# Patient Record
Sex: MALE | Race: WHITE | Employment: OTHER | URBAN - METROPOLITAN AREA
[De-identification: names, ages, dates, MRNs, and addresses within clinical notes are randomized per-mention and may not be internally consistent; named-entity substitution may affect disease eponyms.]

---

## 2019-07-22 PROBLEM — G89.29 CHRONIC LEFT-SIDED LOW BACK PAIN: Status: ACTIVE | Noted: 2019-07-22

## 2019-07-22 PROBLEM — E66.01 OBESITY, MORBID (HCC): Status: ACTIVE | Noted: 2019-07-22

## 2019-07-22 PROBLEM — M54.50 CHRONIC LEFT-SIDED LOW BACK PAIN: Status: ACTIVE | Noted: 2019-07-22

## 2019-07-22 PROBLEM — D64.9 ANEMIA: Status: ACTIVE | Noted: 2019-07-22

## 2019-07-22 PROBLEM — R73.09 ELEVATED GLUCOSE: Status: ACTIVE | Noted: 2019-07-22

## 2019-10-26 ENCOUNTER — APPOINTMENT (OUTPATIENT)
Dept: GENERAL RADIOLOGY | Age: 64
End: 2019-10-26
Attending: EMERGENCY MEDICINE
Payer: MEDICARE

## 2019-10-26 ENCOUNTER — HOSPITAL ENCOUNTER (EMERGENCY)
Age: 64
Discharge: HOME | End: 2019-10-26
Attending: EMERGENCY MEDICINE
Payer: MEDICARE

## 2019-10-26 VITALS
HEART RATE: 70 BPM | WEIGHT: 260 LBS | HEIGHT: 66 IN | DIASTOLIC BLOOD PRESSURE: 79 MMHG | OXYGEN SATURATION: 96 % | TEMPERATURE: 97.8 F | BODY MASS INDEX: 41.78 KG/M2 | SYSTOLIC BLOOD PRESSURE: 136 MMHG | RESPIRATION RATE: 16 BRPM

## 2019-10-26 DIAGNOSIS — M54.50 LEFT-SIDED LOW BACK PAIN WITHOUT SCIATICA, UNSPECIFIED CHRONICITY: Primary | ICD-10-CM

## 2019-10-26 PROCEDURE — 99283 EMERGENCY DEPT VISIT LOW MDM: CPT | Performed by: EMERGENCY MEDICINE

## 2019-10-26 PROCEDURE — 72100 X-RAY EXAM L-S SPINE 2/3 VWS: CPT

## 2019-10-26 RX ORDER — HYDROCODONE BITARTRATE AND ACETAMINOPHEN 5; 325 MG/1; MG/1
1 TABLET ORAL
Qty: 9 TAB | Refills: 0 | Status: SHIPPED | OUTPATIENT
Start: 2019-10-26 | End: 2019-10-29

## 2019-10-26 RX ORDER — CYCLOBENZAPRINE HCL 10 MG
10 TABLET ORAL
Qty: 12 TAB | Refills: 0 | Status: SHIPPED | OUTPATIENT
Start: 2019-10-26 | End: 2020-06-01

## 2019-10-26 NOTE — DISCHARGE INSTRUCTIONS
Rest and use the  pain medications as needed. Follow-up with your doctor or return to the ER for any other acute concerns.

## 2019-10-26 NOTE — ED NOTES
I have reviewed discharge instructions with the patient. The patient verbalized understanding. Patient left ED via Discharge Method: ambulatory to Home with self    Opportunity for questions and clarification provided. Patient given 2 scripts. To continue your aftercare when you leave the hospital, you may receive an automated call from our care team to check in on how you are doing. This is a free service and part of our promise to provide the best care and service to meet your aftercare needs.  If you have questions, or wish to unsubscribe from this service please call [phone]. Thank you for Choosing our [facility] Emergency Department.

## 2019-10-26 NOTE — ED TRIAGE NOTES
Pt ambulatory to triage, slowly. Pt reports a fall a few days ago and he is having lower back pain. Pt has chronic back issues. Pt states he doesn't remember what he hit when he fell, but states he knows he tripped. Pt denies dizziness before or after the fall. Pt denies radiation of pain. Pt states any movement increases the pain.

## 2019-10-26 NOTE — ED PROVIDER NOTES
Patient is a 69-year-old male comes to the emergency department today complaining of left-sided low back pain. He states he does have a history of chronic pain and had a reported fracture in his back many years ago. He usually uses over-the-counter Aleve for pain. 2 or 3 days ago he accidentally tripped in his home and fell forward twisting his back. There was no head injury or loss of consciousness. Later that night he started having worsening pain in the left lower back. No numbness or weakness. No bowel or bladder incontinence. The history is provided by the patient. Fall   The accident occurred more than 2 days ago. The fall occurred while walking. He fell from a height of ground level. He landed on hard floor. There was no blood loss. Pain location: Low back. The pain is moderate. He was not ambulatory at the scene. There was no entrapment after the fall. There was no drug use involved in the accident. There was no alcohol use involved in the accident. Pertinent negatives include no fever, no abdominal pain, no nausea, no loss of consciousness and no laceration. Exacerbated by: Movement. He has tried NSAIDs for the symptoms. The treatment provided no relief. History reviewed. No pertinent past medical history.     Past Surgical History:   Procedure Laterality Date    HX APPENDECTOMY      HX CHOLECYSTECTOMY           Family History:   Problem Relation Age of Onset    Cancer Mother        Social History     Socioeconomic History    Marital status:      Spouse name: Not on file    Number of children: Not on file    Years of education: Not on file    Highest education level: Not on file   Occupational History    Not on file   Social Needs    Financial resource strain: Not on file    Food insecurity:     Worry: Not on file     Inability: Not on file    Transportation needs:     Medical: Not on file     Non-medical: Not on file   Tobacco Use    Smoking status: Never Smoker    Smokeless tobacco: Never Used   Substance and Sexual Activity    Alcohol use: Yes     Comment: socially    Drug use: Never    Sexual activity: Not on file   Lifestyle    Physical activity:     Days per week: Not on file     Minutes per session: Not on file    Stress: Not on file   Relationships    Social connections:     Talks on phone: Not on file     Gets together: Not on file     Attends Sikh service: Not on file     Active member of club or organization: Not on file     Attends meetings of clubs or organizations: Not on file     Relationship status: Not on file    Intimate partner violence:     Fear of current or ex partner: Not on file     Emotionally abused: Not on file     Physically abused: Not on file     Forced sexual activity: Not on file   Other Topics Concern    Not on file   Social History Narrative    Not on file         ALLERGIES: Patient has no known allergies. Review of Systems   Constitutional: Negative for chills, fatigue and fever. HENT: Negative for congestion, rhinorrhea and sore throat. Eyes: Negative for pain, discharge and visual disturbance. Respiratory: Negative for cough and shortness of breath. Cardiovascular: Negative for chest pain and palpitations. Gastrointestinal: Negative for abdominal pain, diarrhea and nausea. Endocrine: Negative for polydipsia and polyuria. Genitourinary: Negative for dysuria, frequency and urgency. Musculoskeletal: Positive for back pain. Negative for neck pain. Skin: Negative for rash. Neurological: Negative for seizures, loss of consciousness, syncope and weakness. Hematological: Negative. Vitals:    10/26/19 1255   BP: 147/84   Pulse: 73   Resp: 16   Temp: 97.8 °F (36.6 °C)   SpO2: 95%   Weight: 117.9 kg (260 lb)   Height: 5' 6\" (1.676 m)            Physical Exam   Constitutional: He is oriented to person, place, and time. He appears well-developed and well-nourished.    HENT:   Head: Normocephalic and atraumatic. Eyes: Pupils are equal, round, and reactive to light. Conjunctivae and EOM are normal.   Neck: Normal range of motion. Neck supple. No midline cervical spine tenderness   Cardiovascular: Normal rate, regular rhythm and intact distal pulses. Pulmonary/Chest: Effort normal and breath sounds normal.   Abdominal: Soft. There is no tenderness. There is no rebound and no guarding. Musculoskeletal: Normal range of motion. He exhibits tenderness. He exhibits no edema. Tender in the left lower back in the left lumbar paraspinal musculature with spasm. Lymphadenopathy:     He has no cervical adenopathy. Neurological: He is alert and oriented to person, place, and time. He has normal strength. No cranial nerve deficit or sensory deficit. GCS eye subscore is 4. GCS verbal subscore is 5. GCS motor subscore is 6. Negative straight leg raise   Skin: Skin is warm and dry. Capillary refill takes less than 2 seconds. No laceration and no rash noted. Nursing note and vitals reviewed. MDM  Number of Diagnoses or Management Options  Diagnosis management comments: 2:08 PM  X-rays of the lumbar spine show an old chronic appearing compression of lumbar #1. This is likely consistent with patient's report of a fracture many years ago. I will prescribe some pain medication and muscle relaxers. He states that his primary care physician has referred him to a pain management specialist and he will be seeing them soon. Voice dictation software was used during the making of this note. This software is not perfect and grammatical and other typographical errors may be present. This note has been proofread, but may still contain errors.   Cristofer Reaves MD; 10/26/2019 @2:08 PM   ===================================================================         Amount and/or Complexity of Data Reviewed  Tests in the radiology section of CPT®: ordered and reviewed  Independent visualization of images, tracings, or specimens: yes    Risk of Complications, Morbidity, and/or Mortality  Presenting problems: low  Diagnostic procedures: low  Management options: low    Patient Progress  Patient progress: stable         Procedures

## 2020-10-26 ENCOUNTER — HOSPITAL ENCOUNTER (EMERGENCY)
Age: 65
Discharge: HOME | End: 2020-10-26
Attending: EMERGENCY MEDICINE | Admitting: EMERGENCY MEDICINE
Payer: MEDICARE

## 2020-10-26 ENCOUNTER — APPOINTMENT (OUTPATIENT)
Dept: GENERAL RADIOLOGY | Age: 65
End: 2020-10-26
Attending: EMERGENCY MEDICINE
Payer: MEDICARE

## 2020-10-26 VITALS
HEIGHT: 67 IN | OXYGEN SATURATION: 96 % | BODY MASS INDEX: 37.67 KG/M2 | RESPIRATION RATE: 18 BRPM | HEART RATE: 94 BPM | SYSTOLIC BLOOD PRESSURE: 177 MMHG | DIASTOLIC BLOOD PRESSURE: 95 MMHG | WEIGHT: 240 LBS | TEMPERATURE: 98 F

## 2020-10-26 DIAGNOSIS — S39.012A BACK STRAIN, INITIAL ENCOUNTER: ICD-10-CM
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
## 2020-10-26 PROCEDURE — 99283 EMERGENCY DEPT VISIT LOW MDM: CPT

## 2020-10-26 PROCEDURE — 74011250637 HC RX REV CODE- 250/637: Performed by: EMERGENCY MEDICINE

## 2020-10-26 PROCEDURE — 72100 X-RAY EXAM L-S SPINE 2/3 VWS: CPT

## 2020-10-26 PROCEDURE — 71046 X-RAY EXAM CHEST 2 VIEWS: CPT

## 2020-10-26 RX ORDER — HYDROCODONE BITARTRATE AND ACETAMINOPHEN 5; 325 MG/1; MG/1
1 TABLET ORAL ONCE
Status: COMPLETED | OUTPATIENT
Start: 2020-10-26 | End: 2020-10-26

## 2020-10-26 RX ORDER — METHOCARBAMOL 500 MG/1
500 TABLET, FILM COATED ORAL
Qty: 16 TAB | Refills: 0 | Status: SHIPPED | OUTPATIENT
Start: 2020-10-26 | End: 2020-12-10 | Stop reason: SDUPTHER

## 2020-10-26 RX ADMIN — HYDROCODONE BITARTRATE AND ACETAMINOPHEN 1 TABLET: 5; 325 TABLET ORAL at 22:05

## 2020-10-27 PROBLEM — E66.01 OBESITY, MORBID (HCC): Status: RESOLVED | Noted: 2019-07-22 | Resolved: 2020-10-27
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
## 2020-10-27 NOTE — ED NOTES
I have reviewed discharge instructions with the patient. The patient verbalized understanding. Patient left ED via Discharge Method: ambulatory to Home with self. Opportunity for questions and clarification provided. Patient given 1 scripts. To continue your aftercare when you leave the hospital, you may receive an automated call from our care team to check in on how you are doing. This is a free service and part of our promise to provide the best care and service to meet your aftercare needs.  If you have questions, or wish to unsubscribe from this service please call [phone]. Thank you for Choosing our [facility] Emergency Department.
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
## 2020-12-01 PROCEDURE — 88312 SPECIAL STAINS GROUP 1: CPT

## 2020-12-01 PROCEDURE — 88341 IMHCHEM/IMCYTCHM EA ADD ANTB: CPT

## 2020-12-01 PROCEDURE — 88342 IMHCHEM/IMCYTCHM 1ST ANTB: CPT

## 2020-12-01 PROCEDURE — 88305 TISSUE EXAM BY PATHOLOGIST: CPT

## 2020-12-02 ENCOUNTER — HOSPITAL ENCOUNTER (OUTPATIENT)
Dept: LAB | Age: 65
Discharge: HOME | End: 2020-12-02

## 2020-12-10 PROBLEM — E66.01 SEVERE OBESITY (HCC): Status: ACTIVE | Noted: 2020-12-10

## 2020-12-22 ENCOUNTER — ANESTHESIA EVENT (OUTPATIENT)
Dept: ENDOSCOPY | Age: 65
End: 2020-12-22
Payer: MEDICARE

## 2020-12-22 RX ORDER — SODIUM CHLORIDE, SODIUM LACTATE, POTASSIUM CHLORIDE, CALCIUM CHLORIDE 600; 310; 30; 20 MG/100ML; MG/100ML; MG/100ML; MG/100ML
100 INJECTION, SOLUTION INTRAVENOUS CONTINUOUS
Status: CANCELLED | OUTPATIENT
Start: 2020-12-22

## 2020-12-22 NOTE — PROGRESS NOTES
Patient stated he would be able to come at 730 am for procedure at 930 am. Confirmed scheduled procedure with patient. Informed patient of time of arrival, entry location, and 1 visitor policy. Patient verbalized understanding. Opportunity for questions provided. No concerns at this time.

## 2020-12-23 ENCOUNTER — HOSPITAL ENCOUNTER (OUTPATIENT)
Age: 65
Setting detail: OUTPATIENT SURGERY
Discharge: HOME | End: 2020-12-23
Attending: INTERNAL MEDICINE | Admitting: INTERNAL MEDICINE
Payer: MEDICARE

## 2020-12-23 ENCOUNTER — ANESTHESIA (OUTPATIENT)
Dept: ENDOSCOPY | Age: 65
End: 2020-12-23
Payer: MEDICARE

## 2020-12-23 VITALS
TEMPERATURE: 97.6 F | BODY MASS INDEX: 35.36 KG/M2 | SYSTOLIC BLOOD PRESSURE: 156 MMHG | HEIGHT: 66 IN | RESPIRATION RATE: 16 BRPM | OXYGEN SATURATION: 97 % | DIASTOLIC BLOOD PRESSURE: 75 MMHG | WEIGHT: 220 LBS | HEART RATE: 62 BPM

## 2020-12-23 PROCEDURE — 88305 TISSUE EXAM BY PATHOLOGIST: CPT

## 2020-12-23 PROCEDURE — 74011000250 HC RX REV CODE- 250: Performed by: REGISTERED NURSE

## 2020-12-23 PROCEDURE — 74011250636 HC RX REV CODE- 250/636: Performed by: REGISTERED NURSE

## 2020-12-23 PROCEDURE — 2709999900 HC NON-CHARGEABLE SUPPLY: Performed by: INTERNAL MEDICINE

## 2020-12-23 PROCEDURE — 77030008969: Performed by: INTERNAL MEDICINE

## 2020-12-23 PROCEDURE — 76060000032 HC ANESTHESIA 0.5 TO 1 HR: Performed by: INTERNAL MEDICINE

## 2020-12-23 PROCEDURE — 76040000026: Performed by: INTERNAL MEDICINE

## 2020-12-23 PROCEDURE — 74011250636 HC RX REV CODE- 250/636: Performed by: ANESTHESIOLOGY

## 2020-12-23 PROCEDURE — 77030028690 HC NDL ASPIR ULTRSND BSC -E: Performed by: INTERNAL MEDICINE

## 2020-12-23 RX ORDER — PROPOFOL 10 MG/ML
INJECTION, EMULSION INTRAVENOUS
Status: DISCONTINUED | OUTPATIENT
Start: 2020-12-23 | End: 2020-12-23 | Stop reason: HOSPADM

## 2020-12-23 RX ORDER — SODIUM CHLORIDE, SODIUM LACTATE, POTASSIUM CHLORIDE, CALCIUM CHLORIDE 600; 310; 30; 20 MG/100ML; MG/100ML; MG/100ML; MG/100ML
100 INJECTION, SOLUTION INTRAVENOUS CONTINUOUS
Status: DISCONTINUED | OUTPATIENT
Start: 2020-12-23 | End: 2020-12-23 | Stop reason: HOSPADM

## 2020-12-23 RX ORDER — LIDOCAINE HYDROCHLORIDE 20 MG/ML
INJECTION, SOLUTION EPIDURAL; INFILTRATION; INTRACAUDAL; PERINEURAL AS NEEDED
Status: DISCONTINUED | OUTPATIENT
Start: 2020-12-23 | End: 2020-12-23 | Stop reason: HOSPADM

## 2020-12-23 RX ORDER — PROPOFOL 10 MG/ML
INJECTION, EMULSION INTRAVENOUS AS NEEDED
Status: DISCONTINUED | OUTPATIENT
Start: 2020-12-23 | End: 2020-12-23 | Stop reason: HOSPADM

## 2020-12-23 RX ADMIN — LIDOCAINE HYDROCHLORIDE 50 MG: 20 INJECTION, SOLUTION EPIDURAL; INFILTRATION; INTRACAUDAL; PERINEURAL at 09:05

## 2020-12-23 RX ADMIN — SODIUM CHLORIDE, SODIUM LACTATE, POTASSIUM CHLORIDE, AND CALCIUM CHLORIDE 100 ML/HR: 600; 310; 30; 20 INJECTION, SOLUTION INTRAVENOUS at 08:07

## 2020-12-23 RX ADMIN — PROPOFOL 50 MG: 10 INJECTION, EMULSION INTRAVENOUS at 09:05

## 2020-12-23 RX ADMIN — PROPOFOL 140 MCG/KG/MIN: 10 INJECTION, EMULSION INTRAVENOUS at 09:05

## 2020-12-23 NOTE — ANESTHESIA PREPROCEDURE EVALUATION
Relevant Problems   ENDOCRINE   (+) Severe obesity (HCC)      HEMATOLOGY   (+) Anemia       Anesthetic History   No history of anesthetic complications            Review of Systems / Medical History  Pertinent labs reviewed    Pulmonary          Undiagnosed apnea         Neuro/Psych   Within defined limits           Cardiovascular    Hypertension              Exercise tolerance: <4 METS     GI/Hepatic/Renal     GERD: well controlled           Endo/Other        Obesity, cancer (esophageal) and anemia     Other Findings            Physical Exam    Airway  Mallampati: II  TM Distance: 4 - 6 cm  Neck ROM: normal range of motion, short neck   Mouth opening: Normal     Cardiovascular  Regular rate and rhythm,  S1 and S2 normal,  no murmur, click, rub, or gallop             Dental  No notable dental hx       Pulmonary  Breath sounds clear to auscultation               Abdominal  GI exam deferred       Other Findings            Anesthetic Plan    ASA: 3  Anesthesia type: total IV anesthesia          Induction: Intravenous  Anesthetic plan and risks discussed with: Patient

## 2020-12-23 NOTE — H&P
History and Physical for Endoscopic Ultrasound             Date: 2020     History of Present Illness:  Patient presents to undergo endoscopic ultrasound for locoregional staging of esophageal cancer. Patient reports dysphagia, but denies any diarrhea, jaundice, fevers or chills. Past Medical History:   Diagnosis Date    Back pain     followed by pain management    Chronic pain     left-sided, lower back pain    Erectile dysfunction     Gastritis     GERD (gastroesophageal reflux disease)     Hypertension     situational; has Amlodipine to take if systolic >205    Malignant neoplasm of esophagus ([address] 2020    Morbid obesity (HCC)     Nausea     takes antiemetic med prn    Nausea      Past Surgical History:   Procedure Laterality Date    HX APPENDECTOMY      HX CHOLECYSTECTOMY      HX COLONOSCOPY  2020    with EGD      Family History   Problem Relation Age of Onset    Cancer Mother     No Known Problems Father      Social History     Tobacco Use    Smoking status: Former Smoker     Packs/day: 1.50     Years: 15.00     Pack years: 22.50     Quit date: 1974     Years since quittin.0    Smokeless tobacco: Never Used   Substance Use Topics    Alcohol use: Yes     Comment: socially        No Known Allergies  No current facility-administered medications for this encounter. Current Outpatient Medications   Medication Sig    oxyCODONE IR (ROXICODONE) 5 mg immediate release tablet Take 5 mg by mouth every four (4) hours as needed for Pain. Take / use AM day of surgery  per anesthesia protocols, if needed  Indications: pain    omeprazole (PRILOSEC) 40 mg capsule Take 1 Cap by mouth daily. (Patient taking differently: Take 40 mg by mouth daily. Take / use AM day of surgery  per anesthesia protocols. Indications: gastroesophageal reflux disease)    famotidine (PEPCID) 20 mg tablet Take 1 Tab by mouth two (2) times a day.  (Patient taking differently: Take 20 mg by mouth two (2) times a day. Take / use AM day of surgery  per anesthesia protocols. Indications: gastroesophageal reflux disease)    ondansetron (ZOFRAN ODT) 4 mg disintegrating tablet Take 1 Tab by mouth every eight (8) hours as needed for Nausea or Vomiting. (Patient taking differently: Take 4 mg by mouth every eight (8) hours as needed for Nausea or Vomiting. Take / use AM day of surgery  per anesthesia protocols, if needed)    promethazine (PHENERGAN) 25 mg tablet Take 1 Tab by mouth every six (6) hours as needed for Nausea.  amLODIPine (NORVASC) 5 mg tablet Take 1 Tab by mouth daily. (Patient taking differently: Take 5 mg by mouth as needed. Take if systolic bp >365 ~prn during stressful situation; Take / use AM day of surgery  per anesthesia protocols, if needed  Indications: high blood pressure)    sildenafil citrate (VIAGRA) 100 mg tablet Take 1 Tab by mouth as needed (erectile dysfunction).  naproxen sodium (ALEVE) 220 mg cap Take  by mouth as needed. For back pain  Indications: pain    DISABLED PLACARD (DISABLED PLACARD) DMV Handicap Placard, for poor ambuation. Review of Systems:  A detailed 10 organ review of systems is obtained with pertinent positives as listed in the History of Present Illness. All others are negative. Objective:     Physical Exam:  There were no vitals taken for this visit. General:  Alert and oriented. Heart: Regular rate and rhythm  Lungs:  Clear to auscultation bilaterally  Abdomen: Soft, nontender, nondistended    Impression/Plan:     Proceed with EUS as planned. I have discussed with the patient the technique, benefits, alternatives, and risks of the procedure, including medication reaction, immediate or delayed bleeding, or perforation of the gastrointestinal tract.     Signed By: Ann Marie Buchanan MD     December 23, 2020

## 2020-12-23 NOTE — PROGRESS NOTES
Spiritual Care visit. Initial Visit, Pre Surgery Consult. Attempted to visit and pray before patient went to surgery. He was taken shortly before  arrived.     Visit by Morgan Samano M.Ed., Th.B. ,Staff

## 2020-12-23 NOTE — ANESTHESIA POSTPROCEDURE EVALUATION
Procedure(s):  ENDOSCOPIC ULTRASOUND (EUS)// 36  FINE NEEDLE ASPIRATION.     total IV anesthesia    Anesthesia Post Evaluation        Patient location during evaluation: PACU  Patient participation: complete - patient participated  Level of consciousness: awake  Pain management: satisfactory to patient  Airway patency: patent  Anesthetic complications: no  Cardiovascular status: hemodynamically stable  Respiratory status: spontaneous ventilation  Hydration status: euvolemic  Post anesthesia nausea and vomiting:  none      INITIAL Post-op Vital signs:   Vitals Value Taken Time   /75 12/23/20 1011   Temp 36.4 °C (97.6 °F) 12/23/20 0943   Pulse 62 12/23/20 1011   Resp 16 12/23/20 1001   SpO2 97 % 12/23/20 1011

## 2020-12-23 NOTE — OP NOTES
Gastroenterology Procedure Note              Procedure:  Endoscopic ultrasound with Doppler and FNA    Date of Procedure:  12/23/2020    Patient:  Gloria Mayo     1955    Indication:  Locoregional staging of esophageal cancer, pancreatic mass on CT     Sedation:  MAC    Pre-Procedure Physical Exam:    Mental status:  alert and oriented  Airway:  normal oropharyngeal airway and neck mobility  CV:  regular rate and rhythm  Respiratory:  clear to auscultation    Procedure:  A History and Physical has been performed, and patient medication allergies have been reviewed. Risks of perforation, hemorrhage, adverse drug reaction, and aspiration were discussed. Informed consent was obtained for the procedure, including sedation. The patient was placed in the left lateral decubitus position. The heart rate, oxygen saturations, blood pressure, and response to care were monitored throughout the procedure. The radial echoendoscope was passed through the mouth and advanced under direct vision to the distal duodenum. As the scope was slowly withdrawn, detailed endoscopic images were obtained from the surrounding organs. The linear echoendoscope was then used for further examination and FNA. The patient tolerated the procedure well. Findings:     ENDOSCOPIC FINDINGS:  Limited views of the mucosa with the echoendoscope reveals a malignant esophageal stricture and hiatal hernia. The tumor does not traverse the GE junction. There are no other gross abnormalities of the stomach or proximal duodenum. The ampulla is well visualized endoscopically and appears normal.     ESOPHAGUS:  The balloon was insufflated to improve acoustic coupling. At a distance 30 to 36 cm from the incisors, there is 60% circumferential wall thickening to 14 mm maximally. There is a hypoechoic, heterogeneous mass that involves the esophageal mucosa, submucosa and muscularis propria.  There are a few small tumor pseudopodia extending through the muscularis propria. There is no invasion of adjacent organs. There are no pathologically enlarged periesophageal lymph nodes. STOMACH:  Multiple sweeps were made throughout the stomach visualizing the entire wall from the pylorus to the gastroesophageal junction. The entire gastric wall is of normal thickness and normal wall architecture. PANCREAS:  The pancreas is well-visualized from head to tail. In the body of the pancreas, there is a large mass with maximal dimensions in a single echoplane of 34 x 31 mm, hypoechoic, heterogenous mass. The mass abuts the splenic vein without distortion of vascular contour or associated venous occlusion. There is encasement at the bifurcation of hepatic and splenic branches of the celiac artery. The pancreatic duct is compressed with upstream dilation in the tail to 6 mm maximally. Fine needle aspiration of the mass was performed using a 22-gauge SYSCO needle. Two passes were made, yielding core specimens. BILIARY TREE: The gallbladder is absent. The common bile duct is well-visualized from its insertion in the ampulla to the bifurcation of right and left hepatic ducts. It is non-dilated, measuring up to 7 mm maximally with a gradual taper down to the level of the ampulla. There are no intraductal stones, sludge or debris. The ampulla appears normal endosonographically. OTHER ORGANS:  Views of the left lobe of the liver demonstrate no solid mass lesions. There is no ascites in the upper abdomen. The left adrenal gland appears normal. There are no pathologically enlarged posterior mediastinal or upper abdominal lymph nodes. Specimen:  Yes    Estimated Blood Loss:  3 ml    Implant:  None           Impression:    1. Esophageal cancer. This is staged T3N0Mx based on endoscopic ultrasound criteria. 2. Hiatal hernia. 3. Pancreatic mass.  This is concerning for an unresectable neoplasm, either from a second primary malignancy or metastatic disease. Plan:  1. Follow up results of pathology. 2. Avoid NSAIDs for 48 hours. 3. Further recommendations will be based on pathology results and patient's clinical course.     Signed:  Amanda Bright MD  12/23/2020  8:51 AM

## 2020-12-23 NOTE — ROUTINE PROCESS
VSS. No complaints noted. Education given and reviewed with son. Patient wheeled out via wheelchair by Kelton Desiraimee Smiley.

## 2020-12-23 NOTE — DISCHARGE INSTRUCTIONS
1. Follow up results of pathology. 2. Avoid NSAIDs for 48 hours. 3. Further recommendations will be based on pathology results and patient's clinical course. Endoscopic Ultrasound (Oral): What to Expect At Home  Your Recovery  After you have an endoscopic ultrasound--a test to look for problems in the stomach, liver, gallbladder, and other organs--you will stay at the hospital or clinic for 1 to 2 hours. This will allow the medicine to wear off. You will be able to go home after your doctor or nurse checks to make sure you are not having any problems. You may have a sore throat for a day or two after the test.  This care sheet gives you a general idea about what to expect after the test.  How can you care for yourself at home? Activity    · Rest as much as you need to after you go home.     · You should be able to go back to your usual activities the day after the test.   Diet    · Follow your doctor's directions for eating after the test.     · Drink plenty of fluids (unless your doctor has told you not to). Medicines    · If you have a sore throat the day after the test, use an over-the-counter spray to numb your throat. Other instructions    · Ask your doctor when you can drive again.     · Do not sign legal documents or make major decisions until the medicine effects are gone and you can think clearly. The anesthesia medicine can make it hard for you to fully understand what you are agreeing to. Follow-up care is a key part of your treatment and safety. Be sure to make and go to all appointments, and call your doctor if you are having problems. It's also a good idea to know your test results and keep a list of the medicines you take. When should you call for help? Call 911 anytime you think you may need emergency care. For example, call if:    · You passed out (lost consciousness).     · You have trouble breathing. Call your doctor now or seek immediate medical care if:    · You are vomiting.   · You have new or worse belly pain.     · You have a fever.     · You cannot pass stools or gas. Watch closely for any changes in your health, and be sure to contact your doctor if:    · You do not get better as expected. Where can you learn more? Go to http://www.gray.com/  Enter H719 in the search box to learn more about \"Endoscopic Ultrasound (Oral): What to Expect At Home. \"  Current as of: April 15, 2020               Content Version: 12.6  © 2006-2020 [employer]. Care instructions adapted under license by [employer] (which disclaims liability or warranty for this information). If you have questions about a medical condition or this instruction, always ask your healthcare professional. [address] any warranty or liability for your use of this information.

## 2020-12-29 ENCOUNTER — HOSPITAL ENCOUNTER (OUTPATIENT)
Dept: LAB | Age: 65
Discharge: HOME | End: 2020-12-29
Payer: MEDICARE

## 2020-12-29 ENCOUNTER — PATIENT OUTREACH (OUTPATIENT)
Dept: CASE MANAGEMENT | Age: 65
End: 2020-12-29

## 2020-12-29 DIAGNOSIS — C25.9 MALIGNANT NEOPLASM OF PANCREAS, UNSPECIFIED LOCATION OF MALIGNANCY (HCC): ICD-10-CM

## 2020-12-29 DIAGNOSIS — C14.0: ICD-10-CM

## 2020-12-29 LAB
ALBUMIN SERPL-MCNC: 3.9 G/DL (ref 3.2–4.6)
ALBUMIN/GLOB SERPL: 1 {RATIO} (ref 1.2–3.5)
ALP SERPL-CCNC: 65 U/L (ref 50–136)
ALT SERPL-CCNC: 14 U/L (ref 12–65)
ANION GAP SERPL CALC-SCNC: 7 MMOL/L (ref 7–16)
AST SERPL-CCNC: 12 U/L (ref 15–37)
BASOPHILS # BLD: 0 K/UL (ref 0–0.2)
BASOPHILS NFR BLD: 1 % (ref 0–2)
BILIRUB SERPL-MCNC: 0.4 MG/DL (ref 0.2–1.1)
BUN SERPL-MCNC: 15 MG/DL (ref 8–23)
CALCIUM SERPL-MCNC: 9.8 MG/DL (ref 8.3–10.4)
CANCER AG19-9 SERPL-ACNC: 63.9 U/ML (ref 2–37)
CEA SERPL-MCNC: 4.6 NG/ML (ref 0–3)
CHLORIDE SERPL-SCNC: 106 MMOL/L (ref 98–107)
CO2 SERPL-SCNC: 26 MMOL/L (ref 21–32)
CREAT SERPL-MCNC: 0.9 MG/DL (ref 0.8–1.5)
DIFFERENTIAL METHOD BLD: ABNORMAL
EOSINOPHIL # BLD: 0.2 K/UL (ref 0–0.8)
EOSINOPHIL NFR BLD: 4 % (ref 0.5–7.8)
ERYTHROCYTE [DISTWIDTH] IN BLOOD BY AUTOMATED COUNT: 15.3 % (ref 11.9–14.6)
GLOBULIN SER CALC-MCNC: 3.9 G/DL (ref 2.3–3.5)
GLUCOSE SERPL-MCNC: 137 MG/DL (ref 65–100)
HCT VFR BLD AUTO: 44.4 % (ref 41.1–50.3)
HGB BLD-MCNC: 13.6 G/DL (ref 13.6–17.2)
IMM GRANULOCYTES # BLD AUTO: 0.1 K/UL (ref 0–0.5)
IMM GRANULOCYTES NFR BLD AUTO: 1 % (ref 0–5)
LYMPHOCYTES # BLD: 0.9 K/UL (ref 0.5–4.6)
LYMPHOCYTES NFR BLD: 16 % (ref 13–44)
MCH RBC QN AUTO: 25.1 PG (ref 26.1–32.9)
MCHC RBC AUTO-ENTMCNC: 30.6 G/DL (ref 31.4–35)
MCV RBC AUTO: 82.1 FL (ref 79.6–97.8)
MONOCYTES # BLD: 0.7 K/UL (ref 0.1–1.3)
MONOCYTES NFR BLD: 11 % (ref 4–12)
NEUTS SEG # BLD: 3.9 K/UL (ref 1.7–8.2)
NEUTS SEG NFR BLD: 67 % (ref 43–78)
NRBC # BLD: 0 K/UL (ref 0–0.2)
PLATELET # BLD AUTO: 235 K/UL (ref 150–450)
PMV BLD AUTO: 10 FL (ref 9.4–12.3)
POTASSIUM SERPL-SCNC: 4.2 MMOL/L (ref 3.5–5.1)
PROT SERPL-MCNC: 7.8 G/DL (ref 6.3–8.2)
RBC # BLD AUTO: 5.41 M/UL (ref 4.23–5.67)
SODIUM SERPL-SCNC: 139 MMOL/L (ref 136–145)
WBC # BLD AUTO: 5.8 K/UL (ref 4.3–11.1)

## 2020-12-29 PROCEDURE — 36415 COLL VENOUS BLD VENIPUNCTURE: CPT

## 2020-12-29 PROCEDURE — 82378 CARCINOEMBRYONIC ANTIGEN: CPT

## 2020-12-29 PROCEDURE — 86301 IMMUNOASSAY TUMOR CA 19-9: CPT

## 2020-12-29 PROCEDURE — 80053 COMPREHEN METABOLIC PANEL: CPT

## 2020-12-29 PROCEDURE — 85025 COMPLETE CBC W/AUTO DIFF WBC: CPT

## 2021-01-05 PROBLEM — C25.1 MALIGNANT NEOPLASM OF BODY OF PANCREAS (HCC): Status: ACTIVE | Noted: 2021-01-05

## 2021-01-05 PROBLEM — C15.5 MALIGNANT NEOPLASM OF LOWER THIRD OF ESOPHAGUS (HCC): Status: ACTIVE | Noted: 2021-01-05

## 2021-01-14 ENCOUNTER — HOSPITAL ENCOUNTER (OUTPATIENT)
Dept: PET IMAGING | Age: 66
Discharge: HOME | End: 2021-01-14
Attending: INTERNAL MEDICINE
Payer: MEDICARE

## 2021-01-14 DIAGNOSIS — C15.5 MALIGNANT NEOPLASM OF LOWER THIRD OF ESOPHAGUS (HCC): ICD-10-CM

## 2021-01-14 DIAGNOSIS — C25.9 MALIGNANT NEOPLASM OF PANCREAS, UNSPECIFIED LOCATION OF MALIGNANCY (HCC): ICD-10-CM

## 2021-01-14 PROCEDURE — A9552 F18 FDG: HCPCS

## 2021-01-14 PROCEDURE — 74011000636 HC RX REV CODE- 636: Performed by: INTERNAL MEDICINE

## 2021-01-14 RX ORDER — SODIUM CHLORIDE 0.9 % (FLUSH) 0.9 %
10 SYRINGE (ML) INJECTION
Status: COMPLETED | OUTPATIENT
Start: 2021-01-14 | End: 2021-01-14

## 2021-01-14 RX ADMIN — DIATRIZOATE MEGLUMINE AND DIATRIZOATE SODIUM 10 ML: 660; 100 LIQUID ORAL; RECTAL at 12:03

## 2021-01-14 RX ADMIN — Medication 10 ML: at 12:03

## 2021-01-15 ENCOUNTER — HOSPITAL ENCOUNTER (OUTPATIENT)
Dept: LAB | Age: 66
Discharge: HOME | End: 2021-01-15
Payer: MEDICARE

## 2021-01-15 ENCOUNTER — PATIENT OUTREACH (OUTPATIENT)
Dept: CASE MANAGEMENT | Age: 66
End: 2021-01-15

## 2021-01-15 DIAGNOSIS — C15.5 MALIGNANT NEOPLASM OF LOWER THIRD OF ESOPHAGUS (HCC): ICD-10-CM

## 2021-01-15 DIAGNOSIS — C25.9 MALIGNANT NEOPLASM OF PANCREAS, UNSPECIFIED LOCATION OF MALIGNANCY (HCC): ICD-10-CM

## 2021-01-15 DIAGNOSIS — G89.3 CANCER RELATED PAIN: Primary | ICD-10-CM

## 2021-01-15 LAB
ALBUMIN SERPL-MCNC: 4.2 G/DL (ref 3.2–4.6)
ALBUMIN/GLOB SERPL: 1.1 {RATIO} (ref 1.2–3.5)
ALP SERPL-CCNC: 65 U/L (ref 50–136)
ALT SERPL-CCNC: 19 U/L (ref 12–65)
ANION GAP SERPL CALC-SCNC: 7 MMOL/L (ref 7–16)
AST SERPL-CCNC: 13 U/L (ref 15–37)
BASOPHILS # BLD: 0 K/UL (ref 0–0.2)
BASOPHILS NFR BLD: 1 % (ref 0–2)
BILIRUB SERPL-MCNC: 0.6 MG/DL (ref 0.2–1.1)
BUN SERPL-MCNC: 14 MG/DL (ref 8–23)
CALCIUM SERPL-MCNC: 10.4 MG/DL (ref 8.3–10.4)
CEA SERPL-MCNC: 5 NG/ML (ref 0–3)
CHLORIDE SERPL-SCNC: 105 MMOL/L (ref 98–107)
CO2 SERPL-SCNC: 27 MMOL/L (ref 21–32)
CREAT SERPL-MCNC: 0.9 MG/DL (ref 0.8–1.5)
DIFFERENTIAL METHOD BLD: ABNORMAL
EOSINOPHIL # BLD: 0.2 K/UL (ref 0–0.8)
EOSINOPHIL NFR BLD: 4 % (ref 0.5–7.8)
ERYTHROCYTE [DISTWIDTH] IN BLOOD BY AUTOMATED COUNT: 16.4 % (ref 11.9–14.6)
GLOBULIN SER CALC-MCNC: 3.8 G/DL (ref 2.3–3.5)
GLUCOSE SERPL-MCNC: 153 MG/DL (ref 65–100)
HCT VFR BLD AUTO: 47.5 % (ref 41.1–50.3)
HGB BLD-MCNC: 14.5 G/DL (ref 13.6–17.2)
IMM GRANULOCYTES # BLD AUTO: 0 K/UL (ref 0–0.5)
IMM GRANULOCYTES NFR BLD AUTO: 0 % (ref 0–5)
LYMPHOCYTES # BLD: 0.8 K/UL (ref 0.5–4.6)
LYMPHOCYTES NFR BLD: 15 % (ref 13–44)
MCH RBC QN AUTO: 25.5 PG (ref 26.1–32.9)
MCHC RBC AUTO-ENTMCNC: 30.5 G/DL (ref 31.4–35)
MCV RBC AUTO: 83.5 FL (ref 79.6–97.8)
MONOCYTES # BLD: 0.6 K/UL (ref 0.1–1.3)
MONOCYTES NFR BLD: 12 % (ref 4–12)
NEUTS SEG # BLD: 3.7 K/UL (ref 1.7–8.2)
NEUTS SEG NFR BLD: 69 % (ref 43–78)
NRBC # BLD: 0 K/UL (ref 0–0.2)
PLATELET # BLD AUTO: 230 K/UL (ref 150–450)
PMV BLD AUTO: 10.1 FL (ref 9.4–12.3)
POTASSIUM SERPL-SCNC: 4 MMOL/L (ref 3.5–5.1)
PROT SERPL-MCNC: 8 G/DL (ref 6.3–8.2)
RBC # BLD AUTO: 5.69 M/UL (ref 4.23–5.67)
SODIUM SERPL-SCNC: 139 MMOL/L (ref 136–145)
WBC # BLD AUTO: 5.4 K/UL (ref 4.3–11.1)

## 2021-01-15 PROCEDURE — 85025 COMPLETE CBC W/AUTO DIFF WBC: CPT

## 2021-01-15 PROCEDURE — 82378 CARCINOEMBRYONIC ANTIGEN: CPT

## 2021-01-15 PROCEDURE — 36415 COLL VENOUS BLD VENIPUNCTURE: CPT

## 2021-01-15 PROCEDURE — 80053 COMPREHEN METABOLIC PANEL: CPT

## 2021-01-15 NOTE — PROGRESS NOTES
Saw patient for esophageal and pancreatic cancer. Both tissue sent for NGS testing. Pt agrees to start Folfirinox. Dr Sean Richmond considered pancreatic mass unresectable but willing to reevaluate if shrunk, arranged PETwhich showed peritoneal avidity c/w metastasis, discussed with pt this was considered stage IV unless proven otherwise, arrange diagnostic laproscopy for peritoneal carcinomatosis and biopsy for origin, which I suspect being pancreatic, tramadol prn abd pain, RTC in 2 weeks to follow outcome and start FOLFIRINOX, send molecular test Caris/Temjoseus for both esophageal and pancreatic cancer, call as needed. Encouraged to call with questions. Navigation will continue to follow.

## 2021-01-26 NOTE — PROGRESS NOTES
I spoke with Mr. Lianne Simmons regarding his insurance coverage, potential oral medication authorizations, enrollment in the [address] [place], [place] [address] ([facility]) and the [facility] [address] ([address] [place] [address]), and assistance organization resource sheet. Mr. Lianne Simmons has [facility] and Medicaid insurances. He is well covered for approved major medical claims. I went over the LPOA document with Mr. Lianne Simmons and the benefits of putting it on file. He reviewed the document and signed it. I gave Mr. Lianne Simmons the [address] participation letter. I let him know that his estimated 6-month patient responsibility after Medicare paid is $6,917.00. Mr. Lianne Simmons has his Medicaid to help with that amount. Next, I spoke with Mr. Lianne Simmons regarding potential oral medication authorizations. I told him that if he ever had any problems getting his oral medications filled to give the dedicated [facility] , Shara Guevara, a call. Most of the time, it is simply an authorization that needs to be done with the insurance company. Next, I spoke with Mr. Lianne Simmons regarding enrolling with [facility] and [facility]. I went over some of the services that [facility] and [facility] offers and the enrollment process. Next, I gave Mr. Lianne Simmons flyers about the free Yoga classes for cancer survivors and the Oncology Massage program.  I let him know that he can get a free 30 minute massage. Lastly, I gave Mr. Lianne Simmons a form with various resource organizations that could assist with specific needs (example:  transportation, lodging, preparing meals, home cleaning)     Emailed Patient Referral form to the [address] at [email or web address]. Phone [phone]. Form scanned into chart. Faxed Physician's Statement to the [address] at [phone]. Phone [phone]. Form scanned into chart. Patient expressed understanding of the information above and all questions were answered to his satisfaction. LPOA will be scanned into chart.

## 2021-02-02 ENCOUNTER — HOSPITAL ENCOUNTER (OUTPATIENT)
Dept: INFUSION THERAPY | Age: 66
End: 2021-02-02

## 2021-02-04 ENCOUNTER — APPOINTMENT (OUTPATIENT)
Dept: INFUSION THERAPY | Age: 66
End: 2021-02-04

## 2021-02-05 ENCOUNTER — HOSPITAL ENCOUNTER (OUTPATIENT)
Dept: GENERAL RADIOLOGY | Age: 66
Discharge: HOME | End: 2021-02-05
Attending: SURGERY
Payer: MEDICARE

## 2021-02-05 ENCOUNTER — HOSPITAL ENCOUNTER (OUTPATIENT)
Dept: SURGERY | Age: 66
Discharge: HOME | End: 2021-02-05
Payer: MEDICARE

## 2021-02-05 VITALS
RESPIRATION RATE: 16 BRPM | HEART RATE: 83 BPM | OXYGEN SATURATION: 99 % | HEIGHT: 66 IN | TEMPERATURE: 97.7 F | DIASTOLIC BLOOD PRESSURE: 93 MMHG | WEIGHT: 201 LBS | SYSTOLIC BLOOD PRESSURE: 155 MMHG | BODY MASS INDEX: 32.3 KG/M2

## 2021-02-05 LAB
ALBUMIN SERPL-MCNC: 3.9 G/DL (ref 3.2–4.6)
ALBUMIN/GLOB SERPL: 1.1 {RATIO} (ref 1.2–3.5)
ALP SERPL-CCNC: 74 U/L (ref 50–136)
ALT SERPL-CCNC: 21 U/L (ref 12–65)
ANION GAP SERPL CALC-SCNC: 6 MMOL/L (ref 7–16)
APPEARANCE UR: ABNORMAL
AST SERPL-CCNC: 16 U/L (ref 15–37)
ATRIAL RATE: 71 BPM
BACTERIA URNS QL MICRO: 0 /HPF
BASOPHILS # BLD: 0 K/UL (ref 0–0.2)
BASOPHILS NFR BLD: 1 % (ref 0–2)
BILIRUB SERPL-MCNC: 0.5 MG/DL (ref 0.2–1.1)
BILIRUB UR QL: ABNORMAL
BUN SERPL-MCNC: 16 MG/DL (ref 8–23)
CALCIUM SERPL-MCNC: 10 MG/DL (ref 8.3–10.4)
CALCULATED P AXIS, ECG09: 47 DEGREES
CALCULATED R AXIS, ECG10: -19 DEGREES
CALCULATED T AXIS, ECG11: 82 DEGREES
CASTS URNS QL MICRO: ABNORMAL /LPF
CHLORIDE SERPL-SCNC: 107 MMOL/L (ref 98–107)
CO2 SERPL-SCNC: 29 MMOL/L (ref 21–32)
COLOR UR: ABNORMAL
CREAT SERPL-MCNC: 0.85 MG/DL (ref 0.8–1.5)
DIAGNOSIS, 93000: NORMAL
DIFFERENTIAL METHOD BLD: ABNORMAL
EOSINOPHIL # BLD: 0.3 K/UL (ref 0–0.8)
EOSINOPHIL NFR BLD: 5 % (ref 0.5–7.8)
EPI CELLS #/AREA URNS HPF: ABNORMAL /HPF
ERYTHROCYTE [DISTWIDTH] IN BLOOD BY AUTOMATED COUNT: 17.6 % (ref 11.9–14.6)
GLOBULIN SER CALC-MCNC: 3.7 G/DL (ref 2.3–3.5)
GLUCOSE SERPL-MCNC: 106 MG/DL (ref 65–100)
GLUCOSE UR STRIP.AUTO-MCNC: NEGATIVE MG/DL
HCT VFR BLD AUTO: 45.7 % (ref 41.1–50.3)
HGB BLD-MCNC: 14.4 G/DL (ref 13.6–17.2)
HGB UR QL STRIP: NEGATIVE
IMM GRANULOCYTES # BLD AUTO: 0 K/UL (ref 0–0.5)
IMM GRANULOCYTES NFR BLD AUTO: 0 % (ref 0–5)
INR PPP: 1.2
KETONES UR QL STRIP.AUTO: ABNORMAL MG/DL
LEUKOCYTE ESTERASE UR QL STRIP.AUTO: NEGATIVE
LYMPHOCYTES # BLD: 0.9 K/UL (ref 0.5–4.6)
LYMPHOCYTES NFR BLD: 18 % (ref 13–44)
MCH RBC QN AUTO: 26.6 PG (ref 26.1–32.9)
MCHC RBC AUTO-ENTMCNC: 31.5 G/DL (ref 31.4–35)
MCV RBC AUTO: 84.5 FL (ref 79.6–97.8)
MONOCYTES # BLD: 0.6 K/UL (ref 0.1–1.3)
MONOCYTES NFR BLD: 12 % (ref 4–12)
MUCOUS THREADS URNS QL MICRO: ABNORMAL /LPF
NEUTS SEG # BLD: 3.3 K/UL (ref 1.7–8.2)
NEUTS SEG NFR BLD: 64 % (ref 43–78)
NITRITE UR QL STRIP.AUTO: NEGATIVE
NRBC # BLD: 0 K/UL (ref 0–0.2)
OTHER OBSERVATIONS,UCOM: ABNORMAL
P-R INTERVAL, ECG05: 142 MS
PH UR STRIP: 5.5 [PH] (ref 5–9)
PLATELET # BLD AUTO: 213 K/UL (ref 150–450)
PMV BLD AUTO: 10.4 FL (ref 9.4–12.3)
POTASSIUM SERPL-SCNC: 4.2 MMOL/L (ref 3.5–5.1)
PROT SERPL-MCNC: 7.6 G/DL (ref 6.3–8.2)
PROT UR STRIP-MCNC: ABNORMAL MG/DL
PROTHROMBIN TIME: 15.1 SEC (ref 12.5–14.7)
Q-T INTERVAL, ECG07: 422 MS
QRS DURATION, ECG06: 146 MS
QTC CALCULATION (BEZET), ECG08: 458 MS
RBC # BLD AUTO: 5.41 M/UL (ref 4.23–5.6)
RBC #/AREA URNS HPF: ABNORMAL /HPF
SODIUM SERPL-SCNC: 142 MMOL/L (ref 136–145)
SP GR UR REFRACTOMETRY: 1.03 (ref 1–1.02)
UROBILINOGEN UR QL STRIP.AUTO: 1 EU/DL (ref 0.2–1)
VENTRICULAR RATE, ECG03: 71 BPM
WBC # BLD AUTO: 5.2 K/UL (ref 4.3–11.1)
WBC URNS QL MICRO: ABNORMAL /HPF

## 2021-02-05 PROCEDURE — 85025 COMPLETE CBC W/AUTO DIFF WBC: CPT

## 2021-02-05 PROCEDURE — 71046 X-RAY EXAM CHEST 2 VIEWS: CPT

## 2021-02-05 PROCEDURE — 81003 URINALYSIS AUTO W/O SCOPE: CPT

## 2021-02-05 PROCEDURE — 80053 COMPREHEN METABOLIC PANEL: CPT

## 2021-02-05 PROCEDURE — 85610 PROTHROMBIN TIME: CPT

## 2021-02-05 PROCEDURE — 93005 ELECTROCARDIOGRAM TRACING: CPT

## 2021-02-05 RX ORDER — HYDROCODONE BITARTRATE AND ACETAMINOPHEN 5; 325 MG/1; MG/1
TABLET ORAL
Status: ON HOLD | COMMUNITY
End: 2021-02-10 | Stop reason: SDUPTHER

## 2021-02-05 NOTE — PERIOP NOTES
Recent Results (from the past 12 hour(s))   URINALYSIS W/ RFLX MICROSCOPIC    Collection Time: 02/05/21 12:00 PM   Result Value Ref Range    Color WHITNEY      Appearance HAZY      Specific gravity 1.027 (H) 1.001 - 1.023      pH (UA) 5.5 5.0 - 9.0      Protein TRACE (A) NEG mg/dL    Glucose Negative mg/dL    Ketone TRACE (A) NEG mg/dL    Bilirubin SMALL (A) NEG      Blood Negative NEG      Urobilinogen 1.0 0.2 - 1.0 EU/dL    Nitrites Negative NEG      Leukocyte Esterase Negative NEG      WBC 0-3 0 /hpf    RBC 0-3 0 /hpf    Epithelial cells 0-3 0 /hpf    Bacteria 0 0 /hpf    Casts 5-10 0 /lpf    Mucus 4+ (H) 0 /lpf    Other observations RESULTS VERIFIED MANUALLY     CBC WITH AUTOMATED DIFF    Collection Time: 02/05/21 12:04 PM   Result Value Ref Range    WBC 5.2 4.3 - 11.1 K/uL    RBC 5.41 4.23 - 5.6 M/uL    HGB 14.4 13.6 - 17.2 g/dL    HCT 45.7 41.1 - 50.3 %    MCV 84.5 79.6 - 97.8 FL    MCH 26.6 26.1 - 32.9 PG    MCHC 31.5 31.4 - 35.0 g/dL    RDW 17.6 (H) 11.9 - 14.6 %    PLATELET 369 763 - 292 K/uL    MPV 10.4 9.4 - 12.3 FL    ABSOLUTE NRBC 0.00 0.0 - 0.2 K/uL    DF AUTOMATED      NEUTROPHILS 64 43 - 78 %    LYMPHOCYTES 18 13 - 44 %    MONOCYTES 12 4.0 - 12.0 %    EOSINOPHILS 5 0.5 - 7.8 %    BASOPHILS 1 0.0 - 2.0 %    IMMATURE GRANULOCYTES 0 0.0 - 5.0 %    ABS. NEUTROPHILS 3.3 1.7 - 8.2 K/UL    ABS. LYMPHOCYTES 0.9 0.5 - 4.6 K/UL    ABS. MONOCYTES 0.6 0.1 - 1.3 K/UL    ABS. EOSINOPHILS 0.3 0.0 - 0.8 K/UL    ABS. BASOPHILS 0.0 0.0 - 0.2 K/UL    ABS. IMM.  GRANS. 0.0 0.0 - 0.5 K/UL   METABOLIC PANEL, COMPREHENSIVE    Collection Time: 02/05/21 12:04 PM   Result Value Ref Range    Sodium 142 136 - 145 mmol/L    Potassium 4.2 3.5 - 5.1 mmol/L    Chloride 107 98 - 107 mmol/L    CO2 29 21 - 32 mmol/L    Anion gap 6 (L) 7 - 16 mmol/L    Glucose 106 (H) 65 - 100 mg/dL    BUN 16 8 - 23 MG/DL    Creatinine 0.85 0.8 - 1.5 MG/DL    GFR est AA >60 >60 ml/min/1.73m2    GFR est non-AA >60 >60 ml/min/1.73m2    Calcium 10.0 8.3 - 10.4 MG/DL    Bilirubin, total 0.5 0.2 - 1.1 MG/DL    ALT (SGPT) 21 12 - 65 U/L    AST (SGOT) 16 15 - 37 U/L    Alk. phosphatase 74 50 - 136 U/L    Protein, total 7.6 6.3 - 8.2 g/dL    Albumin 3.9 3.2 - 4.6 g/dL    Globulin 3.7 (H) 2.3 - 3.5 g/dL    A-G Ratio 1.1 (L) 1.2 - 3.5     PROTHROMBIN TIME + INR    Collection Time: 02/05/21 12:04 PM   Result Value Ref Range    Prothrombin time 15.1 (H) 12.5 - 14.7 sec    INR 1.2     EKG, 12 LEAD, INITIAL    Collection Time: 02/05/21  1:20 PM   Result Value Ref Range    Ventricular Rate 71 BPM    Atrial Rate 71 BPM    P-R Interval 142 ms    QRS Duration 146 ms    Q-T Interval 422 ms    QTC Calculation (Bezet) 458 ms    Calculated P Axis 47 degrees    Calculated R Axis -19 degrees    Calculated T Axis 82 degrees    Diagnosis       Normal sinus rhythm  Left bundle branch block  Abnormal ECG  No previous ECGs available     Study Result    CHEST X-RAY, 2 views.     HISTORY:  Preoperative evaluation for esophageal cancer surgery.     TECHNIQUE: PA and lateral views.     COMPARISON: 26 October 2020.     FINDINGS:   Lungs: No lobar consolidation. Mild interstitial prominence. .   Costophrenic angles: are sharp. Heart size: is normal.   Pulmonary vasculature: is unremarkable. Aorta: Unremarkable. Included portion of the upper abdomen: Hiatal hernia. .   Bones: Mild gibbus deformity at the thoracolumbar junction  Other: None.     IMPRESSION  Moderate hiatal hernia. Mild gibbus deformity at the thoracolumbar  junction. Results reviewed. Dr. Claudio Comer notified of PT 15.1. Orders given to report to surgeon. Called surgeons office to report urine, PT, and chest Xray results but office closed. Routed to surgeon and chart flagged for charge nurse to follow-up.

## 2021-02-05 NOTE — PERIOP NOTES
Orders in [address] [facility] need MD signature. Unable to release/ complete. Called surgeon's office and left  for Jan requesting orders be signed in order for PAT to be able to complete.

## 2021-02-05 NOTE — PERIOP NOTES
Patient confirms name and . Order to obtain consent  found in EHR and  matches case posting. Pt verbalizes understanding of 1 visitor policy. Type 2 surgery, walk-in PAT assessment complete. Labs per surgeon: CBC with diff, CMP, PT/ INR, UA, EKG, chest xray  Labs per anesthesia protocol: none indicated  EKG: completed- results reviewed with Dr. Gregorio Jordan. No orders given. Patient to be assessed by anesthesia morning of procedure. Glucose: not indicated    Covid test 21. Result pending. Patient given copy of order for chest xray and verbalizes understanding to go to 2nd floor radiology to have completed after PAT appointment. Medication list reviewed today. Antibacterial soap and Hibiclens and instructions given per hospital policy. Use in shower the night before surgery and on the morning of surgery. Patient provided with and instructed on educational handouts including Guide to Surgery, Pain Management, Hand Hygiene, Blood Transfusion Education, and [facility] Brochure. Medication instructions provided per PTA medication instruction note. Patient provided with a copy. Patient answered medical/surgical history questions at their best of ability. All prior to admission medications documented in [facility]. Patient instructed on the following:  Arrive at [facility], time of arrival to be called the day before by 1700  NPO after midnight including gum, mints, and ice chips. Responsible adult must drive patient to the hospital, stay during surgery, and patient will require supervision 24 hours after anesthesia. If admitted to hospital, current visiting policy is 1 visitor per patient per day from 10 am to 6 pm.  Leave all valuables (money and jewelry) at home but bring insurance card and ID on DOS. Do not wear make-up, nail polish, lotions, cologne, perfumes, powders, or oil on skin.     Patient teach back successful and patient demonstrates knowledge of instruction.

## 2021-02-05 NOTE — PERIOP NOTES
PLEASE CONTINUE TAKING ALL PRESCRIPTION MEDICATIONS UP TO THE DAY OF SURGERY UNLESS OTHERWISE DIRECTED BELOW. DISCONTINUE all vitamins and supplements 7 days prior to surgery. DISCONTINUE Non-Steriodal Anti-Inflammatory (NSAIDS) such as Advil and Aleve 5 days prior to surgery. Home Medications to take  the day of surgery   Take:   Amlodipine (Norvasc) if needed   Famotidine (Pepcid)  Omeprazole (Prilosec)               If needed may take:   Hydrocodone-acetaminophen (Norco) if needed  Zofran OR Compazine OR phenergan if needed           Home Medications   to Hold   Hold Aleve 5 days        Comments         *Policy of 1 visitor discussed       Please do not bring home medications with you on the day of surgery unless otherwise directed by your nurse. If you are instructed to bring home medications, please give them to your nurse as they will be administered by the nursing staff. If you have any questions, please call  [facility] [phone]. A copy of this note was provided to the patient for reference.

## 2021-02-09 ENCOUNTER — ANESTHESIA EVENT (OUTPATIENT)
Dept: SURGERY | Age: 66
End: 2021-02-09
Payer: MEDICARE

## 2021-02-09 NOTE — ANESTHESIA PREPROCEDURE EVALUATION
Relevant Problems   No relevant active problems       Anesthetic History               Review of Systems / Medical History  Patient summary reviewed and pertinent labs reviewed    Pulmonary          Smoker (Former)         Neuro/Psych              Cardiovascular    Hypertension              Exercise tolerance: >4 METS  Comments: LBBB   GI/Hepatic/Renal                Endo/Other        Obesity, cancer (Esophageal, Pancreatic mass) and anemia     Other Findings              Physical Exam    Airway  Mallampati: III    Neck ROM: decreased range of motion   Mouth opening: Normal     Cardiovascular  Regular rate and rhythm,  S1 and S2 normal,  no murmur, click, rub, or gallop             Dental         Pulmonary  Breath sounds clear to auscultation               Abdominal         Other Findings            Anesthetic Plan    ASA: 2  Anesthesia type: general          Induction: Intravenous and RSI  Anesthetic plan and risks discussed with: Patient      Patient nauseous preoperatively, will proceed with RSI

## 2021-02-10 ENCOUNTER — ANESTHESIA (OUTPATIENT)
Dept: SURGERY | Age: 66
End: 2021-02-10
Payer: MEDICARE

## 2021-02-10 ENCOUNTER — HOSPITAL ENCOUNTER (OUTPATIENT)
Age: 66
Setting detail: OUTPATIENT SURGERY
Discharge: HOME | End: 2021-02-10
Attending: SURGERY | Admitting: SURGERY
Payer: MEDICARE

## 2021-02-10 ENCOUNTER — APPOINTMENT (OUTPATIENT)
Dept: GENERAL RADIOLOGY | Age: 66
End: 2021-02-10
Attending: SURGERY
Payer: MEDICARE

## 2021-02-10 VITALS
TEMPERATURE: 97.9 F | HEIGHT: 66 IN | WEIGHT: 201 LBS | OXYGEN SATURATION: 97 % | RESPIRATION RATE: 18 BRPM | HEART RATE: 81 BPM | BODY MASS INDEX: 32.3 KG/M2 | DIASTOLIC BLOOD PRESSURE: 76 MMHG | SYSTOLIC BLOOD PRESSURE: 161 MMHG

## 2021-02-10 DIAGNOSIS — C25.1 MALIGNANT NEOPLASM OF BODY OF PANCREAS (HCC): ICD-10-CM

## 2021-02-10 DIAGNOSIS — C15.5 MALIGNANT NEOPLASM OF LOWER THIRD OF ESOPHAGUS (HCC): Primary | ICD-10-CM

## 2021-02-10 PROCEDURE — C1788 PORT, INDWELLING, IMP: HCPCS | Performed by: SURGERY

## 2021-02-10 PROCEDURE — 77030037088 HC TUBE ENDOTRACH ORAL NSL COVD-A: Performed by: ANESTHESIOLOGY

## 2021-02-10 PROCEDURE — 77001 FLUOROGUIDE FOR VEIN DEVICE: CPT | Performed by: SURGERY

## 2021-02-10 PROCEDURE — 77030008518 HC TBNG INSUF ENDO STRY -B: Performed by: SURGERY

## 2021-02-10 PROCEDURE — 74011250636 HC RX REV CODE- 250/636: Performed by: ANESTHESIOLOGY

## 2021-02-10 PROCEDURE — 74011250636 HC RX REV CODE- 250/636: Performed by: NURSE ANESTHETIST, CERTIFIED REGISTERED

## 2021-02-10 PROCEDURE — 76210000016 HC OR PH I REC 1 TO 1.5 HR: Performed by: SURGERY

## 2021-02-10 PROCEDURE — 49320 DIAG LAPARO SEPARATE PROC: CPT | Performed by: SURGERY

## 2021-02-10 PROCEDURE — 88305 TISSUE EXAM BY PATHOLOGIST: CPT

## 2021-02-10 PROCEDURE — 76010000162 HC OR TIME 1.5 TO 2 HR INTENSV-TIER 1: Performed by: SURGERY

## 2021-02-10 PROCEDURE — 49320 DIAG LAPARO SEPARATE PROC: CPT | Performed by: NURSE PRACTITIONER

## 2021-02-10 PROCEDURE — 77030003575 HC NDL INSUF ENDOPTH J&J -B: Performed by: SURGERY

## 2021-02-10 PROCEDURE — 74011250637 HC RX REV CODE- 250/637: Performed by: ANESTHESIOLOGY

## 2021-02-10 PROCEDURE — 77030031139 HC SUT VCRL2 J&J -A: Performed by: SURGERY

## 2021-02-10 PROCEDURE — 2709999900 HC NON-CHARGEABLE SUPPLY: Performed by: SURGERY

## 2021-02-10 PROCEDURE — 77030040830 HC CATH URETH FOL MDII -A: Performed by: SURGERY

## 2021-02-10 PROCEDURE — 74011250636 HC RX REV CODE- 250/636: Performed by: SURGERY

## 2021-02-10 PROCEDURE — 74011000250 HC RX REV CODE- 250: Performed by: NURSE ANESTHETIST, CERTIFIED REGISTERED

## 2021-02-10 PROCEDURE — 76060000034 HC ANESTHESIA 1.5 TO 2 HR: Performed by: SURGERY

## 2021-02-10 PROCEDURE — 77030020829: Performed by: SURGERY

## 2021-02-10 PROCEDURE — 76210000020 HC REC RM PH II FIRST 0.5 HR: Performed by: SURGERY

## 2021-02-10 PROCEDURE — 77030040361 HC SLV COMPR DVT MDII -B: Performed by: SURGERY

## 2021-02-10 PROCEDURE — 74011000272 HC RX REV CODE- 272: Performed by: SURGERY

## 2021-02-10 PROCEDURE — 77030019908 HC STETH ESOPH SIMS -A: Performed by: ANESTHESIOLOGY

## 2021-02-10 PROCEDURE — 77030016151 HC PROTCTR LNS DFOG COVD -B: Performed by: SURGERY

## 2021-02-10 PROCEDURE — 36561 INSERT TUNNELED CV CATH: CPT | Performed by: SURGERY

## 2021-02-10 PROCEDURE — 71045 X-RAY EXAM CHEST 1 VIEW: CPT

## 2021-02-10 PROCEDURE — 77030008756 HC TU IRR SUC STRY -B: Performed by: SURGERY

## 2021-02-10 PROCEDURE — 77030040922 HC BLNKT HYPOTHRM STRY -A: Performed by: ANESTHESIOLOGY

## 2021-02-10 PROCEDURE — 77030018673: Performed by: SURGERY

## 2021-02-10 PROCEDURE — 77030008606 HC TRCR ENDOSC KII AMR -B: Performed by: SURGERY

## 2021-02-10 PROCEDURE — 77030039425 HC BLD LARYNG TRULITE DISP TELE -A: Performed by: ANESTHESIOLOGY

## 2021-02-10 PROCEDURE — 74011000250 HC RX REV CODE- 250: Performed by: SURGERY

## 2021-02-10 PROCEDURE — 77030002986 HC SUT PROL J&J -A: Performed by: SURGERY

## 2021-02-10 PROCEDURE — 77030000038 HC TIP SCIS LAPSCP SURI -B: Performed by: SURGERY

## 2021-02-10 DEVICE — POWERPORT IMPLANTABLE PORT WITH ATTACHABLE 8F CHRONOFLEX OPEN-ENDED SINGLE-LUMEN VENOUS CATHETER INTERMEDIATE KIT (WITH SUTURE PLUGS)
Type: IMPLANTABLE DEVICE | Site: CHEST | Status: FUNCTIONAL
Brand: POWERPORT, CHRONOFLEX

## 2021-02-10 RX ORDER — SODIUM CHLORIDE 0.9 % (FLUSH) 0.9 %
5-40 SYRINGE (ML) INJECTION AS NEEDED
Status: DISCONTINUED | OUTPATIENT
Start: 2021-02-10 | End: 2021-02-10 | Stop reason: HOSPADM

## 2021-02-10 RX ORDER — NALOXONE HYDROCHLORIDE 0.4 MG/ML
0.1 INJECTION, SOLUTION INTRAMUSCULAR; INTRAVENOUS; SUBCUTANEOUS AS NEEDED
Status: DISCONTINUED | OUTPATIENT
Start: 2021-02-10 | End: 2021-02-10 | Stop reason: HOSPADM

## 2021-02-10 RX ORDER — ROCURONIUM BROMIDE 10 MG/ML
INJECTION, SOLUTION INTRAVENOUS AS NEEDED
Status: DISCONTINUED | OUTPATIENT
Start: 2021-02-10 | End: 2021-02-10 | Stop reason: HOSPADM

## 2021-02-10 RX ORDER — LIDOCAINE HYDROCHLORIDE 20 MG/ML
INJECTION, SOLUTION EPIDURAL; INFILTRATION; INTRACAUDAL; PERINEURAL AS NEEDED
Status: DISCONTINUED | OUTPATIENT
Start: 2021-02-10 | End: 2021-02-10 | Stop reason: HOSPADM

## 2021-02-10 RX ORDER — HYDROCODONE BITARTRATE AND ACETAMINOPHEN 5; 325 MG/1; MG/1
1-2 TABLET ORAL
Qty: 40 TAB | Refills: 0 | Status: SHIPPED | OUTPATIENT
Start: 2021-02-10 | End: 2021-02-15

## 2021-02-10 RX ORDER — HYDROMORPHONE HYDROCHLORIDE 2 MG/ML
0.5 INJECTION, SOLUTION INTRAMUSCULAR; INTRAVENOUS; SUBCUTANEOUS
Status: DISCONTINUED | OUTPATIENT
Start: 2021-02-10 | End: 2021-02-10 | Stop reason: HOSPADM

## 2021-02-10 RX ORDER — SODIUM CHLORIDE 0.9 G/100ML
IRRIGANT IRRIGATION AS NEEDED
Status: DISCONTINUED | OUTPATIENT
Start: 2021-02-10 | End: 2021-02-10 | Stop reason: HOSPADM

## 2021-02-10 RX ORDER — SODIUM CHLORIDE 0.9 % (FLUSH) 0.9 %
5-40 SYRINGE (ML) INJECTION EVERY 8 HOURS
Status: DISCONTINUED | OUTPATIENT
Start: 2021-02-10 | End: 2021-02-10 | Stop reason: HOSPADM

## 2021-02-10 RX ORDER — PROPOFOL 10 MG/ML
INJECTION, EMULSION INTRAVENOUS AS NEEDED
Status: DISCONTINUED | OUTPATIENT
Start: 2021-02-10 | End: 2021-02-10 | Stop reason: HOSPADM

## 2021-02-10 RX ORDER — NEOSTIGMINE METHYLSULFATE 1 MG/ML
INJECTION, SOLUTION INTRAVENOUS AS NEEDED
Status: DISCONTINUED | OUTPATIENT
Start: 2021-02-10 | End: 2021-02-10 | Stop reason: HOSPADM

## 2021-02-10 RX ORDER — ONDANSETRON 2 MG/ML
INJECTION INTRAMUSCULAR; INTRAVENOUS AS NEEDED
Status: DISCONTINUED | OUTPATIENT
Start: 2021-02-10 | End: 2021-02-10 | Stop reason: HOSPADM

## 2021-02-10 RX ORDER — LIDOCAINE HYDROCHLORIDE 10 MG/ML
0.1 INJECTION INFILTRATION; PERINEURAL AS NEEDED
Status: DISCONTINUED | OUTPATIENT
Start: 2021-02-10 | End: 2021-02-10 | Stop reason: HOSPADM

## 2021-02-10 RX ORDER — FAMOTIDINE 20 MG/1
20 TABLET, FILM COATED ORAL ONCE
Status: COMPLETED | OUTPATIENT
Start: 2021-02-10 | End: 2021-02-10

## 2021-02-10 RX ORDER — HYDROCODONE BITARTRATE AND ACETAMINOPHEN 7.5; 325 MG/1; MG/1
1 TABLET ORAL
Qty: 20 TAB | Refills: 0 | Status: SHIPPED | OUTPATIENT
Start: 2021-02-10 | End: 2021-02-15

## 2021-02-10 RX ORDER — FENTANYL CITRATE 50 UG/ML
INJECTION, SOLUTION INTRAMUSCULAR; INTRAVENOUS AS NEEDED
Status: DISCONTINUED | OUTPATIENT
Start: 2021-02-10 | End: 2021-02-10 | Stop reason: HOSPADM

## 2021-02-10 RX ORDER — FENTANYL CITRATE 50 UG/ML
100 INJECTION, SOLUTION INTRAMUSCULAR; INTRAVENOUS ONCE
Status: DISCONTINUED | OUTPATIENT
Start: 2021-02-10 | End: 2021-02-10 | Stop reason: HOSPADM

## 2021-02-10 RX ORDER — DEXAMETHASONE SODIUM PHOSPHATE 4 MG/ML
INJECTION, SOLUTION INTRA-ARTICULAR; INTRALESIONAL; INTRAMUSCULAR; INTRAVENOUS; SOFT TISSUE AS NEEDED
Status: DISCONTINUED | OUTPATIENT
Start: 2021-02-10 | End: 2021-02-10 | Stop reason: HOSPADM

## 2021-02-10 RX ORDER — OXYCODONE HYDROCHLORIDE 5 MG/1
5 TABLET ORAL
Status: DISCONTINUED | OUTPATIENT
Start: 2021-02-10 | End: 2021-02-10 | Stop reason: HOSPADM

## 2021-02-10 RX ORDER — SODIUM CHLORIDE, SODIUM LACTATE, POTASSIUM CHLORIDE, CALCIUM CHLORIDE 600; 310; 30; 20 MG/100ML; MG/100ML; MG/100ML; MG/100ML
100 INJECTION, SOLUTION INTRAVENOUS CONTINUOUS
Status: DISCONTINUED | OUTPATIENT
Start: 2021-02-10 | End: 2021-02-10 | Stop reason: HOSPADM

## 2021-02-10 RX ORDER — CEFAZOLIN SODIUM/WATER 2 G/20 ML
2 SYRINGE (ML) INTRAVENOUS ONCE
Status: DISCONTINUED | OUTPATIENT
Start: 2021-02-10 | End: 2021-02-10 | Stop reason: HOSPADM

## 2021-02-10 RX ORDER — MIDAZOLAM HYDROCHLORIDE 1 MG/ML
2 INJECTION, SOLUTION INTRAMUSCULAR; INTRAVENOUS
Status: DISCONTINUED | OUTPATIENT
Start: 2021-02-10 | End: 2021-02-10 | Stop reason: HOSPADM

## 2021-02-10 RX ORDER — BUPIVACAINE HYDROCHLORIDE AND EPINEPHRINE 5; 5 MG/ML; UG/ML
INJECTION, SOLUTION EPIDURAL; INTRACAUDAL; PERINEURAL AS NEEDED
Status: DISCONTINUED | OUTPATIENT
Start: 2021-02-10 | End: 2021-02-10 | Stop reason: HOSPADM

## 2021-02-10 RX ORDER — HEPARIN 100 UNIT/ML
SYRINGE INTRAVENOUS AS NEEDED
Status: DISCONTINUED | OUTPATIENT
Start: 2021-02-10 | End: 2021-02-10 | Stop reason: HOSPADM

## 2021-02-10 RX ORDER — HYDROCODONE BITARTRATE AND ACETAMINOPHEN 7.5; 325 MG/1; MG/1
1 TABLET ORAL AS NEEDED
Status: DISCONTINUED | OUTPATIENT
Start: 2021-02-10 | End: 2021-02-10 | Stop reason: HOSPADM

## 2021-02-10 RX ORDER — SODIUM CHLORIDE 9 MG/ML
25 INJECTION, SOLUTION INTRAVENOUS CONTINUOUS
Status: DISCONTINUED | OUTPATIENT
Start: 2021-02-10 | End: 2021-02-10 | Stop reason: HOSPADM

## 2021-02-10 RX ORDER — GLYCOPYRROLATE 0.2 MG/ML
INJECTION INTRAMUSCULAR; INTRAVENOUS AS NEEDED
Status: DISCONTINUED | OUTPATIENT
Start: 2021-02-10 | End: 2021-02-10 | Stop reason: HOSPADM

## 2021-02-10 RX ADMIN — DEXAMETHASONE SODIUM PHOSPHATE 4 MG: 4 INJECTION, SOLUTION INTRAMUSCULAR; INTRAVENOUS at 10:26

## 2021-02-10 RX ADMIN — GLYCOPYRROLATE 0.6 MG: 0.2 INJECTION, SOLUTION INTRAMUSCULAR; INTRAVENOUS at 10:35

## 2021-02-10 RX ADMIN — ROCURONIUM BROMIDE 40 MG: 10 INJECTION, SOLUTION INTRAVENOUS at 09:19

## 2021-02-10 RX ADMIN — Medication 4 MG: at 10:35

## 2021-02-10 RX ADMIN — SODIUM CHLORIDE, SODIUM LACTATE, POTASSIUM CHLORIDE, AND CALCIUM CHLORIDE: 600; 310; 30; 20 INJECTION, SOLUTION INTRAVENOUS at 09:10

## 2021-02-10 RX ADMIN — SODIUM CHLORIDE, SODIUM LACTATE, POTASSIUM CHLORIDE, AND CALCIUM CHLORIDE: 600; 310; 30; 20 INJECTION, SOLUTION INTRAVENOUS at 10:37

## 2021-02-10 RX ADMIN — ROCURONIUM BROMIDE 10 MG: 10 INJECTION, SOLUTION INTRAVENOUS at 09:35

## 2021-02-10 RX ADMIN — FENTANYL CITRATE 50 MCG: 50 INJECTION INTRAMUSCULAR; INTRAVENOUS at 10:20

## 2021-02-10 RX ADMIN — PHENYLEPHRINE HYDROCHLORIDE 100 MCG: 10 INJECTION INTRAVENOUS at 09:52

## 2021-02-10 RX ADMIN — SODIUM CHLORIDE, SODIUM LACTATE, POTASSIUM CHLORIDE, AND CALCIUM CHLORIDE 100 ML/HR: 600; 310; 30; 20 INJECTION, SOLUTION INTRAVENOUS at 08:29

## 2021-02-10 RX ADMIN — FENTANYL CITRATE 100 MCG: 50 INJECTION INTRAMUSCULAR; INTRAVENOUS at 09:19

## 2021-02-10 RX ADMIN — LIDOCAINE HYDROCHLORIDE 80 MG: 20 INJECTION, SOLUTION EPIDURAL; INFILTRATION; INTRACAUDAL; PERINEURAL at 09:19

## 2021-02-10 RX ADMIN — ONDANSETRON 4 MG: 2 INJECTION INTRAMUSCULAR; INTRAVENOUS at 10:30

## 2021-02-10 RX ADMIN — PHENYLEPHRINE HYDROCHLORIDE 100 MCG: 10 INJECTION INTRAVENOUS at 09:36

## 2021-02-10 RX ADMIN — PROPOFOL 140 MG: 10 INJECTION, EMULSION INTRAVENOUS at 09:19

## 2021-02-10 RX ADMIN — PHENYLEPHRINE HYDROCHLORIDE 100 MCG: 10 INJECTION INTRAVENOUS at 09:51

## 2021-02-10 RX ADMIN — FAMOTIDINE 20 MG: 20 TABLET ORAL at 08:29

## 2021-02-10 NOTE — PROGRESS NOTES
's pre-procedure visit and prayer with patient as requested.     Laura Sparks MDiv, BS  Board Certified

## 2021-02-10 NOTE — OP NOTES
[facility]  OPERATIVE REPORT    Name:  Aleta Davis  MR#:  [id number]  :  1955  ACCOUNT #:  [de-identified]  DATE OF SERVICE:  02/10/2021    PREOPERATIVE DIAGNOSES:  Esophageal cancer and pancreatic cancer, concerning for peritoneal disease. POSTOPERATIVE DIAGNOSES:  Esophageal cancer and pancreatic cancer with diffuse peritoneal carcinomatosis. PROCEDURES PERFORMED:  1. Port-A-Cath placement under fluoroscopy. 2.  Diagnostic laparoscopy with peritoneal biopsy. SURGEON:  Mekhi Caballero MD    ASSISTANT:  Tabitha Byrne NP    ANESTHESIA: general    COMPLICATIONS:  none    SPECIMENS REMOVED:  As above    IMPLANTS: as above    ESTIMATED BLOOD LOSS:  50 ml    INDICATIONS:  This is a 58-year-old gentleman who was diagnosed with both esophageal cancer and pancreatic cancer. He is scheduled for chemotherapy. A staging laparoscopy was requested due to concern of peritoneal disease. He also needs a port. He understood the risks and benefits, agreed to proceed. FINDINGS:  1. Laparoscopy showed diffuse peritoneal carcinomatosis. 2.  Fluoroscopy confirmed port placement. PROCEDURE:  After informed consent obtained, the patient brought into the operating room, left in supine position. General anesthesia was administered. We prepped his neck, chest, abdomen altogether and then draped to expose both port placement and diagnostic laparoscopy. We first started with port placement. The left subclavian vein was accessed. Venous blood return was obtained. A guidewire was then advanced under the guidance of fluoroscopy followed by a dilator introducer sheath and then the catheter was advanced along the introducer sheath under the guidance of fluoroscopy. At the same time, a pocket was made at the left upper chest.  The catheter was tunneled through the pocket, connected to a reservoir.   Both reservoir and the catheter were flushed with heparin solution 100 units per mL with good aspiration and good flush. Then the port was sutured into place with 2-0 Prolene stitch. The incision closed with 3-0 Vicryl stitch on dermal layer, 4-0 Vicryl stitch in subcuticular running fashion. Then the port site was dressed and then we moved attention to the abdomen. He had previous surgeries through the umbilical incision so we decided to use a Veress needle technique to create a pneumoperitoneum. Then a Veress needle was placed under the subcostal area at the left upper quadrant. The peritoneal cavity was entered, pneumoperitoneum created, and then a 5-mm trocar placed at the left upper quadrant with a scope-over-trocar technique. The peritoneal cavity was entered easily under direct vision, the Veress needle was confirmed in appropriate place, and then another 5-mm trocar placed at the left mid abdomen. The peritoneal cavity was entered. There was diffuse peritoneal disease and a moderate amount of ascites. The ascites was removed and then a biopsy was made at the left diaphragm. Good hemostasis obtained at the end of the case. Then pneumoperitoneum was evacuated. All trocars were removed. The incision closed with 4-0 Vicryl stitch in subcuticular fashion. The patient tolerated the procedure well, transferred to the recovery room in stable condition. All the instrument count and lap count were correct. Estimated blood loss about 50 mL. As noted, Deep Zambrano is the first assistant in this case. She offered excellent exposure to make this surgery quicker and easier.       Louise Orellana MD      [id number]  D:  02/10/2021 10:48  T:  02/10/2021 16:38  JOB #:  [id number]

## 2021-02-10 NOTE — DISCHARGE INSTRUCTIONS
Remove plastic dressing and gauze after 48 hours, leave sterile strips on for 7-10 days, OK to shower    ACTIVITY  · As tolerated and as directed by your doctor. · Bathe or shower as directed by your doctor. DIET  · Clear liquids until no nausea or vomiting; then light diet for the first day. · Advance to regular diet on second day, unless your doctor orders otherwise. · If nausea and vomiting continues, call your doctor. PAIN  · Take pain medication as directed by your doctor. · Call your doctor if pain is NOT relieved by medication. · DO NOT take aspirin of blood thinners unless directed by your doctor. CALL YOUR DOCTOR IF   · Excessive bleeding that does not stop after holding pressure over the area  · Temperature of 101 degrees F or above  · Excessive redness, swelling or bruising, and/ or green or yellow, smelly discharge from incision    After general anesthesia or intravenous sedation, for 24 hours or while taking prescription Narcotics:  · Limit your activities  · A responsible adult needs to be with you for the next 24 hours  · Do not drive and operate hazardous machinery  · Do not make important personal or business decisions  · Do not drink alcoholic beverages  · If you have not urinated within 8 hours after discharge, and you are experiencing discomfort from urinary retention, please go to the nearest ED. · If you have sleep apnea and have a CPAP machine, please use it for all naps and sleeping. · Please use caution when taking narcotics and any of your home medications that may cause drowsiness. *  Please give a list of your current medications to your Primary Care Provider. *  Please update this list whenever your medications are discontinued, doses are      changed, or new medications (including over-the-counter products) are added. *  Please carry medication information at all times in case of emergency situations.     These are general instructions for a healthy lifestyle:  No smoking/ No tobacco products/ Avoid exposure to second hand smoke  Surgeon General's Warning:  Quitting smoking now greatly reduces serious risk to your health. Obesity, smoking, and sedentary lifestyle greatly increases your risk for illness  A healthy diet, regular physical exercise & weight monitoring are important for maintaining a healthy lifestyle    You may be retaining fluid if you have a history of heart failure or if you experience any of the following symptoms:  Weight gain of 3 pounds or more overnight or 5 pounds in a week, increased swelling in our hands or feet or shortness of breath while lying flat in bed. Please call your doctor as soon as you notice any of these symptoms; do not wait until your next office visit.

## 2021-02-10 NOTE — PERIOP NOTES
Xray at bedside. Discharge instructions discussed with son on telephone, family is in car awaiting his discharge. No questions or concerns at this time.

## 2021-02-10 NOTE — PERIOP NOTES
X-ray reading resulted: \"Single AP view of the chest compared to a similar exam dated 2/5/2021  shows a left-sided chest port in good position with the distal tip over the  distal SVC. There is no pneumothorax. \"  Will proceed with getting patient ready to D/C home from PACU.

## 2021-02-10 NOTE — PERIOP NOTES
Report and transfer of care to [facility], [address] [facility].

## 2021-02-10 NOTE — PERIOP NOTES
Report and transfer of care to me from [facility]. Awaiting results of chest x-ray. Patient resting comfortably with eyes closed.

## 2021-02-10 NOTE — BRIEF OP NOTE
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 Procedure(s): INFUSAPORT INSERTION with fluoro  LAPAROSCOPY GENERAL DIAGNOSTIC, PERITONEAL BIOPSY    Surgeon(s): Fifi Flores MD    Surgical Assistant: Nurse Practitioner: Moni Bolaños NP    Anesthesia: General     Estimated Blood Loss (mL): less than 50     Complications: None    Specimens:   ID Type Source Tests Collected by Time Destination   1 : peritoneal biopsy Preservative Abdomen  Fifi Flores MD 2/10/2021 10:28 AM Pathology        Implants:   Implant Name Type Inv.  Item Serial No.  Lot No. LRB No. Used Action   PORT ATTACH CATH POWERPORT 8FR --  - [id number]  PORT ATTACH CATH POWERPORT 8FR --   BARD PER[employer]_WD [id number] N/A 1 Implanted       Drains: * No LDAs found *    Findings: 1 diffuse peritoneal carcinomatosis; 2 fluoro confirmed port positioning    Electronically Signed by Rafael French MD on 2/10/2021 at 10:40 AM

## 2021-02-10 NOTE — ANESTHESIA POSTPROCEDURE EVALUATION
Procedure(s): INFUSAPORT INSERTION  LAPAROSCOPY GENERAL DIAGNOSTIC, PERITONEAL BIOPSY. general    Anesthesia Post Evaluation      Multimodal analgesia: multimodal analgesia used between 6 hours prior to anesthesia start to PACU discharge  Patient location during evaluation: PACU  Patient participation: complete - patient participated  Level of consciousness: awake and alert  Pain management: adequate  Airway patency: patent  Anesthetic complications: no  Cardiovascular status: acceptable  Respiratory status: acceptable  Hydration status: acceptable  Post anesthesia nausea and vomiting:  none  Final Post Anesthesia Temperature Assessment:  Normothermia (36.0-37.5 degrees C)      INITIAL Post-op Vital signs:   Vitals Value Taken Time   /76 02/10/21 1154   Temp 36.6 °C (97.9 °F) 02/10/21 1052   Pulse 75 02/10/21 1155   Resp 18 02/10/21 1145   SpO2 97 % 02/10/21 1155   Vitals shown include unvalidated device data.

## 2021-02-23 ENCOUNTER — HOSPITAL ENCOUNTER (OUTPATIENT)
Dept: LAB | Age: 66
Discharge: HOME | End: 2021-02-23
Payer: MEDICARE

## 2021-02-23 DIAGNOSIS — C25.1 MALIGNANT NEOPLASM OF BODY OF PANCREAS (HCC): ICD-10-CM

## 2021-02-23 LAB
ALBUMIN SERPL-MCNC: 4 G/DL (ref 3.2–4.6)
ALBUMIN/GLOB SERPL: 1.1 {RATIO} (ref 1.2–3.5)
ALP SERPL-CCNC: 78 U/L (ref 50–136)
ALT SERPL-CCNC: 19 U/L (ref 12–65)
ANION GAP SERPL CALC-SCNC: 4 MMOL/L (ref 7–16)
AST SERPL-CCNC: 9 U/L (ref 15–37)
BASOPHILS # BLD: 0.1 K/UL (ref 0–0.2)
BASOPHILS NFR BLD: 1 % (ref 0–2)
BILIRUB SERPL-MCNC: 0.6 MG/DL (ref 0.2–1.1)
BUN SERPL-MCNC: 18 MG/DL (ref 8–23)
CALCIUM SERPL-MCNC: 10.2 MG/DL (ref 8.3–10.4)
CANCER AG19-9 SERPL-ACNC: 117.8 U/ML (ref 2–37)
CHLORIDE SERPL-SCNC: 104 MMOL/L (ref 98–107)
CO2 SERPL-SCNC: 32 MMOL/L (ref 21–32)
CREAT SERPL-MCNC: 0.9 MG/DL (ref 0.8–1.5)
DIFFERENTIAL METHOD BLD: ABNORMAL
EOSINOPHIL # BLD: 0.4 K/UL (ref 0–0.8)
EOSINOPHIL NFR BLD: 6 % (ref 0.5–7.8)
ERYTHROCYTE [DISTWIDTH] IN BLOOD BY AUTOMATED COUNT: 17.6 % (ref 11.9–14.6)
GLOBULIN SER CALC-MCNC: 3.5 G/DL (ref 2.3–3.5)
GLUCOSE SERPL-MCNC: 110 MG/DL (ref 65–100)
HCT VFR BLD AUTO: 43.2 % (ref 41.1–50.3)
HGB BLD-MCNC: 13.6 G/DL (ref 13.6–17.2)
IMM GRANULOCYTES # BLD AUTO: 0 K/UL (ref 0–0.5)
IMM GRANULOCYTES NFR BLD AUTO: 0 % (ref 0–5)
LYMPHOCYTES # BLD: 0.9 K/UL (ref 0.5–4.6)
LYMPHOCYTES NFR BLD: 15 % (ref 13–44)
MAGNESIUM SERPL-MCNC: 2.1 MG/DL (ref 1.8–2.4)
MCH RBC QN AUTO: 27.1 PG (ref 26.1–32.9)
MCHC RBC AUTO-ENTMCNC: 31.5 G/DL (ref 31.4–35)
MCV RBC AUTO: 86.2 FL (ref 79.6–97.8)
MONOCYTES # BLD: 0.7 K/UL (ref 0.1–1.3)
MONOCYTES NFR BLD: 12 % (ref 4–12)
NEUTS SEG # BLD: 4.1 K/UL (ref 1.7–8.2)
NEUTS SEG NFR BLD: 66 % (ref 43–78)
NRBC # BLD: 0 K/UL (ref 0–0.2)
PLATELET # BLD AUTO: 201 K/UL (ref 150–450)
PMV BLD AUTO: 10.6 FL (ref 9.4–12.3)
POTASSIUM SERPL-SCNC: 4 MMOL/L (ref 3.5–5.1)
PROT SERPL-MCNC: 7.5 G/DL (ref 6.3–8.2)
RBC # BLD AUTO: 5.01 M/UL (ref 4.23–5.67)
SODIUM SERPL-SCNC: 140 MMOL/L (ref 136–145)
WBC # BLD AUTO: 6.2 K/UL (ref 4.3–11.1)

## 2021-02-23 PROCEDURE — 80053 COMPREHEN METABOLIC PANEL: CPT

## 2021-02-23 PROCEDURE — 36415 COLL VENOUS BLD VENIPUNCTURE: CPT

## 2021-02-23 PROCEDURE — 85025 COMPLETE CBC W/AUTO DIFF WBC: CPT

## 2021-02-23 PROCEDURE — 83735 ASSAY OF MAGNESIUM: CPT

## 2021-02-23 PROCEDURE — 86301 IMMUNOASSAY TUMOR CA 19-9: CPT

## 2021-02-24 ENCOUNTER — HOSPITAL ENCOUNTER (OUTPATIENT)
Dept: INFUSION THERAPY | Age: 66
Discharge: HOME | End: 2021-02-24
Payer: MEDICARE

## 2021-02-24 VITALS
BODY MASS INDEX: 30.67 KG/M2 | HEART RATE: 86 BPM | TEMPERATURE: 97.9 F | OXYGEN SATURATION: 97 % | RESPIRATION RATE: 18 BRPM | SYSTOLIC BLOOD PRESSURE: 140 MMHG | WEIGHT: 190 LBS | DIASTOLIC BLOOD PRESSURE: 90 MMHG

## 2021-02-24 DIAGNOSIS — C15.5 MALIGNANT NEOPLASM OF LOWER THIRD OF ESOPHAGUS (HCC): Primary | ICD-10-CM

## 2021-02-24 DIAGNOSIS — C25.1 MALIGNANT NEOPLASM OF BODY OF PANCREAS (HCC): ICD-10-CM

## 2021-02-24 PROCEDURE — 74011000258 HC RX REV CODE- 258: Performed by: INTERNAL MEDICINE

## 2021-02-24 PROCEDURE — 96417 CHEMO IV INFUS EACH ADDL SEQ: CPT

## 2021-02-24 PROCEDURE — 96368 THER/DIAG CONCURRENT INF: CPT

## 2021-02-24 PROCEDURE — 74011250636 HC RX REV CODE- 250/636: Performed by: INTERNAL MEDICINE

## 2021-02-24 PROCEDURE — 96415 CHEMO IV INFUSION ADDL HR: CPT

## 2021-02-24 PROCEDURE — 96372 THER/PROPH/DIAG INJ SC/IM: CPT

## 2021-02-24 PROCEDURE — 96411 CHEMO IV PUSH ADDL DRUG: CPT

## 2021-02-24 PROCEDURE — 96413 CHEMO IV INFUSION 1 HR: CPT

## 2021-02-24 PROCEDURE — G0498 CHEMO EXTEND IV INFUS W/PUMP: HCPCS

## 2021-02-24 PROCEDURE — 96375 TX/PRO/DX INJ NEW DRUG ADDON: CPT

## 2021-02-24 RX ORDER — ONDANSETRON 2 MG/ML
8 INJECTION INTRAMUSCULAR; INTRAVENOUS ONCE
Status: COMPLETED | OUTPATIENT
Start: 2021-02-24 | End: 2021-02-24

## 2021-02-24 RX ORDER — FLUOROURACIL 50 MG/ML
300 INJECTION, SOLUTION INTRAVENOUS ONCE
Status: COMPLETED | OUTPATIENT
Start: 2021-02-24 | End: 2021-02-24

## 2021-02-24 RX ORDER — ATROPINE SULFATE 0.4 MG/ML
0.4 INJECTION, SOLUTION ENDOTRACHEAL; INTRAMEDULLARY; INTRAMUSCULAR; INTRAVENOUS; SUBCUTANEOUS ONCE
Status: COMPLETED | OUTPATIENT
Start: 2021-02-24 | End: 2021-02-24

## 2021-02-24 RX ORDER — SODIUM CHLORIDE 0.9 % (FLUSH) 0.9 %
10 SYRINGE (ML) INJECTION AS NEEDED
Status: ACTIVE | OUTPATIENT
Start: 2021-02-24 | End: 2021-02-24

## 2021-02-24 RX ORDER — DIPHENHYDRAMINE HYDROCHLORIDE 50 MG/ML
50 INJECTION, SOLUTION INTRAMUSCULAR; INTRAVENOUS AS NEEDED
Status: DISPENSED | OUTPATIENT
Start: 2021-02-24 | End: 2021-02-24

## 2021-02-24 RX ORDER — DEXTROSE MONOHYDRATE 50 MG/ML
25 INJECTION, SOLUTION INTRAVENOUS CONTINUOUS
Status: ACTIVE | OUTPATIENT
Start: 2021-02-24 | End: 2021-02-24

## 2021-02-24 RX ORDER — HYDROCORTISONE SODIUM SUCCINATE 100 MG/2ML
100 INJECTION, POWDER, FOR SOLUTION INTRAMUSCULAR; INTRAVENOUS AS NEEDED
Status: DISPENSED | OUTPATIENT
Start: 2021-02-24 | End: 2021-02-24

## 2021-02-24 RX ADMIN — DEXAMETHASONE SODIUM PHOSPHATE 12 MG: 4 INJECTION, SOLUTION INTRAMUSCULAR; INTRAVENOUS at 08:44

## 2021-02-24 RX ADMIN — Medication 10 ML: at 13:26

## 2021-02-24 RX ADMIN — FLUOROURACIL 4000 MG: 50 INJECTION, SOLUTION INTRAVENOUS at 13:38

## 2021-02-24 RX ADMIN — OXALIPLATIN 133.5 MG: 5 INJECTION, SOLUTION INTRAVENOUS at 09:38

## 2021-02-24 RX ADMIN — ONDANSETRON 8 MG: 2 INJECTION INTRAMUSCULAR; INTRAVENOUS at 08:43

## 2021-02-24 RX ADMIN — Medication 10 ML: at 08:20

## 2021-02-24 RX ADMIN — ATROPINE SULFATE 0.4 MG: 0.4 INJECTION, SOLUTION INTRAMUSCULAR; INTRAVENOUS; SUBCUTANEOUS at 11:43

## 2021-02-24 RX ADMIN — DEXTROSE MONOHYDRATE 25 ML/HR: 5 INJECTION, SOLUTION INTRAVENOUS at 08:20

## 2021-02-24 RX ADMIN — IRINOTECAN HYDROCHLORIDE 240 MG: 20 INJECTION, SOLUTION INTRAVENOUS at 11:50

## 2021-02-24 RX ADMIN — FLUOROURACIL 615 MG: 50 INJECTION, SOLUTION INTRAVENOUS at 13:35

## 2021-02-24 RX ADMIN — LEUCOVORIN CALCIUM 820 MG: 350 INJECTION, POWDER, LYOPHILIZED, FOR SOLUTION INTRAMUSCULAR; INTRAVENOUS at 11:49

## 2021-02-24 NOTE — ADDENDUM NOTE
Encounter addended by: Jeffery Neely [facility] on: 2/24/2021 8:27 AM   Actions taken: i-Vent created or edited

## 2021-02-24 NOTE — PROGRESS NOTES
Arrived to the [facility]. D1C1 FOLFIRINOX completed. Elastomeric pump infusing. Spill kit and elastomeric pump education provided. Opportunity for questions provided. Patient verbalized understanding. Patient tolerated well. Any issues or concerns during appointment: no.  Patient aware of next infusion appointment on 2/26/2021 (date) at 3 pm (time). Discharged ambulatory with self.

## 2021-02-26 ENCOUNTER — HOSPITAL ENCOUNTER (OUTPATIENT)
Dept: INFUSION THERAPY | Age: 66
Discharge: HOME | End: 2021-02-26
Payer: MEDICARE

## 2021-02-26 VITALS
SYSTOLIC BLOOD PRESSURE: 155 MMHG | TEMPERATURE: 97.1 F | OXYGEN SATURATION: 98 % | DIASTOLIC BLOOD PRESSURE: 98 MMHG | RESPIRATION RATE: 16 BRPM | HEART RATE: 84 BPM

## 2021-02-26 DIAGNOSIS — C15.5 MALIGNANT NEOPLASM OF LOWER THIRD OF ESOPHAGUS (HCC): Primary | ICD-10-CM

## 2021-02-26 DIAGNOSIS — C25.1 MALIGNANT NEOPLASM OF BODY OF PANCREAS (HCC): ICD-10-CM

## 2021-02-26 PROCEDURE — 96374 THER/PROPH/DIAG INJ IV PUSH: CPT

## 2021-02-26 PROCEDURE — 74011250636 HC RX REV CODE- 250/636: Performed by: INTERNAL MEDICINE

## 2021-02-26 RX ORDER — ONDANSETRON 2 MG/ML
4 INJECTION INTRAMUSCULAR; INTRAVENOUS ONCE
Status: COMPLETED | OUTPATIENT
Start: 2021-02-26 | End: 2021-02-26

## 2021-02-26 RX ORDER — SODIUM CHLORIDE 0.9 % (FLUSH) 0.9 %
10 SYRINGE (ML) INJECTION AS NEEDED
Status: DISCONTINUED | OUTPATIENT
Start: 2021-02-26 | End: 2021-02-27 | Stop reason: HOSPADM

## 2021-02-26 RX ORDER — SODIUM CHLORIDE 9 MG/ML
10 INJECTION INTRAMUSCULAR; INTRAVENOUS; SUBCUTANEOUS AS NEEDED
Status: DISCONTINUED | OUTPATIENT
Start: 2021-02-26 | End: 2021-02-27 | Stop reason: HOSPADM

## 2021-02-26 RX ADMIN — ONDANSETRON 4 MG: 2 INJECTION INTRAMUSCULAR; INTRAVENOUS at 14:47

## 2021-02-26 RX ADMIN — SODIUM CHLORIDE 500 ML: 900 INJECTION, SOLUTION INTRAVENOUS at 14:40

## 2021-02-26 RX ADMIN — SODIUM CHLORIDE 10 ML: 9 INJECTION INTRAMUSCULAR; INTRAVENOUS; SUBCUTANEOUS at 15:17

## 2021-02-26 RX ADMIN — Medication 10 ML: at 14:40

## 2021-02-26 NOTE — PROGRESS NOTES
[facility] Chemo pump completed. Patient tolerated well. Any issues or concerns during appointment: pt c/o nausea and decreased po intake over last 2 days. Gave saline bolus and zofran per request.  Discussed nausea meds and scheduling. Pt to call if nausea meds are not controlling nausea adequately. Patient aware of next infusion appointment on 3/10/21 (date) at 8:30am (time). Discharged ambulatory to home.

## 2021-03-09 ENCOUNTER — HOSPITAL ENCOUNTER (OUTPATIENT)
Dept: LAB | Age: 66
Discharge: HOME | End: 2021-03-09
Payer: MEDICARE

## 2021-03-09 DIAGNOSIS — C25.1 MALIGNANT NEOPLASM OF BODY OF PANCREAS (HCC): ICD-10-CM

## 2021-03-09 PROBLEM — D70.8 OTHER NEUTROPENIA (HCC): Status: ACTIVE | Noted: 2021-03-09

## 2021-03-09 LAB
ALBUMIN SERPL-MCNC: 3.3 G/DL (ref 3.2–4.6)
ALBUMIN/GLOB SERPL: 0.9 {RATIO} (ref 1.2–3.5)
ALP SERPL-CCNC: 93 U/L (ref 50–136)
ALT SERPL-CCNC: 20 U/L (ref 12–65)
ANION GAP SERPL CALC-SCNC: 5 MMOL/L (ref 7–16)
AST SERPL-CCNC: 11 U/L (ref 15–37)
BASOPHILS # BLD: 0 K/UL (ref 0–0.2)
BASOPHILS NFR BLD: 1 % (ref 0–2)
BILIRUB SERPL-MCNC: 0.5 MG/DL (ref 0.2–1.1)
BUN SERPL-MCNC: 13 MG/DL (ref 8–23)
CALCIUM SERPL-MCNC: 9.7 MG/DL (ref 8.3–10.4)
CANCER AG19-9 SERPL-ACNC: 88.7 U/ML (ref 2–37)
CHLORIDE SERPL-SCNC: 106 MMOL/L (ref 98–107)
CO2 SERPL-SCNC: 29 MMOL/L (ref 21–32)
CREAT SERPL-MCNC: 0.8 MG/DL (ref 0.8–1.5)
DIFFERENTIAL METHOD BLD: ABNORMAL
EOSINOPHIL # BLD: 0.3 K/UL (ref 0–0.8)
EOSINOPHIL NFR BLD: 9 % (ref 0.5–7.8)
ERYTHROCYTE [DISTWIDTH] IN BLOOD BY AUTOMATED COUNT: 16.3 % (ref 11.9–14.6)
GLOBULIN SER CALC-MCNC: 3.7 G/DL (ref 2.3–3.5)
GLUCOSE SERPL-MCNC: 150 MG/DL (ref 65–100)
HCT VFR BLD AUTO: 41 % (ref 41.1–50.3)
HGB BLD-MCNC: 13 G/DL (ref 13.6–17.2)
IMM GRANULOCYTES # BLD AUTO: 0 K/UL (ref 0–0.5)
IMM GRANULOCYTES NFR BLD AUTO: 0 % (ref 0–5)
LYMPHOCYTES # BLD: 0.8 K/UL (ref 0.5–4.6)
LYMPHOCYTES NFR BLD: 28 % (ref 13–44)
MAGNESIUM SERPL-MCNC: 1.8 MG/DL (ref 1.8–2.4)
MCH RBC QN AUTO: 28 PG (ref 26.1–32.9)
MCHC RBC AUTO-ENTMCNC: 31.7 G/DL (ref 31.4–35)
MCV RBC AUTO: 88.2 FL (ref 79.6–97.8)
MONOCYTES # BLD: 0.6 K/UL (ref 0.1–1.3)
MONOCYTES NFR BLD: 23 % (ref 4–12)
NEUTS SEG # BLD: 1.1 K/UL (ref 1.7–8.2)
NEUTS SEG NFR BLD: 39 % (ref 43–78)
NRBC # BLD: 0 K/UL (ref 0–0.2)
PLATELET # BLD AUTO: 213 K/UL (ref 150–450)
PMV BLD AUTO: 9.4 FL (ref 9.4–12.3)
POTASSIUM SERPL-SCNC: 3.6 MMOL/L (ref 3.5–5.1)
PROT SERPL-MCNC: 7 G/DL (ref 6.3–8.2)
RBC # BLD AUTO: 4.65 M/UL (ref 4.23–5.67)
SODIUM SERPL-SCNC: 140 MMOL/L (ref 136–145)
WBC # BLD AUTO: 2.7 K/UL (ref 4.3–11.1)

## 2021-03-09 PROCEDURE — 83735 ASSAY OF MAGNESIUM: CPT

## 2021-03-09 PROCEDURE — 80053 COMPREHEN METABOLIC PANEL: CPT

## 2021-03-09 PROCEDURE — 86301 IMMUNOASSAY TUMOR CA 19-9: CPT

## 2021-03-09 PROCEDURE — 85025 COMPLETE CBC W/AUTO DIFF WBC: CPT

## 2021-03-09 PROCEDURE — 36415 COLL VENOUS BLD VENIPUNCTURE: CPT

## 2021-03-10 ENCOUNTER — HOSPITAL ENCOUNTER (OUTPATIENT)
Dept: INFUSION THERAPY | Age: 66
Discharge: HOME | End: 2021-03-10
Payer: MEDICARE

## 2021-03-10 VITALS
OXYGEN SATURATION: 99 % | DIASTOLIC BLOOD PRESSURE: 84 MMHG | HEART RATE: 97 BPM | BODY MASS INDEX: 29.99 KG/M2 | SYSTOLIC BLOOD PRESSURE: 146 MMHG | WEIGHT: 185.8 LBS | TEMPERATURE: 97 F | RESPIRATION RATE: 18 BRPM

## 2021-03-10 DIAGNOSIS — D70.8 OTHER NEUTROPENIA (HCC): Primary | ICD-10-CM

## 2021-03-10 DIAGNOSIS — C25.1 MALIGNANT NEOPLASM OF BODY OF PANCREAS (HCC): ICD-10-CM

## 2021-03-10 DIAGNOSIS — C15.5 MALIGNANT NEOPLASM OF LOWER THIRD OF ESOPHAGUS (HCC): ICD-10-CM

## 2021-03-10 PROCEDURE — 96372 THER/PROPH/DIAG INJ SC/IM: CPT

## 2021-03-10 PROCEDURE — 96368 THER/DIAG CONCURRENT INF: CPT

## 2021-03-10 PROCEDURE — 74011000258 HC RX REV CODE- 258: Performed by: INTERNAL MEDICINE

## 2021-03-10 PROCEDURE — 96413 CHEMO IV INFUSION 1 HR: CPT

## 2021-03-10 PROCEDURE — G0498 CHEMO EXTEND IV INFUS W/PUMP: HCPCS

## 2021-03-10 PROCEDURE — 96415 CHEMO IV INFUSION ADDL HR: CPT

## 2021-03-10 PROCEDURE — 96411 CHEMO IV PUSH ADDL DRUG: CPT

## 2021-03-10 PROCEDURE — 74011250636 HC RX REV CODE- 250/636: Performed by: INTERNAL MEDICINE

## 2021-03-10 PROCEDURE — 96375 TX/PRO/DX INJ NEW DRUG ADDON: CPT

## 2021-03-10 PROCEDURE — 96417 CHEMO IV INFUS EACH ADDL SEQ: CPT

## 2021-03-10 RX ORDER — SODIUM CHLORIDE 0.9 % (FLUSH) 0.9 %
10 SYRINGE (ML) INJECTION AS NEEDED
Status: ACTIVE | OUTPATIENT
Start: 2021-03-10 | End: 2021-03-10

## 2021-03-10 RX ORDER — ATROPINE SULFATE 0.4 MG/ML
0.4 INJECTION, SOLUTION ENDOTRACHEAL; INTRAMEDULLARY; INTRAMUSCULAR; INTRAVENOUS; SUBCUTANEOUS ONCE
Status: COMPLETED | OUTPATIENT
Start: 2021-03-10 | End: 2021-03-10

## 2021-03-10 RX ORDER — ONDANSETRON 2 MG/ML
8 INJECTION INTRAMUSCULAR; INTRAVENOUS ONCE
Status: COMPLETED | OUTPATIENT
Start: 2021-03-10 | End: 2021-03-10

## 2021-03-10 RX ORDER — DEXTROSE MONOHYDRATE 50 MG/ML
25 INJECTION, SOLUTION INTRAVENOUS CONTINUOUS
Status: ACTIVE | OUTPATIENT
Start: 2021-03-10 | End: 2021-03-10

## 2021-03-10 RX ORDER — FLUOROURACIL 50 MG/ML
300 INJECTION, SOLUTION INTRAVENOUS ONCE
Status: COMPLETED | OUTPATIENT
Start: 2021-03-10 | End: 2021-03-10

## 2021-03-10 RX ADMIN — OXALIPLATIN 133.5 MG: 5 INJECTION, SOLUTION INTRAVENOUS at 09:29

## 2021-03-10 RX ADMIN — DEXTROSE MONOHYDRATE 25 ML/HR: 5 INJECTION, SOLUTION INTRAVENOUS at 08:55

## 2021-03-10 RX ADMIN — FLUOROURACIL 615 MG: 50 INJECTION, SOLUTION INTRAVENOUS at 13:14

## 2021-03-10 RX ADMIN — ONDANSETRON 8 MG: 2 INJECTION INTRAMUSCULAR; INTRAVENOUS at 08:39

## 2021-03-10 RX ADMIN — Medication 10 ML: at 08:17

## 2021-03-10 RX ADMIN — ATROPINE SULFATE 0.4 MG: 0.4 INJECTION, SOLUTION INTRAMUSCULAR; INTRAVENOUS; SUBCUTANEOUS at 11:30

## 2021-03-10 RX ADMIN — LEUCOVORIN CALCIUM 616 MG: 350 INJECTION, POWDER, LYOPHILIZED, FOR SOLUTION INTRAMUSCULAR; INTRAVENOUS at 11:34

## 2021-03-10 RX ADMIN — FLUOROURACIL 4000 MG: 50 INJECTION, SOLUTION INTRAVENOUS at 13:20

## 2021-03-10 RX ADMIN — IRINOTECAN HYDROCHLORIDE 240 MG: 20 INJECTION, SOLUTION INTRAVENOUS at 11:35

## 2021-03-10 RX ADMIN — DEXAMETHASONE SODIUM PHOSPHATE 12 MG: 4 INJECTION, SOLUTION INTRAMUSCULAR; INTRAVENOUS at 08:40

## 2021-03-10 NOTE — ADDENDUM NOTE
Encounter addended by: Mando De Los Santos [facility] on: 3/10/2021 8:32 AM   Actions taken: Fredi created or edited

## 2021-03-10 NOTE — PROGRESS NOTES
Arrived to the [facility]. Assessment complete, labs reviewed. FOLFIRINOX regimen completed. Adrucil elastomeric pump connected and unclamped for flow, prior to discharge. Patient tolerated without problems. Any issues or concerns during appointment: None. Pt instructed to call for any concerns or questions. Pt verbalized understanding. Patient aware of next infusion appointment on 3/12/2021 (date) at 1500 (time). Discharged ambulatory.

## 2021-03-12 ENCOUNTER — HOSPITAL ENCOUNTER (OUTPATIENT)
Dept: INFUSION THERAPY | Age: 66
Discharge: HOME | End: 2021-03-12
Payer: MEDICARE

## 2021-03-12 VITALS
HEART RATE: 87 BPM | RESPIRATION RATE: 18 BRPM | SYSTOLIC BLOOD PRESSURE: 121 MMHG | DIASTOLIC BLOOD PRESSURE: 77 MMHG | OXYGEN SATURATION: 96 % | TEMPERATURE: 97.6 F

## 2021-03-12 DIAGNOSIS — C15.5 MALIGNANT NEOPLASM OF LOWER THIRD OF ESOPHAGUS (HCC): ICD-10-CM

## 2021-03-12 DIAGNOSIS — C25.1 MALIGNANT NEOPLASM OF BODY OF PANCREAS (HCC): ICD-10-CM

## 2021-03-12 DIAGNOSIS — D70.8 OTHER NEUTROPENIA (HCC): Primary | ICD-10-CM

## 2021-03-12 PROCEDURE — 74011250636 HC RX REV CODE- 250/636: Performed by: NURSE PRACTITIONER

## 2021-03-12 PROCEDURE — 96361 HYDRATE IV INFUSION ADD-ON: CPT

## 2021-03-12 PROCEDURE — 96374 THER/PROPH/DIAG INJ IV PUSH: CPT

## 2021-03-12 PROCEDURE — 96375 TX/PRO/DX INJ NEW DRUG ADDON: CPT

## 2021-03-12 RX ORDER — ONDANSETRON 2 MG/ML
8 INJECTION INTRAMUSCULAR; INTRAVENOUS ONCE
Status: COMPLETED | OUTPATIENT
Start: 2021-03-12 | End: 2021-03-12

## 2021-03-12 RX ORDER — SODIUM CHLORIDE 0.9 % (FLUSH) 0.9 %
10 SYRINGE (ML) INJECTION AS NEEDED
Status: DISCONTINUED | OUTPATIENT
Start: 2021-03-12 | End: 2021-03-13 | Stop reason: HOSPADM

## 2021-03-12 RX ORDER — DEXAMETHASONE SODIUM PHOSPHATE 100 MG/10ML
10 INJECTION INTRAMUSCULAR; INTRAVENOUS ONCE
Status: COMPLETED | OUTPATIENT
Start: 2021-03-12 | End: 2021-03-12

## 2021-03-12 RX ADMIN — Medication 10 ML: at 16:40

## 2021-03-12 RX ADMIN — ONDANSETRON 8 MG: 2 INJECTION INTRAMUSCULAR; INTRAVENOUS at 15:36

## 2021-03-12 RX ADMIN — Medication 10 ML: at 15:23

## 2021-03-12 RX ADMIN — SODIUM CHLORIDE 1000 ML: 900 INJECTION, SOLUTION INTRAVENOUS at 15:23

## 2021-03-12 RX ADMIN — DEXAMETHASONE SODIUM PHOSPHATE 10 MG: 10 INJECTION INTRAMUSCULAR; INTRAVENOUS at 15:37

## 2021-03-12 NOTE — PROGRESS NOTES
Arrived to the [facility]. Elastomeric pump, IVF, and anti-emetics completed. Patient tolerated well. Any issues or concerns during appointment: Patient reports nausea and vomiting over the past 2 days. Patient states he has only eaten a few crackers in 2 days and has vomited several times. [id number] [facility], NP notified. Order given for Decadron 10mg IV 1x and Zofran 8mg IV 1x. Patient states he has not yet picked up the Phenergan prescription from his pharmacy. He states he has been alternating the Zofran/Compazine consistently, but later reports that he hasn't been able to take anything today. Patient educated on nausea meds. Patient encouraged to  the Phenergan from the pharmacy and call if his nausea is still uncontrolled. LOBITO Oneal (nurse navigator) notified. Patient aware of next infusion appointment on 3/13/2021 (date) at 3:30 pm (time). Discharged ambulatory with self.

## 2021-03-13 ENCOUNTER — HOSPITAL ENCOUNTER (OUTPATIENT)
Dept: INFUSION THERAPY | Age: 66
Discharge: HOME | End: 2021-03-13
Payer: MEDICARE

## 2021-03-13 VITALS
RESPIRATION RATE: 18 BRPM | DIASTOLIC BLOOD PRESSURE: 86 MMHG | SYSTOLIC BLOOD PRESSURE: 152 MMHG | TEMPERATURE: 97.8 F | WEIGHT: 183.8 LBS | BODY MASS INDEX: 29.67 KG/M2 | HEART RATE: 82 BPM | OXYGEN SATURATION: 97 %

## 2021-03-13 DIAGNOSIS — C15.5 MALIGNANT NEOPLASM OF LOWER THIRD OF ESOPHAGUS (HCC): ICD-10-CM

## 2021-03-13 DIAGNOSIS — D70.8 OTHER NEUTROPENIA (HCC): Primary | ICD-10-CM

## 2021-03-13 DIAGNOSIS — C25.1 MALIGNANT NEOPLASM OF BODY OF PANCREAS (HCC): ICD-10-CM

## 2021-03-13 PROCEDURE — 96372 THER/PROPH/DIAG INJ SC/IM: CPT

## 2021-03-13 PROCEDURE — 74011250636 HC RX REV CODE- 250/636: Performed by: INTERNAL MEDICINE

## 2021-03-13 RX ADMIN — PEGFILGRASTIM-CBQV 6 MG: 6 INJECTION, SOLUTION SUBCUTANEOUS at 14:36

## 2021-03-13 NOTE — PROGRESS NOTES
Arrived to the [facility]. Assessment completed. Udenyca completed. Patient tolerated without problems. Patient educated on the use of Clartin to prevent bone pain when receiving Udenyca. Patient stayed for 30 minute observation post 1st injection with no problems noted  Any issues or concerns during appointment: None  Instructed to call Dr Stuart Dawson with any side effects or concerns  Patient aware of next infusion appointment on 3/24/21(date) at 9 AM (time).   Discharged ambulatory

## 2021-03-23 ENCOUNTER — HOSPITAL ENCOUNTER (OUTPATIENT)
Dept: LAB | Age: 66
Discharge: HOME | End: 2021-03-23
Payer: MEDICARE

## 2021-03-23 DIAGNOSIS — C25.1 MALIGNANT NEOPLASM OF BODY OF PANCREAS (HCC): ICD-10-CM

## 2021-03-23 LAB
ALBUMIN SERPL-MCNC: 3.1 G/DL (ref 3.2–4.6)
ALBUMIN/GLOB SERPL: 0.9 {RATIO} (ref 1.2–3.5)
ALP SERPL-CCNC: 105 U/L (ref 50–136)
ALT SERPL-CCNC: 24 U/L (ref 12–65)
ANION GAP SERPL CALC-SCNC: 7 MMOL/L (ref 7–16)
AST SERPL-CCNC: 12 U/L (ref 15–37)
BASOPHILS # BLD: 0 K/UL (ref 0–0.2)
BASOPHILS NFR BLD: 1 % (ref 0–2)
BILIRUB SERPL-MCNC: 0.3 MG/DL (ref 0.2–1.1)
BUN SERPL-MCNC: 12 MG/DL (ref 8–23)
CALCIUM SERPL-MCNC: 9.3 MG/DL (ref 8.3–10.4)
CANCER AG19-9 SERPL-ACNC: 60.9 U/ML (ref 2–37)
CHLORIDE SERPL-SCNC: 105 MMOL/L (ref 98–107)
CO2 SERPL-SCNC: 28 MMOL/L (ref 21–32)
CREAT SERPL-MCNC: 0.6 MG/DL (ref 0.8–1.5)
DIFFERENTIAL METHOD BLD: ABNORMAL
EOSINOPHIL # BLD: 0.2 K/UL (ref 0–0.8)
EOSINOPHIL NFR BLD: 2 % (ref 0.5–7.8)
ERYTHROCYTE [DISTWIDTH] IN BLOOD BY AUTOMATED COUNT: 15.9 % (ref 11.9–14.6)
GLOBULIN SER CALC-MCNC: 3.6 G/DL (ref 2.3–3.5)
GLUCOSE SERPL-MCNC: 121 MG/DL (ref 65–100)
HCT VFR BLD AUTO: 39.6 % (ref 41.1–50.3)
HGB BLD-MCNC: 12.5 G/DL (ref 13.6–17.2)
IMM GRANULOCYTES # BLD AUTO: 0.4 K/UL (ref 0–0.5)
IMM GRANULOCYTES NFR BLD AUTO: 4 % (ref 0–5)
LYMPHOCYTES # BLD: 1.3 K/UL (ref 0.5–4.6)
LYMPHOCYTES NFR BLD: 16 % (ref 13–44)
MAGNESIUM SERPL-MCNC: 2.1 MG/DL (ref 1.8–2.4)
MCH RBC QN AUTO: 28.3 PG (ref 26.1–32.9)
MCHC RBC AUTO-ENTMCNC: 31.6 G/DL (ref 31.4–35)
MCV RBC AUTO: 89.6 FL (ref 79.6–97.8)
MONOCYTES # BLD: 0.8 K/UL (ref 0.1–1.3)
MONOCYTES NFR BLD: 10 % (ref 4–12)
NEUTS SEG # BLD: 5.7 K/UL (ref 1.7–8.2)
NEUTS SEG NFR BLD: 67 % (ref 43–78)
NRBC # BLD: 0 K/UL (ref 0–0.2)
PLATELET # BLD AUTO: 235 K/UL (ref 150–450)
PMV BLD AUTO: 9.3 FL (ref 9.4–12.3)
POTASSIUM SERPL-SCNC: 3.4 MMOL/L (ref 3.5–5.1)
PROT SERPL-MCNC: 6.7 G/DL (ref 6.3–8.2)
RBC # BLD AUTO: 4.42 M/UL (ref 4.23–5.67)
SODIUM SERPL-SCNC: 140 MMOL/L (ref 136–145)
WBC # BLD AUTO: 8.5 K/UL (ref 4.3–11.1)

## 2021-03-23 PROCEDURE — 86301 IMMUNOASSAY TUMOR CA 19-9: CPT

## 2021-03-23 PROCEDURE — 80053 COMPREHEN METABOLIC PANEL: CPT

## 2021-03-23 PROCEDURE — 83735 ASSAY OF MAGNESIUM: CPT

## 2021-03-23 PROCEDURE — 36415 COLL VENOUS BLD VENIPUNCTURE: CPT

## 2021-03-23 PROCEDURE — 85025 COMPLETE CBC W/AUTO DIFF WBC: CPT

## 2021-03-24 ENCOUNTER — HOSPITAL ENCOUNTER (OUTPATIENT)
Dept: INFUSION THERAPY | Age: 66
Discharge: HOME | End: 2021-03-24
Payer: MEDICARE

## 2021-03-24 VITALS
OXYGEN SATURATION: 99 % | DIASTOLIC BLOOD PRESSURE: 73 MMHG | HEART RATE: 79 BPM | TEMPERATURE: 98 F | BODY MASS INDEX: 29.02 KG/M2 | WEIGHT: 179.8 LBS | RESPIRATION RATE: 18 BRPM | SYSTOLIC BLOOD PRESSURE: 127 MMHG

## 2021-03-24 DIAGNOSIS — D70.8 OTHER NEUTROPENIA (HCC): Primary | ICD-10-CM

## 2021-03-24 DIAGNOSIS — C15.5 MALIGNANT NEOPLASM OF LOWER THIRD OF ESOPHAGUS (HCC): ICD-10-CM

## 2021-03-24 DIAGNOSIS — C25.1 MALIGNANT NEOPLASM OF BODY OF PANCREAS (HCC): ICD-10-CM

## 2021-03-24 PROCEDURE — 96415 CHEMO IV INFUSION ADDL HR: CPT

## 2021-03-24 PROCEDURE — 96411 CHEMO IV PUSH ADDL DRUG: CPT

## 2021-03-24 PROCEDURE — 96367 TX/PROPH/DG ADDL SEQ IV INF: CPT

## 2021-03-24 PROCEDURE — 96413 CHEMO IV INFUSION 1 HR: CPT

## 2021-03-24 PROCEDURE — 96372 THER/PROPH/DIAG INJ SC/IM: CPT

## 2021-03-24 PROCEDURE — 96368 THER/DIAG CONCURRENT INF: CPT

## 2021-03-24 PROCEDURE — G0498 CHEMO EXTEND IV INFUS W/PUMP: HCPCS

## 2021-03-24 PROCEDURE — 74011000258 HC RX REV CODE- 258: Performed by: INTERNAL MEDICINE

## 2021-03-24 PROCEDURE — 96375 TX/PRO/DX INJ NEW DRUG ADDON: CPT

## 2021-03-24 PROCEDURE — 74011250636 HC RX REV CODE- 250/636: Performed by: INTERNAL MEDICINE

## 2021-03-24 PROCEDURE — 96417 CHEMO IV INFUS EACH ADDL SEQ: CPT

## 2021-03-24 RX ORDER — DEXTROSE MONOHYDRATE 50 MG/ML
25 INJECTION, SOLUTION INTRAVENOUS CONTINUOUS
Status: ACTIVE | OUTPATIENT
Start: 2021-03-24 | End: 2021-03-24

## 2021-03-24 RX ORDER — ATROPINE SULFATE 0.4 MG/ML
0.4 INJECTION, SOLUTION ENDOTRACHEAL; INTRAMEDULLARY; INTRAMUSCULAR; INTRAVENOUS; SUBCUTANEOUS
Status: ACTIVE | OUTPATIENT
Start: 2021-03-24 | End: 2021-03-24

## 2021-03-24 RX ORDER — ATROPINE SULFATE 0.4 MG/ML
0.4 INJECTION, SOLUTION ENDOTRACHEAL; INTRAMEDULLARY; INTRAMUSCULAR; INTRAVENOUS; SUBCUTANEOUS ONCE
Status: COMPLETED | OUTPATIENT
Start: 2021-03-24 | End: 2021-03-24

## 2021-03-24 RX ORDER — ONDANSETRON 2 MG/ML
8 INJECTION INTRAMUSCULAR; INTRAVENOUS ONCE
Status: COMPLETED | OUTPATIENT
Start: 2021-03-24 | End: 2021-03-24

## 2021-03-24 RX ORDER — SODIUM CHLORIDE 0.9 % (FLUSH) 0.9 %
10 SYRINGE (ML) INJECTION AS NEEDED
Status: ACTIVE | OUTPATIENT
Start: 2021-03-24 | End: 2021-03-24

## 2021-03-24 RX ORDER — FLUOROURACIL 50 MG/ML
300 INJECTION, SOLUTION INTRAVENOUS ONCE
Status: COMPLETED | OUTPATIENT
Start: 2021-03-24 | End: 2021-03-24

## 2021-03-24 RX ADMIN — LEUCOVORIN CALCIUM 586 MG: 200 INJECTION, POWDER, LYOPHILIZED, FOR SOLUTION INTRAMUSCULAR; INTRAVENOUS at 13:08

## 2021-03-24 RX ADMIN — Medication 10 ML: at 09:00

## 2021-03-24 RX ADMIN — OXALIPLATIN 127 MG: 5 INJECTION, SOLUTION INTRAVENOUS at 10:40

## 2021-03-24 RX ADMIN — FLUOROURACIL 3500 MG: 50 INJECTION, SOLUTION INTRAVENOUS at 14:57

## 2021-03-24 RX ADMIN — Medication 10 ML: at 14:45

## 2021-03-24 RX ADMIN — ONDANSETRON 8 MG: 2 INJECTION INTRAMUSCULAR; INTRAVENOUS at 09:34

## 2021-03-24 RX ADMIN — FLUOROURACIL 585 MG: 50 INJECTION, SOLUTION INTRAVENOUS at 14:51

## 2021-03-24 RX ADMIN — DEXTROSE MONOHYDRATE 25 ML/HR: 5 INJECTION, SOLUTION INTRAVENOUS at 09:00

## 2021-03-24 RX ADMIN — DEXAMETHASONE SODIUM PHOSPHATE 12 MG: 4 INJECTION, SOLUTION INTRAMUSCULAR; INTRAVENOUS at 09:35

## 2021-03-24 RX ADMIN — ATROPINE SULFATE 0.4 MG: 0.4 INJECTION, SOLUTION INTRAMUSCULAR; INTRAVENOUS; SUBCUTANEOUS at 13:14

## 2021-03-24 RX ADMIN — IRINOTECAN HYDROCHLORIDE 234 MG: 20 INJECTION, SOLUTION INTRAVENOUS at 13:08

## 2021-03-24 NOTE — ADDENDUM NOTE
Encounter addended by: Giselle Villalobos [facility] on: 3/24/2021 9:28 AM   Actions taken: i-Vent created or edited, Treatment plan modified

## 2021-03-24 NOTE — PROGRESS NOTES
Arrived to the [facility]. Assessment completed and labs reviewed. Pre meds and Folfirinox infusion completed. Patient tolerated well. Any issues or concerns during appointment: none. Patient aware of next infusion appointment on 03/26/2021 at 1700. Discharged ambulatory.

## 2021-03-24 NOTE — PROGRESS NOTES
This RN took over pt care at 1450. Leucovorin and Irinotecan complete. 5FU push completed and 5FU pump connected and infusing prior to discharge. Pt aware of next appt 3/26 at 5pm.  Discharged ambulatory, no distress noted.

## 2021-03-26 ENCOUNTER — HOSPITAL ENCOUNTER (OUTPATIENT)
Dept: INFUSION THERAPY | Age: 66
Discharge: HOME | End: 2021-03-26
Payer: MEDICARE

## 2021-03-26 VITALS
TEMPERATURE: 97.7 F | HEART RATE: 101 BPM | DIASTOLIC BLOOD PRESSURE: 80 MMHG | SYSTOLIC BLOOD PRESSURE: 134 MMHG | OXYGEN SATURATION: 98 % | RESPIRATION RATE: 18 BRPM

## 2021-03-26 DIAGNOSIS — C25.1 MALIGNANT NEOPLASM OF BODY OF PANCREAS (HCC): ICD-10-CM

## 2021-03-26 DIAGNOSIS — C15.5 MALIGNANT NEOPLASM OF LOWER THIRD OF ESOPHAGUS (HCC): ICD-10-CM

## 2021-03-26 DIAGNOSIS — D70.8 OTHER NEUTROPENIA (HCC): Primary | ICD-10-CM

## 2021-03-26 PROCEDURE — 96523 IRRIG DRUG DELIVERY DEVICE: CPT

## 2021-03-26 PROCEDURE — 74011250636 HC RX REV CODE- 250/636: Performed by: INTERNAL MEDICINE

## 2021-03-26 RX ORDER — SODIUM CHLORIDE 9 MG/ML
10 INJECTION INTRAMUSCULAR; INTRAVENOUS; SUBCUTANEOUS AS NEEDED
Status: DISCONTINUED | OUTPATIENT
Start: 2021-03-26 | End: 2021-03-27 | Stop reason: HOSPADM

## 2021-03-26 RX ORDER — SODIUM CHLORIDE 9 MG/ML
500 INJECTION, SOLUTION INTRAVENOUS CONTINUOUS
Status: DISCONTINUED | OUTPATIENT
Start: 2021-03-26 | End: 2021-03-28 | Stop reason: HOSPADM

## 2021-03-26 RX ADMIN — SODIUM CHLORIDE 10 ML: 9 INJECTION INTRAMUSCULAR; INTRAVENOUS; SUBCUTANEOUS at 15:06

## 2021-03-26 RX ADMIN — SODIUM CHLORIDE 500 ML: 900 INJECTION, SOLUTION INTRAVENOUS at 14:35

## 2021-03-26 RX ADMIN — SODIUM CHLORIDE 10 ML: 9 INJECTION INTRAMUSCULAR; INTRAVENOUS; SUBCUTANEOUS at 14:30

## 2021-03-26 NOTE — PROGRESS NOTES
Arrived to the [facility]. DC pump and 500ml NS IV fluid completed. Patient tolerated well. Any issues or concerns during appointment: patient mildly nauseous and been having episodes of diarrhea. Patient aware of next infusion appointment on 3/27 at 3:00pm.  Discharged ambulatory to home.

## 2021-03-26 NOTE — PROGRESS NOTES
Problem: Knowledge Deficit  Goal: *Participate in the learning process  Outcome: Progressing Towards Goal  Goal: *Verbalize description of procedure  Outcome: Progressing Towards Goal  Goal: *Verbalizes understanding and describes medication purposes and frequencies  Outcome: Progressing Towards Goal  Goal: *Knowledge of discharge instructions  Outcome: Progressing Towards Goal     Problem: Patient Education: Go to Patient Education Activity  Goal: Patient/Family Education  Outcome: Progressing Towards Goal

## 2021-03-28 ENCOUNTER — HOSPITAL ENCOUNTER (OUTPATIENT)
Dept: INFUSION THERAPY | Age: 66
Discharge: HOME | End: 2021-03-28
Payer: MEDICARE

## 2021-03-28 VITALS
RESPIRATION RATE: 18 BRPM | TEMPERATURE: 97.5 F | OXYGEN SATURATION: 98 % | SYSTOLIC BLOOD PRESSURE: 144 MMHG | DIASTOLIC BLOOD PRESSURE: 97 MMHG | HEART RATE: 86 BPM

## 2021-03-28 DIAGNOSIS — D70.8 OTHER NEUTROPENIA (HCC): ICD-10-CM

## 2021-03-28 DIAGNOSIS — C15.5 MALIGNANT NEOPLASM OF LOWER THIRD OF ESOPHAGUS (HCC): Primary | ICD-10-CM

## 2021-03-28 DIAGNOSIS — C25.1 MALIGNANT NEOPLASM OF BODY OF PANCREAS (HCC): ICD-10-CM

## 2021-03-28 PROCEDURE — 96372 THER/PROPH/DIAG INJ SC/IM: CPT

## 2021-03-28 PROCEDURE — 74011250636 HC RX REV CODE- 250/636: Performed by: INTERNAL MEDICINE

## 2021-03-28 RX ADMIN — PEGFILGRASTIM-CBQV 6 MG: 6 INJECTION, SOLUTION SUBCUTANEOUS at 13:42

## 2021-03-28 NOTE — PROGRESS NOTES
Arrived to the [facility]. Udenyca injection completed. Provided education on fluid balance/electrolyte maintenance. Patient instructed to report any side affects to ordering provider. Patient tolerated well. Any issues or concerns during appointment: none. Patient aware of next infusion appointment on 4/7 at 9:00am.  Discharged ambulatory to home.

## 2021-04-06 ENCOUNTER — HOSPITAL ENCOUNTER (OUTPATIENT)
Dept: LAB | Age: 66
Discharge: HOME | End: 2021-04-06
Payer: MEDICARE

## 2021-04-06 DIAGNOSIS — C15.5 MALIGNANT NEOPLASM OF LOWER THIRD OF ESOPHAGUS (HCC): ICD-10-CM

## 2021-04-06 LAB
ALBUMIN SERPL-MCNC: 3.4 G/DL (ref 3.2–4.6)
ALBUMIN/GLOB SERPL: 0.9 {RATIO} (ref 1.2–3.5)
ALP SERPL-CCNC: 112 U/L (ref 50–136)
ALT SERPL-CCNC: 17 U/L (ref 12–65)
ANION GAP SERPL CALC-SCNC: 6 MMOL/L (ref 7–16)
AST SERPL-CCNC: 13 U/L (ref 15–37)
BASOPHILS # BLD: 0.2 K/UL (ref 0–0.2)
BASOPHILS NFR BLD: 1 % (ref 0–2)
BILIRUB SERPL-MCNC: 0.3 MG/DL (ref 0.2–1.1)
BUN SERPL-MCNC: 15 MG/DL (ref 8–23)
CALCIUM SERPL-MCNC: 9.4 MG/DL (ref 8.3–10.4)
CHLORIDE SERPL-SCNC: 105 MMOL/L (ref 98–107)
CO2 SERPL-SCNC: 29 MMOL/L (ref 21–32)
CREAT SERPL-MCNC: 0.8 MG/DL (ref 0.8–1.5)
DIFFERENTIAL METHOD BLD: ABNORMAL
EOSINOPHIL # BLD: 0.3 K/UL (ref 0–0.8)
EOSINOPHIL NFR BLD: 2 % (ref 0.5–7.8)
ERYTHROCYTE [DISTWIDTH] IN BLOOD BY AUTOMATED COUNT: 16.5 % (ref 11.9–14.6)
GLOBULIN SER CALC-MCNC: 3.8 G/DL (ref 2.3–3.5)
GLUCOSE SERPL-MCNC: 113 MG/DL (ref 65–100)
HCT VFR BLD AUTO: 40.7 %
HGB BLD-MCNC: 12.8 G/DL (ref 13.6–17.2)
IMM GRANULOCYTES # BLD AUTO: 1.2 K/UL (ref 0–0.5)
IMM GRANULOCYTES NFR BLD AUTO: 7 % (ref 0–5)
LYMPHOCYTES # BLD: 1.7 K/UL (ref 0.5–4.6)
LYMPHOCYTES NFR BLD: 10 % (ref 13–44)
MAGNESIUM SERPL-MCNC: 2 MG/DL (ref 1.8–2.4)
MCH RBC QN AUTO: 28.6 PG (ref 26.1–32.9)
MCHC RBC AUTO-ENTMCNC: 31.4 G/DL (ref 31.4–35)
MCV RBC AUTO: 90.8 FL (ref 79.6–97.8)
MONOCYTES # BLD: 2.2 K/UL (ref 0.1–1.3)
MONOCYTES NFR BLD: 13 % (ref 4–12)
NEUTS SEG # BLD: 11.2 K/UL (ref 1.7–8.2)
NEUTS SEG NFR BLD: 67 % (ref 43–78)
NRBC # BLD: 0.02 K/UL (ref 0–0.2)
PLATELET # BLD AUTO: 289 K/UL (ref 150–450)
PLATELET COMMENTS,PCOM: ADEQUATE
PMV BLD AUTO: 9 FL (ref 9.4–12.3)
POTASSIUM SERPL-SCNC: 3.7 MMOL/L (ref 3.5–5.1)
PROT SERPL-MCNC: 7.2 G/DL (ref 6.3–8.2)
RBC # BLD AUTO: 4.48 M/UL (ref 4.23–5.6)
RBC MORPH BLD: ABNORMAL
RBC MORPH BLD: ABNORMAL
SODIUM SERPL-SCNC: 140 MMOL/L (ref 136–145)
WBC # BLD AUTO: 16.8 K/UL (ref 4.3–11.1)
WBC MORPH BLD: ABNORMAL

## 2021-04-06 PROCEDURE — 36415 COLL VENOUS BLD VENIPUNCTURE: CPT

## 2021-04-06 PROCEDURE — 83735 ASSAY OF MAGNESIUM: CPT

## 2021-04-06 PROCEDURE — 85025 COMPLETE CBC W/AUTO DIFF WBC: CPT

## 2021-04-06 PROCEDURE — 80053 COMPREHEN METABOLIC PANEL: CPT

## 2021-04-07 ENCOUNTER — HOSPITAL ENCOUNTER (OUTPATIENT)
Dept: INFUSION THERAPY | Age: 66
Discharge: HOME | End: 2021-04-07
Payer: MEDICARE

## 2021-04-07 VITALS
DIASTOLIC BLOOD PRESSURE: 67 MMHG | HEIGHT: 66 IN | SYSTOLIC BLOOD PRESSURE: 146 MMHG | BODY MASS INDEX: 27.74 KG/M2 | OXYGEN SATURATION: 96 % | WEIGHT: 172.6 LBS | HEART RATE: 80 BPM | RESPIRATION RATE: 18 BRPM | TEMPERATURE: 97.7 F

## 2021-04-07 DIAGNOSIS — C25.1 MALIGNANT NEOPLASM OF BODY OF PANCREAS (HCC): ICD-10-CM

## 2021-04-07 DIAGNOSIS — C15.5 MALIGNANT NEOPLASM OF LOWER THIRD OF ESOPHAGUS (HCC): ICD-10-CM

## 2021-04-07 DIAGNOSIS — D70.8 OTHER NEUTROPENIA (HCC): Primary | ICD-10-CM

## 2021-04-07 PROCEDURE — 74011250636 HC RX REV CODE- 250/636: Performed by: INTERNAL MEDICINE

## 2021-04-07 PROCEDURE — G0498 CHEMO EXTEND IV INFUS W/PUMP: HCPCS

## 2021-04-07 PROCEDURE — 74011000258 HC RX REV CODE- 258: Performed by: NURSE PRACTITIONER

## 2021-04-07 PROCEDURE — 96375 TX/PRO/DX INJ NEW DRUG ADDON: CPT

## 2021-04-07 PROCEDURE — 96368 THER/DIAG CONCURRENT INF: CPT

## 2021-04-07 PROCEDURE — 96367 TX/PROPH/DG ADDL SEQ IV INF: CPT

## 2021-04-07 PROCEDURE — 74011250637 HC RX REV CODE- 250/637: Performed by: NURSE PRACTITIONER

## 2021-04-07 PROCEDURE — 96413 CHEMO IV INFUSION 1 HR: CPT

## 2021-04-07 PROCEDURE — 74011000258 HC RX REV CODE- 258: Performed by: INTERNAL MEDICINE

## 2021-04-07 PROCEDURE — 74011250636 HC RX REV CODE- 250/636: Performed by: NURSE PRACTITIONER

## 2021-04-07 PROCEDURE — 96415 CHEMO IV INFUSION ADDL HR: CPT

## 2021-04-07 PROCEDURE — 96417 CHEMO IV INFUS EACH ADDL SEQ: CPT

## 2021-04-07 PROCEDURE — 96372 THER/PROPH/DIAG INJ SC/IM: CPT

## 2021-04-07 PROCEDURE — 96411 CHEMO IV PUSH ADDL DRUG: CPT

## 2021-04-07 RX ORDER — DEXTROSE MONOHYDRATE 50 MG/ML
25 INJECTION, SOLUTION INTRAVENOUS CONTINUOUS
Status: ACTIVE | OUTPATIENT
Start: 2021-04-07 | End: 2021-04-07

## 2021-04-07 RX ORDER — ATROPINE SULFATE 0.4 MG/ML
0.4 INJECTION, SOLUTION ENDOTRACHEAL; INTRAMEDULLARY; INTRAMUSCULAR; INTRAVENOUS; SUBCUTANEOUS
Status: DISPENSED | OUTPATIENT
Start: 2021-04-07 | End: 2021-04-07

## 2021-04-07 RX ORDER — LOPERAMIDE HYDROCHLORIDE 2 MG/1
4 CAPSULE ORAL AS NEEDED
Status: DISCONTINUED | OUTPATIENT
Start: 2021-04-07 | End: 2021-04-09 | Stop reason: HOSPADM

## 2021-04-07 RX ORDER — ATROPINE SULFATE 0.4 MG/ML
0.4 INJECTION, SOLUTION ENDOTRACHEAL; INTRAMEDULLARY; INTRAMUSCULAR; INTRAVENOUS; SUBCUTANEOUS ONCE
Status: COMPLETED | OUTPATIENT
Start: 2021-04-07 | End: 2021-04-07

## 2021-04-07 RX ORDER — FLUOROURACIL 50 MG/ML
300 INJECTION, SOLUTION INTRAVENOUS ONCE
Status: COMPLETED | OUTPATIENT
Start: 2021-04-07 | End: 2021-04-07

## 2021-04-07 RX ORDER — ONDANSETRON 2 MG/ML
8 INJECTION INTRAMUSCULAR; INTRAVENOUS ONCE
Status: COMPLETED | OUTPATIENT
Start: 2021-04-07 | End: 2021-04-07

## 2021-04-07 RX ORDER — SODIUM CHLORIDE 0.9 % (FLUSH) 0.9 %
10 SYRINGE (ML) INJECTION AS NEEDED
Status: ACTIVE | OUTPATIENT
Start: 2021-04-07 | End: 2021-04-07

## 2021-04-07 RX ADMIN — FLUOROURACIL 3500 MG: 50 INJECTION, SOLUTION INTRAVENOUS at 14:11

## 2021-04-07 RX ADMIN — Medication 10 ML: at 14:11

## 2021-04-07 RX ADMIN — OXALIPLATIN 127 MG: 5 INJECTION, SOLUTION INTRAVENOUS at 10:20

## 2021-04-07 RX ADMIN — ATROPINE SULFATE 0.4 MG: 0.4 INJECTION, SOLUTION INTRAMUSCULAR; INTRAVENOUS; SUBCUTANEOUS at 13:17

## 2021-04-07 RX ADMIN — LEUCOVORIN CALCIUM 780 MG: 350 INJECTION, POWDER, LYOPHILIZED, FOR SOLUTION INTRAMUSCULAR; INTRAVENOUS at 12:31

## 2021-04-07 RX ADMIN — ONDANSETRON 8 MG: 2 INJECTION INTRAMUSCULAR; INTRAVENOUS at 09:58

## 2021-04-07 RX ADMIN — DEXTROSE MONOHYDRATE 25 ML/HR: 5 INJECTION, SOLUTION INTRAVENOUS at 09:40

## 2021-04-07 RX ADMIN — LOPERAMIDE HYDROCHLORIDE 4 MG: 2 CAPSULE ORAL at 12:31

## 2021-04-07 RX ADMIN — ATROPINE SULFATE 0.4 MG: 0.4 INJECTION, SOLUTION INTRAMUSCULAR; INTRAVENOUS; SUBCUTANEOUS at 11:17

## 2021-04-07 RX ADMIN — LOPERAMIDE HYDROCHLORIDE 4 MG: 2 CAPSULE ORAL at 13:21

## 2021-04-07 RX ADMIN — IRINOTECAN HYDROCHLORIDE 234 MG: 20 INJECTION, SOLUTION INTRAVENOUS at 12:31

## 2021-04-07 RX ADMIN — DEXAMETHASONE SODIUM PHOSPHATE 12 MG: 4 INJECTION, SOLUTION INTRAMUSCULAR; INTRAVENOUS at 09:59

## 2021-04-07 RX ADMIN — FLUOROURACIL 585 MG: 50 INJECTION, SOLUTION INTRAVENOUS at 14:08

## 2021-04-07 NOTE — PROGRESS NOTES
Pt arrived ambulatory. Premeds and Folfirinox administered. Pt with diarrhea multiple times during infusion. Atropine given sub Q x2 and immodium 4 mg po given x 2. Pt has prescritpion for Lomotil ready for  at [facility], and he was instructed to start alternating immodium and Lomotil with each stool. Also pt instructed to push oral fluids to avoid dehydration. Pt aware of next appt 4/9 at 1500. He was instructed to call with any fever 100.5 or higher or any uncontrolled side effects. Discharged ambulatory. No acute distress noted.

## 2021-04-08 NOTE — ADDENDUM NOTE
Encounter addended by: Zackery Oden on: 4/8/2021 8:48 AM   Actions taken: MAR administration accepted

## 2021-04-09 ENCOUNTER — HOSPITAL ENCOUNTER (OUTPATIENT)
Dept: INFUSION THERAPY | Age: 66
Discharge: HOME | End: 2021-04-09
Payer: MEDICARE

## 2021-04-09 VITALS
OXYGEN SATURATION: 97 % | SYSTOLIC BLOOD PRESSURE: 131 MMHG | TEMPERATURE: 97.7 F | HEART RATE: 93 BPM | DIASTOLIC BLOOD PRESSURE: 87 MMHG | RESPIRATION RATE: 18 BRPM

## 2021-04-09 DIAGNOSIS — C15.5 MALIGNANT NEOPLASM OF LOWER THIRD OF ESOPHAGUS (HCC): ICD-10-CM

## 2021-04-09 DIAGNOSIS — D70.8 OTHER NEUTROPENIA (HCC): Primary | ICD-10-CM

## 2021-04-09 DIAGNOSIS — C25.1 MALIGNANT NEOPLASM OF BODY OF PANCREAS (HCC): ICD-10-CM

## 2021-04-09 PROCEDURE — 96523 IRRIG DRUG DELIVERY DEVICE: CPT

## 2021-04-09 RX ORDER — SODIUM CHLORIDE 0.9 % (FLUSH) 0.9 %
10 SYRINGE (ML) INJECTION AS NEEDED
Status: DISCONTINUED | OUTPATIENT
Start: 2021-04-09 | End: 2021-04-10 | Stop reason: HOSPADM

## 2021-04-09 RX ADMIN — Medication 10 ML: at 14:55

## 2021-04-09 NOTE — PROGRESS NOTES
Arrived to the [facility]. Chemo pump completed prior to arrival.  Pump disconnected, port flushed and de-access completed. Patient tolerated well. Any issues or concerns during appointment: None. Patient aware of next infusion appointment on 4/10 (date) at 1500 (time). Discharged ambulatory in stable condition.

## 2021-04-12 ENCOUNTER — HOSPITAL ENCOUNTER (OUTPATIENT)
Dept: INFUSION THERAPY | Age: 66
Discharge: HOME | End: 2021-04-12
Payer: MEDICARE

## 2021-04-12 VITALS
SYSTOLIC BLOOD PRESSURE: 147 MMHG | HEART RATE: 106 BPM | TEMPERATURE: 97.5 F | DIASTOLIC BLOOD PRESSURE: 89 MMHG | OXYGEN SATURATION: 97 % | RESPIRATION RATE: 18 BRPM

## 2021-04-12 DIAGNOSIS — C25.1 MALIGNANT NEOPLASM OF BODY OF PANCREAS (HCC): ICD-10-CM

## 2021-04-12 DIAGNOSIS — C15.5 MALIGNANT NEOPLASM OF LOWER THIRD OF ESOPHAGUS (HCC): ICD-10-CM

## 2021-04-12 DIAGNOSIS — D70.8 OTHER NEUTROPENIA (HCC): ICD-10-CM

## 2021-04-12 DIAGNOSIS — E86.0 DEHYDRATION: Primary | ICD-10-CM

## 2021-04-12 LAB
ALBUMIN SERPL-MCNC: 3.3 G/DL (ref 3.2–4.6)
ALBUMIN/GLOB SERPL: 0.9 {RATIO} (ref 1.2–3.5)
ALP SERPL-CCNC: 82 U/L (ref 50–136)
ALT SERPL-CCNC: 19 U/L (ref 12–65)
ANION GAP SERPL CALC-SCNC: 4 MMOL/L (ref 7–16)
AST SERPL-CCNC: 21 U/L (ref 15–37)
BILIRUB SERPL-MCNC: 0.7 MG/DL (ref 0.2–1.1)
BUN SERPL-MCNC: 15 MG/DL (ref 8–23)
CALCIUM SERPL-MCNC: 9.4 MG/DL (ref 8.3–10.4)
CHLORIDE SERPL-SCNC: 107 MMOL/L (ref 98–107)
CO2 SERPL-SCNC: 28 MMOL/L (ref 21–32)
CREAT SERPL-MCNC: 0.7 MG/DL (ref 0.8–1.5)
ERYTHROCYTE [DISTWIDTH] IN BLOOD BY AUTOMATED COUNT: 15.9 % (ref 11.9–14.6)
GLOBULIN SER CALC-MCNC: 3.5 G/DL (ref 2.3–3.5)
GLUCOSE SERPL-MCNC: 124 MG/DL (ref 65–100)
HCT VFR BLD AUTO: 37.8 %
HGB BLD-MCNC: 12.5 G/DL (ref 13.6–17.2)
MAGNESIUM SERPL-MCNC: 2.3 MG/DL (ref 1.8–2.4)
MCH RBC QN AUTO: 29.8 PG (ref 26.1–32.9)
MCHC RBC AUTO-ENTMCNC: 33.1 G/DL (ref 31.4–35)
MCV RBC AUTO: 90 FL (ref 79.6–97.8)
NRBC # BLD: 0 K/UL (ref 0–0.2)
PLATELET # BLD AUTO: 213 K/UL (ref 150–450)
PMV BLD AUTO: 9.5 FL (ref 9.4–12.3)
POTASSIUM SERPL-SCNC: 3.3 MMOL/L (ref 3.5–5.1)
PROT SERPL-MCNC: 6.8 G/DL (ref 6.3–8.2)
RBC # BLD AUTO: 4.2 M/UL (ref 4.23–5.6)
SODIUM SERPL-SCNC: 139 MMOL/L (ref 136–145)
WBC # BLD AUTO: 6.9 K/UL (ref 4.3–11.1)

## 2021-04-12 PROCEDURE — 74011250636 HC RX REV CODE- 250/636: Performed by: INTERNAL MEDICINE

## 2021-04-12 PROCEDURE — 83735 ASSAY OF MAGNESIUM: CPT

## 2021-04-12 PROCEDURE — 96360 HYDRATION IV INFUSION INIT: CPT

## 2021-04-12 PROCEDURE — 96372 THER/PROPH/DIAG INJ SC/IM: CPT

## 2021-04-12 PROCEDURE — 80053 COMPREHEN METABOLIC PANEL: CPT

## 2021-04-12 PROCEDURE — 85027 COMPLETE CBC AUTOMATED: CPT

## 2021-04-12 RX ORDER — SODIUM CHLORIDE 0.9 % (FLUSH) 0.9 %
10 SYRINGE (ML) INJECTION AS NEEDED
Status: ACTIVE | OUTPATIENT
Start: 2021-04-12 | End: 2021-04-12

## 2021-04-12 RX ORDER — SODIUM CHLORIDE 9 MG/ML
1000 INJECTION, SOLUTION INTRAVENOUS CONTINUOUS
Status: CANCELLED
Start: 2021-04-12

## 2021-04-12 RX ORDER — SODIUM CHLORIDE 9 MG/ML
1000 INJECTION, SOLUTION INTRAVENOUS CONTINUOUS
Status: DISCONTINUED | OUTPATIENT
Start: 2021-04-12 | End: 2021-04-14 | Stop reason: HOSPADM

## 2021-04-12 RX ADMIN — Medication 10 ML: at 13:55

## 2021-04-12 RX ADMIN — Medication 10 ML: at 13:56

## 2021-04-12 RX ADMIN — PEGFILGRASTIM-CBQV 6 MG: 6 INJECTION, SOLUTION SUBCUTANEOUS at 14:14

## 2021-04-12 RX ADMIN — Medication 10 ML: at 13:50

## 2021-04-12 RX ADMIN — Medication 10 ML: at 13:52

## 2021-04-12 RX ADMIN — SODIUM CHLORIDE 1000 ML: 900 INJECTION, SOLUTION INTRAVENOUS at 13:57

## 2021-04-12 RX ADMIN — Medication 10 ML: at 15:00

## 2021-04-12 NOTE — PROGRESS NOTES
Pt arrived ambulatory today at 1323, to receive labs/IV fluids and udenyca. Pt tolerated without difficulty. Patient discharged via ambulatory accompanied by friend. Instructed to notify physician of any problems, questions or concerns. Allowed opportunity for patient/family to ask questions. Verbalized understanding. Next appointment is April 21 at [address] with Harlan 2559.

## 2021-04-20 ENCOUNTER — HOSPITAL ENCOUNTER (OUTPATIENT)
Dept: INFUSION THERAPY | Age: 66
Discharge: HOME | End: 2021-04-20
Payer: MEDICARE

## 2021-04-20 ENCOUNTER — HOSPITAL ENCOUNTER (OUTPATIENT)
Dept: LAB | Age: 66
Discharge: HOME | End: 2021-04-20
Payer: MEDICARE

## 2021-04-20 DIAGNOSIS — E87.6 HYPOKALEMIA: Primary | ICD-10-CM

## 2021-04-20 DIAGNOSIS — T45.1X5A CINV (CHEMOTHERAPY-INDUCED NAUSEA AND VOMITING): ICD-10-CM

## 2021-04-20 DIAGNOSIS — E86.0 DEHYDRATION: ICD-10-CM

## 2021-04-20 DIAGNOSIS — C25.1 MALIGNANT NEOPLASM OF BODY OF PANCREAS (HCC): ICD-10-CM

## 2021-04-20 DIAGNOSIS — R11.2 CINV (CHEMOTHERAPY-INDUCED NAUSEA AND VOMITING): ICD-10-CM

## 2021-04-20 LAB
ALBUMIN SERPL-MCNC: 3.4 G/DL (ref 3.2–4.6)
ALBUMIN/GLOB SERPL: 0.9 {RATIO} (ref 1.2–3.5)
ALP SERPL-CCNC: 129 U/L (ref 50–136)
ALT SERPL-CCNC: 43 U/L (ref 12–65)
ANION GAP SERPL CALC-SCNC: 11 MMOL/L (ref 7–16)
AST SERPL-CCNC: 27 U/L (ref 15–37)
BASOPHILS # BLD: 0.2 K/UL (ref 0–0.2)
BASOPHILS NFR BLD: 1 % (ref 0–2)
BILIRUB SERPL-MCNC: 0.6 MG/DL (ref 0.2–1.1)
BUN SERPL-MCNC: 16 MG/DL (ref 8–23)
CALCIUM SERPL-MCNC: 10 MG/DL (ref 8.3–10.4)
CANCER AG19-9 SERPL-ACNC: 46.2 U/ML (ref 2–37)
CHLORIDE SERPL-SCNC: 102 MMOL/L (ref 98–107)
CO2 SERPL-SCNC: 25 MMOL/L (ref 21–32)
CREAT SERPL-MCNC: 1 MG/DL (ref 0.8–1.5)
DIFFERENTIAL METHOD BLD: ABNORMAL
EOSINOPHIL # BLD: 0 K/UL (ref 0–0.8)
EOSINOPHIL NFR BLD: 0 % (ref 0.5–7.8)
ERYTHROCYTE [DISTWIDTH] IN BLOOD BY AUTOMATED COUNT: 16.9 % (ref 11.9–14.6)
GLOBULIN SER CALC-MCNC: 3.6 G/DL (ref 2.3–3.5)
GLUCOSE SERPL-MCNC: 140 MG/DL (ref 65–100)
HCT VFR BLD AUTO: 42.7 %
HGB BLD-MCNC: 14.1 G/DL (ref 13.6–17.2)
IMM GRANULOCYTES # BLD AUTO: 0.5 K/UL (ref 0–0.5)
IMM GRANULOCYTES NFR BLD AUTO: 3 % (ref 0–5)
LYMPHOCYTES # BLD: 1.5 K/UL (ref 0.5–4.6)
LYMPHOCYTES NFR BLD: 9 % (ref 13–44)
MAGNESIUM SERPL-MCNC: 2.1 MG/DL (ref 1.8–2.4)
MCH RBC QN AUTO: 29.1 PG (ref 26.1–32.9)
MCHC RBC AUTO-ENTMCNC: 33 G/DL (ref 31.4–35)
MCV RBC AUTO: 88 FL (ref 79.6–97.8)
MONOCYTES # BLD: 2.4 K/UL (ref 0.1–1.3)
MONOCYTES NFR BLD: 14 % (ref 4–12)
NEUTS SEG # BLD: 12.4 K/UL (ref 1.7–8.2)
NEUTS SEG NFR BLD: 73 % (ref 43–78)
NRBC # BLD: 0.05 K/UL (ref 0–0.2)
PLATELET # BLD AUTO: 268 K/UL (ref 150–450)
PLATELET COMMENTS,PCOM: ADEQUATE
PMV BLD AUTO: 9.2 FL (ref 9.4–12.3)
POTASSIUM SERPL-SCNC: 2.5 MMOL/L (ref 3.5–5.1)
PROT SERPL-MCNC: 7 G/DL (ref 6.3–8.2)
RBC # BLD AUTO: 4.85 M/UL (ref 4.23–5.6)
RBC MORPH BLD: ABNORMAL
RBC MORPH BLD: ABNORMAL
SODIUM SERPL-SCNC: 138 MMOL/L (ref 136–145)
WBC # BLD AUTO: 17 K/UL (ref 4.3–11.1)
WBC MORPH BLD: ABNORMAL

## 2021-04-20 PROCEDURE — 83735 ASSAY OF MAGNESIUM: CPT

## 2021-04-20 PROCEDURE — 80053 COMPREHEN METABOLIC PANEL: CPT

## 2021-04-20 PROCEDURE — 96375 TX/PRO/DX INJ NEW DRUG ADDON: CPT

## 2021-04-20 PROCEDURE — 36415 COLL VENOUS BLD VENIPUNCTURE: CPT

## 2021-04-20 PROCEDURE — 74011250636 HC RX REV CODE- 250/636: Performed by: INTERNAL MEDICINE

## 2021-04-20 PROCEDURE — 96365 THER/PROPH/DIAG IV INF INIT: CPT

## 2021-04-20 PROCEDURE — 85025 COMPLETE CBC W/AUTO DIFF WBC: CPT

## 2021-04-20 PROCEDURE — 86301 IMMUNOASSAY TUMOR CA 19-9: CPT

## 2021-04-20 PROCEDURE — 96366 THER/PROPH/DIAG IV INF ADDON: CPT

## 2021-04-20 RX ORDER — SODIUM CHLORIDE 0.9 % (FLUSH) 0.9 %
10 SYRINGE (ML) INJECTION AS NEEDED
Status: DISCONTINUED | OUTPATIENT
Start: 2021-04-20 | End: 2021-04-21 | Stop reason: HOSPADM

## 2021-04-20 RX ORDER — SODIUM CHLORIDE 9 MG/ML
1000 INJECTION, SOLUTION INTRAVENOUS ONCE
Status: COMPLETED | OUTPATIENT
Start: 2021-04-20 | End: 2021-04-20

## 2021-04-20 RX ORDER — ONDANSETRON 2 MG/ML
8 INJECTION INTRAMUSCULAR; INTRAVENOUS ONCE
Status: COMPLETED | OUTPATIENT
Start: 2021-04-20 | End: 2021-04-20

## 2021-04-20 RX ORDER — POTASSIUM CHLORIDE 29.8 MG/ML
40 INJECTION INTRAVENOUS ONCE
Status: COMPLETED | OUTPATIENT
Start: 2021-04-20 | End: 2021-04-20

## 2021-04-20 RX ADMIN — POTASSIUM CHLORIDE 40 MEQ: 400 INJECTION, SOLUTION INTRAVENOUS at 13:05

## 2021-04-20 RX ADMIN — SODIUM CHLORIDE 1000 ML: 900 INJECTION, SOLUTION INTRAVENOUS at 12:44

## 2021-04-20 RX ADMIN — ONDANSETRON 8 MG: 2 INJECTION INTRAMUSCULAR; INTRAVENOUS at 12:59

## 2021-04-20 RX ADMIN — Medication 10 ML: at 12:44

## 2021-04-20 RX ADMIN — Medication 10 ML: at 17:05

## 2021-04-20 NOTE — PROGRESS NOTES
Reviewed with patient IVF hydration, antiemetics, and Potassium replacement. Verbalizes understandng.

## 2021-04-20 NOTE — PROGRESS NOTES
Tolerated 1 liter of IVF and 40 meq KCl over 4 hours today for Potassium level 2.5. Zofran given for nausea with moderate relief. Patient directed to go to ER dept for any chest pain or tightness. Verbalizes understanding. Patient discharged via w/c accompanied by family member. Instructed to notify physician of any problems, questions or concerns after discharge. Next appointment is 04/21/2021 at 0830 with Infusion with labs and OV immediately prior.

## 2021-04-21 ENCOUNTER — HOSPITAL ENCOUNTER (OUTPATIENT)
Dept: INFUSION THERAPY | Age: 66
End: 2021-04-21

## 2021-04-23 ENCOUNTER — HOSPITAL ENCOUNTER (OUTPATIENT)
Dept: INFUSION THERAPY | Age: 66
Discharge: HOME | End: 2021-04-23
Payer: MEDICARE

## 2021-04-23 ENCOUNTER — PATIENT OUTREACH (OUTPATIENT)
Dept: CASE MANAGEMENT | Age: 66
End: 2021-04-23

## 2021-04-23 ENCOUNTER — APPOINTMENT (OUTPATIENT)
Dept: INFUSION THERAPY | Age: 66
End: 2021-04-23

## 2021-04-23 DIAGNOSIS — C25.1 MALIGNANT NEOPLASM OF BODY OF PANCREAS (HCC): ICD-10-CM

## 2021-04-23 PROBLEM — E83.42 HYPOMAGNESEMIA: Status: ACTIVE | Noted: 2021-04-23

## 2021-04-23 LAB
ALBUMIN SERPL-MCNC: 3.2 G/DL (ref 3.2–4.6)
ALBUMIN/GLOB SERPL: 1 {RATIO} (ref 1.2–3.5)
ALP SERPL-CCNC: 120 U/L (ref 50–136)
ALT SERPL-CCNC: 20 U/L (ref 12–65)
ANION GAP SERPL CALC-SCNC: 10 MMOL/L (ref 7–16)
AST SERPL-CCNC: 10 U/L (ref 15–37)
BASOPHILS # BLD: 0.1 K/UL (ref 0–0.2)
BASOPHILS NFR BLD: 0 % (ref 0–2)
BILIRUB SERPL-MCNC: 0.3 MG/DL (ref 0.2–1.1)
BUN SERPL-MCNC: 13 MG/DL (ref 8–23)
CALCIUM SERPL-MCNC: 9.2 MG/DL (ref 8.3–10.4)
CANCER AG19-9 SERPL-ACNC: 47.3 U/ML (ref 2–37)
CHLORIDE SERPL-SCNC: 108 MMOL/L (ref 98–107)
CO2 SERPL-SCNC: 23 MMOL/L (ref 21–32)
CREAT SERPL-MCNC: 0.8 MG/DL (ref 0.8–1.5)
DIFFERENTIAL METHOD BLD: ABNORMAL
EOSINOPHIL # BLD: 0.3 K/UL (ref 0–0.8)
EOSINOPHIL NFR BLD: 1 % (ref 0.5–7.8)
ERYTHROCYTE [DISTWIDTH] IN BLOOD BY AUTOMATED COUNT: 17.8 % (ref 11.9–14.6)
GLOBULIN SER CALC-MCNC: 3.2 G/DL (ref 2.3–3.5)
GLUCOSE SERPL-MCNC: 97 MG/DL (ref 65–100)
HCT VFR BLD AUTO: 37 %
HGB BLD-MCNC: 12.3 G/DL (ref 13.6–17.2)
IMM GRANULOCYTES # BLD AUTO: 0.6 K/UL (ref 0–0.5)
IMM GRANULOCYTES NFR BLD AUTO: 2 % (ref 0–5)
LYMPHOCYTES # BLD: 2.1 K/UL (ref 0.5–4.6)
LYMPHOCYTES NFR BLD: 8 % (ref 13–44)
MAGNESIUM SERPL-MCNC: 1.7 MG/DL (ref 1.8–2.4)
MCH RBC QN AUTO: 29.8 PG (ref 26.1–32.9)
MCHC RBC AUTO-ENTMCNC: 33.2 G/DL (ref 31.4–35)
MCV RBC AUTO: 89.6 FL (ref 79.6–97.8)
MONOCYTES # BLD: 2.4 K/UL (ref 0.1–1.3)
MONOCYTES NFR BLD: 10 % (ref 4–12)
NEUTS SEG # BLD: 19.2 K/UL (ref 1.7–8.2)
NEUTS SEG NFR BLD: 79 % (ref 43–78)
NRBC # BLD: 0.03 K/UL (ref 0–0.2)
PLATELET # BLD AUTO: 243 K/UL (ref 150–450)
PMV BLD AUTO: 9.2 FL (ref 9.4–12.3)
POTASSIUM SERPL-SCNC: 3.2 MMOL/L (ref 3.5–5.1)
PROT SERPL-MCNC: 6.4 G/DL (ref 6.3–8.2)
RBC # BLD AUTO: 4.13 M/UL (ref 4.23–5.6)
SODIUM SERPL-SCNC: 141 MMOL/L (ref 136–145)
WBC # BLD AUTO: 24.6 K/UL (ref 4.3–11.1)

## 2021-04-23 PROCEDURE — 96366 THER/PROPH/DIAG IV INF ADDON: CPT

## 2021-04-23 PROCEDURE — 86301 IMMUNOASSAY TUMOR CA 19-9: CPT

## 2021-04-23 PROCEDURE — 96365 THER/PROPH/DIAG IV INF INIT: CPT

## 2021-04-23 PROCEDURE — 83735 ASSAY OF MAGNESIUM: CPT

## 2021-04-23 PROCEDURE — 74011250636 HC RX REV CODE- 250/636: Performed by: NURSE PRACTITIONER

## 2021-04-23 PROCEDURE — 96523 IRRIG DRUG DELIVERY DEVICE: CPT

## 2021-04-23 PROCEDURE — 85025 COMPLETE CBC W/AUTO DIFF WBC: CPT

## 2021-04-23 PROCEDURE — 80053 COMPREHEN METABOLIC PANEL: CPT

## 2021-04-23 RX ORDER — SODIUM CHLORIDE 0.9 % (FLUSH) 0.9 %
10-30 SYRINGE (ML) INJECTION AS NEEDED
Status: DISCONTINUED | OUTPATIENT
Start: 2021-04-23 | End: 2021-04-25 | Stop reason: HOSPADM

## 2021-04-23 RX ORDER — SODIUM CHLORIDE 0.9 % (FLUSH) 0.9 %
10 SYRINGE (ML) INJECTION ONCE
Status: COMPLETED | OUTPATIENT
Start: 2021-04-23 | End: 2021-04-23

## 2021-04-23 RX ADMIN — Medication 10 ML: at 08:00

## 2021-04-23 RX ADMIN — Medication 10 ML: at 10:50

## 2021-04-23 RX ADMIN — MAGNESIUM SULFATE HEPTAHYDRATE: 500 INJECTION, SOLUTION INTRAMUSCULAR; INTRAVENOUS at 08:50

## 2021-04-23 NOTE — PROGRESS NOTES
Patient arrived to port lab for port access and lab draw   [facility] accessed and labs drawn per protocol   *Port remains accessed / *Port flushed   Patient discharged from port lab ambulatory to office/infusion visit.

## 2021-04-23 NOTE — PROGRESS NOTES
4/23/21 saw pt today with Patience Alonso NP for tox check pancreatic cancer. He is feeling much better today. Weight up. Denies any N/V or diarrhea. Will need IV potassium and magnesium today. Proceed to infusion. Follow up 5/4 for next cycle. Pt notified of navigation transition. Encouraged to call with any concerns. Navigation will continue to follow.

## 2021-04-23 NOTE — PROGRESS NOTES
Arrived to the [facility]. IVF completed. Patient tolerated well. Any issues or concerns during appointment: none. Patient aware of next infusion appointment on 5/5/21 (date) at 9:30am (time). Discharged ambulatory to home.

## 2021-04-24 ENCOUNTER — HOSPITAL ENCOUNTER (OUTPATIENT)
Dept: INFUSION THERAPY | Age: 66
End: 2021-04-24

## 2021-05-04 ENCOUNTER — HOSPITAL ENCOUNTER (OUTPATIENT)
Dept: LAB | Age: 66
Discharge: HOME | End: 2021-05-04
Payer: MEDICARE

## 2021-05-04 ENCOUNTER — PATIENT OUTREACH (OUTPATIENT)
Dept: CASE MANAGEMENT | Age: 66
End: 2021-05-04

## 2021-05-04 DIAGNOSIS — C25.1 MALIGNANT NEOPLASM OF BODY OF PANCREAS (HCC): ICD-10-CM

## 2021-05-04 LAB
ALBUMIN SERPL-MCNC: 2.7 G/DL (ref 3.2–4.6)
ALBUMIN/GLOB SERPL: 0.8 {RATIO} (ref 1.2–3.5)
ALP SERPL-CCNC: 85 U/L (ref 50–136)
ALT SERPL-CCNC: 16 U/L (ref 12–65)
ANION GAP SERPL CALC-SCNC: 5 MMOL/L (ref 7–16)
AST SERPL-CCNC: 13 U/L (ref 15–37)
BASOPHILS # BLD: 0.1 K/UL (ref 0–0.2)
BASOPHILS NFR BLD: 1 % (ref 0–2)
BILIRUB SERPL-MCNC: 0.3 MG/DL (ref 0.2–1.1)
BUN SERPL-MCNC: 10 MG/DL (ref 8–23)
CALCIUM SERPL-MCNC: 8.5 MG/DL (ref 8.3–10.4)
CANCER AG19-9 SERPL-ACNC: 26.6 U/ML (ref 2–37)
CEA SERPL-MCNC: 7.8 NG/ML (ref 0–3)
CHLORIDE SERPL-SCNC: 108 MMOL/L (ref 98–107)
CO2 SERPL-SCNC: 31 MMOL/L (ref 21–32)
CREAT SERPL-MCNC: 0.7 MG/DL (ref 0.8–1.5)
DIFFERENTIAL METHOD BLD: ABNORMAL
EOSINOPHIL # BLD: 0.1 K/UL (ref 0–0.8)
EOSINOPHIL NFR BLD: 1 % (ref 0.5–7.8)
ERYTHROCYTE [DISTWIDTH] IN BLOOD BY AUTOMATED COUNT: 18.7 % (ref 11.9–14.6)
GLOBULIN SER CALC-MCNC: 3.3 G/DL (ref 2.3–3.5)
GLUCOSE SERPL-MCNC: 92 MG/DL (ref 65–100)
HCT VFR BLD AUTO: 36.3 %
HGB BLD-MCNC: 11.4 G/DL (ref 13.6–17.2)
IMM GRANULOCYTES # BLD AUTO: 0 K/UL (ref 0–0.5)
IMM GRANULOCYTES NFR BLD AUTO: 1 % (ref 0–5)
LYMPHOCYTES # BLD: 1.2 K/UL (ref 0.5–4.6)
LYMPHOCYTES NFR BLD: 14 % (ref 13–44)
MAGNESIUM SERPL-MCNC: 1.9 MG/DL (ref 1.8–2.4)
MCH RBC QN AUTO: 30.9 PG (ref 26.1–32.9)
MCHC RBC AUTO-ENTMCNC: 31.4 G/DL (ref 31.4–35)
MCV RBC AUTO: 98.4 FL (ref 79.6–97.8)
MONOCYTES # BLD: 0.9 K/UL (ref 0.1–1.3)
MONOCYTES NFR BLD: 10 % (ref 4–12)
NEUTS SEG # BLD: 6.2 K/UL (ref 1.7–8.2)
NEUTS SEG NFR BLD: 74 % (ref 43–78)
NRBC # BLD: 0 K/UL (ref 0–0.2)
PLATELET # BLD AUTO: 211 K/UL (ref 150–450)
PMV BLD AUTO: 8.8 FL (ref 9.4–12.3)
POTASSIUM SERPL-SCNC: 3.9 MMOL/L (ref 3.5–5.1)
PROT SERPL-MCNC: 6 G/DL (ref 6.3–8.2)
RBC # BLD AUTO: 3.69 M/UL (ref 4.23–5.6)
SODIUM SERPL-SCNC: 144 MMOL/L (ref 136–145)
WBC # BLD AUTO: 8.4 K/UL (ref 4.3–11.1)

## 2021-05-04 PROCEDURE — 82378 CARCINOEMBRYONIC ANTIGEN: CPT

## 2021-05-04 PROCEDURE — 80053 COMPREHEN METABOLIC PANEL: CPT

## 2021-05-04 PROCEDURE — 85025 COMPLETE CBC W/AUTO DIFF WBC: CPT

## 2021-05-04 PROCEDURE — 83735 ASSAY OF MAGNESIUM: CPT

## 2021-05-04 PROCEDURE — 86301 IMMUNOASSAY TUMOR CA 19-9: CPT

## 2021-05-04 PROCEDURE — 36415 COLL VENOUS BLD VENIPUNCTURE: CPT

## 2021-05-05 ENCOUNTER — HOSPITAL ENCOUNTER (OUTPATIENT)
Dept: INFUSION THERAPY | Age: 66
Discharge: HOME | End: 2021-05-05
Payer: MEDICARE

## 2021-05-05 VITALS
HEART RATE: 75 BPM | OXYGEN SATURATION: 99 % | RESPIRATION RATE: 16 BRPM | DIASTOLIC BLOOD PRESSURE: 92 MMHG | SYSTOLIC BLOOD PRESSURE: 155 MMHG | BODY MASS INDEX: 30.83 KG/M2 | WEIGHT: 179.6 LBS | TEMPERATURE: 98 F

## 2021-05-05 DIAGNOSIS — T45.1X5A CINV (CHEMOTHERAPY-INDUCED NAUSEA AND VOMITING): ICD-10-CM

## 2021-05-05 DIAGNOSIS — C25.1 MALIGNANT NEOPLASM OF BODY OF PANCREAS (HCC): ICD-10-CM

## 2021-05-05 DIAGNOSIS — E83.42 HYPOMAGNESEMIA: Primary | ICD-10-CM

## 2021-05-05 DIAGNOSIS — D70.8 OTHER NEUTROPENIA (HCC): ICD-10-CM

## 2021-05-05 DIAGNOSIS — R11.2 CINV (CHEMOTHERAPY-INDUCED NAUSEA AND VOMITING): ICD-10-CM

## 2021-05-05 DIAGNOSIS — E86.0 DEHYDRATION: ICD-10-CM

## 2021-05-05 DIAGNOSIS — C15.5 MALIGNANT NEOPLASM OF LOWER THIRD OF ESOPHAGUS (HCC): ICD-10-CM

## 2021-05-05 DIAGNOSIS — E87.6 HYPOKALEMIA: ICD-10-CM

## 2021-05-05 PROCEDURE — 96375 TX/PRO/DX INJ NEW DRUG ADDON: CPT

## 2021-05-05 PROCEDURE — G0498 CHEMO EXTEND IV INFUS W/PUMP: HCPCS

## 2021-05-05 PROCEDURE — 96413 CHEMO IV INFUSION 1 HR: CPT

## 2021-05-05 PROCEDURE — 74011000258 HC RX REV CODE- 258: Performed by: INTERNAL MEDICINE

## 2021-05-05 PROCEDURE — 96368 THER/DIAG CONCURRENT INF: CPT

## 2021-05-05 PROCEDURE — 74011250636 HC RX REV CODE- 250/636: Performed by: INTERNAL MEDICINE

## 2021-05-05 PROCEDURE — 96372 THER/PROPH/DIAG INJ SC/IM: CPT

## 2021-05-05 PROCEDURE — 74011000258 HC RX REV CODE- 258: Performed by: NURSE PRACTITIONER

## 2021-05-05 PROCEDURE — 96417 CHEMO IV INFUS EACH ADDL SEQ: CPT

## 2021-05-05 PROCEDURE — 74011250636 HC RX REV CODE- 250/636: Performed by: NURSE PRACTITIONER

## 2021-05-05 PROCEDURE — 96415 CHEMO IV INFUSION ADDL HR: CPT

## 2021-05-05 PROCEDURE — 96367 TX/PROPH/DG ADDL SEQ IV INF: CPT

## 2021-05-05 PROCEDURE — 96411 CHEMO IV PUSH ADDL DRUG: CPT

## 2021-05-05 RX ORDER — DEXTROSE MONOHYDRATE 50 MG/ML
25 INJECTION, SOLUTION INTRAVENOUS CONTINUOUS
Status: ACTIVE | OUTPATIENT
Start: 2021-05-05 | End: 2021-05-05

## 2021-05-05 RX ORDER — DIPHENHYDRAMINE HYDROCHLORIDE 50 MG/ML
25 INJECTION, SOLUTION INTRAMUSCULAR; INTRAVENOUS AS NEEDED
Status: ACTIVE | OUTPATIENT
Start: 2021-05-05 | End: 2021-05-05

## 2021-05-05 RX ORDER — ONDANSETRON 2 MG/ML
8 INJECTION INTRAMUSCULAR; INTRAVENOUS AS NEEDED
Status: DISCONTINUED | OUTPATIENT
Start: 2021-05-05 | End: 2021-05-06 | Stop reason: HOSPADM

## 2021-05-05 RX ORDER — FLUOROURACIL 50 MG/ML
300 INJECTION, SOLUTION INTRAVENOUS ONCE
Status: COMPLETED | OUTPATIENT
Start: 2021-05-05 | End: 2021-05-05

## 2021-05-05 RX ORDER — HYDROCORTISONE SODIUM SUCCINATE 100 MG/2ML
100 INJECTION, POWDER, FOR SOLUTION INTRAMUSCULAR; INTRAVENOUS AS NEEDED
Status: ACTIVE | OUTPATIENT
Start: 2021-05-05 | End: 2021-05-05

## 2021-05-05 RX ORDER — ONDANSETRON 2 MG/ML
8 INJECTION INTRAMUSCULAR; INTRAVENOUS ONCE
Status: COMPLETED | OUTPATIENT
Start: 2021-05-05 | End: 2021-05-05

## 2021-05-05 RX ORDER — ATROPINE SULFATE 0.4 MG/ML
0.4 INJECTION, SOLUTION ENDOTRACHEAL; INTRAMEDULLARY; INTRAMUSCULAR; INTRAVENOUS; SUBCUTANEOUS ONCE
Status: COMPLETED | OUTPATIENT
Start: 2021-05-05 | End: 2021-05-05

## 2021-05-05 RX ORDER — SODIUM CHLORIDE 0.9 % (FLUSH) 0.9 %
10 SYRINGE (ML) INJECTION AS NEEDED
Status: ACTIVE | OUTPATIENT
Start: 2021-05-05 | End: 2021-05-05

## 2021-05-05 RX ADMIN — FLUOROURACIL 3500 MG: 50 INJECTION, SOLUTION INTRAVENOUS at 15:15

## 2021-05-05 RX ADMIN — LEUCOVORIN CALCIUM 780 MG: 350 INJECTION, POWDER, LYOPHILIZED, FOR SOLUTION INTRAMUSCULAR; INTRAVENOUS at 13:30

## 2021-05-05 RX ADMIN — ATROPINE SULFATE 0.4 MG: 0.4 INJECTION, SOLUTION INTRAMUSCULAR; INTRAVENOUS; SUBCUTANEOUS at 12:55

## 2021-05-05 RX ADMIN — IRINOTECAN HYDROCHLORIDE 234 MG: 20 INJECTION, SOLUTION INTRAVENOUS at 13:30

## 2021-05-05 RX ADMIN — DEXAMETHASONE SODIUM PHOSPHATE 12 MG: 4 INJECTION, SOLUTION INTRAMUSCULAR; INTRAVENOUS at 10:32

## 2021-05-05 RX ADMIN — OXALIPLATIN 127 MG: 5 INJECTION, SOLUTION INTRAVENOUS at 11:18

## 2021-05-05 RX ADMIN — DEXTROSE MONOHYDRATE 25 ML/HR: 5 INJECTION, SOLUTION INTRAVENOUS at 09:45

## 2021-05-05 RX ADMIN — ONDANSETRON 8 MG: 2 INJECTION INTRAMUSCULAR; INTRAVENOUS at 10:11

## 2021-05-05 RX ADMIN — Medication 10 ML: at 15:15

## 2021-05-05 RX ADMIN — FLUOROURACIL 585 MG: 50 INJECTION, SOLUTION INTRAVENOUS at 15:10

## 2021-05-05 RX ADMIN — SODIUM CHLORIDE 150 MG: 900 INJECTION, SOLUTION INTRAVENOUS at 10:50

## 2021-05-05 NOTE — ADDENDUM NOTE
Encounter addended by: June Valera [facility] on: 5/5/2021 10:32 AM   Actions taken: i-Vent created or edited

## 2021-05-05 NOTE — PROGRESS NOTES
Tolerated chemotherapy without difficulty. Fluorouracil elastomeric pump infusing at 5ml/hr x 46 hours. Patient instructed to take antiemetics as directed alternating antiemetics regularly for approx 3-5 days. Directed to notify MD for uncontrollable diarrhea or inability to drink adequate amounts of fluid. Verbalizes understanding. Patient discharged via ambulation accompanied by self. Instructed to notify physician of any problems, questions or concerns after discharge. Next appointment is 05/07/2021 at 1400 with Infusion for pump removal and IVF.

## 2021-05-06 NOTE — PROGRESS NOTES
5/4/21 saw pt today with Dr. Vincent Stephens for pre chemo cycle 5 FOLFIRINOX. He is feeling better this week. PO intake is good. Will add IVF to pump off day. Denies any issues. Infusion tomorrow. Follow up in 2 weeks. Encouraged to call with any concerns. Navigation will continue to follow.

## 2021-05-07 ENCOUNTER — HOSPITAL ENCOUNTER (OUTPATIENT)
Dept: INFUSION THERAPY | Age: 66
Discharge: HOME | End: 2021-05-07
Payer: MEDICARE

## 2021-05-07 VITALS
OXYGEN SATURATION: 99 % | SYSTOLIC BLOOD PRESSURE: 135 MMHG | TEMPERATURE: 97.4 F | HEART RATE: 77 BPM | RESPIRATION RATE: 18 BRPM | DIASTOLIC BLOOD PRESSURE: 76 MMHG

## 2021-05-07 DIAGNOSIS — C15.5 MALIGNANT NEOPLASM OF LOWER THIRD OF ESOPHAGUS (HCC): ICD-10-CM

## 2021-05-07 DIAGNOSIS — E86.0 DEHYDRATION: Primary | ICD-10-CM

## 2021-05-07 DIAGNOSIS — C25.1 MALIGNANT NEOPLASM OF BODY OF PANCREAS (HCC): ICD-10-CM

## 2021-05-07 DIAGNOSIS — D70.8 OTHER NEUTROPENIA (HCC): ICD-10-CM

## 2021-05-07 PROCEDURE — 74011250636 HC RX REV CODE- 250/636: Performed by: INTERNAL MEDICINE

## 2021-05-07 PROCEDURE — 96360 HYDRATION IV INFUSION INIT: CPT

## 2021-05-07 RX ORDER — SODIUM CHLORIDE 9 MG/ML
10 INJECTION INTRAMUSCULAR; INTRAVENOUS; SUBCUTANEOUS AS NEEDED
Status: DISCONTINUED | OUTPATIENT
Start: 2021-05-07 | End: 2021-05-08 | Stop reason: HOSPADM

## 2021-05-07 RX ORDER — SODIUM CHLORIDE 9 MG/ML
1000 INJECTION, SOLUTION INTRAVENOUS ONCE
Status: COMPLETED | OUTPATIENT
Start: 2021-05-07 | End: 2021-05-07

## 2021-05-07 RX ADMIN — SODIUM CHLORIDE 10 ML: 9 INJECTION INTRAMUSCULAR; INTRAVENOUS; SUBCUTANEOUS at 14:35

## 2021-05-07 RX ADMIN — SODIUM CHLORIDE 1000 ML: 900 INJECTION, SOLUTION INTRAVENOUS at 13:30

## 2021-05-07 NOTE — PROGRESS NOTES
Arrived to the [facility]. Assessment completed . 5 FU Elastomeric pump completed. Patient tolerated without problems. 1 Liter of NS given during visit  Any issues or concerns during appointment: None  Instructed to call Dr Keaton Banks  with any side effects or concerns  Patient aware of next infusion appointment on 5/8/21(date) at 3 30 Pm (time).   Discharged ambulatory

## 2021-05-08 ENCOUNTER — HOSPITAL ENCOUNTER (OUTPATIENT)
Dept: INFUSION THERAPY | Age: 66
Discharge: HOME | End: 2021-05-08
Payer: MEDICARE

## 2021-05-08 VITALS
RESPIRATION RATE: 18 BRPM | SYSTOLIC BLOOD PRESSURE: 145 MMHG | BODY MASS INDEX: 30.62 KG/M2 | OXYGEN SATURATION: 98 % | HEART RATE: 89 BPM | DIASTOLIC BLOOD PRESSURE: 67 MMHG | WEIGHT: 178.4 LBS | TEMPERATURE: 97.8 F

## 2021-05-08 DIAGNOSIS — C15.5 MALIGNANT NEOPLASM OF LOWER THIRD OF ESOPHAGUS (HCC): ICD-10-CM

## 2021-05-08 DIAGNOSIS — D70.8 OTHER NEUTROPENIA (HCC): ICD-10-CM

## 2021-05-08 DIAGNOSIS — E86.0 DEHYDRATION: Primary | ICD-10-CM

## 2021-05-08 DIAGNOSIS — C25.1 MALIGNANT NEOPLASM OF BODY OF PANCREAS (HCC): ICD-10-CM

## 2021-05-08 PROCEDURE — 96372 THER/PROPH/DIAG INJ SC/IM: CPT

## 2021-05-08 PROCEDURE — 74011250636 HC RX REV CODE- 250/636: Performed by: INTERNAL MEDICINE

## 2021-05-08 RX ADMIN — PEGFILGRASTIM-CBQV 6 MG: 6 INJECTION, SOLUTION SUBCUTANEOUS at 15:39

## 2021-05-08 NOTE — PROGRESS NOTES
Pt arrived ambulatory today at 1508, to receive Udenyca. Pt tolerated without difficulty. Patient discharged via ambulatory accompanied by self. Instructed to notify physician of any problems, questions or concerns. Allowed opportunity for patient/family to ask questions. Verbalized understanding. Next appointment is May 19  at 0830 with Harlan Jara.

## 2021-05-17 ENCOUNTER — HOSPITAL ENCOUNTER (OUTPATIENT)
Dept: CT IMAGING | Age: 66
Discharge: HOME | End: 2021-05-17
Attending: INTERNAL MEDICINE
Payer: MEDICARE

## 2021-05-17 DIAGNOSIS — C25.1 MALIGNANT NEOPLASM OF BODY OF PANCREAS (HCC): ICD-10-CM

## 2021-05-17 PROCEDURE — 74177 CT ABD & PELVIS W/CONTRAST: CPT

## 2021-05-17 PROCEDURE — 74011000636 HC RX REV CODE- 636: Performed by: INTERNAL MEDICINE

## 2021-05-17 PROCEDURE — 74011000258 HC RX REV CODE- 258: Performed by: INTERNAL MEDICINE

## 2021-05-17 RX ORDER — SODIUM CHLORIDE 0.9 % (FLUSH) 0.9 %
10 SYRINGE (ML) INJECTION
Status: COMPLETED | OUTPATIENT
Start: 2021-05-17 | End: 2021-05-17

## 2021-05-17 RX ADMIN — Medication 10 ML: at 13:55

## 2021-05-17 RX ADMIN — IOPAMIDOL 100 ML: 755 INJECTION, SOLUTION INTRAVENOUS at 13:54

## 2021-05-17 RX ADMIN — SODIUM CHLORIDE 100 ML: 900 INJECTION, SOLUTION INTRAVENOUS at 13:55

## 2021-05-18 ENCOUNTER — HOSPITAL ENCOUNTER (OUTPATIENT)
Dept: LAB | Age: 66
Discharge: HOME | End: 2021-05-18
Payer: MEDICARE

## 2021-05-18 ENCOUNTER — PATIENT OUTREACH (OUTPATIENT)
Dept: CASE MANAGEMENT | Age: 66
End: 2021-05-18

## 2021-05-18 DIAGNOSIS — C25.1 MALIGNANT NEOPLASM OF BODY OF PANCREAS (HCC): ICD-10-CM

## 2021-05-18 LAB
ALBUMIN SERPL-MCNC: 3 G/DL (ref 3.2–4.6)
ALBUMIN/GLOB SERPL: 0.8 {RATIO} (ref 1.2–3.5)
ALP SERPL-CCNC: 111 U/L (ref 50–136)
ALT SERPL-CCNC: 14 U/L (ref 12–65)
ANION GAP SERPL CALC-SCNC: 4 MMOL/L (ref 7–16)
AST SERPL-CCNC: 13 U/L (ref 15–37)
BASOPHILS # BLD: 0 K/UL (ref 0–0.2)
BASOPHILS NFR BLD: 0 % (ref 0–2)
BILIRUB SERPL-MCNC: 0.3 MG/DL (ref 0.2–1.1)
BUN SERPL-MCNC: 9 MG/DL (ref 8–23)
CALCIUM SERPL-MCNC: 8.9 MG/DL (ref 8.3–10.4)
CHLORIDE SERPL-SCNC: 109 MMOL/L (ref 98–107)
CO2 SERPL-SCNC: 30 MMOL/L (ref 21–32)
CREAT SERPL-MCNC: 0.7 MG/DL (ref 0.8–1.5)
DIFFERENTIAL METHOD BLD: ABNORMAL
EOSINOPHIL # BLD: 0.4 K/UL (ref 0–0.8)
EOSINOPHIL NFR BLD: 3 % (ref 0.5–7.8)
ERYTHROCYTE [DISTWIDTH] IN BLOOD BY AUTOMATED COUNT: 16.7 % (ref 11.9–14.6)
GLOBULIN SER CALC-MCNC: 3.6 G/DL (ref 2.3–3.5)
GLUCOSE SERPL-MCNC: 117 MG/DL (ref 65–100)
HCT VFR BLD AUTO: 37.1 %
HGB BLD-MCNC: 11.6 G/DL (ref 13.6–17.2)
IMM GRANULOCYTES # BLD AUTO: 0.2 K/UL (ref 0–0.5)
IMM GRANULOCYTES NFR BLD AUTO: 1 % (ref 0–5)
LYMPHOCYTES # BLD: 1.1 K/UL (ref 0.5–4.6)
LYMPHOCYTES NFR BLD: 11 % (ref 13–44)
MAGNESIUM SERPL-MCNC: 1.8 MG/DL (ref 1.8–2.4)
MCH RBC QN AUTO: 30.8 PG (ref 26.1–32.9)
MCHC RBC AUTO-ENTMCNC: 31.3 G/DL (ref 31.4–35)
MCV RBC AUTO: 98.4 FL (ref 79.6–97.8)
MONOCYTES # BLD: 1.1 K/UL (ref 0.1–1.3)
MONOCYTES NFR BLD: 10 % (ref 4–12)
NEUTS SEG # BLD: 7.8 K/UL (ref 1.7–8.2)
NEUTS SEG NFR BLD: 74 % (ref 43–78)
NRBC # BLD: 0 K/UL (ref 0–0.2)
PLATELET # BLD AUTO: 211 K/UL (ref 150–450)
PMV BLD AUTO: 8.8 FL (ref 9.4–12.3)
POTASSIUM SERPL-SCNC: 3.2 MMOL/L (ref 3.5–5.1)
PROT SERPL-MCNC: 6.6 G/DL (ref 6.3–8.2)
RBC # BLD AUTO: 3.77 M/UL (ref 4.23–5.6)
SODIUM SERPL-SCNC: 143 MMOL/L (ref 136–145)
WBC # BLD AUTO: 10.5 K/UL (ref 4.3–11.1)

## 2021-05-18 PROCEDURE — 36415 COLL VENOUS BLD VENIPUNCTURE: CPT

## 2021-05-18 PROCEDURE — 85025 COMPLETE CBC W/AUTO DIFF WBC: CPT

## 2021-05-18 PROCEDURE — 83735 ASSAY OF MAGNESIUM: CPT

## 2021-05-18 PROCEDURE — 80053 COMPREHEN METABOLIC PANEL: CPT

## 2021-05-18 NOTE — PROGRESS NOTES
5/18/21 saw pt today with Dr. Nyla Lucio for pre chemo cycle 6 FOLFIRINOX. Restaging CT shows response to therapy. He is feeling well. PO intake is good. Denies any issues. IV potassium tomorrow with infusion. Follow up in 2 weeks. Encouraged to call with any concerns. Navigation will continue to follow.

## 2021-05-19 ENCOUNTER — HOSPITAL ENCOUNTER (OUTPATIENT)
Dept: INFUSION THERAPY | Age: 66
Discharge: HOME | End: 2021-05-19
Payer: MEDICARE

## 2021-05-19 VITALS
DIASTOLIC BLOOD PRESSURE: 64 MMHG | OXYGEN SATURATION: 98 % | HEART RATE: 86 BPM | SYSTOLIC BLOOD PRESSURE: 155 MMHG | TEMPERATURE: 98.7 F | WEIGHT: 174 LBS | BODY MASS INDEX: 29.87 KG/M2 | RESPIRATION RATE: 18 BRPM

## 2021-05-19 DIAGNOSIS — E87.6 HYPOKALEMIA: ICD-10-CM

## 2021-05-19 DIAGNOSIS — C15.5 MALIGNANT NEOPLASM OF LOWER THIRD OF ESOPHAGUS (HCC): ICD-10-CM

## 2021-05-19 DIAGNOSIS — C25.1 MALIGNANT NEOPLASM OF BODY OF PANCREAS (HCC): ICD-10-CM

## 2021-05-19 DIAGNOSIS — D70.8 OTHER NEUTROPENIA (HCC): ICD-10-CM

## 2021-05-19 DIAGNOSIS — E86.0 DEHYDRATION: Primary | ICD-10-CM

## 2021-05-19 PROCEDURE — 96417 CHEMO IV INFUS EACH ADDL SEQ: CPT

## 2021-05-19 PROCEDURE — G0498 CHEMO EXTEND IV INFUS W/PUMP: HCPCS

## 2021-05-19 PROCEDURE — 96368 THER/DIAG CONCURRENT INF: CPT

## 2021-05-19 PROCEDURE — 96372 THER/PROPH/DIAG INJ SC/IM: CPT

## 2021-05-19 PROCEDURE — 96367 TX/PROPH/DG ADDL SEQ IV INF: CPT

## 2021-05-19 PROCEDURE — 96413 CHEMO IV INFUSION 1 HR: CPT

## 2021-05-19 PROCEDURE — 74011000258 HC RX REV CODE- 258: Performed by: INTERNAL MEDICINE

## 2021-05-19 PROCEDURE — 96411 CHEMO IV PUSH ADDL DRUG: CPT

## 2021-05-19 PROCEDURE — 96415 CHEMO IV INFUSION ADDL HR: CPT

## 2021-05-19 PROCEDURE — 96375 TX/PRO/DX INJ NEW DRUG ADDON: CPT

## 2021-05-19 PROCEDURE — 96366 THER/PROPH/DIAG IV INF ADDON: CPT

## 2021-05-19 PROCEDURE — 74011250636 HC RX REV CODE- 250/636: Performed by: INTERNAL MEDICINE

## 2021-05-19 RX ORDER — POTASSIUM CHLORIDE 29.8 MG/ML
20 INJECTION INTRAVENOUS ONCE
Status: COMPLETED | OUTPATIENT
Start: 2021-05-19 | End: 2021-05-19

## 2021-05-19 RX ORDER — FLUOROURACIL 50 MG/ML
300 INJECTION, SOLUTION INTRAVENOUS ONCE
Status: COMPLETED | OUTPATIENT
Start: 2021-05-19 | End: 2021-05-19

## 2021-05-19 RX ORDER — ONDANSETRON 2 MG/ML
8 INJECTION INTRAMUSCULAR; INTRAVENOUS ONCE
Status: COMPLETED | OUTPATIENT
Start: 2021-05-19 | End: 2021-05-19

## 2021-05-19 RX ORDER — ATROPINE SULFATE 0.4 MG/ML
0.4 INJECTION, SOLUTION ENDOTRACHEAL; INTRAMEDULLARY; INTRAMUSCULAR; INTRAVENOUS; SUBCUTANEOUS ONCE
Status: COMPLETED | OUTPATIENT
Start: 2021-05-19 | End: 2021-05-19

## 2021-05-19 RX ORDER — DEXTROSE MONOHYDRATE 50 MG/ML
25 INJECTION, SOLUTION INTRAVENOUS CONTINUOUS
Status: ACTIVE | OUTPATIENT
Start: 2021-05-19 | End: 2021-05-19

## 2021-05-19 RX ADMIN — ONDANSETRON 8 MG: 2 INJECTION INTRAMUSCULAR; INTRAVENOUS at 08:06

## 2021-05-19 RX ADMIN — FLUOROURACIL 3500 MG: 50 INJECTION, SOLUTION INTRAVENOUS at 14:05

## 2021-05-19 RX ADMIN — IRINOTECAN HYDROCHLORIDE 234 MG: 20 INJECTION, SOLUTION INTRAVENOUS at 12:30

## 2021-05-19 RX ADMIN — OXALIPLATIN 127 MG: 5 INJECTION, SOLUTION INTRAVENOUS at 10:19

## 2021-05-19 RX ADMIN — DEXTROSE MONOHYDRATE 25 ML/HR: 5 INJECTION, SOLUTION INTRAVENOUS at 08:05

## 2021-05-19 RX ADMIN — DEXAMETHASONE SODIUM PHOSPHATE 12 MG: 4 INJECTION, SOLUTION INTRAMUSCULAR; INTRAVENOUS at 08:07

## 2021-05-19 RX ADMIN — LEUCOVORIN CALCIUM 780 MG: 350 INJECTION, POWDER, LYOPHILIZED, FOR SOLUTION INTRAMUSCULAR; INTRAVENOUS at 12:30

## 2021-05-19 RX ADMIN — POTASSIUM CHLORIDE 20 MEQ: 400 INJECTION, SOLUTION INTRAVENOUS at 08:15

## 2021-05-19 RX ADMIN — FOSAPREPITANT 150 MG: 150 INJECTION, POWDER, LYOPHILIZED, FOR SOLUTION INTRAVENOUS at 08:22

## 2021-05-19 RX ADMIN — FLUOROURACIL 585 MG: 50 INJECTION, SOLUTION INTRAVENOUS at 14:01

## 2021-05-19 RX ADMIN — ATROPINE SULFATE 0.4 MG: 0.4 INJECTION, SOLUTION INTRAMUSCULAR; INTRAVENOUS; SUBCUTANEOUS at 12:05

## 2021-05-19 NOTE — PROGRESS NOTES
Arrived to the [facility]. FOLFIRINOX + 20 KCL completed. Patient tolerated well. Any issues or concerns during appointment: none. Patient aware of next infusion appointment on 5/21 at 1500. Discharged ambulatory with continuous chemo infusing without difficulty.     Dre Gomes RN

## 2021-05-19 NOTE — ADDENDUM NOTE
Encounter addended by: Torrie Nixon [facility] on: 5/19/2021 8:34 AM   Actions taken: i-Vent created or edited

## 2021-05-21 ENCOUNTER — HOSPITAL ENCOUNTER (OUTPATIENT)
Dept: INFUSION THERAPY | Age: 66
Discharge: HOME | End: 2021-05-21
Payer: MEDICARE

## 2021-05-21 VITALS
TEMPERATURE: 97.9 F | HEART RATE: 73 BPM | DIASTOLIC BLOOD PRESSURE: 77 MMHG | SYSTOLIC BLOOD PRESSURE: 134 MMHG | RESPIRATION RATE: 16 BRPM | OXYGEN SATURATION: 98 %

## 2021-05-21 DIAGNOSIS — C25.1 MALIGNANT NEOPLASM OF BODY OF PANCREAS (HCC): ICD-10-CM

## 2021-05-21 DIAGNOSIS — D70.8 OTHER NEUTROPENIA (HCC): ICD-10-CM

## 2021-05-21 DIAGNOSIS — E86.0 DEHYDRATION: Primary | ICD-10-CM

## 2021-05-21 DIAGNOSIS — C15.5 MALIGNANT NEOPLASM OF LOWER THIRD OF ESOPHAGUS (HCC): ICD-10-CM

## 2021-05-21 PROCEDURE — 96523 IRRIG DRUG DELIVERY DEVICE: CPT

## 2021-05-21 RX ORDER — SODIUM CHLORIDE 0.9 % (FLUSH) 0.9 %
10 SYRINGE (ML) INJECTION AS NEEDED
Status: DISCONTINUED | OUTPATIENT
Start: 2021-05-21 | End: 2021-05-22 | Stop reason: HOSPADM

## 2021-05-21 RX ADMIN — Medication 10 ML: at 15:16

## 2021-05-21 NOTE — PROGRESS NOTES
Arrived to the [facility]. 5FU pump disconnect completed and tolerated well. Any issues or concerns during appointment: none, patient states doesn't need IVF today  Patient given education on process  Instructed patient to notify provider for any issues or worrisome symptoms. They verbalized understanding. Patient aware of next infusion appointment scheduled for tomorrow at 330pm   Discharged ambulatory with family member.

## 2021-05-22 ENCOUNTER — HOSPITAL ENCOUNTER (OUTPATIENT)
Dept: INFUSION THERAPY | Age: 66
Discharge: HOME | End: 2021-05-22
Payer: MEDICARE

## 2021-05-22 VITALS
HEART RATE: 78 BPM | TEMPERATURE: 98.2 F | RESPIRATION RATE: 16 BRPM | DIASTOLIC BLOOD PRESSURE: 84 MMHG | SYSTOLIC BLOOD PRESSURE: 135 MMHG | OXYGEN SATURATION: 100 %

## 2021-05-22 DIAGNOSIS — T45.1X5A CINV (CHEMOTHERAPY-INDUCED NAUSEA AND VOMITING): ICD-10-CM

## 2021-05-22 DIAGNOSIS — E87.6 HYPOKALEMIA: ICD-10-CM

## 2021-05-22 DIAGNOSIS — C25.1 MALIGNANT NEOPLASM OF BODY OF PANCREAS (HCC): ICD-10-CM

## 2021-05-22 DIAGNOSIS — C15.5 MALIGNANT NEOPLASM OF LOWER THIRD OF ESOPHAGUS (HCC): ICD-10-CM

## 2021-05-22 DIAGNOSIS — E86.0 DEHYDRATION: Primary | ICD-10-CM

## 2021-05-22 DIAGNOSIS — D70.8 OTHER NEUTROPENIA (HCC): ICD-10-CM

## 2021-05-22 DIAGNOSIS — E83.42 HYPOMAGNESEMIA: ICD-10-CM

## 2021-05-22 DIAGNOSIS — R11.2 CINV (CHEMOTHERAPY-INDUCED NAUSEA AND VOMITING): ICD-10-CM

## 2021-05-22 PROCEDURE — 96372 THER/PROPH/DIAG INJ SC/IM: CPT

## 2021-05-22 PROCEDURE — 74011250636 HC RX REV CODE- 250/636: Performed by: INTERNAL MEDICINE

## 2021-05-22 RX ADMIN — PEGFILGRASTIM-CBQV 6 MG: 6 INJECTION, SOLUTION SUBCUTANEOUS at 17:45

## 2021-05-22 NOTE — PROGRESS NOTES
Arrived to infusion  Reports diarrhea today which has subsided now after taking   Home meds  No other concerns  Udenyca administered  Tolerated well  Next appt  6/2

## 2021-06-01 ENCOUNTER — HOSPITAL ENCOUNTER (OUTPATIENT)
Dept: LAB | Age: 66
Discharge: HOME | End: 2021-06-01
Payer: MEDICARE

## 2021-06-01 ENCOUNTER — PATIENT OUTREACH (OUTPATIENT)
Dept: CASE MANAGEMENT | Age: 66
End: 2021-06-01

## 2021-06-01 DIAGNOSIS — C25.1 MALIGNANT NEOPLASM OF BODY OF PANCREAS (HCC): ICD-10-CM

## 2021-06-01 LAB
ALBUMIN SERPL-MCNC: 3.1 G/DL (ref 3.2–4.6)
ALBUMIN/GLOB SERPL: 0.9 {RATIO} (ref 1.2–3.5)
ALP SERPL-CCNC: 111 U/L (ref 50–136)
ALT SERPL-CCNC: 18 U/L (ref 12–65)
ANION GAP SERPL CALC-SCNC: 6 MMOL/L (ref 7–16)
AST SERPL-CCNC: 15 U/L (ref 15–37)
BASOPHILS # BLD: 0 K/UL (ref 0–0.2)
BASOPHILS NFR BLD: 0 % (ref 0–2)
BILIRUB SERPL-MCNC: 0.3 MG/DL (ref 0.2–1.1)
BUN SERPL-MCNC: 13 MG/DL (ref 8–23)
CALCIUM SERPL-MCNC: 8.9 MG/DL (ref 8.3–10.4)
CANCER AG19-9 SERPL-ACNC: 25.8 U/ML (ref 2–37)
CEA SERPL-MCNC: 11.6 NG/ML (ref 0–3)
CHLORIDE SERPL-SCNC: 109 MMOL/L (ref 98–107)
CO2 SERPL-SCNC: 27 MMOL/L (ref 21–32)
CREAT SERPL-MCNC: 0.8 MG/DL (ref 0.8–1.5)
DIFFERENTIAL METHOD BLD: ABNORMAL
EOSINOPHIL # BLD: 0.2 K/UL (ref 0–0.8)
EOSINOPHIL NFR BLD: 2 % (ref 0.5–7.8)
ERYTHROCYTE [DISTWIDTH] IN BLOOD BY AUTOMATED COUNT: 15 % (ref 11.9–14.6)
GLOBULIN SER CALC-MCNC: 3.5 G/DL (ref 2.3–3.5)
GLUCOSE SERPL-MCNC: 122 MG/DL (ref 65–100)
HCT VFR BLD AUTO: 34.9 %
HGB BLD-MCNC: 11.2 G/DL (ref 13.6–17.2)
IMM GRANULOCYTES # BLD AUTO: 0.3 K/UL (ref 0–0.5)
IMM GRANULOCYTES NFR BLD AUTO: 2 % (ref 0–5)
LYMPHOCYTES # BLD: 1.3 K/UL (ref 0.5–4.6)
LYMPHOCYTES NFR BLD: 11 % (ref 13–44)
MAGNESIUM SERPL-MCNC: 1.7 MG/DL (ref 1.8–2.4)
MCH RBC QN AUTO: 31.5 PG (ref 26.1–32.9)
MCHC RBC AUTO-ENTMCNC: 32.1 G/DL (ref 31.4–35)
MCV RBC AUTO: 98.3 FL (ref 79.6–97.8)
MONOCYTES # BLD: 1.4 K/UL (ref 0.1–1.3)
MONOCYTES NFR BLD: 12 % (ref 4–12)
NEUTS SEG # BLD: 8.8 K/UL (ref 1.7–8.2)
NEUTS SEG NFR BLD: 74 % (ref 43–78)
NRBC # BLD: 0 K/UL (ref 0–0.2)
PLATELET # BLD AUTO: 209 K/UL (ref 150–450)
PMV BLD AUTO: 8.8 FL (ref 9.4–12.3)
POTASSIUM SERPL-SCNC: 3.4 MMOL/L (ref 3.5–5.1)
PROT SERPL-MCNC: 6.6 G/DL (ref 6.3–8.2)
RBC # BLD AUTO: 3.55 M/UL (ref 4.23–5.6)
SODIUM SERPL-SCNC: 142 MMOL/L (ref 136–145)
WBC # BLD AUTO: 11.9 K/UL (ref 4.3–11.1)

## 2021-06-01 PROCEDURE — 83735 ASSAY OF MAGNESIUM: CPT

## 2021-06-01 PROCEDURE — 82378 CARCINOEMBRYONIC ANTIGEN: CPT

## 2021-06-01 PROCEDURE — 80053 COMPREHEN METABOLIC PANEL: CPT

## 2021-06-01 PROCEDURE — 36415 COLL VENOUS BLD VENIPUNCTURE: CPT

## 2021-06-01 PROCEDURE — 85025 COMPLETE CBC W/AUTO DIFF WBC: CPT

## 2021-06-01 PROCEDURE — 86301 IMMUNOASSAY TUMOR CA 19-9: CPT

## 2021-06-01 NOTE — PROGRESS NOTES
6/1/21 saw pt today with Dr. Celi Warner for pre chemo cycle 7 FOLFIRINOX. He is tolerating chemo well. PO intake is good. He is having some cold sensitivity for several days after each chemo. Infusion tomorrow. Follow up in 2 weeks. Encouraged to call with any concerns. Navigation will continue to follow.

## 2021-06-02 ENCOUNTER — HOSPITAL ENCOUNTER (OUTPATIENT)
Dept: INFUSION THERAPY | Age: 66
Discharge: HOME | End: 2021-06-02
Payer: MEDICARE

## 2021-06-02 VITALS
OXYGEN SATURATION: 98 % | BODY MASS INDEX: 29.71 KG/M2 | RESPIRATION RATE: 16 BRPM | HEART RATE: 86 BPM | SYSTOLIC BLOOD PRESSURE: 154 MMHG | DIASTOLIC BLOOD PRESSURE: 79 MMHG | WEIGHT: 175.8 LBS | TEMPERATURE: 97.8 F

## 2021-06-02 DIAGNOSIS — E87.6 HYPOKALEMIA: ICD-10-CM

## 2021-06-02 DIAGNOSIS — E86.0 DEHYDRATION: ICD-10-CM

## 2021-06-02 DIAGNOSIS — R11.2 CINV (CHEMOTHERAPY-INDUCED NAUSEA AND VOMITING): ICD-10-CM

## 2021-06-02 DIAGNOSIS — T45.1X5A CINV (CHEMOTHERAPY-INDUCED NAUSEA AND VOMITING): ICD-10-CM

## 2021-06-02 DIAGNOSIS — E83.42 HYPOMAGNESEMIA: Primary | ICD-10-CM

## 2021-06-02 DIAGNOSIS — C25.1 MALIGNANT NEOPLASM OF BODY OF PANCREAS (HCC): ICD-10-CM

## 2021-06-02 DIAGNOSIS — D70.8 OTHER NEUTROPENIA (HCC): ICD-10-CM

## 2021-06-02 DIAGNOSIS — C15.5 MALIGNANT NEOPLASM OF LOWER THIRD OF ESOPHAGUS (HCC): ICD-10-CM

## 2021-06-02 PROCEDURE — 96375 TX/PRO/DX INJ NEW DRUG ADDON: CPT

## 2021-06-02 PROCEDURE — 74011000258 HC RX REV CODE- 258: Performed by: INTERNAL MEDICINE

## 2021-06-02 PROCEDURE — 96368 THER/DIAG CONCURRENT INF: CPT

## 2021-06-02 PROCEDURE — 96417 CHEMO IV INFUS EACH ADDL SEQ: CPT

## 2021-06-02 PROCEDURE — 96415 CHEMO IV INFUSION ADDL HR: CPT

## 2021-06-02 PROCEDURE — 96372 THER/PROPH/DIAG INJ SC/IM: CPT

## 2021-06-02 PROCEDURE — G0498 CHEMO EXTEND IV INFUS W/PUMP: HCPCS

## 2021-06-02 PROCEDURE — 96413 CHEMO IV INFUSION 1 HR: CPT

## 2021-06-02 PROCEDURE — 96367 TX/PROPH/DG ADDL SEQ IV INF: CPT

## 2021-06-02 PROCEDURE — 74011250636 HC RX REV CODE- 250/636: Performed by: INTERNAL MEDICINE

## 2021-06-02 PROCEDURE — 96411 CHEMO IV PUSH ADDL DRUG: CPT

## 2021-06-02 RX ORDER — FLUOROURACIL 50 MG/ML
300 INJECTION, SOLUTION INTRAVENOUS ONCE
Status: COMPLETED | OUTPATIENT
Start: 2021-06-02 | End: 2021-06-02

## 2021-06-02 RX ORDER — DEXTROSE MONOHYDRATE 50 MG/ML
25 INJECTION, SOLUTION INTRAVENOUS CONTINUOUS
Status: ACTIVE | OUTPATIENT
Start: 2021-06-02 | End: 2021-06-02

## 2021-06-02 RX ORDER — ONDANSETRON 2 MG/ML
8 INJECTION INTRAMUSCULAR; INTRAVENOUS ONCE
Status: COMPLETED | OUTPATIENT
Start: 2021-06-02 | End: 2021-06-02

## 2021-06-02 RX ORDER — ATROPINE SULFATE 0.4 MG/ML
0.4 INJECTION, SOLUTION ENDOTRACHEAL; INTRAMEDULLARY; INTRAMUSCULAR; INTRAVENOUS; SUBCUTANEOUS ONCE
Status: COMPLETED | OUTPATIENT
Start: 2021-06-02 | End: 2021-06-02

## 2021-06-02 RX ADMIN — DEXAMETHASONE SODIUM PHOSPHATE 12 MG: 4 INJECTION, SOLUTION INTRAMUSCULAR; INTRAVENOUS at 10:12

## 2021-06-02 RX ADMIN — ATROPINE SULFATE 0.4 MG: 0.4 INJECTION, SOLUTION INTRAMUSCULAR; INTRAVENOUS; SUBCUTANEOUS at 12:41

## 2021-06-02 RX ADMIN — FLUOROURACIL 585 MG: 50 INJECTION, SOLUTION INTRAVENOUS at 14:35

## 2021-06-02 RX ADMIN — OXALIPLATIN 127 MG: 5 INJECTION, SOLUTION INTRAVENOUS at 10:44

## 2021-06-02 RX ADMIN — LEUCOVORIN CALCIUM 780 MG: 350 INJECTION, POWDER, LYOPHILIZED, FOR SOLUTION INTRAMUSCULAR; INTRAVENOUS at 12:54

## 2021-06-02 RX ADMIN — IRINOTECAN HYDROCHLORIDE 234 MG: 20 INJECTION, SOLUTION INTRAVENOUS at 12:54

## 2021-06-02 RX ADMIN — FOSAPREPITANT 150 MG: 150 INJECTION, POWDER, LYOPHILIZED, FOR SOLUTION INTRAVENOUS at 09:50

## 2021-06-02 RX ADMIN — FLUOROURACIL 3500 MG: 50 INJECTION, SOLUTION INTRAVENOUS at 14:45

## 2021-06-02 RX ADMIN — ONDANSETRON 8 MG: 2 INJECTION INTRAMUSCULAR; INTRAVENOUS at 09:45

## 2021-06-02 RX ADMIN — DEXTROSE MONOHYDRATE 25 ML/HR: 5 INJECTION, SOLUTION INTRAVENOUS at 09:40

## 2021-06-02 NOTE — PROGRESS NOTES
Arrived to the [facility] ambulatory. Oxaliplation, leucovorin, irinotecan and 5fu push  Completed CI 5fu pump started over 46hours. Patient tolerated well. Any issues or concerns during appointment: no.  Patient aware of next infusion appointment on  6/4 at 1530. Discharged to home ambulatory.

## 2021-06-04 ENCOUNTER — HOSPITAL ENCOUNTER (OUTPATIENT)
Dept: INFUSION THERAPY | Age: 66
Discharge: HOME | End: 2021-06-04
Payer: MEDICARE

## 2021-06-04 VITALS
TEMPERATURE: 98.2 F | DIASTOLIC BLOOD PRESSURE: 79 MMHG | HEART RATE: 90 BPM | RESPIRATION RATE: 16 BRPM | OXYGEN SATURATION: 97 % | SYSTOLIC BLOOD PRESSURE: 136 MMHG

## 2021-06-04 DIAGNOSIS — C15.5 MALIGNANT NEOPLASM OF LOWER THIRD OF ESOPHAGUS (HCC): ICD-10-CM

## 2021-06-04 DIAGNOSIS — E86.0 DEHYDRATION: Primary | ICD-10-CM

## 2021-06-04 DIAGNOSIS — D70.8 OTHER NEUTROPENIA (HCC): ICD-10-CM

## 2021-06-04 DIAGNOSIS — C25.1 MALIGNANT NEOPLASM OF BODY OF PANCREAS (HCC): ICD-10-CM

## 2021-06-04 PROCEDURE — 96523 IRRIG DRUG DELIVERY DEVICE: CPT

## 2021-06-04 RX ORDER — SODIUM CHLORIDE 0.9 % (FLUSH) 0.9 %
10 SYRINGE (ML) INJECTION AS NEEDED
Status: DISCONTINUED | OUTPATIENT
Start: 2021-06-04 | End: 2021-06-05 | Stop reason: HOSPADM

## 2021-06-04 RX ORDER — SODIUM CHLORIDE 9 MG/ML
1000 INJECTION, SOLUTION INTRAVENOUS ONCE
Status: DISCONTINUED | OUTPATIENT
Start: 2021-06-04 | End: 2021-06-05 | Stop reason: HOSPADM

## 2021-06-04 RX ADMIN — Medication 10 ML: at 14:50

## 2021-06-04 NOTE — PROGRESS NOTES
Pt arrived ambulatory. 5FU pump complete on arrival.  Pump disconnected. Pt refused fluids, says he is feeling good and drinking plenty of fluids at home,  Pt aware of tomorrow appt at 1600. Discharged ambulatory, no distress noted.

## 2021-06-05 ENCOUNTER — HOSPITAL ENCOUNTER (OUTPATIENT)
Dept: INFUSION THERAPY | Age: 66
Discharge: HOME | End: 2021-06-05
Payer: MEDICARE

## 2021-06-05 VITALS
WEIGHT: 169.4 LBS | SYSTOLIC BLOOD PRESSURE: 141 MMHG | RESPIRATION RATE: 18 BRPM | DIASTOLIC BLOOD PRESSURE: 86 MMHG | HEART RATE: 66 BPM | TEMPERATURE: 97.7 F | BODY MASS INDEX: 28.63 KG/M2

## 2021-06-05 DIAGNOSIS — C15.5 MALIGNANT NEOPLASM OF LOWER THIRD OF ESOPHAGUS (HCC): ICD-10-CM

## 2021-06-05 DIAGNOSIS — E86.0 DEHYDRATION: Primary | ICD-10-CM

## 2021-06-05 DIAGNOSIS — C25.1 MALIGNANT NEOPLASM OF BODY OF PANCREAS (HCC): ICD-10-CM

## 2021-06-05 DIAGNOSIS — D70.8 OTHER NEUTROPENIA (HCC): ICD-10-CM

## 2021-06-05 PROCEDURE — 74011250636 HC RX REV CODE- 250/636: Performed by: INTERNAL MEDICINE

## 2021-06-05 PROCEDURE — 96372 THER/PROPH/DIAG INJ SC/IM: CPT

## 2021-06-05 RX ADMIN — PEGFILGRASTIM-CBQV 6 MG: 6 INJECTION, SOLUTION SUBCUTANEOUS at 15:55

## 2021-06-05 NOTE — PROGRESS NOTES
Pt arrived ambulatory today at 1526, to receive Udenyca. Pt tolerated without difficulty. Patient discharged via ambulatory accompanied by self. Instructed to notify physician of any problems, questions or concerns. Allowed opportunity for patient/family to ask questions. Verbalized understanding. Next appointment is June 16 at 0830 with Harlan Jara.

## 2021-06-15 ENCOUNTER — PATIENT OUTREACH (OUTPATIENT)
Dept: CASE MANAGEMENT | Age: 66
End: 2021-06-15

## 2021-06-15 ENCOUNTER — HOSPITAL ENCOUNTER (OUTPATIENT)
Dept: LAB | Age: 66
Discharge: HOME | End: 2021-06-15
Payer: MEDICARE

## 2021-06-15 DIAGNOSIS — C25.1 MALIGNANT NEOPLASM OF BODY OF PANCREAS (HCC): ICD-10-CM

## 2021-06-15 LAB
ALBUMIN SERPL-MCNC: 3.3 G/DL (ref 3.2–4.6)
ALBUMIN/GLOB SERPL: 1 {RATIO} (ref 1.2–3.5)
ALP SERPL-CCNC: 109 U/L (ref 50–136)
ALT SERPL-CCNC: 12 U/L (ref 12–65)
ANION GAP SERPL CALC-SCNC: 4 MMOL/L (ref 7–16)
AST SERPL-CCNC: 11 U/L (ref 15–37)
BASOPHILS # BLD: 0.1 K/UL (ref 0–0.2)
BASOPHILS NFR BLD: 1 % (ref 0–2)
BILIRUB SERPL-MCNC: 0.2 MG/DL (ref 0.2–1.1)
BUN SERPL-MCNC: 10 MG/DL (ref 8–23)
CALCIUM SERPL-MCNC: 9 MG/DL (ref 8.3–10.4)
CHLORIDE SERPL-SCNC: 110 MMOL/L (ref 98–107)
CO2 SERPL-SCNC: 29 MMOL/L (ref 21–32)
CREAT SERPL-MCNC: 0.7 MG/DL (ref 0.8–1.5)
DIFFERENTIAL METHOD BLD: ABNORMAL
EOSINOPHIL # BLD: 0.2 K/UL (ref 0–0.8)
EOSINOPHIL NFR BLD: 2 % (ref 0.5–7.8)
ERYTHROCYTE [DISTWIDTH] IN BLOOD BY AUTOMATED COUNT: 14.8 % (ref 11.9–14.6)
GLOBULIN SER CALC-MCNC: 3.4 G/DL (ref 2.3–3.5)
GLUCOSE SERPL-MCNC: 112 MG/DL (ref 65–100)
HCT VFR BLD AUTO: 37.6 %
HGB BLD-MCNC: 11.9 G/DL (ref 13.6–17.2)
IMM GRANULOCYTES # BLD AUTO: 0.4 K/UL (ref 0–0.5)
IMM GRANULOCYTES NFR BLD AUTO: 4 % (ref 0–5)
LYMPHOCYTES # BLD: 1.4 K/UL (ref 0.5–4.6)
LYMPHOCYTES NFR BLD: 13 % (ref 13–44)
MAGNESIUM SERPL-MCNC: 1.9 MG/DL (ref 1.8–2.4)
MCH RBC QN AUTO: 31.1 PG (ref 26.1–32.9)
MCHC RBC AUTO-ENTMCNC: 31.6 G/DL (ref 31.4–35)
MCV RBC AUTO: 98.2 FL (ref 79.6–97.8)
MONOCYTES # BLD: 1.2 K/UL (ref 0.1–1.3)
MONOCYTES NFR BLD: 11 % (ref 4–12)
NEUTS SEG # BLD: 7.7 K/UL (ref 1.7–8.2)
NEUTS SEG NFR BLD: 70 % (ref 43–78)
NRBC # BLD: 0 K/UL (ref 0–0.2)
PLATELET # BLD AUTO: 167 K/UL (ref 150–450)
PMV BLD AUTO: 9.2 FL (ref 9.4–12.3)
POTASSIUM SERPL-SCNC: 3.5 MMOL/L (ref 3.5–5.1)
PROT SERPL-MCNC: 6.7 G/DL (ref 6.3–8.2)
RBC # BLD AUTO: 3.83 M/UL (ref 4.23–5.6)
SODIUM SERPL-SCNC: 143 MMOL/L (ref 136–145)
WBC # BLD AUTO: 11 K/UL (ref 4.3–11.1)

## 2021-06-15 PROCEDURE — 85025 COMPLETE CBC W/AUTO DIFF WBC: CPT

## 2021-06-15 PROCEDURE — 36415 COLL VENOUS BLD VENIPUNCTURE: CPT

## 2021-06-15 PROCEDURE — 83735 ASSAY OF MAGNESIUM: CPT

## 2021-06-15 PROCEDURE — 80053 COMPREHEN METABOLIC PANEL: CPT

## 2021-06-15 NOTE — PROGRESS NOTES
6/15/21 saw pt today with Thurmon Angelucci, NP for pre chemo cycle 8 FOLFIRINOX. He is tolerating chemo well. PO intake is great. Denies any issues. Cold sensitivity and neuropathy very mild and intermittent. Infusion tomorrow. Follow up in 2 weeks. Encouraged to call with any concerns. Navigation will continue to follow.

## 2021-06-16 ENCOUNTER — HOSPITAL ENCOUNTER (OUTPATIENT)
Dept: INFUSION THERAPY | Age: 66
Discharge: HOME | End: 2021-06-16
Payer: MEDICARE

## 2021-06-16 VITALS
DIASTOLIC BLOOD PRESSURE: 83 MMHG | OXYGEN SATURATION: 97 % | TEMPERATURE: 98.6 F | WEIGHT: 181.4 LBS | RESPIRATION RATE: 16 BRPM | HEART RATE: 79 BPM | BODY MASS INDEX: 30.66 KG/M2 | SYSTOLIC BLOOD PRESSURE: 130 MMHG

## 2021-06-16 DIAGNOSIS — D70.8 OTHER NEUTROPENIA (HCC): ICD-10-CM

## 2021-06-16 DIAGNOSIS — C25.1 MALIGNANT NEOPLASM OF BODY OF PANCREAS (HCC): ICD-10-CM

## 2021-06-16 DIAGNOSIS — E86.0 DEHYDRATION: Primary | ICD-10-CM

## 2021-06-16 DIAGNOSIS — C15.5 MALIGNANT NEOPLASM OF LOWER THIRD OF ESOPHAGUS (HCC): ICD-10-CM

## 2021-06-16 PROCEDURE — 96375 TX/PRO/DX INJ NEW DRUG ADDON: CPT

## 2021-06-16 PROCEDURE — 74011000258 HC RX REV CODE- 258: Performed by: INTERNAL MEDICINE

## 2021-06-16 PROCEDURE — 96411 CHEMO IV PUSH ADDL DRUG: CPT

## 2021-06-16 PROCEDURE — 96368 THER/DIAG CONCURRENT INF: CPT

## 2021-06-16 PROCEDURE — 96415 CHEMO IV INFUSION ADDL HR: CPT

## 2021-06-16 PROCEDURE — 96413 CHEMO IV INFUSION 1 HR: CPT

## 2021-06-16 PROCEDURE — 96417 CHEMO IV INFUS EACH ADDL SEQ: CPT

## 2021-06-16 PROCEDURE — G0498 CHEMO EXTEND IV INFUS W/PUMP: HCPCS

## 2021-06-16 PROCEDURE — 96367 TX/PROPH/DG ADDL SEQ IV INF: CPT

## 2021-06-16 PROCEDURE — 96372 THER/PROPH/DIAG INJ SC/IM: CPT

## 2021-06-16 PROCEDURE — 74011250636 HC RX REV CODE- 250/636: Performed by: INTERNAL MEDICINE

## 2021-06-16 RX ORDER — ONDANSETRON 2 MG/ML
8 INJECTION INTRAMUSCULAR; INTRAVENOUS ONCE
Status: COMPLETED | OUTPATIENT
Start: 2021-06-16 | End: 2021-06-16

## 2021-06-16 RX ORDER — ATROPINE SULFATE 0.4 MG/ML
0.4 INJECTION, SOLUTION ENDOTRACHEAL; INTRAMEDULLARY; INTRAMUSCULAR; INTRAVENOUS; SUBCUTANEOUS ONCE
Status: COMPLETED | OUTPATIENT
Start: 2021-06-16 | End: 2021-06-16

## 2021-06-16 RX ORDER — FLUOROURACIL 50 MG/ML
300 INJECTION, SOLUTION INTRAVENOUS ONCE
Status: COMPLETED | OUTPATIENT
Start: 2021-06-16 | End: 2021-06-16

## 2021-06-16 RX ORDER — DEXTROSE MONOHYDRATE 50 MG/ML
25 INJECTION, SOLUTION INTRAVENOUS CONTINUOUS
Status: ACTIVE | OUTPATIENT
Start: 2021-06-16 | End: 2021-06-16

## 2021-06-16 RX ORDER — SODIUM CHLORIDE 0.9 % (FLUSH) 0.9 %
10 SYRINGE (ML) INJECTION AS NEEDED
Status: DISCONTINUED | OUTPATIENT
Start: 2021-06-16 | End: 2021-06-17 | Stop reason: HOSPADM

## 2021-06-16 RX ADMIN — FLUOROURACIL 585 MG: 50 INJECTION, SOLUTION INTRAVENOUS at 13:07

## 2021-06-16 RX ADMIN — Medication 10 ML: at 08:05

## 2021-06-16 RX ADMIN — ONDANSETRON 8 MG: 2 INJECTION INTRAMUSCULAR; INTRAVENOUS at 08:15

## 2021-06-16 RX ADMIN — FOSAPREPITANT 150 MG: 150 INJECTION, POWDER, LYOPHILIZED, FOR SOLUTION INTRAVENOUS at 08:36

## 2021-06-16 RX ADMIN — DEXTROSE MONOHYDRATE 25 ML/HR: 5 INJECTION, SOLUTION INTRAVENOUS at 08:05

## 2021-06-16 RX ADMIN — IRINOTECAN HYDROCHLORIDE 220 MG: 20 INJECTION, SOLUTION INTRAVENOUS at 11:30

## 2021-06-16 RX ADMIN — FLUOROURACIL 3500 MG: 50 INJECTION, SOLUTION INTRAVENOUS at 13:10

## 2021-06-16 RX ADMIN — Medication 10 ML: at 13:05

## 2021-06-16 RX ADMIN — DEXAMETHASONE SODIUM PHOSPHATE 12 MG: 4 INJECTION, SOLUTION INTRAMUSCULAR; INTRAVENOUS at 08:16

## 2021-06-16 RX ADMIN — LEUCOVORIN CALCIUM 780 MG: 350 INJECTION, POWDER, LYOPHILIZED, FOR SOLUTION INTRAMUSCULAR; INTRAVENOUS at 11:30

## 2021-06-16 RX ADMIN — OXALIPLATIN 127 MG: 5 INJECTION, SOLUTION INTRAVENOUS at 09:15

## 2021-06-16 RX ADMIN — ATROPINE SULFATE 0.4 MG: 0.4 INJECTION, SOLUTION INTRAMUSCULAR; INTRAVENOUS; SUBCUTANEOUS at 11:33

## 2021-06-16 NOTE — PROGRESS NOTES
Arrived to the [facility]. Assessment completed and labs reviewed. Pre meds and Folfirinox infusion complete. Elastomeric pump attached. Patient tolerated well. Any issues or concerns during appointment: none. Patient aware of next infusion appointment on 06/18/2021 at 1530. Discharged ambulatory.

## 2021-06-16 NOTE — ADDENDUM NOTE
Encounter addended by: Seema Multani RP on: 6/16/2021 7:58 AM   Actions taken: i-Dorcas created or edited

## 2021-06-18 ENCOUNTER — HOSPITAL ENCOUNTER (OUTPATIENT)
Dept: INFUSION THERAPY | Age: 66
Discharge: HOME | End: 2021-06-18
Payer: MEDICARE

## 2021-06-18 VITALS
SYSTOLIC BLOOD PRESSURE: 145 MMHG | OXYGEN SATURATION: 98 % | RESPIRATION RATE: 16 BRPM | DIASTOLIC BLOOD PRESSURE: 94 MMHG | TEMPERATURE: 98.2 F | HEART RATE: 75 BPM

## 2021-06-18 DIAGNOSIS — E86.0 DEHYDRATION: Primary | ICD-10-CM

## 2021-06-18 DIAGNOSIS — D70.8 OTHER NEUTROPENIA (HCC): ICD-10-CM

## 2021-06-18 DIAGNOSIS — C25.1 MALIGNANT NEOPLASM OF BODY OF PANCREAS (HCC): ICD-10-CM

## 2021-06-18 DIAGNOSIS — C15.5 MALIGNANT NEOPLASM OF LOWER THIRD OF ESOPHAGUS (HCC): ICD-10-CM

## 2021-06-18 PROCEDURE — 96523 IRRIG DRUG DELIVERY DEVICE: CPT

## 2021-06-18 RX ORDER — SODIUM CHLORIDE 9 MG/ML
1000 INJECTION, SOLUTION INTRAVENOUS ONCE
Status: DISCONTINUED | OUTPATIENT
Start: 2021-06-18 | End: 2021-06-19 | Stop reason: HOSPADM

## 2021-06-18 RX ORDER — SODIUM CHLORIDE 0.9 % (FLUSH) 0.9 %
10 SYRINGE (ML) INJECTION AS NEEDED
Status: DISCONTINUED | OUTPATIENT
Start: 2021-06-18 | End: 2021-06-19 | Stop reason: HOSPADM

## 2021-06-18 RX ADMIN — Medication 10 ML: at 15:00

## 2021-06-19 ENCOUNTER — HOSPITAL ENCOUNTER (OUTPATIENT)
Dept: INFUSION THERAPY | Age: 66
Discharge: HOME | End: 2021-06-19
Payer: MEDICARE

## 2021-06-19 VITALS
SYSTOLIC BLOOD PRESSURE: 153 MMHG | HEART RATE: 83 BPM | TEMPERATURE: 98 F | RESPIRATION RATE: 18 BRPM | OXYGEN SATURATION: 98 % | DIASTOLIC BLOOD PRESSURE: 83 MMHG

## 2021-06-19 DIAGNOSIS — C15.5 MALIGNANT NEOPLASM OF LOWER THIRD OF ESOPHAGUS (HCC): ICD-10-CM

## 2021-06-19 DIAGNOSIS — D70.8 OTHER NEUTROPENIA (HCC): ICD-10-CM

## 2021-06-19 DIAGNOSIS — E86.0 DEHYDRATION: Primary | ICD-10-CM

## 2021-06-19 DIAGNOSIS — C25.1 MALIGNANT NEOPLASM OF BODY OF PANCREAS (HCC): ICD-10-CM

## 2021-06-19 PROCEDURE — 96372 THER/PROPH/DIAG INJ SC/IM: CPT

## 2021-06-19 PROCEDURE — 74011250636 HC RX REV CODE- 250/636: Performed by: INTERNAL MEDICINE

## 2021-06-19 RX ADMIN — PEGFILGRASTIM-CBQV 6 MG: 6 INJECTION, SOLUTION SUBCUTANEOUS at 16:33

## 2021-06-19 NOTE — PROGRESS NOTES
Arrived to the [facility]. Teagan completed. Patient tolerated well. Any issues or concerns during appointment: none. Patient aware of next infusion appointment on 6-30-21 (date) at 0830 (time). Discharged via ambulatory.

## 2021-06-29 ENCOUNTER — HOSPITAL ENCOUNTER (OUTPATIENT)
Dept: LAB | Age: 66
Discharge: HOME | End: 2021-06-29
Payer: MEDICARE

## 2021-06-29 DIAGNOSIS — C25.1 MALIGNANT NEOPLASM OF BODY OF PANCREAS (HCC): ICD-10-CM

## 2021-06-29 LAB
ALBUMIN SERPL-MCNC: 3.2 G/DL (ref 3.2–4.6)
ALBUMIN/GLOB SERPL: 0.9 {RATIO} (ref 1.2–3.5)
ALP SERPL-CCNC: 115 U/L (ref 50–136)
ALT SERPL-CCNC: 14 U/L (ref 12–65)
ANION GAP SERPL CALC-SCNC: 3 MMOL/L (ref 7–16)
AST SERPL-CCNC: 12 U/L (ref 15–37)
BASOPHILS # BLD: 0 K/UL (ref 0–0.2)
BASOPHILS NFR BLD: 0 % (ref 0–2)
BILIRUB SERPL-MCNC: 0.3 MG/DL (ref 0.2–1.1)
BUN SERPL-MCNC: 10 MG/DL (ref 8–23)
CALCIUM SERPL-MCNC: 9.1 MG/DL (ref 8.3–10.4)
CANCER AG19-9 SERPL-ACNC: 26.1 U/ML (ref 2–37)
CEA SERPL-MCNC: 7.6 NG/ML (ref 0–3)
CHLORIDE SERPL-SCNC: 108 MMOL/L (ref 98–107)
CO2 SERPL-SCNC: 30 MMOL/L (ref 21–32)
CREAT SERPL-MCNC: 0.7 MG/DL (ref 0.8–1.5)
DIFFERENTIAL METHOD BLD: ABNORMAL
EOSINOPHIL # BLD: 0.1 K/UL (ref 0–0.8)
EOSINOPHIL NFR BLD: 1 % (ref 0.5–7.8)
ERYTHROCYTE [DISTWIDTH] IN BLOOD BY AUTOMATED COUNT: 14.2 % (ref 11.9–14.6)
GLOBULIN SER CALC-MCNC: 3.7 G/DL (ref 2.3–3.5)
GLUCOSE SERPL-MCNC: 109 MG/DL (ref 65–100)
HCT VFR BLD AUTO: 36.4 %
HGB BLD-MCNC: 11.5 G/DL (ref 13.6–17.2)
IMM GRANULOCYTES # BLD AUTO: 0.1 K/UL (ref 0–0.5)
IMM GRANULOCYTES NFR BLD AUTO: 1 % (ref 0–5)
LYMPHOCYTES # BLD: 1 K/UL (ref 0.5–4.6)
LYMPHOCYTES NFR BLD: 12 % (ref 13–44)
MAGNESIUM SERPL-MCNC: 2.1 MG/DL (ref 1.8–2.4)
MCH RBC QN AUTO: 31.3 PG (ref 26.1–32.9)
MCHC RBC AUTO-ENTMCNC: 31.6 G/DL (ref 31.4–35)
MCV RBC AUTO: 98.9 FL (ref 79.6–97.8)
MONOCYTES # BLD: 1 K/UL (ref 0.1–1.3)
MONOCYTES NFR BLD: 12 % (ref 4–12)
NEUTS SEG # BLD: 6 K/UL (ref 1.7–8.2)
NEUTS SEG NFR BLD: 73 % (ref 43–78)
NRBC # BLD: 0 K/UL (ref 0–0.2)
PLATELET # BLD AUTO: 141 K/UL (ref 150–450)
PMV BLD AUTO: 9.4 FL (ref 9.4–12.3)
POTASSIUM SERPL-SCNC: 3.8 MMOL/L (ref 3.5–5.1)
PROT SERPL-MCNC: 6.9 G/DL (ref 6.3–8.2)
RBC # BLD AUTO: 3.68 M/UL (ref 4.23–5.6)
SODIUM SERPL-SCNC: 141 MMOL/L (ref 136–145)
WBC # BLD AUTO: 8.1 K/UL (ref 4.3–11.1)

## 2021-06-29 PROCEDURE — 86301 IMMUNOASSAY TUMOR CA 19-9: CPT

## 2021-06-29 PROCEDURE — 36415 COLL VENOUS BLD VENIPUNCTURE: CPT

## 2021-06-29 PROCEDURE — 82378 CARCINOEMBRYONIC ANTIGEN: CPT

## 2021-06-29 PROCEDURE — 83735 ASSAY OF MAGNESIUM: CPT

## 2021-06-29 PROCEDURE — 80053 COMPREHEN METABOLIC PANEL: CPT

## 2021-06-29 PROCEDURE — 85025 COMPLETE CBC W/AUTO DIFF WBC: CPT

## 2021-06-30 ENCOUNTER — HOSPITAL ENCOUNTER (OUTPATIENT)
Dept: INFUSION THERAPY | Age: 66
Discharge: HOME | End: 2021-06-30
Payer: MEDICARE

## 2021-06-30 VITALS
WEIGHT: 178.2 LBS | RESPIRATION RATE: 18 BRPM | DIASTOLIC BLOOD PRESSURE: 79 MMHG | HEART RATE: 76 BPM | TEMPERATURE: 97.5 F | OXYGEN SATURATION: 97 % | SYSTOLIC BLOOD PRESSURE: 150 MMHG | BODY MASS INDEX: 30.12 KG/M2

## 2021-06-30 DIAGNOSIS — C25.1 MALIGNANT NEOPLASM OF BODY OF PANCREAS (HCC): ICD-10-CM

## 2021-06-30 DIAGNOSIS — D70.8 OTHER NEUTROPENIA (HCC): ICD-10-CM

## 2021-06-30 DIAGNOSIS — C15.5 MALIGNANT NEOPLASM OF LOWER THIRD OF ESOPHAGUS (HCC): ICD-10-CM

## 2021-06-30 DIAGNOSIS — E86.0 DEHYDRATION: Primary | ICD-10-CM

## 2021-06-30 PROCEDURE — 96372 THER/PROPH/DIAG INJ SC/IM: CPT

## 2021-06-30 PROCEDURE — 74011000258 HC RX REV CODE- 258: Performed by: INTERNAL MEDICINE

## 2021-06-30 PROCEDURE — 96417 CHEMO IV INFUS EACH ADDL SEQ: CPT

## 2021-06-30 PROCEDURE — 74011250636 HC RX REV CODE- 250/636: Performed by: INTERNAL MEDICINE

## 2021-06-30 PROCEDURE — 96368 THER/DIAG CONCURRENT INF: CPT

## 2021-06-30 PROCEDURE — 96367 TX/PROPH/DG ADDL SEQ IV INF: CPT

## 2021-06-30 PROCEDURE — 96413 CHEMO IV INFUSION 1 HR: CPT

## 2021-06-30 PROCEDURE — 96415 CHEMO IV INFUSION ADDL HR: CPT

## 2021-06-30 PROCEDURE — 96411 CHEMO IV PUSH ADDL DRUG: CPT

## 2021-06-30 PROCEDURE — G0498 CHEMO EXTEND IV INFUS W/PUMP: HCPCS

## 2021-06-30 PROCEDURE — 96376 TX/PRO/DX INJ SAME DRUG ADON: CPT

## 2021-06-30 PROCEDURE — 96375 TX/PRO/DX INJ NEW DRUG ADDON: CPT

## 2021-06-30 RX ORDER — DEXTROSE MONOHYDRATE 50 MG/ML
25 INJECTION, SOLUTION INTRAVENOUS CONTINUOUS
Status: ACTIVE | OUTPATIENT
Start: 2021-06-30 | End: 2021-06-30

## 2021-06-30 RX ORDER — ATROPINE SULFATE 0.4 MG/ML
0.4 INJECTION, SOLUTION ENDOTRACHEAL; INTRAMEDULLARY; INTRAMUSCULAR; INTRAVENOUS; SUBCUTANEOUS ONCE
Status: COMPLETED | OUTPATIENT
Start: 2021-06-30 | End: 2021-06-30

## 2021-06-30 RX ORDER — ONDANSETRON 2 MG/ML
8 INJECTION INTRAMUSCULAR; INTRAVENOUS ONCE
Status: COMPLETED | OUTPATIENT
Start: 2021-06-30 | End: 2021-06-30

## 2021-06-30 RX ORDER — FLUOROURACIL 50 MG/ML
300 INJECTION, SOLUTION INTRAVENOUS ONCE
Status: COMPLETED | OUTPATIENT
Start: 2021-06-30 | End: 2021-06-30

## 2021-06-30 RX ORDER — SODIUM CHLORIDE 0.9 % (FLUSH) 0.9 %
10 SYRINGE (ML) INJECTION AS NEEDED
Status: ACTIVE | OUTPATIENT
Start: 2021-06-30 | End: 2021-06-30

## 2021-06-30 RX ADMIN — FOSAPREPITANT 150 MG: 150 INJECTION, POWDER, LYOPHILIZED, FOR SOLUTION INTRAVENOUS at 09:16

## 2021-06-30 RX ADMIN — Medication 10 ML: at 08:46

## 2021-06-30 RX ADMIN — DEXTROSE MONOHYDRATE 25 ML/HR: 5 INJECTION, SOLUTION INTRAVENOUS at 08:46

## 2021-06-30 RX ADMIN — OXALIPLATIN 127 MG: 5 INJECTION, SOLUTION INTRAVENOUS at 09:51

## 2021-06-30 RX ADMIN — LEUCOVORIN CALCIUM 780 MG: 350 INJECTION, POWDER, LYOPHILIZED, FOR SOLUTION INTRAMUSCULAR; INTRAVENOUS at 12:06

## 2021-06-30 RX ADMIN — ONDANSETRON 8 MG: 2 INJECTION INTRAMUSCULAR; INTRAVENOUS at 13:57

## 2021-06-30 RX ADMIN — ONDANSETRON 8 MG: 2 INJECTION INTRAMUSCULAR; INTRAVENOUS at 08:49

## 2021-06-30 RX ADMIN — ATROPINE SULFATE 0.4 MG: 0.4 INJECTION, SOLUTION INTRAMUSCULAR; INTRAVENOUS; SUBCUTANEOUS at 11:07

## 2021-06-30 RX ADMIN — FLUOROURACIL 3500 MG: 50 INJECTION, SOLUTION INTRAVENOUS at 14:38

## 2021-06-30 RX ADMIN — IRINOTECAN HYDROCHLORIDE 234 MG: 20 INJECTION, SOLUTION INTRAVENOUS at 12:06

## 2021-06-30 RX ADMIN — FLUOROURACIL 585 MG: 50 INJECTION, SOLUTION INTRAVENOUS at 14:29

## 2021-06-30 RX ADMIN — DEXAMETHASONE SODIUM PHOSPHATE 12 MG: 4 INJECTION, SOLUTION INTRAMUSCULAR; INTRAVENOUS at 08:54

## 2021-07-02 ENCOUNTER — HOSPITAL ENCOUNTER (OUTPATIENT)
Dept: INFUSION THERAPY | Age: 66
Discharge: HOME | End: 2021-07-02
Payer: MEDICARE

## 2021-07-02 VITALS
DIASTOLIC BLOOD PRESSURE: 87 MMHG | TEMPERATURE: 98 F | RESPIRATION RATE: 17 BRPM | SYSTOLIC BLOOD PRESSURE: 150 MMHG | HEART RATE: 75 BPM | OXYGEN SATURATION: 96 %

## 2021-07-02 DIAGNOSIS — E86.0 DEHYDRATION: Primary | ICD-10-CM

## 2021-07-02 DIAGNOSIS — D70.8 OTHER NEUTROPENIA (HCC): ICD-10-CM

## 2021-07-02 DIAGNOSIS — C15.5 MALIGNANT NEOPLASM OF LOWER THIRD OF ESOPHAGUS (HCC): ICD-10-CM

## 2021-07-02 DIAGNOSIS — C25.1 MALIGNANT NEOPLASM OF BODY OF PANCREAS (HCC): ICD-10-CM

## 2021-07-02 PROCEDURE — 96523 IRRIG DRUG DELIVERY DEVICE: CPT

## 2021-07-02 RX ORDER — SODIUM CHLORIDE 9 MG/ML
1000 INJECTION, SOLUTION INTRAVENOUS ONCE
Status: DISCONTINUED | OUTPATIENT
Start: 2021-07-02 | End: 2021-07-03 | Stop reason: HOSPADM

## 2021-07-02 RX ORDER — SODIUM CHLORIDE 0.9 % (FLUSH) 0.9 %
10 SYRINGE (ML) INJECTION AS NEEDED
Status: DISCONTINUED | OUTPATIENT
Start: 2021-07-02 | End: 2021-07-03 | Stop reason: HOSPADM

## 2021-07-02 RX ADMIN — Medication 10 ML: at 13:48

## 2021-07-02 NOTE — PROGRESS NOTES
Arrived to the [facility]. Pump d/c'd completed. Patient tolerated well. Any issues or concerns during appointment: none. Patient aware of next infusion appointment on 07/03/2021 (date) at 1500 (time). Discharged ambulatory.

## 2021-07-13 ENCOUNTER — PATIENT OUTREACH (OUTPATIENT)
Dept: CASE MANAGEMENT | Age: 66
End: 2021-07-13

## 2021-07-13 ENCOUNTER — HOSPITAL ENCOUNTER (OUTPATIENT)
Dept: LAB | Age: 66
Discharge: HOME | End: 2021-07-13
Payer: MEDICARE

## 2021-07-13 DIAGNOSIS — C15.5 MALIGNANT NEOPLASM OF LOWER THIRD OF ESOPHAGUS (HCC): ICD-10-CM

## 2021-07-13 LAB
ALBUMIN SERPL-MCNC: 3.2 G/DL (ref 3.2–4.6)
ALBUMIN/GLOB SERPL: 1 {RATIO} (ref 1.2–3.5)
ALP SERPL-CCNC: 75 U/L (ref 50–136)
ALT SERPL-CCNC: 13 U/L (ref 12–65)
ANION GAP SERPL CALC-SCNC: 5 MMOL/L (ref 7–16)
AST SERPL-CCNC: 11 U/L (ref 15–37)
BASOPHILS # BLD: 0 K/UL (ref 0–0.2)
BASOPHILS NFR BLD: 1 % (ref 0–2)
BILIRUB SERPL-MCNC: 0.2 MG/DL (ref 0.2–1.1)
BUN SERPL-MCNC: 8 MG/DL (ref 8–23)
CALCIUM SERPL-MCNC: 8.9 MG/DL (ref 8.3–10.4)
CANCER AG19-9 SERPL-ACNC: 23.7 U/ML (ref 2–37)
CEA SERPL-MCNC: 8.3 NG/ML (ref 0–3)
CHLORIDE SERPL-SCNC: 109 MMOL/L (ref 98–107)
CO2 SERPL-SCNC: 28 MMOL/L (ref 21–32)
CREAT SERPL-MCNC: 0.7 MG/DL (ref 0.8–1.5)
DIFFERENTIAL METHOD BLD: ABNORMAL
EOSINOPHIL # BLD: 0.2 K/UL (ref 0–0.8)
EOSINOPHIL NFR BLD: 5 % (ref 0.5–7.8)
ERYTHROCYTE [DISTWIDTH] IN BLOOD BY AUTOMATED COUNT: 14.2 % (ref 11.9–14.6)
GLOBULIN SER CALC-MCNC: 3.1 G/DL (ref 2.3–3.5)
GLUCOSE SERPL-MCNC: 108 MG/DL (ref 65–100)
HCT VFR BLD AUTO: 34.7 %
HGB BLD-MCNC: 10.9 G/DL (ref 13.6–17.2)
IMM GRANULOCYTES # BLD AUTO: 0 K/UL (ref 0–0.5)
IMM GRANULOCYTES NFR BLD AUTO: 1 % (ref 0–5)
LYMPHOCYTES # BLD: 0.9 K/UL (ref 0.5–4.6)
LYMPHOCYTES NFR BLD: 28 % (ref 13–44)
MAGNESIUM SERPL-MCNC: 1.9 MG/DL (ref 1.8–2.4)
MCH RBC QN AUTO: 30.9 PG (ref 26.1–32.9)
MCHC RBC AUTO-ENTMCNC: 31.4 G/DL (ref 31.4–35)
MCV RBC AUTO: 98.3 FL (ref 79.6–97.8)
MONOCYTES # BLD: 0.6 K/UL (ref 0.1–1.3)
MONOCYTES NFR BLD: 20 % (ref 4–12)
NEUTS SEG # BLD: 1.5 K/UL (ref 1.7–8.2)
NEUTS SEG NFR BLD: 47 % (ref 43–78)
NRBC # BLD: 0 K/UL (ref 0–0.2)
PLATELET # BLD AUTO: 102 K/UL (ref 150–450)
PMV BLD AUTO: 9.2 FL (ref 9.4–12.3)
POTASSIUM SERPL-SCNC: 3.4 MMOL/L (ref 3.5–5.1)
PROT SERPL-MCNC: 6.3 G/DL (ref 6.3–8.2)
RBC # BLD AUTO: 3.53 M/UL (ref 4.23–5.6)
SODIUM SERPL-SCNC: 142 MMOL/L (ref 136–145)
WBC # BLD AUTO: 3.2 K/UL (ref 4.3–11.1)

## 2021-07-13 PROCEDURE — 86301 IMMUNOASSAY TUMOR CA 19-9: CPT

## 2021-07-13 PROCEDURE — 36415 COLL VENOUS BLD VENIPUNCTURE: CPT

## 2021-07-13 PROCEDURE — 83735 ASSAY OF MAGNESIUM: CPT

## 2021-07-13 PROCEDURE — 80053 COMPREHEN METABOLIC PANEL: CPT

## 2021-07-13 PROCEDURE — 82378 CARCINOEMBRYONIC ANTIGEN: CPT

## 2021-07-13 PROCEDURE — 85025 COMPLETE CBC W/AUTO DIFF WBC: CPT

## 2021-07-13 NOTE — PROGRESS NOTES
7/13/21 saw pt today with Dr. Duglas Adams for pre chemo cycle 10 FOLFIRINOX. He is tolerating chemo well. PO intake is good. Denies any issues. Infusion tomorrow. Follow up in 2 weeks. Encouraged to call with any concerns. Navigation will continue to follow.

## 2021-07-14 ENCOUNTER — HOSPITAL ENCOUNTER (OUTPATIENT)
Dept: INFUSION THERAPY | Age: 66
Discharge: HOME | End: 2021-07-14
Payer: MEDICARE

## 2021-07-14 VITALS
DIASTOLIC BLOOD PRESSURE: 76 MMHG | WEIGHT: 181 LBS | HEART RATE: 69 BPM | OXYGEN SATURATION: 97 % | RESPIRATION RATE: 16 BRPM | TEMPERATURE: 98.1 F | SYSTOLIC BLOOD PRESSURE: 143 MMHG | BODY MASS INDEX: 31.07 KG/M2

## 2021-07-14 DIAGNOSIS — C25.1 MALIGNANT NEOPLASM OF BODY OF PANCREAS (HCC): ICD-10-CM

## 2021-07-14 DIAGNOSIS — D70.8 OTHER NEUTROPENIA (HCC): ICD-10-CM

## 2021-07-14 DIAGNOSIS — E86.0 DEHYDRATION: Primary | ICD-10-CM

## 2021-07-14 DIAGNOSIS — C15.5 MALIGNANT NEOPLASM OF LOWER THIRD OF ESOPHAGUS (HCC): ICD-10-CM

## 2021-07-14 PROCEDURE — 74011250636 HC RX REV CODE- 250/636: Performed by: INTERNAL MEDICINE

## 2021-07-14 PROCEDURE — 74011000258 HC RX REV CODE- 258: Performed by: INTERNAL MEDICINE

## 2021-07-14 PROCEDURE — 96368 THER/DIAG CONCURRENT INF: CPT

## 2021-07-14 PROCEDURE — 96367 TX/PROPH/DG ADDL SEQ IV INF: CPT

## 2021-07-14 PROCEDURE — 96415 CHEMO IV INFUSION ADDL HR: CPT

## 2021-07-14 PROCEDURE — 96375 TX/PRO/DX INJ NEW DRUG ADDON: CPT

## 2021-07-14 PROCEDURE — G0498 CHEMO EXTEND IV INFUS W/PUMP: HCPCS

## 2021-07-14 PROCEDURE — 96417 CHEMO IV INFUS EACH ADDL SEQ: CPT

## 2021-07-14 PROCEDURE — 96372 THER/PROPH/DIAG INJ SC/IM: CPT

## 2021-07-14 PROCEDURE — 96411 CHEMO IV PUSH ADDL DRUG: CPT

## 2021-07-14 PROCEDURE — 96413 CHEMO IV INFUSION 1 HR: CPT

## 2021-07-14 RX ORDER — ATROPINE SULFATE 0.4 MG/ML
0.4 INJECTION, SOLUTION ENDOTRACHEAL; INTRAMEDULLARY; INTRAMUSCULAR; INTRAVENOUS; SUBCUTANEOUS ONCE
Status: COMPLETED | OUTPATIENT
Start: 2021-07-14 | End: 2021-07-14

## 2021-07-14 RX ORDER — SODIUM CHLORIDE 0.9 % (FLUSH) 0.9 %
10 SYRINGE (ML) INJECTION AS NEEDED
Status: ACTIVE | OUTPATIENT
Start: 2021-07-14 | End: 2021-07-14

## 2021-07-14 RX ORDER — FLUOROURACIL 50 MG/ML
300 INJECTION, SOLUTION INTRAVENOUS ONCE
Status: COMPLETED | OUTPATIENT
Start: 2021-07-14 | End: 2021-07-14

## 2021-07-14 RX ORDER — ONDANSETRON 2 MG/ML
8 INJECTION INTRAMUSCULAR; INTRAVENOUS ONCE
Status: COMPLETED | OUTPATIENT
Start: 2021-07-14 | End: 2021-07-14

## 2021-07-14 RX ORDER — DEXTROSE MONOHYDRATE 50 MG/ML
25 INJECTION, SOLUTION INTRAVENOUS CONTINUOUS
Status: ACTIVE | OUTPATIENT
Start: 2021-07-14 | End: 2021-07-14

## 2021-07-14 RX ADMIN — ONDANSETRON 8 MG: 2 INJECTION INTRAMUSCULAR; INTRAVENOUS at 08:40

## 2021-07-14 RX ADMIN — ATROPINE SULFATE 0.4 MG: 0.4 INJECTION, SOLUTION INTRAMUSCULAR; INTRAVENOUS; SUBCUTANEOUS at 08:42

## 2021-07-14 RX ADMIN — FLUOROURACIL 585 MG: 50 INJECTION, SOLUTION INTRAVENOUS at 13:13

## 2021-07-14 RX ADMIN — Medication 10 ML: at 08:36

## 2021-07-14 RX ADMIN — OXALIPLATIN 127 MG: 5 INJECTION, SOLUTION INTRAVENOUS at 09:25

## 2021-07-14 RX ADMIN — IRINOTECAN HYDROCHLORIDE 234 MG: 20 INJECTION, SOLUTION INTRAVENOUS at 11:35

## 2021-07-14 RX ADMIN — FOSAPREPITANT 150 MG: 150 INJECTION, POWDER, LYOPHILIZED, FOR SOLUTION INTRAVENOUS at 09:02

## 2021-07-14 RX ADMIN — DEXAMETHASONE SODIUM PHOSPHATE 12 MG: 4 INJECTION, SOLUTION INTRAMUSCULAR; INTRAVENOUS at 08:46

## 2021-07-14 RX ADMIN — FLUOROURACIL 3500 MG: 50 INJECTION, SOLUTION INTRAVENOUS at 13:18

## 2021-07-14 RX ADMIN — DEXTROSE MONOHYDRATE 25 ML/HR: 5 INJECTION, SOLUTION INTRAVENOUS at 08:36

## 2021-07-14 RX ADMIN — LEUCOVORIN CALCIUM 780 MG: 350 INJECTION, POWDER, LYOPHILIZED, FOR SOLUTION INTRAMUSCULAR; INTRAVENOUS at 11:35

## 2021-07-14 NOTE — PROGRESS NOTES
Patient ambulatory to infusion area. Here for C10D1 of Folfirinox. Port accessed using sterile technique with positive blood return. Patient tolerated treatment well. Blood return verified before, during, and at end of 5FU push. Patient educated to refrain from eating/drinking anything cold. Patient discharged with 5FU pump. He requested to not have line taped/secured to skin. Tubing unclamped upon discharge and verified by 2nd RN. Patient aware of next appt date/time.

## 2021-07-16 ENCOUNTER — HOSPITAL ENCOUNTER (OUTPATIENT)
Dept: INFUSION THERAPY | Age: 66
Discharge: HOME | End: 2021-07-16
Payer: MEDICARE

## 2021-07-16 VITALS
RESPIRATION RATE: 16 BRPM | HEART RATE: 81 BPM | DIASTOLIC BLOOD PRESSURE: 79 MMHG | OXYGEN SATURATION: 97 % | SYSTOLIC BLOOD PRESSURE: 140 MMHG | TEMPERATURE: 97.4 F

## 2021-07-16 DIAGNOSIS — D70.8 OTHER NEUTROPENIA (HCC): ICD-10-CM

## 2021-07-16 DIAGNOSIS — E86.0 DEHYDRATION: Primary | ICD-10-CM

## 2021-07-16 DIAGNOSIS — C25.1 MALIGNANT NEOPLASM OF BODY OF PANCREAS (HCC): ICD-10-CM

## 2021-07-16 DIAGNOSIS — C15.5 MALIGNANT NEOPLASM OF LOWER THIRD OF ESOPHAGUS (HCC): ICD-10-CM

## 2021-07-16 PROCEDURE — 74011250636 HC RX REV CODE- 250/636: Performed by: INTERNAL MEDICINE

## 2021-07-16 PROCEDURE — 96360 HYDRATION IV INFUSION INIT: CPT

## 2021-07-16 RX ORDER — SODIUM CHLORIDE 0.9 % (FLUSH) 0.9 %
10 SYRINGE (ML) INJECTION AS NEEDED
Status: DISCONTINUED | OUTPATIENT
Start: 2021-07-16 | End: 2021-07-17 | Stop reason: HOSPADM

## 2021-07-16 RX ORDER — SODIUM CHLORIDE 9 MG/ML
1000 INJECTION, SOLUTION INTRAVENOUS ONCE
Status: COMPLETED | OUTPATIENT
Start: 2021-07-16 | End: 2021-07-16

## 2021-07-16 RX ADMIN — Medication 10 ML: at 17:02

## 2021-07-16 RX ADMIN — SODIUM CHLORIDE 1000 ML: 9 INJECTION, SOLUTION INTRAVENOUS at 15:51

## 2021-07-16 RX ADMIN — Medication 10 ML: at 15:50

## 2021-07-27 ENCOUNTER — HOSPITAL ENCOUNTER (OUTPATIENT)
Dept: LAB | Age: 66
Discharge: HOME | End: 2021-07-27
Payer: MEDICARE

## 2021-07-27 DIAGNOSIS — C25.1 MALIGNANT NEOPLASM OF BODY OF PANCREAS (HCC): ICD-10-CM

## 2021-07-27 LAB
ALBUMIN SERPL-MCNC: 3.4 G/DL (ref 3.2–4.6)
ALBUMIN/GLOB SERPL: 0.9 {RATIO} (ref 1.2–3.5)
ALP SERPL-CCNC: 72 U/L (ref 50–136)
ALT SERPL-CCNC: 18 U/L (ref 12–65)
ANION GAP SERPL CALC-SCNC: 5 MMOL/L (ref 7–16)
AST SERPL-CCNC: 11 U/L (ref 15–37)
BASOPHILS # BLD: 0 K/UL (ref 0–0.2)
BASOPHILS NFR BLD: 1 % (ref 0–2)
BILIRUB SERPL-MCNC: 0.4 MG/DL (ref 0.2–1.1)
BUN SERPL-MCNC: 9 MG/DL (ref 8–23)
CALCIUM SERPL-MCNC: 9.2 MG/DL (ref 8.3–10.4)
CHLORIDE SERPL-SCNC: 108 MMOL/L (ref 98–107)
CO2 SERPL-SCNC: 27 MMOL/L (ref 21–32)
CREAT SERPL-MCNC: 0.6 MG/DL (ref 0.8–1.5)
DIFFERENTIAL METHOD BLD: ABNORMAL
EOSINOPHIL # BLD: 0.2 K/UL (ref 0–0.8)
EOSINOPHIL NFR BLD: 3 % (ref 0.5–7.8)
ERYTHROCYTE [DISTWIDTH] IN BLOOD BY AUTOMATED COUNT: 14.1 % (ref 11.9–14.6)
GLOBULIN SER CALC-MCNC: 3.6 G/DL (ref 2.3–3.5)
GLUCOSE SERPL-MCNC: 116 MG/DL (ref 65–100)
HCT VFR BLD AUTO: 37.8 %
HGB BLD-MCNC: 12.2 G/DL (ref 13.6–17.2)
IMM GRANULOCYTES # BLD AUTO: 0 K/UL (ref 0–0.5)
IMM GRANULOCYTES NFR BLD AUTO: 0 % (ref 0–5)
LYMPHOCYTES # BLD: 0.9 K/UL (ref 0.5–4.6)
LYMPHOCYTES NFR BLD: 17 % (ref 13–44)
MAGNESIUM SERPL-MCNC: 2.2 MG/DL (ref 1.8–2.4)
MCH RBC QN AUTO: 30.7 PG (ref 26.1–32.9)
MCHC RBC AUTO-ENTMCNC: 32.3 G/DL (ref 31.4–35)
MCV RBC AUTO: 95 FL (ref 79.6–97.8)
MONOCYTES # BLD: 0.9 K/UL (ref 0.1–1.3)
MONOCYTES NFR BLD: 16 % (ref 4–12)
NEUTS SEG # BLD: 3.4 K/UL (ref 1.7–8.2)
NEUTS SEG NFR BLD: 63 % (ref 43–78)
NRBC # BLD: 0 K/UL (ref 0–0.2)
PLATELET # BLD AUTO: 126 K/UL (ref 150–450)
PMV BLD AUTO: 9.1 FL (ref 9.4–12.3)
POTASSIUM SERPL-SCNC: 3.8 MMOL/L (ref 3.5–5.1)
PROT SERPL-MCNC: 7 G/DL (ref 6.3–8.2)
RBC # BLD AUTO: 3.98 M/UL (ref 4.23–5.6)
SODIUM SERPL-SCNC: 140 MMOL/L (ref 136–145)
WBC # BLD AUTO: 5.4 K/UL (ref 4.3–11.1)

## 2021-07-27 PROCEDURE — 85025 COMPLETE CBC W/AUTO DIFF WBC: CPT

## 2021-07-27 PROCEDURE — 80053 COMPREHEN METABOLIC PANEL: CPT

## 2021-07-27 PROCEDURE — 36415 COLL VENOUS BLD VENIPUNCTURE: CPT

## 2021-07-27 PROCEDURE — 83735 ASSAY OF MAGNESIUM: CPT

## 2021-07-28 ENCOUNTER — HOSPITAL ENCOUNTER (OUTPATIENT)
Dept: INFUSION THERAPY | Age: 66
Discharge: HOME | End: 2021-07-28
Payer: MEDICARE

## 2021-07-28 VITALS
TEMPERATURE: 97.5 F | SYSTOLIC BLOOD PRESSURE: 172 MMHG | WEIGHT: 181 LBS | RESPIRATION RATE: 16 BRPM | HEART RATE: 67 BPM | BODY MASS INDEX: 29.09 KG/M2 | HEIGHT: 66 IN | DIASTOLIC BLOOD PRESSURE: 94 MMHG | OXYGEN SATURATION: 98 %

## 2021-07-28 DIAGNOSIS — C25.1 MALIGNANT NEOPLASM OF BODY OF PANCREAS (HCC): ICD-10-CM

## 2021-07-28 DIAGNOSIS — C15.5 MALIGNANT NEOPLASM OF LOWER THIRD OF ESOPHAGUS (HCC): Primary | ICD-10-CM

## 2021-07-28 PROCEDURE — 96413 CHEMO IV INFUSION 1 HR: CPT

## 2021-07-28 PROCEDURE — 96375 TX/PRO/DX INJ NEW DRUG ADDON: CPT

## 2021-07-28 PROCEDURE — 96372 THER/PROPH/DIAG INJ SC/IM: CPT

## 2021-07-28 PROCEDURE — 96411 CHEMO IV PUSH ADDL DRUG: CPT

## 2021-07-28 PROCEDURE — 96415 CHEMO IV INFUSION ADDL HR: CPT

## 2021-07-28 PROCEDURE — 96368 THER/DIAG CONCURRENT INF: CPT

## 2021-07-28 PROCEDURE — 96417 CHEMO IV INFUS EACH ADDL SEQ: CPT

## 2021-07-28 PROCEDURE — 96367 TX/PROPH/DG ADDL SEQ IV INF: CPT

## 2021-07-28 PROCEDURE — 74011000258 HC RX REV CODE- 258: Performed by: NURSE PRACTITIONER

## 2021-07-28 PROCEDURE — 74011250636 HC RX REV CODE- 250/636: Performed by: NURSE PRACTITIONER

## 2021-07-28 PROCEDURE — G0498 CHEMO EXTEND IV INFUS W/PUMP: HCPCS

## 2021-07-28 RX ORDER — SODIUM CHLORIDE 0.9 % (FLUSH) 0.9 %
10 SYRINGE (ML) INJECTION AS NEEDED
Status: ACTIVE | OUTPATIENT
Start: 2021-07-28 | End: 2021-07-28

## 2021-07-28 RX ORDER — FLUOROURACIL 50 MG/ML
300 INJECTION, SOLUTION INTRAVENOUS ONCE
Status: COMPLETED | OUTPATIENT
Start: 2021-07-28 | End: 2021-07-28

## 2021-07-28 RX ORDER — DEXTROSE MONOHYDRATE 50 MG/ML
25 INJECTION, SOLUTION INTRAVENOUS CONTINUOUS
Status: ACTIVE | OUTPATIENT
Start: 2021-07-28 | End: 2021-07-28

## 2021-07-28 RX ORDER — ATROPINE SULFATE 0.4 MG/ML
0.4 INJECTION, SOLUTION ENDOTRACHEAL; INTRAMEDULLARY; INTRAMUSCULAR; INTRAVENOUS; SUBCUTANEOUS ONCE
Status: COMPLETED | OUTPATIENT
Start: 2021-07-28 | End: 2021-07-28

## 2021-07-28 RX ORDER — ONDANSETRON 2 MG/ML
8 INJECTION INTRAMUSCULAR; INTRAVENOUS ONCE
Status: COMPLETED | OUTPATIENT
Start: 2021-07-28 | End: 2021-07-28

## 2021-07-28 RX ADMIN — FLUOROURACIL 585 MG: 50 INJECTION, SOLUTION INTRAVENOUS at 13:11

## 2021-07-28 RX ADMIN — FLUOROURACIL 3500 MG: 50 INJECTION, SOLUTION INTRAVENOUS at 13:14

## 2021-07-28 RX ADMIN — LEUCOVORIN CALCIUM 780 MG: 350 INJECTION, POWDER, LYOPHILIZED, FOR SOLUTION INTRAMUSCULAR; INTRAVENOUS at 11:35

## 2021-07-28 RX ADMIN — DEXAMETHASONE SODIUM PHOSPHATE 12 MG: 4 INJECTION, SOLUTION INTRAMUSCULAR; INTRAVENOUS at 08:53

## 2021-07-28 RX ADMIN — FOSAPREPITANT 150 MG: 150 INJECTION, POWDER, LYOPHILIZED, FOR SOLUTION INTRAVENOUS at 09:10

## 2021-07-28 RX ADMIN — DEXTROSE MONOHYDRATE 25 ML/HR: 5 INJECTION, SOLUTION INTRAVENOUS at 08:50

## 2021-07-28 RX ADMIN — ONDANSETRON 8 MG: 2 INJECTION INTRAMUSCULAR; INTRAVENOUS at 08:52

## 2021-07-28 RX ADMIN — Medication 10 ML: at 08:50

## 2021-07-28 RX ADMIN — OXALIPLATIN 127 MG: 5 INJECTION, SOLUTION INTRAVENOUS at 09:33

## 2021-07-28 RX ADMIN — ATROPINE SULFATE 0.4 MG: 0.4 INJECTION, SOLUTION INTRAMUSCULAR; INTRAVENOUS; SUBCUTANEOUS at 10:06

## 2021-07-28 RX ADMIN — IRINOTECAN HYDROCHLORIDE 234 MG: 20 INJECTION, SOLUTION INTRAVENOUS at 11:35

## 2021-07-28 NOTE — PROGRESS NOTES
Arrived to the [facility]. FOLFIRINOX completed. Patient tolerated well. Any issues or concerns during appointment: none. Patient aware of next infusion appointment on 7/30 (date) at 2:00 PM (time). Discharged ambulatory with Adrucil pump infusing.

## 2021-07-30 ENCOUNTER — HOSPITAL ENCOUNTER (OUTPATIENT)
Dept: INFUSION THERAPY | Age: 66
Discharge: HOME | End: 2021-07-30
Payer: MEDICARE

## 2021-07-30 VITALS
HEART RATE: 78 BPM | OXYGEN SATURATION: 98 % | TEMPERATURE: 97.5 F | DIASTOLIC BLOOD PRESSURE: 87 MMHG | SYSTOLIC BLOOD PRESSURE: 143 MMHG | RESPIRATION RATE: 16 BRPM

## 2021-07-30 DIAGNOSIS — C15.5 MALIGNANT NEOPLASM OF LOWER THIRD OF ESOPHAGUS (HCC): Primary | ICD-10-CM

## 2021-07-30 DIAGNOSIS — C25.1 MALIGNANT NEOPLASM OF BODY OF PANCREAS (HCC): ICD-10-CM

## 2021-07-30 PROCEDURE — 96360 HYDRATION IV INFUSION INIT: CPT

## 2021-07-30 PROCEDURE — 74011250636 HC RX REV CODE- 250/636: Performed by: NURSE PRACTITIONER

## 2021-07-30 RX ORDER — SODIUM CHLORIDE 9 MG/ML
1000 INJECTION, SOLUTION INTRAVENOUS ONCE
Status: COMPLETED | OUTPATIENT
Start: 2021-07-30 | End: 2021-07-30

## 2021-07-30 RX ORDER — SODIUM CHLORIDE 0.9 % (FLUSH) 0.9 %
10 SYRINGE (ML) INJECTION AS NEEDED
Status: DISCONTINUED | OUTPATIENT
Start: 2021-07-30 | End: 2021-07-31 | Stop reason: HOSPADM

## 2021-07-30 RX ADMIN — Medication 10 ML: at 13:18

## 2021-07-30 RX ADMIN — Medication 10 ML: at 14:18

## 2021-07-30 RX ADMIN — SODIUM CHLORIDE 1000 ML: 900 INJECTION, SOLUTION INTRAVENOUS at 13:20

## 2021-07-30 NOTE — PROGRESS NOTES
Arrived to the [facility]. 5FU pump disconnect completed and tolerated well. 1L IVF given. Any issues or concerns during appointment: none  Patient given education on process  Instructed patient to notify provider for any issues or worrisome symptoms. They verbalized understanding. Patient aware of next infusion appointment scheduled for tomorrow at 3pm   Discharged ambulatory with self.

## 2021-07-31 ENCOUNTER — HOSPITAL ENCOUNTER (OUTPATIENT)
Dept: INFUSION THERAPY | Age: 66
Discharge: HOME | End: 2021-07-31
Payer: MEDICARE

## 2021-07-31 VITALS
TEMPERATURE: 98 F | RESPIRATION RATE: 18 BRPM | SYSTOLIC BLOOD PRESSURE: 141 MMHG | HEART RATE: 72 BPM | DIASTOLIC BLOOD PRESSURE: 94 MMHG | OXYGEN SATURATION: 99 %

## 2021-07-31 DIAGNOSIS — C25.1 MALIGNANT NEOPLASM OF BODY OF PANCREAS (HCC): ICD-10-CM

## 2021-07-31 DIAGNOSIS — C15.5 MALIGNANT NEOPLASM OF LOWER THIRD OF ESOPHAGUS (HCC): Primary | ICD-10-CM

## 2021-07-31 PROCEDURE — 74011250636 HC RX REV CODE- 250/636: Performed by: NURSE PRACTITIONER

## 2021-07-31 PROCEDURE — 96372 THER/PROPH/DIAG INJ SC/IM: CPT

## 2021-07-31 RX ADMIN — PEGFILGRASTIM-CBQV 6 MG: 6 INJECTION, SOLUTION SUBCUTANEOUS at 15:04

## 2021-07-31 NOTE — PROGRESS NOTES
Patient arrived at [facility] to receive Granix shot. Assessment completed. Patient is aware of next appointment on 8/10/2021 @1230 for labs @1300 for OV. Patient discharged ambulatory.

## 2021-08-10 ENCOUNTER — PATIENT OUTREACH (OUTPATIENT)
Dept: CASE MANAGEMENT | Age: 66
End: 2021-08-10

## 2021-08-10 ENCOUNTER — HOSPITAL ENCOUNTER (OUTPATIENT)
Dept: LAB | Age: 66
Discharge: HOME | End: 2021-08-10
Payer: MEDICARE

## 2021-08-10 DIAGNOSIS — C25.1 MALIGNANT NEOPLASM OF BODY OF PANCREAS (HCC): ICD-10-CM

## 2021-08-10 LAB
ALBUMIN SERPL-MCNC: 3.8 G/DL (ref 3.2–4.6)
ALBUMIN/GLOB SERPL: 1.2 {RATIO} (ref 1.2–3.5)
ALP SERPL-CCNC: 94 U/L (ref 50–136)
ALT SERPL-CCNC: 18 U/L (ref 12–65)
ANION GAP SERPL CALC-SCNC: 5 MMOL/L (ref 7–16)
AST SERPL-CCNC: 16 U/L (ref 15–37)
BASOPHILS # BLD: 0 K/UL (ref 0–0.2)
BASOPHILS NFR BLD: 0 % (ref 0–2)
BILIRUB SERPL-MCNC: 0.4 MG/DL (ref 0.2–1.1)
BUN SERPL-MCNC: 12 MG/DL (ref 8–23)
CALCIUM SERPL-MCNC: 9.2 MG/DL (ref 8.3–10.4)
CEA SERPL-MCNC: 11.6 NG/ML (ref 0–3)
CHLORIDE SERPL-SCNC: 108 MMOL/L (ref 98–107)
CO2 SERPL-SCNC: 28 MMOL/L (ref 21–32)
CREAT SERPL-MCNC: 0.9 MG/DL (ref 0.8–1.5)
DIFFERENTIAL METHOD BLD: ABNORMAL
EOSINOPHIL # BLD: 0.2 K/UL (ref 0–0.8)
EOSINOPHIL NFR BLD: 3 % (ref 0.5–7.8)
ERYTHROCYTE [DISTWIDTH] IN BLOOD BY AUTOMATED COUNT: 15 % (ref 11.9–14.6)
GLOBULIN SER CALC-MCNC: 3.2 G/DL (ref 2.3–3.5)
GLUCOSE SERPL-MCNC: 102 MG/DL (ref 65–100)
HCT VFR BLD AUTO: 37.7 %
HGB BLD-MCNC: 11.7 G/DL (ref 13.6–17.2)
IMM GRANULOCYTES # BLD AUTO: 0.1 K/UL (ref 0–0.5)
IMM GRANULOCYTES NFR BLD AUTO: 1 % (ref 0–5)
LYMPHOCYTES # BLD: 1.1 K/UL (ref 0.5–4.6)
LYMPHOCYTES NFR BLD: 14 % (ref 13–44)
MAGNESIUM SERPL-MCNC: 1.9 MG/DL (ref 1.8–2.4)
MCH RBC QN AUTO: 30.4 PG (ref 26.1–32.9)
MCHC RBC AUTO-ENTMCNC: 31 G/DL (ref 31.4–35)
MCV RBC AUTO: 97.9 FL (ref 79.6–97.8)
MONOCYTES # BLD: 1 K/UL (ref 0.1–1.3)
MONOCYTES NFR BLD: 13 % (ref 4–12)
NEUTS SEG # BLD: 5.3 K/UL (ref 1.7–8.2)
NEUTS SEG NFR BLD: 70 % (ref 43–78)
NRBC # BLD: 0 K/UL (ref 0–0.2)
PLATELET # BLD AUTO: 122 K/UL (ref 150–450)
PMV BLD AUTO: 9.1 FL (ref 9.4–12.3)
POTASSIUM SERPL-SCNC: 3.8 MMOL/L (ref 3.5–5.1)
PROT SERPL-MCNC: 7 G/DL (ref 6.3–8.2)
RBC # BLD AUTO: 3.85 M/UL (ref 4.23–5.6)
SODIUM SERPL-SCNC: 141 MMOL/L (ref 136–145)
WBC # BLD AUTO: 7.7 K/UL (ref 4.3–11.1)

## 2021-08-10 PROCEDURE — 85025 COMPLETE CBC W/AUTO DIFF WBC: CPT

## 2021-08-10 PROCEDURE — 36415 COLL VENOUS BLD VENIPUNCTURE: CPT

## 2021-08-10 PROCEDURE — 80053 COMPREHEN METABOLIC PANEL: CPT

## 2021-08-10 PROCEDURE — 83735 ASSAY OF MAGNESIUM: CPT

## 2021-08-10 PROCEDURE — 82378 CARCINOEMBRYONIC ANTIGEN: CPT

## 2021-08-10 NOTE — PROGRESS NOTES
8/10/21 saw pt today with Dr. Rhonda Jorge for pre chemo cycle 12 FOLFIRINOX. He is tolerating chemo well. PO intake is good. He is reporting some shooting shocks down his legs when he bends head forward. Infusion tomorrow. Restaging CT prior to follow up in 2 weeks. Encouraged to call with any concerns. Navigation will continue to follow.

## 2021-08-11 ENCOUNTER — HOSPITAL ENCOUNTER (OUTPATIENT)
Dept: INFUSION THERAPY | Age: 66
Discharge: HOME | End: 2021-08-11
Payer: MEDICARE

## 2021-08-11 VITALS
DIASTOLIC BLOOD PRESSURE: 93 MMHG | RESPIRATION RATE: 18 BRPM | BODY MASS INDEX: 32.24 KG/M2 | SYSTOLIC BLOOD PRESSURE: 173 MMHG | TEMPERATURE: 98.1 F | WEIGHT: 187.8 LBS | HEART RATE: 79 BPM | OXYGEN SATURATION: 98 %

## 2021-08-11 DIAGNOSIS — C15.5 MALIGNANT NEOPLASM OF LOWER THIRD OF ESOPHAGUS (HCC): ICD-10-CM

## 2021-08-11 DIAGNOSIS — D70.8 OTHER NEUTROPENIA (HCC): ICD-10-CM

## 2021-08-11 DIAGNOSIS — C25.1 MALIGNANT NEOPLASM OF BODY OF PANCREAS (HCC): ICD-10-CM

## 2021-08-11 DIAGNOSIS — E86.0 DEHYDRATION: Primary | ICD-10-CM

## 2021-08-11 PROCEDURE — 74011000258 HC RX REV CODE- 258: Performed by: INTERNAL MEDICINE

## 2021-08-11 PROCEDURE — 96413 CHEMO IV INFUSION 1 HR: CPT

## 2021-08-11 PROCEDURE — 96375 TX/PRO/DX INJ NEW DRUG ADDON: CPT

## 2021-08-11 PROCEDURE — 96415 CHEMO IV INFUSION ADDL HR: CPT

## 2021-08-11 PROCEDURE — 96372 THER/PROPH/DIAG INJ SC/IM: CPT

## 2021-08-11 PROCEDURE — 74011250636 HC RX REV CODE- 250/636: Performed by: INTERNAL MEDICINE

## 2021-08-11 PROCEDURE — 96411 CHEMO IV PUSH ADDL DRUG: CPT

## 2021-08-11 PROCEDURE — G0498 CHEMO EXTEND IV INFUS W/PUMP: HCPCS

## 2021-08-11 PROCEDURE — 96367 TX/PROPH/DG ADDL SEQ IV INF: CPT

## 2021-08-11 PROCEDURE — 96368 THER/DIAG CONCURRENT INF: CPT

## 2021-08-11 PROCEDURE — 96417 CHEMO IV INFUS EACH ADDL SEQ: CPT

## 2021-08-11 RX ORDER — DEXTROSE MONOHYDRATE 50 MG/ML
25 INJECTION, SOLUTION INTRAVENOUS CONTINUOUS
Status: ACTIVE | OUTPATIENT
Start: 2021-08-11 | End: 2021-08-11

## 2021-08-11 RX ORDER — FLUOROURACIL 50 MG/ML
300 INJECTION, SOLUTION INTRAVENOUS ONCE
Status: COMPLETED | OUTPATIENT
Start: 2021-08-11 | End: 2021-08-11

## 2021-08-11 RX ORDER — ONDANSETRON 2 MG/ML
8 INJECTION INTRAMUSCULAR; INTRAVENOUS ONCE
Status: COMPLETED | OUTPATIENT
Start: 2021-08-11 | End: 2021-08-11

## 2021-08-11 RX ORDER — ATROPINE SULFATE 0.4 MG/ML
0.4 INJECTION, SOLUTION ENDOTRACHEAL; INTRAMEDULLARY; INTRAMUSCULAR; INTRAVENOUS; SUBCUTANEOUS ONCE
Status: COMPLETED | OUTPATIENT
Start: 2021-08-11 | End: 2021-08-11

## 2021-08-11 RX ADMIN — ATROPINE SULFATE 0.4 MG: 0.4 INJECTION, SOLUTION INTRAMUSCULAR; INTRAVENOUS; SUBCUTANEOUS at 11:36

## 2021-08-11 RX ADMIN — IRINOTECAN HYDROCHLORIDE 234 MG: 20 INJECTION, SOLUTION INTRAVENOUS at 11:40

## 2021-08-11 RX ADMIN — DEXTROSE MONOHYDRATE 25 ML/HR: 5 INJECTION, SOLUTION INTRAVENOUS at 09:00

## 2021-08-11 RX ADMIN — FLUOROURACIL 585 MG: 50 INJECTION, SOLUTION INTRAVENOUS at 13:19

## 2021-08-11 RX ADMIN — LEUCOVORIN CALCIUM 780 MG: 350 INJECTION, POWDER, LYOPHILIZED, FOR SOLUTION INTRAMUSCULAR; INTRAVENOUS at 11:40

## 2021-08-11 RX ADMIN — OXALIPLATIN 127 MG: 5 INJECTION, SOLUTION INTRAVENOUS at 09:30

## 2021-08-11 RX ADMIN — ONDANSETRON 8 MG: 2 INJECTION INTRAMUSCULAR; INTRAVENOUS at 08:38

## 2021-08-11 RX ADMIN — FLUOROURACIL 3500 MG: 50 INJECTION, SOLUTION INTRAVENOUS at 13:25

## 2021-08-11 RX ADMIN — FOSAPREPITANT 150 MG: 150 INJECTION, POWDER, LYOPHILIZED, FOR SOLUTION INTRAVENOUS at 08:45

## 2021-08-11 RX ADMIN — DEXAMETHASONE SODIUM PHOSPHATE 12 MG: 4 INJECTION, SOLUTION INTRAMUSCULAR; INTRAVENOUS at 09:08

## 2021-08-11 NOTE — PROGRESS NOTES
Arrived to the [facility] ambulatory. Oxaliplatin, camptosar,leucovorin, 5FU push completed and CI 5fu pump started at 1330 over 46hours. Patient tolerated well. Any issues or concerns during appointment: no.  Patient aware of next infusion appointment on 8/13 at 1700. Discharged to home ambulatory.

## 2021-08-13 ENCOUNTER — HOSPITAL ENCOUNTER (OUTPATIENT)
Dept: INFUSION THERAPY | Age: 66
Discharge: HOME | End: 2021-08-13
Payer: MEDICARE

## 2021-08-13 VITALS
OXYGEN SATURATION: 97 % | DIASTOLIC BLOOD PRESSURE: 86 MMHG | TEMPERATURE: 97.6 F | SYSTOLIC BLOOD PRESSURE: 143 MMHG | HEART RATE: 81 BPM | RESPIRATION RATE: 16 BRPM

## 2021-08-13 DIAGNOSIS — C15.5 MALIGNANT NEOPLASM OF LOWER THIRD OF ESOPHAGUS (HCC): ICD-10-CM

## 2021-08-13 DIAGNOSIS — E86.0 DEHYDRATION: Primary | ICD-10-CM

## 2021-08-13 DIAGNOSIS — D70.8 OTHER NEUTROPENIA (HCC): ICD-10-CM

## 2021-08-13 DIAGNOSIS — C25.1 MALIGNANT NEOPLASM OF BODY OF PANCREAS (HCC): ICD-10-CM

## 2021-08-13 PROCEDURE — 74011250636 HC RX REV CODE- 250/636: Performed by: INTERNAL MEDICINE

## 2021-08-13 PROCEDURE — 96360 HYDRATION IV INFUSION INIT: CPT

## 2021-08-13 RX ORDER — SODIUM CHLORIDE 0.9 % (FLUSH) 0.9 %
10 SYRINGE (ML) INJECTION AS NEEDED
Status: DISCONTINUED | OUTPATIENT
Start: 2021-08-13 | End: 2021-08-14 | Stop reason: HOSPADM

## 2021-08-13 RX ORDER — SODIUM CHLORIDE 9 MG/ML
1000 INJECTION, SOLUTION INTRAVENOUS ONCE
Status: COMPLETED | OUTPATIENT
Start: 2021-08-13 | End: 2021-08-13

## 2021-08-13 RX ADMIN — SODIUM CHLORIDE 1000 ML: 9 INJECTION, SOLUTION INTRAVENOUS at 14:55

## 2021-08-13 RX ADMIN — Medication 10 ML: at 15:58

## 2021-08-13 NOTE — PROGRESS NOTES
Arrived to the [facility] ambulatory. CI 5fu pump DC'd and 1lt NS bolus completed. Patient tolerated well. Any issues or concerns during appointment: Pt states he is going out of town to [employer] and will not be coming tomorrow for evidanza. Pt encourged to leave for [employer] in am and to come for [employer]. Pt instructed to stay out of crowds wash hands offten and call with any fevers. Patient aware of next infusion appointment on  8/25 at 0930. Discharged to home ambulatory.

## 2021-08-14 ENCOUNTER — HOSPITAL ENCOUNTER (OUTPATIENT)
Dept: INFUSION THERAPY | Age: 66
End: 2021-08-14

## 2021-08-23 ENCOUNTER — HOSPITAL ENCOUNTER (OUTPATIENT)
Dept: CT IMAGING | Age: 66
Discharge: HOME | End: 2021-08-23
Attending: INTERNAL MEDICINE
Payer: MEDICARE

## 2021-08-23 DIAGNOSIS — C25.1 MALIGNANT NEOPLASM OF BODY OF PANCREAS (HCC): ICD-10-CM

## 2021-08-23 DIAGNOSIS — M54.2 NECK PAIN: ICD-10-CM

## 2021-08-23 PROCEDURE — 74011250636 HC RX REV CODE- 250/636: Performed by: INTERNAL MEDICINE

## 2021-08-23 PROCEDURE — 70491 CT SOFT TISSUE NECK W/DYE: CPT

## 2021-08-23 PROCEDURE — 74011000258 HC RX REV CODE- 258: Performed by: INTERNAL MEDICINE

## 2021-08-23 PROCEDURE — 74011000636 HC RX REV CODE- 636: Performed by: INTERNAL MEDICINE

## 2021-08-23 RX ORDER — SODIUM CHLORIDE 0.9 % (FLUSH) 0.9 %
10 SYRINGE (ML) INJECTION
Status: COMPLETED | OUTPATIENT
Start: 2021-08-23 | End: 2021-08-23

## 2021-08-23 RX ORDER — HEPARIN 100 UNIT/ML
500 SYRINGE INTRAVENOUS ONCE
Status: COMPLETED | OUTPATIENT
Start: 2021-08-23 | End: 2021-08-23

## 2021-08-23 RX ADMIN — IOPAMIDOL 100 ML: 755 INJECTION, SOLUTION INTRAVENOUS at 09:25

## 2021-08-23 RX ADMIN — SODIUM CHLORIDE 100 ML: 900 INJECTION, SOLUTION INTRAVENOUS at 09:26

## 2021-08-23 RX ADMIN — HEPARIN SODIUM (PORCINE) LOCK FLUSH IV SOLN 100 UNIT/ML 500 UNITS: 100 SOLUTION at 09:39

## 2021-08-23 RX ADMIN — DIATRIZOATE MEGLUMINE AND DIATRIZOATE SODIUM 15 ML: 660; 100 LIQUID ORAL; RECTAL at 09:26

## 2021-08-23 RX ADMIN — Medication 10 ML: at 09:26

## 2021-08-24 ENCOUNTER — PATIENT OUTREACH (OUTPATIENT)
Dept: CASE MANAGEMENT | Age: 66
End: 2021-08-24

## 2021-08-24 ENCOUNTER — HOSPITAL ENCOUNTER (OUTPATIENT)
Dept: LAB | Age: 66
Discharge: HOME | End: 2021-08-24
Payer: MEDICARE

## 2021-08-24 DIAGNOSIS — C25.1 MALIGNANT NEOPLASM OF BODY OF PANCREAS (HCC): ICD-10-CM

## 2021-08-24 LAB
ALBUMIN SERPL-MCNC: 3.4 G/DL (ref 3.2–4.6)
ALBUMIN/GLOB SERPL: 1 {RATIO} (ref 1.2–3.5)
ALP SERPL-CCNC: 78 U/L (ref 50–136)
ALT SERPL-CCNC: 15 U/L (ref 12–65)
ANION GAP SERPL CALC-SCNC: 6 MMOL/L (ref 7–16)
AST SERPL-CCNC: 12 U/L (ref 15–37)
BASOPHILS # BLD: 0 K/UL (ref 0–0.2)
BASOPHILS NFR BLD: 1 % (ref 0–2)
BILIRUB SERPL-MCNC: 0.3 MG/DL (ref 0.2–1.1)
BUN SERPL-MCNC: 11 MG/DL (ref 8–23)
CALCIUM SERPL-MCNC: 9.2 MG/DL (ref 8.3–10.4)
CANCER AG19-9 SERPL-ACNC: 43.2 U/ML (ref 2–37)
CEA SERPL-MCNC: 10.2 NG/ML (ref 0–3)
CHLORIDE SERPL-SCNC: 109 MMOL/L (ref 98–107)
CO2 SERPL-SCNC: 27 MMOL/L (ref 21–32)
CREAT SERPL-MCNC: 0.8 MG/DL (ref 0.8–1.5)
DIFFERENTIAL METHOD BLD: ABNORMAL
EOSINOPHIL # BLD: 0.2 K/UL (ref 0–0.8)
EOSINOPHIL NFR BLD: 6 % (ref 0.5–7.8)
ERYTHROCYTE [DISTWIDTH] IN BLOOD BY AUTOMATED COUNT: 15 % (ref 11.9–14.6)
GLOBULIN SER CALC-MCNC: 3.5 G/DL (ref 2.3–3.5)
GLUCOSE SERPL-MCNC: 91 MG/DL (ref 65–100)
HCT VFR BLD AUTO: 35.4 %
HGB BLD-MCNC: 11.3 G/DL (ref 13.6–17.2)
IMM GRANULOCYTES # BLD AUTO: 0 K/UL (ref 0–0.5)
IMM GRANULOCYTES NFR BLD AUTO: 0 % (ref 0–5)
LYMPHOCYTES # BLD: 0.7 K/UL (ref 0.5–4.6)
LYMPHOCYTES NFR BLD: 25 % (ref 13–44)
MAGNESIUM SERPL-MCNC: 2.2 MG/DL (ref 1.8–2.4)
MCH RBC QN AUTO: 31.1 PG (ref 26.1–32.9)
MCHC RBC AUTO-ENTMCNC: 31.9 G/DL (ref 31.4–35)
MCV RBC AUTO: 97.5 FL (ref 79.6–97.8)
MONOCYTES # BLD: 0.7 K/UL (ref 0.1–1.3)
MONOCYTES NFR BLD: 25 % (ref 4–12)
NEUTS SEG # BLD: 1.2 K/UL (ref 1.7–8.2)
NEUTS SEG NFR BLD: 43 % (ref 43–78)
NRBC # BLD: 0 K/UL (ref 0–0.2)
PLATELET # BLD AUTO: 130 K/UL (ref 150–450)
PMV BLD AUTO: 9.2 FL (ref 9.4–12.3)
POTASSIUM SERPL-SCNC: 3.9 MMOL/L (ref 3.5–5.1)
PROT SERPL-MCNC: 6.9 G/DL (ref 6.3–8.2)
RBC # BLD AUTO: 3.63 M/UL (ref 4.23–5.6)
SODIUM SERPL-SCNC: 142 MMOL/L (ref 136–145)
WBC # BLD AUTO: 2.9 K/UL (ref 4.3–11.1)

## 2021-08-24 PROCEDURE — 85025 COMPLETE CBC W/AUTO DIFF WBC: CPT

## 2021-08-24 PROCEDURE — 86301 IMMUNOASSAY TUMOR CA 19-9: CPT

## 2021-08-24 PROCEDURE — 83735 ASSAY OF MAGNESIUM: CPT

## 2021-08-24 PROCEDURE — 82378 CARCINOEMBRYONIC ANTIGEN: CPT

## 2021-08-24 PROCEDURE — 36415 COLL VENOUS BLD VENIPUNCTURE: CPT

## 2021-08-24 PROCEDURE — 80053 COMPREHEN METABOLIC PANEL: CPT

## 2021-08-24 NOTE — PROGRESS NOTES
8/24/21 saw pt today with Dr. Vaughn Mora for pre chemo cycle 13 FOLFIRINOX and restaging CT review. Scan shows improvement except for pancreas. Will arrange referral to rad onc. PO intake is good. Denies any issues. Infusion tomorrow. He did not get [place] last cycle due to being out of town. Continue current regimen. Encouraged to call with any concerns. Follow up in 2 weeks. Navigation will continue to follow.

## 2021-08-25 ENCOUNTER — HOSPITAL ENCOUNTER (OUTPATIENT)
Dept: INFUSION THERAPY | Age: 66
Discharge: HOME | End: 2021-08-25
Payer: MEDICARE

## 2021-08-25 VITALS
BODY MASS INDEX: 32.13 KG/M2 | HEART RATE: 73 BPM | DIASTOLIC BLOOD PRESSURE: 83 MMHG | TEMPERATURE: 97.8 F | SYSTOLIC BLOOD PRESSURE: 151 MMHG | OXYGEN SATURATION: 99 % | WEIGHT: 187.2 LBS | RESPIRATION RATE: 16 BRPM

## 2021-08-25 DIAGNOSIS — C15.5 MALIGNANT NEOPLASM OF LOWER THIRD OF ESOPHAGUS (HCC): ICD-10-CM

## 2021-08-25 DIAGNOSIS — D70.8 OTHER NEUTROPENIA (HCC): ICD-10-CM

## 2021-08-25 DIAGNOSIS — E86.0 DEHYDRATION: Primary | ICD-10-CM

## 2021-08-25 DIAGNOSIS — C25.1 MALIGNANT NEOPLASM OF BODY OF PANCREAS (HCC): ICD-10-CM

## 2021-08-25 PROCEDURE — 74011000258 HC RX REV CODE- 258: Performed by: INTERNAL MEDICINE

## 2021-08-25 PROCEDURE — 96372 THER/PROPH/DIAG INJ SC/IM: CPT

## 2021-08-25 PROCEDURE — G0498 CHEMO EXTEND IV INFUS W/PUMP: HCPCS

## 2021-08-25 PROCEDURE — 74011250636 HC RX REV CODE- 250/636: Performed by: INTERNAL MEDICINE

## 2021-08-25 PROCEDURE — 96413 CHEMO IV INFUSION 1 HR: CPT

## 2021-08-25 PROCEDURE — 96375 TX/PRO/DX INJ NEW DRUG ADDON: CPT

## 2021-08-25 PROCEDURE — 96411 CHEMO IV PUSH ADDL DRUG: CPT

## 2021-08-25 PROCEDURE — 96415 CHEMO IV INFUSION ADDL HR: CPT

## 2021-08-25 PROCEDURE — 96368 THER/DIAG CONCURRENT INF: CPT

## 2021-08-25 PROCEDURE — 96417 CHEMO IV INFUS EACH ADDL SEQ: CPT

## 2021-08-25 PROCEDURE — 96367 TX/PROPH/DG ADDL SEQ IV INF: CPT

## 2021-08-25 RX ORDER — ATROPINE SULFATE 0.4 MG/ML
0.4 INJECTION, SOLUTION ENDOTRACHEAL; INTRAMEDULLARY; INTRAMUSCULAR; INTRAVENOUS; SUBCUTANEOUS ONCE
Status: COMPLETED | OUTPATIENT
Start: 2021-08-25 | End: 2021-08-25

## 2021-08-25 RX ORDER — ONDANSETRON 2 MG/ML
8 INJECTION INTRAMUSCULAR; INTRAVENOUS ONCE
Status: COMPLETED | OUTPATIENT
Start: 2021-08-25 | End: 2021-08-25

## 2021-08-25 RX ORDER — FLUOROURACIL 50 MG/ML
300 INJECTION, SOLUTION INTRAVENOUS ONCE
Status: COMPLETED | OUTPATIENT
Start: 2021-08-25 | End: 2021-08-25

## 2021-08-25 RX ORDER — DEXTROSE MONOHYDRATE 50 MG/ML
25 INJECTION, SOLUTION INTRAVENOUS CONTINUOUS
Status: ACTIVE | OUTPATIENT
Start: 2021-08-25 | End: 2021-08-25

## 2021-08-25 RX ORDER — SODIUM CHLORIDE 0.9 % (FLUSH) 0.9 %
10 SYRINGE (ML) INJECTION AS NEEDED
Status: ACTIVE | OUTPATIENT
Start: 2021-08-25 | End: 2021-08-25

## 2021-08-25 RX ADMIN — FOSAPREPITANT 150 MG: 150 INJECTION, POWDER, LYOPHILIZED, FOR SOLUTION INTRAVENOUS at 10:32

## 2021-08-25 RX ADMIN — ONDANSETRON 8 MG: 2 INJECTION INTRAMUSCULAR; INTRAVENOUS at 10:05

## 2021-08-25 RX ADMIN — Medication 10 ML: at 09:55

## 2021-08-25 RX ADMIN — OXALIPLATIN 127 MG: 5 INJECTION, SOLUTION INTRAVENOUS at 10:58

## 2021-08-25 RX ADMIN — IRINOTECAN HYDROCHLORIDE 234 MG: 20 INJECTION, SOLUTION INTRAVENOUS at 12:50

## 2021-08-25 RX ADMIN — DEXTROSE MONOHYDRATE 25 ML/HR: 5 INJECTION, SOLUTION INTRAVENOUS at 09:30

## 2021-08-25 RX ADMIN — ATROPINE SULFATE 0.4 MG: 0.4 INJECTION, SOLUTION INTRAMUSCULAR; INTRAVENOUS; SUBCUTANEOUS at 11:53

## 2021-08-25 RX ADMIN — FLUOROURACIL 585 MG: 50 INJECTION, SOLUTION INTRAVENOUS at 14:31

## 2021-08-25 RX ADMIN — LEUCOVORIN CALCIUM 780 MG: 350 INJECTION, POWDER, LYOPHILIZED, FOR SOLUTION INTRAMUSCULAR; INTRAVENOUS at 12:53

## 2021-08-25 RX ADMIN — DEXAMETHASONE SODIUM PHOSPHATE 12 MG: 4 INJECTION, SOLUTION INTRAMUSCULAR; INTRAVENOUS at 10:07

## 2021-08-25 RX ADMIN — FLUOROURACIL 3500 MG: 50 INJECTION, SOLUTION INTRAVENOUS at 14:40

## 2021-08-25 NOTE — PROGRESS NOTES
Patient arrived at [facility] for FOLFIRINOX. Assessment completed. VSS. No needs voiced at this time. Patient did mention that when he bends his neck down, he feels like a \"shock\" down to his feet. Patient said MD aware. Patient tolerated chemo well. Patient is aware of next appointment on 8/27/2021 @1600. Patient discharged ambulatory.

## 2021-08-27 ENCOUNTER — HOSPITAL ENCOUNTER (OUTPATIENT)
Dept: INFUSION THERAPY | Age: 66
Discharge: HOME | End: 2021-08-27
Payer: MEDICARE

## 2021-08-27 VITALS
RESPIRATION RATE: 20 BRPM | OXYGEN SATURATION: 97 % | TEMPERATURE: 97.7 F | HEART RATE: 68 BPM | SYSTOLIC BLOOD PRESSURE: 148 MMHG | DIASTOLIC BLOOD PRESSURE: 84 MMHG

## 2021-08-27 DIAGNOSIS — C15.5 MALIGNANT NEOPLASM OF LOWER THIRD OF ESOPHAGUS (HCC): ICD-10-CM

## 2021-08-27 DIAGNOSIS — E86.0 DEHYDRATION: Primary | ICD-10-CM

## 2021-08-27 DIAGNOSIS — C25.1 MALIGNANT NEOPLASM OF BODY OF PANCREAS (HCC): ICD-10-CM

## 2021-08-27 DIAGNOSIS — D70.8 OTHER NEUTROPENIA (HCC): ICD-10-CM

## 2021-08-27 PROCEDURE — 96360 HYDRATION IV INFUSION INIT: CPT

## 2021-08-27 PROCEDURE — 74011250636 HC RX REV CODE- 250/636: Performed by: INTERNAL MEDICINE

## 2021-08-27 RX ORDER — SODIUM CHLORIDE 9 MG/ML
1000 INJECTION, SOLUTION INTRAVENOUS ONCE
Status: COMPLETED | OUTPATIENT
Start: 2021-08-27 | End: 2021-08-27

## 2021-08-27 RX ORDER — SODIUM CHLORIDE 0.9 % (FLUSH) 0.9 %
10 SYRINGE (ML) INJECTION AS NEEDED
Status: DISCONTINUED | OUTPATIENT
Start: 2021-08-27 | End: 2021-08-28 | Stop reason: HOSPADM

## 2021-08-27 RX ADMIN — Medication 10 ML: at 14:02

## 2021-08-27 RX ADMIN — SODIUM CHLORIDE 1000 ML: 900 INJECTION, SOLUTION INTRAVENOUS at 13:01

## 2021-08-27 NOTE — PROGRESS NOTES
Patient arrived ambulatory to infusion center for pump d/c. 1L NS infused. Patient tolerated well. Discharged home ambulatory in stable condition. Patient aware of appt for [facility] tomorrow 8/28.

## 2021-08-28 ENCOUNTER — HOSPITAL ENCOUNTER (OUTPATIENT)
Dept: INFUSION THERAPY | Age: 66
Discharge: HOME | End: 2021-08-28
Payer: MEDICARE

## 2021-08-28 VITALS
SYSTOLIC BLOOD PRESSURE: 146 MMHG | HEART RATE: 74 BPM | DIASTOLIC BLOOD PRESSURE: 77 MMHG | TEMPERATURE: 97.6 F | RESPIRATION RATE: 20 BRPM | OXYGEN SATURATION: 99 %

## 2021-08-28 DIAGNOSIS — C15.5 MALIGNANT NEOPLASM OF LOWER THIRD OF ESOPHAGUS (HCC): ICD-10-CM

## 2021-08-28 DIAGNOSIS — C25.1 MALIGNANT NEOPLASM OF BODY OF PANCREAS (HCC): ICD-10-CM

## 2021-08-28 DIAGNOSIS — E86.0 DEHYDRATION: Primary | ICD-10-CM

## 2021-08-28 DIAGNOSIS — D70.8 OTHER NEUTROPENIA (HCC): ICD-10-CM

## 2021-08-28 PROCEDURE — 96372 THER/PROPH/DIAG INJ SC/IM: CPT

## 2021-08-28 PROCEDURE — 74011250636 HC RX REV CODE- 250/636: Performed by: INTERNAL MEDICINE

## 2021-08-28 RX ADMIN — PEGFILGRASTIM-CBQV 6 MG: 6 INJECTION, SOLUTION SUBCUTANEOUS at 13:49

## 2021-08-28 NOTE — PROGRESS NOTES
Arrived to infusion  No concerns noted  udenyca injected , tolerated well  Next appt 8/30 with UOA  9/8 with infusion

## 2021-08-31 ENCOUNTER — HOSPITAL ENCOUNTER (OUTPATIENT)
Dept: RADIATION ONCOLOGY | Age: 66
Discharge: HOME | End: 2021-08-31
Payer: MEDICARE

## 2021-08-31 VITALS
BODY MASS INDEX: 31.88 KG/M2 | SYSTOLIC BLOOD PRESSURE: 159 MMHG | DIASTOLIC BLOOD PRESSURE: 84 MMHG | OXYGEN SATURATION: 96 % | WEIGHT: 185.7 LBS | TEMPERATURE: 98.2 F | HEART RATE: 68 BPM

## 2021-08-31 DIAGNOSIS — C25.9 MALIGNANT NEOPLASM OF PANCREAS, UNSPECIFIED LOCATION OF MALIGNANCY (HCC): ICD-10-CM

## 2021-08-31 DIAGNOSIS — K22.89 OTHER SPECIFIED DISEASES OF ESOPHAGUS: ICD-10-CM

## 2021-08-31 DIAGNOSIS — Z01.812 PRE-PROCEDURE LAB EXAM: ICD-10-CM

## 2021-08-31 DIAGNOSIS — Z01.812 PRE-PROCEDURE LAB EXAM: Primary | ICD-10-CM

## 2021-08-31 LAB
ALBUMIN SERPL-MCNC: 3.4 G/DL (ref 3.2–4.6)
ALBUMIN/GLOB SERPL: 1 {RATIO} (ref 1.2–3.5)
ALP SERPL-CCNC: 100 U/L (ref 50–136)
ALT SERPL-CCNC: 13 U/L (ref 12–65)
ANION GAP SERPL CALC-SCNC: 3 MMOL/L (ref 7–16)
APTT PPP: 32.6 SEC (ref 24.1–35.1)
AST SERPL-CCNC: 13 U/L (ref 15–37)
BASOPHILS # BLD: 0 K/UL (ref 0–0.2)
BASOPHILS NFR BLD: 0 % (ref 0–2)
BILIRUB SERPL-MCNC: 0.3 MG/DL (ref 0.2–1.1)
BUN SERPL-MCNC: 10 MG/DL (ref 8–23)
CALCIUM SERPL-MCNC: 9.3 MG/DL (ref 8.3–10.4)
CHLORIDE SERPL-SCNC: 106 MMOL/L (ref 98–107)
CO2 SERPL-SCNC: 29 MMOL/L (ref 21–32)
CREAT SERPL-MCNC: 0.7 MG/DL (ref 0.8–1.5)
DIFFERENTIAL METHOD BLD: ABNORMAL
EOSINOPHIL # BLD: 0.4 K/UL (ref 0–0.8)
EOSINOPHIL NFR BLD: 2 % (ref 0.5–7.8)
ERYTHROCYTE [DISTWIDTH] IN BLOOD BY AUTOMATED COUNT: 14.6 % (ref 11.9–14.6)
GLOBULIN SER CALC-MCNC: 3.5 G/DL (ref 2.3–3.5)
GLUCOSE SERPL-MCNC: 96 MG/DL (ref 65–100)
HCT VFR BLD AUTO: 36.1 %
HGB BLD-MCNC: 11.5 G/DL (ref 13.6–17.2)
IMM GRANULOCYTES # BLD AUTO: 0.2 K/UL (ref 0–0.5)
IMM GRANULOCYTES NFR BLD AUTO: 1 % (ref 0–5)
INR PPP: 1.1
LYMPHOCYTES # BLD: 1.3 K/UL (ref 0.5–4.6)
LYMPHOCYTES NFR BLD: 7 % (ref 13–44)
MCH RBC QN AUTO: 31.3 PG (ref 26.1–32.9)
MCHC RBC AUTO-ENTMCNC: 31.9 G/DL (ref 31.4–35)
MCV RBC AUTO: 98.4 FL (ref 79.6–97.8)
MONOCYTES # BLD: 1.6 K/UL (ref 0.1–1.3)
MONOCYTES NFR BLD: 9 % (ref 4–12)
NEUTS SEG # BLD: 14.7 K/UL (ref 1.7–8.2)
NEUTS SEG NFR BLD: 81 % (ref 43–78)
NRBC # BLD: 0 K/UL (ref 0–0.2)
PLATELET # BLD AUTO: 110 K/UL (ref 150–450)
PLATELET COMMENTS,PCOM: SLIGHT
PMV BLD AUTO: 9.9 FL (ref 9.4–12.3)
POTASSIUM SERPL-SCNC: 3.9 MMOL/L (ref 3.5–5.1)
PROT SERPL-MCNC: 6.9 G/DL (ref 6.3–8.2)
PROTHROMBIN TIME: 14.7 SEC (ref 12.6–14.5)
RBC # BLD AUTO: 3.67 M/UL (ref 4.23–5.6)
RBC MORPH BLD: ABNORMAL
SODIUM SERPL-SCNC: 138 MMOL/L (ref 136–145)
WBC # BLD AUTO: 18.2 K/UL (ref 4.3–11.1)
WBC MORPH BLD: SLIGHT

## 2021-08-31 PROCEDURE — 80053 COMPREHEN METABOLIC PANEL: CPT

## 2021-08-31 PROCEDURE — 85730 THROMBOPLASTIN TIME PARTIAL: CPT

## 2021-08-31 PROCEDURE — 85610 PROTHROMBIN TIME: CPT

## 2021-08-31 PROCEDURE — 85025 COMPLETE CBC W/AUTO DIFF WBC: CPT

## 2021-08-31 PROCEDURE — 99211 OFF/OP EST MAY X REQ PHY/QHP: CPT

## 2021-08-31 PROCEDURE — 36415 COLL VENOUS BLD VENIPUNCTURE: CPT

## 2021-08-31 NOTE — PROGRESS NOTES
Patient: Magno Zamora MRN: [id number]  SSN: [id number]    YOB: 1955  Age: 76 y.o. Sex: male      Other Providers:  Dr. Janeth Orellana: Abdominal pain    DIAGNOSIS: Locally advanced esophageal adenocarcionma; pancreatic adenocarcinoma with peritoneal metastases     PREVIOUS RADIATION TREATMENT:  1) None    HISTORY OF PRESENT ILLNESS:  Magno Zamora is a 76 y.o. male who I am seeing at the request of Dr. Antonio Arita. Mr. Sigifredo Anderson is a former smoker (1.5ppd x 15 years, quit 1974) who presented to medical attention 11/2020 complaining of abdominal pain, and nausea. A colonscopy and EGD 12/1/2020 demonstrated a 6-8 mm duodenal bulb polyp and colonscopy demonstrated a polyp in the ascending and transverse colon. Pathology from biopsies from the distal esophagus revealed invasive adenocarcinoma arising in a background of Amos's esophagus. A CT chest/ abdomen/ pelvis 12/7/2020 demonstrated asymmetric soft tissue thickening in the esophagus, an indeterminate suspicious lesion in the pancreatic body, and no evidence of other distant disease. EUS with doppler and FNA 12/23/2020 confirmed T3N0Mx. Biopsy of the pancreatic mass revealed adenocarcinoma. PET/CT 1/14/21 additional demonstrated diffuse soft tissue nodularity throughout the peritoneum with associated trace ascites consistent with peritoneal carcinomatosis. Peritoneal biopsy 2/10/21 demonstrated findings consistent with metastatic adenocarcinoma most likely of pancreatic origin. Therefore he started treatment with FOLFIRINOX. Repeat CT 5/17/21 after 5 cycles demonstrated an interval improvement. Repeat CT after cycle 12 on 8/23/21 again demonstrates a response in the esophageal mass but with interval enlargement of the primary pancreas tumor and a questionable small lesion in the right lobe of the liver, continued follow up suggested.  He reports that he has tolerated chemotherapy much better with addition of Emend for nausea control and that his appetite and energy level are good. He recently rolled his tractor and has soreness and decreased range of motion in the left arm but otherwise denies pain. He has had no recent fevers or chills. PAST MEDICAL HISTORY:    Past Medical History:   Diagnosis Date    Back pain     followed by pain management    Chronic pain     left-sided, lower back pain    CINV (chemotherapy-induced nausea and vomiting) 4/20/2021    Erectile dysfunction     Gastritis     GERD (gastroesophageal reflux disease)     daily medication    Hiatal hernia     \"medium\"    History of anemia     Hypertension     situational; has Amlodipine to take if systolic >081    Left bundle branch block (LBBB) on electrocardiogram 02/05/2021    Malignant neoplasm of body of pancreas (HCC)     Malignant neoplasm of esophagus ([address] 12/07/2020    Morbid obesity (HCC)     Nausea     takes antiemetic med prn       The patient denies history of collagen vascular diseases, pacemaker insertion, and prior radiation. See HPI for details of prior chemotherapy. PAST SURGICAL HISTORY:   Past Surgical History:   Procedure Laterality Date    HX APPENDECTOMY      HX CHOLECYSTECTOMY      HX COLONOSCOPY  12/01/2020    with EGD       MEDICATIONS:     Current Outpatient Medications:     sildenafil citrate (Viagra) 100 mg tablet, Take 1 Tablet by mouth as needed (erectile dysfunction). , Disp: 30 Tablet, Rfl: 5    DISABLED PLACARD (DISABLED PLACARD) DMV, Use as directed for poor ambulation, Disp: 1 Each, Rfl: 0    omeprazole (PRILOSEC) 40 mg capsule, Take 1 Capsule by mouth daily. , Disp: 30 Capsule, Rfl: 5    famotidine (PEPCID) 20 mg tablet, Take 1 Tablet by mouth two (2) times a day., Disp: 60 Tablet, Rfl: 5    diphenoxylate-atropine (LomotiL) 2.5-0.025 mg per tablet, Take 1 Tablet by mouth four (4) times daily as needed for Diarrhea. Max Daily Amount: 4 Tablets. , Disp: 90 Tablet, Rfl: 1    prochlorperazine (Compazine) 10 mg tablet, Take 1 Tablet by mouth every six (6) hours as needed for Nausea. Indications: nausea and vomiting caused by cancer drugs, nausea and vomiting, Disp: 90 Tablet, Rfl: 3    potassium chloride (K-DUR, KLOR-CON) 20 mEq tablet, Take 1 Tablet by mouth daily. , Disp: 30 Tablet, Rfl: 2    promethazine (PHENERGAN) 25 mg tablet, Take 1 Tab by mouth every six (6) hours as needed for Nausea., Disp: 30 Tab, Rfl: 1    HYDROcodone-acetaminophen (NORCO) 5-325 mg per tablet, , Disp: , Rfl:     ondansetron hcl (Zofran) 8 mg tablet, Take 1 Tab by mouth every eight (8) hours as needed for Nausea. Indications: nausea and vomiting caused by cancer drugs, Disp: 60 Tab, Rfl: 3    lidocaine-prilocaine (EMLA) topical cream, Apply  to affected area as needed for Pain., Disp: 30 g, Rfl: 0    ondansetron (ZOFRAN ODT) 4 mg disintegrating tablet, Take 1 Tab by mouth every eight (8) hours as needed for Nausea or Vomiting., Disp: 24 Tab, Rfl: 1    naproxen sodium (ALEVE) 220 mg cap, Take  by mouth as needed. For back pain Hold 5 days for procedure per anesthesia protocol  Indications: pain, Disp: , Rfl:     DISABLED PLACARD (DISABLED PLACARD) DMV, Handicap Placard, for poor ambuation. , Disp: 1 Each, Rfl: 0    ALLERGIES:   No Known Allergies    SOCIAL HISTORY:   Social History     Socioeconomic History    Marital status:      Spouse name: Not on file    Number of children: Not on file    Years of education: Not on file    Highest education level: Not on file   Occupational History    Not on file   Tobacco Use    Smoking status: Former Smoker     Packs/day: 1.50     Years: 15.00     Pack years: 22.50     Quit date: 1974     Years since quittin.7    Smokeless tobacco: Never Used   Vaping Use    Vaping Use: Never used   Substance and Sexual Activity    Alcohol use: Yes     Comment: socially    Drug use: Never    Sexual activity: Not on file   Other Topics Concern    Not on file   Social History Narrative    Not on file     Social Determinants of Health     Financial Resource Strain:     Difficulty of Paying Living Expenses:    Food Insecurity:     Worried About Running Out of Food in the Last Year:     [address] in the Last Year:    Transportation Needs:     Lack of Transportation (Medical):  Lack of Transportation (Non-Medical):    Physical Activity:     Days of Exercise per Week:     Minutes of Exercise per Session:    Stress:     Feeling of Stress :    Social Connections:     Frequency of Communication with Friends and Family:     Frequency of Social Gatherings with Friends and Family:     Attends [employer] Services:     Active Member of Clubs or Organizations:     Attends Club or Organization Meetings:     Marital Status:    Intimate Partner Violence:     Fear of Current or Ex-Partner:     Emotionally Abused:     Physically Abused:     Sexually Abused:        FAMILY HISTORY:   Family History   Problem Relation Age of Onset    Cancer Mother     No Known Problems Father     Cancer Sister        REVIEW OF SYSTEMS:   A full 12-point review of systems was completed and was negative unless noted in the history of present illness.     PHYSICAL EXAMINATION:   ECOG Performance status 1  VITAL SIGNS:   Visit Vitals  BP (!) 159/84 (BP 1 Location: Right upper arm, BP Patient Position: Sitting)   Pulse 68   Temp 98.2 °F (36.8 °C)   Wt 84.2 kg (185 lb 11.2 oz)   SpO2 96%   BMI 31.88 kg/m²        General: well developed/nourished adult Male in no acute distress; appears stated age  [de-identified]: normocephalic, atraumatic; EOMI  Neck: supple with full ROM; no cervical lymphadenopathy  Cardiovascular: normal S1 and S2, RRR, no murmurs  Respiratory: normal inspiratory effort, no audible wheezes  Extremities: no cyanosis, clubbing, or edema  Musculoskeletal: mobility intact x4; decreased ROM in left shoulder  Skin: no skin lesions identified  Neuro: AOx3; sensation intact x 4; CNII-XII grossly intact  Psych: appropriate affect, insight, and judgement  GI: abdomen soft, non-distended; no tenderness to palpation    PATHOLOGY:    I personally reviewed the pathology reports as documented in the HPI. LABORATORY:   Lab Results   Component Value Date/Time    Sodium 142 08/24/2021 11:00 AM    Potassium 3.9 08/24/2021 11:00 AM    Chloride 109 (H) 08/24/2021 11:00 AM    CO2 27 08/24/2021 11:00 AM    Anion gap 6 (L) 08/24/2021 11:00 AM    Glucose 91 08/24/2021 11:00 AM    BUN 11 08/24/2021 11:00 AM    Creatinine 0.80 08/24/2021 11:00 AM    GFR est AA >60 08/24/2021 11:00 AM    GFR est non-AA >60 08/24/2021 11:00 AM    Calcium 9.2 08/24/2021 11:00 AM    Magnesium 2.2 08/24/2021 11:00 AM    Albumin 3.4 08/24/2021 11:00 AM    Protein, total 6.9 08/24/2021 11:00 AM    Globulin 3.5 08/24/2021 11:00 AM    A-G Ratio 1.0 (L) 08/24/2021 11:00 AM    ALT (SGPT) 15 08/24/2021 11:00 AM     Lab Results   Component Value Date/Time    WBC 2.9 (L) 08/24/2021 11:00 AM    HGB 11.3 (L) 08/24/2021 11:00 AM    HCT 35.4 08/24/2021 11:00 AM    PLATELET 697 (L) 40/56/1707 11:00 AM       RADIOLOGY:    I personally reviewed the images and agree with the findings as documented in the HPI. IMPRESSION:  Pebbles Cordova is a 76 y.o. male with locally advanced esophageal cancer . I had a long discussion with Pebbles Cordova regarding his treatment to date and the results of his most recent imaging studies, which show an excellent response to systemic therapy in the esophagus and sites of peritoneal disease but with progression in his primary pancreatic tumor. I recommended treatment to this site using stereotactic body radiation in order to improve local control and minimize the risk of progressive, symptomatic disease.  I reviewed in detail the anticipated acute and late toxicities of radiation therapy as well as the logistics of treatment including the need for fiducial marker placement, NPO prior to simulation and treatment, and possible acute and late toxicities. I reviewed that there remains a risk of local and distant progression following treatment and the need to continue monitoring of his peritoneal disease and new indeterminate liver lesion. Verena Rodriguez had the opportunity to ask questions which appeared to be answered to their satisfaction and has elected to proceed with treatment. PLAN:    1) Consented patient for treatment with external beam radiation after discussing risk, benefits, and side effects from treatment. 2) Referral to IR for fiducial marker placement in the primary pancreatic mass. 3) Simulation following fiducial marker placement. 4) Treatment to be coordinated with medical oncology.     Lorna Barnes MD   August 31, 2021
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
## 2021-09-07 ENCOUNTER — HOSPITAL ENCOUNTER (OUTPATIENT)
Dept: LAB | Age: 66
Discharge: HOME | End: 2021-09-07
Payer: MEDICARE

## 2021-09-07 ENCOUNTER — PATIENT OUTREACH (OUTPATIENT)
Dept: CASE MANAGEMENT | Age: 66
End: 2021-09-07

## 2021-09-07 DIAGNOSIS — C25.1 MALIGNANT NEOPLASM OF BODY OF PANCREAS (HCC): ICD-10-CM

## 2021-09-07 LAB
ALBUMIN SERPL-MCNC: 3.3 G/DL (ref 3.2–4.6)
ALBUMIN/GLOB SERPL: 0.9 {RATIO} (ref 1.2–3.5)
ALP SERPL-CCNC: 123 U/L (ref 50–136)
ALT SERPL-CCNC: 17 U/L (ref 12–65)
ANION GAP SERPL CALC-SCNC: 5 MMOL/L (ref 7–16)
AST SERPL-CCNC: 16 U/L (ref 15–37)
BASOPHILS # BLD: 0.1 K/UL (ref 0–0.2)
BASOPHILS NFR BLD: 1 % (ref 0–2)
BILIRUB SERPL-MCNC: 0.4 MG/DL (ref 0.2–1.1)
BUN SERPL-MCNC: 12 MG/DL (ref 8–23)
CALCIUM SERPL-MCNC: 9.4 MG/DL (ref 8.3–10.4)
CHLORIDE SERPL-SCNC: 108 MMOL/L (ref 98–107)
CO2 SERPL-SCNC: 27 MMOL/L (ref 21–32)
CREAT SERPL-MCNC: 0.8 MG/DL (ref 0.8–1.5)
DIFFERENTIAL METHOD BLD: ABNORMAL
EOSINOPHIL # BLD: 0.2 K/UL (ref 0–0.8)
EOSINOPHIL NFR BLD: 2 % (ref 0.5–7.8)
ERYTHROCYTE [DISTWIDTH] IN BLOOD BY AUTOMATED COUNT: 15.1 % (ref 11.9–14.6)
GLOBULIN SER CALC-MCNC: 3.5 G/DL (ref 2.3–3.5)
GLUCOSE SERPL-MCNC: 109 MG/DL (ref 65–100)
HCT VFR BLD AUTO: 36.5 %
HGB BLD-MCNC: 11.7 G/DL (ref 13.6–17.2)
IMM GRANULOCYTES # BLD AUTO: 0.1 K/UL (ref 0–0.5)
IMM GRANULOCYTES NFR BLD AUTO: 1 % (ref 0–5)
LYMPHOCYTES # BLD: 1 K/UL (ref 0.5–4.6)
LYMPHOCYTES NFR BLD: 10 % (ref 13–44)
MAGNESIUM SERPL-MCNC: 2.2 MG/DL (ref 1.8–2.4)
MCH RBC QN AUTO: 31.5 PG (ref 26.1–32.9)
MCHC RBC AUTO-ENTMCNC: 32.1 G/DL (ref 31.4–35)
MCV RBC AUTO: 98.1 FL (ref 79.6–97.8)
MONOCYTES # BLD: 1 K/UL (ref 0.1–1.3)
MONOCYTES NFR BLD: 10 % (ref 4–12)
NEUTS SEG # BLD: 7.3 K/UL (ref 1.7–8.2)
NEUTS SEG NFR BLD: 75 % (ref 43–78)
NRBC # BLD: 0 K/UL (ref 0–0.2)
PLATELET # BLD AUTO: 118 K/UL (ref 150–450)
PMV BLD AUTO: 9.6 FL (ref 9.4–12.3)
POTASSIUM SERPL-SCNC: 3.9 MMOL/L (ref 3.5–5.1)
PROT SERPL-MCNC: 6.8 G/DL (ref 6.3–8.2)
RBC # BLD AUTO: 3.72 M/UL (ref 4.23–5.6)
SODIUM SERPL-SCNC: 140 MMOL/L (ref 136–145)
WBC # BLD AUTO: 9.7 K/UL (ref 4.3–11.1)

## 2021-09-07 PROCEDURE — 36415 COLL VENOUS BLD VENIPUNCTURE: CPT

## 2021-09-07 PROCEDURE — 83735 ASSAY OF MAGNESIUM: CPT

## 2021-09-07 PROCEDURE — 80053 COMPREHEN METABOLIC PANEL: CPT

## 2021-09-07 PROCEDURE — 85025 COMPLETE CBC W/AUTO DIFF WBC: CPT

## 2021-09-07 NOTE — PROGRESS NOTES
9/7/21 saw pt today with Ramya Rutledge NP for pre chemo cycle 14 FOLFIRINOX. He is tolerating chemo well. PO intake is good. Denies any issues. He is still waiting to hear back from radiation about procedure and CT sim. I will reach out to that department. Infusion tomorrow. Follow up in 2 weeks. Encouraged to call with any concerns. Navigation will continue to follow.

## 2021-09-08 ENCOUNTER — HOSPITAL ENCOUNTER (OUTPATIENT)
Dept: SURGERY | Age: 66
Discharge: HOME | End: 2021-09-08

## 2021-09-08 ENCOUNTER — HOSPITAL ENCOUNTER (OUTPATIENT)
Dept: INFUSION THERAPY | Age: 66
Discharge: HOME | End: 2021-09-08
Payer: MEDICARE

## 2021-09-08 VITALS
HEART RATE: 74 BPM | SYSTOLIC BLOOD PRESSURE: 151 MMHG | OXYGEN SATURATION: 96 % | WEIGHT: 188.8 LBS | RESPIRATION RATE: 18 BRPM | BODY MASS INDEX: 30.34 KG/M2 | DIASTOLIC BLOOD PRESSURE: 86 MMHG | TEMPERATURE: 97.7 F | HEIGHT: 66 IN

## 2021-09-08 DIAGNOSIS — D70.8 OTHER NEUTROPENIA (HCC): ICD-10-CM

## 2021-09-08 DIAGNOSIS — E83.42 HYPOMAGNESEMIA: Primary | ICD-10-CM

## 2021-09-08 DIAGNOSIS — C15.5 MALIGNANT NEOPLASM OF LOWER THIRD OF ESOPHAGUS (HCC): ICD-10-CM

## 2021-09-08 DIAGNOSIS — T45.1X5A CINV (CHEMOTHERAPY-INDUCED NAUSEA AND VOMITING): ICD-10-CM

## 2021-09-08 DIAGNOSIS — C25.1 MALIGNANT NEOPLASM OF BODY OF PANCREAS (HCC): ICD-10-CM

## 2021-09-08 DIAGNOSIS — E87.6 HYPOKALEMIA: ICD-10-CM

## 2021-09-08 DIAGNOSIS — E86.0 DEHYDRATION: ICD-10-CM

## 2021-09-08 DIAGNOSIS — R11.2 CINV (CHEMOTHERAPY-INDUCED NAUSEA AND VOMITING): ICD-10-CM

## 2021-09-08 PROCEDURE — 74011250636 HC RX REV CODE- 250/636: Performed by: NURSE PRACTITIONER

## 2021-09-08 PROCEDURE — 96411 CHEMO IV PUSH ADDL DRUG: CPT

## 2021-09-08 PROCEDURE — 96413 CHEMO IV INFUSION 1 HR: CPT

## 2021-09-08 PROCEDURE — G0498 CHEMO EXTEND IV INFUS W/PUMP: HCPCS

## 2021-09-08 PROCEDURE — 96367 TX/PROPH/DG ADDL SEQ IV INF: CPT

## 2021-09-08 PROCEDURE — 96368 THER/DIAG CONCURRENT INF: CPT

## 2021-09-08 PROCEDURE — 96417 CHEMO IV INFUS EACH ADDL SEQ: CPT

## 2021-09-08 PROCEDURE — 96372 THER/PROPH/DIAG INJ SC/IM: CPT

## 2021-09-08 PROCEDURE — 96415 CHEMO IV INFUSION ADDL HR: CPT

## 2021-09-08 PROCEDURE — 96375 TX/PRO/DX INJ NEW DRUG ADDON: CPT

## 2021-09-08 PROCEDURE — 74011000258 HC RX REV CODE- 258: Performed by: NURSE PRACTITIONER

## 2021-09-08 RX ORDER — ONDANSETRON 2 MG/ML
8 INJECTION INTRAMUSCULAR; INTRAVENOUS AS NEEDED
Status: CANCELLED | OUTPATIENT
Start: 2021-09-08

## 2021-09-08 RX ORDER — HEPARIN 100 UNIT/ML
300-500 SYRINGE INTRAVENOUS AS NEEDED
Status: CANCELLED
Start: 2021-09-08

## 2021-09-08 RX ORDER — ATROPINE SULFATE 0.4 MG/ML
0.4 INJECTION, SOLUTION ENDOTRACHEAL; INTRAMEDULLARY; INTRAMUSCULAR; INTRAVENOUS; SUBCUTANEOUS
Status: CANCELLED | OUTPATIENT
Start: 2021-09-08

## 2021-09-08 RX ORDER — DIPHENHYDRAMINE HYDROCHLORIDE 50 MG/ML
25 INJECTION, SOLUTION INTRAMUSCULAR; INTRAVENOUS AS NEEDED
Status: CANCELLED
Start: 2021-09-08

## 2021-09-08 RX ORDER — EPINEPHRINE 1 MG/ML
0.3 INJECTION, SOLUTION, CONCENTRATE INTRAVENOUS AS NEEDED
Status: CANCELLED | OUTPATIENT
Start: 2021-09-08

## 2021-09-08 RX ORDER — ALBUTEROL SULFATE 0.83 MG/ML
2.5 SOLUTION RESPIRATORY (INHALATION) AS NEEDED
Status: CANCELLED
Start: 2021-09-08

## 2021-09-08 RX ORDER — HEPARIN 100 UNIT/ML
300-500 SYRINGE INTRAVENOUS AS NEEDED
Status: CANCELLED
Start: 2021-09-10

## 2021-09-08 RX ORDER — SODIUM CHLORIDE 9 MG/ML
10 INJECTION INTRAMUSCULAR; INTRAVENOUS; SUBCUTANEOUS AS NEEDED
Status: CANCELLED | OUTPATIENT
Start: 2021-09-10

## 2021-09-08 RX ORDER — ONDANSETRON 2 MG/ML
8 INJECTION INTRAMUSCULAR; INTRAVENOUS ONCE
Status: COMPLETED | OUTPATIENT
Start: 2021-09-08 | End: 2021-09-08

## 2021-09-08 RX ORDER — HYDROCORTISONE SODIUM SUCCINATE 100 MG/2ML
100 INJECTION, POWDER, FOR SOLUTION INTRAMUSCULAR; INTRAVENOUS AS NEEDED
Status: CANCELLED | OUTPATIENT
Start: 2021-09-08

## 2021-09-08 RX ORDER — ACETAMINOPHEN 325 MG/1
650 TABLET ORAL AS NEEDED
Status: CANCELLED
Start: 2021-09-08

## 2021-09-08 RX ORDER — FLUOROURACIL 50 MG/ML
300 INJECTION, SOLUTION INTRAVENOUS ONCE
Status: COMPLETED | OUTPATIENT
Start: 2021-09-08 | End: 2021-09-08

## 2021-09-08 RX ORDER — SODIUM CHLORIDE 0.9 % (FLUSH) 0.9 %
10 SYRINGE (ML) INJECTION AS NEEDED
Status: ACTIVE | OUTPATIENT
Start: 2021-09-08 | End: 2021-09-08

## 2021-09-08 RX ORDER — SODIUM CHLORIDE 9 MG/ML
1000 INJECTION, SOLUTION INTRAVENOUS ONCE
Status: CANCELLED
Start: 2021-09-10 | End: 2021-09-10

## 2021-09-08 RX ORDER — SODIUM CHLORIDE 9 MG/ML
10 INJECTION INTRAMUSCULAR; INTRAVENOUS; SUBCUTANEOUS AS NEEDED
Status: CANCELLED | OUTPATIENT
Start: 2021-09-08

## 2021-09-08 RX ORDER — DIPHENHYDRAMINE HYDROCHLORIDE 50 MG/ML
50 INJECTION, SOLUTION INTRAMUSCULAR; INTRAVENOUS AS NEEDED
Status: CANCELLED
Start: 2021-09-08

## 2021-09-08 RX ORDER — SODIUM CHLORIDE 0.9 % (FLUSH) 0.9 %
10 SYRINGE (ML) INJECTION AS NEEDED
Status: CANCELLED | OUTPATIENT
Start: 2021-09-10

## 2021-09-08 RX ORDER — DEXTROSE MONOHYDRATE 50 MG/ML
25 INJECTION, SOLUTION INTRAVENOUS CONTINUOUS
Status: ACTIVE | OUTPATIENT
Start: 2021-09-08 | End: 2021-09-08

## 2021-09-08 RX ORDER — ATROPINE SULFATE 0.4 MG/ML
0.4 INJECTION, SOLUTION ENDOTRACHEAL; INTRAMEDULLARY; INTRAMUSCULAR; INTRAVENOUS; SUBCUTANEOUS ONCE
Status: COMPLETED | OUTPATIENT
Start: 2021-09-08 | End: 2021-09-08

## 2021-09-08 RX ADMIN — ATROPINE SULFATE 0.4 MG: 0.4 INJECTION, SOLUTION INTRAMUSCULAR; INTRAVENOUS; SUBCUTANEOUS at 12:46

## 2021-09-08 RX ADMIN — ONDANSETRON 8 MG: 2 INJECTION INTRAMUSCULAR; INTRAVENOUS at 09:42

## 2021-09-08 RX ADMIN — FLUOROURACIL 3500 MG: 50 INJECTION, SOLUTION INTRAVENOUS at 14:40

## 2021-09-08 RX ADMIN — OXALIPLATIN 127 MG: 5 INJECTION, SOLUTION INTRAVENOUS at 10:45

## 2021-09-08 RX ADMIN — FOSAPREPITANT 150 MG: 150 INJECTION, POWDER, LYOPHILIZED, FOR SOLUTION INTRAVENOUS at 10:05

## 2021-09-08 RX ADMIN — FLUOROURACIL 585 MG: 50 INJECTION, SOLUTION INTRAVENOUS at 14:35

## 2021-09-08 RX ADMIN — DEXTROSE MONOHYDRATE 25 ML/HR: 5 INJECTION, SOLUTION INTRAVENOUS at 09:15

## 2021-09-08 RX ADMIN — IRINOTECAN HYDROCHLORIDE 234 MG: 20 INJECTION, SOLUTION INTRAVENOUS at 12:55

## 2021-09-08 RX ADMIN — Medication 10 ML: at 09:15

## 2021-09-08 RX ADMIN — LEUCOVORIN CALCIUM 780 MG: 350 INJECTION, POWDER, LYOPHILIZED, FOR SOLUTION INTRAMUSCULAR; INTRAVENOUS at 12:55

## 2021-09-08 RX ADMIN — DEXAMETHASONE SODIUM PHOSPHATE 12 MG: 4 INJECTION, SOLUTION INTRAMUSCULAR; INTRAVENOUS at 09:43

## 2021-09-08 NOTE — PROGRESS NOTES
Arrived to the [facility]. Assessment completed and labs reviewed. Pre meds and Folfirinox infusion completed. Patient tolerated well. Any issues or concerns during appointment: none. Patient aware of next infusion appointment on 09/10/2021 at 1700. Discharged ambulatory.

## 2021-09-09 ENCOUNTER — HOSPITAL ENCOUNTER (OUTPATIENT)
Dept: RADIATION ONCOLOGY | Age: 66
Discharge: HOME | End: 2021-09-09
Payer: MEDICARE

## 2021-09-09 PROCEDURE — 77470 SPECIAL RADIATION TREATMENT: CPT

## 2021-09-10 ENCOUNTER — HOSPITAL ENCOUNTER (OUTPATIENT)
Dept: INFUSION THERAPY | Age: 66
Discharge: HOME | End: 2021-09-10
Payer: MEDICARE

## 2021-09-10 VITALS
TEMPERATURE: 97.5 F | HEART RATE: 71 BPM | OXYGEN SATURATION: 97 % | DIASTOLIC BLOOD PRESSURE: 85 MMHG | SYSTOLIC BLOOD PRESSURE: 153 MMHG | RESPIRATION RATE: 16 BRPM

## 2021-09-10 DIAGNOSIS — E86.0 DEHYDRATION: Primary | ICD-10-CM

## 2021-09-10 DIAGNOSIS — D70.8 OTHER NEUTROPENIA (HCC): ICD-10-CM

## 2021-09-10 DIAGNOSIS — C15.5 MALIGNANT NEOPLASM OF LOWER THIRD OF ESOPHAGUS (HCC): ICD-10-CM

## 2021-09-10 DIAGNOSIS — C25.1 MALIGNANT NEOPLASM OF BODY OF PANCREAS (HCC): ICD-10-CM

## 2021-09-10 PROCEDURE — 96523 IRRIG DRUG DELIVERY DEVICE: CPT

## 2021-09-10 RX ORDER — SODIUM CHLORIDE 0.9 % (FLUSH) 0.9 %
10 SYRINGE (ML) INJECTION AS NEEDED
Status: ACTIVE | OUTPATIENT
Start: 2021-09-10 | End: 2021-09-10

## 2021-09-10 RX ADMIN — Medication 10 ML: at 11:10

## 2021-09-10 NOTE — PERIOP NOTES
Patient verified name, , and procedure. Type: 1a; abbreviated assessment per anesthesia guidelines  Labs per surgeon: none  Labs per anesthesia: none    A negative Covid swab result is required to proceed with surgery; appointments are made by the surgeon office and test should be collected 7 days prior to surgery. The testing center is located at the . [address], [place]. Testing on 9/10/21    Instructed pt that they will be notified by the doctor office for time of arrival to GI lab. If any questions please call the GI lab at [phone]. Follow diet and prep instructions per office. May have clear liquids until 4 hours prior to time of arrival.    Yorkshire Roxo or shower the night before and the am of surgery with antibacterial soap. No lotions, oils, powders, cologne on skin. No make up, eye make up or jewelry. Wear loose fitting comfortable, clean clothing. Must have adult present in building the entire time . Medications for the day of procedure  Omeprazole     The following discharge instructions reviewed with patient: medication given during procedure may cause drowsiness for several hours, therefore, do not drive or operate machinery for remainder of the day, no alcohol on the day of your procedure, resume regular diet and activity unless otherwise directed, for mild sore throat you may use Cepacol throat lozenges or warm salt water gargles as needed, call your physician for any problems or questions. Patient verbalizes understanding.

## 2021-09-10 NOTE — PROGRESS NOTES
[facility] Pump discontinue completed. Patient refused to wait until pump completed. Patient tolerated well. Any issues or concerns during appointment: none. Patient aware of next infusion appointment on 09/11/2021 (date) at 1330 (time). Discharged ambulatory.

## 2021-09-11 ENCOUNTER — HOSPITAL ENCOUNTER (OUTPATIENT)
Dept: INFUSION THERAPY | Age: 66
Discharge: HOME | End: 2021-09-11
Payer: MEDICARE

## 2021-09-11 VITALS
TEMPERATURE: 97.9 F | SYSTOLIC BLOOD PRESSURE: 148 MMHG | HEART RATE: 67 BPM | OXYGEN SATURATION: 98 % | DIASTOLIC BLOOD PRESSURE: 77 MMHG | RESPIRATION RATE: 18 BRPM

## 2021-09-11 DIAGNOSIS — C15.5 MALIGNANT NEOPLASM OF LOWER THIRD OF ESOPHAGUS (HCC): ICD-10-CM

## 2021-09-11 DIAGNOSIS — D70.8 OTHER NEUTROPENIA (HCC): ICD-10-CM

## 2021-09-11 DIAGNOSIS — E86.0 DEHYDRATION: Primary | ICD-10-CM

## 2021-09-11 DIAGNOSIS — C25.1 MALIGNANT NEOPLASM OF BODY OF PANCREAS (HCC): ICD-10-CM

## 2021-09-11 PROCEDURE — 96360 HYDRATION IV INFUSION INIT: CPT

## 2021-09-11 PROCEDURE — 96372 THER/PROPH/DIAG INJ SC/IM: CPT

## 2021-09-11 PROCEDURE — 74011250636 HC RX REV CODE- 250/636: Performed by: NURSE PRACTITIONER

## 2021-09-11 PROCEDURE — 74011250636 HC RX REV CODE- 250/636: Performed by: INTERNAL MEDICINE

## 2021-09-11 RX ORDER — SODIUM CHLORIDE 0.9 % (FLUSH) 0.9 %
10 SYRINGE (ML) INJECTION AS NEEDED
Status: DISCONTINUED | OUTPATIENT
Start: 2021-09-11 | End: 2021-09-12 | Stop reason: HOSPADM

## 2021-09-11 RX ADMIN — Medication 10 ML: at 13:40

## 2021-09-11 RX ADMIN — SODIUM CHLORIDE 1000 ML: 900 INJECTION, SOLUTION INTRAVENOUS at 13:40

## 2021-09-11 RX ADMIN — PEGFILGRASTIM-CBQV 6 MG: 6 INJECTION, SOLUTION SUBCUTANEOUS at 13:41

## 2021-09-11 NOTE — PROGRESS NOTES
Arrived to the [facility]. Udenyca and 1L NS bolus completed. Patient tolerated well. Any issues or concerns during appointment: None. Patient aware of next infusion appointment on September 22nd at 0900. Discharged ambulatory.

## 2021-09-21 ENCOUNTER — HOSPITAL ENCOUNTER (OUTPATIENT)
Dept: LAB | Age: 66
Discharge: HOME | End: 2021-09-21
Payer: MEDICARE

## 2021-09-21 ENCOUNTER — PATIENT OUTREACH (OUTPATIENT)
Dept: CASE MANAGEMENT | Age: 66
End: 2021-09-21

## 2021-09-21 DIAGNOSIS — C25.1 MALIGNANT NEOPLASM OF BODY OF PANCREAS (HCC): ICD-10-CM

## 2021-09-21 LAB
ALBUMIN SERPL-MCNC: 3.4 G/DL (ref 3.2–4.6)
ALBUMIN/GLOB SERPL: 0.9 {RATIO} (ref 1.2–3.5)
ALP SERPL-CCNC: 133 U/L (ref 50–136)
ALT SERPL-CCNC: 16 U/L (ref 12–65)
ANION GAP SERPL CALC-SCNC: 6 MMOL/L (ref 7–16)
AST SERPL-CCNC: 15 U/L (ref 15–37)
BASOPHILS # BLD: 0.1 K/UL (ref 0–0.2)
BASOPHILS NFR BLD: 0 % (ref 0–2)
BILIRUB SERPL-MCNC: 0.2 MG/DL (ref 0.2–1.1)
BUN SERPL-MCNC: 10 MG/DL (ref 8–23)
CALCIUM SERPL-MCNC: 9.3 MG/DL (ref 8.3–10.4)
CANCER AG19-9 SERPL-ACNC: 41.1 U/ML (ref 2–37)
CEA SERPL-MCNC: 11 NG/ML (ref 0–3)
CHLORIDE SERPL-SCNC: 106 MMOL/L (ref 98–107)
CO2 SERPL-SCNC: 28 MMOL/L (ref 21–32)
CREAT SERPL-MCNC: 0.9 MG/DL (ref 0.8–1.5)
DIFFERENTIAL METHOD BLD: ABNORMAL
EOSINOPHIL # BLD: 0.4 K/UL (ref 0–0.8)
EOSINOPHIL NFR BLD: 3 % (ref 0.5–7.8)
ERYTHROCYTE [DISTWIDTH] IN BLOOD BY AUTOMATED COUNT: 14.6 % (ref 11.9–14.6)
GLOBULIN SER CALC-MCNC: 3.8 G/DL (ref 2.3–3.5)
GLUCOSE SERPL-MCNC: 115 MG/DL (ref 65–100)
HCT VFR BLD AUTO: 38.2 %
HGB BLD-MCNC: 12.1 G/DL (ref 13.6–17.2)
IMM GRANULOCYTES # BLD AUTO: 0.3 K/UL (ref 0–0.5)
IMM GRANULOCYTES NFR BLD AUTO: 3 % (ref 0–5)
LYMPHOCYTES # BLD: 1.3 K/UL (ref 0.5–4.6)
LYMPHOCYTES NFR BLD: 11 % (ref 13–44)
MAGNESIUM SERPL-MCNC: 2.1 MG/DL (ref 1.8–2.4)
MCH RBC QN AUTO: 31.3 PG (ref 26.1–32.9)
MCHC RBC AUTO-ENTMCNC: 31.7 G/DL (ref 31.4–35)
MCV RBC AUTO: 99 FL (ref 79.6–97.8)
MONOCYTES # BLD: 1.4 K/UL (ref 0.1–1.3)
MONOCYTES NFR BLD: 12 % (ref 4–12)
NEUTS SEG # BLD: 8.2 K/UL (ref 1.7–8.2)
NEUTS SEG NFR BLD: 71 % (ref 43–78)
NRBC # BLD: 0 K/UL (ref 0–0.2)
PLATELET # BLD AUTO: 152 K/UL (ref 150–450)
PMV BLD AUTO: 9.2 FL (ref 9.4–12.3)
POTASSIUM SERPL-SCNC: 4.1 MMOL/L (ref 3.5–5.1)
PROT SERPL-MCNC: 7.2 G/DL (ref 6.3–8.2)
RBC # BLD AUTO: 3.86 M/UL (ref 4.23–5.6)
SODIUM SERPL-SCNC: 140 MMOL/L (ref 136–145)
WBC # BLD AUTO: 11.7 K/UL (ref 4.3–11.1)

## 2021-09-21 PROCEDURE — 36415 COLL VENOUS BLD VENIPUNCTURE: CPT

## 2021-09-21 PROCEDURE — 85025 COMPLETE CBC W/AUTO DIFF WBC: CPT

## 2021-09-21 PROCEDURE — 82378 CARCINOEMBRYONIC ANTIGEN: CPT

## 2021-09-21 PROCEDURE — 80053 COMPREHEN METABOLIC PANEL: CPT

## 2021-09-21 PROCEDURE — 86301 IMMUNOASSAY TUMOR CA 19-9: CPT

## 2021-09-21 PROCEDURE — 83735 ASSAY OF MAGNESIUM: CPT

## 2021-09-22 ENCOUNTER — HOSPITAL ENCOUNTER (OUTPATIENT)
Dept: INFUSION THERAPY | Age: 66
Discharge: HOME | End: 2021-09-22
Payer: MEDICARE

## 2021-09-22 VITALS
DIASTOLIC BLOOD PRESSURE: 97 MMHG | OXYGEN SATURATION: 96 % | SYSTOLIC BLOOD PRESSURE: 171 MMHG | BODY MASS INDEX: 30.44 KG/M2 | RESPIRATION RATE: 16 BRPM | WEIGHT: 188.6 LBS | HEART RATE: 87 BPM | TEMPERATURE: 98 F

## 2021-09-22 DIAGNOSIS — D70.8 OTHER NEUTROPENIA (HCC): ICD-10-CM

## 2021-09-22 DIAGNOSIS — C25.1 MALIGNANT NEOPLASM OF BODY OF PANCREAS (HCC): ICD-10-CM

## 2021-09-22 DIAGNOSIS — C15.5 MALIGNANT NEOPLASM OF LOWER THIRD OF ESOPHAGUS (HCC): ICD-10-CM

## 2021-09-22 DIAGNOSIS — E86.0 DEHYDRATION: Primary | ICD-10-CM

## 2021-09-22 PROCEDURE — 96375 TX/PRO/DX INJ NEW DRUG ADDON: CPT

## 2021-09-22 PROCEDURE — 74011250636 HC RX REV CODE- 250/636: Performed by: INTERNAL MEDICINE

## 2021-09-22 PROCEDURE — 96417 CHEMO IV INFUS EACH ADDL SEQ: CPT

## 2021-09-22 PROCEDURE — 96415 CHEMO IV INFUSION ADDL HR: CPT

## 2021-09-22 PROCEDURE — 96372 THER/PROPH/DIAG INJ SC/IM: CPT

## 2021-09-22 PROCEDURE — G0498 CHEMO EXTEND IV INFUS W/PUMP: HCPCS

## 2021-09-22 PROCEDURE — 74011000258 HC RX REV CODE- 258: Performed by: INTERNAL MEDICINE

## 2021-09-22 PROCEDURE — 96367 TX/PROPH/DG ADDL SEQ IV INF: CPT

## 2021-09-22 PROCEDURE — 96411 CHEMO IV PUSH ADDL DRUG: CPT

## 2021-09-22 PROCEDURE — 96413 CHEMO IV INFUSION 1 HR: CPT

## 2021-09-22 PROCEDURE — 96368 THER/DIAG CONCURRENT INF: CPT

## 2021-09-22 RX ORDER — ONDANSETRON 2 MG/ML
8 INJECTION INTRAMUSCULAR; INTRAVENOUS ONCE
Status: COMPLETED | OUTPATIENT
Start: 2021-09-22 | End: 2021-09-22

## 2021-09-22 RX ORDER — ATROPINE SULFATE 0.4 MG/ML
0.4 INJECTION, SOLUTION ENDOTRACHEAL; INTRAMEDULLARY; INTRAMUSCULAR; INTRAVENOUS; SUBCUTANEOUS ONCE
Status: COMPLETED | OUTPATIENT
Start: 2021-09-22 | End: 2021-09-22

## 2021-09-22 RX ORDER — SODIUM CHLORIDE 0.9 % (FLUSH) 0.9 %
10 SYRINGE (ML) INJECTION AS NEEDED
Status: ACTIVE | OUTPATIENT
Start: 2021-09-22 | End: 2021-09-22

## 2021-09-22 RX ORDER — DEXTROSE MONOHYDRATE 50 MG/ML
25 INJECTION, SOLUTION INTRAVENOUS CONTINUOUS
Status: ACTIVE | OUTPATIENT
Start: 2021-09-22 | End: 2021-09-22

## 2021-09-22 RX ORDER — FLUOROURACIL 50 MG/ML
300 INJECTION, SOLUTION INTRAVENOUS ONCE
Status: COMPLETED | OUTPATIENT
Start: 2021-09-22 | End: 2021-09-22

## 2021-09-22 RX ADMIN — LEUCOVORIN CALCIUM 780 MG: 350 INJECTION, POWDER, LYOPHILIZED, FOR SOLUTION INTRAMUSCULAR; INTRAVENOUS at 13:17

## 2021-09-22 RX ADMIN — FLUOROURACIL 3500 MG: 50 INJECTION, SOLUTION INTRAVENOUS at 15:04

## 2021-09-22 RX ADMIN — DEXAMETHASONE SODIUM PHOSPHATE 12 MG: 4 INJECTION, SOLUTION INTRAMUSCULAR; INTRAVENOUS at 09:52

## 2021-09-22 RX ADMIN — FOSAPREPITANT 150 MG: 150 INJECTION, POWDER, LYOPHILIZED, FOR SOLUTION INTRAVENOUS at 10:17

## 2021-09-22 RX ADMIN — ATROPINE SULFATE 0.4 MG: 0.4 INJECTION, SOLUTION INTRAMUSCULAR; INTRAVENOUS; SUBCUTANEOUS at 13:03

## 2021-09-22 RX ADMIN — Medication 10 ML: at 14:50

## 2021-09-22 RX ADMIN — ONDANSETRON 8 MG: 2 INJECTION INTRAMUSCULAR; INTRAVENOUS at 09:48

## 2021-09-22 RX ADMIN — IRINOTECAN HYDROCHLORIDE 234 MG: 20 INJECTION, SOLUTION INTRAVENOUS at 13:17

## 2021-09-22 RX ADMIN — OXALIPLATIN 127 MG: 5 INJECTION, SOLUTION INTRAVENOUS at 10:55

## 2021-09-22 RX ADMIN — DEXTROSE MONOHYDRATE 25 ML/HR: 5 INJECTION, SOLUTION INTRAVENOUS at 09:20

## 2021-09-22 RX ADMIN — FLUOROURACIL 585 MG: 50 INJECTION, SOLUTION INTRAVENOUS at 15:00

## 2021-09-22 NOTE — PROGRESS NOTES
Arrived to the infusion center. Assessment done and labs reviewwed. Folfirinox completed without  signs of adverse reaction. . No new issues or concerns voiced during the visit. Aware of his next appointment on 9/24 at 1530  To have pump dc/d. Discharged ambulatory in satisfactory condition.

## 2021-09-24 ENCOUNTER — HOSPITAL ENCOUNTER (OUTPATIENT)
Dept: INFUSION THERAPY | Age: 66
Discharge: HOME | End: 2021-09-24
Payer: MEDICARE

## 2021-09-24 VITALS
RESPIRATION RATE: 16 BRPM | SYSTOLIC BLOOD PRESSURE: 166 MMHG | HEART RATE: 85 BPM | OXYGEN SATURATION: 94 % | DIASTOLIC BLOOD PRESSURE: 89 MMHG

## 2021-09-24 DIAGNOSIS — C15.5 MALIGNANT NEOPLASM OF LOWER THIRD OF ESOPHAGUS (HCC): ICD-10-CM

## 2021-09-24 DIAGNOSIS — C25.1 MALIGNANT NEOPLASM OF BODY OF PANCREAS (HCC): ICD-10-CM

## 2021-09-24 DIAGNOSIS — D70.8 OTHER NEUTROPENIA (HCC): ICD-10-CM

## 2021-09-24 DIAGNOSIS — E86.0 DEHYDRATION: Primary | ICD-10-CM

## 2021-09-24 PROCEDURE — 96360 HYDRATION IV INFUSION INIT: CPT

## 2021-09-24 PROCEDURE — 74011250636 HC RX REV CODE- 250/636: Performed by: INTERNAL MEDICINE

## 2021-09-24 RX ORDER — SODIUM CHLORIDE 9 MG/ML
1000 INJECTION, SOLUTION INTRAVENOUS ONCE
Status: COMPLETED | OUTPATIENT
Start: 2021-09-24 | End: 2021-09-24

## 2021-09-24 RX ORDER — SODIUM CHLORIDE 0.9 % (FLUSH) 0.9 %
10 SYRINGE (ML) INJECTION AS NEEDED
Status: DISCONTINUED | OUTPATIENT
Start: 2021-09-24 | End: 2021-09-25 | Stop reason: HOSPADM

## 2021-09-24 RX ADMIN — Medication 10 ML: at 15:17

## 2021-09-24 RX ADMIN — SODIUM CHLORIDE 1000 ML: 9 INJECTION, SOLUTION INTRAVENOUS at 15:17

## 2021-09-24 RX ADMIN — Medication 10 ML: at 16:18

## 2021-09-24 NOTE — PROGRESS NOTES
Arrived to the [facility]. D/C pump and hydration completed. Patient tolerated wrell. Any issues or concerns during appointment:   Patient arrived with bloody paper towels in mouth from having teeth removed 45 min prior to arrival.  Patient reported that Dr. Jg Lux was aware and gave permission for this. Patient aware of next infusion appointment on 9/25 (date) at 4:00 PM (time). Discharged ambulatory.

## 2021-09-25 ENCOUNTER — HOSPITAL ENCOUNTER (OUTPATIENT)
Dept: INFUSION THERAPY | Age: 66
Discharge: HOME | End: 2021-09-25
Payer: MEDICARE

## 2021-09-25 VITALS
OXYGEN SATURATION: 95 % | SYSTOLIC BLOOD PRESSURE: 122 MMHG | RESPIRATION RATE: 15 BRPM | DIASTOLIC BLOOD PRESSURE: 75 MMHG | TEMPERATURE: 97.8 F | WEIGHT: 183.2 LBS | HEIGHT: 66 IN | HEART RATE: 81 BPM | BODY MASS INDEX: 29.44 KG/M2

## 2021-09-25 DIAGNOSIS — C25.1 MALIGNANT NEOPLASM OF BODY OF PANCREAS (HCC): ICD-10-CM

## 2021-09-25 DIAGNOSIS — E86.0 DEHYDRATION: Primary | ICD-10-CM

## 2021-09-25 DIAGNOSIS — C15.5 MALIGNANT NEOPLASM OF LOWER THIRD OF ESOPHAGUS (HCC): ICD-10-CM

## 2021-09-25 DIAGNOSIS — D70.8 OTHER NEUTROPENIA (HCC): ICD-10-CM

## 2021-09-25 PROCEDURE — 74011250636 HC RX REV CODE- 250/636: Performed by: INTERNAL MEDICINE

## 2021-09-25 PROCEDURE — 96372 THER/PROPH/DIAG INJ SC/IM: CPT

## 2021-09-25 RX ADMIN — PEGFILGRASTIM-CBQV 6 MG: 6 INJECTION, SOLUTION SUBCUTANEOUS at 15:18

## 2021-09-25 NOTE — PROGRESS NOTES
Patient arrived at [facility]. Assessment completed. No needs voiced at this time. Patient tolerated injection well and is aware of next appointment on 10/5/2021 @10. Patient discharged ambulatory.

## 2021-10-05 ENCOUNTER — HOSPITAL ENCOUNTER (OUTPATIENT)
Dept: RADIATION ONCOLOGY | Age: 66
End: 2021-10-05
Payer: MEDICARE

## 2021-10-05 ENCOUNTER — HOSPITAL ENCOUNTER (OUTPATIENT)
Dept: INFUSION THERAPY | Age: 66
Discharge: HOME | End: 2021-10-05
Payer: MEDICARE

## 2021-10-05 ENCOUNTER — HOSPITAL ENCOUNTER (OUTPATIENT)
Dept: RADIATION ONCOLOGY | Age: 66
Discharge: HOME | End: 2021-10-05
Payer: MEDICARE

## 2021-10-05 ENCOUNTER — HOSPITAL ENCOUNTER (OUTPATIENT)
Dept: LAB | Age: 66
Discharge: HOME | End: 2021-10-05
Payer: MEDICARE

## 2021-10-05 DIAGNOSIS — C25.1 MALIGNANT NEOPLASM OF BODY OF PANCREAS (HCC): ICD-10-CM

## 2021-10-05 DIAGNOSIS — D70.8 OTHER NEUTROPENIA (HCC): ICD-10-CM

## 2021-10-05 DIAGNOSIS — E86.0 DEHYDRATION: Primary | ICD-10-CM

## 2021-10-05 DIAGNOSIS — C15.5 MALIGNANT NEOPLASM OF LOWER THIRD OF ESOPHAGUS (HCC): ICD-10-CM

## 2021-10-05 LAB
ALBUMIN SERPL-MCNC: 3.3 G/DL (ref 3.2–4.6)
ALBUMIN/GLOB SERPL: 0.9 {RATIO} (ref 1.2–3.5)
ALP SERPL-CCNC: 137 U/L (ref 50–136)
ALT SERPL-CCNC: 16 U/L (ref 12–65)
ANION GAP SERPL CALC-SCNC: 7 MMOL/L (ref 7–16)
AST SERPL-CCNC: 16 U/L (ref 15–37)
BASOPHILS # BLD: 0 K/UL (ref 0–0.2)
BASOPHILS NFR BLD: 1 % (ref 0–2)
BILIRUB SERPL-MCNC: 0.3 MG/DL (ref 0.2–1.1)
BUN SERPL-MCNC: 7 MG/DL (ref 8–23)
CALCIUM SERPL-MCNC: 8.9 MG/DL (ref 8.3–10.4)
CEA SERPL-MCNC: 16.8 NG/ML (ref 0–3)
CHLORIDE SERPL-SCNC: 107 MMOL/L (ref 98–107)
CO2 SERPL-SCNC: 29 MMOL/L (ref 21–32)
CREAT SERPL-MCNC: 0.7 MG/DL (ref 0.8–1.5)
DIFFERENTIAL METHOD BLD: ABNORMAL
EOSINOPHIL # BLD: 0.2 K/UL (ref 0–0.8)
EOSINOPHIL NFR BLD: 3 % (ref 0.5–7.8)
ERYTHROCYTE [DISTWIDTH] IN BLOOD BY AUTOMATED COUNT: 14.6 % (ref 11.9–14.6)
GLOBULIN SER CALC-MCNC: 3.7 G/DL (ref 2.3–3.5)
GLUCOSE SERPL-MCNC: 91 MG/DL (ref 65–100)
HCT VFR BLD AUTO: 37.4 %
HGB BLD-MCNC: 11.7 G/DL (ref 13.6–17.2)
IMM GRANULOCYTES # BLD AUTO: 0.1 K/UL (ref 0–0.5)
IMM GRANULOCYTES NFR BLD AUTO: 2 % (ref 0–5)
LYMPHOCYTES # BLD: 0.9 K/UL (ref 0.5–4.6)
LYMPHOCYTES NFR BLD: 11 % (ref 13–44)
MAGNESIUM SERPL-MCNC: 1.8 MG/DL (ref 1.8–2.4)
MCH RBC QN AUTO: 30.5 PG (ref 26.1–32.9)
MCHC RBC AUTO-ENTMCNC: 31.3 G/DL (ref 31.4–35)
MCV RBC AUTO: 97.7 FL (ref 79.6–97.8)
MONOCYTES # BLD: 1.2 K/UL (ref 0.1–1.3)
MONOCYTES NFR BLD: 15 % (ref 4–12)
NEUTS SEG # BLD: 5.9 K/UL (ref 1.7–8.2)
NEUTS SEG NFR BLD: 70 % (ref 43–78)
NRBC # BLD: 0 K/UL (ref 0–0.2)
PLATELET # BLD AUTO: 123 K/UL (ref 150–450)
PMV BLD AUTO: 10 FL (ref 9.4–12.3)
POTASSIUM SERPL-SCNC: 3.8 MMOL/L (ref 3.5–5.1)
PROT SERPL-MCNC: 7 G/DL (ref 6.3–8.2)
RBC # BLD AUTO: 3.83 M/UL (ref 4.23–5.6)
SODIUM SERPL-SCNC: 143 MMOL/L (ref 136–145)
WBC # BLD AUTO: 8.5 K/UL (ref 4.3–11.1)

## 2021-10-05 PROCEDURE — 96411 CHEMO IV PUSH ADDL DRUG: CPT

## 2021-10-05 PROCEDURE — 80053 COMPREHEN METABOLIC PANEL: CPT

## 2021-10-05 PROCEDURE — 77334 RADIATION TREATMENT AID(S): CPT

## 2021-10-05 PROCEDURE — 96413 CHEMO IV INFUSION 1 HR: CPT

## 2021-10-05 PROCEDURE — 96375 TX/PRO/DX INJ NEW DRUG ADDON: CPT

## 2021-10-05 PROCEDURE — 96367 TX/PROPH/DG ADDL SEQ IV INF: CPT

## 2021-10-05 PROCEDURE — 96417 CHEMO IV INFUS EACH ADDL SEQ: CPT

## 2021-10-05 PROCEDURE — 85025 COMPLETE CBC W/AUTO DIFF WBC: CPT

## 2021-10-05 PROCEDURE — 82378 CARCINOEMBRYONIC ANTIGEN: CPT

## 2021-10-05 PROCEDURE — 83735 ASSAY OF MAGNESIUM: CPT

## 2021-10-05 PROCEDURE — 96372 THER/PROPH/DIAG INJ SC/IM: CPT

## 2021-10-05 PROCEDURE — 74011250636 HC RX REV CODE- 250/636: Performed by: INTERNAL MEDICINE

## 2021-10-05 PROCEDURE — 96368 THER/DIAG CONCURRENT INF: CPT

## 2021-10-05 PROCEDURE — 36415 COLL VENOUS BLD VENIPUNCTURE: CPT

## 2021-10-05 PROCEDURE — 74011000258 HC RX REV CODE- 258: Performed by: INTERNAL MEDICINE

## 2021-10-05 PROCEDURE — 96415 CHEMO IV INFUSION ADDL HR: CPT

## 2021-10-05 PROCEDURE — G0498 CHEMO EXTEND IV INFUS W/PUMP: HCPCS

## 2021-10-05 RX ORDER — DEXTROSE MONOHYDRATE 50 MG/ML
25 INJECTION, SOLUTION INTRAVENOUS CONTINUOUS
Status: DISCONTINUED | OUTPATIENT
Start: 2021-10-05 | End: 2021-10-06 | Stop reason: HOSPADM

## 2021-10-05 RX ORDER — FLUOROURACIL 50 MG/ML
300 INJECTION, SOLUTION INTRAVENOUS ONCE
Status: COMPLETED | OUTPATIENT
Start: 2021-10-05 | End: 2021-10-05

## 2021-10-05 RX ORDER — SODIUM CHLORIDE 0.9 % (FLUSH) 0.9 %
10 SYRINGE (ML) INJECTION AS NEEDED
Status: DISCONTINUED | OUTPATIENT
Start: 2021-10-05 | End: 2021-10-06 | Stop reason: HOSPADM

## 2021-10-05 RX ORDER — ATROPINE SULFATE 0.4 MG/ML
0.4 INJECTION, SOLUTION ENDOTRACHEAL; INTRAMEDULLARY; INTRAMUSCULAR; INTRAVENOUS; SUBCUTANEOUS ONCE
Status: COMPLETED | OUTPATIENT
Start: 2021-10-05 | End: 2021-10-05

## 2021-10-05 RX ORDER — ONDANSETRON 2 MG/ML
8 INJECTION INTRAMUSCULAR; INTRAVENOUS ONCE
Status: COMPLETED | OUTPATIENT
Start: 2021-10-05 | End: 2021-10-05

## 2021-10-05 RX ADMIN — FLUOROURACIL 585 MG: 50 INJECTION, SOLUTION INTRAVENOUS at 17:03

## 2021-10-05 RX ADMIN — DEXTROSE MONOHYDRATE 25 ML/HR: 5 INJECTION, SOLUTION INTRAVENOUS at 12:16

## 2021-10-05 RX ADMIN — DEXAMETHASONE SODIUM PHOSPHATE 12 MG: 4 INJECTION, SOLUTION INTRAMUSCULAR; INTRAVENOUS at 12:30

## 2021-10-05 RX ADMIN — ONDANSETRON 8 MG: 2 INJECTION INTRAMUSCULAR; INTRAVENOUS at 12:18

## 2021-10-05 RX ADMIN — FLUOROURACIL 3500 MG: 50 INJECTION, SOLUTION INTRAVENOUS at 17:03

## 2021-10-05 RX ADMIN — LEUCOVORIN CALCIUM 780 MG: 350 INJECTION, POWDER, LYOPHILIZED, FOR SOLUTION INTRAMUSCULAR; INTRAVENOUS at 15:27

## 2021-10-05 RX ADMIN — OXALIPLATIN 127 MG: 5 INJECTION, SOLUTION INTRAVENOUS at 13:20

## 2021-10-05 RX ADMIN — IRINOTECAN HYDROCHLORIDE 234 MG: 20 INJECTION, SOLUTION INTRAVENOUS at 15:27

## 2021-10-05 RX ADMIN — Medication 10 ML: at 12:16

## 2021-10-05 RX ADMIN — ATROPINE SULFATE 0.4 MG: 0.4 INJECTION, SOLUTION INTRAMUSCULAR; INTRAVENOUS; SUBCUTANEOUS at 15:20

## 2021-10-05 RX ADMIN — FOSAPREPITANT 150 MG: 150 INJECTION, POWDER, LYOPHILIZED, FOR SOLUTION INTRAVENOUS at 12:50

## 2021-10-05 NOTE — PROGRESS NOTES
Patient arrived ambulatory to infusion area. C16D1 of Folfirinox completed. Patient tolerated well. Discharged home ambulatory with elastomeric pump. Tubing unclamped. Patient aware of pump d/c appt on 10/7.

## 2021-10-05 NOTE — ADDENDUM NOTE
Encounter addended by: Umm Edwards RPH on: 10/5/2021 12:08 PM   Actions taken: i-Dorcas created or edited

## 2021-10-07 ENCOUNTER — HOSPITAL ENCOUNTER (OUTPATIENT)
Dept: INFUSION THERAPY | Age: 66
Discharge: HOME | End: 2021-10-07
Payer: MEDICARE

## 2021-10-07 ENCOUNTER — HOSPITAL ENCOUNTER (OUTPATIENT)
Dept: RADIATION ONCOLOGY | Age: 66
Discharge: HOME | End: 2021-10-07
Payer: MEDICARE

## 2021-10-07 VITALS
HEART RATE: 76 BPM | TEMPERATURE: 97.8 F | SYSTOLIC BLOOD PRESSURE: 168 MMHG | DIASTOLIC BLOOD PRESSURE: 91 MMHG | OXYGEN SATURATION: 96 % | RESPIRATION RATE: 16 BRPM

## 2021-10-07 DIAGNOSIS — D70.8 OTHER NEUTROPENIA (HCC): ICD-10-CM

## 2021-10-07 DIAGNOSIS — C15.5 MALIGNANT NEOPLASM OF LOWER THIRD OF ESOPHAGUS (HCC): ICD-10-CM

## 2021-10-07 DIAGNOSIS — C25.1 MALIGNANT NEOPLASM OF BODY OF PANCREAS (HCC): ICD-10-CM

## 2021-10-07 DIAGNOSIS — E86.0 DEHYDRATION: Primary | ICD-10-CM

## 2021-10-07 PROCEDURE — 74011250636 HC RX REV CODE- 250/636: Performed by: INTERNAL MEDICINE

## 2021-10-07 PROCEDURE — 96360 HYDRATION IV INFUSION INIT: CPT

## 2021-10-07 PROCEDURE — 77301 RADIOTHERAPY DOSE PLAN IMRT: CPT

## 2021-10-07 PROCEDURE — 77399 UNLISTED PX MED RADJ PHYSICS: CPT

## 2021-10-07 PROCEDURE — 77300 RADIATION THERAPY DOSE PLAN: CPT

## 2021-10-07 PROCEDURE — 77293 RESPIRATOR MOTION MGMT SIMUL: CPT

## 2021-10-07 RX ORDER — SODIUM CHLORIDE 0.9 % (FLUSH) 0.9 %
10 SYRINGE (ML) INJECTION AS NEEDED
Status: DISCONTINUED | OUTPATIENT
Start: 2021-10-07 | End: 2021-10-08 | Stop reason: HOSPADM

## 2021-10-07 RX ORDER — SODIUM CHLORIDE 9 MG/ML
1000 INJECTION, SOLUTION INTRAVENOUS ONCE
Status: COMPLETED | OUTPATIENT
Start: 2021-10-07 | End: 2021-10-07

## 2021-10-07 RX ADMIN — SODIUM CHLORIDE 1000 ML: 9 INJECTION, SOLUTION INTRAVENOUS at 16:00

## 2021-10-07 RX ADMIN — Medication 10 ML: at 17:02

## 2021-10-07 NOTE — PROGRESS NOTES
Arrived to the [facility] ambulatory. CI 5fu pump removed and 1lt NS bolus completed. Patient tolerated well. Any issues or concerns during appointment: no.  Patient aware of next infusion appointment on  10/8 at 1600  Discharged to home ambulatory.

## 2021-10-08 PROCEDURE — 77338 DESIGN MLC DEVICE FOR IMRT: CPT

## 2021-10-11 ENCOUNTER — HOSPITAL ENCOUNTER (OUTPATIENT)
Dept: RADIATION ONCOLOGY | Age: 66
Discharge: HOME | End: 2021-10-11
Payer: MEDICARE

## 2021-10-11 PROCEDURE — 77373 STRTCTC BDY RAD THER TX DLVR: CPT

## 2021-10-12 ENCOUNTER — HOSPITAL ENCOUNTER (OUTPATIENT)
Dept: RADIATION ONCOLOGY | Age: 66
Discharge: HOME | End: 2021-10-12
Payer: MEDICARE

## 2021-10-12 PROCEDURE — 77373 STRTCTC BDY RAD THER TX DLVR: CPT

## 2021-10-13 ENCOUNTER — HOSPITAL ENCOUNTER (OUTPATIENT)
Dept: RADIATION ONCOLOGY | Age: 66
Discharge: HOME | End: 2021-10-13
Payer: MEDICARE

## 2021-10-13 PROCEDURE — 77373 STRTCTC BDY RAD THER TX DLVR: CPT

## 2021-10-14 ENCOUNTER — HOSPITAL ENCOUNTER (OUTPATIENT)
Dept: RADIATION ONCOLOGY | Age: 66
Discharge: HOME | End: 2021-10-14
Payer: MEDICARE

## 2021-10-14 PROCEDURE — 77373 STRTCTC BDY RAD THER TX DLVR: CPT

## 2021-10-15 ENCOUNTER — HOSPITAL ENCOUNTER (OUTPATIENT)
Dept: RADIATION ONCOLOGY | Age: 66
Discharge: HOME | End: 2021-10-15
Payer: MEDICARE

## 2021-10-15 VITALS
OXYGEN SATURATION: 97 % | BODY MASS INDEX: 29.62 KG/M2 | RESPIRATION RATE: 16 BRPM | DIASTOLIC BLOOD PRESSURE: 91 MMHG | WEIGHT: 183.5 LBS | HEART RATE: 81 BPM | SYSTOLIC BLOOD PRESSURE: 158 MMHG | TEMPERATURE: 98.4 F

## 2021-10-15 PROCEDURE — 77336 RADIATION PHYSICS CONSULT: CPT

## 2021-10-15 PROCEDURE — 77373 STRTCTC BDY RAD THER TX DLVR: CPT

## 2021-10-15 NOTE — PROGRESS NOTES
Patient: Erica Gracia MRN: [id number]  SSN: [id number]    YOB: 1955  Age: 78 y.o. Sex: male      DIAGNOSIS:  Oligoprogressive metastatic pancreatic cancer    TREATMENT SITE:  Pancreas    DOSE and FRACTIONATION:  4000 of 4000cGy; 5 of 5 fractions    INTERVAL HISTORY:  Erica Gracia is a 78 y.o. male being treated for oligoprogressive pancreatic cancer. Week 1:  No complaints.     OBJECTIVE:  NAD  Visit Vitals  BP (!) 158/91 (BP 1 Location: Left upper arm, BP Patient Position: Sitting)   Pulse 81   Temp 98.4 °F (36.9 °C)   Resp 16   Wt 83.2 kg (183 lb 8 oz)   SpO2 97%   BMI 29.62 kg/m²       Lab Results   Component Value Date/Time    Sodium 143 10/05/2021 10:41 AM    Potassium 3.8 10/05/2021 10:41 AM    Chloride 107 10/05/2021 10:41 AM    CO2 29 10/05/2021 10:41 AM    Anion gap 7 10/05/2021 10:41 AM    Glucose 91 10/05/2021 10:41 AM    BUN 7 (L) 10/05/2021 10:41 AM    Creatinine 0.70 (L) 10/05/2021 10:41 AM    GFR est AA >60 10/05/2021 10:41 AM    GFR est non-AA >60 10/05/2021 10:41 AM    Calcium 8.9 10/05/2021 10:41 AM    Magnesium 1.8 10/05/2021 10:41 AM    Albumin 3.3 10/05/2021 10:41 AM    Protein, total 7.0 10/05/2021 10:41 AM    Globulin 3.7 (H) 10/05/2021 10:41 AM    A-G Ratio 0.9 (L) 10/05/2021 10:41 AM    ALT (SGPT) 16 10/05/2021 10:41 AM     Lab Results   Component Value Date/Time    WBC 8.5 10/05/2021 10:41 AM    HGB 11.7 (L) 10/05/2021 10:41 AM    HCT 37.4 10/05/2021 10:41 AM    PLATELET 425 (L) 87/51/7922 10:41 AM       ASSESSMENT and PLAN:  Erica Gracia is tolerating radiation as anticipated for the current dose and fraction.    - He will resume chemotherapy 10/19/21  - Follow up in radiation oncology in 3 months or sooner as needed    Poornima Mcnally MD   October 15, 2021

## 2021-10-19 ENCOUNTER — HOSPITAL ENCOUNTER (OUTPATIENT)
Dept: LAB | Age: 66
Discharge: HOME | End: 2021-10-19
Payer: MEDICARE

## 2021-10-19 ENCOUNTER — HOSPITAL ENCOUNTER (OUTPATIENT)
Dept: INFUSION THERAPY | Age: 66
Discharge: HOME | End: 2021-10-19

## 2021-10-19 ENCOUNTER — PATIENT OUTREACH (OUTPATIENT)
Dept: CASE MANAGEMENT | Age: 66
End: 2021-10-19

## 2021-10-19 DIAGNOSIS — C25.1 MALIGNANT NEOPLASM OF BODY OF PANCREAS (HCC): ICD-10-CM

## 2021-10-19 LAB
ALBUMIN SERPL-MCNC: 3.3 G/DL (ref 3.2–4.6)
ALBUMIN/GLOB SERPL: 0.8 {RATIO} (ref 1.2–3.5)
ALP SERPL-CCNC: 103 U/L (ref 50–136)
ALT SERPL-CCNC: 18 U/L (ref 12–65)
ANION GAP SERPL CALC-SCNC: 5 MMOL/L (ref 7–16)
AST SERPL-CCNC: 15 U/L (ref 15–37)
BASOPHILS # BLD: 0 K/UL (ref 0–0.2)
BASOPHILS NFR BLD: 1 % (ref 0–2)
BILIRUB SERPL-MCNC: 0.4 MG/DL (ref 0.2–1.1)
BUN SERPL-MCNC: 6 MG/DL (ref 8–23)
CALCIUM SERPL-MCNC: 9.5 MG/DL (ref 8.3–10.4)
CANCER AG19-9 SERPL-ACNC: 44.5 U/ML (ref 2–37)
CEA SERPL-MCNC: 18.2 NG/ML (ref 0–3)
CHLORIDE SERPL-SCNC: 106 MMOL/L (ref 98–107)
CO2 SERPL-SCNC: 29 MMOL/L (ref 21–32)
CREAT SERPL-MCNC: 0.7 MG/DL (ref 0.8–1.5)
DIFFERENTIAL METHOD BLD: ABNORMAL
EOSINOPHIL # BLD: 0.3 K/UL (ref 0–0.8)
EOSINOPHIL NFR BLD: 10 % (ref 0.5–7.8)
ERYTHROCYTE [DISTWIDTH] IN BLOOD BY AUTOMATED COUNT: 13.9 % (ref 11.9–14.6)
GLOBULIN SER CALC-MCNC: 4 G/DL (ref 2.3–3.5)
GLUCOSE SERPL-MCNC: 153 MG/DL (ref 65–100)
HCT VFR BLD AUTO: 38.2 %
HGB BLD-MCNC: 12.2 G/DL (ref 13.6–17.2)
IMM GRANULOCYTES # BLD AUTO: 0 K/UL (ref 0–0.5)
IMM GRANULOCYTES NFR BLD AUTO: 0 % (ref 0–5)
LYMPHOCYTES # BLD: 0.4 K/UL (ref 0.5–4.6)
LYMPHOCYTES NFR BLD: 14 % (ref 13–44)
MAGNESIUM SERPL-MCNC: 1.9 MG/DL (ref 1.8–2.4)
MCH RBC QN AUTO: 30.6 PG (ref 26.1–32.9)
MCHC RBC AUTO-ENTMCNC: 31.9 G/DL (ref 31.4–35)
MCV RBC AUTO: 95.7 FL (ref 79.6–97.8)
MONOCYTES # BLD: 0.8 K/UL (ref 0.1–1.3)
MONOCYTES NFR BLD: 25 % (ref 4–12)
NEUTS SEG # BLD: 1.5 K/UL (ref 1.7–8.2)
NEUTS SEG NFR BLD: 50 % (ref 43–78)
NRBC # BLD: 0 K/UL (ref 0–0.2)
PLATELET # BLD AUTO: 100 K/UL (ref 150–450)
PMV BLD AUTO: 9.2 FL (ref 9.4–12.3)
POTASSIUM SERPL-SCNC: 3 MMOL/L (ref 3.5–5.1)
PROT SERPL-MCNC: 7.3 G/DL (ref 6.3–8.2)
RBC # BLD AUTO: 3.99 M/UL (ref 4.23–5.6)
SODIUM SERPL-SCNC: 140 MMOL/L (ref 136–145)
WBC # BLD AUTO: 3 K/UL (ref 4.3–11.1)

## 2021-10-19 PROCEDURE — 85025 COMPLETE CBC W/AUTO DIFF WBC: CPT

## 2021-10-19 PROCEDURE — 86301 IMMUNOASSAY TUMOR CA 19-9: CPT

## 2021-10-19 PROCEDURE — 83735 ASSAY OF MAGNESIUM: CPT

## 2021-10-19 PROCEDURE — 36415 COLL VENOUS BLD VENIPUNCTURE: CPT

## 2021-10-19 PROCEDURE — 80053 COMPREHEN METABOLIC PANEL: CPT

## 2021-10-19 PROCEDURE — 82378 CARCINOEMBRYONIC ANTIGEN: CPT

## 2021-10-19 NOTE — PROGRESS NOTES
10/19/21 saw pt today with Staci Loo NP for pre chemo cycle 17 FOLFIRINOX. He completed radiation on 10/15, reporting fatigue and some slight nausea. Per pt request will delay 2 weeks to gain some strength prior to resuming. Encouraged to call with any concerns. Navigation will continue to follow.

## 2021-10-22 ENCOUNTER — HOSPITAL ENCOUNTER (OUTPATIENT)
Dept: INFUSION THERAPY | Age: 66
End: 2021-10-22

## 2021-11-02 ENCOUNTER — HOSPITAL ENCOUNTER (OUTPATIENT)
Dept: INFUSION THERAPY | Age: 66
Discharge: HOME | End: 2021-11-02
Payer: MEDICARE

## 2021-11-02 ENCOUNTER — PATIENT OUTREACH (OUTPATIENT)
Dept: CASE MANAGEMENT | Age: 66
End: 2021-11-02

## 2021-11-02 DIAGNOSIS — C15.5 MALIGNANT NEOPLASM OF LOWER THIRD OF ESOPHAGUS (HCC): ICD-10-CM

## 2021-11-02 DIAGNOSIS — E86.0 DEHYDRATION: Primary | ICD-10-CM

## 2021-11-02 DIAGNOSIS — C25.1 MALIGNANT NEOPLASM OF BODY OF PANCREAS (HCC): ICD-10-CM

## 2021-11-02 DIAGNOSIS — D70.8 OTHER NEUTROPENIA (HCC): ICD-10-CM

## 2021-11-02 LAB
ALBUMIN SERPL-MCNC: 3.3 G/DL (ref 3.2–4.6)
ALBUMIN/GLOB SERPL: 0.9 {RATIO} (ref 1.2–3.5)
ALP SERPL-CCNC: 140 U/L (ref 50–136)
ALT SERPL-CCNC: 21 U/L (ref 12–65)
ANION GAP SERPL CALC-SCNC: 5 MMOL/L (ref 7–16)
AST SERPL-CCNC: 24 U/L (ref 15–37)
BASOPHILS # BLD: 0 K/UL (ref 0–0.2)
BASOPHILS NFR BLD: 1 % (ref 0–2)
BILIRUB SERPL-MCNC: 0.3 MG/DL (ref 0.2–1.1)
BUN SERPL-MCNC: 9 MG/DL (ref 8–23)
CALCIUM SERPL-MCNC: 9.2 MG/DL (ref 8.3–10.4)
CHLORIDE SERPL-SCNC: 107 MMOL/L (ref 98–107)
CO2 SERPL-SCNC: 28 MMOL/L (ref 21–32)
CREAT SERPL-MCNC: 0.8 MG/DL (ref 0.8–1.5)
DIFFERENTIAL METHOD BLD: ABNORMAL
EOSINOPHIL # BLD: 0.1 K/UL (ref 0–0.8)
EOSINOPHIL NFR BLD: 4 % (ref 0.5–7.8)
ERYTHROCYTE [DISTWIDTH] IN BLOOD BY AUTOMATED COUNT: 13.4 % (ref 11.9–14.6)
GLOBULIN SER CALC-MCNC: 3.5 G/DL (ref 2.3–3.5)
GLUCOSE SERPL-MCNC: 124 MG/DL (ref 65–100)
HCT VFR BLD AUTO: 37.7 %
HGB BLD-MCNC: 12 G/DL (ref 13.6–17.2)
IMM GRANULOCYTES # BLD AUTO: 0 K/UL (ref 0–0.5)
IMM GRANULOCYTES NFR BLD AUTO: 0 % (ref 0–5)
LYMPHOCYTES # BLD: 0.6 K/UL (ref 0.5–4.6)
LYMPHOCYTES NFR BLD: 15 % (ref 13–44)
MAGNESIUM SERPL-MCNC: 2.1 MG/DL (ref 1.8–2.4)
MCH RBC QN AUTO: 30.6 PG (ref 26.1–32.9)
MCHC RBC AUTO-ENTMCNC: 31.8 G/DL (ref 31.4–35)
MCV RBC AUTO: 96.2 FL (ref 79.6–97.8)
MONOCYTES # BLD: 0.7 K/UL (ref 0.1–1.3)
MONOCYTES NFR BLD: 17 % (ref 4–12)
NEUTS SEG # BLD: 2.6 K/UL (ref 1.7–8.2)
NEUTS SEG NFR BLD: 64 % (ref 43–78)
NRBC # BLD: 0 K/UL (ref 0–0.2)
PLATELET # BLD AUTO: 95 K/UL (ref 150–450)
PMV BLD AUTO: 9.8 FL (ref 9.4–12.3)
POTASSIUM SERPL-SCNC: 3.8 MMOL/L (ref 3.5–5.1)
PROT SERPL-MCNC: 6.8 G/DL (ref 6.3–8.2)
RBC # BLD AUTO: 3.92 M/UL (ref 4.23–5.6)
SODIUM SERPL-SCNC: 140 MMOL/L (ref 136–145)
WBC # BLD AUTO: 4 K/UL (ref 4.3–11.1)

## 2021-11-02 PROCEDURE — 96411 CHEMO IV PUSH ADDL DRUG: CPT

## 2021-11-02 PROCEDURE — 83735 ASSAY OF MAGNESIUM: CPT

## 2021-11-02 PROCEDURE — 96372 THER/PROPH/DIAG INJ SC/IM: CPT

## 2021-11-02 PROCEDURE — 36591 DRAW BLOOD OFF VENOUS DEVICE: CPT

## 2021-11-02 PROCEDURE — 96413 CHEMO IV INFUSION 1 HR: CPT

## 2021-11-02 PROCEDURE — 85025 COMPLETE CBC W/AUTO DIFF WBC: CPT

## 2021-11-02 PROCEDURE — 96368 THER/DIAG CONCURRENT INF: CPT

## 2021-11-02 PROCEDURE — 74011250636 HC RX REV CODE- 250/636: Performed by: INTERNAL MEDICINE

## 2021-11-02 PROCEDURE — 96375 TX/PRO/DX INJ NEW DRUG ADDON: CPT

## 2021-11-02 PROCEDURE — 80053 COMPREHEN METABOLIC PANEL: CPT

## 2021-11-02 PROCEDURE — 96367 TX/PROPH/DG ADDL SEQ IV INF: CPT

## 2021-11-02 PROCEDURE — G0498 CHEMO EXTEND IV INFUS W/PUMP: HCPCS

## 2021-11-02 PROCEDURE — 96417 CHEMO IV INFUS EACH ADDL SEQ: CPT

## 2021-11-02 PROCEDURE — 74011000258 HC RX REV CODE- 258: Performed by: INTERNAL MEDICINE

## 2021-11-02 PROCEDURE — 96415 CHEMO IV INFUSION ADDL HR: CPT

## 2021-11-02 RX ORDER — DEXTROSE MONOHYDRATE 50 MG/ML
25 INJECTION, SOLUTION INTRAVENOUS CONTINUOUS
Status: DISCONTINUED | OUTPATIENT
Start: 2021-11-02 | End: 2021-11-03 | Stop reason: HOSPADM

## 2021-11-02 RX ORDER — SODIUM CHLORIDE 0.9 % (FLUSH) 0.9 %
10 SYRINGE (ML) INJECTION AS NEEDED
Status: ACTIVE | OUTPATIENT
Start: 2021-11-02 | End: 2021-11-02

## 2021-11-02 RX ORDER — FLUOROURACIL 50 MG/ML
300 INJECTION, SOLUTION INTRAVENOUS ONCE
Status: COMPLETED | OUTPATIENT
Start: 2021-11-02 | End: 2021-11-02

## 2021-11-02 RX ORDER — ATROPINE SULFATE 0.4 MG/ML
0.4 INJECTION, SOLUTION ENDOTRACHEAL; INTRAMEDULLARY; INTRAMUSCULAR; INTRAVENOUS; SUBCUTANEOUS ONCE
Status: COMPLETED | OUTPATIENT
Start: 2021-11-02 | End: 2021-11-02

## 2021-11-02 RX ORDER — SODIUM CHLORIDE 0.9 % (FLUSH) 0.9 %
10 SYRINGE (ML) INJECTION AS NEEDED
Status: DISCONTINUED | OUTPATIENT
Start: 2021-11-02 | End: 2021-11-03 | Stop reason: HOSPADM

## 2021-11-02 RX ORDER — ONDANSETRON 2 MG/ML
8 INJECTION INTRAMUSCULAR; INTRAVENOUS ONCE
Status: COMPLETED | OUTPATIENT
Start: 2021-11-02 | End: 2021-11-02

## 2021-11-02 RX ADMIN — ONDANSETRON 8 MG: 2 INJECTION INTRAMUSCULAR; INTRAVENOUS at 12:35

## 2021-11-02 RX ADMIN — FLUOROURACIL 585 MG: 50 INJECTION, SOLUTION INTRAVENOUS at 17:08

## 2021-11-02 RX ADMIN — LEUCOVORIN CALCIUM 780 MG: 350 INJECTION, POWDER, LYOPHILIZED, FOR SOLUTION INTRAMUSCULAR; INTRAVENOUS at 15:35

## 2021-11-02 RX ADMIN — FLUOROURACIL 3500 MG: 50 INJECTION, SOLUTION INTRAVENOUS at 17:13

## 2021-11-02 RX ADMIN — ATROPINE SULFATE 0.4 MG: 0.4 INJECTION, SOLUTION INTRAMUSCULAR; INTRAVENOUS; SUBCUTANEOUS at 15:30

## 2021-11-02 RX ADMIN — DEXTROSE MONOHYDRATE 25 ML/HR: 5 INJECTION, SOLUTION INTRAVENOUS at 12:15

## 2021-11-02 RX ADMIN — IRINOTECAN HYDROCHLORIDE 234 MG: 20 INJECTION, SOLUTION INTRAVENOUS at 15:35

## 2021-11-02 RX ADMIN — Medication 10 ML: at 09:41

## 2021-11-02 RX ADMIN — OXALIPLATIN 97.5 MG: 5 INJECTION, SOLUTION INTRAVENOUS at 13:32

## 2021-11-02 RX ADMIN — DEXAMETHASONE SODIUM PHOSPHATE 12 MG: 4 INJECTION, SOLUTION INTRAMUSCULAR; INTRAVENOUS at 12:36

## 2021-11-02 RX ADMIN — Medication 10 ML: at 12:15

## 2021-11-02 RX ADMIN — FOSAPREPITANT 150 MG: 150 INJECTION, POWDER, LYOPHILIZED, FOR SOLUTION INTRAVENOUS at 12:57

## 2021-11-02 NOTE — PROGRESS NOTES
Patient arrived to port lab for port access and lab draw   [facility] accessed and labs drawn per protocol   Port remains accessed   Patient discharged from port lab ambulatory

## 2021-11-02 NOTE — PROGRESS NOTES
11/2/21 saw pt today with Dr. Karishma George for pre chemo cycle 17 FOLFIRINOX. He is reporting some cold sensitivity over the past 2 weeks. After discussion, will dose reduce oxaliplatin starting this cycle. PO intake is good. Proceed to infusion. Follow up in 2 weeks. Encouraged to call with any concerns. Navigation will continue to follow.

## 2021-11-02 NOTE — PROGRESS NOTES
Received report from [facility]. Folfirinox completed. Patient tolerated well. Any issues or concerns during appointment: none. Patient aware of next infusion appointment on 11/4/21 at 1630. Patient aware of next lab and [facility] office visit on 11/16/21 at 0830. Discharged amb.

## 2021-11-02 NOTE — ADDENDUM NOTE
Encounter addended by: Marvel Fierro on: 11/2/2021 12:21 PM   Actions taken: i-Dorcas created or edited

## 2021-11-02 NOTE — PROGRESS NOTES
Arrived to the [facility]. Oxaliplatin infusing. Patient tolerating well. Any issues or concerns during appointment: no.  Patient aware of next infusion appointment on 11/4/2021 (date) at 4:30 pm (time). Patient aware of next lab and [facility] office visit on 11/16/2021 (date) at 8:30 am (time). Report given to Aron Yancey RN.

## 2021-11-04 ENCOUNTER — HOSPITAL ENCOUNTER (OUTPATIENT)
Dept: INFUSION THERAPY | Age: 66
Discharge: HOME | End: 2021-11-04
Payer: MEDICARE

## 2021-11-04 VITALS
DIASTOLIC BLOOD PRESSURE: 93 MMHG | SYSTOLIC BLOOD PRESSURE: 144 MMHG | WEIGHT: 184.75 LBS | TEMPERATURE: 98.1 F | HEART RATE: 73 BPM | OXYGEN SATURATION: 98 % | BODY MASS INDEX: 29.82 KG/M2 | RESPIRATION RATE: 16 BRPM

## 2021-11-04 DIAGNOSIS — C25.1 MALIGNANT NEOPLASM OF BODY OF PANCREAS (HCC): ICD-10-CM

## 2021-11-04 DIAGNOSIS — T45.1X5A CINV (CHEMOTHERAPY-INDUCED NAUSEA AND VOMITING): ICD-10-CM

## 2021-11-04 DIAGNOSIS — E86.0 DEHYDRATION: Primary | ICD-10-CM

## 2021-11-04 DIAGNOSIS — E83.42 HYPOMAGNESEMIA: ICD-10-CM

## 2021-11-04 DIAGNOSIS — R11.2 CINV (CHEMOTHERAPY-INDUCED NAUSEA AND VOMITING): ICD-10-CM

## 2021-11-04 DIAGNOSIS — E87.6 HYPOKALEMIA: ICD-10-CM

## 2021-11-04 DIAGNOSIS — D70.8 OTHER NEUTROPENIA (HCC): ICD-10-CM

## 2021-11-04 DIAGNOSIS — C15.5 MALIGNANT NEOPLASM OF LOWER THIRD OF ESOPHAGUS (HCC): ICD-10-CM

## 2021-11-04 PROCEDURE — 74011250636 HC RX REV CODE- 250/636: Performed by: INTERNAL MEDICINE

## 2021-11-04 PROCEDURE — 96360 HYDRATION IV INFUSION INIT: CPT

## 2021-11-04 RX ORDER — SODIUM CHLORIDE 0.9 % (FLUSH) 0.9 %
10 SYRINGE (ML) INJECTION AS NEEDED
Status: DISCONTINUED | OUTPATIENT
Start: 2021-11-04 | End: 2021-11-05 | Stop reason: HOSPADM

## 2021-11-04 RX ORDER — SODIUM CHLORIDE 9 MG/ML
1000 INJECTION, SOLUTION INTRAVENOUS ONCE
Status: COMPLETED | OUTPATIENT
Start: 2021-11-04 | End: 2021-11-04

## 2021-11-04 RX ADMIN — SODIUM CHLORIDE 1000 ML: 900 INJECTION, SOLUTION INTRAVENOUS at 15:10

## 2021-11-04 RX ADMIN — Medication 10 ML: at 15:45

## 2021-11-04 RX ADMIN — Medication 10 ML: at 15:10

## 2021-11-04 NOTE — PROGRESS NOTES
Pt arrived ambulatory today at [phone], to have fluorouracil pump removed and IV fluids. Pt requested 1/2 liter of IV fluids, and tolerated without difficulty. Patient discharged via ambulatory accompanied by self. Instructed to notify physician of any problems, questions or concerns. Allowed opportunity for patient/family to ask questions. Verbalized understanding. Next appointment is Nov 5 at  with Harlan 1968.

## 2021-11-05 ENCOUNTER — HOSPITAL ENCOUNTER (OUTPATIENT)
Dept: INFUSION THERAPY | Age: 66
End: 2021-11-05

## 2021-11-05 ENCOUNTER — HOSPITAL ENCOUNTER (OUTPATIENT)
Dept: INFUSION THERAPY | Age: 66
Discharge: HOME | End: 2021-11-05
Payer: MEDICARE

## 2021-11-05 VITALS
HEART RATE: 84 BPM | DIASTOLIC BLOOD PRESSURE: 83 MMHG | TEMPERATURE: 97.5 F | OXYGEN SATURATION: 96 % | SYSTOLIC BLOOD PRESSURE: 143 MMHG | RESPIRATION RATE: 15 BRPM

## 2021-11-05 DIAGNOSIS — D70.8 OTHER NEUTROPENIA (HCC): ICD-10-CM

## 2021-11-05 DIAGNOSIS — E86.0 DEHYDRATION: Primary | ICD-10-CM

## 2021-11-05 DIAGNOSIS — C25.1 MALIGNANT NEOPLASM OF BODY OF PANCREAS (HCC): ICD-10-CM

## 2021-11-05 DIAGNOSIS — C15.5 MALIGNANT NEOPLASM OF LOWER THIRD OF ESOPHAGUS (HCC): ICD-10-CM

## 2021-11-05 PROCEDURE — 74011250636 HC RX REV CODE- 250/636: Performed by: INTERNAL MEDICINE

## 2021-11-05 PROCEDURE — 96372 THER/PROPH/DIAG INJ SC/IM: CPT

## 2021-11-05 RX ADMIN — PEGFILGRASTIM-CBQV 6 MG: 6 INJECTION, SOLUTION SUBCUTANEOUS at 15:50

## 2021-11-05 NOTE — PROGRESS NOTES
Arrived to the [facility]. Udenyca injection completed. Provided education on injection. Patient instructed to report any side affects to ordering provider. Patient tolerated well. Any issues or concerns during appointment: none. Patient aware of next infusion appointment on 11/16/2021 at 0930. Discharged ambulatory.

## 2021-11-16 ENCOUNTER — HOSPITAL ENCOUNTER (OUTPATIENT)
Dept: INFUSION THERAPY | Age: 66
Discharge: HOME | End: 2021-11-16
Payer: MEDICARE

## 2021-11-16 VITALS
OXYGEN SATURATION: 94 % | HEART RATE: 80 BPM | TEMPERATURE: 97.6 F | RESPIRATION RATE: 16 BRPM | SYSTOLIC BLOOD PRESSURE: 158 MMHG | DIASTOLIC BLOOD PRESSURE: 78 MMHG | BODY MASS INDEX: 30.02 KG/M2 | WEIGHT: 186 LBS

## 2021-11-16 DIAGNOSIS — D70.8 OTHER NEUTROPENIA (HCC): ICD-10-CM

## 2021-11-16 DIAGNOSIS — C25.1 MALIGNANT NEOPLASM OF BODY OF PANCREAS (HCC): ICD-10-CM

## 2021-11-16 DIAGNOSIS — E86.0 DEHYDRATION: Primary | ICD-10-CM

## 2021-11-16 DIAGNOSIS — C15.5 MALIGNANT NEOPLASM OF LOWER THIRD OF ESOPHAGUS (HCC): ICD-10-CM

## 2021-11-16 LAB
ALBUMIN SERPL-MCNC: 3.4 G/DL (ref 3.2–4.6)
ALBUMIN/GLOB SERPL: 0.9 {RATIO} (ref 1.2–3.5)
ALP SERPL-CCNC: 183 U/L (ref 50–136)
ALT SERPL-CCNC: 20 U/L (ref 12–65)
ANION GAP SERPL CALC-SCNC: 7 MMOL/L (ref 7–16)
AST SERPL-CCNC: 16 U/L (ref 15–37)
BASOPHILS # BLD: 0 K/UL (ref 0–0.2)
BASOPHILS NFR BLD: 0 % (ref 0–2)
BILIRUB SERPL-MCNC: 0.3 MG/DL (ref 0.2–1.1)
BUN SERPL-MCNC: 10 MG/DL (ref 8–23)
CALCIUM SERPL-MCNC: 9.3 MG/DL (ref 8.3–10.4)
CANCER AG19-9 SERPL-ACNC: 73.9 U/ML (ref 2–37)
CEA SERPL-MCNC: 14.6 NG/ML (ref 0–3)
CHLORIDE SERPL-SCNC: 104 MMOL/L (ref 98–107)
CO2 SERPL-SCNC: 28 MMOL/L (ref 21–32)
CREAT SERPL-MCNC: 0.8 MG/DL (ref 0.8–1.5)
DIFFERENTIAL METHOD BLD: ABNORMAL
EOSINOPHIL # BLD: 0.3 K/UL (ref 0–0.8)
EOSINOPHIL NFR BLD: 3 % (ref 0.5–7.8)
ERYTHROCYTE [DISTWIDTH] IN BLOOD BY AUTOMATED COUNT: 13.2 % (ref 11.9–14.6)
GLOBULIN SER CALC-MCNC: 3.6 G/DL (ref 2.3–3.5)
GLUCOSE SERPL-MCNC: 116 MG/DL (ref 65–100)
HCT VFR BLD AUTO: 38.3 %
HGB BLD-MCNC: 12.2 G/DL (ref 13.6–17.2)
IMM GRANULOCYTES # BLD AUTO: 0.1 K/UL (ref 0–0.5)
IMM GRANULOCYTES NFR BLD AUTO: 1 % (ref 0–5)
LYMPHOCYTES # BLD: 0.6 K/UL (ref 0.5–4.6)
LYMPHOCYTES NFR BLD: 6 % (ref 13–44)
MAGNESIUM SERPL-MCNC: 2.2 MG/DL (ref 1.8–2.4)
MCH RBC QN AUTO: 30.8 PG (ref 26.1–32.9)
MCHC RBC AUTO-ENTMCNC: 31.9 G/DL (ref 31.4–35)
MCV RBC AUTO: 96.7 FL (ref 79.6–97.8)
MONOCYTES # BLD: 1.1 K/UL (ref 0.1–1.3)
MONOCYTES NFR BLD: 11 % (ref 4–12)
NEUTS SEG # BLD: 7.9 K/UL (ref 1.7–8.2)
NEUTS SEG NFR BLD: 80 % (ref 43–78)
NRBC # BLD: 0 K/UL (ref 0–0.2)
PLATELET # BLD AUTO: 112 K/UL (ref 150–450)
PMV BLD AUTO: 9.8 FL (ref 9.4–12.3)
POTASSIUM SERPL-SCNC: 4.2 MMOL/L (ref 3.5–5.1)
PROT SERPL-MCNC: 7 G/DL (ref 6.3–8.2)
RBC # BLD AUTO: 3.96 M/UL (ref 4.23–5.6)
SODIUM SERPL-SCNC: 139 MMOL/L (ref 136–145)
WBC # BLD AUTO: 9.9 K/UL (ref 4.3–11.1)

## 2021-11-16 PROCEDURE — 96413 CHEMO IV INFUSION 1 HR: CPT

## 2021-11-16 PROCEDURE — 36591 DRAW BLOOD OFF VENOUS DEVICE: CPT

## 2021-11-16 PROCEDURE — 96411 CHEMO IV PUSH ADDL DRUG: CPT

## 2021-11-16 PROCEDURE — 96375 TX/PRO/DX INJ NEW DRUG ADDON: CPT

## 2021-11-16 PROCEDURE — 74011250636 HC RX REV CODE- 250/636: Performed by: INTERNAL MEDICINE

## 2021-11-16 PROCEDURE — 99214 OFFICE O/P EST MOD 30 MIN: CPT | Performed by: NURSE PRACTITIONER

## 2021-11-16 PROCEDURE — 96415 CHEMO IV INFUSION ADDL HR: CPT

## 2021-11-16 PROCEDURE — 96368 THER/DIAG CONCURRENT INF: CPT

## 2021-11-16 PROCEDURE — 83735 ASSAY OF MAGNESIUM: CPT

## 2021-11-16 PROCEDURE — 85025 COMPLETE CBC W/AUTO DIFF WBC: CPT

## 2021-11-16 PROCEDURE — 96417 CHEMO IV INFUS EACH ADDL SEQ: CPT

## 2021-11-16 PROCEDURE — 82378 CARCINOEMBRYONIC ANTIGEN: CPT

## 2021-11-16 PROCEDURE — G0498 CHEMO EXTEND IV INFUS W/PUMP: HCPCS

## 2021-11-16 PROCEDURE — 80053 COMPREHEN METABOLIC PANEL: CPT

## 2021-11-16 PROCEDURE — 96367 TX/PROPH/DG ADDL SEQ IV INF: CPT

## 2021-11-16 PROCEDURE — 96372 THER/PROPH/DIAG INJ SC/IM: CPT

## 2021-11-16 PROCEDURE — 74011000258 HC RX REV CODE- 258: Performed by: INTERNAL MEDICINE

## 2021-11-16 PROCEDURE — 86301 IMMUNOASSAY TUMOR CA 19-9: CPT

## 2021-11-16 RX ORDER — SODIUM CHLORIDE 0.9 % (FLUSH) 0.9 %
10 SYRINGE (ML) INJECTION ONCE
Status: COMPLETED | OUTPATIENT
Start: 2021-11-16 | End: 2021-11-16

## 2021-11-16 RX ORDER — DEXTROSE MONOHYDRATE 50 MG/ML
25 INJECTION, SOLUTION INTRAVENOUS CONTINUOUS
Status: DISCONTINUED | OUTPATIENT
Start: 2021-11-16 | End: 2021-11-17 | Stop reason: HOSPADM

## 2021-11-16 RX ORDER — FLUOROURACIL 50 MG/ML
300 INJECTION, SOLUTION INTRAVENOUS ONCE
Status: COMPLETED | OUTPATIENT
Start: 2021-11-16 | End: 2021-11-16

## 2021-11-16 RX ORDER — SODIUM CHLORIDE 0.9 % (FLUSH) 0.9 %
10 SYRINGE (ML) INJECTION AS NEEDED
Status: DISCONTINUED | OUTPATIENT
Start: 2021-11-16 | End: 2021-11-17 | Stop reason: HOSPADM

## 2021-11-16 RX ORDER — ATROPINE SULFATE 0.4 MG/ML
0.4 INJECTION, SOLUTION ENDOTRACHEAL; INTRAMEDULLARY; INTRAMUSCULAR; INTRAVENOUS; SUBCUTANEOUS ONCE
Status: COMPLETED | OUTPATIENT
Start: 2021-11-16 | End: 2021-11-16

## 2021-11-16 RX ORDER — ONDANSETRON 2 MG/ML
8 INJECTION INTRAMUSCULAR; INTRAVENOUS ONCE
Status: COMPLETED | OUTPATIENT
Start: 2021-11-16 | End: 2021-11-16

## 2021-11-16 RX ADMIN — FOSAPREPITANT 150 MG: 150 INJECTION, POWDER, LYOPHILIZED, FOR SOLUTION INTRAVENOUS at 12:41

## 2021-11-16 RX ADMIN — IRINOTECAN HYDROCHLORIDE 234 MG: 20 INJECTION, SOLUTION INTRAVENOUS at 15:20

## 2021-11-16 RX ADMIN — ONDANSETRON 8 MG: 2 INJECTION INTRAMUSCULAR; INTRAVENOUS at 12:24

## 2021-11-16 RX ADMIN — Medication 10 ML: at 11:30

## 2021-11-16 RX ADMIN — DEXAMETHASONE SODIUM PHOSPHATE 12 MG: 4 INJECTION, SOLUTION INTRAMUSCULAR; INTRAVENOUS at 12:26

## 2021-11-16 RX ADMIN — ATROPINE SULFATE 0.4 MG: 0.4 INJECTION, SOLUTION INTRAMUSCULAR; INTRAVENOUS; SUBCUTANEOUS at 15:19

## 2021-11-16 RX ADMIN — DEXTROSE MONOHYDRATE 25 ML/HR: 5 INJECTION, SOLUTION INTRAVENOUS at 11:50

## 2021-11-16 RX ADMIN — OXALIPLATIN 97.5 MG: 5 INJECTION, SOLUTION INTRAVENOUS at 13:06

## 2021-11-16 RX ADMIN — FLUOROURACIL 585 MG: 50 INJECTION, SOLUTION INTRAVENOUS at 17:05

## 2021-11-16 RX ADMIN — LEUCOVORIN CALCIUM 780 MG: 500 INJECTION, POWDER, LYOPHILIZED, FOR SOLUTION INTRAMUSCULAR; INTRAVENOUS at 15:20

## 2021-11-16 RX ADMIN — Medication 10 ML: at 11:50

## 2021-11-16 RX ADMIN — Medication 10 ML: at 17:04

## 2021-11-16 RX ADMIN — FLUOROURACIL 3500 MG: 50 INJECTION, SOLUTION INTRAVENOUS at 17:08

## 2021-11-16 NOTE — PROGRESS NOTES
Arrived to the [facility]. FOLFIRINOX completed. Patient tolerated well. Any issues or concerns during appointment: none. Patient aware of next infusion appointment on 11/18/21 at 4:30 PM for pump disconnect. Discharged home. Adryan Garcia

## 2021-11-16 NOTE — ADDENDUM NOTE
Encounter addended by: Cassie Lopez, [id number] [facility] on: 11/16/2021 12:10 PM   Actions taken: i-Vent created or edited

## 2021-11-16 NOTE — PROGRESS NOTES
Patient arrived to port lab for port access and lab draw   [facility] accessed and labs drawn per protocol   *Port remains accessed   Patient discharged from port lab ambulatory*

## 2021-11-17 NOTE — PROGRESS NOTES
[facility]: Office Visit Progress Note    Chief Complaint:    Esophageal carcinoma  Pancreatic carcinoma    History of Present Illness:  Reason for Referral: Malignant neoplasm of esophagus, unspecified     Referring Provider:  Elissa Jacobson MD     Primary Care Provider: Mariann Golden MD     Family History of Cancer/Hematologic Disorders: Family history significant for mother with unspecified type of cancer.      Presenting Symptoms: Abdominal pain, nausea, and decreased appetite     Narrative with recent with Results/Procedures/Biopsies and Dates completed: Mr. Makenzie Bruno is a 76year old white male with  a history of tobacco use (former 1.5 pack per day smoker x 15 years; quit in 1974), morbid obesity, HTN, GERD, gastritis, ED, chronic pain, back pain, cholecystectomy and appendectomy. He presented to [facility] on 11/12/20 after being referred by his PCP for abdominal pain, nausea, and colon cancer screening. He reported getting a cold about 10 weeks prior with associated symptoms of abdominal pain, nausea, and decreased appetite. His PCP had started him on Omeprazole and Famotidine approximately 10 days prior and he stated that since starting the medication his symptoms were about 80% improved.      GI recommended further evaluation with EGD and colonoscopy which were performed on 12/1/20. EGD findings included hiatal hernia; LA Class C esophagitis; irregular z-line with some thickening and friability of mucosa; mild gastritis; and a 6-8 mm duodenal bulb polyp. Colonoscopy findings included polyp in the ascending colon; polyp in the transverse colon; diverticulosis without bleeding; and external hemorrhoid.  Unfortunately, pathology from biopsies taken from the distal esophagus revealed invasive adenocarcinoma arising in a background of Amoss esophagus.      CT of the chest, abdomen and pelvis completed on 12/7/20 demonstrated an asymmetric soft tissue thickening at the distal esophagus just above the small moderate hiatal hernia possibly reflective of the known esophageal lesion; an indeterminate suspicious lesion in the pancreatic body possibly reflective of malignancy; no pulmonary nodules or thoracic or abdominal adenopathy; interstitial lung disease; moderate diverticulosis without diverticulitis; and status post cholecystectomy and appendectomy.     On 12/23/20, patient underwent endoscopic ultrasound with doppler and FNA. Findings included esophageal cancer staged as T3N0Mx based on endoscopic ultrasound criteria; hiatal hernia; and a pancreatic mass concerning for an unresectable neoplasm, either from a second primary malignancy or metastatic disease. Pathology from the pancreatic mass biopsy revealed adenocarcinoma.      Mr. Gonzalez is now urgently referred to [facility], for oncology evaluation and treatment.      EGD REPORT 12/1/20         COLONOSCOPY REPORT 12/1/20         [facility] SURGICAL PATHOLOGY REPORT 12/1/20  DIAGNOSIS   A: DUODENAL POLYP BIOPSIES: FINDINGS CONSISTENT WITH PEPTIC DUODENITIS. NO ADENOMATOUS CHANGE IS IDENTIFIED. B: GASTRIC BIOPSIES: FRAGMENTS OF BENIGN GASTRIC MUCOSA WITH FEATURES OF REPAIR. HELICOBACTER PYLORI ORGANISMS ARE NOT IDENTIFIED. C: DISTAL ESOPHAGEAL BIOPSIES: INVASIVE ADENOCARCINOMA ARISING IN A BACKGROUND OF HANCOCKS ESOPHAGUS. SEE COMMENT. D: ASCENDING COLON POLYP: FRAGMENT OF BENIGN COLONIC MUCOSA. NO ADENOMATOUS OR HYPERPLASTIC CHANGE IS IDENTIFIED. E: ASCENDING AND TRANSVERSE COLON POLYPS: FRAGMENT OF BENIGN COLONIC MUCOSA. NO ADENOMATOUS OR HYPERPLASTIC CHANGE IS IDENTIFIED. COMMENT: Results communicated to Dr. Quique Nathan via [employer] [facility] at 11:01 am on 12-3-20          CT CHEST- ABDOMEN - PELVIS WITH CONTRAST 12/7/20  FINDINGS:  Target lesions: 1.3 cm asymmetric soft tissue fullness/mass in the distal esophagus just above the small moderate hiatal hernia on image 51 of series 602.  5.8 x 3.3 cm lesion in the pancreatic body on image 92 of series 602. Associated dilatation of the pancreatic duct in the pancreatic tail. Malignancy must be considered. This area abuts and partially surrounds the distal celiac artery and its bifurcation. The sum of longest diameters(SLD) measures 7.1 cm. Thoracic Inlet: No thyroid nodule is seen. Axillary: Normal.  Kalyn - Mediastinum:  A small to moderate hiatal hernia is demonstrated. Asymmetric soft tissue fullness just above the hiatal hernia may represent the newly diagnosed esophageal mass. The asymmetric wall thickening measures 1.3 cm in this area on image 51 of series 602. Correlation with previous endoscopy is recommended. No enlarged axillary, hilar, or mediastinal lymph nodes is seen. Heart - Vascular:  The heart is normal in size. No aortic aneurysm or dissection is seen. A few atherosclerotic calcifications are noted in the aorta, coronary arteries, and great vessels. Lungs - Pleura:  Mild pleural parenchymal changes noted in the apices bilaterally. No pneumothorax or pleural effusion is seen. Peripheral linear densities are noted in the lungs bilaterally. This may be related to interstitial lung disease or perhaps scarring. Relative sparing of the right lower lobe and right middle lobe is noted. No pulmonary nodule is seen. Liver:  Normal.  Portal Vein:  Normal.  Gallbladder - Biliary Tree:  Status post cholecystectomy. The mildly prominent common duct is likely related to previous cholecystectomy. Pancreas:  A hypodense area in the pancreatic body measures 5.8 x 3.3 cm on image 92 of series 602. Only minimal infiltration of the adjacent fat is seen. This may be related to malignancy or perhaps focal pancreatitis. The duct at the pancreatic tail is mildly dilated suggesting obstruction. The abnormal soft tissue density abutted and partially surrounded the distal celiac artery and its bifurcation into the hepatic artery and splenic artery. Spleen:  A calcified granuloma is seen.  The spleen is mildly heterogeneous posteriorly. Retroperitoneum:  Adrenals: Normal.  Kidneys:  Hypodensities in both kidneys are demonstrated. Several are too small to further characterize. I favor small cysts. One in the posterior mid to upper pole of the right kidney measures 3.2 cm in size. Density sampling suggests 9 Hounsfield units. This is consistent with a simple cyst.  Aorto - Cava:  The IVC is normal. A coarse atherosclerotic calcifications are noted in the aorta and multiple branching vessels. No aortic aneurysm is seen. Lymphatics:  Normal.  GI - Mesentery - Peritoneum:  Spasm at the gastric antrum is suggested. The small bowel is normal. No bowel pneumatosis or free intraperitoneal air is seen. The terminal ileum is normal. The patient appears to be status post appendectomy. No colon lesion is seen. Moderate diverticulosis is clinically apparent in the sigmoid colon and descending colon. A small amount of fluid is noted in the lower pelvis. Bladder:  Normal.  Pelvis:  A few prostate calcifications are seen. The prostate and seminal vesicles are unremarkable. Soft tissues - MSK:  Mild compression deformities are demonstrated at L1 and T12. Moderate spondylosis is seen. No destructive bone lesion is demonstrated. IMPRESSION: Asymmetric soft tissue thickening at the distal esophagus just above the small moderate hiatal hernia. May reflect the known esophageal lesion. Indeterminate suspicious lesion in the pancreatic body. May reflect malignancy. No pulmonary nodules or thoracic or abdominal adenopathy. Interstitial lung disease. Moderate diverticulosis without diverticulitis.  Status post cholecystectomy and appendectomy.     ENDOSCOPIC ULTRASOUND WITH DOPPLER AND FNA 12/23/20  FINDINGS:   ENDOSCOPIC FINDINGS:  Limited views of the mucosa with the echoendoscope reveals a malignant esophageal stricture and hiatal hernia. The tumor does not traverse the GE junction. There are no other gross abnormalities of the stomach or proximal duodenum. The ampulla is well visualized endoscopically and appears normal.   ESOPHAGUS:  The balloon was insufflated to improve acoustic coupling. At a distance 30 to 36 cm from the incisors, there is 60% circumferential wall thickening to 14 mm maximally. There is a hypoechoic, heterogeneous mass that involves the esophageal mucosa, submucosa and muscularis propria. There are a few small tumor pseudopodia extending through the muscularis propria. There is no invasion of adjacent organs. There are no pathologically enlarged periesophageal lymph nodes. STOMACH:  Multiple sweeps were made throughout the stomach visualizing the entire wall from the pylorus to the gastroesophageal junction. The entire gastric wall is of normal thickness and normal wall architecture. PANCREAS:  The pancreas is well-visualized from head to tail. In the body of the pancreas, there is a large mass with maximal dimensions in a single echoplane of 34 x 31 mm, hypoechoic, heterogenous mass. The mass abuts the splenic vein without distortion of vascular contour or associated venous occlusion. There is encasement at the bifurcation of hepatic and splenic branches of the celiac artery. The pancreatic duct is compressed with upstream dilation in the tail to 6 mm maximally. Fine needle aspiration of the mass was performed using a 22-gauge [employer] needle. Two passes were made, yielding core specimens. BILIARY TREE: The gallbladder is absent. The common bile duct is well-visualized from its insertion in the ampulla to the bifurcation of right and left hepatic ducts. It is non-dilated, measuring up to 7 mm maximally with a gradual taper down to the level of the ampulla. There are no intraductal stones, sludge or debris. The ampulla appears normal endosonographically. OTHER ORGANS:  Views of the left lobe of the liver demonstrate no solid mass lesions. There is no ascites in the upper abdomen.  The left adrenal gland appears normal. There are no pathologically enlarged posterior mediastinal or upper abdominal lymph nodes. IMPRESSION:  1. Esophageal cancer. This is staged T3N0Mx based on endoscopic ultrasound criteria. 2. Hiatal hernia.   3. Pancreatic mass. This is concerning for an unresectable neoplasm, either from a second primary malignancy or metastatic disease.      [facility] SURGICAL PATHOLOGY REPORT 12/23/20  DIAGNOSIS   \"PANCREATIC MASS BIOPSY\": ADENOCARCINOMA. COMMENT: Results discussed with Dr. Laureen Yee at 9:02 a.m. on 12/24/20. PET 1/14/21:  1. Primary esophageal neoplasm appears slightly enlarged when compared to  previous study with intense FDG activity. 2. Pancreatic mass with elevated FDG activity consistent with underlying  neoplasm. 3. Diffuse soft tissue nodularity throughout the peritoneum with associated  trace ascites again noted with elevated FDG activity consistent with peritoneal  carcinomatosis.       Interim history update in A/P    Review of Systems:  Constitutional Fatigue. Denies fever or chills. HEENT Denies trauma, bluring vision, hearing loss, ear pain, nosebleeds, sore throat, neck pain and ear discharge. Skin Denies lesions or rashes. Lungs Denies shortness of breath, cough, sputum production or hemoptysis. Cardiovascular Denies chest pain, palpitations, orthopnea, claudication and leg swelling. Gastrointestinal Abd pain and nausea resolved, intermittent diarrhea. Denies vomiting. Denies bloody or black stools.  Denies dysuria, frequency or hesitancy of urination   Neuro  mild neuropathy. Denies headaches, visual changes or ataxia. Denies dizziness, speech change, focal weakness and headaches. Hematology Denies nasal/gum bleeding, denies easy bruise   Endo Denies heat/cold intolerance, denies diabetes. MSK Denies back pain, swollen legs, myalgias and falls. Psychiatric/Behavioral Denies depression and substance abuse.  The patient is not nervous/anxious.        No Known Allergies  Past Medical History:   Diagnosis Date    Back pain     followed by pain management    Chronic pain     left-sided, lower back pain    CINV (chemotherapy-induced nausea and vomiting) 2021    Erectile dysfunction     Gastritis     GERD (gastroesophageal reflux disease)     daily medication    Hiatal hernia     \"medium\"    History of anemia     Hypertension     situational; has Amlodipine to take if systolic >362    Left bundle branch block (LBBB) on electrocardiogram 2021    Malignant [facility] of body of pancreas ([facility] [address]     Malignant neoplasm of esophagus ([facility] [address] 2020    Morbid obesity (HCC)     Nausea     takes antiemetic med prn     Past Surgical History:   Procedure Laterality Date    HX APPENDECTOMY      HX CHOLECYSTECTOMY      HX COLONOSCOPY  2020    with EGD     Family History   Problem Relation Age of Onset    Cancer Mother     No Known Problems Father     Cancer Sister      Social History     Socioeconomic History    Marital status:      Spouse name: Not on file    Number of children: Not on file    Years of education: Not on file    Highest education level: Not on file   Occupational History    Not on file   Tobacco Use    Smoking status: Former Smoker     Packs/day: 1.50     Years: 15.00     Pack years: 22.50     Quit date: 1974     Years since quittin.9    Smokeless tobacco: Never Used   Vaping Use    Vaping Use: Never used   Substance and Sexual Activity    Alcohol use: Yes     Comment: socially    Drug use: Never    Sexual activity: Not on file   Other Topics Concern    Not on file   Social History Narrative    Not on file     Social Determinants of Health     Financial Resource Strain:     Difficulty of Paying Living Expenses: Not on file   Food Insecurity:     Worried About Running Out of Food in the Last Year: Not on file    Elian of Food in the Last Year: Not on file Transportation Needs:     Lack of Transportation (Medical): Not on file    Lack of Transportation (Non-Medical): Not on file   Physical Activity:     Days of Exercise per Week: Not on file    Minutes of Exercise per Session: Not on file   Stress:     Feeling of Stress : Not on file   Social Connections:     Frequency of Communication with Friends and Family: Not on file    Frequency of Social Gatherings with Friends and Family: Not on file    Attends [employer]ism Services: Not on file    Active Member of [address] [place] [address] or Organizations: Not on file    Attends Club or Organization Meetings: Not on file    Marital Status: Not on file   Intimate Partner Violence:     Fear of Current or Ex-Partner: Not on file    Emotionally Abused: Not on file    Physically Abused: Not on file    Sexually Abused: Not on file   Housing Stability:     Unable to Pay for Housing in the Last Year: Not on file    Number of [place] in the Last Year: Not on file    Unstable Housing in the Last Year: Not on file     Current Outpatient Medications   Medication Sig Dispense Refill    diphenoxylate-atropine (LomotiL) 2.5-0.025 mg per tablet Take 1 Tablet by mouth four (4) times daily as needed for Diarrhea. Max Daily Amount: 4 Tablets. 90 Tablet 1    sildenafil citrate (Viagra) 100 mg tablet Take 1 Tablet by mouth as needed (erectile dysfunction). (Patient not taking: Reported on 10/19/2021) 30 Tablet 5    DISABLED PLACARD (DISABLED PLACARD) DMV Use as directed for poor ambulation 1 Each 0    omeprazole (PRILOSEC) 40 mg capsule Take 1 Capsule by mouth daily. 30 Capsule 5    famotidine (PEPCID) 20 mg tablet Take 1 Tablet by mouth two (2) times a day. 60 Tablet 5    prochlorperazine (Compazine) 10 mg tablet Take 1 Tablet by mouth every six (6) hours as needed for Nausea.  Indications: nausea and vomiting caused by cancer drugs, nausea and vomiting 90 Tablet 3    potassium chloride (K-DUR, KLOR-CON) 20 mEq tablet Take 1 Tablet by mouth daily. 30 Tablet 2    promethazine (PHENERGAN) 25 mg tablet Take 1 Tab by mouth every six (6) hours as needed for Nausea. (Patient taking differently: Take 25 mg by mouth every six (6) hours as needed for Nausea. Takes at night.) 30 Tab 1    HYDROcodone-acetaminophen (NORCO) 5-325 mg per tablet Take 1 Tablet by mouth every six (6) hours as needed.  ondansetron hcl (Zofran) 8 mg tablet Take 1 Tab by mouth every eight (8) hours as needed for Nausea. Indications: nausea and vomiting caused by cancer drugs 60 Tab 3    lidocaine-prilocaine (EMLA) topical cream Apply  to affected area as needed for Pain. (Patient not taking: Reported on 10/19/2021) 30 g 0    ondansetron (ZOFRAN ODT) 4 mg disintegrating tablet Take 1 Tab by mouth every eight (8) hours as needed for Nausea or Vomiting. 24 Tab 1    naproxen sodium (ALEVE) 220 mg cap Take  by mouth as needed. For back pain  Hold 5 days for procedure per anesthesia protocol  Indications: pain      DISABLED PLACARD (DISABLED PLACARD) DMV Handicap Placard, for poor ambuation. 1 Each 0     Current Facility-Administered Medications   Medication Dose Route Frequency Provider Last Rate Last Admin    fluorouraciL (ADRUCIL) 3,500 mg in 0.9% sodium chloride 230 mL elastomeric pump  3,500 mg IntraVENous Tisanta Osorio MD 5 mL/hr at 11/16/21 1708 3,500 mg at 11/16/21 1708       OBJECTIVE:  Visit Vitals  BP (!) 158/78 (BP 1 Location: Right upper arm, BP Patient Position: Sitting)   Pulse 80   Temp 97.6 °F (36.4 °C)   Resp 16   Wt 186 lb (84.4 kg)   SpO2 94%   BMI 30.02 kg/m²       Physical Exam:  Constitutional: Oriented to person, place, and time. Well-developed and well-nourished. HEENT: Normocephalic and atraumatic. Oropharynx is clear and moist.   Conjunctivae and EOM are normal. Pupils are equal, round, and reactive to light. No scleral icterus. Neck supple. No JVD present. No tracheal deviation present. No thyromegaly present.     Lymph node Deferred Skin Warm and dry. No bruising and no rash noted. No erythema. No pallor. Respiratory Effort normal and breath sounds normal.  No respiratory distress. No wheezes. No rales. No tenderness. CVS Normal rate, regular rhythm and normal heart sounds. Exam reveals no gallop, no friction and no rub. No murmur heard. Abdomen Abd tenderness resolved. Soft. Bowel sounds are normal. Exhibits no distension. There is no rebound and no guarding. Neuro Normal reflexes. No cranial nerve deficit. Exhibits normal muscle tone, 5 of 5 strength of all extremities. MSK Normal range of motion in general.  No edema and no tenderness. Psych Normal mood, affect, behavior, judgment and thought content      Labs:  No results found for this or any previous visit (from the past 24 hour(s)). Imaging:  No results found for this or any previous visit. ASSESSMENT/PLAN:    ICD-10-CM ICD-9-CM    1. Malignant neoplasm of body of pancreas (HCC)  C25.1 157.1 CBC WITH AUTOMATED DIFF      METABOLIC PANEL, COMPREHENSIVE      MAGNESIUM      CEA      CANCER AG 19-9   2. Neuropathy  G62.9 355.9    3.  Thrombocytopenia ([facility] [address]  D69.6 287.5      Problem List  Date Reviewed: 11/2/2021          Codes Class Noted    Hypomagnesemia ICD-10-CM: E83.42  ICD-9-CM: 275.2  4/23/2021        Hypokalemia ICD-10-CM: E87.6  ICD-9-CM: 276.8  4/20/2021        CINV (chemotherapy-induced nausea and vomiting) ICD-10-CM: R11.2, T45.1X5A  ICD-9-CM: 787.01, E933.1  4/20/2021        Dehydration ICD-10-CM: E86.0  ICD-9-CM: 276.51  4/12/2021        Other neutropenia ([facility] [address] ICD-10-CM: D70.8  ICD-9-CM: 288.09  3/9/2021        Malignant neoplasm of lower third of esophagus ([facility] [address] ICD-10-CM: C15.5  ICD-9-CM: 150.5  1/5/2021        Malignant neoplasm of body of pancreas ([facility] [address] ICD-10-CM: C25.1  ICD-9-CM: 157.1  1/5/2021        Severe obesity ([facility] [address] ICD-10-CM: E66.01  ICD-9-CM: 278.01  12/10/2020        Elevated glucose ICD-10-CM: R73.09  ICD-9-CM: 790.29  7/22/2019 Anemia ICD-10-CM: D64.9  ICD-9-CM: 285.9  7/22/2019        Chronic left-sided low back pain ICD-10-CM: M54.50, G89.29  ICD-9-CM: 724.2, 338.29  7/22/2019          78 y.o. M consulted for cancer of distal esophagus and pancreatic body in 12/2020. 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 He presented to [facility] on 12/29/20 and we discussed the situation being very unusual and need to distinguish the two cancer sites being separate primary vs metastasis for the prognosis and treatment plan would be rather different, pt was very motivated to aim at curative rx if possible, I discussed with Dr. Tammy Jimenez and involved Dr. Chica Vance for further histology and IHC study for the two samples showed to be distinctive primaries, saw Dr Leora Howard considered pancreatic mass unresectable but willing to reevaluate if shrunk, arranged PET showed peritoneal avidity c/w metastasis, I reviewed PET personally and discussed with pt this was considered stage IV unless proven otherwise, arrange diagnostic laproscopy for peritoneal carcinomatosis and biopsy for origin, which I suspect being pancreatic, tramadol prn abd pain.  He returned on 2/23/21, lost 10 lb and discussed nutrition, laproscopic biopsy 2/10/21 showed metastatic adenocarcinoma and I called Dr. Tammy Jimenez to study the origin of panc vs esophageal cancer, discussed with Dr. Tammy Jimenez the [facility] is more consistent with pancreatic cancer, started [facility] and tolerated reasonably well, discussed nausea control, added GCSF for neutropenia, need dental work and will arrange time probably after first scan, discussed of the weight loss in he declined appetite stimulant, add Emend for nausea control, reduce 5-FU infusion, return on 4/20/2021 due for cycle 5, however reported nausea vomiting and diarrhea for 2 weeks and did not call the office, lost 13 pounds, hypokalemia 2.5, discussed need to be admitted but patient adamantly declined, 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 and 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 Keene Leventhal delivered 5 sessions of treatment in 10/2021, cycle of 17 chemo was held for fatigue as patient desired, reported mild neuropathy with cold sensitivity and reduce oxaliplatin, platelet 95 and okay to proceed to cycle 18 with dose modification, return to clinic in 2 weeks for next cycle, call as needed. 11/16/21: Here today for follow up. He has been doing okay since last seen. He continues to tolerate treatment well. Diarrhea controlled with imodium. Neuropathy/cold sensitivities stable. No infectious symptoms. Labs reviewed and acceptable. Proceed with next cycle. RTC in 2 weeks or sooner If needed.       Problem complexity: high  Risk of Rx: high  Irinotecan:  Cardiovascular: Vasodilation (9% to 11%)  Central nervous system: Cholinergic syndrome (47%; includes diaphoresis, flushing, increased peristalsis, lacrimation, miosis, rhinitis, sialorrhea), pain (23% to 24%), dizziness (15% to 21%), insomnia (19%), headache (17%), chills (14%)  Dermatologic: Alopecia (46% to 72%), diaphoresis (16%), skin rash (13% to 14%)  Endocrine & metabolic: Weight loss (12%), dehydration (15%)  Gastrointestinal: Diarrhea (late: 83% to 88%, grades 3/4: 14% to 31%; early: 43% to 51%, grades 3/4: 7% to 22%), nausea (70% to 86%), abdominal pain (57% to 68%), vomiting (62% to 67%), abdominal cramps (57%), anorexia (44% to 55%), constipation (30% to 32%), mucositis (30%), flatulence (12%), stomatitis (12%)  Hematologic & oncologic: Anemia (60% to 97%; grades 3/4: 5% to 7%), leukopenia (63% to 96%, grades 3/4: 14% to 28%), thrombocytopenia (96%, grades 3/4: 1% to 4%), neutropenia (30% to 96%; grades 3/4: 14% to 31%)  Hepatic: Increased serum bilirubin (84%), increased serum alkaline phosphatase (13%)  Infection: Infection (14%)  Neuromuscular & skeletal: Weakness (69% to 76%), back pain (14%)  Respiratory: Dyspnea (22%), cough (17% to 20%), rhinitis (16%)  Miscellaneous: Fever (44% to 45%)  Oxaliplatin:  Gastrointestinal: Abdominal pain (31%), anorexia (20%), constipation (31%), diarrhea (46%), nausea (64%), stomatitis (2% to 14%), vomiting (37%)  Hematologic & oncologic: Anemia (64%; grades 3/4: 1%), leukopenia (13%).  thrombocytopenia (30%; grades 3/4: 3%)  Hepatic: Increased serum alanine aminotransferase (36%), increased serum aspartate aminotransferase (54%), increased serum bilirubin (13%)  Nervous system: Fatigue (61%), headache (13%), insomnia (11%), pain (14%), peripheral neuropathy (may be dose limiting; 76%, grades 3/4: 7%; acute 65%; grades 3/4: 5%; persistent 43%; grades 3/4: 3%)  Neuromuscular & skeletal: Back pain (11%)  Respiratory: Cough (11%), dyspnea (13%)  Miscellaneous: Fever (25%)  5FU:  Cardiovascular: Angina pectoris, cardiac arrhythmia, cardiac failure, cerebrovascular accident, ischemic heart disease, local thrombophlebitis, myocardial infarction, vasospasm, ventricular ectopy  Central nervous system: Cerebellar syndrome (acute), confusion, disorientation, euphoria, headache  Dermatologic: Alopecia, changes in nails (including nail loss), dermatitis, hyperpigmentation (supravenous), maculopapular rash (pruritic), palmar-plantar erythrodysesthesia, skin fissure, skin photosensitivity, Mckeon-Herb syndrome, toxic epidermal necrolysis, xeroderma  Gastrointestinal: Anorexia, diarrhea, esophagopharyngitis, gastrointestinal hemorrhage, gastrointestinal ulcer, mesenteric ischemia (acute), nausea, stomatitis, tissue sloughing (gastrointestinal), vomiting  Hematologic & oncologic: Agranulocytosis, anemia, leukopenia (ana: days 9 to 14; recovery by day 30), pancytopenia, thrombocytopenia  Hypersensitivity: Anaphylaxis, hypersensitivity reaction (generalized)  Ophthalmic: Lacrimal stenosis, lacrimation, nystagmus, photophobia, visual disturbance  Respiratory: Epistaxis             Erenest Client, FNP-C   [facility] Hematology [facility] On[facility]/Radiation Oncology   [address] [place] [address]  Office : [phone]  Fax : [phone]

## 2021-11-18 ENCOUNTER — HOSPITAL ENCOUNTER (OUTPATIENT)
Dept: INFUSION THERAPY | Age: 66
Discharge: HOME | End: 2021-11-18
Payer: MEDICARE

## 2021-11-18 VITALS
OXYGEN SATURATION: 97 % | DIASTOLIC BLOOD PRESSURE: 68 MMHG | SYSTOLIC BLOOD PRESSURE: 140 MMHG | HEART RATE: 77 BPM | RESPIRATION RATE: 16 BRPM | TEMPERATURE: 97.5 F

## 2021-11-18 DIAGNOSIS — C15.5 MALIGNANT NEOPLASM OF LOWER THIRD OF ESOPHAGUS (HCC): ICD-10-CM

## 2021-11-18 DIAGNOSIS — E86.0 DEHYDRATION: Primary | ICD-10-CM

## 2021-11-18 DIAGNOSIS — C25.1 MALIGNANT NEOPLASM OF BODY OF PANCREAS (HCC): ICD-10-CM

## 2021-11-18 DIAGNOSIS — D70.8 OTHER NEUTROPENIA (HCC): ICD-10-CM

## 2021-11-18 PROCEDURE — 74011250636 HC RX REV CODE- 250/636: Performed by: INTERNAL MEDICINE

## 2021-11-18 PROCEDURE — 96360 HYDRATION IV INFUSION INIT: CPT

## 2021-11-18 RX ORDER — SODIUM CHLORIDE 0.9 % (FLUSH) 0.9 %
10 SYRINGE (ML) INJECTION AS NEEDED
Status: DISCONTINUED | OUTPATIENT
Start: 2021-11-18 | End: 2021-11-19 | Stop reason: HOSPADM

## 2021-11-18 RX ORDER — SODIUM CHLORIDE 9 MG/ML
1000 INJECTION, SOLUTION INTRAVENOUS ONCE
Status: COMPLETED | OUTPATIENT
Start: 2021-11-18 | End: 2021-11-18

## 2021-11-18 RX ADMIN — Medication 10 ML: at 16:36

## 2021-11-18 RX ADMIN — SODIUM CHLORIDE 1000 ML: 900 INJECTION, SOLUTION INTRAVENOUS at 15:35

## 2021-11-18 RX ADMIN — Medication 10 ML: at 15:35

## 2021-11-18 NOTE — PROGRESS NOTES
Patient arrived ambulatory to infusion area. Pump d/c completed. IVF administered. Port de-accessed and patient discharged home ambulatory. Patient aware of next infusion appt tomorrow 11/19 for Udenyca.

## 2021-11-19 ENCOUNTER — HOSPITAL ENCOUNTER (OUTPATIENT)
Dept: INFUSION THERAPY | Age: 66
Discharge: HOME | End: 2021-11-19
Payer: MEDICARE

## 2021-11-19 ENCOUNTER — HOSPITAL ENCOUNTER (OUTPATIENT)
Dept: INFUSION THERAPY | Age: 66
End: 2021-11-19

## 2021-11-19 VITALS
SYSTOLIC BLOOD PRESSURE: 126 MMHG | DIASTOLIC BLOOD PRESSURE: 73 MMHG | TEMPERATURE: 97.5 F | HEART RATE: 81 BPM | OXYGEN SATURATION: 96 % | RESPIRATION RATE: 16 BRPM

## 2021-11-19 DIAGNOSIS — E86.0 DEHYDRATION: Primary | ICD-10-CM

## 2021-11-19 DIAGNOSIS — C15.5 MALIGNANT NEOPLASM OF LOWER THIRD OF ESOPHAGUS (HCC): ICD-10-CM

## 2021-11-19 DIAGNOSIS — D70.8 OTHER NEUTROPENIA (HCC): ICD-10-CM

## 2021-11-19 DIAGNOSIS — C25.1 MALIGNANT NEOPLASM OF BODY OF PANCREAS (HCC): ICD-10-CM

## 2021-11-19 PROCEDURE — 96372 THER/PROPH/DIAG INJ SC/IM: CPT

## 2021-11-19 PROCEDURE — 74011250636 HC RX REV CODE- 250/636: Performed by: INTERNAL MEDICINE

## 2021-11-19 RX ADMIN — PEGFILGRASTIM-CBQV 6 MG: 6 INJECTION, SOLUTION SUBCUTANEOUS at 15:41

## 2021-11-19 NOTE — PROGRESS NOTES
Arrived to the [facility]. Teagan completed. Provided education on same. Patient instructed to report any side affects to ordering provider. Patient tolerated well. Any issues or concerns during appointment: none. Patient aware of next infusion appointment on 11/30  Discharged ambulatory.

## 2021-11-30 ENCOUNTER — PATIENT OUTREACH (OUTPATIENT)
Dept: CASE MANAGEMENT | Age: 66
End: 2021-11-30

## 2021-11-30 ENCOUNTER — HOSPITAL ENCOUNTER (OUTPATIENT)
Dept: INFUSION THERAPY | Age: 66
Discharge: HOME | End: 2021-11-30
Payer: MEDICARE

## 2021-11-30 DIAGNOSIS — C25.1 MALIGNANT NEOPLASM OF BODY OF PANCREAS (HCC): ICD-10-CM

## 2021-11-30 DIAGNOSIS — D70.8 OTHER NEUTROPENIA (HCC): ICD-10-CM

## 2021-11-30 DIAGNOSIS — C15.5 MALIGNANT NEOPLASM OF LOWER THIRD OF ESOPHAGUS (HCC): ICD-10-CM

## 2021-11-30 DIAGNOSIS — E86.0 DEHYDRATION: Primary | ICD-10-CM

## 2021-11-30 DIAGNOSIS — E86.0 DEHYDRATION: ICD-10-CM

## 2021-11-30 LAB
ALBUMIN SERPL-MCNC: 3.2 G/DL (ref 3.2–4.6)
ALBUMIN/GLOB SERPL: 0.9 {RATIO} (ref 1.2–3.5)
ALP SERPL-CCNC: 225 U/L (ref 50–136)
ALT SERPL-CCNC: 23 U/L (ref 12–65)
ANION GAP SERPL CALC-SCNC: 3 MMOL/L (ref 7–16)
AST SERPL-CCNC: 23 U/L (ref 15–37)
BASOPHILS # BLD: 0 K/UL (ref 0–0.2)
BASOPHILS NFR BLD: 0 % (ref 0–2)
BILIRUB SERPL-MCNC: 0.3 MG/DL (ref 0.2–1.1)
BUN SERPL-MCNC: 10 MG/DL (ref 8–23)
CALCIUM SERPL-MCNC: 9.1 MG/DL (ref 8.3–10.4)
CHLORIDE SERPL-SCNC: 108 MMOL/L (ref 98–107)
CO2 SERPL-SCNC: 28 MMOL/L (ref 21–32)
CREAT SERPL-MCNC: 0.8 MG/DL (ref 0.8–1.5)
DIFFERENTIAL METHOD BLD: ABNORMAL
EOSINOPHIL # BLD: 0.3 K/UL (ref 0–0.8)
EOSINOPHIL NFR BLD: 2 % (ref 0.5–7.8)
ERYTHROCYTE [DISTWIDTH] IN BLOOD BY AUTOMATED COUNT: 13.7 % (ref 11.9–14.6)
GLOBULIN SER CALC-MCNC: 3.5 G/DL (ref 2.3–3.5)
GLUCOSE SERPL-MCNC: 123 MG/DL (ref 65–100)
HCT VFR BLD AUTO: 38.4 %
HGB BLD-MCNC: 12.2 G/DL (ref 13.6–17.2)
IMM GRANULOCYTES # BLD AUTO: 0.1 K/UL (ref 0–0.5)
IMM GRANULOCYTES NFR BLD AUTO: 1 % (ref 0–5)
LYMPHOCYTES # BLD: 0.6 K/UL (ref 0.5–4.6)
LYMPHOCYTES NFR BLD: 6 % (ref 13–44)
MAGNESIUM SERPL-MCNC: 2.1 MG/DL (ref 1.8–2.4)
MCH RBC QN AUTO: 30.7 PG (ref 26.1–32.9)
MCHC RBC AUTO-ENTMCNC: 31.8 G/DL (ref 31.4–35)
MCV RBC AUTO: 96.7 FL (ref 79.6–97.8)
MONOCYTES # BLD: 1.3 K/UL (ref 0.1–1.3)
MONOCYTES NFR BLD: 12 % (ref 4–12)
NEUTS SEG # BLD: 8.9 K/UL (ref 1.7–8.2)
NEUTS SEG NFR BLD: 79 % (ref 43–78)
NRBC # BLD: 0 K/UL (ref 0–0.2)
PLATELET # BLD AUTO: 127 K/UL (ref 150–450)
PMV BLD AUTO: 9.6 FL (ref 9.4–12.3)
POTASSIUM SERPL-SCNC: 3.7 MMOL/L (ref 3.5–5.1)
PROT SERPL-MCNC: 6.7 G/DL (ref 6.3–8.2)
RBC # BLD AUTO: 3.97 M/UL (ref 4.23–5.6)
SODIUM SERPL-SCNC: 139 MMOL/L (ref 136–145)
WBC # BLD AUTO: 11.3 K/UL (ref 4.3–11.1)

## 2021-11-30 PROCEDURE — 74011250636 HC RX REV CODE- 250/636: Performed by: INTERNAL MEDICINE

## 2021-11-30 PROCEDURE — 85025 COMPLETE CBC W/AUTO DIFF WBC: CPT

## 2021-11-30 PROCEDURE — 96372 THER/PROPH/DIAG INJ SC/IM: CPT

## 2021-11-30 PROCEDURE — 74011000258 HC RX REV CODE- 258: Performed by: INTERNAL MEDICINE

## 2021-11-30 PROCEDURE — 96411 CHEMO IV PUSH ADDL DRUG: CPT

## 2021-11-30 PROCEDURE — 96417 CHEMO IV INFUS EACH ADDL SEQ: CPT

## 2021-11-30 PROCEDURE — 96413 CHEMO IV INFUSION 1 HR: CPT

## 2021-11-30 PROCEDURE — 83735 ASSAY OF MAGNESIUM: CPT

## 2021-11-30 PROCEDURE — 96375 TX/PRO/DX INJ NEW DRUG ADDON: CPT

## 2021-11-30 PROCEDURE — 96415 CHEMO IV INFUSION ADDL HR: CPT

## 2021-11-30 PROCEDURE — 36591 DRAW BLOOD OFF VENOUS DEVICE: CPT

## 2021-11-30 PROCEDURE — 96368 THER/DIAG CONCURRENT INF: CPT

## 2021-11-30 PROCEDURE — 96367 TX/PROPH/DG ADDL SEQ IV INF: CPT

## 2021-11-30 PROCEDURE — 80053 COMPREHEN METABOLIC PANEL: CPT

## 2021-11-30 PROCEDURE — G0498 CHEMO EXTEND IV INFUS W/PUMP: HCPCS

## 2021-11-30 RX ORDER — SODIUM CHLORIDE 0.9 % (FLUSH) 0.9 %
10 SYRINGE (ML) INJECTION AS NEEDED
Status: DISCONTINUED | OUTPATIENT
Start: 2021-11-30 | End: 2021-12-01 | Stop reason: HOSPADM

## 2021-11-30 RX ORDER — SODIUM CHLORIDE 0.9 % (FLUSH) 0.9 %
10 SYRINGE (ML) INJECTION AS NEEDED
Status: DISCONTINUED | OUTPATIENT
Start: 2021-11-30 | End: 2021-12-02 | Stop reason: HOSPADM

## 2021-11-30 RX ORDER — ATROPINE SULFATE 0.4 MG/ML
0.4 INJECTION, SOLUTION ENDOTRACHEAL; INTRAMEDULLARY; INTRAMUSCULAR; INTRAVENOUS; SUBCUTANEOUS ONCE
Status: COMPLETED | OUTPATIENT
Start: 2021-11-30 | End: 2021-11-30

## 2021-11-30 RX ORDER — ONDANSETRON 2 MG/ML
8 INJECTION INTRAMUSCULAR; INTRAVENOUS ONCE
Status: COMPLETED | OUTPATIENT
Start: 2021-11-30 | End: 2021-11-30

## 2021-11-30 RX ORDER — FLUOROURACIL 50 MG/ML
300 INJECTION, SOLUTION INTRAVENOUS ONCE
Status: COMPLETED | OUTPATIENT
Start: 2021-11-30 | End: 2021-11-30

## 2021-11-30 RX ORDER — DEXTROSE MONOHYDRATE 50 MG/ML
25 INJECTION, SOLUTION INTRAVENOUS CONTINUOUS
Status: DISCONTINUED | OUTPATIENT
Start: 2021-11-30 | End: 2021-12-01 | Stop reason: HOSPADM

## 2021-11-30 RX ADMIN — OXALIPLATIN 97.5 MG: 5 INJECTION, SOLUTION INTRAVENOUS at 13:40

## 2021-11-30 RX ADMIN — DEXTROSE MONOHYDRATE 25 ML/HR: 5 INJECTION, SOLUTION INTRAVENOUS at 12:10

## 2021-11-30 RX ADMIN — ATROPINE SULFATE 0.4 MG: 0.4 INJECTION, SOLUTION INTRAMUSCULAR; INTRAVENOUS; SUBCUTANEOUS at 15:59

## 2021-11-30 RX ADMIN — DEXAMETHASONE SODIUM PHOSPHATE 12 MG: 4 INJECTION, SOLUTION INTRAMUSCULAR; INTRAVENOUS at 12:55

## 2021-11-30 RX ADMIN — Medication 10 ML: at 12:10

## 2021-11-30 RX ADMIN — FLUOROURACIL 585 MG: 50 INJECTION, SOLUTION INTRAVENOUS at 17:45

## 2021-11-30 RX ADMIN — IRINOTECAN HYDROCHLORIDE 234 MG: 20 INJECTION, SOLUTION INTRAVENOUS at 16:04

## 2021-11-30 RX ADMIN — FOSAPREPITANT 150 MG: 150 INJECTION, POWDER, LYOPHILIZED, FOR SOLUTION INTRAVENOUS at 13:11

## 2021-11-30 RX ADMIN — Medication 10 ML: at 10:15

## 2021-11-30 RX ADMIN — FLUOROURACIL 3500 MG: 50 INJECTION, SOLUTION INTRAVENOUS at 17:55

## 2021-11-30 RX ADMIN — ONDANSETRON 8 MG: 2 INJECTION INTRAMUSCULAR; INTRAVENOUS at 12:42

## 2021-11-30 RX ADMIN — LEUCOVORIN CALCIUM 780 MG: 350 INJECTION, POWDER, LYOPHILIZED, FOR SOLUTION INTRAMUSCULAR; INTRAVENOUS at 16:05

## 2021-11-30 NOTE — PROGRESS NOTES
11/30/21 saw pt today with Dr. Michael Carl for pre chemo cycle 20 FOLFIRINOX. He is tolerating chemo well. PO intake is great. Neuropathy is slightly improved. Denies any issues. Proceed to infusion. Follow up in 2 weeks. Encouraged to call with any concerns. Navigation will continue to follow.

## 2021-11-30 NOTE — ADDENDUM NOTE
Encounter addended by: RYDER Harrison D [facility] [place] on: 11/30/2021 12:17 PM   Actions taken: i-Dorcas created or edited

## 2021-11-30 NOTE — PROGRESS NOTES
Port accessed per protocol with a 0.75 ernandez needle. Flushed with normal saline 10cc. Positive blood return. Labs drawn per order.  Flushed with 10cc of normal saline and discharged ambulatory to office appointment     Still accessed yes   Dressing applied yes
No

## 2021-12-02 ENCOUNTER — HOSPITAL ENCOUNTER (OUTPATIENT)
Dept: INFUSION THERAPY | Age: 66
Discharge: HOME | End: 2021-12-02
Payer: MEDICARE

## 2021-12-02 VITALS
RESPIRATION RATE: 16 BRPM | SYSTOLIC BLOOD PRESSURE: 130 MMHG | OXYGEN SATURATION: 95 % | DIASTOLIC BLOOD PRESSURE: 77 MMHG | HEART RATE: 84 BPM | TEMPERATURE: 97.8 F

## 2021-12-02 DIAGNOSIS — C25.1 MALIGNANT NEOPLASM OF BODY OF PANCREAS (HCC): ICD-10-CM

## 2021-12-02 DIAGNOSIS — D70.8 OTHER NEUTROPENIA (HCC): ICD-10-CM

## 2021-12-02 DIAGNOSIS — C15.5 MALIGNANT NEOPLASM OF LOWER THIRD OF ESOPHAGUS (HCC): ICD-10-CM

## 2021-12-02 DIAGNOSIS — E86.0 DEHYDRATION: Primary | ICD-10-CM

## 2021-12-02 PROCEDURE — 74011250636 HC RX REV CODE- 250/636: Performed by: INTERNAL MEDICINE

## 2021-12-02 PROCEDURE — 96360 HYDRATION IV INFUSION INIT: CPT

## 2021-12-02 RX ORDER — SODIUM CHLORIDE 0.9 % (FLUSH) 0.9 %
10 SYRINGE (ML) INJECTION AS NEEDED
Status: DISCONTINUED | OUTPATIENT
Start: 2021-12-02 | End: 2021-12-03 | Stop reason: HOSPADM

## 2021-12-02 RX ORDER — SODIUM CHLORIDE 9 MG/ML
1000 INJECTION, SOLUTION INTRAVENOUS ONCE
Status: COMPLETED | OUTPATIENT
Start: 2021-12-02 | End: 2021-12-02

## 2021-12-02 RX ADMIN — Medication 10 ML: at 17:00

## 2021-12-02 RX ADMIN — SODIUM CHLORIDE 1000 ML: 9 INJECTION, SOLUTION INTRAVENOUS at 16:10

## 2021-12-02 RX ADMIN — Medication 10 ML: at 15:53

## 2021-12-02 NOTE — PROGRESS NOTES
Patient arrived at [facility] for 1L of fluids and DC pump. Assessment completed. No needs voiced at this time. Patient tolerated infusion well and is aware of next appointment on 12/3/2021 @1700. Patient discharged ambulatory.

## 2021-12-03 ENCOUNTER — HOSPITAL ENCOUNTER (OUTPATIENT)
Dept: INFUSION THERAPY | Age: 66
Discharge: HOME | End: 2021-12-03
Payer: MEDICARE

## 2021-12-03 VITALS
SYSTOLIC BLOOD PRESSURE: 129 MMHG | HEART RATE: 76 BPM | TEMPERATURE: 98.8 F | RESPIRATION RATE: 16 BRPM | DIASTOLIC BLOOD PRESSURE: 58 MMHG | OXYGEN SATURATION: 97 %

## 2021-12-03 DIAGNOSIS — D70.8 OTHER NEUTROPENIA (HCC): ICD-10-CM

## 2021-12-03 DIAGNOSIS — E86.0 DEHYDRATION: Primary | ICD-10-CM

## 2021-12-03 DIAGNOSIS — C25.1 MALIGNANT NEOPLASM OF BODY OF PANCREAS (HCC): ICD-10-CM

## 2021-12-03 DIAGNOSIS — C15.5 MALIGNANT NEOPLASM OF LOWER THIRD OF ESOPHAGUS (HCC): ICD-10-CM

## 2021-12-03 PROCEDURE — 96372 THER/PROPH/DIAG INJ SC/IM: CPT

## 2021-12-03 PROCEDURE — 74011250636 HC RX REV CODE- 250/636: Performed by: INTERNAL MEDICINE

## 2021-12-03 RX ADMIN — PEGFILGRASTIM-CBQV 6 MG: 6 INJECTION, SOLUTION SUBCUTANEOUS at 16:53

## 2021-12-03 NOTE — PROGRESS NOTES
Pt arrived ambulatory to [facility]. Teagan RAYMUNDO. Pt aware of next appt on 12/14/21 at 1330. Discharged ambulatory.

## 2021-12-14 ENCOUNTER — HOSPITAL ENCOUNTER (OUTPATIENT)
Dept: INFUSION THERAPY | Age: 66
Discharge: HOME | End: 2021-12-14
Payer: MEDICARE

## 2021-12-14 ENCOUNTER — PATIENT OUTREACH (OUTPATIENT)
Dept: CASE MANAGEMENT | Age: 66
End: 2021-12-14

## 2021-12-14 DIAGNOSIS — E83.42 HYPOMAGNESEMIA: Primary | ICD-10-CM

## 2021-12-14 DIAGNOSIS — D70.8 OTHER NEUTROPENIA (HCC): ICD-10-CM

## 2021-12-14 DIAGNOSIS — E86.0 DEHYDRATION: ICD-10-CM

## 2021-12-14 DIAGNOSIS — R11.2 CINV (CHEMOTHERAPY-INDUCED NAUSEA AND VOMITING): ICD-10-CM

## 2021-12-14 DIAGNOSIS — E87.6 HYPOKALEMIA: ICD-10-CM

## 2021-12-14 DIAGNOSIS — C25.1 MALIGNANT NEOPLASM OF BODY OF PANCREAS (HCC): ICD-10-CM

## 2021-12-14 DIAGNOSIS — T45.1X5A CINV (CHEMOTHERAPY-INDUCED NAUSEA AND VOMITING): ICD-10-CM

## 2021-12-14 DIAGNOSIS — C15.5 MALIGNANT NEOPLASM OF LOWER THIRD OF ESOPHAGUS (HCC): ICD-10-CM

## 2021-12-14 LAB
ALBUMIN SERPL-MCNC: 3.1 G/DL (ref 3.2–4.6)
ALBUMIN/GLOB SERPL: 0.9 {RATIO} (ref 1.2–3.5)
ALP SERPL-CCNC: 201 U/L (ref 50–136)
ALT SERPL-CCNC: 16 U/L (ref 12–65)
ANION GAP SERPL CALC-SCNC: 5 MMOL/L (ref 7–16)
AST SERPL-CCNC: 17 U/L (ref 15–37)
BASOPHILS # BLD: 0 K/UL (ref 0–0.2)
BASOPHILS NFR BLD: 0 % (ref 0–2)
BILIRUB SERPL-MCNC: 0.3 MG/DL (ref 0.2–1.1)
BUN SERPL-MCNC: 13 MG/DL (ref 8–23)
CALCIUM SERPL-MCNC: 9.2 MG/DL (ref 8.3–10.4)
CANCER AG19-9 SERPL-ACNC: 69.9 U/ML (ref 2–37)
CEA SERPL-MCNC: 14.1 NG/ML (ref 0–3)
CHLORIDE SERPL-SCNC: 108 MMOL/L (ref 98–107)
CO2 SERPL-SCNC: 28 MMOL/L (ref 21–32)
CREAT SERPL-MCNC: 0.8 MG/DL (ref 0.8–1.5)
DIFFERENTIAL METHOD BLD: ABNORMAL
EOSINOPHIL # BLD: 0.2 K/UL (ref 0–0.8)
EOSINOPHIL NFR BLD: 3 % (ref 0.5–7.8)
ERYTHROCYTE [DISTWIDTH] IN BLOOD BY AUTOMATED COUNT: 14.5 % (ref 11.9–14.6)
GLOBULIN SER CALC-MCNC: 3.5 G/DL (ref 2.3–3.5)
GLUCOSE SERPL-MCNC: 111 MG/DL (ref 65–100)
HCT VFR BLD AUTO: 36.7 %
HGB BLD-MCNC: 11.6 G/DL (ref 13.6–17.2)
IMM GRANULOCYTES # BLD AUTO: 0.1 K/UL (ref 0–0.5)
IMM GRANULOCYTES NFR BLD AUTO: 1 % (ref 0–5)
LYMPHOCYTES # BLD: 0.6 K/UL (ref 0.5–4.6)
LYMPHOCYTES NFR BLD: 8 % (ref 13–44)
MAGNESIUM SERPL-MCNC: 2.2 MG/DL (ref 1.8–2.4)
MCH RBC QN AUTO: 31.1 PG (ref 26.1–32.9)
MCHC RBC AUTO-ENTMCNC: 31.6 G/DL (ref 31.4–35)
MCV RBC AUTO: 98.4 FL (ref 79.6–97.8)
MONOCYTES # BLD: 1.2 K/UL (ref 0.1–1.3)
MONOCYTES NFR BLD: 17 % (ref 4–12)
NEUTS SEG # BLD: 5.2 K/UL (ref 1.7–8.2)
NEUTS SEG NFR BLD: 71 % (ref 43–78)
NRBC # BLD: 0 K/UL (ref 0–0.2)
PLATELET # BLD AUTO: 109 K/UL (ref 150–450)
PMV BLD AUTO: 10.1 FL (ref 9.4–12.3)
POTASSIUM SERPL-SCNC: 3.9 MMOL/L (ref 3.5–5.1)
PROT SERPL-MCNC: 6.6 G/DL (ref 6.3–8.2)
RBC # BLD AUTO: 3.73 M/UL (ref 4.23–5.6)
SODIUM SERPL-SCNC: 141 MMOL/L (ref 136–145)
WBC # BLD AUTO: 7.3 K/UL (ref 4.3–11.1)

## 2021-12-14 PROCEDURE — 74011000258 HC RX REV CODE- 258: Performed by: INTERNAL MEDICINE

## 2021-12-14 PROCEDURE — 96411 CHEMO IV PUSH ADDL DRUG: CPT

## 2021-12-14 PROCEDURE — 82378 CARCINOEMBRYONIC ANTIGEN: CPT

## 2021-12-14 PROCEDURE — 96368 THER/DIAG CONCURRENT INF: CPT

## 2021-12-14 PROCEDURE — 96413 CHEMO IV INFUSION 1 HR: CPT

## 2021-12-14 PROCEDURE — 96375 TX/PRO/DX INJ NEW DRUG ADDON: CPT

## 2021-12-14 PROCEDURE — 96415 CHEMO IV INFUSION ADDL HR: CPT

## 2021-12-14 PROCEDURE — 96372 THER/PROPH/DIAG INJ SC/IM: CPT

## 2021-12-14 PROCEDURE — 74011250636 HC RX REV CODE- 250/636: Performed by: INTERNAL MEDICINE

## 2021-12-14 PROCEDURE — 80053 COMPREHEN METABOLIC PANEL: CPT

## 2021-12-14 PROCEDURE — 36591 DRAW BLOOD OFF VENOUS DEVICE: CPT

## 2021-12-14 PROCEDURE — 85025 COMPLETE CBC W/AUTO DIFF WBC: CPT

## 2021-12-14 PROCEDURE — 96367 TX/PROPH/DG ADDL SEQ IV INF: CPT

## 2021-12-14 PROCEDURE — 83735 ASSAY OF MAGNESIUM: CPT

## 2021-12-14 PROCEDURE — 96417 CHEMO IV INFUS EACH ADDL SEQ: CPT

## 2021-12-14 PROCEDURE — 86301 IMMUNOASSAY TUMOR CA 19-9: CPT

## 2021-12-14 PROCEDURE — G0498 CHEMO EXTEND IV INFUS W/PUMP: HCPCS

## 2021-12-14 RX ORDER — DEXTROSE MONOHYDRATE 50 MG/ML
25 INJECTION, SOLUTION INTRAVENOUS CONTINUOUS
Status: ACTIVE | OUTPATIENT
Start: 2021-12-14 | End: 2021-12-14

## 2021-12-14 RX ORDER — ONDANSETRON 2 MG/ML
8 INJECTION INTRAMUSCULAR; INTRAVENOUS ONCE
Status: COMPLETED | OUTPATIENT
Start: 2021-12-14 | End: 2021-12-14

## 2021-12-14 RX ORDER — FLUOROURACIL 50 MG/ML
300 INJECTION, SOLUTION INTRAVENOUS ONCE
Status: COMPLETED | OUTPATIENT
Start: 2021-12-14 | End: 2021-12-14

## 2021-12-14 RX ORDER — SODIUM CHLORIDE 0.9 % (FLUSH) 0.9 %
10 SYRINGE (ML) INJECTION EVERY 8 HOURS
Status: DISCONTINUED | OUTPATIENT
Start: 2021-12-14 | End: 2021-12-16 | Stop reason: HOSPADM

## 2021-12-14 RX ORDER — SODIUM CHLORIDE 0.9 % (FLUSH) 0.9 %
10 SYRINGE (ML) INJECTION AS NEEDED
Status: ACTIVE | OUTPATIENT
Start: 2021-12-14 | End: 2021-12-14

## 2021-12-14 RX ORDER — ATROPINE SULFATE 0.4 MG/ML
0.4 INJECTION, SOLUTION ENDOTRACHEAL; INTRAMEDULLARY; INTRAMUSCULAR; INTRAVENOUS; SUBCUTANEOUS ONCE
Status: COMPLETED | OUTPATIENT
Start: 2021-12-14 | End: 2021-12-14

## 2021-12-14 RX ADMIN — ATROPINE SULFATE 0.4 MG: 0.4 INJECTION, SOLUTION INTRAMUSCULAR; INTRAVENOUS; SUBCUTANEOUS at 14:56

## 2021-12-14 RX ADMIN — ONDANSETRON 8 MG: 2 INJECTION INTRAMUSCULAR; INTRAVENOUS at 12:04

## 2021-12-14 RX ADMIN — Medication 10 ML: at 10:14

## 2021-12-14 RX ADMIN — FLUOROURACIL 585 MG: 50 INJECTION, SOLUTION INTRAVENOUS at 16:38

## 2021-12-14 RX ADMIN — IRINOTECAN HYDROCHLORIDE 234 MG: 20 INJECTION, SOLUTION INTRAVENOUS at 14:59

## 2021-12-14 RX ADMIN — LEUCOVORIN CALCIUM 780 MG: 350 INJECTION, POWDER, LYOPHILIZED, FOR SOLUTION INTRAMUSCULAR; INTRAVENOUS at 14:59

## 2021-12-14 RX ADMIN — DEXAMETHASONE SODIUM PHOSPHATE 12 MG: 4 INJECTION, SOLUTION INTRAMUSCULAR; INTRAVENOUS at 12:06

## 2021-12-14 RX ADMIN — FLUOROURACIL 3500 MG: 50 INJECTION, SOLUTION INTRAVENOUS at 16:38

## 2021-12-14 RX ADMIN — Medication 10 ML: at 16:36

## 2021-12-14 RX ADMIN — DEXTROSE MONOHYDRATE 25 ML/HR: 5 INJECTION, SOLUTION INTRAVENOUS at 12:04

## 2021-12-14 RX ADMIN — FOSAPREPITANT 150 MG: 150 INJECTION, POWDER, LYOPHILIZED, FOR SOLUTION INTRAVENOUS at 12:23

## 2021-12-14 RX ADMIN — OXALIPLATIN 97.5 MG: 5 INJECTION, SOLUTION INTRAVENOUS at 12:54

## 2021-12-14 NOTE — PROGRESS NOTES
Patient arrived to port lab for port access and lab draw   [facility] accessed and labs drawn per protocol   *Port remains accessed for infusion  Patient discharged from port lab ambulatory*

## 2021-12-14 NOTE — PROGRESS NOTES
12/14/21 saw pt today with Dylan Chavarria NP for pre chemo cycle 21 FOLFIRINOX. He is tolerating chemo well. Reporting neuropathy is constant but not interfering with ADLS. Will monitor closely. PO intake is good. Proceed to infusion. Follow up in 2 weeks with repeat scan. Encouraged to call with any concerns. Navigation will continue to follow.

## 2021-12-14 NOTE — ADDENDUM NOTE
Encounter addended by: RYDER Parrish [facility] [place] on: 12/14/2021 11:47 AM   Actions taken: i-Vent created or edited

## 2021-12-14 NOTE — PROGRESS NOTES
Arrived to the [facility]. FOLFIRINOX completed. Patient tolerated without problems. Any issues or concerns during appointment: no.  Patient aware of next infusion appointment on 12/16/21. Spoke with patient and he will come in at 1430  Discharged ambulatory.

## 2021-12-16 ENCOUNTER — HOSPITAL ENCOUNTER (OUTPATIENT)
Dept: INFUSION THERAPY | Age: 66
Discharge: HOME | End: 2021-12-16
Payer: MEDICARE

## 2021-12-16 VITALS
RESPIRATION RATE: 16 BRPM | DIASTOLIC BLOOD PRESSURE: 82 MMHG | SYSTOLIC BLOOD PRESSURE: 144 MMHG | TEMPERATURE: 98.2 F | HEART RATE: 91 BPM | OXYGEN SATURATION: 94 %

## 2021-12-16 PROCEDURE — 96523 IRRIG DRUG DELIVERY DEVICE: CPT

## 2021-12-16 RX ORDER — SODIUM CHLORIDE 0.9 % (FLUSH) 0.9 %
10 SYRINGE (ML) INJECTION AS NEEDED
Status: DISCONTINUED | OUTPATIENT
Start: 2021-12-16 | End: 2021-12-18 | Stop reason: HOSPADM

## 2021-12-16 RX ADMIN — Medication 10 ML: at 14:39

## 2021-12-16 NOTE — PROGRESS NOTES
Arrived to the [facility]. D/C pump completed. Pt declined fluids. Provided education on same. Patient instructed to report any side affects to ordering provider. Patient tolerated well. Any issues or concerns during appointment: none. Patient aware of next infusion appointment on 12/17  Discharged ambulatory.

## 2021-12-17 ENCOUNTER — HOSPITAL ENCOUNTER (OUTPATIENT)
Dept: INFUSION THERAPY | Age: 66
Discharge: HOME | End: 2021-12-17
Payer: MEDICARE

## 2021-12-17 VITALS
DIASTOLIC BLOOD PRESSURE: 64 MMHG | RESPIRATION RATE: 16 BRPM | SYSTOLIC BLOOD PRESSURE: 132 MMHG | HEART RATE: 88 BPM | OXYGEN SATURATION: 97 % | TEMPERATURE: 97.8 F

## 2021-12-17 DIAGNOSIS — E83.42 HYPOMAGNESEMIA: ICD-10-CM

## 2021-12-17 DIAGNOSIS — C15.5 MALIGNANT NEOPLASM OF LOWER THIRD OF ESOPHAGUS (HCC): ICD-10-CM

## 2021-12-17 DIAGNOSIS — R11.2 CINV (CHEMOTHERAPY-INDUCED NAUSEA AND VOMITING): ICD-10-CM

## 2021-12-17 DIAGNOSIS — E86.0 DEHYDRATION: Primary | ICD-10-CM

## 2021-12-17 DIAGNOSIS — C25.1 MALIGNANT NEOPLASM OF BODY OF PANCREAS (HCC): ICD-10-CM

## 2021-12-17 DIAGNOSIS — E87.6 HYPOKALEMIA: ICD-10-CM

## 2021-12-17 DIAGNOSIS — D70.8 OTHER NEUTROPENIA (HCC): ICD-10-CM

## 2021-12-17 DIAGNOSIS — T45.1X5A CINV (CHEMOTHERAPY-INDUCED NAUSEA AND VOMITING): ICD-10-CM

## 2021-12-17 PROCEDURE — 96372 THER/PROPH/DIAG INJ SC/IM: CPT

## 2021-12-17 PROCEDURE — 74011250636 HC RX REV CODE- 250/636: Performed by: INTERNAL MEDICINE

## 2021-12-17 RX ADMIN — PEGFILGRASTIM-CBQV 6 MG: 6 INJECTION, SOLUTION SUBCUTANEOUS at 14:52

## 2021-12-17 NOTE — PROGRESS NOTES
Arrived to the [facility]. Teagan completed. Provided education on falls prevention. Patient instructed to report any side affects to ordering provider. Patient tolerated well. Any issues or concerns during appointment: none. Patient aware of next infusion appointment on 12/28 at 11:00am.  Discharged ambulatory to home.

## 2021-12-27 ENCOUNTER — HOSPITAL ENCOUNTER (OUTPATIENT)
Dept: CT IMAGING | Age: 66
Discharge: HOME | End: 2021-12-27
Attending: NURSE PRACTITIONER
Payer: MEDICARE

## 2021-12-27 DIAGNOSIS — C25.1 MALIGNANT NEOPLASM OF BODY OF PANCREAS (HCC): ICD-10-CM

## 2021-12-27 PROCEDURE — 74011000258 HC RX REV CODE- 258: Performed by: NURSE PRACTITIONER

## 2021-12-27 PROCEDURE — 74011000636 HC RX REV CODE- 636: Performed by: NURSE PRACTITIONER

## 2021-12-27 PROCEDURE — 74177 CT ABD & PELVIS W/CONTRAST: CPT

## 2021-12-27 RX ORDER — SODIUM CHLORIDE 0.9 % (FLUSH) 0.9 %
10 SYRINGE (ML) INJECTION
Status: COMPLETED | OUTPATIENT
Start: 2021-12-27 | End: 2021-12-27

## 2021-12-27 RX ADMIN — SODIUM CHLORIDE 100 ML: 900 INJECTION, SOLUTION INTRAVENOUS at 10:49

## 2021-12-27 RX ADMIN — IOPAMIDOL 100 ML: 755 INJECTION, SOLUTION INTRAVENOUS at 10:49

## 2021-12-27 RX ADMIN — Medication 10 ML: at 10:49

## 2021-12-28 ENCOUNTER — HOSPITAL ENCOUNTER (OUTPATIENT)
Dept: INFUSION THERAPY | Age: 66
Discharge: HOME | End: 2021-12-28
Payer: MEDICARE

## 2021-12-28 DIAGNOSIS — C25.1 MALIGNANT NEOPLASM OF BODY OF PANCREAS (HCC): ICD-10-CM

## 2021-12-28 DIAGNOSIS — D70.8 OTHER NEUTROPENIA (HCC): ICD-10-CM

## 2021-12-28 DIAGNOSIS — C15.5 MALIGNANT NEOPLASM OF LOWER THIRD OF ESOPHAGUS (HCC): ICD-10-CM

## 2021-12-28 DIAGNOSIS — E86.0 DEHYDRATION: Primary | ICD-10-CM

## 2021-12-28 LAB
ALBUMIN SERPL-MCNC: 3 G/DL (ref 3.2–4.6)
ALBUMIN/GLOB SERPL: 0.9 {RATIO} (ref 1.2–3.5)
ALP SERPL-CCNC: 206 U/L (ref 50–136)
ALT SERPL-CCNC: 19 U/L (ref 12–65)
ANION GAP SERPL CALC-SCNC: 6 MMOL/L (ref 7–16)
AST SERPL-CCNC: 21 U/L (ref 15–37)
BASOPHILS # BLD: 0 K/UL (ref 0–0.2)
BASOPHILS NFR BLD: 0 % (ref 0–2)
BILIRUB SERPL-MCNC: 0.3 MG/DL (ref 0.2–1.1)
BUN SERPL-MCNC: 10 MG/DL (ref 8–23)
CALCIUM SERPL-MCNC: 9 MG/DL (ref 8.3–10.4)
CHLORIDE SERPL-SCNC: 108 MMOL/L (ref 98–107)
CO2 SERPL-SCNC: 26 MMOL/L (ref 21–32)
CREAT SERPL-MCNC: 0.7 MG/DL (ref 0.8–1.5)
DIFFERENTIAL METHOD BLD: ABNORMAL
EOSINOPHIL # BLD: 0.3 K/UL (ref 0–0.8)
EOSINOPHIL NFR BLD: 3 % (ref 0.5–7.8)
ERYTHROCYTE [DISTWIDTH] IN BLOOD BY AUTOMATED COUNT: 14.7 % (ref 11.9–14.6)
GLOBULIN SER CALC-MCNC: 3.4 G/DL (ref 2.3–3.5)
GLUCOSE SERPL-MCNC: 118 MG/DL (ref 65–100)
HCT VFR BLD AUTO: 36.9 %
HGB BLD-MCNC: 11.7 G/DL (ref 13.6–17.2)
IMM GRANULOCYTES # BLD AUTO: 0.1 K/UL (ref 0–0.5)
IMM GRANULOCYTES NFR BLD AUTO: 1 % (ref 0–5)
LYMPHOCYTES # BLD: 0.7 K/UL (ref 0.5–4.6)
LYMPHOCYTES NFR BLD: 7 % (ref 13–44)
MAGNESIUM SERPL-MCNC: 1.9 MG/DL (ref 1.8–2.4)
MCH RBC QN AUTO: 30.6 PG (ref 26.1–32.9)
MCHC RBC AUTO-ENTMCNC: 31.7 G/DL (ref 31.4–35)
MCV RBC AUTO: 96.6 FL (ref 79.6–97.8)
MONOCYTES # BLD: 1.1 K/UL (ref 0.1–1.3)
MONOCYTES NFR BLD: 11 % (ref 4–12)
NEUTS SEG # BLD: 7.6 K/UL (ref 1.7–8.2)
NEUTS SEG NFR BLD: 77 % (ref 43–78)
NRBC # BLD: 0 K/UL (ref 0–0.2)
PLATELET # BLD AUTO: 123 K/UL (ref 150–450)
PMV BLD AUTO: 10 FL (ref 9.4–12.3)
POTASSIUM SERPL-SCNC: 3.5 MMOL/L (ref 3.5–5.1)
PROT SERPL-MCNC: 6.4 G/DL (ref 6.3–8.2)
RBC # BLD AUTO: 3.82 M/UL (ref 4.23–5.6)
SODIUM SERPL-SCNC: 140 MMOL/L (ref 136–145)
WBC # BLD AUTO: 9.9 K/UL (ref 4.3–11.1)

## 2021-12-28 PROCEDURE — 85025 COMPLETE CBC W/AUTO DIFF WBC: CPT

## 2021-12-28 PROCEDURE — 36591 DRAW BLOOD OFF VENOUS DEVICE: CPT

## 2021-12-28 PROCEDURE — G0498 CHEMO EXTEND IV INFUS W/PUMP: HCPCS

## 2021-12-28 PROCEDURE — 96413 CHEMO IV INFUSION 1 HR: CPT

## 2021-12-28 PROCEDURE — 96417 CHEMO IV INFUS EACH ADDL SEQ: CPT

## 2021-12-28 PROCEDURE — 96411 CHEMO IV PUSH ADDL DRUG: CPT

## 2021-12-28 PROCEDURE — 80053 COMPREHEN METABOLIC PANEL: CPT

## 2021-12-28 PROCEDURE — 74011000258 HC RX REV CODE- 258: Performed by: INTERNAL MEDICINE

## 2021-12-28 PROCEDURE — 96372 THER/PROPH/DIAG INJ SC/IM: CPT

## 2021-12-28 PROCEDURE — 96367 TX/PROPH/DG ADDL SEQ IV INF: CPT

## 2021-12-28 PROCEDURE — 83735 ASSAY OF MAGNESIUM: CPT

## 2021-12-28 PROCEDURE — 74011250636 HC RX REV CODE- 250/636: Performed by: INTERNAL MEDICINE

## 2021-12-28 PROCEDURE — 96375 TX/PRO/DX INJ NEW DRUG ADDON: CPT

## 2021-12-28 PROCEDURE — 96368 THER/DIAG CONCURRENT INF: CPT

## 2021-12-28 PROCEDURE — 96415 CHEMO IV INFUSION ADDL HR: CPT

## 2021-12-28 RX ORDER — SODIUM CHLORIDE 0.9 % (FLUSH) 0.9 %
10 SYRINGE (ML) INJECTION AS NEEDED
Status: ACTIVE | OUTPATIENT
Start: 2021-12-28 | End: 2021-12-28

## 2021-12-28 RX ORDER — ATROPINE SULFATE 0.4 MG/ML
0.4 INJECTION, SOLUTION ENDOTRACHEAL; INTRAMEDULLARY; INTRAMUSCULAR; INTRAVENOUS; SUBCUTANEOUS ONCE
Status: COMPLETED | OUTPATIENT
Start: 2021-12-28 | End: 2021-12-28

## 2021-12-28 RX ORDER — SODIUM CHLORIDE 0.9 % (FLUSH) 0.9 %
10 SYRINGE (ML) INJECTION AS NEEDED
Status: DISCONTINUED | OUTPATIENT
Start: 2021-12-28 | End: 2021-12-30 | Stop reason: HOSPADM

## 2021-12-28 RX ORDER — ONDANSETRON 2 MG/ML
8 INJECTION INTRAMUSCULAR; INTRAVENOUS ONCE
Status: COMPLETED | OUTPATIENT
Start: 2021-12-28 | End: 2021-12-28

## 2021-12-28 RX ORDER — FLUOROURACIL 50 MG/ML
300 INJECTION, SOLUTION INTRAVENOUS ONCE
Status: COMPLETED | OUTPATIENT
Start: 2021-12-28 | End: 2021-12-28

## 2021-12-28 RX ORDER — DEXTROSE MONOHYDRATE 50 MG/ML
25 INJECTION, SOLUTION INTRAVENOUS CONTINUOUS
Status: ACTIVE | OUTPATIENT
Start: 2021-12-28 | End: 2021-12-28

## 2021-12-28 RX ADMIN — ONDANSETRON 8 MG: 2 INJECTION INTRAMUSCULAR; INTRAVENOUS at 11:00

## 2021-12-28 RX ADMIN — Medication 10 ML: at 10:35

## 2021-12-28 RX ADMIN — Medication 10 ML: at 15:46

## 2021-12-28 RX ADMIN — FLUOROURACIL 585 MG: 50 INJECTION, SOLUTION INTRAVENOUS at 15:39

## 2021-12-28 RX ADMIN — OXALIPLATIN 97.5 MG: 5 INJECTION, SOLUTION INTRAVENOUS at 11:52

## 2021-12-28 RX ADMIN — FOSAPREPITANT 150 MG: 150 INJECTION, POWDER, LYOPHILIZED, FOR SOLUTION INTRAVENOUS at 11:20

## 2021-12-28 RX ADMIN — DEXTROSE MONOHYDRATE 25 ML/HR: 5 INJECTION, SOLUTION INTRAVENOUS at 10:35

## 2021-12-28 RX ADMIN — IRINOTECAN HYDROCHLORIDE 234 MG: 20 INJECTION, SOLUTION INTRAVENOUS at 14:02

## 2021-12-28 RX ADMIN — LEUCOVORIN CALCIUM 780 MG: 350 INJECTION, POWDER, LYOPHILIZED, FOR SOLUTION INTRAMUSCULAR; INTRAVENOUS at 14:02

## 2021-12-28 RX ADMIN — Medication 10 ML: at 09:38

## 2021-12-28 RX ADMIN — ATROPINE SULFATE 0.4 MG: 0.4 INJECTION, SOLUTION INTRAMUSCULAR; INTRAVENOUS; SUBCUTANEOUS at 13:53

## 2021-12-28 RX ADMIN — DEXAMETHASONE SODIUM PHOSPHATE 12 MG: 4 INJECTION, SOLUTION INTRAMUSCULAR; INTRAVENOUS at 11:03

## 2021-12-28 RX ADMIN — FLUOROURACIL 3500 MG: 50 INJECTION, SOLUTION INTRAVENOUS at 15:42

## 2021-12-28 NOTE — PROGRESS NOTES
Patient arrived to port lab for port access and lab draw   [facility] accessed and labs drawn per protocol   *Port remains accessed   Patient discharged from port lab ambulatory*

## 2021-12-30 ENCOUNTER — HOSPITAL ENCOUNTER (OUTPATIENT)
Dept: INFUSION THERAPY | Age: 66
Discharge: HOME | End: 2021-12-30
Payer: MEDICARE

## 2021-12-30 VITALS
TEMPERATURE: 98.2 F | RESPIRATION RATE: 16 BRPM | DIASTOLIC BLOOD PRESSURE: 82 MMHG | HEART RATE: 79 BPM | SYSTOLIC BLOOD PRESSURE: 143 MMHG | OXYGEN SATURATION: 94 %

## 2021-12-30 DIAGNOSIS — D70.8 OTHER NEUTROPENIA (HCC): ICD-10-CM

## 2021-12-30 DIAGNOSIS — E86.0 DEHYDRATION: Primary | ICD-10-CM

## 2021-12-30 DIAGNOSIS — C25.1 MALIGNANT NEOPLASM OF BODY OF PANCREAS (HCC): ICD-10-CM

## 2021-12-30 DIAGNOSIS — C15.5 MALIGNANT NEOPLASM OF LOWER THIRD OF ESOPHAGUS (HCC): ICD-10-CM

## 2021-12-30 PROCEDURE — 96523 IRRIG DRUG DELIVERY DEVICE: CPT

## 2021-12-30 RX ORDER — SODIUM CHLORIDE 9 MG/ML
1000 INJECTION, SOLUTION INTRAVENOUS ONCE
Status: DISCONTINUED | OUTPATIENT
Start: 2021-12-30 | End: 2021-12-31 | Stop reason: HOSPADM

## 2021-12-30 RX ORDER — SODIUM CHLORIDE 0.9 % (FLUSH) 0.9 %
10 SYRINGE (ML) INJECTION AS NEEDED
Status: DISCONTINUED | OUTPATIENT
Start: 2021-12-30 | End: 2021-12-31 | Stop reason: HOSPADM

## 2021-12-30 RX ADMIN — Medication 10 ML: at 16:21

## 2021-12-30 NOTE — PROGRESS NOTES
Arrived to the Infusion Center.  5FU pump disconnect completed and tolerated well. No fluids needed today. Any issues or concerns during appointment: none  Patient given education on process  Instructed patient to notify provider for any issues or worrisome symptoms. They verbalized understanding. Patient aware of next infusion appointment scheduled for tomorrow at 5pm   Discharged ambulatory with self.

## 2021-12-31 ENCOUNTER — HOSPITAL ENCOUNTER (OUTPATIENT)
Dept: INFUSION THERAPY | Age: 66
Discharge: HOME | End: 2021-12-31
Payer: MEDICARE

## 2021-12-31 VITALS
SYSTOLIC BLOOD PRESSURE: 141 MMHG | TEMPERATURE: 98.1 F | RESPIRATION RATE: 18 BRPM | HEART RATE: 75 BPM | OXYGEN SATURATION: 95 % | DIASTOLIC BLOOD PRESSURE: 76 MMHG

## 2021-12-31 DIAGNOSIS — E86.0 DEHYDRATION: Primary | ICD-10-CM

## 2021-12-31 DIAGNOSIS — C15.5 MALIGNANT NEOPLASM OF LOWER THIRD OF ESOPHAGUS (HCC): ICD-10-CM

## 2021-12-31 DIAGNOSIS — D70.8 OTHER NEUTROPENIA (HCC): ICD-10-CM

## 2021-12-31 DIAGNOSIS — C25.1 MALIGNANT NEOPLASM OF BODY OF PANCREAS (HCC): ICD-10-CM

## 2021-12-31 PROCEDURE — 74011250636 HC RX REV CODE- 250/636: Performed by: INTERNAL MEDICINE

## 2021-12-31 PROCEDURE — 96372 THER/PROPH/DIAG INJ SC/IM: CPT

## 2021-12-31 RX ADMIN — PEGFILGRASTIM-CBQV 6 MG: 6 INJECTION, SOLUTION SUBCUTANEOUS at 16:30

## 2021-12-31 NOTE — PROGRESS NOTES
Arrived to the [facility]. Teagan completed. Provided education on Udenyca    Patient instructed to report any side affects to ordering provider. Patient tolerated injection. Any issues or concerns during appointment: none. Patient aware of next infusion appointment on 1/12/22 (date) at 11 29 AM (time). Discharged ambulatory.

## 2022-01-10 RX ORDER — SODIUM CHLORIDE 0.9 % (FLUSH) 0.9 %
10 SYRINGE (ML) INJECTION ONCE
Status: CANCELLED | OUTPATIENT
Start: 2022-01-10 | End: 2022-01-10

## 2022-01-11 ENCOUNTER — HOSPITAL ENCOUNTER (OUTPATIENT)
Dept: INFUSION THERAPY | Age: 67
End: 2022-01-11

## 2022-01-12 ENCOUNTER — HOSPITAL ENCOUNTER (OUTPATIENT)
Dept: INFUSION THERAPY | Age: 67
End: 2022-01-12

## 2022-01-26 ENCOUNTER — PATIENT OUTREACH (OUTPATIENT)
Dept: CASE MANAGEMENT | Age: 67
End: 2022-01-26

## 2022-01-26 ENCOUNTER — HOSPITAL ENCOUNTER (OUTPATIENT)
Dept: INFUSION THERAPY | Age: 67
Discharge: HOME | End: 2022-01-26
Payer: MEDICARE

## 2022-01-26 ENCOUNTER — HOSPITAL ENCOUNTER (OUTPATIENT)
Dept: INFUSION THERAPY | Age: 67
End: 2022-01-26

## 2022-01-26 DIAGNOSIS — C25.1 MALIGNANT NEOPLASM OF BODY OF PANCREAS (HCC): ICD-10-CM

## 2022-01-26 LAB
ALBUMIN SERPL-MCNC: 3.1 G/DL (ref 3.2–4.6)
ALBUMIN/GLOB SERPL: 0.8 {RATIO} (ref 1.2–3.5)
ALP SERPL-CCNC: 242 U/L (ref 50–136)
ALT SERPL-CCNC: 13 U/L (ref 12–65)
ANION GAP SERPL CALC-SCNC: 7 MMOL/L (ref 7–16)
AST SERPL-CCNC: 19 U/L (ref 15–37)
BASOPHILS # BLD: 0.1 K/UL (ref 0–0.2)
BASOPHILS NFR BLD: 1 % (ref 0–2)
BILIRUB SERPL-MCNC: 0.7 MG/DL (ref 0.2–1.1)
BUN SERPL-MCNC: 10 MG/DL (ref 8–23)
CALCIUM SERPL-MCNC: 9.1 MG/DL (ref 8.3–10.4)
CANCER AG19-9 SERPL-ACNC: 64.2 U/ML (ref 2–37)
CEA SERPL-MCNC: 16.6 NG/ML (ref 0–3)
CHLORIDE SERPL-SCNC: 103 MMOL/L (ref 98–107)
CO2 SERPL-SCNC: 27 MMOL/L (ref 21–32)
COVID-19 RAPID TEST, COVR: NOT DETECTED
CREAT SERPL-MCNC: 0.7 MG/DL (ref 0.8–1.5)
DIFFERENTIAL METHOD BLD: ABNORMAL
EOSINOPHIL # BLD: 0.3 K/UL (ref 0–0.8)
EOSINOPHIL NFR BLD: 4 % (ref 0.5–7.8)
ERYTHROCYTE [DISTWIDTH] IN BLOOD BY AUTOMATED COUNT: 14.2 % (ref 11.9–14.6)
GLOBULIN SER CALC-MCNC: 3.9 G/DL (ref 2.3–3.5)
GLUCOSE SERPL-MCNC: 130 MG/DL (ref 65–100)
HCT VFR BLD AUTO: 40.7 %
HGB BLD-MCNC: 12.7 G/DL (ref 13.6–17.2)
IMM GRANULOCYTES # BLD AUTO: 0 K/UL (ref 0–0.5)
IMM GRANULOCYTES NFR BLD AUTO: 0 % (ref 0–5)
LYMPHOCYTES # BLD: 0.5 K/UL (ref 0.5–4.6)
LYMPHOCYTES NFR BLD: 8 % (ref 13–44)
MAGNESIUM SERPL-MCNC: 2.1 MG/DL (ref 1.8–2.4)
MCH RBC QN AUTO: 30.5 PG (ref 26.1–32.9)
MCHC RBC AUTO-ENTMCNC: 31.2 G/DL (ref 31.4–35)
MCV RBC AUTO: 97.8 FL (ref 79.6–97.8)
MONOCYTES # BLD: 1.1 K/UL (ref 0.1–1.3)
MONOCYTES NFR BLD: 16 % (ref 4–12)
NEUTS SEG # BLD: 4.8 K/UL (ref 1.7–8.2)
NEUTS SEG NFR BLD: 72 % (ref 43–78)
NRBC # BLD: 0 K/UL (ref 0–0.2)
PLATELET # BLD AUTO: 163 K/UL (ref 150–450)
PMV BLD AUTO: 9.6 FL (ref 9.4–12.3)
POTASSIUM SERPL-SCNC: 3.8 MMOL/L (ref 3.5–5.1)
PROT SERPL-MCNC: 7 G/DL (ref 6.3–8.2)
RBC # BLD AUTO: 4.16 M/UL (ref 4.23–5.6)
SARS-COV-2, COV2: NOT DETECTED
SODIUM SERPL-SCNC: 137 MMOL/L (ref 136–145)
SOURCE, COVRS: NORMAL
SPECIMEN SOURCE, FCOV2M: NORMAL
WBC # BLD AUTO: 6.7 K/UL (ref 4.3–11.1)

## 2022-01-26 PROCEDURE — 83735 ASSAY OF MAGNESIUM: CPT

## 2022-01-26 PROCEDURE — 36591 DRAW BLOOD OFF VENOUS DEVICE: CPT

## 2022-01-26 PROCEDURE — 80053 COMPREHEN METABOLIC PANEL: CPT

## 2022-01-26 PROCEDURE — 85025 COMPLETE CBC W/AUTO DIFF WBC: CPT

## 2022-01-26 PROCEDURE — 87635 SARS-COV-2 COVID-19 AMP PRB: CPT

## 2022-01-26 PROCEDURE — 86301 IMMUNOASSAY TUMOR CA 19-9: CPT

## 2022-01-26 PROCEDURE — U0005 INFEC AGEN DETEC AMPLI PROBE: HCPCS

## 2022-01-26 PROCEDURE — 82378 CARCINOEMBRYONIC ANTIGEN: CPT

## 2022-01-26 RX ORDER — SODIUM CHLORIDE 0.9 % (FLUSH) 0.9 %
10 SYRINGE (ML) INJECTION AS NEEDED
Status: DISCONTINUED | OUTPATIENT
Start: 2022-01-26 | End: 2022-01-28 | Stop reason: HOSPADM

## 2022-01-26 RX ADMIN — Medication 10 ML: at 09:18

## 2022-01-26 NOTE — PROGRESS NOTES
1/26/22 saw pt today with Dr. Ben Guy for pre chemo cycle 23 FOLFIRINOX for pancreatic cancer. He had covid like symptoms like 3 weeks ago, feeling better now. covid tested then and was negative. Will repeat covid test today and if negative will proceed with infusion tomorrow. Remeron sent to pharmacy for sleep and appetite. Follow up in 2 weeks. Encouraged to call with any concerns. Navigation will sign off.

## 2022-01-26 NOTE — PROGRESS NOTES
Patient arrived to port lab for port access and lab draw   [facility] accessed and labs drawn per protocol   *Port remains accessed for infusion  Patient discharged from port lab ambulatory*

## 2022-01-27 ENCOUNTER — HOSPITAL ENCOUNTER (OUTPATIENT)
Dept: INFUSION THERAPY | Age: 67
Discharge: HOME | End: 2022-01-27
Payer: MEDICARE

## 2022-01-27 VITALS
HEART RATE: 92 BPM | RESPIRATION RATE: 18 BRPM | BODY MASS INDEX: 29.63 KG/M2 | OXYGEN SATURATION: 98 % | DIASTOLIC BLOOD PRESSURE: 96 MMHG | SYSTOLIC BLOOD PRESSURE: 156 MMHG | TEMPERATURE: 98 F | WEIGHT: 183.6 LBS

## 2022-01-27 DIAGNOSIS — C15.5 MALIGNANT NEOPLASM OF LOWER THIRD OF ESOPHAGUS (HCC): ICD-10-CM

## 2022-01-27 DIAGNOSIS — C25.1 MALIGNANT NEOPLASM OF BODY OF PANCREAS (HCC): ICD-10-CM

## 2022-01-27 DIAGNOSIS — D70.8 OTHER NEUTROPENIA (HCC): ICD-10-CM

## 2022-01-27 DIAGNOSIS — E86.0 DEHYDRATION: Primary | ICD-10-CM

## 2022-01-27 PROCEDURE — 96411 CHEMO IV PUSH ADDL DRUG: CPT

## 2022-01-27 PROCEDURE — 96413 CHEMO IV INFUSION 1 HR: CPT

## 2022-01-27 PROCEDURE — 96372 THER/PROPH/DIAG INJ SC/IM: CPT

## 2022-01-27 PROCEDURE — 74011250636 HC RX REV CODE- 250/636: Performed by: INTERNAL MEDICINE

## 2022-01-27 PROCEDURE — G0498 CHEMO EXTEND IV INFUS W/PUMP: HCPCS

## 2022-01-27 PROCEDURE — 96367 TX/PROPH/DG ADDL SEQ IV INF: CPT

## 2022-01-27 PROCEDURE — 96415 CHEMO IV INFUSION ADDL HR: CPT

## 2022-01-27 PROCEDURE — 96417 CHEMO IV INFUS EACH ADDL SEQ: CPT

## 2022-01-27 PROCEDURE — 96375 TX/PRO/DX INJ NEW DRUG ADDON: CPT

## 2022-01-27 PROCEDURE — 74011000250 HC RX REV CODE- 250: Performed by: INTERNAL MEDICINE

## 2022-01-27 PROCEDURE — 74011000258 HC RX REV CODE- 258: Performed by: INTERNAL MEDICINE

## 2022-01-27 PROCEDURE — 96368 THER/DIAG CONCURRENT INF: CPT

## 2022-01-27 RX ORDER — FLUOROURACIL 50 MG/ML
300 INJECTION, SOLUTION INTRAVENOUS ONCE
Status: COMPLETED | OUTPATIENT
Start: 2022-01-27 | End: 2022-01-27

## 2022-01-27 RX ORDER — ONDANSETRON 2 MG/ML
8 INJECTION INTRAMUSCULAR; INTRAVENOUS ONCE
Status: COMPLETED | OUTPATIENT
Start: 2022-01-27 | End: 2022-01-27

## 2022-01-27 RX ORDER — SODIUM CHLORIDE 0.9 % (FLUSH) 0.9 %
10 SYRINGE (ML) INJECTION AS NEEDED
Status: ACTIVE | OUTPATIENT
Start: 2022-01-27 | End: 2022-01-27

## 2022-01-27 RX ORDER — DEXTROSE MONOHYDRATE 50 MG/ML
25 INJECTION, SOLUTION INTRAVENOUS CONTINUOUS
Status: ACTIVE | OUTPATIENT
Start: 2022-01-27 | End: 2022-01-27

## 2022-01-27 RX ORDER — ATROPINE SULFATE 0.4 MG/ML
0.4 INJECTION, SOLUTION ENDOTRACHEAL; INTRAMEDULLARY; INTRAMUSCULAR; INTRAVENOUS; SUBCUTANEOUS ONCE
Status: COMPLETED | OUTPATIENT
Start: 2022-01-27 | End: 2022-01-27

## 2022-01-27 RX ADMIN — SODIUM CHLORIDE, PRESERVATIVE FREE 10 ML: 5 INJECTION INTRAVENOUS at 09:45

## 2022-01-27 RX ADMIN — IRINOTECAN HYDROCHLORIDE 234 MG: 20 INJECTION, SOLUTION INTRAVENOUS at 12:46

## 2022-01-27 RX ADMIN — FOSAPREPITANT 150 MG: 150 INJECTION, POWDER, LYOPHILIZED, FOR SOLUTION INTRAVENOUS at 10:06

## 2022-01-27 RX ADMIN — DEXTROSE MONOHYDRATE 25 ML/HR: 50 INJECTION, SOLUTION INTRAVENOUS at 09:45

## 2022-01-27 RX ADMIN — DEXAMETHASONE SODIUM PHOSPHATE 12 MG: 4 INJECTION, SOLUTION INTRAMUSCULAR; INTRAVENOUS at 09:51

## 2022-01-27 RX ADMIN — FLUOROURACIL 3500 MG: 50 INJECTION, SOLUTION INTRAVENOUS at 14:28

## 2022-01-27 RX ADMIN — OXALIPLATIN 97.5 MG: 5 INJECTION, SOLUTION INTRAVENOUS at 10:44

## 2022-01-27 RX ADMIN — FLUOROURACIL 585 MG: 50 INJECTION, SOLUTION INTRAVENOUS at 14:25

## 2022-01-27 RX ADMIN — LEUCOVORIN CALCIUM 780 MG: 350 INJECTION, POWDER, LYOPHILIZED, FOR SOLUTION INTRAMUSCULAR; INTRAVENOUS at 12:46

## 2022-01-27 RX ADMIN — ATROPINE SULFATE 0.4 MG: 0.4 INJECTION, SOLUTION INTRAMUSCULAR; INTRAVENOUS; SUBCUTANEOUS at 11:27

## 2022-01-27 RX ADMIN — ONDANSETRON 8 MG: 2 INJECTION INTRAMUSCULAR; INTRAVENOUS at 09:49

## 2022-01-27 NOTE — PROGRESS NOTES
Arrived to the [facility]. FOLFIRINOX completed. Patient tolerated well. Any issues or concerns during appointment: none. Patient aware of next infusion appointment on 1/29 (date) at 5:00 PM (time). Patient instructed to call provider with temperature of 100.4 or greater or nausea/vomiting/ diarrhea or pain not controlled by medications  Discharged ambulatory. 4

## 2022-01-29 ENCOUNTER — HOSPITAL ENCOUNTER (OUTPATIENT)
Dept: INFUSION THERAPY | Age: 67
Discharge: HOME | End: 2022-01-29
Payer: MEDICARE

## 2022-01-29 VITALS
BODY MASS INDEX: 29.64 KG/M2 | TEMPERATURE: 97.5 F | OXYGEN SATURATION: 96 % | SYSTOLIC BLOOD PRESSURE: 133 MMHG | RESPIRATION RATE: 18 BRPM | WEIGHT: 183.64 LBS | HEART RATE: 79 BPM | DIASTOLIC BLOOD PRESSURE: 83 MMHG

## 2022-01-29 DIAGNOSIS — R11.2 CINV (CHEMOTHERAPY-INDUCED NAUSEA AND VOMITING): ICD-10-CM

## 2022-01-29 DIAGNOSIS — E86.0 DEHYDRATION: Primary | ICD-10-CM

## 2022-01-29 DIAGNOSIS — T45.1X5A CINV (CHEMOTHERAPY-INDUCED NAUSEA AND VOMITING): ICD-10-CM

## 2022-01-29 DIAGNOSIS — C25.1 MALIGNANT NEOPLASM OF BODY OF PANCREAS (HCC): ICD-10-CM

## 2022-01-29 DIAGNOSIS — C15.5 MALIGNANT NEOPLASM OF LOWER THIRD OF ESOPHAGUS (HCC): ICD-10-CM

## 2022-01-29 DIAGNOSIS — E83.42 HYPOMAGNESEMIA: ICD-10-CM

## 2022-01-29 DIAGNOSIS — E87.6 HYPOKALEMIA: ICD-10-CM

## 2022-01-29 DIAGNOSIS — D70.8 OTHER NEUTROPENIA (HCC): ICD-10-CM

## 2022-01-29 PROCEDURE — 74011000250 HC RX REV CODE- 250: Performed by: INTERNAL MEDICINE

## 2022-01-29 PROCEDURE — 96523 IRRIG DRUG DELIVERY DEVICE: CPT

## 2022-01-29 RX ORDER — SODIUM CHLORIDE 0.9 % (FLUSH) 0.9 %
10 SYRINGE (ML) INJECTION AS NEEDED
Status: DISCONTINUED | OUTPATIENT
Start: 2022-01-29 | End: 2022-01-30 | Stop reason: HOSPADM

## 2022-01-29 RX ORDER — SODIUM CHLORIDE 9 MG/ML
1000 INJECTION, SOLUTION INTRAVENOUS ONCE
Status: DISCONTINUED | OUTPATIENT
Start: 2022-01-29 | End: 2022-01-30 | Stop reason: HOSPADM

## 2022-01-29 RX ADMIN — SODIUM CHLORIDE, PRESERVATIVE FREE 10 ML: 5 INJECTION INTRAVENOUS at 16:30

## 2022-01-29 NOTE — PROGRESS NOTES
Pt arrived ambulatory today at [phone], to have fluorouracil pump removed. Pt tolerated without difficulty. Patient discharged via ambulatory accompanied by self. Instructed to notify physician of any problems, questions or concerns. Allowed opportunity for patient/family to ask questions. Verbalized understanding. Next appointment is Jan 31 at 0830 with Harlan Jara.

## 2022-02-10 ENCOUNTER — HOSPITAL ENCOUNTER (OUTPATIENT)
Dept: INFUSION THERAPY | Age: 67
End: 2022-02-10

## 2022-02-16 ENCOUNTER — HOSPITAL ENCOUNTER (OUTPATIENT)
Dept: LAB | Age: 67
Discharge: HOME | End: 2022-02-16
Payer: MEDICARE

## 2022-02-16 ENCOUNTER — HOSPITAL ENCOUNTER (OUTPATIENT)
Dept: INFUSION THERAPY | Age: 67
Discharge: HOME | End: 2022-02-16
Payer: MEDICARE

## 2022-02-16 DIAGNOSIS — R11.2 CINV (CHEMOTHERAPY-INDUCED NAUSEA AND VOMITING): Primary | ICD-10-CM

## 2022-02-16 DIAGNOSIS — T45.1X5A CINV (CHEMOTHERAPY-INDUCED NAUSEA AND VOMITING): Primary | ICD-10-CM

## 2022-02-16 DIAGNOSIS — C25.1 MALIGNANT NEOPLASM OF BODY OF PANCREAS (HCC): ICD-10-CM

## 2022-02-16 DIAGNOSIS — E86.0 DEHYDRATION: ICD-10-CM

## 2022-02-16 LAB
ALBUMIN SERPL-MCNC: 3.1 G/DL (ref 3.2–4.6)
ALBUMIN/GLOB SERPL: 0.7 {RATIO} (ref 1.2–3.5)
ALP SERPL-CCNC: 287 U/L (ref 50–136)
ALT SERPL-CCNC: 10 U/L (ref 12–65)
ANION GAP SERPL CALC-SCNC: 7 MMOL/L (ref 7–16)
AST SERPL-CCNC: 16 U/L (ref 15–37)
BASOPHILS # BLD: 0 K/UL (ref 0–0.2)
BASOPHILS NFR BLD: 1 % (ref 0–2)
BILIRUB SERPL-MCNC: 0.5 MG/DL (ref 0.2–1.1)
BUN SERPL-MCNC: 16 MG/DL (ref 8–23)
CALCIUM SERPL-MCNC: 9.9 MG/DL (ref 8.3–10.4)
CANCER AG19-9 SERPL-ACNC: 69 U/ML (ref 2–37)
CEA SERPL-MCNC: 24.1 NG/ML (ref 0–3)
CHLORIDE SERPL-SCNC: 102 MMOL/L (ref 98–107)
CO2 SERPL-SCNC: 27 MMOL/L (ref 21–32)
CREAT SERPL-MCNC: 0.9 MG/DL (ref 0.8–1.5)
DIFFERENTIAL METHOD BLD: ABNORMAL
EOSINOPHIL # BLD: 0.5 K/UL (ref 0–0.8)
EOSINOPHIL NFR BLD: 12 % (ref 0.5–7.8)
ERYTHROCYTE [DISTWIDTH] IN BLOOD BY AUTOMATED COUNT: 13.3 % (ref 11.9–14.6)
GLOBULIN SER CALC-MCNC: 4.2 G/DL (ref 2.3–3.5)
GLUCOSE SERPL-MCNC: 127 MG/DL (ref 65–100)
HCT VFR BLD AUTO: 45.7 %
HGB BLD-MCNC: 14.2 G/DL (ref 13.6–17.2)
IMM GRANULOCYTES # BLD AUTO: 0 K/UL (ref 0–0.5)
IMM GRANULOCYTES NFR BLD AUTO: 0 % (ref 0–5)
LYMPHOCYTES # BLD: 0.6 K/UL (ref 0.5–4.6)
LYMPHOCYTES NFR BLD: 15 % (ref 13–44)
MAGNESIUM SERPL-MCNC: 2 MG/DL (ref 1.8–2.4)
MCH RBC QN AUTO: 29.5 PG (ref 26.1–32.9)
MCHC RBC AUTO-ENTMCNC: 31.1 G/DL (ref 31.4–35)
MCV RBC AUTO: 95 FL (ref 79.6–97.8)
MONOCYTES # BLD: 0.9 K/UL (ref 0.1–1.3)
MONOCYTES NFR BLD: 22 % (ref 4–12)
NEUTS SEG # BLD: 2 K/UL (ref 1.7–8.2)
NEUTS SEG NFR BLD: 50 % (ref 43–78)
NRBC # BLD: 0 K/UL (ref 0–0.2)
PLATELET # BLD AUTO: 175 K/UL (ref 150–450)
PMV BLD AUTO: 10.1 FL (ref 9.4–12.3)
POTASSIUM SERPL-SCNC: 3.7 MMOL/L (ref 3.5–5.1)
PROT SERPL-MCNC: 7.3 G/DL (ref 6.3–8.2)
RBC # BLD AUTO: 4.81 M/UL (ref 4.23–5.6)
SODIUM SERPL-SCNC: 136 MMOL/L (ref 136–145)
WBC # BLD AUTO: 4 K/UL (ref 4.3–11.1)

## 2022-02-16 PROCEDURE — 96360 HYDRATION IV INFUSION INIT: CPT

## 2022-02-16 PROCEDURE — 86301 IMMUNOASSAY TUMOR CA 19-9: CPT

## 2022-02-16 PROCEDURE — 85025 COMPLETE CBC W/AUTO DIFF WBC: CPT

## 2022-02-16 PROCEDURE — 82378 CARCINOEMBRYONIC ANTIGEN: CPT

## 2022-02-16 PROCEDURE — 36415 COLL VENOUS BLD VENIPUNCTURE: CPT

## 2022-02-16 PROCEDURE — 74011250636 HC RX REV CODE- 250/636: Performed by: INTERNAL MEDICINE

## 2022-02-16 PROCEDURE — 83735 ASSAY OF MAGNESIUM: CPT

## 2022-02-16 PROCEDURE — 80053 COMPREHEN METABOLIC PANEL: CPT

## 2022-02-16 RX ADMIN — SODIUM CHLORIDE 1000 ML: 900 INJECTION, SOLUTION INTRAVENOUS at 11:42

## 2022-02-16 NOTE — PROGRESS NOTES
Arrived to the [facility]. 1L NS completed. Provided education on same. Patient instructed to report any side affects to ordering provider. Patient tolerated well. Any issues or concerns during appointment: none. Patient aware of next infusion appointment on 2/17  Discharged via wheelchair.

## 2022-02-17 ENCOUNTER — HOSPITAL ENCOUNTER (OUTPATIENT)
Dept: INFUSION THERAPY | Age: 67
Discharge: HOME | End: 2022-02-17
Payer: MEDICARE

## 2022-02-17 VITALS
RESPIRATION RATE: 16 BRPM | TEMPERATURE: 97.2 F | HEART RATE: 99 BPM | SYSTOLIC BLOOD PRESSURE: 141 MMHG | DIASTOLIC BLOOD PRESSURE: 81 MMHG | BODY MASS INDEX: 29.05 KG/M2 | OXYGEN SATURATION: 98 % | WEIGHT: 180 LBS

## 2022-02-17 DIAGNOSIS — D70.8 OTHER NEUTROPENIA (HCC): ICD-10-CM

## 2022-02-17 DIAGNOSIS — E86.0 DEHYDRATION: Primary | ICD-10-CM

## 2022-02-17 DIAGNOSIS — C25.1 MALIGNANT NEOPLASM OF BODY OF PANCREAS (HCC): ICD-10-CM

## 2022-02-17 DIAGNOSIS — C15.5 MALIGNANT NEOPLASM OF LOWER THIRD OF ESOPHAGUS (HCC): ICD-10-CM

## 2022-02-17 PROCEDURE — G0498 CHEMO EXTEND IV INFUS W/PUMP: HCPCS

## 2022-02-17 PROCEDURE — 74011250636 HC RX REV CODE- 250/636: Performed by: INTERNAL MEDICINE

## 2022-02-17 PROCEDURE — 96375 TX/PRO/DX INJ NEW DRUG ADDON: CPT

## 2022-02-17 PROCEDURE — 96372 THER/PROPH/DIAG INJ SC/IM: CPT

## 2022-02-17 PROCEDURE — 96368 THER/DIAG CONCURRENT INF: CPT

## 2022-02-17 PROCEDURE — 74011000250 HC RX REV CODE- 250: Performed by: INTERNAL MEDICINE

## 2022-02-17 PROCEDURE — 96367 TX/PROPH/DG ADDL SEQ IV INF: CPT

## 2022-02-17 PROCEDURE — 74011000258 HC RX REV CODE- 258: Performed by: INTERNAL MEDICINE

## 2022-02-17 PROCEDURE — 96417 CHEMO IV INFUS EACH ADDL SEQ: CPT

## 2022-02-17 PROCEDURE — 96413 CHEMO IV INFUSION 1 HR: CPT

## 2022-02-17 PROCEDURE — 96411 CHEMO IV PUSH ADDL DRUG: CPT

## 2022-02-17 PROCEDURE — 96415 CHEMO IV INFUSION ADDL HR: CPT

## 2022-02-17 RX ORDER — ONDANSETRON 2 MG/ML
8 INJECTION INTRAMUSCULAR; INTRAVENOUS ONCE
Status: COMPLETED | OUTPATIENT
Start: 2022-02-17 | End: 2022-02-17

## 2022-02-17 RX ORDER — DEXTROSE MONOHYDRATE 50 MG/ML
25 INJECTION, SOLUTION INTRAVENOUS CONTINUOUS
Status: ACTIVE | OUTPATIENT
Start: 2022-02-17 | End: 2022-02-17

## 2022-02-17 RX ORDER — ATROPINE SULFATE 0.4 MG/ML
0.4 INJECTION, SOLUTION ENDOTRACHEAL; INTRAMEDULLARY; INTRAMUSCULAR; INTRAVENOUS; SUBCUTANEOUS ONCE
Status: COMPLETED | OUTPATIENT
Start: 2022-02-17 | End: 2022-02-17

## 2022-02-17 RX ORDER — SODIUM CHLORIDE 0.9 % (FLUSH) 0.9 %
10 SYRINGE (ML) INJECTION AS NEEDED
Status: ACTIVE | OUTPATIENT
Start: 2022-02-17 | End: 2022-02-17

## 2022-02-17 RX ORDER — FLUOROURACIL 50 MG/ML
300 INJECTION, SOLUTION INTRAVENOUS ONCE
Status: COMPLETED | OUTPATIENT
Start: 2022-02-17 | End: 2022-02-17

## 2022-02-17 RX ADMIN — IRINOTECAN HYDROCHLORIDE 234 MG: 20 INJECTION, SOLUTION INTRAVENOUS at 12:18

## 2022-02-17 RX ADMIN — DEXTROSE MONOHYDRATE 25 ML/HR: 5 INJECTION, SOLUTION INTRAVENOUS at 08:53

## 2022-02-17 RX ADMIN — FLUOROURACIL 3500 MG: 50 INJECTION, SOLUTION INTRAVENOUS at 13:57

## 2022-02-17 RX ADMIN — FLUOROURACIL 585 MG: 50 INJECTION, SOLUTION INTRAVENOUS at 13:53

## 2022-02-17 RX ADMIN — FOSAPREPITANT 150 MG: 150 INJECTION, POWDER, LYOPHILIZED, FOR SOLUTION INTRAVENOUS at 09:26

## 2022-02-17 RX ADMIN — ONDANSETRON 8 MG: 2 INJECTION INTRAMUSCULAR; INTRAVENOUS at 08:51

## 2022-02-17 RX ADMIN — SODIUM CHLORIDE, PRESERVATIVE FREE 10 ML: 5 INJECTION INTRAVENOUS at 08:45

## 2022-02-17 RX ADMIN — DEXAMETHASONE SODIUM PHOSPHATE 12 MG: 4 INJECTION, SOLUTION INTRAMUSCULAR; INTRAVENOUS at 09:02

## 2022-02-17 RX ADMIN — ATROPINE SULFATE 0.4 MG: 0.4 INJECTION, SOLUTION INTRAMUSCULAR; INTRAVENOUS; SUBCUTANEOUS at 11:34

## 2022-02-17 RX ADMIN — LEUCOVORIN CALCIUM 780 MG: 350 INJECTION, POWDER, LYOPHILIZED, FOR SOLUTION INTRAMUSCULAR; INTRAVENOUS at 12:18

## 2022-02-17 RX ADMIN — OXALIPLATIN 97.5 MG: 5 INJECTION, SOLUTION INTRAVENOUS at 10:04

## 2022-02-17 NOTE — ADDENDUM NOTE
Encounter addended by: RYDER Alfonso D [facility] on: 2/17/2022 8:46 AM   Actions taken: i-Vent created or edited

## 2022-02-17 NOTE — PROGRESS NOTES
Arrived to the [facility]. Folfirinox infusion completed. Pump placed at 1357 Patient tolerated well. Any issues or concerns during appointment: none. Patient aware of next infusion appointment on 02/19/2022 (date) at 1330 (time). Discharged ambulatory.

## 2022-02-19 ENCOUNTER — HOSPITAL ENCOUNTER (OUTPATIENT)
Dept: INFUSION THERAPY | Age: 67
Discharge: HOME | End: 2022-02-19
Payer: MEDICARE

## 2022-02-19 VITALS
HEART RATE: 85 BPM | OXYGEN SATURATION: 98 % | SYSTOLIC BLOOD PRESSURE: 141 MMHG | TEMPERATURE: 97.3 F | DIASTOLIC BLOOD PRESSURE: 74 MMHG | RESPIRATION RATE: 18 BRPM

## 2022-02-19 DIAGNOSIS — D70.8 OTHER NEUTROPENIA (HCC): ICD-10-CM

## 2022-02-19 DIAGNOSIS — C25.1 MALIGNANT NEOPLASM OF BODY OF PANCREAS (HCC): ICD-10-CM

## 2022-02-19 DIAGNOSIS — C15.5 MALIGNANT NEOPLASM OF LOWER THIRD OF ESOPHAGUS (HCC): ICD-10-CM

## 2022-02-19 DIAGNOSIS — E86.0 DEHYDRATION: Primary | ICD-10-CM

## 2022-02-19 PROCEDURE — 96375 TX/PRO/DX INJ NEW DRUG ADDON: CPT

## 2022-02-19 PROCEDURE — 96361 HYDRATE IV INFUSION ADD-ON: CPT

## 2022-02-19 PROCEDURE — 96374 THER/PROPH/DIAG INJ IV PUSH: CPT

## 2022-02-19 PROCEDURE — 74011250636 HC RX REV CODE- 250/636: Performed by: INTERNAL MEDICINE

## 2022-02-19 PROCEDURE — 74011250636 HC RX REV CODE- 250/636: Performed by: NURSE PRACTITIONER

## 2022-02-19 PROCEDURE — 74011000250 HC RX REV CODE- 250: Performed by: INTERNAL MEDICINE

## 2022-02-19 RX ORDER — DEXAMETHASONE SODIUM PHOSPHATE 100 MG/10ML
10 INJECTION INTRAMUSCULAR; INTRAVENOUS ONCE
Status: COMPLETED | OUTPATIENT
Start: 2022-02-19 | End: 2022-02-19

## 2022-02-19 RX ORDER — SODIUM CHLORIDE 0.9 % (FLUSH) 0.9 %
10 SYRINGE (ML) INJECTION AS NEEDED
Status: ACTIVE | OUTPATIENT
Start: 2022-02-19 | End: 2022-02-19

## 2022-02-19 RX ORDER — SODIUM CHLORIDE 9 MG/ML
1000 INJECTION, SOLUTION INTRAVENOUS ONCE
Status: COMPLETED | OUTPATIENT
Start: 2022-02-19 | End: 2022-02-19

## 2022-02-19 RX ORDER — ONDANSETRON 2 MG/ML
8 INJECTION INTRAMUSCULAR; INTRAVENOUS ONCE
Status: COMPLETED | OUTPATIENT
Start: 2022-02-19 | End: 2022-02-19

## 2022-02-19 RX ADMIN — ONDANSETRON 8 MG: 2 INJECTION INTRAMUSCULAR; INTRAVENOUS at 09:01

## 2022-02-19 RX ADMIN — DEXAMETHASONE SODIUM PHOSPHATE 10 MG: 10 INJECTION INTRAMUSCULAR; INTRAVENOUS at 09:02

## 2022-02-19 RX ADMIN — SODIUM CHLORIDE, PRESERVATIVE FREE 10 ML: 5 INJECTION INTRAVENOUS at 08:48

## 2022-02-19 RX ADMIN — SODIUM CHLORIDE 1000 ML: 9 INJECTION, SOLUTION INTRAVENOUS at 08:48

## 2022-02-19 RX ADMIN — SODIUM CHLORIDE, PRESERVATIVE FREE 10 ML: 5 INJECTION INTRAVENOUS at 10:08

## 2022-02-19 NOTE — PROGRESS NOTES
Arrived to the [facility]. Chemo pump completed on arrival. Patient c/o nausea/vomiting. Patient given Zofran 8 mg & Dexamethasone 10 mg IVP. Patient tolerated well. Any issues or concerns during appointment: see above. Patient aware of next infusion appointment on 2/20/22 (date) at 0900 (time). Patient aware of next lab and [facility] office visit on 2/21 (date) at 0700 (time). Patient instructed to call provider with temperature of 100.4 or greater or nausea/vomiting/ diarrhea or pain not controlled by medications  Discharged in stable condition.

## 2022-02-21 ENCOUNTER — HOSPITAL ENCOUNTER (OUTPATIENT)
Dept: LAB | Age: 67
Discharge: HOME | End: 2022-02-21
Payer: MEDICARE

## 2022-02-21 ENCOUNTER — HOSPITAL ENCOUNTER (OUTPATIENT)
Dept: INFUSION THERAPY | Age: 67
Discharge: HOME | End: 2022-02-21
Payer: MEDICARE

## 2022-02-21 DIAGNOSIS — E86.0 DEHYDRATION: ICD-10-CM

## 2022-02-21 DIAGNOSIS — C78.6 PERITONEAL CARCINOMATOSIS (HCC): ICD-10-CM

## 2022-02-21 DIAGNOSIS — R11.2 CINV (CHEMOTHERAPY-INDUCED NAUSEA AND VOMITING): Primary | ICD-10-CM

## 2022-02-21 DIAGNOSIS — T45.1X5A CINV (CHEMOTHERAPY-INDUCED NAUSEA AND VOMITING): Primary | ICD-10-CM

## 2022-02-21 LAB
ALBUMIN SERPL-MCNC: 2.9 G/DL (ref 3.2–4.6)
ALBUMIN/GLOB SERPL: 0.8 {RATIO} (ref 1.2–3.5)
ALP SERPL-CCNC: 332 U/L (ref 50–136)
ALT SERPL-CCNC: 16 U/L (ref 12–65)
ANION GAP SERPL CALC-SCNC: 4 MMOL/L (ref 7–16)
AST SERPL-CCNC: 31 U/L (ref 15–37)
BASOPHILS # BLD: 0 K/UL (ref 0–0.2)
BASOPHILS NFR BLD: 1 % (ref 0–2)
BILIRUB SERPL-MCNC: 0.6 MG/DL (ref 0.2–1.1)
BUN SERPL-MCNC: 13 MG/DL (ref 8–23)
CALCIUM SERPL-MCNC: 9.9 MG/DL (ref 8.3–10.4)
CHLORIDE SERPL-SCNC: 104 MMOL/L (ref 98–107)
CO2 SERPL-SCNC: 29 MMOL/L (ref 21–32)
CREAT SERPL-MCNC: 0.9 MG/DL (ref 0.8–1.5)
DIFFERENTIAL METHOD BLD: ABNORMAL
EOSINOPHIL # BLD: 0.3 K/UL (ref 0–0.8)
EOSINOPHIL NFR BLD: 6 % (ref 0.5–7.8)
ERYTHROCYTE [DISTWIDTH] IN BLOOD BY AUTOMATED COUNT: 13.2 % (ref 11.9–14.6)
GLOBULIN SER CALC-MCNC: 3.8 G/DL (ref 2.3–3.5)
GLUCOSE SERPL-MCNC: 127 MG/DL (ref 65–100)
HCT VFR BLD AUTO: 44 %
HGB BLD-MCNC: 13.6 G/DL (ref 13.6–17.2)
IMM GRANULOCYTES # BLD AUTO: 0 K/UL (ref 0–0.5)
IMM GRANULOCYTES NFR BLD AUTO: 0 % (ref 0–5)
LYMPHOCYTES # BLD: 0.4 K/UL (ref 0.5–4.6)
LYMPHOCYTES NFR BLD: 9 % (ref 13–44)
MAGNESIUM SERPL-MCNC: 2.4 MG/DL (ref 1.8–2.4)
MCH RBC QN AUTO: 29.6 PG (ref 26.1–32.9)
MCHC RBC AUTO-ENTMCNC: 30.9 G/DL (ref 31.4–35)
MCV RBC AUTO: 95.9 FL (ref 79.6–97.8)
MONOCYTES # BLD: 0.1 K/UL (ref 0.1–1.3)
MONOCYTES NFR BLD: 3 % (ref 4–12)
NEUTS SEG # BLD: 3.9 K/UL (ref 1.7–8.2)
NEUTS SEG NFR BLD: 81 % (ref 43–78)
NRBC # BLD: 0 K/UL (ref 0–0.2)
PLATELET # BLD AUTO: 143 K/UL (ref 150–450)
PMV BLD AUTO: 10 FL (ref 9.4–12.3)
POTASSIUM SERPL-SCNC: 4 MMOL/L (ref 3.5–5.1)
PROT SERPL-MCNC: 6.7 G/DL (ref 6.3–8.2)
RBC # BLD AUTO: 4.59 M/UL (ref 4.23–5.6)
SODIUM SERPL-SCNC: 137 MMOL/L (ref 136–145)
WBC # BLD AUTO: 4.8 K/UL (ref 4.3–11.1)

## 2022-02-21 PROCEDURE — 80053 COMPREHEN METABOLIC PANEL: CPT

## 2022-02-21 PROCEDURE — 83735 ASSAY OF MAGNESIUM: CPT

## 2022-02-21 PROCEDURE — 96374 THER/PROPH/DIAG INJ IV PUSH: CPT

## 2022-02-21 PROCEDURE — 36415 COLL VENOUS BLD VENIPUNCTURE: CPT

## 2022-02-21 PROCEDURE — 85025 COMPLETE CBC W/AUTO DIFF WBC: CPT

## 2022-02-21 PROCEDURE — 74011250636 HC RX REV CODE- 250/636: Performed by: INTERNAL MEDICINE

## 2022-02-21 PROCEDURE — 96375 TX/PRO/DX INJ NEW DRUG ADDON: CPT

## 2022-02-21 RX ORDER — SODIUM CHLORIDE 0.9 % (FLUSH) 0.9 %
10 SYRINGE (ML) INJECTION AS NEEDED
Status: ACTIVE | OUTPATIENT
Start: 2022-02-21 | End: 2022-02-21

## 2022-02-21 RX ORDER — ONDANSETRON 2 MG/ML
8 INJECTION INTRAMUSCULAR; INTRAVENOUS ONCE
Status: COMPLETED | OUTPATIENT
Start: 2022-02-21 | End: 2022-02-21

## 2022-02-21 RX ORDER — DEXAMETHASONE SODIUM PHOSPHATE 100 MG/10ML
10 INJECTION INTRAMUSCULAR; INTRAVENOUS ONCE
Status: COMPLETED | OUTPATIENT
Start: 2022-02-21 | End: 2022-02-21

## 2022-02-21 RX ADMIN — SODIUM CHLORIDE 1000 ML: 9 INJECTION, SOLUTION INTRAVENOUS at 08:45

## 2022-02-21 RX ADMIN — DEXAMETHASONE SODIUM PHOSPHATE 10 MG: 10 INJECTION INTRAMUSCULAR; INTRAVENOUS at 09:07

## 2022-02-21 RX ADMIN — ONDANSETRON 8 MG: 2 INJECTION INTRAMUSCULAR; INTRAVENOUS at 09:05

## 2022-02-21 NOTE — PROGRESS NOTES
Arrived to the infusion center. c/o nausea. 1 liter NS infused without difficulty. Zofran and decadron  Given as ordered. Aware of his next appointment on 3/3 at 0900 for Folfox. Discharged via wheelchair in satisfactory condition.

## 2022-02-23 ENCOUNTER — HOSPITAL ENCOUNTER (OUTPATIENT)
Dept: GENERAL RADIOLOGY | Age: 67
Discharge: HOME | End: 2022-02-23
Payer: MEDICARE

## 2022-02-23 ENCOUNTER — HOSPITAL ENCOUNTER (OUTPATIENT)
Dept: INFUSION THERAPY | Age: 67
Discharge: HOME | End: 2022-02-23
Payer: MEDICARE

## 2022-02-23 VITALS
HEART RATE: 89 BPM | SYSTOLIC BLOOD PRESSURE: 104 MMHG | RESPIRATION RATE: 18 BRPM | DIASTOLIC BLOOD PRESSURE: 66 MMHG | OXYGEN SATURATION: 97 % | TEMPERATURE: 97.7 F

## 2022-02-23 DIAGNOSIS — E87.6 HYPOKALEMIA: ICD-10-CM

## 2022-02-23 DIAGNOSIS — D70.8 OTHER NEUTROPENIA (HCC): ICD-10-CM

## 2022-02-23 DIAGNOSIS — C15.5 MALIGNANT NEOPLASM OF LOWER THIRD OF ESOPHAGUS (HCC): ICD-10-CM

## 2022-02-23 DIAGNOSIS — E83.42 HYPOMAGNESEMIA: Primary | ICD-10-CM

## 2022-02-23 DIAGNOSIS — E86.0 DEHYDRATION: ICD-10-CM

## 2022-02-23 DIAGNOSIS — T45.1X5A CINV (CHEMOTHERAPY-INDUCED NAUSEA AND VOMITING): ICD-10-CM

## 2022-02-23 DIAGNOSIS — K59.00 CONSTIPATION, UNSPECIFIED CONSTIPATION TYPE: ICD-10-CM

## 2022-02-23 DIAGNOSIS — C25.1 MALIGNANT NEOPLASM OF BODY OF PANCREAS (HCC): ICD-10-CM

## 2022-02-23 DIAGNOSIS — R11.2 CINV (CHEMOTHERAPY-INDUCED NAUSEA AND VOMITING): ICD-10-CM

## 2022-02-23 PROCEDURE — 96374 THER/PROPH/DIAG INJ IV PUSH: CPT

## 2022-02-23 PROCEDURE — 74011250636 HC RX REV CODE- 250/636: Performed by: NURSE PRACTITIONER

## 2022-02-23 PROCEDURE — 96375 TX/PRO/DX INJ NEW DRUG ADDON: CPT

## 2022-02-23 RX ORDER — SODIUM CHLORIDE 0.9 % (FLUSH) 0.9 %
10 SYRINGE (ML) INJECTION AS NEEDED
Status: DISCONTINUED | OUTPATIENT
Start: 2022-02-23 | End: 2022-02-25 | Stop reason: HOSPADM

## 2022-02-23 RX ORDER — DEXAMETHASONE SODIUM PHOSPHATE 100 MG/10ML
10 INJECTION INTRAMUSCULAR; INTRAVENOUS ONCE
Status: COMPLETED | OUTPATIENT
Start: 2022-02-23 | End: 2022-02-23

## 2022-02-23 RX ORDER — ONDANSETRON 2 MG/ML
8 INJECTION INTRAMUSCULAR; INTRAVENOUS ONCE
Status: COMPLETED | OUTPATIENT
Start: 2022-02-23 | End: 2022-02-23

## 2022-02-23 RX ADMIN — SODIUM CHLORIDE 500 ML: 900 INJECTION, SOLUTION INTRAVENOUS at 13:52

## 2022-02-23 RX ADMIN — ONDANSETRON 8 MG: 2 INJECTION INTRAMUSCULAR; INTRAVENOUS at 14:02

## 2022-02-23 RX ADMIN — Medication 10 ML: at 14:19

## 2022-02-23 RX ADMIN — DEXAMETHASONE SODIUM PHOSPHATE 10 MG: 10 INJECTION INTRAMUSCULAR; INTRAVENOUS at 13:50

## 2022-02-23 RX ADMIN — Medication 10 ML: at 13:40

## 2022-02-23 NOTE — PROGRESS NOTES
Arrived to the [facility]. Patient having diffuse abdominal pain 9/10 that started a few days ago. 3 day history of nausea/vomiting/diarrhea/not passing gas. Patient's abdomen is tender to palpation. 500ml NS, IV zofran, and decadron given. 1 episode of bile emesis during infusion. Soraida Hernandez NP updated about patient condition. Going to send for abdominal US, patient updated and discharge to home via wheelchair. Communicated that radiology will be calling patient to schedule. Any issues or concerns during appointment: no other concerns today. Patient aware of next infusion appointment on 3/3 at 9:00am.  Patient instructed to call provider with temperature of 100.4 or greater or nausea/vomiting/ diarrhea or pain not controlled by medications  Discharged ambulatory to home.

## 2022-02-23 NOTE — PROGRESS NOTES
Problem: Patient Education: Go to Patient Education Activity  Goal: Patient/Family Education  Outcome: Progressing Towards Goal     Problem: Knowledge Deficit  Goal: *Participate in the learning process  Outcome: Progressing Towards Goal  Goal: *Verbalize description of procedure  Outcome: Progressing Towards Goal  Goal: *Verbalizes understanding and describes medication purposes and frequencies  Outcome: Progressing Towards Goal  Goal: *Knowledge of discharge instructions  Outcome: Progressing Towards Goal     Problem: Patient Education: Go to Patient Education Activity  Goal: Patient/Family Education  Outcome: Progressing Towards Goal

## 2022-02-24 ENCOUNTER — HOSPITAL ENCOUNTER (OUTPATIENT)
Dept: INTERVENTIONAL RADIOLOGY/VASCULAR | Age: 67
Discharge: HOME | End: 2022-02-24
Attending: NURSE PRACTITIONER
Payer: MEDICARE

## 2022-02-24 ENCOUNTER — HOSPITAL ENCOUNTER (OUTPATIENT)
Dept: ULTRASOUND IMAGING | Age: 67
Discharge: HOME | End: 2022-02-24
Attending: NURSE PRACTITIONER
Payer: MEDICARE

## 2022-02-24 VITALS
SYSTOLIC BLOOD PRESSURE: 160 MMHG | DIASTOLIC BLOOD PRESSURE: 82 MMHG | HEIGHT: 64 IN | HEART RATE: 83 BPM | OXYGEN SATURATION: 96 % | WEIGHT: 181 LBS | RESPIRATION RATE: 18 BRPM | BODY MASS INDEX: 30.9 KG/M2 | TEMPERATURE: 97.1 F

## 2022-02-24 DIAGNOSIS — C25.1 MALIGNANT NEOPLASM OF BODY OF PANCREAS (HCC): ICD-10-CM

## 2022-02-24 DIAGNOSIS — C15.5 MALIGNANT NEOPLASM OF LOWER THIRD OF ESOPHAGUS (HCC): ICD-10-CM

## 2022-02-24 DIAGNOSIS — R14.0 BLOATING: ICD-10-CM

## 2022-02-24 DIAGNOSIS — R14.0 ABDOMINAL DISTENSION: ICD-10-CM

## 2022-02-24 PROCEDURE — 88112 CYTOPATH CELL ENHANCE TECH: CPT

## 2022-02-24 PROCEDURE — 74011250636 HC RX REV CODE- 250/636: Performed by: PHYSICIAN ASSISTANT

## 2022-02-24 PROCEDURE — 77030010507 HC ADH SKN DERMBND J&J -B

## 2022-02-24 PROCEDURE — 49083 ABD PARACENTESIS W/IMAGING: CPT

## 2022-02-24 PROCEDURE — 76705 ECHO EXAM OF ABDOMEN: CPT

## 2022-02-24 PROCEDURE — P9047 ALBUMIN (HUMAN), 25%, 50ML: HCPCS | Performed by: PHYSICIAN ASSISTANT

## 2022-02-24 PROCEDURE — 88305 TISSUE EXAM BY PATHOLOGIST: CPT

## 2022-02-24 PROCEDURE — 77030014146 HC TY THORCENT/PARACENT BD -B

## 2022-02-24 RX ORDER — ALBUMIN HUMAN 250 G/1000ML
12.5-5 SOLUTION INTRAVENOUS
Status: DISCONTINUED | OUTPATIENT
Start: 2022-02-24 | End: 2022-02-28 | Stop reason: HOSPADM

## 2022-02-24 RX ADMIN — ALBUMIN (HUMAN) 12.5 G: 0.25 INJECTION, SOLUTION INTRAVENOUS at 12:12

## 2022-02-24 NOTE — DISCHARGE INSTRUCTIONS
[address]  Department of Interventional Radiology  [facility]  [phone] Office  [phone] Fax    PARACENTESIS DISCHARGE INSTRUCTIONS    General Information:  During this procedure, the doctor will insert a needle into the abdomen to drain fluid. After the procedure, you will be able to take a deep breath much easier. The site of the puncture may ooze the first day. This will decrease and eventually stop. Paracentesis (draining fluid from the abdomen) sometimes makes patients hypotensive (low blood pressure). Your doctor may order for you to receive fluids or albumin (a volume booster) during the procedure through an IV site. Home Care Instructions:  Keep the puncture site clean and dry. No tub baths or swimming until puncture site heals. Showering is acceptable. Resume your normal diet, and resume your normal activity slowly and as you tolerate. If you are short of breath, rest. If shortness of breath does not ease, please call your ordering doctor. Fluid can re-accumulate in the chest and/or in the abdomen. If this should occur, your doctor needs to know as you may need to have the procedure done again. Call If:     You should call your Physician and/or the Radiology Nurse if you notice any signs of infection, like pus draining, or if it is swollen or reddened. Also call if you have a fever, or if you are bleeding from the puncture site more than a small amount on the dressing. Call if the puncture site keeps draining fluid. Some oozing is to be expected, but should slow and then stop. Call if you feel like you have pressure in your abdomen. SEEK IMMEDIATE CARE OR CALL 911 IF YOU SUDDENLY HAVE TROUBLE BREATHING, OR IF YOUR LIPS TURN BLUE, OR IF YOU NOTICE BLOOD IN YOUR SPUTUM. Follow-Up Instructions: Please see your ordering doctor as he/she has requested. To Reach Us:   If you have any questions about your procedure, please call the Interventional Radiology department at [phone]. After business hours (5pm) and weekends, call the answering service at [phone] and ask for the Radiologist on call to be paged. Si tiene Preguntas acerca del procedimiento, por favor llame al departamento de Radiología Intervencional al [phone]. Después de horas de oficina (5 pm) y los fines de [place], llamar al Gianna Womack al [phone] y pregunte por el Radiologo de [facility]. Interventional Radiology General Nurse Discharge    After general anesthesia or intravenous sedation, for 24 hours or while taking prescription Narcotics:  · Limit your activities  · Do not drive and operate hazardous machinery  · Do not make important personal or business decisions  · Do  not drink alcoholic beverages  · If you have not urinated within 8 hours after discharge, please contact your surgeon on call. * Please give a list of your current medications to your Primary Care Provider. * Please update this list whenever your medications are discontinued, doses are     changed, or new medications (including over-the-counter products) are added. * Please carry medication information at all times in case of emergency situations. These are general instructions for a healthy lifestyle:    No smoking/ No tobacco products/ Avoid exposure to second hand smoke  Surgeon General's Warning:  Quitting smoking now greatly reduces serious risk to your health. Obesity, smoking, and sedentary lifestyle greatly increases your risk for illness  A healthy diet, regular physical exercise & weight monitoring are important for maintaining a healthy lifestyle    You may be retaining fluid if you have a history of heart failure or if you experience any of the following symptoms:  Weight gain of 3 pounds or more overnight or 5 pounds in a week, increased swelling in our hands or feet or shortness of breath while lying flat in bed.   Please call your doctor as soon as you notice any of these symptoms; do not wait until your next office visit. Recognize signs and symptoms of STROKE:  F-face looks uneven    A-arms unable to move or move unevenly    S-speech slurred or non-existent    T-time-call 911 as soon as signs and symptoms begin-DO NOT go       Back to bed or wait to see if you get better-TIME IS BRAIN.

## 2022-02-24 NOTE — PROGRESS NOTES
Interventional Radiology Post Paracentesis/Thoracentesis Note    2/24/2022    Procedure(s): Ultrasound guided Therapeutic Paracentesis Performed with 8 [place] catheter total volume 6230 ml. Samples sent to lab. Preliminary Findings: large clear and yellow. Complications: None    Plan:  Observation, check labs if drawn.           Chest X-Ray:  no    Full dictated report to follow

## 2022-02-25 LAB
NON-GYNECOLOGIC CYTOLOGY REPRT: NORMAL
SPECIMEN SOURCE: NORMAL

## 2022-02-27 ENCOUNTER — HOSPITAL ENCOUNTER (EMERGENCY)
Age: 67
Discharge: HOME | DRG: 435 | End: 2022-02-28
Attending: EMERGENCY MEDICINE
Payer: MEDICARE

## 2022-02-27 DIAGNOSIS — R11.2 INTRACTABLE VOMITING WITH NAUSEA, UNSPECIFIED VOMITING TYPE: Primary | ICD-10-CM

## 2022-02-27 LAB
ALBUMIN SERPL-MCNC: 2.5 G/DL (ref 3.2–4.6)
ALBUMIN/GLOB SERPL: 0.6 {RATIO} (ref 1.2–3.5)
ALP SERPL-CCNC: 392 U/L (ref 50–136)
ALT SERPL-CCNC: 28 U/L (ref 12–65)
ANION GAP SERPL CALC-SCNC: 5 MMOL/L (ref 7–16)
AST SERPL-CCNC: 35 U/L (ref 15–37)
BASOPHILS # BLD: 0 K/UL (ref 0–0.2)
BASOPHILS NFR BLD: 0 % (ref 0–2)
BILIRUB SERPL-MCNC: 0.6 MG/DL (ref 0.2–1.1)
BUN SERPL-MCNC: 16 MG/DL (ref 8–23)
CALCIUM SERPL-MCNC: 9.1 MG/DL (ref 8.3–10.4)
CHLORIDE SERPL-SCNC: 101 MMOL/L (ref 98–107)
CO2 SERPL-SCNC: 30 MMOL/L (ref 21–32)
CREAT SERPL-MCNC: 0.9 MG/DL (ref 0.8–1.5)
DIFFERENTIAL METHOD BLD: ABNORMAL
EOSINOPHIL # BLD: 0.1 K/UL (ref 0–0.8)
EOSINOPHIL NFR BLD: 3 % (ref 0.5–7.8)
ERYTHROCYTE [DISTWIDTH] IN BLOOD BY AUTOMATED COUNT: 13.4 % (ref 11.9–14.6)
GLOBULIN SER CALC-MCNC: 4.2 G/DL (ref 2.3–3.5)
GLUCOSE SERPL-MCNC: 130 MG/DL (ref 65–100)
HCT VFR BLD AUTO: 47.5 % (ref 41.1–50.3)
HGB BLD-MCNC: 15 G/DL (ref 13.6–17.2)
IMM GRANULOCYTES # BLD AUTO: 0 K/UL (ref 0–0.5)
IMM GRANULOCYTES NFR BLD AUTO: 0 % (ref 0–5)
LACTATE SERPL-SCNC: 2.3 MMOL/L (ref 0.4–2)
LACTATE SERPL-SCNC: 2.4 MMOL/L (ref 0.4–2)
LIPASE SERPL-CCNC: 36 U/L (ref 73–393)
LYMPHOCYTES # BLD: 0.7 K/UL (ref 0.5–4.6)
LYMPHOCYTES NFR BLD: 13 % (ref 13–44)
MCH RBC QN AUTO: 30.1 PG (ref 26.1–32.9)
MCHC RBC AUTO-ENTMCNC: 31.6 G/DL (ref 31.4–35)
MCV RBC AUTO: 95.4 FL (ref 79.6–97.8)
MONOCYTES # BLD: 0.8 K/UL (ref 0.1–1.3)
MONOCYTES NFR BLD: 16 % (ref 4–12)
NEUTS SEG # BLD: 3.3 K/UL (ref 1.7–8.2)
NEUTS SEG NFR BLD: 67 % (ref 43–78)
NRBC # BLD: 0 K/UL (ref 0–0.2)
PLATELET # BLD AUTO: 165 K/UL (ref 150–450)
PMV BLD AUTO: 10.1 FL (ref 9.4–12.3)
POTASSIUM SERPL-SCNC: 4.1 MMOL/L (ref 3.5–5.1)
PROT SERPL-MCNC: 6.7 G/DL (ref 6.3–8.2)
RBC # BLD AUTO: 4.98 M/UL (ref 4.23–5.6)
SODIUM SERPL-SCNC: 136 MMOL/L (ref 138–145)
WBC # BLD AUTO: 5 K/UL (ref 4.3–11.1)

## 2022-02-27 PROCEDURE — 74011250636 HC RX REV CODE- 250/636: Performed by: EMERGENCY MEDICINE

## 2022-02-27 PROCEDURE — 93005 ELECTROCARDIOGRAM TRACING: CPT | Performed by: EMERGENCY MEDICINE

## 2022-02-27 PROCEDURE — 80053 COMPREHEN METABOLIC PANEL: CPT

## 2022-02-27 PROCEDURE — 96375 TX/PRO/DX INJ NEW DRUG ADDON: CPT

## 2022-02-27 PROCEDURE — 85025 COMPLETE CBC W/AUTO DIFF WBC: CPT

## 2022-02-27 PROCEDURE — 83690 ASSAY OF LIPASE: CPT

## 2022-02-27 PROCEDURE — 96374 THER/PROPH/DIAG INJ IV PUSH: CPT

## 2022-02-27 PROCEDURE — 83605 ASSAY OF LACTIC ACID: CPT

## 2022-02-27 PROCEDURE — 99284 EMERGENCY DEPT VISIT MOD MDM: CPT

## 2022-02-27 PROCEDURE — 74011000250 HC RX REV CODE- 250: Performed by: EMERGENCY MEDICINE

## 2022-02-27 PROCEDURE — 74011250637 HC RX REV CODE- 250/637: Performed by: EMERGENCY MEDICINE

## 2022-02-27 RX ORDER — MAG HYDROX/ALUMINUM HYD/SIMETH 200-200-20
30 SUSPENSION, ORAL (FINAL DOSE FORM) ORAL
Status: COMPLETED | OUTPATIENT
Start: 2022-02-27 | End: 2022-02-27

## 2022-02-27 RX ORDER — SODIUM CHLORIDE 0.9 % (FLUSH) 0.9 %
5-10 SYRINGE (ML) INJECTION EVERY 8 HOURS
Status: DISCONTINUED | OUTPATIENT
Start: 2022-02-27 | End: 2022-02-28 | Stop reason: HOSPADM

## 2022-02-27 RX ORDER — ONDANSETRON 2 MG/ML
4 INJECTION INTRAMUSCULAR; INTRAVENOUS
Status: COMPLETED | OUTPATIENT
Start: 2022-02-27 | End: 2022-02-27

## 2022-02-27 RX ORDER — SODIUM CHLORIDE 0.9 % (FLUSH) 0.9 %
5-10 SYRINGE (ML) INJECTION AS NEEDED
Status: DISCONTINUED | OUTPATIENT
Start: 2022-02-27 | End: 2022-02-28 | Stop reason: HOSPADM

## 2022-02-27 RX ORDER — LIDOCAINE HYDROCHLORIDE 20 MG/ML
10 SOLUTION OROPHARYNGEAL ONCE
Status: COMPLETED | OUTPATIENT
Start: 2022-02-27 | End: 2022-02-27

## 2022-02-27 RX ORDER — DIPHENHYDRAMINE HYDROCHLORIDE 50 MG/ML
12.5 INJECTION, SOLUTION INTRAMUSCULAR; INTRAVENOUS
Status: COMPLETED | OUTPATIENT
Start: 2022-02-27 | End: 2022-02-27

## 2022-02-27 RX ORDER — SODIUM CHLORIDE, SODIUM LACTATE, POTASSIUM CHLORIDE, CALCIUM CHLORIDE 600; 310; 30; 20 MG/100ML; MG/100ML; MG/100ML; MG/100ML
1000 INJECTION, SOLUTION INTRAVENOUS CONTINUOUS
Status: DISCONTINUED | OUTPATIENT
Start: 2022-02-27 | End: 2022-02-28 | Stop reason: HOSPADM

## 2022-02-27 RX ADMIN — DIPHENHYDRAMINE HYDROCHLORIDE 12.5 MG: 50 INJECTION, SOLUTION INTRAMUSCULAR; INTRAVENOUS at 20:07

## 2022-02-27 RX ADMIN — SODIUM CHLORIDE, SODIUM LACTATE, POTASSIUM CHLORIDE, AND CALCIUM CHLORIDE 1000 ML: 600; 310; 30; 20 INJECTION, SOLUTION INTRAVENOUS at 20:02

## 2022-02-27 RX ADMIN — SODIUM CHLORIDE 1000 ML: 900 INJECTION, SOLUTION INTRAVENOUS at 22:21

## 2022-02-27 RX ADMIN — ONDANSETRON 4 MG: 2 INJECTION INTRAMUSCULAR; INTRAVENOUS at 21:20

## 2022-02-27 RX ADMIN — SODIUM CHLORIDE, PRESERVATIVE FREE 10 ML: 5 INJECTION INTRAVENOUS at 21:27

## 2022-02-27 RX ADMIN — PROCHLORPERAZINE EDISYLATE 5 MG: 5 INJECTION INTRAMUSCULAR; INTRAVENOUS at 20:02

## 2022-02-27 RX ADMIN — LIDOCAINE HYDROCHLORIDE 10 ML: 20 SOLUTION ORAL; TOPICAL at 21:22

## 2022-02-27 RX ADMIN — FAMOTIDINE 20 MG: 10 INJECTION INTRAVENOUS at 21:23

## 2022-02-27 RX ADMIN — ALUMINUM HYDROXIDE, MAGNESIUM HYDROXIDE, DIMETHICONE 30 ML: 200; 200; 20 LIQUID ORAL at 21:22

## 2022-02-27 NOTE — ED TRIAGE NOTES
Patient ambulatory to triage with mask in place. Patient reports fatigue, decreased appetite, n/v. Hx of pancreatic cancer and had 6L removed from his abd on Thursday.

## 2022-02-28 VITALS
HEART RATE: 75 BPM | DIASTOLIC BLOOD PRESSURE: 80 MMHG | BODY MASS INDEX: 29.02 KG/M2 | SYSTOLIC BLOOD PRESSURE: 128 MMHG | OXYGEN SATURATION: 97 % | RESPIRATION RATE: 20 BRPM | WEIGHT: 170 LBS | HEIGHT: 64 IN | TEMPERATURE: 97.7 F

## 2022-02-28 LAB
ATRIAL RATE: 94 BPM
BILIRUB UR QL: ABNORMAL
CALCULATED P AXIS, ECG09: 34 DEGREES
CALCULATED R AXIS, ECG10: -25 DEGREES
CALCULATED T AXIS, ECG11: 148 DEGREES
DIAGNOSIS, 93000: NORMAL
GLUCOSE UR QL STRIP.AUTO: NEGATIVE MG/DL
KETONES UR-MCNC: 15 MG/DL
LACTATE SERPL-SCNC: 1.3 MMOL/L (ref 0.4–2)
LEUKOCYTE ESTERASE UR QL STRIP: NEGATIVE
NITRITE UR QL: NEGATIVE
P-R INTERVAL, ECG05: 133 MS
PH UR: 6 [PH] (ref 5–9)
PROT UR QL: 100 MG/DL
Q-T INTERVAL, ECG07: 382 MS
QRS DURATION, ECG06: 140 MS
QTC CALCULATION (BEZET), ECG08: 476 MS
RBC # UR STRIP: NEGATIVE /UL
SERVICE CMNT-IMP: ABNORMAL
SP GR UR: 1.02 (ref 1–1.02)
UROBILINOGEN UR QL: 0.2 EU/DL (ref 0.2–1)
VENTRICULAR RATE, ECG03: 93 BPM

## 2022-02-28 PROCEDURE — 83605 ASSAY OF LACTIC ACID: CPT

## 2022-02-28 PROCEDURE — 81003 URINALYSIS AUTO W/O SCOPE: CPT

## 2022-02-28 NOTE — DISCHARGE INSTRUCTIONS
Push fluids. Slowly advance to bland solid foods. Follow-up closely with oncology. Return for worsening or concerning symptoms.

## 2022-02-28 NOTE — ED NOTES
I have reviewed discharge instructions with the patient. The patient verbalized understanding. Patient left ED via Discharge Method: ambulatory to Home with family member. Opportunity for questions and clarification provided. Patient given 0 scripts. Pt in no acute distress at discharge     To continue your aftercare when you leave the hospital, you may receive an automated call from our care team to check in on how you are doing. This is a free service and part of our promise to provide the best care and service to meet your aftercare needs.  If you have questions, or wish to unsubscribe from this service please call [phone]. Thank you for Choosing our [address] [place] [address] Emergency Department.

## 2022-02-28 NOTE — ED PROVIDER NOTES
48-year-old male with a history of pancreatic cancer on every 2-week chemotherapy, GERD, hypertension presents with persistent nausea, vomiting, diarrhea, and abdominal pain for the past week and a half. He has been taking Phenergan and Zofran without much improvement. He has had a poor appetite. He was seen by oncologist and told his labs looked great. Also had paracentesis with 6 L removed 4 days ago. No documented fevers. He has occasional chest pain, no shortness of breath, or cough. He reports generalized weakness and feels like he will pass out when he walks. States he feels dehydrated.            Past Medical History:   Diagnosis Date    Back pain     followed by pain management    Chronic pain     left-sided, lower back pain    CINV (chemotherapy-induced nausea and vomiting) 4/20/2021    Erectile dysfunction     Gastritis     GERD (gastroesophageal reflux disease)     daily medication    Hiatal hernia     \"medium\"    History of anemia     Hypertension     situational; has Amlodipine to take if systolic >376    Left bundle branch block (LBBB) on electrocardiogram 02/05/2021    Malignant neoplasm of body of pancreas ([address]     Malignant neoplasm of esophagus ([address] 12/07/2020    Morbid obesity (HCC)     Nausea     takes antiemetic med prn       Past Surgical History:   Procedure Laterality Date    HX APPENDECTOMY      HX CHOLECYSTECTOMY      HX COLONOSCOPY  12/01/2020    with EGD    HX VASCULAR ACCESS      IR PARACENTESIS ABD W IMAGE  2/24/2022         Family History:   Problem Relation Age of Onset    Cancer Mother     No Known Problems Father     Cancer Sister        Social History     Socioeconomic History    Marital status:      Spouse name: Not on file    Number of children: Not on file    Years of education: Not on file    Highest education level: Not on file   Occupational History    Not on file   Tobacco Use    Smoking status: Former Smoker     Packs/day: 1.50 Years: 15.00     Pack years: 22.50     Quit date: 1974     Years since quittin.3    Smokeless tobacco: Never Used   Vaping Use    Vaping Use: Never used   Substance and Sexual Activity    Alcohol use: Yes     Comment: socially    Drug use: Never    Sexual activity: Not on file   Other Topics Concern     Service Not Asked    Blood Transfusions Not Asked    Caffeine Concern Not Asked    Occupational Exposure Not Asked    Hobby Hazards Not Asked    Sleep Concern Not Asked    Stress Concern Not Asked    Weight Concern Not Asked    Special Diet Not Asked    Back Care Not Asked    Exercise Not Asked    Bike Helmet Not Asked    [address] Not Asked    Self-Exams Not Asked   Social History Narrative    Not on file     Social Determinants of Health     Financial Resource Strain:     Difficulty of Paying Living Expenses: Not on file   Food Insecurity:     Worried About Running Out of Food in the Last Year: Not on file    Elian of Food in the Last Year: Not on file   Transportation Needs:     Lack of Transportation (Medical): Not on file    Lack of Transportation (Non-Medical):  Not on file   Physical Activity:     Days of Exercise per Week: Not on file    Minutes of Exercise per Session: Not on file   Stress:     Feeling of Stress : Not on file   Social Connections:     Frequency of Communication with Friends and Family: Not on file    Frequency of Social Gatherings with Friends and Family: Not on file    Attends Je[employer] witness Services: Not on file    Active Member of Clubs or Organizations: Not on file    Attends Club or Organization Meetings: Not on file    Marital Status: Not on file   Intimate Partner Violence:     Fear of Current or Ex-Partner: Not on file    Emotionally Abused: Not on file    Physically Abused: Not on file    Sexually Abused: Not on file   Housing Stability:     Unable to Pay for Housing in the Last Year: Not on file    Number of [place] in the Last Year: Not on file    Unstable Housing in the Last Year: Not on file         ALLERGIES: Patient has no known allergies. Review of Systems   Constitutional: Positive for appetite change and fatigue. Negative for fever. HENT: Negative for hearing loss. Eyes: Negative for visual disturbance. Respiratory: Negative for cough and shortness of breath. Cardiovascular: Negative for chest pain. Gastrointestinal: Positive for abdominal pain, diarrhea, nausea and vomiting. Genitourinary: Negative for dysuria. Musculoskeletal: Positive for arthralgias and back pain. Skin: Negative for rash. Neurological: Negative for headaches. Psychiatric/Behavioral: Negative for confusion. All other systems reviewed and are negative. Vitals:    02/27/22 1759 02/27/22 1933 02/27/22 1956   BP: (!) 149/115 (!) 135/101    Pulse: (!) 108 (!) 101    Resp: 16 16    Temp: 97.5 °F (36.4 °C) 97.7 °F (36.5 °C)    SpO2: 96% 99% 97%   Weight: 77.1 kg (170 lb)     Height: 5' 4\" (1.626 m)              Physical Exam  Vitals and nursing note reviewed. Constitutional:       Appearance: Normal appearance. He is well-developed. He is ill-appearing. HENT:      Head: Normocephalic and atraumatic. Nose: Nose normal.      Mouth/Throat:      Mouth: Mucous membranes are moist.   Eyes:      Pupils: Pupils are equal, round, and reactive to light. Cardiovascular:      Rate and Rhythm: Regular rhythm. Tachycardia present. Heart sounds: Normal heart sounds. Pulmonary:      Effort: Pulmonary effort is normal.      Breath sounds: Normal breath sounds. Abdominal:      General: There is no distension. Palpations: Abdomen is soft. Tenderness: There is abdominal tenderness. Musculoskeletal:         General: No deformity. Normal range of motion. Cervical back: Normal range of motion and neck supple. Right lower leg: Edema present. Left lower leg: Edema present.    Skin:     General: Skin is warm and dry. Neurological:      General: No focal deficit present. Mental Status: He is alert. Mental status is at baseline. Psychiatric:         Mood and Affect: Mood normal.         Behavior: Behavior normal.          MDM  Number of Diagnoses or Management Options  Diagnosis management comments: Parts of this document were created using dragon voice recognition software. The chart has been reviewed but errors may still be present. I wore appropriate PPE throughout this patient's ED visit. Tucker Vasquez MD, 7:59 PM    12:59 AM  Lactic acidosis cleared after 2 L. No further vomiting in the emergency department. Advised oncology follow-up. I discussed the results of all labs, procedures, radiographs, and treatments with the patient and available family. Treatment plan is agreed upon and the patient is ready for discharge. Questions about treatment in the ED and differential diagnosis of presenting condition were answered. Patient was given verbal discharge instructions including, but not limited to, importance of returning to the emergency department for any concern of worsening or continued symptoms. Instructions were given to follow up with a primary care provider or specialist within 1-2 days. Adverse effects of medications, if prescribed, were discussed and patient was advised to refrain from significant physical activity until followed up by primary care physician and to not drive or operate heavy machinery after taking any sedating substances.            Amount and/or Complexity of Data Reviewed  Clinical lab tests: reviewed and ordered (Results for orders placed or performed during the hospital encounter of 02/27/22  -CBC WITH AUTOMATED DIFF:        Result                      Value             Ref Range           WBC                         5.0               4.3 - 11.1 K*       RBC                         4.98              4.23 - 5.6 M*       HGB                         15.0              13.6 - 17.2 *       HCT                         47.5              41.1 - 50.3 %       MCV                         95.4              79.6 - 97.8 *       MCH                         30.1              26.1 - 32.9 *       MCHC                        31.6              31.4 - 35.0 *       RDW                         13.4              11.9 - 14.6 %       PLATELET                    165               150 - 450 K/*       MPV                         10.1              9.4 - 12.3 FL       ABSOLUTE NRBC               0.00              0.0 - 0.2 K/*       DF                          AUTOMATED                             NEUTROPHILS                 67                43 - 78 %           LYMPHOCYTES                 13                13 - 44 %           MONOCYTES                   16 (H)            4.0 - 12.0 %        EOSINOPHILS                 3                 0.5 - 7.8 %         BASOPHILS                   0                 0.0 - 2.0 %         IMMATURE GRANULOCYTES       0                 0.0 - 5.0 %         ABS. NEUTROPHILS            3.3               1.7 - 8.2 K/*       ABS. LYMPHOCYTES            0.7               0.5 - 4.6 K/*       ABS. MONOCYTES              0.8               0.1 - 1.3 K/*       ABS. EOSINOPHILS            0.1               0.0 - 0.8 K/*       ABS. BASOPHILS              0.0               0.0 - 0.2 K/*       ABS. IMM.  GRANS.            0.0               0.0 - 0.5 K/*  -METABOLIC PANEL, COMPREHENSIVE:        Result                      Value             Ref Range           Sodium                      136 (L)           138 - 145 mm*       Potassium                   4.1               3.5 - 5.1 mm*       Chloride                    101               98 - 107 mmo*       CO2                         30                21 - 32 mmol*       Anion gap                   5 (L)             7 - 16 mmol/L       Glucose                     130 (H)           65 - 100 mg/*       BUN                         16                8 - 23 MG/DL        Creatinine                  0.90              0.8 - 1.5 MG*       GFR est AA                  >60               >60 ml/min/1*       GFR est non-AA              >60               >60 ml/min/1*       Calcium                     9.1               8.3 - 10.4 M*       Bilirubin, total            0.6               0.2 - 1.1 MG*       ALT (SGPT)                  28                12 - 65 U/L         AST (SGOT)                  35                15 - 37 U/L         Alk.  phosphatase            392 (H)           50 - 136 U/L        Protein, total              6.7               6.3 - 8.2 g/*       Albumin                     2.5 (L)           3.2 - 4.6 g/*       Globulin                    4.2 (H)           2.3 - 3.5 g/*       A-G Ratio                   0.6 (L)           1.2 - 3.5      -LIPASE:        Result                      Value             Ref Range           Lipase                      36 (L)            73 - 393 U/L   -LACTIC ACID:        Result                      Value             Ref Range           Lactic acid                 2.3 (H)           0.4 - 2.0 MM*  -LACTIC ACID:        Result                      Value             Ref Range           Lactic acid                 2.4 (H)           0.4 - 2.0 MM*  -LACTIC ACID:        Result                      Value             Ref Range           Lactic acid                 1.3               0.4 - 2.0 MM*  -POC URINE MACROSCOPIC:        Result                      Value             Ref Range           Spec. gravity (POC)         1.025 (H)         1.001 - 1.02*       pH, urine  (POC)            6.0               5.0 - 9.0           Protein (POC)               100 (A)           NEG mg/dL           Glucose, urine (POC)        Negative          NEG mg/dL           Ketones (POC)               15 (A)            NEG mg/dL           Bilirubin (POC)             MODERATE (A)      NEG                 Blood (POC)                 Negative          NEG Urobilinogen (POC)          0.2               0.2 - 1.0 EU*       Nitrite (POC)               Negative          NEG                 Leukocyte esterase (PO*     Negative          NEG                 Performed by                Olga Churchill                     -EKG, 12 LEAD, INITIAL:        Result                      Value             Ref Range           Ventricular Rate            93                BPM                 Atrial Rate                 94                BPM                 P-R Interval                133               ms                  QRS Duration                140               ms                  Q-T Interval                382               ms                  QTC Calculation (Bezet)     476               ms                  Calculated P Axis           34                degrees             Calculated R Axis           -25               degrees             Calculated T Axis           148               degrees             Diagnosis                                                     Sinus tachycardia   Atrial premature complexes   Left bundle branch block   Baseline wander in lead(s) V4     )  Tests in the medicine section of CPT®: ordered and reviewed           EKG    Date/Time: 2/27/2022 8:42 PM  Performed by: Adriana Correa MD  Authorized by: Adriana Correa MD     ECG reviewed by ED Physician in the absence of a cardiologist: yes    Previous ECG:     Previous ECG:  Compared to current    Comparison ECG info:  2/5/21 LBBB present    Similarity:  No change  Interpretation:     Interpretation: abnormal    Rate:     ECG rate:  93    ECG rate assessment: normal    Rhythm:     Rhythm: sinus rhythm    Ectopy:     Ectopy: PAC    Conduction:     Conduction: abnormal      Abnormal conduction: complete LBBB    ST segments:     ST segments:  Non-specific  T waves:     T waves: non-specific

## 2022-03-02 ENCOUNTER — HOSPITAL ENCOUNTER (OUTPATIENT)
Dept: LAB | Age: 67
Discharge: HOME | DRG: 435 | End: 2022-03-02
Payer: MEDICARE

## 2022-03-02 ENCOUNTER — HOSPITAL ENCOUNTER (INPATIENT)
Age: 67
LOS: 5 days | Discharge: HOME | DRG: 435 | End: 2022-03-07
Attending: EMERGENCY MEDICINE | Admitting: INTERNAL MEDICINE
Payer: MEDICARE

## 2022-03-02 DIAGNOSIS — R10.31 RIGHT LOWER QUADRANT ABDOMINAL PAIN: ICD-10-CM

## 2022-03-02 DIAGNOSIS — C25.1 MALIGNANT NEOPLASM OF BODY OF PANCREAS (HCC): ICD-10-CM

## 2022-03-02 DIAGNOSIS — Z51.5 ENCOUNTER FOR PALLIATIVE CARE: ICD-10-CM

## 2022-03-02 DIAGNOSIS — R62.7 FAILURE TO THRIVE IN ADULT: ICD-10-CM

## 2022-03-02 DIAGNOSIS — G89.3 CANCER ASSOCIATED PAIN: Primary | ICD-10-CM

## 2022-03-02 DIAGNOSIS — R52 INTRACTABLE PAIN: ICD-10-CM

## 2022-03-02 DIAGNOSIS — E86.0 DEHYDRATION: ICD-10-CM

## 2022-03-02 DIAGNOSIS — R63.4 WEIGHT LOSS: ICD-10-CM

## 2022-03-02 DIAGNOSIS — R18.8 OTHER ASCITES: ICD-10-CM

## 2022-03-02 DIAGNOSIS — R18.0 MALIGNANT ASCITES: ICD-10-CM

## 2022-03-02 DIAGNOSIS — C15.5 MALIGNANT NEOPLASM OF LOWER THIRD OF ESOPHAGUS (HCC): ICD-10-CM

## 2022-03-02 PROBLEM — C25.9 PANCREATIC CANCER (HCC): Status: ACTIVE | Noted: 2022-03-02

## 2022-03-02 PROBLEM — Z51.11 ADMISSION FOR ANTINEOPLASTIC CHEMOTHERAPY: Status: ACTIVE | Noted: 2022-03-02

## 2022-03-02 LAB
ALBUMIN SERPL-MCNC: 2.7 G/DL (ref 3.2–4.6)
ALBUMIN/GLOB SERPL: 0.7 {RATIO} (ref 1.2–3.5)
ALP SERPL-CCNC: 398 U/L (ref 50–136)
ALT SERPL-CCNC: 22 U/L (ref 12–65)
ANION GAP SERPL CALC-SCNC: 5 MMOL/L (ref 7–16)
AST SERPL-CCNC: 30 U/L (ref 15–37)
BASOPHILS # BLD: 0 K/UL (ref 0–0.2)
BASOPHILS NFR BLD: 1 % (ref 0–2)
BILIRUB SERPL-MCNC: 0.6 MG/DL (ref 0.2–1.1)
BUN SERPL-MCNC: 17 MG/DL (ref 8–23)
CALCIUM SERPL-MCNC: 9.1 MG/DL (ref 8.3–10.4)
CANCER AG19-9 SERPL-ACNC: 152.3 U/ML (ref 2–37)
CEA SERPL-MCNC: 29.5 NG/ML (ref 0–3)
CHLORIDE SERPL-SCNC: 102 MMOL/L (ref 98–107)
CO2 SERPL-SCNC: 30 MMOL/L (ref 21–32)
CREAT SERPL-MCNC: 0.9 MG/DL (ref 0.8–1.5)
DIFFERENTIAL METHOD BLD: ABNORMAL
EOSINOPHIL # BLD: 0.3 K/UL (ref 0–0.8)
EOSINOPHIL NFR BLD: 7 % (ref 0.5–7.8)
ERYTHROCYTE [DISTWIDTH] IN BLOOD BY AUTOMATED COUNT: 14.3 % (ref 11.9–14.6)
GLOBULIN SER CALC-MCNC: 3.9 G/DL (ref 2.3–3.5)
GLUCOSE SERPL-MCNC: 115 MG/DL (ref 65–100)
HCT VFR BLD AUTO: 43.7 %
HGB BLD-MCNC: 13.8 G/DL (ref 13.6–17.2)
IMM GRANULOCYTES # BLD AUTO: 0 K/UL (ref 0–0.5)
IMM GRANULOCYTES NFR BLD AUTO: 0 % (ref 0–5)
LYMPHOCYTES # BLD: 0.6 K/UL (ref 0.5–4.6)
LYMPHOCYTES NFR BLD: 15 % (ref 13–44)
MAGNESIUM SERPL-MCNC: 2.4 MG/DL (ref 1.8–2.4)
MCH RBC QN AUTO: 29.7 PG (ref 26.1–32.9)
MCHC RBC AUTO-ENTMCNC: 31.6 G/DL (ref 31.4–35)
MCV RBC AUTO: 94.2 FL (ref 79.6–97.8)
MONOCYTES # BLD: 0.7 K/UL (ref 0.1–1.3)
MONOCYTES NFR BLD: 18 % (ref 4–12)
NEUTS SEG # BLD: 2.3 K/UL (ref 1.7–8.2)
NEUTS SEG NFR BLD: 59 % (ref 43–78)
NRBC # BLD: 0 K/UL (ref 0–0.2)
PLATELET # BLD AUTO: 153 K/UL (ref 150–450)
PMV BLD AUTO: 9.8 FL (ref 9.4–12.3)
POTASSIUM SERPL-SCNC: 3.9 MMOL/L (ref 3.5–5.1)
PROT SERPL-MCNC: 6.6 G/DL (ref 6.3–8.2)
RBC # BLD AUTO: 4.64 M/UL (ref 4.23–5.6)
SODIUM SERPL-SCNC: 137 MMOL/L (ref 136–145)
WBC # BLD AUTO: 3.8 K/UL (ref 4.3–11.1)

## 2022-03-02 PROCEDURE — 96374 THER/PROPH/DIAG INJ IV PUSH: CPT

## 2022-03-02 PROCEDURE — 80053 COMPREHEN METABOLIC PANEL: CPT

## 2022-03-02 PROCEDURE — 82378 CARCINOEMBRYONIC ANTIGEN: CPT

## 2022-03-02 PROCEDURE — 83735 ASSAY OF MAGNESIUM: CPT

## 2022-03-02 PROCEDURE — 99285 EMERGENCY DEPT VISIT HI MDM: CPT

## 2022-03-02 PROCEDURE — 74011250636 HC RX REV CODE- 250/636: Performed by: NURSE PRACTITIONER

## 2022-03-02 PROCEDURE — 86301 IMMUNOASSAY TUMOR CA 19-9: CPT

## 2022-03-02 PROCEDURE — 96375 TX/PRO/DX INJ NEW DRUG ADDON: CPT

## 2022-03-02 PROCEDURE — 74011250637 HC RX REV CODE- 250/637: Performed by: NURSE PRACTITIONER

## 2022-03-02 PROCEDURE — 65270000029 HC RM PRIVATE

## 2022-03-02 PROCEDURE — 2709999900 HC NON-CHARGEABLE SUPPLY

## 2022-03-02 PROCEDURE — 36415 COLL VENOUS BLD VENIPUNCTURE: CPT

## 2022-03-02 PROCEDURE — 85025 COMPLETE CBC W/AUTO DIFF WBC: CPT

## 2022-03-02 PROCEDURE — 99222 1ST HOSP IP/OBS MODERATE 55: CPT | Performed by: INTERNAL MEDICINE

## 2022-03-02 RX ORDER — MAG HYDROX/ALUMINUM HYD/SIMETH 200-200-20
30 SUSPENSION, ORAL (FINAL DOSE FORM) ORAL
Status: DISCONTINUED | OUTPATIENT
Start: 2022-03-02 | End: 2022-03-07 | Stop reason: HOSPADM

## 2022-03-02 RX ORDER — PROMETHAZINE HYDROCHLORIDE 25 MG/1
25 TABLET ORAL
Status: DISCONTINUED | OUTPATIENT
Start: 2022-03-02 | End: 2022-03-07 | Stop reason: HOSPADM

## 2022-03-02 RX ORDER — SODIUM CHLORIDE 9 MG/ML
25 INJECTION, SOLUTION INTRAVENOUS CONTINUOUS
Status: DISCONTINUED | OUTPATIENT
Start: 2022-03-02 | End: 2022-03-04

## 2022-03-02 RX ORDER — HYDROCODONE BITARTRATE AND ACETAMINOPHEN 5; 325 MG/1; MG/1
1 TABLET ORAL
Status: DISCONTINUED | OUTPATIENT
Start: 2022-03-02 | End: 2022-03-04

## 2022-03-02 RX ORDER — HYDROMORPHONE HYDROCHLORIDE 1 MG/ML
1 INJECTION, SOLUTION INTRAMUSCULAR; INTRAVENOUS; SUBCUTANEOUS ONCE
Status: COMPLETED | OUTPATIENT
Start: 2022-03-02 | End: 2022-03-02

## 2022-03-02 RX ORDER — ONDANSETRON 2 MG/ML
4 INJECTION INTRAMUSCULAR; INTRAVENOUS ONCE
Status: COMPLETED | OUTPATIENT
Start: 2022-03-02 | End: 2022-03-02

## 2022-03-02 RX ORDER — PROCHLORPERAZINE MALEATE 10 MG
10 TABLET ORAL
Status: DISCONTINUED | OUTPATIENT
Start: 2022-03-02 | End: 2022-03-07 | Stop reason: HOSPADM

## 2022-03-02 RX ORDER — FAMOTIDINE 20 MG/1
20 TABLET, FILM COATED ORAL 2 TIMES DAILY
Status: DISCONTINUED | OUTPATIENT
Start: 2022-03-02 | End: 2022-03-07 | Stop reason: HOSPADM

## 2022-03-02 RX ORDER — DIPHENOXYLATE HYDROCHLORIDE AND ATROPINE SULFATE 2.5; .025 MG/1; MG/1
1 TABLET ORAL
Status: DISCONTINUED | OUTPATIENT
Start: 2022-03-02 | End: 2022-03-07 | Stop reason: HOSPADM

## 2022-03-02 RX ORDER — ENOXAPARIN SODIUM 100 MG/ML
40 INJECTION SUBCUTANEOUS EVERY 24 HOURS
Status: DISCONTINUED | OUTPATIENT
Start: 2022-03-02 | End: 2022-03-07 | Stop reason: HOSPADM

## 2022-03-02 RX ORDER — POTASSIUM CHLORIDE 20 MEQ/1
20 TABLET, EXTENDED RELEASE ORAL DAILY
Status: DISCONTINUED | OUTPATIENT
Start: 2022-03-03 | End: 2022-03-07 | Stop reason: HOSPADM

## 2022-03-02 RX ORDER — ONDANSETRON 4 MG/1
8 TABLET, ORALLY DISINTEGRATING ORAL
Status: DISCONTINUED | OUTPATIENT
Start: 2022-03-02 | End: 2022-03-07 | Stop reason: HOSPADM

## 2022-03-02 RX ORDER — HYDROMORPHONE HYDROCHLORIDE 1 MG/ML
1 INJECTION, SOLUTION INTRAMUSCULAR; INTRAVENOUS; SUBCUTANEOUS
Status: DISCONTINUED | OUTPATIENT
Start: 2022-03-02 | End: 2022-03-04

## 2022-03-02 RX ADMIN — FAMOTIDINE 20 MG: 20 TABLET ORAL at 17:25

## 2022-03-02 RX ADMIN — PROMETHAZINE HYDROCHLORIDE 25 MG: 25 TABLET ORAL at 15:55

## 2022-03-02 RX ADMIN — SODIUM CHLORIDE 25 ML/HR: 900 INJECTION, SOLUTION INTRAVENOUS at 15:55

## 2022-03-02 RX ADMIN — HYDROMORPHONE HYDROCHLORIDE 1 MG: 1 INJECTION, SOLUTION INTRAMUSCULAR; INTRAVENOUS; SUBCUTANEOUS at 14:24

## 2022-03-02 RX ADMIN — ONDANSETRON 8 MG: 4 TABLET, ORALLY DISINTEGRATING ORAL at 23:20

## 2022-03-02 RX ADMIN — HYDROMORPHONE HYDROCHLORIDE 1 MG: 1 INJECTION, SOLUTION INTRAMUSCULAR; INTRAVENOUS; SUBCUTANEOUS at 19:20

## 2022-03-02 RX ADMIN — ONDANSETRON 4 MG: 2 INJECTION INTRAMUSCULAR; INTRAVENOUS at 14:24

## 2022-03-02 NOTE — H&P
Oncology Admission H/P    Chief Complaint:    Esophageal carcinoma  Pancreatic carcinoma  Peritoneal carcinomatosis  Malignant ascites  Intractable pain  Nausea and vomiting  Failure to thrive    History of Present Illness:  78 y.o. M initially consulted for cancer of distal esophagus and pancreatic body in 12/2020. He developed nausea and epigastric pain and GI workup found adenocarcinoma at the distal esophagus in the background of Aoms's esophagus, also a pancreatic body mass of adenocarcinoma. He presented to [facility] on 12/29/20 and we discussed the situation being very unusual and need to distinguish the two cancer sites being separate primary vs metastasis for the prognosis and treatment plan would be rather different, pt was very motivated to aim at curative rx if possible, I discussed with Dr. Eda Andrews and involved Dr. Oriana Layne for further histology and IHC study for the two samples showed to be distinctive primaries, saw Dr Marylou Vela considered pancreatic mass unresectable but willing to reevaluate if shrunk, arranged PET showed peritoneal avidity c/w metastasis, I reviewed PET personally and discussed with pt this was considered stage IV unless proven otherwise, arrange diagnostic laproscopy for peritoneal carcinomatosis and biopsy for origin, which I suspect being pancreatic, tramadol prn abd pain.  He returned on 2/23/21, lost 10 lb and discussed nutrition, laproscopic biopsy 2/10/21 showed metastatic adenocarcinoma and I called Dr. Eda Andrews to study the origin of panc vs esophageal cancer, discussed with Dr. Eda Andrews the [facility] is more consistent with pancreatic cancer, started [facility] and tolerated reasonably well, discussed nausea control, added GCSF for neutropenia, need dental work and will arrange time probably after first scan, discussed of the weight loss in he declined appetite stimulant, add Emend for nausea control, reduce 5-FU infusion, return on 4/20/2021 due for cycle 5, however reported nausea vomiting and diarrhea for 2 weeks and did not call the office, lost 13 pounds, hypokalemia 2.5, discussed need to be admitted but patient adamantly declined, 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 and 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 Anand Beach delivered 5 sessions of treatment in 10/2021, cycle 17 chemo was held for fatigue as patient desired, reported mild neuropathy with cold sensitivity and reduced oxaliplatin, return on 12/28/2021 and the dose reduction was better tolerated, had 2 episodes of vomiting dark emesis after cycle 20 better resolved, hemoglobin stable, still having neuropathy but not worse, CT 12/27/2021 showed stable disease but a 7 mm new liver lesion to be monitored.  He continued modified FOLFIRINOX but did not return for cycle 23 due to sore throat and fatigue, followed 1/26/2022, still with congestion and weakness, rule out Covid and will pursue to cycle 23 tomorrow if negative, CEA up to 16.6 after interruption and will monitor, added Remeron for insomnia and depression but made him cry, was in bed for most of the time and his son prepared food for him, lost weight, finally feeling better and started eating, like to defer chemo by a week, return 2/16/2022 not getting better, feeling bloated no bowel movement for 3 days, start lactulose for constipation and give IV fluid for dehydration, start Decadron 2 mg every morning for anorexia, concern of too much interruption of chemotherapy and proceed to cycle 24 with close follow-up next week, CEA up to 24 and continue to trend, plan to repeat PET scan next time, however rapidly declined and had paracentesis removing 6 liter ascites with positive cytology, presented today with abdominal distention again with intractable pain, unable to eat and continue to lose weight, failure to thrive. Review of Systems:  Constitutional Fatigue. Weight loss. Denies fever or chills. HEENT Denies trauma, bluring vision, hearing loss, ear pain, nosebleeds, sore throat, neck pain and ear discharge. Skin Denies lesions or rashes. Lungs Denies shortness of breath, cough, sputum production or hemoptysis. Cardiovascular Denies chest pain, palpitations, orthopnea, claudication and leg swelling. Gastrointestinal Abd pain and distention. Denies bloody or black stools.  Denies dysuria, frequency or hesitancy of urination   Neuro  mild neuropathy. Denies headaches, visual changes or ataxia. Denies dizziness, speech change, focal weakness and headaches. Hematology Denies nasal/gum bleeding, denies easy bruise   Endo Denies heat/cold intolerance, denies diabetes. MSK Denies back pain, swollen legs, myalgias and falls. Psychiatric/Behavioral Denies depression and substance abuse. The patient is not nervous/anxious.        No Known Allergies  Past Medical History:   Diagnosis Date    Back pain     followed by pain management    Chronic pain     left-sided, lower back pain    CINV (chemotherapy-induced nausea and vomiting) 4/20/2021    Erectile dysfunction     Gastritis     GERD (gastroesophageal reflux disease)     daily medication    Hiatal hernia     \"medium\"    History of anemia     Hypertension     situational; has Amlodipine to take if systolic >159    Left bundle branch block (LBBB) on electrocardiogram 02/05/2021    Malignant ne[facility] of body of pancreas [facility]     Malignant neoplasm of esophagus ([facility] 2020    Morbid obesity (HCC)     Nausea     takes antiemetic med prn     Past Surgical History:   Procedure Laterality Date    HX APPENDECTOMY      HX CHOLECYSTECTOMY      HX COLONOSCOPY  2020    with EGD    HX VASCULAR ACCESS      IR PARACENTESIS ABD W IMAGE  2022     Family History   Problem Relation Age of Onset    Cancer Mother     No Known Problems Father     Cancer Sister      Social History     Socioeconomic History    Marital status:      Spouse name: Not on file    Number of children: Not on file    Years of education: Not on file    Highest education level: Not on file   Occupational History    Not on file   Tobacco Use    Smoking status: Former Smoker     Packs/day: 1.50     Years: 15.00     Pack years: 22.50     Quit date: 1974     Years since quittin.3    Smokeless tobacco: Never Used   Vaping Use    Vaping Use: Never used   Substance and Sexual Activity    Alcohol use: Yes     Comment: socially    Drug use: Never    Sexual activity: Not on file   Other Topics Concern     Service Not Asked    Blood Transfusions Not Asked    Caffeine Concern Not Asked    Occupational Exposure Not Asked    Hobby Hazards Not Asked    Sleep Concern Not Asked    Stress Concern Not Asked    Weight Concern Not Asked    Special Diet Not Asked    Back Care Not Asked    Exercise Not Asked    Bike Helmet Not Asked    Seat Belt Not Asked    Self-Exams Not Asked   Social History Narrative    Not on file     Social Determinants of Health     Financial Resource Strain:     Difficulty of Paying Living Expenses: Not on file   Food Insecurity:     Worried About Running Out of Food in the Last Year: Not on file    Elian of Food in the Last Year: Not on file   Transportation Needs:     Lack of Transportation (Medical): Not on file    Lack of Transportation (Non-Medical):  Not on file   Physical Activity:     Days of Exercise per Week: Not on file    Minutes of Exercise per Session: Not on file   Stress:     Feeling of Stress : Not on file   Social Connections:     Frequency of Communication with Friends and Family: Not on file    Frequency of Social Gatherings with Friends and Family: Not on file    Attends Voodoo Services: Not on file    Active Member of [address] [place] [address] or Organizations: Not on file    Attends Club or Organization Meetings: Not on file    Marital Status: Not on file   Intimate Partner Violence:     Fear of Current or Ex-Partner: Not on file    Emotionally Abused: Not on file    Physically Abused: Not on file    Sexually Abused: Not on file   Housing Stability:     Unable to Pay for Housing in the Last Year: Not on file    Number of [place] in the Last Year: Not on file    Unstable Housing in the Last Year: Not on file     Current Outpatient Medications   Medication Sig Dispense Refill    lactulose (CHRONULAC) 10 gram/15 mL solution Take 45 mL by mouth three (3) times daily. 480 mL 0    promethazine (PHENERGAN) 25 mg tablet Take 1 Tablet by mouth every six (6) hours as needed for Nausea. 30 Tablet 1    ondansetron hcl (Zofran) 8 mg tablet Take 1 Tablet by mouth every eight (8) hours as needed for Nausea. Indications: nausea and vomiting caused by cancer drugs 60 Tablet 3    diphenoxylate-atropine (LomotiL) 2.5-0.025 mg per tablet Take 1 Tablet by mouth four (4) times daily as needed for Diarrhea. Max Daily Amount: 4 Tablets. 90 Tablet 1    sildenafil citrate (Viagra) 100 mg tablet Take 1 Tablet by mouth as needed (erectile dysfunction). 30 Tablet 5    DISABLED PLACARD (DISABLED PLACARD) DMV Use as directed for poor ambulation 1 Each 0    omeprazole (PRILOSEC) 40 mg capsule Take 1 Capsule by mouth daily. 30 Capsule 5    famotidine (PEPCID) 20 mg tablet Take 1 Tablet by mouth two (2) times a day. 60 Tablet 5    prochlorperazine (Compazine) 10 mg tablet Take 1 Tablet by mouth every six (6) hours as needed for Nausea. Indications: nausea and vomiting caused by cancer drugs, nausea and vomiting 90 Tablet 3    potassium chloride (K-DUR, KLOR-CON) 20 mEq tablet Take 1 Tablet by mouth daily. 30 Tablet 2    HYDROcodone-acetaminophen (NORCO) 5-325 mg per tablet Take 1 Tablet by mouth every six (6) hours as needed.  naproxen sodium (ALEVE) 220 mg cap Take  by mouth as needed. For back pain  Hold 5 days for procedure per anesthesia protocol  Indications: pain      DISABLED PLACARD (DISABLED PLACARD) DMV Handicap Placard, for poor ambuation. 1 Each 0       OBJECTIVE:  Visit Vitals  BP (!) 137/93 (BP 1 Location: Left upper arm, BP Patient Position: Sitting)   Pulse 80   Temp 97.8 °F (36.6 °C)   Resp 16   Ht 5' 3\" (1.6 m)   Wt 161 lb (73 kg)   SpO2 97%   BMI 28.52 kg/m²       Physical Exam:  Constitutional: Oriented to person, place, and time. Looking miserable in pain. HEENT: Normocephalic and atraumatic. Oropharynx is clear and moist.   Conjunctivae and EOM are normal. Pupils are equal, round, and reactive to light. No scleral icterus. Neck supple. No JVD present. No tracheal deviation present. No thyromegaly present. Lymph node No palpable submandibular, cervical, supraclavicular, axillary and inguinal lymph nodes. Skin Warm and dry. No bruising and no rash noted. No erythema. No pallor. Respiratory Effort normal and breath sounds normal.  No respiratory distress. No wheezes. No rales. No tenderness. CVS Normal rate, regular rhythm and normal heart sounds. Exam reveals no gallop, no friction and no rub. No murmur heard. Abdomen  abdomen distended and tender   Neuro Normal reflexes. No cranial nerve deficit. Exhibits normal muscle tone, 5 of 5 strength of all extremities. MSK Normal range of motion in general.  No edema and no tenderness.    Psych Normal mood, affect, behavior, judgment and thought content      Labs:  Recent Results (from the past 24 hour(s))   CBC WITH AUTOMATED DIFF    Collection Time: 03/02/22  9:30 AM   Result Value Ref Range    WBC 3.8 (L) 4.3 - 11.1 K/uL    RBC 4.64 4.23 - 5.6 M/uL    HGB 13.8 13.6 - 17.2 g/dL    HCT 43.7 %    MCV 94.2 79.6 - 97.8 FL    MCH 29.7 26.1 - 32.9 PG    MCHC 31.6 31.4 - 35.0 g/dL    RDW 14.3 11.9 - 14.6 %    PLATELET 235 440 - 979 K/uL    MPV 9.8 9.4 - 12.3 FL    ABSOLUTE NRBC 0.00 0.0 - 0.2 K/uL    NEUTROPHILS 59 43 - 78 %    LYMPHOCYTES 15 13 - 44 %    MONOCYTES 18 (H) 4.0 - 12.0 %    EOSINOPHILS 7 0.5 - 7.8 %    BASOPHILS 1 0.0 - 2.0 %    IMMATURE GRANULOCYTES 0 0.0 - 5.0 %    ABS. NEUTROPHILS 2.3 1.7 - 8.2 K/UL    ABS. LYMPHOCYTES 0.6 0.5 - 4.6 K/UL    ABS. MONOCYTES 0.7 0.1 - 1.3 K/UL    ABS. EOSINOPHILS 0.3 0.0 - 0.8 K/UL    ABS. BASOPHILS 0.0 0.0 - 0.2 K/UL    ABS. IMM. GRANS. 0.0 0.0 - 0.5 K/UL    DF AUTOMATED     METABOLIC PANEL, COMPREHENSIVE    Collection Time: 03/02/22  9:30 AM   Result Value Ref Range    Sodium 137 136 - 145 mmol/L    Potassium 3.9 3.5 - 5.1 mmol/L    Chloride 102 98 - 107 mmol/L    CO2 30 21 - 32 mmol/L    Anion gap 5 (L) 7 - 16 mmol/L    Glucose 115 (H) 65 - 100 mg/dL    BUN 17 8 - 23 MG/DL    Creatinine 0.90 0.8 - 1.5 MG/DL    GFR est AA >60 >60 ml/min/1.73m2    GFR est non-AA >60 >60 ml/min/1.73m2    Calcium 9.1 8.3 - 10.4 MG/DL    Bilirubin, total 0.6 0.2 - 1.1 MG/DL    ALT (SGPT) 22 12 - 65 U/L    AST (SGOT) 30 15 - 37 U/L    Alk. phosphatase 398 (H) 50 - 136 U/L    Protein, total 6.6 6.3 - 8.2 g/dL    Albumin 2.7 (L) 3.2 - 4.6 g/dL    Globulin 3.9 (H) 2.3 - 3.5 g/dL    A-G Ratio 0.7 (L) 1.2 - 3.5     MAGNESIUM    Collection Time: 03/02/22  9:30 AM   Result Value Ref Range    Magnesium 2.4 1.8 - 2.4 mg/dL   CEA    Collection Time: 03/02/22  9:30 AM   Result Value Ref Range    CEA 29.5 (H) 0.0 - 3.0 ng/mL   CANCER AG 19-9    Collection Time: 03/02/22  9:30 AM   Result Value Ref Range    Cancer antigen 19-9 152.30 (H) 2.0 - 37.0 U/mL       Imaging:  No results found for this or any previous visit.   ASSESSMENT/PLAN:    ICD-10-CM ICD-9-CM    1. Malignant neoplasm of body of pancreas (HCC)  C25.1 157.1    2. Peritoneal carcinomatosis (HCC)  C78.6 197.6    3. Intractable pain  R52 780.96    4. Malignant ascites  R18.0 789.51    5. Failure to thrive in adult  R62.7 783.7      Problem List  Date Reviewed: 3/2/2022          Codes Class Noted    Hypomagnesemia ICD-10-CM: E83.42  ICD-9-CM: 275.2  4/23/2021        Hypokalemia ICD-10-CM: E87.6  ICD-9-CM: 276.8  4/20/2021        CINV (chemotherapy-induced nausea and vomiting) ICD-10-CM: R11.2, T45.1X5A  ICD-9-CM: 787.01, E933.1  4/20/2021        Dehydration ICD-10-CM: E86.0  ICD-9-CM: 276.51  4/12/2021        Other neutropenia ([facility] ICD-10-CM: D70.8  ICD-9-CM: 288.09  3/9/2021        Malignant neoplasm of lower third of esophagus (HCC) ICD-10-CM: C15.5  ICD-9-CM: 150.5  1/5/2021        Malignant neoplasm of body of pancreas ([facility] ICD-10-CM: C25.1  ICD-9-CM: 157.1  1/5/2021        Severe obesity ([facility] ICD-10-CM: E66.01  ICD-9-CM: 278.01  12/10/2020        Elevated glucose ICD-10-CM: R73.09  ICD-9-CM: 790.29  7/22/2019        Anemia ICD-10-CM: D64.9  ICD-9-CM: 285.9  7/22/2019        Chronic left-sided low back pain ICD-10-CM: M54.50, G89.29  ICD-9-CM: 724.2, 338.29  7/22/2019          78 y.o. M consulted for cancer of distal esophagus and pancreatic body in 12/2020, on peritoneal carcinomatosis consistent with pancreatic cancer metastases and start FOLFIRINOX with good tolerance and response, recently CEA trending up and developed symptoms of bloating and difficulty eating, weight loss, plan to repeat PET scan next time, however rapidly declined and had paracentesis removing 6 liter ascites with positive cytology, Admitted for intractable abdominal pain, ascites, place Pleurx, Send cellblock for Caris, Empiric gemcitabine Abraxane, no bed yet till later and discussed with NP to admit at ER. Monica Camacho M.D.   [address], [address] [place] [address]  Office : [phone]  Fax : [phone]

## 2022-03-02 NOTE — PROGRESS NOTES
Care Management Interventions  PCP Verified by CM: Yes (Dr. Jeanette Bashir)  Mode of Transport at Discharge:  (family)  Transition of Care Consult (CM Consult): Discharge Planning  Support Systems: Child(betsy) (Son/Adal)  The Patient and/or Patient Representative was Provided with a Choice of Provider and Agrees with the Discharge Plan?: Yes  Freedom of Choice List was Provided with Basic Dialogue that Supports the Patient's Individualized Plan of Care/Goals, Treatment Preferences and Shares the Quality Data Associated with the Providers?: Yes  Discharge Location  Patient Expects to be Discharged to[de-identified] Unable to determine at this time  Visited with pt to establish prior to admission baseline and discuss their anticipated goals at time of d/c. Pt was seen by Oncology today and sent in as a direct admit for eval for ascites and new cancer sites in abd. Pt has been in the ED due to bed availability. He has Hx of pancreatic and esophogeal CA. PT lives at home, alone in a  Single story home, inde with ADL's, states that his son/Adal lives behind him. He is current with PCP and Rx [facility]. Pt states that his last Chemo Tx was 2 weeks ago. His goal is to return home upon d/c, CM to follow.

## 2022-03-02 NOTE — ED NOTES
TRANSFER - OUT REPORT:    Verbal report given to Reji Gudino RN on Vandana Rodriguez  being transferred to [id number] for routine progression of care       Report consisted of patients Situation, Background, Assessment and   Recommendations(SBAR). Information from the following report(s) ED Summary and MAR was reviewed with the receiving nurse. Lines:   Venous Access Device Upper chest (subclavicular area), left (Active)       Peripheral IV 03/02/22 Anterior;Distal;Left Forearm (Active)        Opportunity for questions and clarification was provided.       Patient transported with:   [employer]

## 2022-03-02 NOTE — PROGRESS NOTES
TRANSFER - IN REPORT:    Verbal report received from [facility] (name) on Uri Hugo  being received from ER (unit) for routine progression of care      Report consisted of patients Situation, Background, Assessment and   Recommendations(SBAR). Information from the following report(s) SBAR, Kardex, Intake/Output, MAR and Recent Results was reviewed with the receiving nurse. Opportunity for questions and clarification was provided. Assessment completed upon patients arrival to unit and care assumed.

## 2022-03-02 NOTE — ED TRIAGE NOTES
Pt sent by [facility] for admission of evaluation of ascites and evidence of new cancer sites in his abdomen. Pt is awaiting a room for direct admit.

## 2022-03-02 NOTE — PROGRESS NOTES
Problem: Pain  Goal: *Control of Pain  Outcome: Progressing Towards Goal     Problem: Nausea/Vomiting (Adult)  Goal: *Absence of nausea/vomiting  Outcome: Progressing Towards Goal  Goal: *Palliation of nausea/vomiting (Palliative Care)  Outcome: Progressing Towards Goal

## 2022-03-02 NOTE — PROGRESS NOTES
Patient to be direct admit to hematology/oncology service. No inpatient beds available at this time. Patient in ER waiting for inpatient bed to become available.

## 2022-03-02 NOTE — PROGRESS NOTES
03/02/22 1545   Dual Skin Pressure Injury Assessment   Dual Skin Pressure Injury Assessment WDL   Second Care Provider (Based on [address] [place] [address]) Alma Rosa Feliz RN

## 2022-03-03 ENCOUNTER — HOSPITAL ENCOUNTER (OUTPATIENT)
Dept: INTERVENTIONAL RADIOLOGY/VASCULAR | Age: 67
Discharge: HOME | End: 2022-03-03
Attending: NURSE PRACTITIONER
Payer: MEDICARE

## 2022-03-03 ENCOUNTER — HOSPITAL ENCOUNTER (OUTPATIENT)
Dept: INFUSION THERAPY | Age: 67
End: 2022-03-03

## 2022-03-03 VITALS
RESPIRATION RATE: 16 BRPM | HEIGHT: 63 IN | OXYGEN SATURATION: 99 % | HEART RATE: 69 BPM | DIASTOLIC BLOOD PRESSURE: 68 MMHG | BODY MASS INDEX: 28.35 KG/M2 | WEIGHT: 160 LBS | TEMPERATURE: 97.7 F | SYSTOLIC BLOOD PRESSURE: 130 MMHG

## 2022-03-03 LAB
ALBUMIN SERPL-MCNC: 2 G/DL (ref 3.2–4.6)
ALBUMIN/GLOB SERPL: 0.6 {RATIO} (ref 1.2–3.5)
ALP SERPL-CCNC: 306 U/L (ref 50–136)
ALT SERPL-CCNC: 19 U/L (ref 12–65)
ANION GAP SERPL CALC-SCNC: 6 MMOL/L (ref 7–16)
AST SERPL-CCNC: 21 U/L (ref 15–37)
BASOPHILS # BLD: 0 K/UL (ref 0–0.2)
BASOPHILS NFR BLD: 1 % (ref 0–2)
BILIRUB SERPL-MCNC: 0.4 MG/DL (ref 0.2–1.1)
BUN SERPL-MCNC: 18 MG/DL (ref 8–23)
CALCIUM SERPL-MCNC: 8.6 MG/DL (ref 8.3–10.4)
CHLORIDE SERPL-SCNC: 102 MMOL/L (ref 98–107)
CO2 SERPL-SCNC: 29 MMOL/L (ref 21–32)
COVID-19 RAPID TEST, COVR: NOT DETECTED
CREAT SERPL-MCNC: 0.67 MG/DL (ref 0.8–1.5)
DIFFERENTIAL METHOD BLD: ABNORMAL
EOSINOPHIL # BLD: 0.2 K/UL (ref 0–0.8)
EOSINOPHIL NFR BLD: 6 % (ref 0.5–7.8)
ERYTHROCYTE [DISTWIDTH] IN BLOOD BY AUTOMATED COUNT: 14 % (ref 11.9–14.6)
GLOBULIN SER CALC-MCNC: 3.5 G/DL (ref 2.3–3.5)
GLUCOSE SERPL-MCNC: 91 MG/DL (ref 65–100)
HCT VFR BLD AUTO: 36.2 % (ref 41.1–50.3)
HGB BLD-MCNC: 11.4 G/DL (ref 13.6–17.2)
IMM GRANULOCYTES # BLD AUTO: 0 K/UL (ref 0–0.5)
IMM GRANULOCYTES NFR BLD AUTO: 0 % (ref 0–5)
LYMPHOCYTES # BLD: 0.5 K/UL (ref 0.5–4.6)
LYMPHOCYTES NFR BLD: 18 % (ref 13–44)
MAGNESIUM SERPL-MCNC: 2.3 MG/DL (ref 1.8–2.4)
MCH RBC QN AUTO: 29.8 PG (ref 26.1–32.9)
MCHC RBC AUTO-ENTMCNC: 31.5 G/DL (ref 31.4–35)
MCV RBC AUTO: 94.8 FL (ref 79.6–97.8)
MONOCYTES # BLD: 0.7 K/UL (ref 0.1–1.3)
MONOCYTES NFR BLD: 24 % (ref 4–12)
NEUTS SEG # BLD: 1.4 K/UL (ref 1.7–8.2)
NEUTS SEG NFR BLD: 52 % (ref 43–78)
NRBC # BLD: 0 K/UL (ref 0–0.2)
PLATELET # BLD AUTO: 118 K/UL (ref 150–450)
PMV BLD AUTO: 10.2 FL (ref 9.4–12.3)
POTASSIUM SERPL-SCNC: 3.6 MMOL/L (ref 3.5–5.1)
PROT SERPL-MCNC: 5.5 G/DL (ref 6.3–8.2)
RBC # BLD AUTO: 3.82 M/UL (ref 4.23–5.6)
SARS-COV-2, COV2: NORMAL
SODIUM SERPL-SCNC: 137 MMOL/L (ref 136–145)
SOURCE, COVRS: NORMAL
WBC # BLD AUTO: 2.7 K/UL (ref 4.3–11.1)

## 2022-03-03 PROCEDURE — 74011250637 HC RX REV CODE- 250/637: Performed by: NURSE PRACTITIONER

## 2022-03-03 PROCEDURE — 77030002916 HC SUT ETHLN J&J -A

## 2022-03-03 PROCEDURE — 80053 COMPREHEN METABOLIC PANEL: CPT

## 2022-03-03 PROCEDURE — 65270000029 HC RM PRIVATE

## 2022-03-03 PROCEDURE — APPSS45 APP SPLIT SHARED TIME 31-45 MINUTES: Performed by: NURSE PRACTITIONER

## 2022-03-03 PROCEDURE — 74011250636 HC RX REV CODE- 250/636: Performed by: RADIOLOGY

## 2022-03-03 PROCEDURE — 74011000250 HC RX REV CODE- 250: Performed by: RADIOLOGY

## 2022-03-03 PROCEDURE — 0W9G30Z DRAINAGE OF PERITONEAL CAVITY WITH DRAINAGE DEVICE, PERCUTANEOUS APPROACH: ICD-10-PCS | Performed by: RADIOLOGY

## 2022-03-03 PROCEDURE — 75989 ABSCESS DRAINAGE UNDER X-RAY: CPT

## 2022-03-03 PROCEDURE — 74011250636 HC RX REV CODE- 250/636: Performed by: PHYSICIAN ASSISTANT

## 2022-03-03 PROCEDURE — 77030031139 HC SUT VCRL2 J&J -A

## 2022-03-03 PROCEDURE — 74011250636 HC RX REV CODE- 250/636: Performed by: NURSE PRACTITIONER

## 2022-03-03 PROCEDURE — 77030028114 HC DRN KT PLEURX SYS BD -B

## 2022-03-03 PROCEDURE — 99233 SBSQ HOSP IP/OBS HIGH 50: CPT | Performed by: INTERNAL MEDICINE

## 2022-03-03 PROCEDURE — 87635 SARS-COV-2 COVID-19 AMP PRB: CPT

## 2022-03-03 PROCEDURE — 83735 ASSAY OF MAGNESIUM: CPT

## 2022-03-03 PROCEDURE — 77030010507 HC ADH SKN DERMBND J&J -B

## 2022-03-03 PROCEDURE — C1729 CATH, DRAINAGE: HCPCS

## 2022-03-03 PROCEDURE — 85025 COMPLETE CBC W/AUTO DIFF WBC: CPT

## 2022-03-03 RX ORDER — ONDANSETRON 2 MG/ML
4 INJECTION INTRAMUSCULAR; INTRAVENOUS ONCE
Status: COMPLETED | OUTPATIENT
Start: 2022-03-03 | End: 2022-03-03

## 2022-03-03 RX ORDER — MIDAZOLAM HYDROCHLORIDE 1 MG/ML
.5-2 INJECTION, SOLUTION INTRAMUSCULAR; INTRAVENOUS
Status: DISCONTINUED | OUTPATIENT
Start: 2022-03-03 | End: 2022-03-03

## 2022-03-03 RX ORDER — LIDOCAINE HYDROCHLORIDE 20 MG/ML
20-200 INJECTION, SOLUTION INFILTRATION; PERINEURAL
Status: DISCONTINUED | OUTPATIENT
Start: 2022-03-03 | End: 2022-03-03

## 2022-03-03 RX ORDER — ONDANSETRON 2 MG/ML
8 INJECTION INTRAMUSCULAR; INTRAVENOUS ONCE
Status: COMPLETED | OUTPATIENT
Start: 2022-03-03 | End: 2022-03-03

## 2022-03-03 RX ORDER — FENTANYL CITRATE 50 UG/ML
25-100 INJECTION, SOLUTION INTRAMUSCULAR; INTRAVENOUS
Status: DISCONTINUED | OUTPATIENT
Start: 2022-03-03 | End: 2022-03-03

## 2022-03-03 RX ORDER — SODIUM CHLORIDE 9 MG/ML
25 INJECTION, SOLUTION INTRAVENOUS ONCE
Status: COMPLETED | OUTPATIENT
Start: 2022-03-03 | End: 2022-03-03

## 2022-03-03 RX ADMIN — MIDAZOLAM 1 MG: 1 INJECTION INTRAMUSCULAR; INTRAVENOUS at 08:34

## 2022-03-03 RX ADMIN — POTASSIUM CHLORIDE 20 MEQ: 20 TABLET, EXTENDED RELEASE ORAL at 11:13

## 2022-03-03 RX ADMIN — MIDAZOLAM 1 MG: 1 INJECTION INTRAMUSCULAR; INTRAVENOUS at 08:25

## 2022-03-03 RX ADMIN — ENOXAPARIN SODIUM 40 MG: 100 INJECTION SUBCUTANEOUS at 16:16

## 2022-03-03 RX ADMIN — FAMOTIDINE 20 MG: 20 TABLET ORAL at 11:13

## 2022-03-03 RX ADMIN — ONDANSETRON 4 MG: 2 INJECTION INTRAMUSCULAR; INTRAVENOUS at 07:35

## 2022-03-03 RX ADMIN — HYDROMORPHONE HYDROCHLORIDE 1 MG: 1 INJECTION, SOLUTION INTRAMUSCULAR; INTRAVENOUS; SUBCUTANEOUS at 20:27

## 2022-03-03 RX ADMIN — LIDOCAINE HYDROCHLORIDE 200 MG: 20 INJECTION, SOLUTION INFILTRATION; PERINEURAL at 08:35

## 2022-03-03 RX ADMIN — HYDROMORPHONE HYDROCHLORIDE 1 MG: 1 INJECTION, SOLUTION INTRAMUSCULAR; INTRAVENOUS; SUBCUTANEOUS at 11:10

## 2022-03-03 RX ADMIN — SODIUM CHLORIDE 25 ML/HR: 900 INJECTION, SOLUTION INTRAVENOUS at 08:20

## 2022-03-03 RX ADMIN — LIDOCAINE HYDROCHLORIDE 50 MG: 10; .005 INJECTION, SOLUTION EPIDURAL; INFILTRATION; INTRACAUDAL; PERINEURAL at 08:36

## 2022-03-03 RX ADMIN — ONDANSETRON 8 MG: 2 INJECTION INTRAMUSCULAR; INTRAVENOUS at 16:17

## 2022-03-03 RX ADMIN — HYDROMORPHONE HYDROCHLORIDE 1 MG: 1 INJECTION, SOLUTION INTRAMUSCULAR; INTRAVENOUS; SUBCUTANEOUS at 15:09

## 2022-03-03 RX ADMIN — FENTANYL CITRATE 50 MCG: 50 INJECTION, SOLUTION INTRAMUSCULAR; INTRAVENOUS at 08:34

## 2022-03-03 RX ADMIN — FENTANYL CITRATE 50 MCG: 50 INJECTION, SOLUTION INTRAMUSCULAR; INTRAVENOUS at 08:25

## 2022-03-03 RX ADMIN — PROCHLORPERAZINE MALEATE 10 MG: 10 TABLET ORAL at 20:27

## 2022-03-03 NOTE — PROGRESS NOTES
Report called to Joyce Nick RN for pt returning to [facility] room 367. All questions answered. PleurX supplies transported with pt.

## 2022-03-03 NOTE — H&P
Department of Interventional Radiology  [phone]    History and Physical    Patient:  Lyman Angelucci MRN:  [id number]  SSN:  [id number]    YOB: 1955  Age:  78 y.o. Sex:  male      Primary Care Provider:  Alexis Bangura MD  Referring Physician:  Mars Petty NP    Subjective:     Chief Complaint: ascites    History of the Present Illness: The patient is a 78 y.o. male with pancreatic cancer, cancer of the the distal esophagus, failure to thrive and malignant ascites who presents for placement of abdominal Pleurx catheter. C/o abdominal distention. NPO. Past Medical History:   Diagnosis Date    Back pain     followed by pain management    Chronic pain     left-sided, lower back pain    CINV (chemotherapy-induced nausea and vomiting) 4/20/2021    Erectile dysfunction     Gastritis     GERD (gastroesophageal reflux disease)     daily medication    Hiatal hernia     \"medium\"    History of anemia     Hypertension     situational; has Amlodipine to take if systolic >353    Left bundle branch block (LBBB) on electrocardiogram 02/05/2021    Malignant neoplasm of body of pancreas ([address]     Malignant neoplasm of esophagus ([address] 12/07/2020    Morbid obesity (HCC)     Nausea     takes antiemetic med prn     Past Surgical History:   Procedure Laterality Date    HX APPENDECTOMY      HX CHOLECYSTECTOMY      HX COLONOSCOPY  12/01/2020    with EGD    HX VASCULAR ACCESS      IR PARACENTESIS ABD W IMAGE  2/24/2022        Review of Systems:    Pertinent items are noted in the History of Present Illness. Prior to Admission medications    Medication Sig Start Date End Date Taking? Authorizing Provider   lactulose (CHRONULAC) 10 gram/15 mL solution Take 45 mL by mouth three (3) times daily. Patient not taking: Reported on 3/2/2022 2/16/22   Zari Abdi MD   promethazine (PHENERGAN) 25 mg tablet Take 1 Tablet by mouth every six (6) hours as needed for Nausea.  2/16/22 Melba Serra MD   ondansetron hcl (Zofran) 8 mg tablet Take 1 Tablet by mouth every eight (8) hours as needed for Nausea. Indications: nausea and vomiting caused by cancer drugs 2/16/22   Melba Serra MD   diphenoxylate-atropine (LomotiL) 2.5-0.025 mg per tablet Take 1 Tablet by mouth four (4) times daily as needed for Diarrhea. Max Daily Amount: 4 Tablets. 11/17/21   Lynsey Contreras NP   sildenafil citrate (Viagra) 100 mg tablet Take 1 Tablet by mouth as needed (erectile dysfunction). Patient not taking: Reported on 3/2/2022 8/30/21   Gabby Hansen MD   DISABLED PLACARD (DISABLED [place]) DMV Use as directed for poor ambulation 8/30/21   Gabby Hansen MD   omeprazole (PRILOSEC) 40 mg capsule Take 1 Capsule by mouth daily. 8/30/21   Gabby Hansen MD   famotidine (PEPCID) 20 mg tablet Take 1 Tablet by mouth two (2) times a day. Patient not taking: Reported on 3/2/2022 8/30/21   Gabby Hansen MD   prochlorperazine (Compazine) 10 mg tablet Take 1 Tablet by mouth every six (6) hours as needed for Nausea. Indications: nausea and vomiting caused by cancer drugs, nausea and vomiting 7/27/21   Jennifer Lance NP   potassium chloride (K-DUR, KLOR-CON) 20 mEq tablet Take 1 Tablet by mouth daily. Patient not taking: Reported on 3/2/2022 6/1/21   Melba Serra MD   HYDROcodone-acetaminophen [facility]) 5-325 mg per tablet Take 1 Tablet by mouth every six (6) hours as needed. 2/26/21   Provider, Historical   naproxen sodium (ALEVE) 220 mg cap Take  by mouth as needed. For back pain  Hold 5 days for procedure per anesthesia protocol  Indications: pain    Provider, Historical   DISABLED PLACARD (DISABLED PLACARD) DMV Handicap Placard, for poor ambuation.  7/22/19   Gabby Hansen MD        No Known Allergies    Family History   Problem Relation Age of Onset   Roberts Cancer Mother     No Known Problems Father     Cancer Sister      Social History     Tobacco Use    Smoking status: Former Smoker     Packs/day: 1.50 Years: 15.00     Pack years: 22.50     Quit date: 1974     Years since quittin.3    Smokeless tobacco: Never Used   Substance Use Topics    Alcohol use: Yes     Comment: socially        Objective:       Physical Examination:    Vitals:    22   BP: (!) 144/76    Pulse: 77    Resp: 18    Temp: 97.7 °F (36.5 °C)    SpO2: 96%    Weight:  72.6 kg (160 lb)   Height:  5' 3\" (1.6 m)       Pain Assessment  Pain Intensity 1: 0 (22)        Patient Stated Pain Goal: 0      HEART: regular rate and rhythm  LUNG: clear to auscultation bilaterally  ABDOMEN: soft, distended  EXTREMITIES: warm, no edema    Laboratory:     Lab Results   Component Value Date/Time    Sodium 137 2022 02:45 AM    Sodium 137 2022 09:30 AM    Potassium 3.6 2022 02:45 AM    Potassium 3.9 2022 09:30 AM    Chloride 102 2022 02:45 AM    Chloride 102 2022 09:30 AM    CO2 29 2022 02:45 AM    CO2 30 2022 09:30 AM    Anion gap 6 (L) 2022 02:45 AM    Anion gap 5 (L) 2022 09:30 AM    Glucose 91 2022 02:45 AM    Glucose 115 (H) 2022 09:30 AM    BUN 18 2022 02:45 AM    BUN 17 2022 09:30 AM    Creatinine 0.67 (L) 2022 02:45 AM    Creatinine 0.90 2022 09:30 AM    GFR est AA >60 2022 02:45 AM    GFR est AA >60 2022 09:30 AM    GFR est non-AA >60 2022 02:45 AM    GFR est non-AA >60 2022 09:30 AM    Calcium 8.6 2022 02:45 AM    Calcium 9.1 2022 09:30 AM    Magnesium 2.3 2022 02:45 AM    Magnesium 2.4 2022 09:30 AM    Albumin 2.0 (L) 2022 02:45 AM    Albumin 2.7 (L) 2022 09:30 AM    Protein, total 5.5 (L) 2022 02:45 AM    Protein, total 6.6 2022 09:30 AM    Globulin 3.5 2022 02:45 AM    Globulin 3.9 (H) 2022 09:30 AM    A-G Ratio 0.6 (L) 2022 02:45 AM    A-G Ratio 0.7 (L) 2022 09:30 AM    ALT (SGPT) 19 2022 02:45 AM    ALT (SGPT) 22 03/02/2022 09:30 AM     Lab Results   Component Value Date/Time    WBC 2.7 (L) 03/03/2022 02:45 AM    WBC 3.8 (L) 03/02/2022 09:30 AM    HGB 11.4 (L) 03/03/2022 02:45 AM    HGB 13.8 03/02/2022 09:30 AM    HCT 36.2 (L) 03/03/2022 02:45 AM    HCT 43.7 03/02/2022 09:30 AM    PLATELET 661 (L) 29/88/8684 02:45 AM    PLATELET 631 86/67/4479 09:30 AM     Lab Results   Component Value Date/Time    aPTT 32.6 08/31/2021 02:25 PM    Prothrombin time 14.7 (H) 08/31/2021 02:25 PM    Prothrombin time 15.1 (H) 02/05/2021 12:04 PM    INR 1.1 08/31/2021 02:25 PM    INR 1.2 02/05/2021 12:04 PM       Assessment:     Pancreatic cancer, distal esophageal cancer, malignant ascites    Hospital Problems  Date Reviewed: 3/2/2022    None          Plan:     Planned Procedure:  Abdominal Pleurx catheter placement    Risks, benefits, and alternatives reviewed with patient and he agrees to proceed with the procedure.       Signed By: Zamzam Anderson PA-C     March 3, 2022

## 2022-03-03 NOTE — PROGRESS NOTES
Problem: Falls - Risk of  Goal: *Absence of Falls  Description: Document Dennie Oak Fall Risk and appropriate interventions in the flowsheet.   Outcome: Progressing Towards Goal  Note: Fall Risk Interventions:            Medication Interventions: Evaluate medications/consider consulting pharmacy,Teach patient to arise slowly,Patient to call before getting OOB

## 2022-03-03 NOTE — PROGRESS NOTES
TRANSFER - IN REPORT:    Verbal report received from [address] , RN(name) on Trevor Salinas  being received from IR(unit) for routine progression of care      Report consisted of patients Situation, Background, Assessment and   Recommendations(SBAR). Information from the following report(s) SBAR and MAR was reviewed with the receiving nurse. Opportunity for questions and clarification was provided. Assessment completed upon patients arrival to unit and care assumed.

## 2022-03-03 NOTE — PROGRESS NOTES
Glenda Tracey contacted to transport patient back to [facility]. No ETA provided.

## 2022-03-03 NOTE — CONSULTS
[facility] Consult Note       Primary GI Physician: Dr Buck Gist    Referring Provider:  Kushal Martin NP    Consult Date:  3/3/2022    Admit Date:  3/2/2022    Chief Complaint:  Dysphagia    Subjective:     History of Present Illness:  Patient is a 78 y.o. male with PMH including but not limited to GERD, Amos's esophagus, esophogeal carcinoma in situ dx 12/2020 and pancreatic body adenocarcinoma who is seen in consultation at the request of Kushal Martin NP for dysphagia. Both cancers are primary and he has been receiving chemotherapy. He was admitted 3/2 with abd pain, failure to thrive, ascites and new cancer sites in his abd. He is followed by oncology Dr Dennis Colon and was a direct admit. He is undergoing a paracentesis and Plurex drain placement today. His CEA had been trending up and he had complaints of bloating. He had paracentesis 2/24 with 6 liter removed with positive cytology. Patient says that he is not actually having any trouble swallowing at this time. He recalls about 4 weeks ago, he was having issues high up in his throat after eating solids or liquids but that went away. The fluid in the abdomen was his main issue and he got a Pleur X drain placed for that which is helping. I offered an EGD but patient says that he wants to think about this and discuss with Oncology first.      EGD 12/1/20 with medium HH, LA class C esophagitis, irregular Z line with some thickening and friability. 6-8 mm duodenal polyp. Bx with invasive adenocarcinoma. Review of labs today with WBC 2.7, hgb 11.4, plt 118, BUN 18, Cr 0.67, TB 0.4, , AST 21 and ALT 19    ABD US 2/24/22  FINDINGS: Multiple images from real time ultrasound evaluation of the abdomen  demonstrate copious ascites in all 4 quadrants. The volume is significantly  increased from December 27. IMPRESSION  Copious ascites with significant increase since December 27.     KUB 2/23/22  FINDINGS:   Nonobstructive bowel gas pattern. No intraperitoneal free air. No pathologic  calcifications. Right upper quadrant surgical clips. IMPRESSION  No acute radiographic abnormality.     CT Chest/A/P 12/27/21   FINDINGS:  - LUNGS: There are fibrotic changes in both lungs, most prominent in the left  lung base. No developing pulmonary mass or acute infiltrate. - MEDIASTINUM/AXILLA: Stable moderate size hiatal hernia and gastroesophageal  varices. - HEART/VESSELS: Mild vascular calcification.  - CHEST WALL: Normal.  - LIVER: There is a new small 7 mm low-attenuation mass in the inferior right  lobe of the liver. No other discrete liver lesion. Portal vein is patent. - GALLBLADDER/BILE DUCTS: Increased left hepatic bile duct dilatation. Common  duct measures 6 mm. Gallbladder is absent. - PANCREAS: Relatively stable mass is again noted in the body the pancreas, 6.3  x 3.9 cm. The mass is encasing the celiac artery and occluding the splenic  vein. - SPLEEN: Normal.  - ADRENALS:  Normal.  - KIDNEYS/URETERS: Stable small renal cysts. No hydronephrosis or significant  mass. - BLADDER: Normal.  - REPRODUCTIVE ORGANS: No pelvic masses. - BOWEL: There is increased irregularity of the wall the sigmoid colon,  concerning for tumor involvement. There is also sigmoid diverticulosis. There  is no significant bowel distention.  - LYMPH NODES: No significant retroperitoneal, mesenteric, or pelvic adenopathy.  - BONES: Stable compression fracture of L1. No fracture or significant bone  lesion.  - VASCULATURE: Normal  - OTHER: There is a new small peritoneal nodule adjacent to the spleen, 9 mm in  diameter. Developing ascites, most prominent in the upper right abdomen. IMPRESSION  1. Stable appearance of the primary pancreas tumor. 2.  Irregularity of the wall of the sigmoid colon, peritoneal nodularity, and  developing ascites. Concerning for peritoneal carcinomatosis.   3.  New small low-attenuation lesion in the inferior liver, also concerning for  metastatic disease. ENDOSCOPIC ULTRASOUND WITH DOPPLER AND FNA 12/23/20  FINDINGS:   ENDOSCOPIC FINDINGS:  Limited views of the mucosa with the echoendoscope reveals a malignant esophageal stricture and hiatal hernia. The tumor does not traverse the GE junction. There are no other gross abnormalities of the stomach or proximal duodenum. The ampulla is well visualized endoscopically and appears normal.   ESOPHAGUS:  The balloon was insufflated to improve acoustic coupling. At a distance 30 to 36 cm from the incisors, there is 60% circumferential wall thickening to 14 mm maximally. There is a hypoechoic, heterogeneous mass that involves the esophageal mucosa, submucosa and muscularis propria. There are a few small tumor pseudopodia extending through the muscularis propria. There is no invasion of adjacent organs. There are no pathologically enlarged periesophageal lymph nodes. STOMACH:  Multiple sweeps were made throughout the stomach visualizing the entire wall from the pylorus to the gastroesophageal junction. The entire gastric wall is of normal thickness and normal wall architecture. PANCREAS:  The pancreas is well-visualized from head to tail. In the body of the pancreas, there is a large mass with maximal dimensions in a single echoplane of 34 x 31 mm, hypoechoic, heterogenous mass. The mass abuts the splenic vein without distortion of vascular contour or associated venous occlusion. There is encasement at the bifurcation of hepatic and splenic branches of the celiac artery. The pancreatic duct is compressed with upstream dilation in the tail to 6 mm maximally. Fine needle aspiration of the mass was performed using a 22-gauge [employer] needle. Two passes were made, yielding core specimens. BILIARY TREE: The gallbladder is absent. The common bile duct is well-visualized from its insertion in the ampulla to the bifurcation of right and left hepatic ducts.  It is non-dilated, measuring up to 7 mm maximally with a gradual taper down to the level of the ampulla. There are no intraductal stones, sludge or debris. The ampulla appears normal endosonographically. OTHER ORGANS:  Views of the left lobe of the liver demonstrate no solid mass lesions. There is no ascites in the upper abdomen. The left adrenal gland appears normal. There are no pathologically enlarged posterior mediastinal or upper abdominal lymph nodes. IMPRESSION:  1. Esophageal cancer. This is staged [id number] based on endoscopic ultrasound criteria. 2. Hiatal hernia.   3. Pancreatic mass. This is concerning for an unresectable neoplasm, either from a second primary malignancy or metastatic disease.        PMH:  Past Medical History:   Diagnosis Date    Back pain     followed by pain management    Chronic pain     left-sided, lower back pain    CINV (chemotherapy-induced nausea and vomiting) 4/20/2021    Erectile dysfunction     Gastritis     GERD (gastroesophageal reflux disease)     daily medication    Hiatal hernia     \"medium\"    History of anemia     Hypertension     situational; has Amlodipine to take if systolic >973    Left bundle branch block (LBBB) on electrocardiogram 02/05/2021    Malignant neoplasm of body of pancreas ([address]     Malignant neoplasm of esophagus ([address] 12/07/2020    Morbid obesity (HCC)     Nausea     takes antiemetic med prn       PSH:  Past Surgical History:   Procedure Laterality Date    HX APPENDECTOMY      HX CHOLECYSTECTOMY      HX COLONOSCOPY  12/01/2020    with EGD    HX VASCULAR ACCESS      IR PARACENTESIS ABD W IMAGE  2/24/2022       Allergies:  No Known Allergies    Home Medications:  Prior to Admission medications    Medication Sig Start Date End Date Taking? Authorizing Provider   lactulose (CHRONULAC) 10 gram/15 mL solution Take 45 mL by mouth three (3) times daily.   Patient not taking: Reported on 3/2/2022 2/16/22   Krystle Jones MD   promethazine (PHENERGAN) 25 mg tablet Take 1 Tablet by mouth every six (6) hours as needed for Nausea. 2/16/22   Isabel Ferraro MD   ondansetron hcl (Zofran) 8 mg tablet Take 1 Tablet by mouth every eight (8) hours as needed for Nausea. Indications: nausea and vomiting caused by cancer drugs 2/16/22   Isabel Ferraro MD   diphenoxylate-atropine (LomotiL) 2.5-0.025 mg per tablet Take 1 Tablet by mouth four (4) times daily as needed for Diarrhea. Max Daily Amount: 4 Tablets. 11/17/21   Corinna Contreras NP   sildenafil citrate (Viagra) 100 mg tablet Take 1 Tablet by mouth as needed (erectile dysfunction). Patient not taking: Reported on 3/2/2022 8/30/21   Keith Castro MD   DISABLED PLACARD (DISABLED [place]) DMV Use as directed for poor ambulation 8/30/21   Keith Castro MD   omeprazole (PRILOSEC) 40 mg capsule Take 1 Capsule by mouth daily. 8/30/21   Keith Castro MD   famotidine (PEPCID) 20 mg tablet Take 1 Tablet by mouth two (2) times a day. Patient not taking: Reported on 3/2/2022 8/30/21   Keith Castro MD   prochlorperazine (Compazine) 10 mg tablet Take 1 Tablet by mouth every six (6) hours as needed for Nausea. Indications: nausea and vomiting caused by cancer drugs, nausea and vomiting 7/27/21   Gigi Ramirez NP   potassium chloride (K-DUR, KLOR-CON) 20 mEq tablet Take 1 Tablet by mouth daily. Patient not taking: Reported on 3/2/2022 6/1/21   Isabel Ferraro MD   HYDROcodone-acetaminophen [facility]) 5-325 mg per tablet Take 1 Tablet by mouth every six (6) hours as needed. 2/26/21   Provider, Historical   naproxen sodium (ALEVE) 220 mg cap Take  by mouth as needed. For back pain  Hold 5 days for procedure per anesthesia protocol  Indications: pain    Provider, Historical   DISABLED PLACARD (DISABLED PLACARD) DMV Handicap Placard, for poor ambuation.  7/22/19   Keith Castro MD       Hospital Medications:  Current Facility-Administered Medications   Medication Dose Route Frequency    0.9% sodium chloride infusion 25 mL/hr IntraVENous CONTINUOUS    enoxaparin (LOVENOX) injection 40 mg  40 mg SubCUTAneous Q24H    diphenoxylate-atropine (LOMOTIL) tablet 1 Tablet  1 Tablet Oral QID PRN    famotidine (PEPCID) tablet 20 mg  20 mg Oral BID    ondansetron (ZOFRAN ODT) tablet 8 mg  8 mg Oral Q8H PRN    potassium chloride (K-DUR, KLOR-CON M20) SR tablet 20 mEq  20 mEq Oral DAILY    prochlorperazine (COMPAZINE) tablet 10 mg  10 mg Oral Q6H PRN    promethazine (PHENERGAN) tablet 25 mg  25 mg Oral Q6H PRN    HYDROcodone-acetaminophen (NORCO) 5-325 mg per tablet 1 Tablet  1 Tablet Oral Q6H PRN    HYDROmorphone (DILAUDID) injection 1 mg  1 mg IntraVENous Q4H PRN    alum-mag hydroxide-simeth (MYLANTA) oral suspension 30 mL  30 mL Oral Q4H PRN       Social History:  Social History     Tobacco Use    Smoking status: Former Smoker     Packs/day: 1.50     Years: 15.00     Pack years: 22.50     Quit date: 1974     Years since quittin.2    Smokeless tobacco: Never Used   Substance Use Topics    Alcohol use: Yes     Comment: socially       Pt denies any history of drug use, blood transfusions, or tattoos. Family History:  Family History   Problem Relation Age of Onset    Cancer Mother     No Known Problems Father     Cancer Sister        Review of Systems:  A detailed 10 system ROS is obtained, with pertinent positives as listed above. All others are negative. Objective:     Physical Exam:  Vitals:  Visit Vitals  /82 (BP 1 Location: Right upper arm, BP Patient Position: Semi fowlers)   Pulse 67   Temp 97.4 °F (36.3 °C)   Resp 18   Ht 5' 3\" (1.6 m)   Wt 72.9 kg (160 lb 10 oz)   SpO2 98%   BMI 28.45 kg/m²     Gen:  Pt is alert, cooperative, no acute distress. Appears chronically ill. Skin:  Extremities and face reveal no rashes. HEENT: Sclerae anicteric. Extra-occular muscles are intact. No oral ulcers. No abnormal pigmentation of the lips. The neck is supple.   Cardiovascular: Regular rate and rhythm. No murmurs, gallops, or rubs. Respiratory:  Comfortable breathing with no accessory muscle use. Clear breath sounds anteriorly with no wheezes, rales, or rhonchi. GI:  Abdomen nondistended, soft, and nontender. Normal active bowel sounds. Prior surgical scars. Pleur X drain noted in the right abdomen. Musculoskeletal:  No pitting edema of the lower legs. Neurological:  Gross memory appears intact. Patient is alert and oriented. Psychiatric:  Mood appears appropriate with judgement intact. Laboratory:    Recent Labs     03/03/22  0245 03/02/22  0930   WBC 2.7* 3.8*   HGB 11.4* 13.8   HCT 36.2* 43.7   * 153   MCV 94.8 94.2    137   K 3.6 3.9    102   CO2 29 30   BUN 18 17   CREA 0.67* 0.90   CA 8.6 9.1   MG 2.3 2.4   GLU 91 115*   * 398*   AST 21 30   ALT 19 22   TBILI 0.4 0.6   ALB 2.0* 2.7*   TP 5.5* 6.6          Assessment:     Active Problems:    Pancreatic cancer ([facility] [address] (3/2/2022)      Ascites (3/2/2022)      Admission for antineoplastic chemotherapy (3/2/2022)      78 y.o. male with PMH including but not limited to GERD, Amos's esophagus, esophogeal carcinoma in situ dx 12/2020 and pancreatic body adenocarcinoma (both primary ca) undergoing chemo admitted 3/2 with abd pain, failure to thrive, ascites and peritoneal carcinomatosis. He had paracentesis and Plurex drain placement today. Review of labs today with WBC 2.7, hgb 11.4, plt 118, BUN 18, Cr 0.67, TB 0.4, , AST 21 and ALT 19    We were consulted to consider an EGD with dilation for dysphagia. Patient says he is not having any trouble swallowing at this time. He says he had it about 4 weeks ago but that is gone and now he is eating solids, liquids and even taking medications like large potassium pills just fine.         Plan:     - Continue Pepcid  - Paracentesis and Plurex drain placement today     - I discussed an EGD with possible dilation but patient says he is not having trouble swallowing. He wants to think about it and discuss this with Oncology. - Will follow.     Dennie Clines, MD   [facility]

## 2022-03-03 NOTE — PROGRESS NOTES
Interventional Radiology Post Paracentesis/Thoracentesis Note    3/3/2022    Procedure(s): Ultrasound guided Diagnostic Paracentesis Performed with 8 [place] catheter total volume 4290 ml. Samples sent to lab. Preliminary Findings: medium clear and yellow. Complications: None    Plan:  Observation, check labs if drawn.           Chest X-Ray:  no    Full dictated report to follow

## 2022-03-03 NOTE — PROGRESS NOTES
[facility] Hematology & Oncology        Inpatient Hematology / Oncology Progress Note      Admission Date: 3/2/2022 12:00 PM  Reason for Admission/Hospital Course: Pancreatic cancer [facility]) [C25.9]  Admission for antineoplastic chemotherapy [Z51.11]  Ascites [R18.8]      24 Hour Events:  Paracentesis this am removed 4290  Pleurex drain placed  GI consult pending  Plans for gem/abraxane outpatient    ROS:  Constitutional:  negative for fever, chills. +weakness, malaise, fatigue. CV: negative for chest pain, palpitations, edema. Respiratory: negative for dyspnea, cough, wheezing. GI:  negative for nausea, diarrhea. +abdominal pain    10 point review of systems is otherwise negative with the exception of the elements mentioned above in the HPI. No Known Allergies    OBJECTIVE:  No data found. Temp (24hrs), Av.7 °F (36.5 °C), Min:97.4 °F (36.3 °C), Max:98.1 °F (36.7 °C)    No intake/output data recorded. Physical Exam:  Constitutional: Well developed,  male in no acute distress, sitting comfortably in the hospital bed. HEENT: Normocephalic and atraumatic. Oropharynx is clear, mucous membranes are moist.  Pupils are equal, round, and reactive to light. Extraocular muscles are intact. Sclerae anicteric   Skin Warm and dry. No bruising and no rash noted. No erythema. No pallor. Respiratory Lungs are clear to auscultation bilaterally without wheezes, rales or rhonchi, normal air exchange without accessory muscle use. CVS Normal rate, regular rhythm and normal S1 and S2. No murmurs, gallops, or rubs. Abdomen Soft, tender and nondistended, normoactive bowel sounds. Neuro Grossly nonfocal with no obvious sensory or motor deficits. MSK Normal range of motion in general.  No edema and no tenderness. Psych Appropriate mood and affect.         Labs:      Recent Labs     22  0245 22  0930   WBC 2.7* 3.8*   RBC 3.82* 4.64   HGB 11.4* 13.8   HCT 36.2* 43.7   MCV 94.8 94.2   MCH 29.8 29.7   MCHC 31.5 31.6   RDW 14.0 14.3   * 153   GRANS 52 59   LYMPH 18 15   MONOS 24* 18*   EOS 6 7   BASOS 1 1   IG 0 0   DF AUTOMATED AUTOMATED   ANEU 1.4* 2.3   ABL 0.5 0.6   ABM 0.7 0.7   JONNY 0.2 0.3   ABB 0.0 0.0   AIG 0.0 0.0        Recent Labs     03/03/22  0245 03/02/22  0930    137   K 3.6 3.9    102   CO2 29 30   AGAP 6* 5*   GLU 91 115*   BUN 18 17   CREA 0.67* 0.90   GFRAA >60 >60   GFRNA >60 >60   CA 8.6 9.1   * 398*   TP 5.5* 6.6   ALB 2.0* 2.7*   GLOB 3.5 3.9*   AGRAT 0.6* 0.7*   MG 2.3 2.4         Imaging:    Medications:  Current Facility-Administered Medications   Medication Dose Route Frequency    0.9% sodium chloride infusion  25 mL/hr IntraVENous CONTINUOUS    enoxaparin (LOVENOX) injection 40 mg  40 mg SubCUTAneous Q24H    diphenoxylate-atropine (LOMOTIL) tablet 1 Tablet  1 Tablet Oral QID PRN    famotidine (PEPCID) tablet 20 mg  20 mg Oral BID    ondansetron (ZOFRAN ODT) tablet 8 mg  8 mg Oral Q8H PRN    potassium chloride (K-DUR, KLOR-CON M20) SR tablet 20 mEq  20 mEq Oral DAILY    prochlorperazine (COMPAZINE) tablet 10 mg  10 mg Oral Q6H PRN    promethazine (PHENERGAN) tablet 25 mg  25 mg Oral Q6H PRN    HYDROcodone-acetaminophen (NORCO) 5-325 mg per tablet 1 Tablet  1 Tablet Oral Q6H PRN    HYDROmorphone (DILAUDID) injection 1 mg  1 mg IntraVENous Q4H PRN    alum-mag hydroxide-simeth (MYLANTA) oral suspension 30 mL  30 mL Oral Q4H PRN         ASSESSMENT:    Problem List  Date Reviewed: 3/2/2022          Codes Class Noted    P[facility] ([facility] ICD-10-CM: C25.9  ICD-9-CM: 157.9  3/2/2022        Ascites ICD-10-CM: R18.8  ICD-9-CM: 789.59  3/2/2022        Admission for antineoplastic chemotherapy ICD-10-CM: Z51.11  ICD-9-CM: V58.11  3/2/2022        Hypomagnesemia ICD-10-CM: E83.42  ICD-9-CM: 275.2  4/23/2021        Hypokalemia ICD-10-CM: E87.6  ICD-9-CM: 276.8  4/20/2021        CINV (chemotherapy-induced nausea and vomiting) ICD-10-CM: R11.2, T45.1X5A  ICD-9-CM: 787.01, E933.1  4/20/2021        Dehydration ICD-10-CM: E86.0  ICD-9-CM: 276.51  4/12/2021        Other neutropenia ([facility] ICD-10-CM: D70.8  ICD-9-CM: 288.09  3/9/2021        Malignant neoplasm of lower third of esophagus ([facility] ICD-10-CM: C15.5  ICD-9-CM: 150.5  1/5/2021        Malignant neoplasm of body of pancreas ([facility] ICD-10-CM: C25.1  ICD-9-CM: 157.1  1/5/2021        Severe obesity ([facility] ICD-10-CM: E66.01  ICD-9-CM: 278.01  12/10/2020        Elevated glucose ICD-10-CM: R73.09  ICD-9-CM: 790.29  7/22/2019        Anemia ICD-10-CM: D64.9  ICD-9-CM: 285.9  7/22/2019        Chronic left-sided low back pain ICD-10-CM: M54.50, G89.29  ICD-9-CM: 724.2, 338.29  7/22/2019                PLAN:  Metastatic pancreatic cancer sp 24 cycles FOLFIRINOX   3/3 CEA and CA 19.9 trending up-plans for gem/abraxane outpatient    Pain  3/3 needing IV pain meds post procedure. Continue to monitor    Ascites recurrent  3/3 para this am removed 4290 ml. Pleurex drain placed    Difficulty eating  3/3 GI consulted (EGD 12/2020 with malignant esophageal stricture)    Goals and plan of care reviewed with the patient. All questions answered to the best of our ability.             Nitesh Rodrigues NP   [facility] Hematology & Oncology  [address] [address]  Office : [phone]  Fax : [phone]

## 2022-03-03 NOTE — PROGRESS NOTES
TRANSFER - OUT REPORT:    Verbal report given to [address] RN on Kate Talley  being transferred to IR room 3 for routine post - op       Report consisted of patients Situation, Background, Assessment and   Recommendations(SBAR). Information from the following report(s) SBAR, Procedure Summary and MAR was reviewed with the receiving nurse. Opportunity for questions and clarification was provided. Conscious Sedation:   100 Mcg of Fentanyl administered  2 Mg of Versed administered    Pt tolerated procedure well.      Visit Vitals  /84   Pulse 83   Temp 97.7 °F (36.5 °C)   Resp 13   Ht 5' 3\" (1.6 m)   Wt 72.6 kg (160 lb)   SpO2 100%   BMI 28.34 kg/m²     Past Medical History:   Diagnosis Date    Back pain     followed by pain management    Chronic pain     left-sided, lower back pain    CINV (chemotherapy-induced nausea and vomiting) 4/20/2021    Erectile dysfunction     Gastritis     GERD (gastroesophageal reflux disease)     daily medication    Hiatal hernia     \"medium\"    History of anemia     Hypertension     situational; has Amlodipine to take if systolic >019    Left bundle branch block (LBBB) on electrocardiogram 02/05/2021    Malignant neoplasm of body of pancreas ([facility]     Malignant neoplasm of esophagus ([address] 12/07/2020    Morbid obesity (HCC)     Nausea     takes antiemetic med prn     Peripheral IV 03/02/22 Anterior;Distal;Left Forearm (Active)   Site Assessment Clean, dry, & intact 03/02/22 2011   Phlebitis Assessment 0 03/02/22 2011   Infiltration Assessment 0 03/02/22 2011   Dressing Status Clean, dry, & intact 03/02/22 2011   Dressing Type Tape;Transparent 03/02/22 2011   Hub Color/Line Status Patent 03/02/22 1545              Venous Access Device Upper chest (subclavicular area), left (Active)       Venous Access Device Left upper chest subclavian 03/02/22 Upper chest (subclavicular area), left (Active)   Central Line Being Utilized Yes 03/02/22 2011   Criteria for Appropriate Use Irritant/vesicant medication 03/02/22 2011   Site Assessment Clean, dry, & intact 03/02/22 2011   Date of Last Dressing Change 03/02/22 03/02/22 2011   Dressing Status Clean, dry, & intact 03/02/22 2011   Dressing Type Disk with Chlorhexadine gluconate (CHG); Transparent 03/02/22 2011   Date Accessed (Medial Site) 03/02/22 03/02/22 2011     Drain PleurX peritoneal drain 03/03/22 Right; Outer Abdomen (Active)

## 2022-03-03 NOTE — PROGRESS NOTES
Problem: Falls - Risk of  Goal: *Absence of Falls  Description: Document [place] Fall Risk and appropriate interventions in the flowsheet.   Outcome: Progressing Towards Goal  Note: Fall Risk Interventions:            Medication Interventions: Evaluate medications/consider consulting pharmacy,Teach patient to arise slowly,Patient to call before getting OOB

## 2022-03-03 NOTE — PROGRESS NOTES
Pt to IR Suite 2 via stretcher with RN.       IR Nurse Pre-Procedure Checklist Part 2          Consent signed: Yes    H&P complete:  Yes    Antibiotics: Not applicable    Airway/Mallampati Done: Yes    Shaved: Yes    Pregnancy Form:Not applicable    Patient Position: Yes    MD Side: Yes     Biopsy Worksheet: Yes    Specimen Medium: Yes

## 2022-03-04 PROBLEM — E43 SEVERE PROTEIN-CALORIE MALNUTRITION (HCC): Status: ACTIVE | Noted: 2022-03-04

## 2022-03-04 LAB
ALBUMIN SERPL-MCNC: 1.8 G/DL (ref 3.2–4.6)
ALBUMIN/GLOB SERPL: 0.5 {RATIO} (ref 1.2–3.5)
ALP SERPL-CCNC: 301 U/L (ref 50–136)
ALT SERPL-CCNC: 18 U/L (ref 12–65)
ANION GAP SERPL CALC-SCNC: 3 MMOL/L (ref 7–16)
AST SERPL-CCNC: 20 U/L (ref 15–37)
BASOPHILS # BLD: 0 K/UL (ref 0–0.2)
BASOPHILS NFR BLD: 0 % (ref 0–2)
BILIRUB SERPL-MCNC: 0.4 MG/DL (ref 0.2–1.1)
BUN SERPL-MCNC: 17 MG/DL (ref 8–23)
CALCIUM SERPL-MCNC: 8.5 MG/DL (ref 8.3–10.4)
CHLORIDE SERPL-SCNC: 104 MMOL/L (ref 98–107)
CO2 SERPL-SCNC: 29 MMOL/L (ref 21–32)
CREAT SERPL-MCNC: 0.61 MG/DL (ref 0.8–1.5)
DIFFERENTIAL METHOD BLD: ABNORMAL
EOSINOPHIL # BLD: 0.1 K/UL (ref 0–0.8)
EOSINOPHIL NFR BLD: 4 % (ref 0.5–7.8)
ERYTHROCYTE [DISTWIDTH] IN BLOOD BY AUTOMATED COUNT: 14.3 % (ref 11.9–14.6)
GLOBULIN SER CALC-MCNC: 3.4 G/DL (ref 2.3–3.5)
GLUCOSE SERPL-MCNC: 104 MG/DL (ref 65–100)
HCT VFR BLD AUTO: 36.1 % (ref 41.1–50.3)
HGB BLD-MCNC: 11.6 G/DL (ref 13.6–17.2)
IMM GRANULOCYTES # BLD AUTO: 0 K/UL (ref 0–0.5)
IMM GRANULOCYTES NFR BLD AUTO: 0 % (ref 0–5)
LYMPHOCYTES # BLD: 0.5 K/UL (ref 0.5–4.6)
LYMPHOCYTES NFR BLD: 17 % (ref 13–44)
MAGNESIUM SERPL-MCNC: 2.2 MG/DL (ref 1.8–2.4)
MCH RBC QN AUTO: 30 PG (ref 26.1–32.9)
MCHC RBC AUTO-ENTMCNC: 32.1 G/DL (ref 31.4–35)
MCV RBC AUTO: 93.3 FL (ref 79.6–97.8)
MONOCYTES # BLD: 0.9 K/UL (ref 0.1–1.3)
MONOCYTES NFR BLD: 32 % (ref 4–12)
NEUTS SEG # BLD: 1.3 K/UL (ref 1.7–8.2)
NEUTS SEG NFR BLD: 47 % (ref 43–78)
NRBC # BLD: 0 K/UL (ref 0–0.2)
PLATELET # BLD AUTO: 116 K/UL (ref 150–450)
PMV BLD AUTO: 9.9 FL (ref 9.4–12.3)
POTASSIUM SERPL-SCNC: 4 MMOL/L (ref 3.5–5.1)
PROT SERPL-MCNC: 5.2 G/DL (ref 6.3–8.2)
RBC # BLD AUTO: 3.87 M/UL (ref 4.23–5.6)
SODIUM SERPL-SCNC: 136 MMOL/L (ref 136–145)
WBC # BLD AUTO: 2.6 K/UL (ref 4.3–11.1)

## 2022-03-04 PROCEDURE — 80053 COMPREHEN METABOLIC PANEL: CPT

## 2022-03-04 PROCEDURE — 65270000029 HC RM PRIVATE

## 2022-03-04 PROCEDURE — 36591 DRAW BLOOD OFF VENOUS DEVICE: CPT

## 2022-03-04 PROCEDURE — 83735 ASSAY OF MAGNESIUM: CPT

## 2022-03-04 PROCEDURE — 99223 1ST HOSP IP/OBS HIGH 75: CPT | Performed by: NURSE PRACTITIONER

## 2022-03-04 PROCEDURE — 74011250637 HC RX REV CODE- 250/637: Performed by: NURSE PRACTITIONER

## 2022-03-04 PROCEDURE — 85025 COMPLETE CBC W/AUTO DIFF WBC: CPT

## 2022-03-04 PROCEDURE — APPNB45 APP NON BILLABLE 31-45 MINUTES: Performed by: NURSE PRACTITIONER

## 2022-03-04 PROCEDURE — 76450000000

## 2022-03-04 PROCEDURE — 99231 SBSQ HOSP IP/OBS SF/LOW 25: CPT | Performed by: INTERNAL MEDICINE

## 2022-03-04 PROCEDURE — 74011250636 HC RX REV CODE- 250/636: Performed by: NURSE PRACTITIONER

## 2022-03-04 RX ORDER — HYDROMORPHONE HYDROCHLORIDE 1 MG/ML
1 INJECTION, SOLUTION INTRAMUSCULAR; INTRAVENOUS; SUBCUTANEOUS
Status: DISCONTINUED | OUTPATIENT
Start: 2022-03-04 | End: 2022-03-07 | Stop reason: HOSPADM

## 2022-03-04 RX ORDER — OXYCODONE HYDROCHLORIDE 5 MG/1
20 TABLET ORAL
Status: DISCONTINUED | OUTPATIENT
Start: 2022-03-04 | End: 2022-03-04

## 2022-03-04 RX ORDER — TAMSULOSIN HYDROCHLORIDE 0.4 MG/1
0.4 CAPSULE ORAL DAILY
Status: DISCONTINUED | OUTPATIENT
Start: 2022-03-04 | End: 2022-03-07 | Stop reason: HOSPADM

## 2022-03-04 RX ORDER — OXYCODONE HYDROCHLORIDE 5 MG/1
20 TABLET ORAL
Status: DISCONTINUED | OUTPATIENT
Start: 2022-03-04 | End: 2022-03-05

## 2022-03-04 RX ADMIN — OXYCODONE 20 MG: 5 TABLET ORAL at 18:05

## 2022-03-04 RX ADMIN — HYDROMORPHONE HYDROCHLORIDE 1 MG: 1 INJECTION, SOLUTION INTRAMUSCULAR; INTRAVENOUS; SUBCUTANEOUS at 08:54

## 2022-03-04 RX ADMIN — FAMOTIDINE 20 MG: 20 TABLET ORAL at 08:53

## 2022-03-04 RX ADMIN — HYDROMORPHONE HYDROCHLORIDE 1 MG: 1 INJECTION, SOLUTION INTRAMUSCULAR; INTRAVENOUS; SUBCUTANEOUS at 13:32

## 2022-03-04 RX ADMIN — FAMOTIDINE 20 MG: 20 TABLET ORAL at 18:05

## 2022-03-04 RX ADMIN — TAMSULOSIN HYDROCHLORIDE 0.4 MG: 0.4 CAPSULE ORAL at 10:50

## 2022-03-04 NOTE — PROGRESS NOTES
[facility] Progress Note         Admit Date:  3/2/2022    Today's Date:  3/4/2022    CC:  Dysphagia    Subjective:     Patient reports no dysphagia and tolerating diet (ate baked chicken and pineapple last night). Does reports frequent nausea w/o vomiting. Medications:   Current Facility-Administered Medications   Medication Dose Route Frequency    0.9% sodium chloride infusion  25 mL/hr IntraVENous CONTINUOUS    enoxaparin (LOVENOX) injection 40 mg  40 mg SubCUTAneous Q24H    diphenoxylate-atropine (LOMOTIL) tablet 1 Tablet  1 Tablet Oral QID PRN    famotidine (PEPCID) tablet 20 mg  20 mg Oral BID    ondansetron (ZOFRAN ODT) tablet 8 mg  8 mg Oral Q8H PRN    potassium chloride (K-DUR, KLOR-CON M20) SR tablet 20 mEq  20 mEq Oral DAILY    prochlorperazine (COMPAZINE) tablet 10 mg  10 mg Oral Q6H PRN    promethazine (PHENERGAN) tablet 25 mg  25 mg Oral Q6H PRN    HYDROcodone-acetaminophen (NORCO) 5-325 mg per tablet 1 Tablet  1 Tablet Oral Q6H PRN    HYDROmorphone (DILAUDID) injection 1 mg  1 mg IntraVENous Q4H PRN    alum-mag hydroxide-simeth (MYLANTA) oral suspension 30 mL  30 mL Oral Q4H PRN       Review of Systems:  ROS was obtained, with pertinent positives as listed above. No chest pain or SOB. Diet:  Regular    Objective:   Vitals:  Visit Vitals  /77 (BP 1 Location: Left upper arm, BP Patient Position: At rest)   Pulse 76   Temp 97.8 °F (36.6 °C)   Resp 16   Ht 5' 3\" (1.6 m)   Wt 72.9 kg (160 lb 10 oz)   SpO2 97%   BMI 28.45 kg/m²     Intake/Output:  03/04 0701 - 03/04 1900  In: -   Out: 10 [Urine:10]  03/02 1901 - 03/04 0700  In: 118 [P.O.:118]  Out: 100 [Urine:100]  Exam:  General appearance: alert, cooperative, no distress  Lungs: clear to auscultation bilaterally anteriorly  Heart: regular rate and rhythm  Abdomen: soft, non-tender.  Bowel sounds normal. No masses, no organomegaly  Extremities: extremities normal, atraumatic, no cyanosis or edema  Neuro:  alert and oriented    Data Review (Labs):    Recent Labs     03/04/22  0308 03/03/22  0245 03/02/22  0930   WBC 2.6* 2.7* 3.8*   HGB 11.6* 11.4* 13.8   HCT 36.1* 36.2* 43.7   * 118* 153   MCV 93.3 94.8 94.2    137 137   K 4.0 3.6 3.9    102 102   CO2 29 29 30   BUN 17 18 17   CREA 0.61* 0.67* 0.90   CA 8.5 8.6 9.1   MG 2.2 2.3 2.4   * 91 115*   * 306* 398*   AST 20 21 30   ALT 18 19 22   TBILI 0.4 0.4 0.6   ALB 1.8* 2.0* 2.7*   TP 5.2* 5.5* 6.6        EGD 12/1/20 with medium HH, LA class C esophagitis, irregular Z line with some thickening and friability. 6-8 mm duodenal polyp. Bx with invasive adenocarcinoma.       Review of labs today with WBC 2.7, hgb 11.4, plt 118, BUN 18, Cr 0.67, TB 0.4, , AST 21 and ALT 19     ABD US 2/24/22  FINDINGS: Multiple images from real time ultrasound evaluation of the abdomen  demonstrate copious ascites in all 4 quadrants.  The volume is significantly  increased from December 27. IMPRESSION  Copious ascites with significant increase since December 27.     KUB 2/23/22  FINDINGS:   Nonobstructive bowel gas pattern. No intraperitoneal free air. No pathologic  calcifications. Right upper quadrant surgical clips. IMPRESSION  No acute radiographic abnormality.     CT Chest/A/P 12/27/21   FINDINGS:  - LUNGS: There are fibrotic changes in both lungs, most prominent in the left  lung base.  No developing pulmonary mass or acute infiltrate. - MEDIASTINUM/AXILLA: Stable moderate size hiatal hernia and gastroesophageal  varices. - HEART/VESSELS: Mild vascular calcification.  - CHEST WALL: Normal.  - LIVER: There is a new small 7 mm low-attenuation mass in the inferior right  lobe of the liver.  No other discrete liver lesion.  Portal vein is patent. - GALLBLADDER/BILE DUCTS: Increased left hepatic bile duct dilatation.  Common  duct measures 6 mm.  Gallbladder is absent.   - PANCREAS: Relatively stable mass is again noted in the body the pancreas, 6.3  x 3. 9 cm.  The mass is encasing the celiac artery and occluding the splenic  vein. - SPLEEN: Normal.  - ADRENALS:  Normal.  - KIDNEYS/URETERS: Stable small renal cysts.  No hydronephrosis or significant  mass. - BLADDER: Normal.  - REPRODUCTIVE ORGANS: No pelvic masses. - BOWEL: There is increased irregularity of the wall the sigmoid colon,  concerning for tumor involvement. Cottie Sayres is also sigmoid diverticulosis. Cottie Sayres  is no significant bowel distention.  - LYMPH NODES: No significant retroperitoneal, mesenteric, or pelvic adenopathy.  - BONES: Stable compression fracture of L1.  No fracture or significant bone  lesion.  - VASCULATURE: Normal  - OTHER: There is a new small peritoneal nodule adjacent to the spleen, 9 mm in  diameter.  Developing ascites, most prominent in the upper right abdomen. IMPRESSION  1.  Stable appearance of the primary pancreas tumor. 2.  Irregularity of the wall of the sigmoid colon, peritoneal nodularity, and  developing ascites.  Concerning for peritoneal carcinomatosis. 3.  New small low-attenuation lesion in the inferior liver, also concerning for  metastatic disease.     ENDOSCOPIC ULTRASOUND WITH DOPPLER AND FNA 12/23/20  FINDINGS:   ENDOSCOPIC FINDINGS:  Limited views of the mucosa with the echoendoscope reveals a malignant esophageal stricture and hiatal hernia. The tumor does not traverse the GE junction. There are no other gross abnormalities of the stomach or proximal duodenum. The ampulla is well visualized endoscopically and appears normal.   ESOPHAGUS:  The balloon was insufflated to improve acoustic coupling. At a distance 30 to 36 cm from the incisors, there is 60% circumferential wall thickening to 14 mm maximally. There is a hypoechoic, heterogeneous mass that involves the esophageal mucosa, submucosa and muscularis propria. There are a few small tumor pseudopodia extending through the muscularis propria. There is no invasion of adjacent organs.  There are no pathologically enlarged periesophageal lymph nodes. STOMACH:  Multiple sweeps were made throughout the stomach visualizing the entire wall from the pylorus to the gastroesophageal junction. The entire gastric wall is of normal thickness and normal wall architecture. PANCREAS:  The pancreas is well-visualized from head to tail. In the body of the pancreas, there is a large mass with maximal dimensions in a single echoplane of 34 x 31 mm, hypoechoic, heterogenous mass. The mass abuts the splenic vein without distortion of vascular contour or associated venous occlusion. There is encasement at the bifurcation of hepatic and splenic branches of the celiac artery. The pancreatic duct is compressed with upstream dilation in the tail to 6 mm maximally. Fine needle aspiration of the mass was performed using a 22-gauge [employer] needle. Two passes were made, yielding core specimens. BILIARY TREE: The gallbladder is absent. The common bile duct is well-visualized from its insertion in the ampulla to the bifurcation of right and left hepatic ducts. It is non-dilated, measuring up to 7 mm maximally with a gradual taper down to the level of the ampulla. There are no intraductal stones, sludge or debris. The ampulla appears normal endosonographically. OTHER ORGANS:  Views of the left lobe of the liver demonstrate no solid mass lesions. There is no ascites in the upper abdomen. The left adrenal gland appears normal. There are no pathologically enlarged posterior mediastinal or upper abdominal lymph nodes. IMPRESSION:  1. Esophageal cancer. This is staged T3N0Mx based on endoscopic ultrasound criteria.   2. Hiatal hernia.   3. Pancreatic mass. This is concerning for an unresectable neoplasm, either from a second primary malignancy or metastatic disease.     Assessment:     Active Problems:    Pancreatic cancer ([facility] (3/2/2022)      Ascites (3/2/2022)      Admission for antineoplastic chemotherapy (3/2/2022)      78 y.o. male with PMH including but not limited to GERD, Amos's esophagus, esophogeal carcinoma in situ dx 12/2020 and pancreatic body adenocarcinoma (both primary ca) undergoing chemo admitted 3/2 with abd pain, failure to thrive, ascites and peritoneal carcinomatosis. He had paracentesis and Plurex drain placement today.        Review of labs today with WBC 2.7, hgb 11.4, plt 118, BUN 18, Cr 0.67, TB 0.4, , AST 21 and ALT 19     We were consulted to consider an EGD with dilation for dysphagia. Patient says he is not having any trouble swallowing at this time. He says he had it about 4 weeks ago but that is gone and now he is eating solids, liquids and even taking medications like large potassium pills just fine. He does reports frequent nausea with no vomiting. Plan:     - Continue Pepcid  - Paracentesis and Plurex drain placement 3/3/22    - I discussed an EGD with possible dilation but patient says he is not having trouble swallowing. He wants to think about it and discuss this with Oncology. - Consider antiemetics scheduled prior to meals  - GI will sign off please call with any questions or concerns. Stew Roland NP  Patient is seen and examined in collaboration with Dr. Rashmi Pickett. Assessment and plan as per Dr. Bennie Enriquez.

## 2022-03-04 NOTE — PROGRESS NOTES
In to see patient. Lives at home alone. Son, Rivera Breen (does not have a phone), lives behind patient and is a good support for him. Patient has another son, Errol Mari and a daughter Jason Levels in [facility]. Also, patient has a best friend, Pio Hood [phone] who is also a good support for him. Does not use any medical equipment at home. Has 5 steps to enter home and does fine with them. Medications are filled at [facility] in [facility] and is current with his PCP, Samantha Haro. CM following for discharge needs.

## 2022-03-04 NOTE — PROGRESS NOTES
Problem: Falls - Risk of  Goal: *Absence of Falls  Description: Document Vernia Colder Fall Risk and appropriate interventions in the flowsheet.   Outcome: Progressing Towards Goal  Note: Fall Risk Interventions:            Medication Interventions: Evaluate medications/consider consulting pharmacy,Patient to call before getting OOB,Teach patient to arise slowly

## 2022-03-04 NOTE — PROGRESS NOTES
[facility] Hematology & Oncology        Inpatient Hematology / Oncology Progress Note      Admission Date: 3/2/2022 12:00 PM  Reason for Admission/Hospital Course: Pancreatic cancer [facility]) [C25.9]  Admission for antineoplastic chemotherapy [Z51.11]  Ascites [R18.8]      24 Hour Events:  Pain not controlled without IV dilaudid  PC consulted-patient was only on Norco 5mg OP  PT consulted  Plans for gem/abraxane outpatient  In good spirits    ROS:  Constitutional:  negative for fever, chills. +weakness, malaise, fatigue. CV: negative for chest pain, palpitations, edema. Respiratory: negative for dyspnea, cough, wheezing. GI:  negative for nausea, diarrhea. +abdominal pain    10 point review of systems is otherwise negative with the exception of the elements mentioned above in the HPI. No Known Allergies    OBJECTIVE:  Patient Vitals for the past 8 hrs:   BP Temp Pulse Resp SpO2   22 0700 122/77 97.8 °F (36.6 °C) 76 16 97 %   22 0257 103/67 98.2 °F (36.8 °C) 72 -- 97 %     Temp (24hrs), Av.7 °F (36.5 °C), Min:97.3 °F (36.3 °C), Max:98.2 °F (36.8 °C)     0701 -  1900  In: -   Out: 10 [Urine:10]    Physical Exam:  Constitutional: Well developed,  male in no acute distress, sitting comfortably in the hospital bed. HEENT: Normocephalic and atraumatic. Oropharynx is clear, mucous membranes are moist.  Pupils are equal, round, and reactive to light. Extraocular muscles are intact. Sclerae anicteric   Skin Warm and dry. No bruising and no rash noted. No erythema. No pallor. Respiratory Lungs are clear to auscultation bilaterally without wheezes, rales or rhonchi, normal air exchange without accessory muscle use. CVS Normal rate, regular rhythm and normal S1 and S2. No murmurs, gallops, or rubs. Abdomen Soft, tender and nondistended, normoactive bowel sounds. Neuro Grossly nonfocal with no obvious sensory or motor deficits.    MSK Normal range of motion in general.  No edema and no tenderness. Psych Appropriate mood and affect.         Labs:      Recent Labs     03/04/22 0308 03/03/22  0245 03/02/22  0930   WBC 2.6* 2.7* 3.8*   RBC 3.87* 3.82* 4.64   HGB 11.6* 11.4* 13.8   HCT 36.1* 36.2* 43.7   MCV 93.3 94.8 94.2   MCH 30.0 29.8 29.7   MCHC 32.1 31.5 31.6   RDW 14.3 14.0 14.3   * 118* 153   GRANS 47 52 59   LYMPH 17 18 15   MONOS 32* 24* 18*   EOS 4 6 7   BASOS 0 1 1   IG 0 0 0   DF AUTOMATED AUTOMATED AUTOMATED   ANEU 1.3* 1.4* 2.3   ABL 0.5 0.5 0.6   ABM 0.9 0.7 0.7   JONNY 0.1 0.2 0.3   ABB 0.0 0.0 0.0   AIG 0.0 0.0 0.0        Recent Labs     03/04/22 0308 03/03/22  0245 03/02/22  0930    137 137   K 4.0 3.6 3.9    102 102   CO2 29 29 30   AGAP 3* 6* 5*   * 91 115*   BUN 17 18 17   CREA 0.61* 0.67* 0.90   GFRAA >60 >60 >60   GFRNA >60 >60 >60   CA 8.5 8.6 9.1   * 306* 398*   TP 5.2* 5.5* 6.6   ALB 1.8* 2.0* 2.7*   GLOB 3.4 3.5 3.9*   AGRAT 0.5* 0.6* 0.7*   MG 2.2 2.3 2.4         Imaging:    Medications:  Current Facility-Administered Medications   Medication Dose Route Frequency    tamsulosin (FLOMAX) capsule 0.4 mg  0.4 mg Oral DAILY    enoxaparin (LOVENOX) injection 40 mg  40 mg SubCUTAneous Q24H    diphenoxylate-atropine (LOMOTIL) tablet 1 Tablet  1 Tablet Oral QID PRN    famotidine (PEPCID) tablet 20 mg  20 mg Oral BID    ondansetron (ZOFRAN ODT) tablet 8 mg  8 mg Oral Q8H PRN    potassium chloride (K-DUR, KLOR-CON M20) SR tablet 20 mEq  20 mEq Oral DAILY    prochlorperazine (COMPAZINE) tablet 10 mg  10 mg Oral Q6H PRN    promethazine (PHENERGAN) tablet 25 mg  25 mg Oral Q6H PRN    HYDROcodone-acetaminophen (NORCO) 5-325 mg per tablet 1 Tablet  1 Tablet Oral Q6H PRN    HYDROmorphone (DILAUDID) injection 1 mg  1 mg IntraVENous Q4H PRN    alum-mag hydroxide-simeth (MYLANTA) oral suspension 30 mL  30 mL Oral Q4H PRN         ASSESSMENT:    Problem List  Date Reviewed: 3/2/2022          Codes Class Noted    Pancreatic cancer ([facility] [address] ICD-10-CM: C25.9  ICD-9-CM: 157.9  3/2/2022        Ascites ICD-10-CM: R18.8  ICD-9-CM: 789.59  3/2/2022        Admission for antineoplastic chemotherapy ICD-10-CM: Z51.11  ICD-9-CM: V58.11  3/2/2022        Hypomagnesemia ICD-10-CM: E83.42  ICD-9-CM: 275.2  4/23/2021        Hypokalemia ICD-10-CM: E87.6  ICD-9-CM: 276.8  4/20/2021        CINV (chemotherapy-induced nausea and vomiting) ICD-10-CM: R11.2, T45.1X5A  ICD-9-CM: 787.01, E933.1  4/20/2021        Dehydration ICD-10-CM: E86.0  ICD-9-CM: 276.51  4/12/2021        Other neutropenia ([facility] ICD-10-CM: D70.8  ICD-9-CM: 288.09  3/9/2021        Malignant neoplasm of lower third of esophagus (HCC) ICD-10-CM: C15.5  ICD-9-CM: 150.5  1/5/2021        Malignant neoplasm of body of pancreas ([facility] [address] ICD-10-CM: C25.1  ICD-9-CM: 157.1  1/5/2021        Severe obesity ([facility] [address] ICD-10-CM: E66.01  ICD-9-CM: 278.01  12/10/2020        Elevated glucose ICD-10-CM: R73.09  ICD-9-CM: 790.29  7/22/2019        Anemia ICD-10-CM: D64.9  ICD-9-CM: 285.9  7/22/2019        Chronic left-sided low back pain ICD-10-CM: M54.50, G89.29  ICD-9-CM: 724.2, 338.29  7/22/2019                PLAN:  Metastatic pancreatic cancer sp 24 cycles FOLFIRINOX   3/3 CEA and CA 19.9 trending up-plans for gem/abraxane outpatient    Pain  3/3 needing IV pain meds post procedure. Continue to monitor  3/4 pain not controlled with oral med. PC consulted. DC when pain controlled. Ascites recurrent  3/3 para this am removed 4290 ml. Pleurex drain placed  3/4 nursing ordered placed to educate patient on home drainage    Difficulty eating  3/3 GI consulted (EGD 12/2020 with malignant esophageal stricture)  3/4 Patient denies difficulty with eating. Decline GI offer for EGD. Home when pain controlled. Goals and plan of care reviewed with the patient. All questions answered to the best of our ability.             Juan , NP   [facility] Hematology & Oncology  [address] [address]  Office : [phone]  Fax : [phone]

## 2022-03-04 NOTE — PROGRESS NOTES
Comprehensive Nutrition Assessment    Type and Reason for Visit: Initial,Positive nutrition screen  Best Practice Alert for Malnutrition Screening Tool: Recently Lost Weight Without Trying: Yes, If Yes, How Much Weight Loss: 2-13 lbs, Eating Poorly Due to Decreased Appetite: Yes    Nutrition Recommendations/Plan:   Meals and Snacks:  Continue current diet. Nutrition Supplement Therapy:   Medical food supplement therapy:  Change Ensure Clear twice per day (this provides 240 kcal and 8 grams protein per bottle) and Magic Cup three times per day (this provides 290 kcal and 9 grams protein per serving)   Pt with prior hx of poor intake of regular ensure products agreeable to try clear. He indicates not crazy about magic cup d/t flavor taste discussed thinning with milk for additional calorie and protein. Malnutrition Assessment:  Malnutrition Status: Severe malnutrition  Context: Chronic illness  Findings of clinical characteristics of malnutrition:   Energy Intake:  7 - 75% or less est energy requirements for 1 month or longer  Weight Loss:  7 - Greater than 5% over 1 month (11% over 5-6 weeks)     Body Fat Loss:  1 - Mild body fat loss, Triceps   Muscle Mass Loss:  1 - Mild muscle mass loss, Thigh (quadriceps),Temples (temporalis),Calf (gastrocnemius),Clavicles (pectoralis &deltoids)  Fluid Accumulation:   ,     Strength:  Not performed     Nutrition Assessment:   Nutrition History:  Pt reports fluctuating hx of po intake at baseline throughout Ca treatment. He indicates most recently \"he stopped eating\" due to nausea reporting minimal food and limited beverage intake during this time with significant decline from his usual fluctuations. He reports wt loss initially with diagnosis but indicates recently holding steady around 180#.    Cultural/Sabianism/Ethnic Food Preference(s): None   Nutrition Background:  PMH GERD, Amos's Esophagus, esophageal carcinom, pancreatic body adenocarcinoma, FTT, ascites and peritoneal carcinomatosis. Admitted with ascites, admission for antineoplastic chemotherapy. s/p paracentesis and pleurex drain placement 3/3. Nutrition Interval:  Pt is alert and oriented x 4 at RD visit. Reports significant relief s/p paracentesis and pleurex drain placement. He indicates largest barrier to intake at this time is nausea, denies dysphagia. Frustrated that he was NPO this am for EGD as he \"doesn't have problems swallowing. \"  He indicates intake last pm greater than the past 4 weeks. GI recommended antiemetic scheduled before meals, pt likely to benefit from change given report to RD on nutrition barriers. Nutrition Related Findings:   Declined EGD 3/4      Current Nutrition Therapies:  ADULT ORAL NUTRITION SUPPLEMENT Breakfast, Lunch, Dinner; Frozen Supplement  ADULT DIET Easy to Chew    Current Intake:   Average Meal Intake: 51-75% (last pm meal per pt recall) Average Supplement Intake: 1-25% (MC times one)      Anthropometric Measures:  Height: 5' 3\" (160 cm)  Current Body Wt: 72.6 kg (160 lb 0.9 oz) (3/2), Weight source: Bed scale  BMI: 28.4, Overweight (BMI 25.0-29. 9)  Admission Body Weight: 165 lb 12.6 oz (no wt source on 3/2)  Ideal Body Weight (lbs) (Calculated): 124 lbs (56 kg), 129.1 %  Usual Body Wt: 81.6 kg (180 lb) (pt stated ~ 4 weeks ago ), Percent weight change: -11.1        Weight hx per Oncology office visits: 181# 2/21/22, [email or web address] 1/26/22, 180# 10/19/21, 189# 9/21/21  Edema: No data recorded   Estimated Daily Nutrient Needs:  Energy (kcal/day): 8300-2792 (Kcal/kg (25-30), Weight Used: Current (72.6 kg))  Protein (g/day): 73-88 (1-1.2 g/kg) Weight Used: (Current)  Fluid (ml/day):   (1 ml/kcal)    Nutrition Diagnosis:   · Inadequate oral intake related to  (compromised appetite, nausea) as evidenced by  (self report of barriers, home po <25% needs)    · Severe malnutrition,In context of chronic illness related to catabolic illness as evidenced by  (malnutrition criteria above)    Nutrition Interventions:   Food and/or Nutrient Delivery: Continue current diet,Modify oral nutrition supplement     Coordination of Nutrition Care: Continue to monitor while inpatient    Goals: Active Goal: Meet >50% needs by nutrition follow-up    Nutrition Monitoring and Evaluation:      Food/Nutrient Intake Outcomes: Food and nutrient intake,Supplement intake  Physical Signs/Symptoms Outcomes: Biochemical data,GI status,Fluid status or edema,Weight    Discharge Planning:     Too soon to determine    John Villarreal RD, LDN   Contact: [phone]

## 2022-03-04 NOTE — DISCHARGE SUMMARY
[facility] Hematology & Oncology: Inpatient Hematology / Oncology Discharge Summary Note    Patient ID:  Ze Bacon  [id number]  14 y.o.  1955    Admit Date: 3/2/2022    Discharge Date: 03/05/22      Admission Diagnoses: Pancreatic cancer [facility]) [C25.9]  Admission for antineoplastic chemotherapy [Z51.11]  Ascites [R18.8]    Discharge Diagnoses:  Principal Diagnosis: <principal problem not specified>  Active Problems:    Pancreatic cancer ([address] (3/2/2022)      Ascites (3/2/2022)      Admission for antineoplastic chemotherapy (3/2/2022)        Hospital Course:  Mr. Chato Somers is a 79 yo male patient known to Dr. Gibran Mccain for metastatic pancreatic cancer that was admitted 3/2/22 from office for recurrent ascites and difficulty eating. He had repeat paracentesis 3/3 removing 4290 mls and also had Pleurex drain placed RLQ. GI was consulted for difficulty eating with weight loss. Patient reported to GI that he is actually not having any troubles swallowing at this time. He recalled about 4 weeks ago that he was having issues high up in his throat after eating solids or liquids but that went away after having paracentesis on 2/24 with  6230 mls fluid removed. On admission, he was only on Norco 5 prn but since admission and particularly after para he required IV dilaudid. PC consulted and pain now controlled with oral oxycodone 20 mg every 4 hours as needed. He knows not to take this with his Norco.  He has been educated on pleurex drain management. Plan with Dr. Gibran Mccain is for gem/abraxane outpatient. He is now ready for discharge. We will arrange for follow up with Dr. Gibran Mccain this week to start gem/abraxane. He has been educated to call with any worrisome symptoms.      Consults:  IP CONSULT TO INTERVENTIONAL RADIOLOGY  IP CONSULT TO GASTROENTEROLOGY  IP CONSULT TO PALLIATIVE CARE - PROVIDER    Pertinent Diagnostic Studies:   Labs:    Recent Labs     03/04/22  0308 03/03/22  0245 03/02/22  0930   WBC 2.6* 2.7* 3.8*   HGB 11.6* 11.4* 13.8   * 118* 153   ANEU 1.3* 1.4* 2.3      Recent Labs     22  0308 22  0245 22  0930    137 137   K 4.0 3.6 3.9    102 102   CO2 29 29 30   * 91 115*   BUN 17 18 17   CREA 0.61* 0.67* 0.90   CA 8.5 8.6 9.1   * 306* 398*   TP 5.2* 5.5* 6.6   ALB 1.8* 2.0* 2.7*   MG 2.2 2.3 2.4       Imaging:  None       OBJECTIVE:  Patient Vitals for the past 8 hrs:   BP Temp Pulse Resp SpO2   22 0700 122/77 97.8 °F (36.6 °C) 76 16 97 %   22 0257 103/67 98.2 °F (36.8 °C) 72  97 %     Temp (24hrs), Av.7 °F (36.5 °C), Min:97.3 °F (36.3 °C), Max:98.2 °F (36.8 °C)     07 -  1900  In: -   Out: 10 [Urine:10]    Physical Exam:  Constitutional: Well developed, well nourished, chronically ill appearing male in no acute distress, sitting comfortably on the bed. HEENT: Normocephalic and atraumatic. Oropharynx is clear, mucous membranes are moist.  Extraocular muscles are intact. Sclerae anicteric. Neck supple. Skin Warm and dry. No bruising and no rash noted. No erythema. No pallor. Respiratory Lungs are clear to auscultation bilaterally without wheezes, rales or rhonchi, normal air exchange without accessory muscle use. CVS Normal rate, regular rhythm and normal S1 and S2. No murmurs, gallops, or rubs. Abdomen Soft, mildly tender with mild distention and Pleurex intact, normoactive bowel sounds. Neuro Grossly nonfocal with no obvious sensory or motor deficits. MSK Normal range of motion in general.  No edema and no tenderness. Psych Appropriate mood and affect.         ASSESSMENT:    Active Problems:    Pancreatic cancer ([address] (3/2/2022)      Ascites (3/2/2022)      Admission for antineoplastic chemotherapy (3/2/2022)      Current Discharge Medication List      START taking these medications    Details   oxyCODONE IR (ROXICODONE) 20 mg immediate release tablet Take 1 Tablet by mouth every four (4) hours as needed for Pain for up to 30 days. Max Daily Amount: 120 mg.  Qty: 120 Tablet, Refills: 0  Start date: 3/5/2022, End date: 4/4/2022    Associated Diagnoses: Malignant neoplasm of body of pancreas ([address] Malignant neoplasm of lower third of esophagus ([address] Cancer associated pain      tamsulosin (FLOMAX) 0.4 mg capsule Take 1 Capsule by mouth daily. Qty: 30 Capsule, Refills: 2  Start date: 3/6/2022         CONTINUE these medications which have NOT CHANGED    Details   lactulose (CHRONULAC) 10 gram/15 mL solution Take 45 mL by mouth three (3) times daily. Qty: 480 mL, Refills: 0      promethazine (PHENERGAN) 25 mg tablet Take 1 Tablet by mouth every six (6) hours as needed for Nausea. Qty: 30 Tablet, Refills: 1    Associated Diagnoses: Nausea      ondansetron hcl (Zofran) 8 mg tablet Take 1 Tablet by mouth every eight (8) hours as needed for Nausea. Indications: nausea and vomiting caused by cancer drugs  Qty: 60 Tablet, Refills: 3    Associated Diagnoses: CINV (chemotherapy-induced nausea and vomiting)      diphenoxylate-atropine (LomotiL) 2.5-0.025 mg per tablet Take 1 Tablet by mouth four (4) times daily as needed for Diarrhea. Max Daily Amount: 4 Tablets. Qty: 90 Tablet, Refills: 1    Associated Diagnoses: Malignant neoplasm of lower third of esophagus ([address] Diarrhea, unspecified type      sildenafil citrate (Viagra) 100 mg tablet Take 1 Tablet by mouth as needed (erectile dysfunction). Qty: 30 Tablet, Refills: 5    Associated Diagnoses: Erectile dysfunction, unspecified erectile dysfunction type      !! DISABLED PLACARD (DISABLED PLACARD) DMV Use as directed for poor ambulation  Qty: 1 Each, Refills: 0    Associated Diagnoses: Poor tolerance for ambulation; Malignant neoplasm of lower third of esophagus ([address] Malignant neoplasm of body of pancreas (HCC)      omeprazole (PRILOSEC) 40 mg capsule Take 1 Capsule by mouth daily.   Qty: 30 Capsule, Refills: 5    Associated Diagnoses: Acute gastritis, presence of bleeding unspecified, unspecified gastritis type; Encounter for screening colonoscopy      famotidine (PEPCID) 20 mg tablet Take 1 Tablet by mouth two (2) times a day. Qty: 60 Tablet, Refills: 5    Associated Diagnoses: Acute gastritis, presence of bleeding unspecified, unspecified gastritis type; Encounter for screening colonoscopy      prochlorperazine (Compazine) 10 mg tablet Take 1 Tablet by mouth every six (6) hours as needed for Nausea. Indications: nausea and vomiting caused by cancer drugs, nausea and vomiting  Qty: 90 Tablet, Refills: 3    Associated Diagnoses: CINV (chemotherapy-induced nausea and vomiting)      potassium chloride (K-DUR, KLOR-CON) 20 mEq tablet Take 1 Tablet by mouth daily. Qty: 30 Tablet, Refills: 2      naproxen sodium (ALEVE) 220 mg cap Take  by mouth as needed. For back pain  Hold 5 days for procedure per anesthesia protocol  Indications: pain    Associated Diagnoses: Chronic left-sided low back pain, with sciatica presence unspecified      !! DISABLED PLACARD (DISABLED PLACARD) DMV Handicap Placard, for poor ambuation. Qty: 1 Each, Refills: 0    Associated Diagnoses: Poor tolerance for ambulation       !! - Potential duplicate medications found. Please discuss with provider. STOP taking these medications       HYDROcodone-acetaminophen (NORCO) 5-325 mg per tablet Comments:   Reason for Stopping:             DISPOSITION:  Follow-up Appointments   Procedures    FOLLOW UP VISIT Appointment in: One Week Please arrange follow up with Dr. Ben Guy one week. Please arrange follow up with Dr. Ben Guy one week. Standing Status:   Standing     Number of Occurrences:   1     Order Specific Question:   Appointment in     Answer: One Week         Over 30 minutes was spent in discharge planning and coordination of care.             Dmitri Lock NP  [facility] Hematology & Oncology  [address] [address]  Office : [phone]  Fax : [phone]

## 2022-03-04 NOTE — CONSULTS
Palliative Care    Patient: Gloria Aguilar MRN: [id number]  SSN: [id number]    YOB: 1955  Age: 78 y.o. Sex: male       Date of Request: 3/4/22  Date of Consult:  3/4/2022  Reason for Consult:  pain and symptom management  Requesting Physician: TRACI Barba     Assessment/Plan:     Principal Diagnosis:    Pain, abdomen  R10.9    Additional Diagnoses:   · Encounter for Palliative Care  Z51.5    Palliative Performance Scale (PPS):       Medical Decision Making:   Reviewed and summarized notes from admission to present. Discussed case with appropriate providers: TRACI Barba, RN [facility]  Reviewed laboratory and x-ray data from admission to present. Pt resting in bed, no visitors present. Mr Dago Machado said he requests pain medication when he experiences a pain score of 8 in his right lower abdomen. He has found that Dilaudid IV 1 mg brings his pain score down to a 2 and lasts 3 to 4 hours. He has not found Norco 5-325 effective, probably because it is 1/4 the strength of Dilaudid IV 1 mg. Have discontinued Norco and started oxycodone 20 mg q 4 hrs PRN. Encouraged Mr Dago Machado to take oxycodone before turning to IV Dilaudid, with goal of managing his pain without IV meds. Mr Dago Machado expressed concern that this new medication would conflict with the prescriptions for Norco 5 mg that he has received previously from his pain management physician for chronic back pain due to an accident. However, he agreed that he does not need to take the Norco right now as he is taking a larger dose of oxycodone. Once his opioid needs are known, he may benefit from a long-acting medication. Have made an appointment for him to see Palliative Care at the [facility] on his next visit there.         Will discuss findings with members of the interdisciplinary team.      Thank you for this referral.          .    Subjective:     History obtained from:  Patient, Care Provider and Chart    Chief Complaint: Abdominal pain    History of Present Illness:  Mr Negrito Daugherty is a 77 yo M with a PMH of malignant neoplasm of lower third of esophagus as well as malignant neoplasm of body of pancreas and other conditions listed below who was diagonosed in 2020 and is a pt of Dr Hola Vergara who was most recently on FOLFIRINOX, who is a direct admit for intractable abdominal pain and ascites. A Pleurx was placed this morning. Advance Directive: No       Code Status:  Full Code            Health Care Power of : No - Patient does not have a [address]. Past Medical History:   Diagnosis Date    Back pain     followed by pain management    Chronic pain     left-sided, lower back pain    CINV (chemotherapy-induced nausea and vomiting) 2021    Erectile dysfunction     Gastritis     GERD (gastroesophageal reflux disease)     daily medication    Hiatal hernia     \"medium\"    History of anemia     Hypertension     situational; has Amlodipine to take if systolic >069    Left bundle branch block (LBBB) on electrocardiogram 2021    Malignant neoplasm of body of pancreas ([address]     Malignant neoplasm of esophagus ([address] 2020    Morbid obesity (HCC)     Nausea     takes antiemetic med prn      Past Surgical History:   Procedure Laterality Date    HX APPENDECTOMY      HX CHOLECYSTECTOMY      HX COLONOSCOPY  2020    with EGD    HX VASCULAR ACCESS      IR PARACENTESIS ABD W IMAGE  2022     Family History   Problem Relation Age of Onset    Cancer Mother     No Known Problems Father     Cancer Sister       Social History     Tobacco Use    Smoking status: Former Smoker     Packs/day: 1.50     Years: 15.00     Pack years: 22.50     Quit date: 1974     Years since quittin.3    Smokeless tobacco: Never Used   Substance Use Topics    Alcohol use: Yes     Comment: socially     Prior to Admission medications    Medication Sig Start Date End Date Taking?  Authorizing Provider   lactulose (CHRONULAC) 10 gram/15 mL solution Take 45 mL by mouth three (3) times daily. Patient not taking: Reported on 3/2/2022 2/16/22   Hardeep Middleton MD   promethazine (PHENERGAN) 25 mg tablet Take 1 Tablet by mouth every six (6) hours as needed for Nausea. 2/16/22   Hardeep Middleton MD   ondansetron hcl (Zofran) 8 mg tablet Take 1 Tablet by mouth every eight (8) hours as needed for Nausea. Indications: nausea and vomiting caused by cancer drugs 2/16/22   Hardeep Middleton MD   diphenoxylate-atropine (LomotiL) 2.5-0.025 mg per tablet Take 1 Tablet by mouth four (4) times daily as needed for Diarrhea. Max Daily Amount: 4 Tablets. 11/17/21   Dionna Contreras NP   sildenafil citrate (Viagra) 100 mg tablet Take 1 Tablet by mouth as needed (erectile dysfunction). Patient not taking: Reported on 3/2/2022 8/30/21   Yanely Duckworth MD   DISABLED PLACARD (DISABLED [place]) DMV Use as directed for poor ambulation 8/30/21   Yanely Duckworth MD   omeprazole (PRILOSEC) 40 mg capsule Take 1 Capsule by mouth daily. 8/30/21   Yanely Duckworth MD   famotidine (PEPCID) 20 mg tablet Take 1 Tablet by mouth two (2) times a day. Patient not taking: Reported on 3/2/2022 8/30/21   Yanely Duckworth MD   prochlorperazine (Compazine) 10 mg tablet Take 1 Tablet by mouth every six (6) hours as needed for Nausea. Indications: nausea and vomiting caused by cancer drugs, nausea and vomiting 7/27/21   Beatrice Whelan NP   potassium chloride (K-DUR, KLOR-CON) 20 mEq tablet Take 1 Tablet by mouth daily. Patient not taking: Reported on 3/2/2022 6/1/21   Hradeep Middleton MD   HYDROcodone-acetaminophen [facility]) 5-325 mg per tablet Take 1 Tablet by mouth every six (6) hours as needed. 2/26/21   Provider, Historical   naproxen sodium (ALEVE) 220 mg cap Take  by mouth as needed.  For back pain  Hold 5 days for procedure per anesthesia protocol  Indications: pain    Provider, Historical   DISABLED PLACARD (DISABLED PLACARD) DMV Handicap Placard, for poor ambuation. 7/22/19   Dari Iyer MD       No Known Allergies     Review of Systems:  A comprehensive review of systems was negative except as noted above. Objective:     Visit Vitals  /77 (BP 1 Location: Left upper arm, BP Patient Position: At rest)   Pulse 76   Temp 97.8 °F (36.6 °C)   Resp 16   Ht 5' 3\" (1.6 m)   Wt 160 lb 10 oz (72.9 kg)   SpO2 97%   BMI 28.45 kg/m²        Physical Exam:    General:  Cooperative. No acute distress. Eyes:  Conjunctivae/corneas clear    Nose: Nares normal. Septum midline. Neck: Supple, symmetrical, trachea midline, no JVD   Lungs:   Clear to auscultation bilaterally, unlabored   Heart:  Regular rate and rhythm, no murmur    Abdomen:   Soft, non-tender, non-distended   Extremities: Normal, atraumatic, no cyanosis or edema   Skin: Skin color, texture, turgor normal. No rash or lesions.    Neurologic: Nonfocal   Psych: Alert and oriented      Assessment:     Hospital Problems  Date Reviewed: 3/2/2022          Codes Class Noted POA    [facility] ICD-10-CM: C25.9  ICD-9-CM: 157.9  3/2/2022 Unknown        Ascites ICD-10-CM: R18.8  ICD-9-CM: 789.59  3/2/2022 Unknown        Admission for antineoplastic chemotherapy ICD-10-CM: Z51.11  ICD-9-CM: V58.11  3/2/2022 Unknown              Signed By: Reed Junior NP     March 4, 2022

## 2022-03-05 LAB
ALBUMIN SERPL-MCNC: 1.9 G/DL (ref 3.2–4.6)
ALBUMIN/GLOB SERPL: 0.6 {RATIO} (ref 1.2–3.5)
ALP SERPL-CCNC: 308 U/L (ref 50–136)
ALT SERPL-CCNC: 18 U/L (ref 12–65)
ANION GAP SERPL CALC-SCNC: 3 MMOL/L (ref 7–16)
AST SERPL-CCNC: 24 U/L (ref 15–37)
BASOPHILS # BLD: 0 K/UL (ref 0–0.2)
BASOPHILS NFR BLD: 1 % (ref 0–2)
BILIRUB SERPL-MCNC: 0.5 MG/DL (ref 0.2–1.1)
BUN SERPL-MCNC: 14 MG/DL (ref 8–23)
CALCIUM SERPL-MCNC: 8.7 MG/DL (ref 8.3–10.4)
CHLORIDE SERPL-SCNC: 100 MMOL/L (ref 98–107)
CO2 SERPL-SCNC: 29 MMOL/L (ref 21–32)
CREAT SERPL-MCNC: 0.58 MG/DL (ref 0.8–1.5)
DIFFERENTIAL METHOD BLD: ABNORMAL
EOSINOPHIL # BLD: 0.1 K/UL (ref 0–0.8)
EOSINOPHIL NFR BLD: 5 % (ref 0.5–7.8)
ERYTHROCYTE [DISTWIDTH] IN BLOOD BY AUTOMATED COUNT: 14.3 % (ref 11.9–14.6)
GLOBULIN SER CALC-MCNC: 3.4 G/DL (ref 2.3–3.5)
GLUCOSE SERPL-MCNC: 116 MG/DL (ref 65–100)
HCT VFR BLD AUTO: 37 % (ref 41.1–50.3)
HGB BLD-MCNC: 11.8 G/DL (ref 13.6–17.2)
IMM GRANULOCYTES # BLD AUTO: 0 K/UL (ref 0–0.5)
IMM GRANULOCYTES NFR BLD AUTO: 1 % (ref 0–5)
LYMPHOCYTES # BLD: 0.3 K/UL (ref 0.5–4.6)
LYMPHOCYTES NFR BLD: 14 % (ref 13–44)
MAGNESIUM SERPL-MCNC: 2.3 MG/DL (ref 1.8–2.4)
MCH RBC QN AUTO: 29.8 PG (ref 26.1–32.9)
MCHC RBC AUTO-ENTMCNC: 31.9 G/DL (ref 31.4–35)
MCV RBC AUTO: 93.4 FL (ref 79.6–97.8)
MONOCYTES # BLD: 0.7 K/UL (ref 0.1–1.3)
MONOCYTES NFR BLD: 32 % (ref 4–12)
NEUTS SEG # BLD: 1 K/UL (ref 1.7–8.2)
NEUTS SEG NFR BLD: 47 % (ref 43–78)
NRBC # BLD: 0 K/UL (ref 0–0.2)
PLATELET # BLD AUTO: 127 K/UL (ref 150–450)
PMV BLD AUTO: 10.1 FL (ref 9.4–12.3)
POTASSIUM SERPL-SCNC: 4.4 MMOL/L (ref 3.5–5.1)
PROT SERPL-MCNC: 5.3 G/DL (ref 6.3–8.2)
RBC # BLD AUTO: 3.96 M/UL (ref 4.23–5.6)
SODIUM SERPL-SCNC: 132 MMOL/L (ref 136–145)
WBC # BLD AUTO: 2.1 K/UL (ref 4.3–11.1)

## 2022-03-05 PROCEDURE — 83735 ASSAY OF MAGNESIUM: CPT

## 2022-03-05 PROCEDURE — 80053 COMPREHEN METABOLIC PANEL: CPT

## 2022-03-05 PROCEDURE — 74011250637 HC RX REV CODE- 250/637: Performed by: NURSE PRACTITIONER

## 2022-03-05 PROCEDURE — 65270000029 HC RM PRIVATE

## 2022-03-05 PROCEDURE — 99232 SBSQ HOSP IP/OBS MODERATE 35: CPT | Performed by: INTERNAL MEDICINE

## 2022-03-05 PROCEDURE — 97161 PT EVAL LOW COMPLEX 20 MIN: CPT

## 2022-03-05 PROCEDURE — 2709999900 HC NON-CHARGEABLE SUPPLY

## 2022-03-05 PROCEDURE — 85025 COMPLETE CBC W/AUTO DIFF WBC: CPT

## 2022-03-05 PROCEDURE — 97530 THERAPEUTIC ACTIVITIES: CPT

## 2022-03-05 RX ORDER — OXYCODONE HYDROCHLORIDE 20 MG/1
20 TABLET ORAL
Qty: 120 TABLET | Refills: 0 | Status: SHIPPED | OUTPATIENT
Start: 2022-03-05 | End: 2022-03-07

## 2022-03-05 RX ORDER — TAMSULOSIN HYDROCHLORIDE 0.4 MG/1
0.4 CAPSULE ORAL DAILY
Qty: 30 CAPSULE | Refills: 2 | Status: SHIPPED | OUTPATIENT
Start: 2022-03-06 | End: 2022-04-06

## 2022-03-05 RX ORDER — OXYCODONE HYDROCHLORIDE 5 MG/1
10 TABLET ORAL
Status: DISCONTINUED | OUTPATIENT
Start: 2022-03-05 | End: 2022-03-07

## 2022-03-05 RX ADMIN — ONDANSETRON 8 MG: 4 TABLET, ORALLY DISINTEGRATING ORAL at 23:22

## 2022-03-05 RX ADMIN — ONDANSETRON 8 MG: 4 TABLET, ORALLY DISINTEGRATING ORAL at 10:17

## 2022-03-05 RX ADMIN — TAMSULOSIN HYDROCHLORIDE 0.4 MG: 0.4 CAPSULE ORAL at 10:04

## 2022-03-05 RX ADMIN — FAMOTIDINE 20 MG: 20 TABLET ORAL at 17:47

## 2022-03-05 RX ADMIN — OXYCODONE 10 MG: 5 TABLET ORAL at 23:22

## 2022-03-05 RX ADMIN — PROMETHAZINE HYDROCHLORIDE 25 MG: 25 TABLET ORAL at 12:49

## 2022-03-05 RX ADMIN — OXYCODONE 20 MG: 5 TABLET ORAL at 10:04

## 2022-03-05 RX ADMIN — OXYCODONE 20 MG: 5 TABLET ORAL at 01:28

## 2022-03-05 RX ADMIN — OXYCODONE 10 MG: 5 TABLET ORAL at 19:36

## 2022-03-05 RX ADMIN — FAMOTIDINE 20 MG: 20 TABLET ORAL at 10:04

## 2022-03-05 NOTE — PROGRESS NOTES
Patient Vitals for the past 12 hrs:   Temp Pulse Resp BP SpO2   03/05/22 1430 97.3 °F (36.3 °C) 94 -- 103/87 97 %   03/05/22 1034 97.4 °F (36.3 °C) 88 19 102/70 97 %   03/05/22 0808 97.4 °F (36.3 °C) 75 18 123/77 97 %     PRN oxycodone 20mg po given x's 1 for pain. PRN zofran 8mg odt given x's 1 for pt report of nausea. Orders to dc patient received. When this RN arrived at bedside to discuss dc plans w/ patient, patient informed RN that he's been vomiting.  (Emesis bag on bedside table with vomit)  Stated he thinks the pain medicine we are giving him is too strong and is making him sick. TRACI Ballesteros notified, and RN asked to hold of on dc to see how patient is. PRN oxy changed to 10mg q4hr prn. Pt continued to complain of nausea. PRN phenergan 25mg po given x's 1. When rounding on pt later in shift, pt stated his stomach felt like it was on fire and he may have taken too much of his own nausea meds. (compazine). RN educated pt on medication safety and that pt needs to only take what staff is giving him. Verbalized understanding. Pt also stated that he's felt this pain before and it was prior to having fluid drained from abd. RN accessed pleurx and 1000cc drained. TRACI Ballesteros notified of pt condition and instructed to not dc pt. PRN oxy 10mg po given x's 1 prior to end of this RNs shift.     Shift report given to shraddha Villalta RN

## 2022-03-05 NOTE — PROGRESS NOTES
Problem: Mobility Impaired (Adult and Pediatric)  Goal: *Acute Goals and Plan of Care (Insert Text)  Outcome: Progressing Towards Goal  Note: STG:  (1.)Mr. Gonzalez will move from supine to sit and sit to supine  with SUPERVISION-INDEPENDENT within 1-2 treatment day(s). (2.)Mr. Gonzalez will transfer from bed to chair and chair to bed with SUPERVISION using the least restrictive device within 1-2 treatment day(s). (3.)Mr. Gonzalez will ambulate with CGA-SBA for 225 feet with the least restrictive device within 1-2 treatment day(s). LTG:  (1.)Mr. Gonzalez will move from supine to sit and sit to supine  in bed with INDEPENDENT within 3-5 treatment day(s). (2.)Mr. Gonzalez will transfer from bed to chair and chair to bed with SUPERVISION-INDEPENDENT using the least restrictive device within 3-5 treatment day(s). (3.)Mr. Gonzalez will ambulate with STAND BY ASSIST-INDEPENDENT for 225-250 feet with the least restrictive device within 3-5 treatment day(s). ________________________________________________________________________________________________      PHYSICAL THERAPY: Initial Assessment, Daily Note, and AM 3/5/2022  INPATIENT:    Payor: Salvatore Hamilton / Plan: [address] [place] [address] HMO / Product Type: Managed Care Medicare /       NAME/AGE/GENDER: Mathieu Winkler is a 78 y.o. male   PRIMARY DIAGNOSIS: [facility] [C25.9]  Admission for antineoplastic chemotherapy [Z51.11]  Ascites [R18.8] <principal problem not specified> <principal problem not specified>        ICD-10: Treatment Diagnosis:    Difficulty in walking, Not elsewhere classified (R26.2)  Other abnormalities of gait and mobility (R26.89)   Precaution/Allergies:  Patient has no known allergies. ASSESSMENT:     Mr. Sherwin Thurston presents with decreased strength and endurance, secondary to above diagnosis. Pt supine on arrival. Pt performed supine to sit to stand. Pt ambulated in room and hallway. Pt returned supine.  Pt supine with needs in reach. Pt will benefit from PT services to maximize independence with functional mobility. This section established at most recent assessment   PROBLEM LIST (Impairments causing functional limitations):  Decreased Strength  Decreased Transfer Abilities  Decreased Ambulation Ability/Technique  Decreased Balance  Increased Pain  Decreased Activity Tolerance  Decreased Flexibility/Joint Mobility   INTERVENTIONS PLANNED: (Benefits and precautions of physical therapy have been discussed with the patient.)  Bed Mobility  Gait Training  Therapeutic Activites  Therapeutic Exercise/Strengthening  Transfer Training     TREATMENT PLAN: Frequency/Duration: daily for duration of hospital stay  Rehabilitation Potential For Stated Goals: Good     REHAB RECOMMENDATIONS (at time of discharge pending progress):    Placement: It is my opinion, based on this patient's performance to date, that Mr. Suha Jennings may benefit from [address] after discharge due to the functional deficits listed above that are likely to improve with skilled rehabilitation because he/she has multiple medical issues that affect his/her functional mobility in the community. Equipment:   None at this time              HISTORY:   History of Present Injury/Illness (Reason for Referral):  Pt admitted with above diagnosis. Past Medical History/Comorbidities:   Mr. Suha Jennings  has a past medical history of Back pain, Chronic pain, CINV (chemotherapy-induced nausea and vomiting) (4/20/2021), Erectile dysfunction, Gastritis, GERD (gastroesophageal reflux disease), Hiatal hernia, History of anemia, Hypertension, Left bundle branch block (LBBB) on electrocardiogram (02/05/2021), Malignant neoplasm of body of pancreas ([address] Malignant neoplasm of esophagus ([address] (12/07/2020), Morbid obesity ([address] and Nausea. He has no past medical history of Difficult intubation, Malignant hyperthermia due to anesthesia, or Pseudocholinesterase deficiency.    Chato Somers  has a past surgical history that includes hx cholecystectomy; hx appendectomy; hx colonoscopy (2020); hx vascular access; and ir paracentesis abd w image (2022). Social History/Living Environment:   Home Environment: Private residence  One/Two Story Residence: One story  Living Alone: No  Support Systems: Child(betsy)  Patient Expects to be Discharged to[de-identified] Home  Current DME Used/Available at Home: None  Prior Level of Function/Work/Activity:  Pt requires some assistance from son, sometimes ambulates with a walker. Number of Personal Factors/Comorbidities that affect the Plan of Care: 0: LOW COMPLEXITY   EXAMINATION:   Most Recent Physical Functioning:   Gross Assessment:  AROM: Generally decreased, functional  Strength: Generally decreased, functional  Sensation: Intact               Posture:  Posture (WDL): Within defined limits  Balance:  Sitting: Intact  Standing: Intact Bed Mobility:  Supine to Sit: Stand-by assistance  Sit to Supine: Stand-by assistance  Wheelchair Mobility:     Transfers:  Sit to Stand: Stand-by assistance  Stand to Sit: Stand-by assistance  Gait:     Distance (ft): 225 Feet (ft)  Ambulation - Level of Assistance: Contact guard assistance         Body Structures Involved:  Bones  Joints  Muscles  Ligaments Body Functions Affected:  Neuromusculoskeletal  Movement Related Activities and Participation Affected: Mobility   Number of elements that affect the Plan of Care: 4+: HIGH COMPLEXITY   CLINICAL PRESENTATION:   Presentation: Stable and uncomplicated: LOW COMPLEXITY   CLINICAL DECISION MAKIN [facility] [address] [address] AM-PAC 6 Clicks   Basic Mobility Inpatient Short Form  How much difficulty does the patient currently have. .. Unable A Lot A Little None   1. Turning over in bed (including adjusting bedclothes, sheets and blankets)? [] 1   [] 2   [x] 3   [] 4   2.   Sitting down on and standing up from a chair with arms ( e.g., wheelchair, bedside commode, etc.)   [] 1 [] 2   [x] 3   [] 4   3. Moving from lying on back to sitting on the side of the bed? [] 1   [] 2   [x] 3   [] 4   How much help from another person does the patient currently need. .. Total A Lot A Little None   4. Moving to and from a bed to a chair (including a wheelchair)? [] 1   [] 2   [x] 3   [] 4   5. Need to walk in hospital room? [] 1   [] 2   [x] 3   [] 4   6. Climbing 3-5 steps with a railing? [] 1   [] 2   [x] 3   [] 4   © 2007, Trustees of [address] [place] [address] [address] [address], under license to [employer]. All rights reserved      Score:  Initial: 18 Most Recent: X (Date: -- )    Interpretation of Tool:  Represents activities that are increasingly more difficult (i.e. Bed mobility, Transfers, Gait). Medical Necessity:     Skilled intervention continues to be required due to decreased mobility ability. Reason for Services/Other Comments:  Patient continues to require skilled intervention due to   Decreased mobility ability. .   Use of outcome tool(s) and clinical judgement create a POC that gives a: Clear prediction of patient's progress: LOW COMPLEXITY            TREATMENT:   (In addition to Assessment/Re-Assessment sessions the following treatments were rendered)   Pre-treatment Symptoms/Complaints:    Pain: Initial:      Post Session:  no complaints     Therapeutic Activity: (    25  minutes): Therapeutic activities including Bed transfers, Chair transfers, and Ambulation on level ground to improve mobility. Assessment    Braces/Orthotics/Lines/Etc:   O2 Device: None (Room air)  Treatment/Session Assessment:    Response to Treatment:  Pt agreeable to ambulate with therapy.   Interdisciplinary Collaboration:   Physical Therapist  Registered Nurse  After treatment position/precautions:   Supine in bed  Bed/Chair-wheels locked  Bed in low position  Caregiver at bedside  Call light within reach  RN notified  Family at bedside   Compliance with Program/Exercises: Compliant most of the time, Will assess as treatment progresses  Recommendations/Intent for next treatment session: \"Next visit will focus on advancements to more challenging activities and reduction in assistance provided\".   Total Treatment Duration:  PT Patient Time In/Time Out  Time In: 1125  Time Out: [facility], PT

## 2022-03-05 NOTE — PROGRESS NOTES
Patient is anticipated to discharge today. Plan for discharge is as follows:    Care Management Interventions  PCP Verified by CM: Yes (Dr. Álvaro Aden)  Mode of Transport at Discharge:  (family)  Transition of Care Consult (CM Consult): Discharge Planning  Support Systems: Child(betsy)  The Patient and/or Patient Representative was Provided with a Choice of Provider and Agrees with the Discharge Plan?: Yes  Freedom of Choice List was Provided with Basic Dialogue that Supports the Patient's Individualized Plan of Care/Goals, Treatment Preferences and Shares the Quality Data Associated with the Providers?: Yes  Discharge Location  Patient Expects to be Discharged to[de-identified] Home    Milestones and interventions have been entered.      Yusuf Delgado, ALEKSANDRASW    [place]    * [email or web address]

## 2022-03-06 LAB
ALBUMIN SERPL-MCNC: 1.8 G/DL (ref 3.2–4.6)
ALBUMIN/GLOB SERPL: 0.5 {RATIO} (ref 1.2–3.5)
ALP SERPL-CCNC: 325 U/L (ref 50–136)
ALT SERPL-CCNC: 20 U/L (ref 12–65)
ANION GAP SERPL CALC-SCNC: 3 MMOL/L (ref 7–16)
AST SERPL-CCNC: 25 U/L (ref 15–37)
BASOPHILS # BLD: 0 K/UL (ref 0–0.2)
BASOPHILS NFR BLD: 0 % (ref 0–2)
BILIRUB SERPL-MCNC: 0.5 MG/DL (ref 0.2–1.1)
BUN SERPL-MCNC: 11 MG/DL (ref 8–23)
CALCIUM SERPL-MCNC: 8.4 MG/DL (ref 8.3–10.4)
CHLORIDE SERPL-SCNC: 100 MMOL/L (ref 98–107)
CO2 SERPL-SCNC: 29 MMOL/L (ref 21–32)
CREAT SERPL-MCNC: 0.63 MG/DL (ref 0.8–1.5)
DIFFERENTIAL METHOD BLD: ABNORMAL
EOSINOPHIL # BLD: 0.1 K/UL (ref 0–0.8)
EOSINOPHIL NFR BLD: 4 % (ref 0.5–7.8)
ERYTHROCYTE [DISTWIDTH] IN BLOOD BY AUTOMATED COUNT: 14.6 % (ref 11.9–14.6)
GLOBULIN SER CALC-MCNC: 3.6 G/DL (ref 2.3–3.5)
GLUCOSE SERPL-MCNC: 111 MG/DL (ref 65–100)
HCT VFR BLD AUTO: 37 % (ref 41.1–50.3)
HGB BLD-MCNC: 12 G/DL (ref 13.6–17.2)
IMM GRANULOCYTES # BLD AUTO: 0 K/UL (ref 0–0.5)
IMM GRANULOCYTES NFR BLD AUTO: 0 % (ref 0–5)
LYMPHOCYTES # BLD: 0.4 K/UL (ref 0.5–4.6)
LYMPHOCYTES NFR BLD: 18 % (ref 13–44)
MAGNESIUM SERPL-MCNC: 2.3 MG/DL (ref 1.8–2.4)
MCH RBC QN AUTO: 30.2 PG (ref 26.1–32.9)
MCHC RBC AUTO-ENTMCNC: 32.4 G/DL (ref 31.4–35)
MCV RBC AUTO: 93.2 FL (ref 79.6–97.8)
MONOCYTES # BLD: 1 K/UL (ref 0.1–1.3)
MONOCYTES NFR BLD: 39 % (ref 4–12)
NEUTS SEG # BLD: 0.9 K/UL (ref 1.7–8.2)
NEUTS SEG NFR BLD: 39 % (ref 43–78)
NRBC # BLD: 0 K/UL (ref 0–0.2)
PLATELET # BLD AUTO: 127 K/UL (ref 150–450)
PMV BLD AUTO: 10 FL (ref 9.4–12.3)
POTASSIUM SERPL-SCNC: 4.4 MMOL/L (ref 3.5–5.1)
PROT SERPL-MCNC: 5.4 G/DL (ref 6.3–8.2)
RBC # BLD AUTO: 3.97 M/UL (ref 4.23–5.6)
SODIUM SERPL-SCNC: 132 MMOL/L (ref 136–145)
WBC # BLD AUTO: 2.4 K/UL (ref 4.3–11.1)

## 2022-03-06 PROCEDURE — 85025 COMPLETE CBC W/AUTO DIFF WBC: CPT

## 2022-03-06 PROCEDURE — 80053 COMPREHEN METABOLIC PANEL: CPT

## 2022-03-06 PROCEDURE — 74011250637 HC RX REV CODE- 250/637: Performed by: NURSE PRACTITIONER

## 2022-03-06 PROCEDURE — 65270000029 HC RM PRIVATE

## 2022-03-06 PROCEDURE — 74011250636 HC RX REV CODE- 250/636: Performed by: NURSE PRACTITIONER

## 2022-03-06 PROCEDURE — 36591 DRAW BLOOD OFF VENOUS DEVICE: CPT

## 2022-03-06 PROCEDURE — 99232 SBSQ HOSP IP/OBS MODERATE 35: CPT | Performed by: INTERNAL MEDICINE

## 2022-03-06 PROCEDURE — 83735 ASSAY OF MAGNESIUM: CPT

## 2022-03-06 RX ORDER — HYDROMORPHONE HYDROCHLORIDE 4 MG/1
4 TABLET ORAL
Status: DISCONTINUED | OUTPATIENT
Start: 2022-03-06 | End: 2022-03-07 | Stop reason: HOSPADM

## 2022-03-06 RX ADMIN — TAMSULOSIN HYDROCHLORIDE 0.4 MG: 0.4 CAPSULE ORAL at 08:40

## 2022-03-06 RX ADMIN — FAMOTIDINE 20 MG: 20 TABLET ORAL at 17:30

## 2022-03-06 RX ADMIN — OXYCODONE 10 MG: 5 TABLET ORAL at 03:58

## 2022-03-06 RX ADMIN — HYDROMORPHONE HYDROCHLORIDE 4 MG: 4 TABLET ORAL at 09:42

## 2022-03-06 RX ADMIN — HYDROMORPHONE HYDROCHLORIDE 4 MG: 4 TABLET ORAL at 13:19

## 2022-03-06 RX ADMIN — HYDROMORPHONE HYDROCHLORIDE 1 MG: 1 INJECTION, SOLUTION INTRAMUSCULAR; INTRAVENOUS; SUBCUTANEOUS at 23:23

## 2022-03-06 RX ADMIN — PROMETHAZINE HYDROCHLORIDE 25 MG: 25 TABLET ORAL at 19:58

## 2022-03-06 RX ADMIN — PROMETHAZINE HYDROCHLORIDE 25 MG: 25 TABLET ORAL at 08:40

## 2022-03-06 RX ADMIN — HYDROMORPHONE HYDROCHLORIDE 4 MG: 4 TABLET ORAL at 17:30

## 2022-03-06 RX ADMIN — FAMOTIDINE 20 MG: 20 TABLET ORAL at 08:41

## 2022-03-06 RX ADMIN — HYDROMORPHONE HYDROCHLORIDE 4 MG: 4 TABLET ORAL at 21:08

## 2022-03-06 RX ADMIN — POTASSIUM CHLORIDE 20 MEQ: 20 TABLET, EXTENDED RELEASE ORAL at 08:41

## 2022-03-06 NOTE — PROGRESS NOTES
END OF SHIFT NOTE:    Intake/Output  03/05 1901 - 03/06 0700  In: 300 [P.O.:300]  Out: 150 [Urine:150]   Voiding: YES  Catheter: NO  Drain: pleurex  Drain PleurX peritoneal drain 03/03/22 Right; Outer Abdomen (Active)   Site Assessment Other (Comment) 03/06/22 0155   Dressing Status Clean, dry, & intact 03/06/22 0155   Status Clamped 03/06/22 0155   Drainage Description Yellow 03/05/22 1610   Intake (ml) 0 ml 03/05/22 2000   Output (ml) 0 ml 03/05/22 2000               Stool: 1 occurrence   Stool Assessment  Stool Color: Alinda Jayy (03/05/22 2000)  Stool Appearance: Soft (03/05/22 2000)  Stool Amount: Small (03/05/22 2000)  Stool Source/Status: Rectum (03/05/22 2000)    Emesis:  0 occurrences. VITAL SIGNS  Patient Vitals for the past 12 hrs:   Temp Pulse Resp BP SpO2   03/06/22 0337 97.2 °F (36.2 °C) 84 16 107/73 97 %   03/05/22 2332 97.3 °F (36.3 °C) 85 16 128/82 97 %   03/05/22 1924 98.2 °F (36.8 °C) 84 17 115/88 96 %       Pain Assessment  Pain 1  Pain Scale 1: Numeric (0 - 10) (03/06/22 0358)  Pain Intensity 1: 6 (03/06/22 0358)  Patient Stated Pain Goal: 0 (03/06/22 0358)  Pain Reassessment 1: Patient resting w/respiratory rate greater than 10 (03/06/22 0018)  Pain Onset 1: pta (03/06/22 0358)  Pain Location 1: Abdomen (03/06/22 0358)  Pain Orientation 1: Mid (03/06/22 0358)  Pain Description 1: Aching; Sharp (03/06/22 0358)  Pain Intervention(s) 1: Medication (see MAR); Rest (03/06/22 0358)    Ambulating  Yes    Additional Information:   Medicated x 2 w/ prn Oxyir overnight. x1 po Zofran for nausea;denies further nausea this AM;no vomiting noted/reported. Confirmed w/ pt if he was taught how to drain pleurex cath;states \"I was told this & that\";inquired if he watched yesterday when his pleurex was drained \" I watched as much as I can\". May need further teaching when pt more awake as he has been sleeping most of the shift. Shift report given to oncoming nurse LOBITO Storm at the bedside.     Marshal Gutierrez, RN

## 2022-03-06 NOTE — PROGRESS NOTES
[facility] Hematology & Oncology        Inpatient Hematology / Oncology Progress Note      Admission Date: 3/2/2022 12:00 PM  Reason for Admission/Hospital Course: Pancreatic cancer [facility]) [C25.9]  Admission for antineoplastic chemotherapy [Z51.11]  Ascites [R18.8]      24 Hour Events:  Pain controlled better on oxycodone 20 mg PRN  Plans for gem/abraxane outpatient  Discussed feeling okay to go home with [facility] services for purposes of helping with abdominal drain     ROS:  Constitutional:  negative for fever, chills. +weakness, malaise, fatigue. CV: negative for chest pain, palpitations, edema. Respiratory: negative for dyspnea, cough, wheezing. GI:  negative for nausea, diarrhea. +abdominal pain - improved. 10 point review of systems is otherwise negative with the exception of the elements mentioned above in the HPI. No Known Allergies    OBJECTIVE:  Patient Vitals for the past 8 hrs:   BP Temp Pulse Resp SpO2   22 0337 107/73 97.2 °F (36.2 °C) 84 16 97 %   22 2332 128/82 97.3 °F (36.3 °C) 85 16 97 %     Temp (24hrs), Av.5 °F (36.4 °C), Min:97.2 °F (36.2 °C), Max:98.2 °F (36.8 °C)    No intake/output data recorded. Physical Exam:  Constitutional: Well developed,  male in no acute distress, sitting comfortably in the hospital bed. HEENT: Normocephalic and atraumatic. Oropharynx is clear, mucous membranes are moist.  Extraocular muscles are intact. Sclerae anicteric   Skin Warm and dry. No bruising and no rash noted. No erythema. No pallor. Respiratory Lungs are clear to auscultation bilaterally without wheezes, rales or rhonchi, normal air exchange without accessory muscle use. CVS Normal rate, regular rhythm and normal S1 and S2. No murmurs, gallops, or rubs. Abdomen Soft, tender and mildly distended due to ascites, normoactive bowel sounds. Neuro Grossly nonfocal with no obvious sensory or motor deficits.    MSK Normal range of motion in general.  No edema and no tenderness. Psych Appropriate mood and affect.         Labs:      Recent Labs     03/06/22 0227 03/05/22 0345 03/04/22  0308   WBC 2.4* 2.1* 2.6*   RBC 3.97* 3.96* 3.87*   HGB 12.0* 11.8* 11.6*   HCT 37.0* 37.0* 36.1*   MCV 93.2 93.4 93.3   MCH 30.2 29.8 30.0   MCHC 32.4 31.9 32.1   RDW 14.6 14.3 14.3   * 127* 116*   GRANS 39* 47 47   LYMPH 18 14 17   MONOS 39* 32* 32*   EOS 4 5 4   BASOS 0 1 0   IG 0 1 0   DF AUTOMATED AUTOMATED AUTOMATED   ANEU 0.9* 1.0* 1.3*   ABL 0.4* 0.3* 0.5   ABM 1.0 0.7 0.9   JONNY 0.1 0.1 0.1   ABB 0.0 0.0 0.0   AIG 0.0 0.0 0.0        Recent Labs     03/06/22 0227 03/05/22 0345 03/04/22  0308   * 132* 136   K 4.4 4.4 4.0    100 104   CO2 29 29 29   AGAP 3* 3* 3*   * 116* 104*   BUN 11 14 17   CREA 0.63* 0.58* 0.61*   GFRAA >60 >60 >60   GFRNA >60 >60 >60   CA 8.4 8.7 8.5   * 308* 301*   TP 5.4* 5.3* 5.2*   ALB 1.8* 1.9* 1.8*   GLOB 3.6* 3.4 3.4   AGRAT 0.5* 0.6* 0.5*   MG 2.3 2.3 2.2           Medications:  Current Facility-Administered Medications   Medication Dose Route Frequency    oxyCODONE IR (ROXICODONE) tablet 10 mg  10 mg Oral Q4H PRN    tamsulosin (FLOMAX) capsule 0.4 mg  0.4 mg Oral DAILY    HYDROmorphone (DILAUDID) injection 1 mg  1 mg IntraVENous Q4H PRN    enoxaparin (LOVENOX) injection 40 mg  40 mg SubCUTAneous Q24H    diphenoxylate-atropine (LOMOTIL) tablet 1 Tablet  1 Tablet Oral QID PRN    famotidine (PEPCID) tablet 20 mg  20 mg Oral BID    ondansetron (ZOFRAN ODT) tablet 8 mg  8 mg Oral Q8H PRN    potassium chloride (K-DUR, KLOR-CON M20) SR tablet 20 mEq  20 mEq Oral DAILY    prochlorperazine (COMPAZINE) tablet 10 mg  10 mg Oral Q6H PRN    promethazine (PHENERGAN) tablet 25 mg  25 mg Oral Q6H PRN    alum-mag hydroxide-simeth (MYLANTA) oral suspension 30 mL  30 mL Oral Q4H PRN         ASSESSMENT:    Problem List  Date Reviewed: 3/2/2022          Codes Class Noted    Severe protein-calorie malnutrition (HCC) ICD-10-CM: E43  ICD-9-CM: 823  3/4/2022        Pancreatic cancer ([facility] ICD-10-CM: C25.9  ICD-9-CM: 157.9  3/2/2022        Ascites ICD-10-CM: R18.8  ICD-9-CM: 789.59  3/2/2022        Admission for antineoplastic chemotherapy ICD-10-CM: Z51.11  ICD-9-CM: V58.11  3/2/2022        Hypomagnesemia ICD-10-CM: E83.42  ICD-9-CM: 275.2  4/23/2021        Hypokalemia ICD-10-CM: E87.6  ICD-9-CM: 276.8  4/20/2021        CINV (chemotherapy-induced nausea and vomiting) ICD-10-CM: R11.2, T45.1X5A  ICD-9-CM: 787.01, E933.1  4/20/2021        Dehydration ICD-10-CM: E86.0  ICD-9-CM: 276.51  4/12/2021        Other neutropenia ([facility] ICD-10-CM: D70.8  ICD-9-CM: 288.09  3/9/2021        Malignant neoplasm of lower third of esophagus (HCC) ICD-10-CM: C15.5  ICD-9-CM: 150.5  1/5/2021        Malignant neoplasm of body of pancreas ([facility] ICD-10-CM: C25.1  ICD-9-CM: 157.1  1/5/2021        Severe obesity ([facility] ICD-10-CM: E66.01  ICD-9-CM: 278.01  12/10/2020        Elevated glucose ICD-10-CM: R73.09  ICD-9-CM: 790.29  7/22/2019        Anemia ICD-10-CM: D64.9  ICD-9-CM: 285.9  7/22/2019        Chronic left-sided low back pain ICD-10-CM: M54.50, G89.29  ICD-9-CM: 724.2, 338.29  7/22/2019                PLAN:  Metastatic pancreatic cancer sp 24 cycles FOLFIRINOX   3/3 CEA and CA 19.9 trending up-plans for gem/abraxane outpatient    Pain  3/3 needing IV pain meds post procedure. Continue to monitor  3/4 pain not controlled with oral med. PC consulted. DC when pain controlled. 3/5 Pain better on oxycodone 20 mg every 4 hours PRN. Ascites recurrent  3/3 para this am removed 4290 ml. Pleurex drain placed  3/4 nursing ordered placed to educate patient on home drainage    Difficulty eating  3/3 GI consulted (EGD 12/2020 with malignant esophageal stricture)  3/4 Patient denies difficulty with eating. Decline GI offer for EGD. Update: We discussed discharge and he was agreeable to plan.  Orders were placed and then patient had some episodes of vomiting after rounding team left. He stated he felt like the prescribed pain medication was actually too strong and made him \"woozy\" and nauseated. We de-escalated the dose to 10 mg every 4 hours PRN and he will eat with taking pain medications. We will re-evaluate tomorrow for discharge. Goals and plan of care reviewed with the patient. All questions answered to the best of our ability.             Mani Byers NP   [facility] Hematology & Oncology  [address] [place] [address]  Office : [phone]  Fax : [phone]

## 2022-03-06 NOTE — PROGRESS NOTES
[facility] Hematology & Oncology        Inpatient Hematology / Oncology Progress Note      Admission Date: 3/2/2022 12:00 PM  Reason for Admission/Hospital Course: Pancreatic cancer [facility]) [C25.9]  Admission for antineoplastic chemotherapy [Z51.11]  Ascites [R18.8]      24 Hour Events:  Pain not controlled on 10 mg PRN and it makes him nauseous and \"woozy\"  Plans for gem/abraxane outpatient  Weakness ongoing - encouraged working with PT and up out of bed      ROS:  Constitutional:  negative for fever, chills. +weakness, malaise, fatigue. CV: negative for chest pain, palpitations, edema. Respiratory: negative for dyspnea, cough, wheezing. GI:  negative for diarrhea. +abdominal pain - improved, nausae. 10 point review of systems is otherwise negative with the exception of the elements mentioned above in the HPI. No Known Allergies    OBJECTIVE:  Patient Vitals for the past 8 hrs:   BP Temp Pulse Resp SpO2   22 0735 103/60 98.1 °F (36.7 °C) 95 16 97 %   22 0337 107/73 97.2 °F (36.2 °C) 84 16 97 %     Temp (24hrs), Av.6 °F (36.4 °C), Min:97.2 °F (36.2 °C), Max:98.2 °F (36.8 °C)    No intake/output data recorded. Physical Exam:  Constitutional: Well developed, chronically-ill appearing  male in no acute distress, sitting comfortably in the hospital bed. HEENT: Normocephalic and atraumatic. Oropharynx is clear, mucous membranes are moist.  Extraocular muscles are intact. Sclerae anicteric   Skin Warm and dry. No bruising and no rash noted. No erythema. No pallor. Respiratory Lungs are clear to auscultation bilaterally without wheezes, rales or rhonchi, normal air exchange without accessory muscle use. CVS Normal rate, regular rhythm and normal S1 and S2. No murmurs, gallops, or rubs. Abdomen Soft, tender and mildly distended due to ascites, normoactive bowel sounds. Neuro Grossly nonfocal with no obvious sensory or motor deficits.    MSK Normal range of motion in general.  No edema and no tenderness. Psych Appropriate mood and affect.         Labs:      Recent Labs     03/06/22 0227 03/05/22 0345 03/04/22  0308   WBC 2.4* 2.1* 2.6*   RBC 3.97* 3.96* 3.87*   HGB 12.0* 11.8* 11.6*   HCT 37.0* 37.0* 36.1*   MCV 93.2 93.4 93.3   MCH 30.2 29.8 30.0   MCHC 32.4 31.9 32.1   RDW 14.6 14.3 14.3   * 127* 116*   GRANS 39* 47 47   LYMPH 18 14 17   MONOS 39* 32* 32*   EOS 4 5 4   BASOS 0 1 0   IG 0 1 0   DF AUTOMATED AUTOMATED AUTOMATED   ANEU 0.9* 1.0* 1.3*   ABL 0.4* 0.3* 0.5   ABM 1.0 0.7 0.9   JONNY 0.1 0.1 0.1   ABB 0.0 0.0 0.0   AIG 0.0 0.0 0.0        Recent Labs     03/06/22 0227 03/05/22 0345 03/04/22  0308   * 132* 136   K 4.4 4.4 4.0    100 104   CO2 29 29 29   AGAP 3* 3* 3*   * 116* 104*   BUN 11 14 17   CREA 0.63* 0.58* 0.61*   GFRAA >60 >60 >60   GFRNA >60 >60 >60   CA 8.4 8.7 8.5   * 308* 301*   TP 5.4* 5.3* 5.2*   ALB 1.8* 1.9* 1.8*   GLOB 3.6* 3.4 3.4   AGRAT 0.5* 0.6* 0.5*   MG 2.3 2.3 2.2           Medications:  Current Facility-Administered Medications   Medication Dose Route Frequency    HYDROmorphone (DILAUDID) tablet 4 mg  4 mg Oral Q4H PRN    oxyCODONE IR (ROXICODONE) tablet 10 mg  10 mg Oral Q4H PRN    tamsulosin (FLOMAX) capsule 0.4 mg  0.4 mg Oral DAILY    HYDROmorphone (DILAUDID) injection 1 mg  1 mg IntraVENous Q4H PRN    enoxaparin (LOVENOX) injection 40 mg  40 mg SubCUTAneous Q24H    diphenoxylate-atropine (LOMOTIL) tablet 1 Tablet  1 Tablet Oral QID PRN    famotidine (PEPCID) tablet 20 mg  20 mg Oral BID    ondansetron (ZOFRAN ODT) tablet 8 mg  8 mg Oral Q8H PRN    potassium chloride (K-DUR, KLOR-CON M20) SR tablet 20 mEq  20 mEq Oral DAILY    prochlorperazine (COMPAZINE) tablet 10 mg  10 mg Oral Q6H PRN    promethazine (PHENERGAN) tablet 25 mg  25 mg Oral Q6H PRN    alum-mag hydroxide-simeth (MYLANTA) oral suspension 30 mL  30 mL Oral Q4H PRN         ASSESSMENT:    Problem List  Date Reviewed: 3/2/2022 Codes Class Noted    Severe protein-calorie malnutrition ([facility] ICD-10-CM: E43  ICD-9-CM: 438  3/4/2022        Pancreatic cancer ([facility] ICD-10-CM: C25.9  ICD-9-CM: 157.9  3/2/2022        Ascites ICD-10-CM: R18.8  ICD-9-CM: 789.59  3/2/2022        Admission for antineoplastic chemotherapy ICD-10-CM: Z51.11  ICD-9-CM: V58.11  3/2/2022        Hypomagnesemia ICD-10-CM: E83.42  ICD-9-CM: 275.2  4/23/2021        Hypokalemia ICD-10-CM: E87.6  ICD-9-CM: 276.8  4/20/2021        CINV (chemotherapy-induced nausea and vomiting) ICD-10-CM: R11.2, T45.1X5A  ICD-9-CM: 787.01, E933.1  4/20/2021        Dehydration ICD-10-CM: E86.0  ICD-9-CM: 276.51  4/12/2021        Other neutropenia ([facility] ICD-10-CM: D70.8  ICD-9-CM: 288.09  3/9/2021        Malignant neoplasm of lower third of esophagus (HCC) ICD-10-CM: C15.5  ICD-9-CM: 150.5  1/5/2021        Malignant neoplasm of body of pancreas ([facility] ICD-10-CM: C25.1  ICD-9-CM: 157.1  1/5/2021        Severe obesity ([facility] ICD-10-CM: E66.01  ICD-9-CM: 278.01  12/10/2020        Elevated glucose ICD-10-CM: R73.09  ICD-9-CM: 790.29  7/22/2019        Anemia ICD-10-CM: D64.9  ICD-9-CM: 285.9  7/22/2019        Chronic left-sided low back pain ICD-10-CM: M54.50, G89.29  ICD-9-CM: 724.2, 338.29  7/22/2019                PLAN:  Metastatic pancreatic cancer sp 24 cycles FOLFIRINOX   3/3 CEA and CA 19.9 trending up-plans for gem/abraxane outpatient    Pain  3/3 needing IV pain meds post procedure. Continue to monitor  3/4 pain not controlled with oral med. PC consulted. DC when pain controlled. 3/6 nausea and dizziness with oxycodone 20 mg yesterday, decreased to 10 mg and still doesn't like the way it makes him feel and is not real helpful as well. Will stop oxycodone and switch to oral Dilaudid 4 mg every 4 hours PRN. Ascites recurrent  3/3 para this am removed 4290 ml.  Pleurex drain placed  3/4 nursing ordered placed to educate patient on home drainage    Difficulty eating  3/3 GI consulted (EGD 12/2020 with malignant esophageal stricture)  3/4 Patient denies difficulty with eating. Decline GI offer for EGD. Weakness  3/6 Encouraged OOB and working with PT. Will need HH PT at discharge. Goals and plan of care reviewed with the patient. All questions answered to the best of our ability. Discharge when he is tolerating pain medication and it adequately controls his pain.              Erik Aleman NP   [facility] Hematology & Oncology  [address] [place] [address]  Office : [phone]  Fax : [phone]

## 2022-03-07 ENCOUNTER — HOME HEALTH ADMISSION (OUTPATIENT)
Dept: HOME HEALTH SERVICES | Facility: HOME HEALTH | Age: 67
End: 2022-03-07
Payer: MEDICARE

## 2022-03-07 VITALS
BODY MASS INDEX: 27.36 KG/M2 | OXYGEN SATURATION: 98 % | TEMPERATURE: 98 F | HEART RATE: 87 BPM | HEIGHT: 63 IN | RESPIRATION RATE: 20 BRPM | WEIGHT: 154.4 LBS | DIASTOLIC BLOOD PRESSURE: 85 MMHG | SYSTOLIC BLOOD PRESSURE: 128 MMHG

## 2022-03-07 LAB
ALBUMIN SERPL-MCNC: 1.8 G/DL (ref 3.2–4.6)
ALBUMIN/GLOB SERPL: 0.5 {RATIO} (ref 1.2–3.5)
ALP SERPL-CCNC: 340 U/L (ref 50–136)
ALT SERPL-CCNC: 20 U/L (ref 12–65)
ANION GAP SERPL CALC-SCNC: 3 MMOL/L (ref 7–16)
AST SERPL-CCNC: 25 U/L (ref 15–37)
BASOPHILS # BLD: 0 K/UL (ref 0–0.2)
BASOPHILS NFR BLD: 0 % (ref 0–2)
BILIRUB SERPL-MCNC: 0.5 MG/DL (ref 0.2–1.1)
BUN SERPL-MCNC: 13 MG/DL (ref 8–23)
CALCIUM SERPL-MCNC: 8.9 MG/DL (ref 8.3–10.4)
CHLORIDE SERPL-SCNC: 99 MMOL/L (ref 98–107)
CO2 SERPL-SCNC: 29 MMOL/L (ref 21–32)
CREAT SERPL-MCNC: 0.65 MG/DL (ref 0.8–1.5)
DIFFERENTIAL METHOD BLD: ABNORMAL
EOSINOPHIL # BLD: 0.1 K/UL (ref 0–0.8)
EOSINOPHIL NFR BLD: 4 % (ref 0.5–7.8)
ERYTHROCYTE [DISTWIDTH] IN BLOOD BY AUTOMATED COUNT: 14.7 % (ref 11.9–14.6)
GLOBULIN SER CALC-MCNC: 3.8 G/DL (ref 2.3–3.5)
GLUCOSE SERPL-MCNC: 116 MG/DL (ref 65–100)
HCT VFR BLD AUTO: 38.1 % (ref 41.1–50.3)
HGB BLD-MCNC: 12.3 G/DL (ref 13.6–17.2)
IMM GRANULOCYTES # BLD AUTO: 0 K/UL (ref 0–0.5)
IMM GRANULOCYTES NFR BLD AUTO: 0 % (ref 0–5)
LYMPHOCYTES # BLD: 0.4 K/UL (ref 0.5–4.6)
LYMPHOCYTES NFR BLD: 16 % (ref 13–44)
MAGNESIUM SERPL-MCNC: 2.4 MG/DL (ref 1.8–2.4)
MCH RBC QN AUTO: 29.9 PG (ref 26.1–32.9)
MCHC RBC AUTO-ENTMCNC: 32.3 G/DL (ref 31.4–35)
MCV RBC AUTO: 92.7 FL (ref 79.6–97.8)
MONOCYTES # BLD: 1.2 K/UL (ref 0.1–1.3)
MONOCYTES NFR BLD: 42 % (ref 4–12)
NEUTS SEG # BLD: 1.1 K/UL (ref 1.7–8.2)
NEUTS SEG NFR BLD: 38 % (ref 43–78)
NRBC # BLD: 0 K/UL (ref 0–0.2)
PLATELET # BLD AUTO: 143 K/UL (ref 150–450)
PMV BLD AUTO: 9.9 FL (ref 9.4–12.3)
POTASSIUM SERPL-SCNC: 4.6 MMOL/L (ref 3.5–5.1)
PROT SERPL-MCNC: 5.6 G/DL (ref 6.3–8.2)
RBC # BLD AUTO: 4.11 M/UL (ref 4.23–5.6)
SODIUM SERPL-SCNC: 131 MMOL/L (ref 136–145)
WBC # BLD AUTO: 2.8 K/UL (ref 4.3–11.1)

## 2022-03-07 PROCEDURE — 74011250637 HC RX REV CODE- 250/637: Performed by: NURSE PRACTITIONER

## 2022-03-07 PROCEDURE — 99232 SBSQ HOSP IP/OBS MODERATE 35: CPT | Performed by: INTERNAL MEDICINE

## 2022-03-07 PROCEDURE — 83735 ASSAY OF MAGNESIUM: CPT

## 2022-03-07 PROCEDURE — APPSS180 APP SPLIT SHARED TIME > 60 MINUTES: Performed by: NURSE PRACTITIONER

## 2022-03-07 PROCEDURE — 85025 COMPLETE CBC W/AUTO DIFF WBC: CPT

## 2022-03-07 PROCEDURE — 74011250636 HC RX REV CODE- 250/636: Performed by: INTERNAL MEDICINE

## 2022-03-07 PROCEDURE — 80053 COMPREHEN METABOLIC PANEL: CPT

## 2022-03-07 RX ORDER — HYDROMORPHONE HYDROCHLORIDE 4 MG/1
4 TABLET ORAL
Qty: 84 TABLET | Refills: 0 | Status: SHIPPED | OUTPATIENT
Start: 2022-03-07 | End: 2022-04-06

## 2022-03-07 RX ORDER — HEPARIN 100 UNIT/ML
300 SYRINGE INTRAVENOUS AS NEEDED
Status: DISCONTINUED | OUTPATIENT
Start: 2022-03-07 | End: 2022-03-07 | Stop reason: HOSPADM

## 2022-03-07 RX ADMIN — HEPARIN 300 UNITS: 100 SYRINGE at 13:45

## 2022-03-07 RX ADMIN — TAMSULOSIN HYDROCHLORIDE 0.4 MG: 0.4 CAPSULE ORAL at 09:33

## 2022-03-07 RX ADMIN — FAMOTIDINE 20 MG: 20 TABLET ORAL at 09:33

## 2022-03-07 RX ADMIN — PROMETHAZINE HYDROCHLORIDE 25 MG: 25 TABLET ORAL at 09:33

## 2022-03-07 RX ADMIN — HYDROMORPHONE HYDROCHLORIDE 4 MG: 4 TABLET ORAL at 13:44

## 2022-03-07 RX ADMIN — HYDROMORPHONE HYDROCHLORIDE 4 MG: 4 TABLET ORAL at 05:37

## 2022-03-07 RX ADMIN — HYDROMORPHONE HYDROCHLORIDE 4 MG: 4 TABLET ORAL at 09:33

## 2022-03-07 NOTE — PROGRESS NOTES
SW met with oncology, patient anticipated to discharge today. Recommendation now for [facility] PT/RN. SW met with patient who was receptive, preference is for [facility].      Ashly Byers LMSW    [facility]    * [email or web address]

## 2022-03-07 NOTE — DISCHARGE SUMMARY
[facility] Hematology & Oncology: Inpatient Hematology / Oncology Discharge Summary Note    Patient ID:  Susanne Batista  [id number]  10 y.o.  1955    Admit Date: 3/2/2022    Discharge Date: 3/7/2022    Admission Diagnoses: Pancreatic cancer [facility]) [C25.9]  Admission for antineoplastic chemotherapy [Z51.11]  Ascites [R18.8]    Discharge Diagnoses:  Principal Diagnosis: <principal problem not specified>  Active Problems:    Pancreatic cancer ([facility] (3/2/2022)      Ascites (3/2/2022)      Admission for antineoplastic chemotherapy (3/2/2022)      Severe protein-calorie malnutrition ([facility] (3/4/2022)        Hospital Course:  Mr. Megan Rainey is a 77 yo male patient known to Dr. Rolando Naylor for metastatic pancreatic cancer that was admitted 3/2/22 from office for recurrent ascites and difficulty eating. He had repeat paracentesis 3/3 removing 4290 mls and also had Pleurex drain placed RLQ. GI was consulted for difficulty eating with weight loss. Patient reported to GI that he is actually not having any troubles swallowing at this time. He recalled about 4 weeks ago that he was having issues high up in his throat after eating solids or liquids but that went away after having paracentesis on 2/24 with  6230 mls fluid removed. On admission, he was on Norco 5 prn which was originally prescribed by pain management. Since admission and particularly after para he required IV dilaudid. Pain now controlled Dilaudid 4 mg q 4 hours prn. Plan with Dr. Rolando Naylor is for gem/abraxane outpatient. He is now ready for discharge. We will arrange for follow up with Dr. Rolando Naylor this week to start gem/abraxane. He has been educated to call with any worrisome symptoms. I have reviewed the patients controlled substance prescription history, as maintained in the [place] prescription monitoring program, so that the prescription(s) for a  controlled substance can be given.     Consults:  IP CONSULT TO INTERVENTIONAL RADIOLOGY  IP CONSULT TO GASTROENTEROLOGY  IP CONSULT TO PALLIATIVE CARE - PROVIDER    Pertinent Diagnostic Studies:   Labs:    Recent Labs     03/07/22  0438 03/06/22 0227 03/05/22  0345   WBC 2.8* 2.4* 2.1*   HGB 12.3* 12.0* 11.8*   * 127* 127*   ANEU 1.1* 0.9* 1.0*      Recent Labs     03/07/22  0438 03/06/22 0227 03/05/22  0345   * 132* 132*   K 4.6 4.4 4.4   CL 99 100 100   CO2 29 29 29   * 111* 116*   BUN 13 11 14   CREA 0.65* 0.63* 0.58*   CA 8.9 8.4 8.7   * 325* 308*   TP 5.6* 5.4* 5.3*   ALB 1.8* 1.8* 1.9*   MG 2.4 2.3 2.3     Current Discharge Medication List      START taking these medications    Details   HYDROmorphone (DILAUDID) 4 mg tablet Take 1 Tablet by mouth every four (4) hours as needed for Pain for up to 14 days. Max Daily Amount: 24 mg. Qty: 84 Tablet, Refills: 0  Start date: 3/7/2022, End date: 3/21/2022    Associated Diagnoses: Cancer associated pain      tamsulosin (FLOMAX) 0.4 mg capsule Take 1 Capsule by mouth daily. Qty: 30 Capsule, Refills: 2  Start date: 3/6/2022         CONTINUE these medications which have NOT CHANGED    Details   lactulose (CHRONULAC) 10 gram/15 mL solution Take 45 mL by mouth three (3) times daily. Qty: 480 mL, Refills: 0      promethazine (PHENERGAN) 25 mg tablet Take 1 Tablet by mouth every six (6) hours as needed for Nausea. Qty: 30 Tablet, Refills: 1    Associated Diagnoses: Nausea      ondansetron hcl (Zofran) 8 mg tablet Take 1 Tablet by mouth every eight (8) hours as needed for Nausea. Indications: nausea and vomiting caused by cancer drugs  Qty: 60 Tablet, Refills: 3    Associated Diagnoses: CINV (chemotherapy-induced nausea and vomiting)      diphenoxylate-atropine (LomotiL) 2.5-0.025 mg per tablet Take 1 Tablet by mouth four (4) times daily as needed for Diarrhea. Max Daily Amount: 4 Tablets. Qty: 90 Tablet, Refills: 1    Associated Diagnoses: Malignant neoplasm of lower third of esophagus ([address]  Diarrhea, unspecified type      sildenafil citrate (Viagra) 100 mg tablet Take 1 Tablet by mouth as needed (erectile dysfunction). Qty: 30 Tablet, Refills: 5    Associated Diagnoses: Erectile dysfunction, unspecified erectile dysfunction type      !! DISABLED PLACARD (DISABLED PLACARD) DMV Use as directed for poor ambulation  Qty: 1 Each, Refills: 0    Associated Diagnoses: Poor tolerance for ambulation; Malignant neoplasm of lower third of esophagus ([address] Malignant neoplasm of body of pancreas (HCC)      omeprazole (PRILOSEC) 40 mg capsule Take 1 Capsule by mouth daily. Qty: 30 Capsule, Refills: 5    Associated Diagnoses: Acute gastritis, presence of bleeding unspecified, unspecified gastritis type; Encounter for screening colonoscopy      famotidine (PEPCID) 20 mg tablet Take 1 Tablet by mouth two (2) times a day. Qty: 60 Tablet, Refills: 5    Associated Diagnoses: Acute gastritis, presence of bleeding unspecified, unspecified gastritis type; Encounter for screening colonoscopy      prochlorperazine (Compazine) 10 mg tablet Take 1 Tablet by mouth every six (6) hours as needed for Nausea. Indications: nausea and vomiting caused by cancer drugs, nausea and vomiting  Qty: 90 Tablet, Refills: 3    Associated Diagnoses: CINV (chemotherapy-induced nausea and vomiting)      potassium chloride (K-DUR, KLOR-CON) 20 mEq tablet Take 1 Tablet by mouth daily. Qty: 30 Tablet, Refills: 2      naproxen sodium (ALEVE) 220 mg cap Take  by mouth as needed. For back pain  Hold 5 days for procedure per anesthesia protocol  Indications: pain    Associated Diagnoses: Chronic left-sided low back pain, with sciatica presence unspecified      !! DISABLED PLACARD (DISABLED PLACARD) DMV Handicap Placard, for poor ambuation. Qty: 1 Each, Refills: 0    Associated Diagnoses: Poor tolerance for ambulation       !! - Potential duplicate medications found. Please discuss with provider.       STOP taking these medications       HYDROcodone-acetaminophen (NORCO) 5-325 mg per tablet Comments: Reason for Stopping:               OBJECTIVE:  Patient Vitals for the past 8 hrs:   BP Temp Pulse Resp SpO2   22 0831 113/87 97.3 °F (36.3 °C) 97 20 98 %     Temp (24hrs), Av.4 °F (36.3 °C), Min:97.3 °F (36.3 °C), Max:97.4 °F (36.3 °C)     07 -  1900  In: 120 [P.O.:120]  Out: 100 [Urine:100]    Physical Exam:  Constitutional: Well developed male in no acute distress, lying comfortably in bed. HEENT: Normocephalic and atraumatic. Oropharynx is clear, mucous membranes are moist.  Pupils are equal, round, and reactive to light. Extraocular muscles are intact. Sclerae anicteric. Skin Warm and dry. No bruising and no rash noted. No erythema. No pallor. Respiratory Lungs are clear to auscultation bilaterally without wheezes, rales or rhonchi, normal air exchange without accessory muscle use. CVS Normal rate, regular rhythm and normal S1 and S2. No murmurs, gallops, or rubs. Abdomen Soft, nontender and nondistended, normoactive bowel sounds. No palpable mass. No hepatosplenomegaly. Neuro Grossly nonfocal with no obvious sensory or motor deficits. MSK Normal range of motion in general.  No edema and no tenderness. Psych Appropriate mood and affect. ASSESSMENT:    Active Problems:    [facility] (3/2/2022)      Ascites (3/2/2022)      Admission for antineoplastic chemotherapy (3/2/2022)      Severe protein-calorie malnutrition ([facility] (3/4/2022)        DISPOSITION:  Follow-up Appointments   Procedures    FOLLOW UP VISIT Appointment in: One Week Please arrange follow up with Dr. Rios Lion one week. Please arrange follow up with Dr. Rios Lion one week. Standing Status:   Standing     Number of Occurrences:   1     Order Specific Question:   Appointment in     Answer:    One Week    FOLLOW UP VISIT Appointment in: One Week Dr. Rios Lion     Standing Status:   Standing     Number of Occurrences:   1     Order Specific Question:   Appointment in     Answer: One Week         Over 60 minutes was spent in discharge planning and coordination of care.             Ursula Gale NP  [facility] Hematology & Oncology  [id number] [place] [address]  Office : [phone]  Fax : [phone]

## 2022-03-07 NOTE — PROGRESS NOTES
Patient is anticipated to discharge today. Plan for discharge is as follows:    Care Management Interventions  PCP Verified by CM: Yes  Mode of Transport at Discharge: Self  Transition of Care Consult (CM Consult): Discharge [address]: Yes  Physical Therapy Consult: Yes  Support Systems: Child(betsy)  Confirm Follow Up Transport: Family  The Plan for Transition of Care is Related to the Following Treatment Goals : [facility] PT/RN  The Patient and/or Patient Representative was Provided with a Choice of Provider and Agrees with the Discharge Plan?: Yes  Name of the Patient Representative Who was Provided with a Choice of Provider and Agrees with the Discharge Plan: Alexander Hannon of Choice List was Provided with Basic Dialogue that Supports the Patient's Individualized Plan of Care/Goals, Treatment Preferences and Shares the Quality Data Associated with the Providers?: Yes  Discharge Location  Patient Expects to be Discharged to[de-identified] Home with home health    Milestones and interventions have been entered.      Riddhi Gonzalez, LEN     [facility]    * [email or web address]

## 2022-03-07 NOTE — PROGRESS NOTES
Discharge paperwork went over with and signed. Port flushed w/heparin and de-accessed for discharge. No other complaints.

## 2022-03-07 NOTE — PROGRESS NOTES
Drained 1125ml's of clear yellow fluid from pleurex .  Educated pt as I accessed ,drained and redressed pleurex site

## 2022-03-08 ENCOUNTER — HOME CARE VISIT (OUTPATIENT)
Dept: SCHEDULING | Facility: HOME HEALTH | Age: 67
End: 2022-03-08

## 2022-03-08 PROCEDURE — G0299 HHS/HOSPICE OF RN EA 15 MIN: HCPCS

## 2022-03-09 ENCOUNTER — HOME CARE VISIT (OUTPATIENT)
Dept: HOME HEALTH SERVICES | Facility: HOME HEALTH | Age: 67
End: 2022-03-09
Payer: MEDICARE

## 2022-03-09 ENCOUNTER — HOME CARE VISIT (OUTPATIENT)
Dept: SCHEDULING | Facility: HOME HEALTH | Age: 67
End: 2022-03-09
Payer: MEDICARE

## 2022-03-09 VITALS
DIASTOLIC BLOOD PRESSURE: 84 MMHG | SYSTOLIC BLOOD PRESSURE: 126 MMHG | TEMPERATURE: 96.7 F | HEART RATE: 84 BPM | RESPIRATION RATE: 16 BRPM | OXYGEN SATURATION: 96 %

## 2022-03-09 PROCEDURE — 400013 HH SOC

## 2022-03-09 PROCEDURE — 3331090001 HH PPS REVENUE CREDIT

## 2022-03-09 PROCEDURE — 3331090002 HH PPS REVENUE DEBIT

## 2022-03-09 PROCEDURE — G0151 HHCP-SERV OF PT,EA 15 MIN: HCPCS

## 2022-03-09 PROCEDURE — 400018 HH-NO PAY CLAIM PROCEDURE

## 2022-03-09 NOTE — Clinical Note
S: This patient is a 78year old male referred to home health PT services secondary to pancreatic cancer with recent acute hospitalization 3/2/2022 through 3/7/2022. He has returned home 3/7/2022 where he lives alone in a [place] home. Patient requires assistance for safety in bed mobility, transfers and gait without an AD. PMH includes - Back pain, Chronic pain - left-sided, lower back pain, CINV (chemotherapy-induced nausea and vomiting), Gastritis, GERD, Hiatal hernia, anemia, HTN, Left bundle branch block (LBBB), Malignant neoplasm of body of pancreas,  Malignant neoplasm of e sophagus, Morbid obesity, Nausea, APPENDECTOMY, CHOLECYSTECTOMY,    VASCULAR ACCESS, IR PARACENTESIS ABD W IMAGE       B:  PLOF:  I in all aspects of self care and mobility. A/R:  Pt. will benefit from continued skilled PT services to address deficits in strength, safety and functional mobility; patient verbalizes agreement with POC and goals. Dr. Liana Dove was contacted on 3/9/2022 to review pt's functional status, PT POC/goals and medications: MD follow-up appointment on 3/15/2022.

## 2022-03-10 ENCOUNTER — HOME CARE VISIT (OUTPATIENT)
Dept: SCHEDULING | Facility: HOME HEALTH | Age: 67
End: 2022-03-10
Payer: MEDICARE

## 2022-03-10 PROCEDURE — G0299 HHS/HOSPICE OF RN EA 15 MIN: HCPCS

## 2022-03-10 PROCEDURE — 3331090002 HH PPS REVENUE DEBIT

## 2022-03-10 PROCEDURE — 3331090001 HH PPS REVENUE CREDIT

## 2022-03-10 NOTE — Clinical Note
I saw patient during on call this evening, as he fell earlier today and states he busted his pleurex tube. He was referred to the ER and didn't go. Pt is having pain at the site of tube, and fluid is leaking at the site. I did drain and the bulb filled appropriately however it was still leaking at the site of the tube insertion. The stitch also appears pulled loose and the skin is pushed outward (picture taken and uploaded into Epic). I explained to the pt he needed to go to the ER, he refused multiple times. I explained I didn't think he understood the seriousness of the issue and he still refused to go to the ER. I called the sup on call, Yousuf Lopez and notified her of his refusal, I also called Chacha Strickland office and spoke with his on call NP, Jose Squires, she was in agreement patient should go to the ER but said if he refused there was nothing more I could do. She is going to make her team aware and ask them to follow up with the pt tomorrow. I did call the patient back to let him know what the  said, but I was unable to reach him.

## 2022-03-10 NOTE — Clinical Note
S-Patient admitted to [facility] on 03/02/2022 from office for recurrent ascites and difficulty eating. Pt known to Dr. Conner Yañez for metastatic pancreatic cancer. He had repeat paracentesis 3/3 removing 4290 mls and also had Pleurex drain placed RLQ. GI was consulted for difficulty eating with weight loss. Patient reported to GI that he is actually not having any t roubles swallowing at this time. He recalled about 4 weeks ago that he was having issues high up in his throat after eating solids or liquids but that went away after having paracentesis on 2/24 with 6230 mls fluid removed. On admission, he was on Norco 5 prn which was originally prescribed by pain management. Since admission and particularly after para he required IV dilaudid. Pain now controlled Dilaudid 4 mg q 4 hours prn. Pt had an US and fluoro guided tunneled peritoneal drain (PleurX) placement, RLQ on 03/03/22 d/t recurrent ascites. Drained 1125ml's of clear yellow fluid from pleurex, prior to d/c home. Discharging nurse educated pt as she accessed ,drained and redressed pleurex site. Pt was instructed to drain when he started to feel like fluid has re accumulated. Plan with Dr. Conner Yañez is for gem/abraxane outpatient. He will follow up with Dr. Conner Yañez this week to start gem/abraxane. Anticipating d/c 03/07/2022. B-Back pain, Chronic pain - left-sided, lower back pain, CINV (chemotherapy-induced nausea and vomiting), Gastritis, GERD, Hiatal hernia, anemia, HTN, Left bundle branch block (LBBB), Malignant neoplasm of body of pancreas, Malignant neoplasm of e sophagus, Morbid obesity, Nausea, APPENDECTOMY, CHOLECYSTECTOMY,   VASCULAR ACCESS, IR PARACENTESIS ABD W IMAGE     A-Patient admitted to [facility]. SN reveiwed homecare admission book and instructed patient on how to contact homecare agency, [facility], CMS, and [employer]. SC reviewed Plan of care and developed patient centered goals with input from the patient and caregiver. Pt and Cg verbalized understanding. Patient copy of medications updated and is current. Copy of med list left in home with admission book. Emergency care plan adn Emergency Preparedness Plan developed with patient/caregiver and copy left in the home.     R-1d1, 2wk2, 1wk6 for wound care as ordered, disease and medication management and education, skilled obs and assessment

## 2022-03-10 NOTE — Clinical Note
----- Message -----  From: Shila Miller RN  Sent: 3/10/2022   9:53 PM EST  To: Yeyo Randolph RN      I saw patient during on call this evening, as he fell earlier today and states he busted his pleurex tube. He was referred to the ER and didn't go. Pt is having pain at the site of tube, and fluid is leaking at the site. I did drain and the bulb filled appropriately however it was still leaking at the site of the tube insertion. The stitch also appears pulled loose and the skin is pushed outward (picture taken and uploaded into Epic). I explained to the pt he needed to go to the ER, he refused multiple times. I explained I didn't think he understood the seriousness of the issue and he still refused to go to the ER. I called the sup on call, Angelina Merchant and notified her of his refusal, I also called Bianca Strickland office and spoke with his on call NP, Adrián Fay, she was in agreement patient should go to the ER but said if he refused there was nothing more I could do. She is going to make her team aware and ask them to follow up with the pt tomorrow. I did call the patient back to let him know what the  said, but I was unable to reach him.

## 2022-03-11 PROCEDURE — 3331090002 HH PPS REVENUE DEBIT

## 2022-03-11 PROCEDURE — 3331090001 HH PPS REVENUE CREDIT

## 2022-03-12 PROCEDURE — 3331090001 HH PPS REVENUE CREDIT

## 2022-03-12 PROCEDURE — 3331090002 HH PPS REVENUE DEBIT

## 2022-03-13 PROCEDURE — 3331090001 HH PPS REVENUE CREDIT

## 2022-03-13 PROCEDURE — 3331090002 HH PPS REVENUE DEBIT

## 2022-03-14 VITALS
HEART RATE: 91 BPM | RESPIRATION RATE: 17 BRPM | TEMPERATURE: 97.9 F | SYSTOLIC BLOOD PRESSURE: 118 MMHG | OXYGEN SATURATION: 96 % | DIASTOLIC BLOOD PRESSURE: 66 MMHG

## 2022-03-14 PROCEDURE — 3331090001 HH PPS REVENUE CREDIT

## 2022-03-14 PROCEDURE — 3331090002 HH PPS REVENUE DEBIT

## 2022-03-15 ENCOUNTER — APPOINTMENT (OUTPATIENT)
Dept: GENERAL RADIOLOGY | Age: 67
DRG: 435 | End: 2022-03-15
Attending: NURSE PRACTITIONER
Payer: MEDICARE

## 2022-03-15 ENCOUNTER — HOSPITAL ENCOUNTER (OUTPATIENT)
Dept: INFUSION THERAPY | Age: 67
Discharge: HOME | End: 2022-03-15

## 2022-03-15 ENCOUNTER — HOME CARE VISIT (OUTPATIENT)
Dept: HOME HEALTH SERVICES | Facility: HOME HEALTH | Age: 67
End: 2022-03-15
Payer: MEDICARE

## 2022-03-15 ENCOUNTER — HOSPITAL ENCOUNTER (INPATIENT)
Age: 67
LOS: 22 days | Discharge: HOSPICE | DRG: 435 | End: 2022-04-06
Attending: EMERGENCY MEDICINE | Admitting: INTERNAL MEDICINE
Payer: MEDICARE

## 2022-03-15 ENCOUNTER — HOSPITAL ENCOUNTER (OUTPATIENT)
Dept: LAB | Age: 67
Discharge: HOME | End: 2022-03-15
Payer: MEDICARE
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
## 2022-03-15 DIAGNOSIS — C15.5 MALIGNANT NEOPLASM OF LOWER THIRD OF ESOPHAGUS (HCC): ICD-10-CM

## 2022-03-15 DIAGNOSIS — R06.00 DYSPNEA, UNSPECIFIED TYPE: ICD-10-CM

## 2022-03-15 DIAGNOSIS — R18.8 OTHER ASCITES: ICD-10-CM

## 2022-03-15 DIAGNOSIS — Z51.5 END OF LIFE CARE: ICD-10-CM

## 2022-03-15 DIAGNOSIS — R09.02 HYPOXIA: ICD-10-CM

## 2022-03-15 DIAGNOSIS — R06.02 SOB (SHORTNESS OF BREATH): ICD-10-CM

## 2022-03-15 DIAGNOSIS — E83.42 HYPOMAGNESEMIA: ICD-10-CM

## 2022-03-15 DIAGNOSIS — E87.5 HYPERKALEMIA: ICD-10-CM

## 2022-03-15 DIAGNOSIS — D64.9 ANEMIA, UNSPECIFIED TYPE: ICD-10-CM

## 2022-03-15 DIAGNOSIS — R62.7 FTT (FAILURE TO THRIVE) IN ADULT: ICD-10-CM

## 2022-03-15 DIAGNOSIS — I44.7 LBBB (LEFT BUNDLE BRANCH BLOCK): ICD-10-CM

## 2022-03-15 DIAGNOSIS — E86.0 DEHYDRATION: ICD-10-CM

## 2022-03-15 DIAGNOSIS — R10.84 GENERALIZED ABDOMINAL PAIN: ICD-10-CM

## 2022-03-15 DIAGNOSIS — J18.9 HAP (HOSPITAL-ACQUIRED PNEUMONIA): ICD-10-CM

## 2022-03-15 DIAGNOSIS — M25.522 LEFT ELBOW PAIN: ICD-10-CM

## 2022-03-15 DIAGNOSIS — R18.0 MALIGNANT ASCITES: ICD-10-CM

## 2022-03-15 DIAGNOSIS — K56.609 SBO (SMALL BOWEL OBSTRUCTION) (HCC): ICD-10-CM

## 2022-03-15 DIAGNOSIS — C78.6 PERITONEAL CARCINOMATOSIS (HCC): ICD-10-CM

## 2022-03-15 DIAGNOSIS — D70.8 OTHER NEUTROPENIA (HCC): ICD-10-CM
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
## 2022-03-15 DIAGNOSIS — R11.2 CINV (CHEMOTHERAPY-INDUCED NAUSEA AND VOMITING): ICD-10-CM

## 2022-03-15 DIAGNOSIS — C25.1 MALIGNANT NEOPLASM OF BODY OF PANCREAS (HCC): ICD-10-CM

## 2022-03-15 DIAGNOSIS — J96.01 ACUTE RESPIRATORY FAILURE WITH HYPOXIA (HCC): ICD-10-CM

## 2022-03-15 DIAGNOSIS — Z78.9 ON TOTAL PARENTERAL NUTRITION (TPN): ICD-10-CM

## 2022-03-15 DIAGNOSIS — R91.8 BILATERAL PULMONARY INFILTRATES ON CHEST X-RAY: ICD-10-CM

## 2022-03-15 DIAGNOSIS — T45.1X5A CINV (CHEMOTHERAPY-INDUCED NAUSEA AND VOMITING): ICD-10-CM

## 2022-03-15 DIAGNOSIS — E87.6 HYPOKALEMIA: ICD-10-CM

## 2022-03-15 DIAGNOSIS — E87.1 HYPONATREMIA: Primary | ICD-10-CM

## 2022-03-15 DIAGNOSIS — M79.632 PAIN OF LEFT FOREARM: ICD-10-CM

## 2022-03-15 DIAGNOSIS — I47.1 SVT (SUPRAVENTRICULAR TACHYCARDIA) (HCC): ICD-10-CM

## 2022-03-15 DIAGNOSIS — R10.31 RIGHT LOWER QUADRANT ABDOMINAL PAIN: ICD-10-CM

## 2022-03-15 DIAGNOSIS — C25.9 MALIGNANT NEOPLASM OF PANCREAS, UNSPECIFIED LOCATION OF MALIGNANCY (HCC): ICD-10-CM

## 2022-03-15 DIAGNOSIS — R19.7 DIARRHEA, UNSPECIFIED TYPE: ICD-10-CM

## 2022-03-15 DIAGNOSIS — E43 SEVERE PROTEIN-CALORIE MALNUTRITION (HCC): ICD-10-CM

## 2022-03-15 DIAGNOSIS — E44.1 MALNUTRITION OF MILD DEGREE (HCC): ICD-10-CM

## 2022-03-15 DIAGNOSIS — R53.83 FATIGUE, UNSPECIFIED TYPE: ICD-10-CM

## 2022-03-15 DIAGNOSIS — R00.1 BRADYCARDIA: ICD-10-CM

## 2022-03-15 DIAGNOSIS — R54 FRAILTY: ICD-10-CM

## 2022-03-15 DIAGNOSIS — R05.9 COUGH: ICD-10-CM

## 2022-03-15 DIAGNOSIS — Z51.11 ADMISSION FOR ANTINEOPLASTIC CHEMOTHERAPY: ICD-10-CM

## 2022-03-15 DIAGNOSIS — Z71.89 ACP (ADVANCE CARE PLANNING): ICD-10-CM

## 2022-03-15 DIAGNOSIS — I50.22 CHRONIC SYSTOLIC CONGESTIVE HEART FAILURE (HCC): ICD-10-CM

## 2022-03-15 DIAGNOSIS — I95.9 HYPOTENSION, UNSPECIFIED HYPOTENSION TYPE: ICD-10-CM

## 2022-03-15 DIAGNOSIS — Z51.5 ENCOUNTER FOR PALLIATIVE CARE: ICD-10-CM

## 2022-03-15 DIAGNOSIS — Z66 DNR (DO NOT RESUSCITATE): ICD-10-CM

## 2022-03-15 DIAGNOSIS — R11.2 NON-INTRACTABLE VOMITING WITH NAUSEA, UNSPECIFIED VOMITING TYPE: ICD-10-CM

## 2022-03-15 DIAGNOSIS — R62.7 FAILURE TO THRIVE IN ADULT: ICD-10-CM

## 2022-03-15 DIAGNOSIS — R79.89 HIGH SERUM LACTATE: ICD-10-CM

## 2022-03-15 DIAGNOSIS — R57.1 HYPOVOLEMIC SHOCK (HCC): ICD-10-CM

## 2022-03-15 LAB
ALBUMIN SERPL-MCNC: 2.2 G/DL (ref 3.2–4.6)
ALBUMIN SERPL-MCNC: 2.6 G/DL (ref 3.2–4.6)
ALBUMIN/GLOB SERPL: 0.6 {RATIO} (ref 1.2–3.5)
ALBUMIN/GLOB SERPL: 0.6 {RATIO} (ref 1.2–3.5)
ALP SERPL-CCNC: 439 U/L (ref 50–136)
ALP SERPL-CCNC: 511 U/L (ref 50–136)
ALT SERPL-CCNC: 21 U/L (ref 12–65)
ALT SERPL-CCNC: 26 U/L (ref 12–65)
ANION GAP SERPL CALC-SCNC: 10 MMOL/L (ref 7–16)
ANION GAP SERPL CALC-SCNC: 9 MMOL/L (ref 7–16)
APPEARANCE UR: ABNORMAL
AST SERPL-CCNC: 26 U/L (ref 15–37)
AST SERPL-CCNC: 36 U/L (ref 15–37)
BACTERIA URNS QL MICRO: ABNORMAL /HPF
BASOPHILS # BLD: 0 K/UL (ref 0–0.2)
BASOPHILS # BLD: 0 K/UL (ref 0–0.2)
BASOPHILS NFR BLD: 0 % (ref 0–2)
BASOPHILS NFR BLD: 0 % (ref 0–2)
BILIRUB SERPL-MCNC: 0.9 MG/DL (ref 0.2–1.1)
BILIRUB SERPL-MCNC: 1 MG/DL (ref 0.2–1.1)
BILIRUB UR QL: ABNORMAL
BUN SERPL-MCNC: 25 MG/DL (ref 8–23)
BUN SERPL-MCNC: 25 MG/DL (ref 8–23)
CALCIUM SERPL-MCNC: 8.9 MG/DL (ref 8.3–10.4)
CALCIUM SERPL-MCNC: 9.6 MG/DL (ref 8.3–10.4)
CANCER AG19-9 SERPL-ACNC: 427.5 U/ML (ref 2–37)
CHLORIDE SERPL-SCNC: 92 MMOL/L (ref 98–107)
CHLORIDE SERPL-SCNC: 95 MMOL/L (ref 98–107)
CO2 SERPL-SCNC: 24 MMOL/L (ref 21–32)
CO2 SERPL-SCNC: 24 MMOL/L (ref 21–32)
COLOR UR: ABNORMAL
CREAT SERPL-MCNC: 0.9 MG/DL (ref 0.8–1.5)
CREAT SERPL-MCNC: 1.3 MG/DL (ref 0.8–1.5)
DIFFERENTIAL METHOD BLD: ABNORMAL
DIFFERENTIAL METHOD BLD: ABNORMAL
EOSINOPHIL # BLD: 0 K/UL (ref 0–0.8)
EOSINOPHIL # BLD: 0.1 K/UL (ref 0–0.8)
EOSINOPHIL NFR BLD: 0 % (ref 0.5–7.8)
EOSINOPHIL NFR BLD: 1 % (ref 0.5–7.8)
EPI CELLS #/AREA URNS HPF: ABNORMAL /HPF
ERYTHROCYTE [DISTWIDTH] IN BLOOD BY AUTOMATED COUNT: 15.1 % (ref 11.9–14.6)
ERYTHROCYTE [DISTWIDTH] IN BLOOD BY AUTOMATED COUNT: 15.5 % (ref 11.9–14.6)
GLOBULIN SER CALC-MCNC: 3.8 G/DL (ref 2.3–3.5)
GLOBULIN SER CALC-MCNC: 4.4 G/DL (ref 2.3–3.5)
GLUCOSE SERPL-MCNC: 124 MG/DL (ref 65–100)
GLUCOSE SERPL-MCNC: 158 MG/DL (ref 65–100)
GLUCOSE UR STRIP.AUTO-MCNC: NEGATIVE MG/DL
HBV SURFACE AB SERPL IA-ACNC: <3.1 MIU/ML
HBV SURFACE AG SER QL: NONREACTIVE
HCT VFR BLD AUTO: 44.9 % (ref 41.1–50.3)
HCT VFR BLD AUTO: 50.6 %
HGB BLD-MCNC: 15 G/DL (ref 13.6–17.2)
HGB BLD-MCNC: 16.3 G/DL (ref 13.6–17.2)
HGB UR QL STRIP: NEGATIVE
IMM GRANULOCYTES # BLD AUTO: 0.1 K/UL (ref 0–0.5)
IMM GRANULOCYTES # BLD AUTO: 0.1 K/UL (ref 0–0.5)
IMM GRANULOCYTES NFR BLD AUTO: 1 % (ref 0–5)
IMM GRANULOCYTES NFR BLD AUTO: 1 % (ref 0–5)
KETONES UR QL STRIP.AUTO: ABNORMAL MG/DL
LACTATE SERPL-SCNC: 2.3 MMOL/L (ref 0.4–2)
LACTATE SERPL-SCNC: 2.3 MMOL/L (ref 0.4–2)
LACTATE SERPL-SCNC: 2.6 MMOL/L (ref 0.4–2)
LEUKOCYTE ESTERASE UR QL STRIP.AUTO: ABNORMAL
LIPASE SERPL-CCNC: 69 U/L (ref 73–393)
LYMPHOCYTES # BLD: 0.4 K/UL (ref 0.5–4.6)
LYMPHOCYTES # BLD: 0.6 K/UL (ref 0.5–4.6)
LYMPHOCYTES NFR BLD: 3 % (ref 13–44)
LYMPHOCYTES NFR BLD: 5 % (ref 13–44)
MAGNESIUM SERPL-MCNC: 2.4 MG/DL (ref 1.8–2.4)
MCH RBC QN AUTO: 29.5 PG (ref 26.1–32.9)
MCH RBC QN AUTO: 30.1 PG (ref 26.1–32.9)
MCHC RBC AUTO-ENTMCNC: 32.2 G/DL (ref 31.4–35)
MCHC RBC AUTO-ENTMCNC: 33.4 G/DL (ref 31.4–35)
MCV RBC AUTO: 90 FL (ref 79.6–97.8)
MCV RBC AUTO: 91.5 FL (ref 79.6–97.8)
MONOCYTES # BLD: 1.8 K/UL (ref 0.1–1.3)
MONOCYTES # BLD: 2 K/UL (ref 0.1–1.3)
MONOCYTES NFR BLD: 17 % (ref 4–12)
MONOCYTES NFR BLD: 18 % (ref 4–12)
MUCOUS THREADS URNS QL MICRO: ABNORMAL /LPF
NEUTS SEG # BLD: 8.1 K/UL (ref 1.7–8.2)
NEUTS SEG # BLD: 8.7 K/UL (ref 1.7–8.2)
NEUTS SEG NFR BLD: 75 % (ref 43–78)
NEUTS SEG NFR BLD: 79 % (ref 43–78)
NITRITE UR QL STRIP.AUTO: NEGATIVE
NRBC # BLD: 0 K/UL (ref 0–0.2)
NRBC # BLD: 0 K/UL (ref 0–0.2)
PH UR STRIP: 5.5 [PH] (ref 5–9)
PLATELET # BLD AUTO: 164 K/UL (ref 150–450)
PLATELET # BLD AUTO: 185 K/UL (ref 150–450)
PMV BLD AUTO: 9.7 FL (ref 9.4–12.3)
PMV BLD AUTO: 9.7 FL (ref 9.4–12.3)
POTASSIUM SERPL-SCNC: 5.1 MMOL/L (ref 3.5–5.1)
POTASSIUM SERPL-SCNC: 5.7 MMOL/L (ref 3.5–5.1)
PROT SERPL-MCNC: 6 G/DL (ref 6.3–8.2)
PROT SERPL-MCNC: 7 G/DL (ref 6.3–8.2)
PROT UR STRIP-MCNC: 100 MG/DL
RBC # BLD AUTO: 4.99 M/UL (ref 4.23–5.6)
RBC # BLD AUTO: 5.53 M/UL (ref 4.23–5.6)
RBC #/AREA URNS HPF: ABNORMAL /HPF
SODIUM SERPL-SCNC: 126 MMOL/L (ref 136–145)
SODIUM SERPL-SCNC: 128 MMOL/L (ref 138–145)
SP GR UR REFRACTOMETRY: 1.03 (ref 1–1.02)
UROBILINOGEN UR QL STRIP.AUTO: 1 EU/DL (ref 0.2–1)
WBC # BLD AUTO: 10.9 K/UL (ref 4.3–11.1)
WBC # BLD AUTO: 11 K/UL (ref 4.3–11.1)
WBC URNS QL MICRO: ABNORMAL /HPF

## 2022-03-15 PROCEDURE — 74011250636 HC RX REV CODE- 250/636: Performed by: EMERGENCY MEDICINE

## 2022-03-15 PROCEDURE — 87040 BLOOD CULTURE FOR BACTERIA: CPT

## 2022-03-15 PROCEDURE — 36415 COLL VENOUS BLD VENIPUNCTURE: CPT

## 2022-03-15 PROCEDURE — 81001 URINALYSIS AUTO W/SCOPE: CPT

## 2022-03-15 PROCEDURE — 86706 HEP B SURFACE ANTIBODY: CPT

## 2022-03-15 PROCEDURE — 74011250637 HC RX REV CODE- 250/637: Performed by: NURSE PRACTITIONER

## 2022-03-15 PROCEDURE — 74011000258 HC RX REV CODE- 258: Performed by: NURSE PRACTITIONER

## 2022-03-15 PROCEDURE — 87086 URINE CULTURE/COLONY COUNT: CPT

## 2022-03-15 PROCEDURE — 85025 COMPLETE CBC W/AUTO DIFF WBC: CPT

## 2022-03-15 PROCEDURE — 3331090002 HH PPS REVENUE DEBIT

## 2022-03-15 PROCEDURE — 96361 HYDRATE IV INFUSION ADD-ON: CPT

## 2022-03-15 PROCEDURE — 80053 COMPREHEN METABOLIC PANEL: CPT

## 2022-03-15 PROCEDURE — 83605 ASSAY OF LACTIC ACID: CPT

## 2022-03-15 PROCEDURE — 86704 HEP B CORE ANTIBODY TOTAL: CPT

## 2022-03-15 PROCEDURE — 83690 ASSAY OF LIPASE: CPT

## 2022-03-15 PROCEDURE — 74011250636 HC RX REV CODE- 250/636: Performed by: INTERNAL MEDICINE

## 2022-03-15 PROCEDURE — 99285 EMERGENCY DEPT VISIT HI MDM: CPT

## 2022-03-15 PROCEDURE — 3331090001 HH PPS REVENUE CREDIT

## 2022-03-15 PROCEDURE — 65270000015 HC RM PRIVATE ONCOLOGY

## 2022-03-15 PROCEDURE — 83735 ASSAY OF MAGNESIUM: CPT

## 2022-03-15 PROCEDURE — 74011250637 HC RX REV CODE- 250/637: Performed by: EMERGENCY MEDICINE

## 2022-03-15 PROCEDURE — 99223 1ST HOSP IP/OBS HIGH 75: CPT | Performed by: INTERNAL MEDICINE

## 2022-03-15 PROCEDURE — 96374 THER/PROPH/DIAG INJ IV PUSH: CPT

## 2022-03-15 PROCEDURE — 87340 HEPATITIS B SURFACE AG IA: CPT

## 2022-03-15 PROCEDURE — 3331090003 HH PPS REVENUE ADJ

## 2022-03-15 PROCEDURE — 74011250636 HC RX REV CODE- 250/636: Performed by: NURSE PRACTITIONER

## 2022-03-15 PROCEDURE — 71045 X-RAY EXAM CHEST 1 VIEW: CPT

## 2022-03-15 PROCEDURE — 86301 IMMUNOASSAY TUMOR CA 19-9: CPT

## 2022-03-15 PROCEDURE — 81003 URINALYSIS AUTO W/O SCOPE: CPT

## 2022-03-15 PROCEDURE — 74011000250 HC RX REV CODE- 250: Performed by: EMERGENCY MEDICINE

## 2022-03-15 RX ORDER — DIPHENOXYLATE HYDROCHLORIDE AND ATROPINE SULFATE 2.5; .025 MG/1; MG/1
1 TABLET ORAL
Status: DISCONTINUED | OUTPATIENT
Start: 2022-03-15 | End: 2022-04-06 | Stop reason: HOSPADM

## 2022-03-15 RX ORDER — HYDROMORPHONE HYDROCHLORIDE 4 MG/1
4 TABLET ORAL
Status: DISCONTINUED | OUTPATIENT
Start: 2022-03-15 | End: 2022-03-17

## 2022-03-15 RX ORDER — ONDANSETRON 2 MG/ML
4 INJECTION INTRAMUSCULAR; INTRAVENOUS
Status: DISCONTINUED | OUTPATIENT
Start: 2022-03-15 | End: 2022-04-06 | Stop reason: HOSPADM

## 2022-03-15 RX ORDER — VANCOMYCIN/0.9 % SOD CHLORIDE 1.5G/250ML
1500 PLASTIC BAG, INJECTION (ML) INTRAVENOUS ONCE
Status: COMPLETED | OUTPATIENT
Start: 2022-03-15 | End: 2022-03-15

## 2022-03-15 RX ORDER — TAMSULOSIN HYDROCHLORIDE 0.4 MG/1
0.4 CAPSULE ORAL DAILY
Status: DISCONTINUED | OUTPATIENT
Start: 2022-03-16 | End: 2022-04-06 | Stop reason: HOSPADM

## 2022-03-15 RX ORDER — SODIUM CHLORIDE 9 MG/ML
75 INJECTION, SOLUTION INTRAVENOUS CONTINUOUS
Status: DISPENSED | OUTPATIENT
Start: 2022-03-15 | End: 2022-03-16

## 2022-03-15 RX ORDER — PANTOPRAZOLE SODIUM 40 MG/1
40 TABLET, DELAYED RELEASE ORAL
Status: DISCONTINUED | OUTPATIENT
Start: 2022-03-16 | End: 2022-04-02

## 2022-03-15 RX ORDER — PROMETHAZINE HYDROCHLORIDE 25 MG/1
25 TABLET ORAL
Status: DISCONTINUED | OUTPATIENT
Start: 2022-03-15 | End: 2022-04-06 | Stop reason: HOSPADM

## 2022-03-15 RX ORDER — ENOXAPARIN SODIUM 100 MG/ML
40 INJECTION SUBCUTANEOUS EVERY 24 HOURS
Status: DISCONTINUED | OUTPATIENT
Start: 2022-03-15 | End: 2022-03-24

## 2022-03-15 RX ORDER — MORPHINE SULFATE 4 MG/ML
2 INJECTION INTRAVENOUS
Status: DISCONTINUED | OUTPATIENT
Start: 2022-03-15 | End: 2022-03-17

## 2022-03-15 RX ORDER — SODIUM CHLORIDE 0.9 % (FLUSH) 0.9 %
5-10 SYRINGE (ML) INJECTION EVERY 8 HOURS
Status: DISCONTINUED | OUTPATIENT
Start: 2022-03-15 | End: 2022-03-24

## 2022-03-15 RX ORDER — FAMOTIDINE 20 MG/1
20 TABLET, FILM COATED ORAL 2 TIMES DAILY
Status: DISCONTINUED | OUTPATIENT
Start: 2022-03-15 | End: 2022-04-02

## 2022-03-15 RX ORDER — HYDROCODONE BITARTRATE AND ACETAMINOPHEN 5; 325 MG/1; MG/1
1 TABLET ORAL ONCE
Status: COMPLETED | OUTPATIENT
Start: 2022-03-15 | End: 2022-03-15

## 2022-03-15 RX ORDER — SODIUM CHLORIDE 0.9 % (FLUSH) 0.9 %
5-10 SYRINGE (ML) INJECTION AS NEEDED
Status: DISCONTINUED | OUTPATIENT
Start: 2022-03-15 | End: 2022-04-06 | Stop reason: HOSPADM

## 2022-03-15 RX ADMIN — FAMOTIDINE 20 MG: 20 TABLET, FILM COATED ORAL at 17:33

## 2022-03-15 RX ADMIN — SODIUM CHLORIDE, PRESERVATIVE FREE 10 ML: 5 INJECTION INTRAVENOUS at 17:51

## 2022-03-15 RX ADMIN — ONDANSETRON 4 MG: 2 INJECTION INTRAMUSCULAR; INTRAVENOUS at 19:53

## 2022-03-15 RX ADMIN — VANCOMYCIN HYDROCHLORIDE 1500 MG: 10 INJECTION, POWDER, LYOPHILIZED, FOR SOLUTION INTRAVENOUS at 19:07

## 2022-03-15 RX ADMIN — SODIUM CHLORIDE 1000 ML: 900 INJECTION, SOLUTION INTRAVENOUS at 11:41

## 2022-03-15 RX ADMIN — MORPHINE SULFATE 2 MG: 4 INJECTION INTRAVENOUS at 16:49

## 2022-03-15 RX ADMIN — ONDANSETRON 4 MG: 2 INJECTION INTRAMUSCULAR; INTRAVENOUS at 16:49

## 2022-03-15 RX ADMIN — ENOXAPARIN SODIUM 40 MG: 100 INJECTION SUBCUTANEOUS at 17:33

## 2022-03-15 RX ADMIN — SODIUM CHLORIDE 10 MG: 9 INJECTION INTRAMUSCULAR; INTRAVENOUS; SUBCUTANEOUS at 11:38

## 2022-03-15 RX ADMIN — MORPHINE SULFATE 2 MG: 4 INJECTION INTRAVENOUS at 22:01

## 2022-03-15 RX ADMIN — SODIUM CHLORIDE, PRESERVATIVE FREE 10 ML: 5 INJECTION INTRAVENOUS at 21:57

## 2022-03-15 RX ADMIN — HYDROMORPHONE HYDROCHLORIDE 4 MG: 4 TABLET ORAL at 19:53

## 2022-03-15 RX ADMIN — CEFEPIME HYDROCHLORIDE 2 G: 2 INJECTION, POWDER, FOR SOLUTION INTRAVENOUS at 18:00

## 2022-03-15 RX ADMIN — LACTULOSE 45 ML: 20 SOLUTION ORAL at 17:33

## 2022-03-15 RX ADMIN — HYDROCODONE BITARTRATE AND ACETAMINOPHEN 1 TABLET: 5; 325 TABLET ORAL at 11:39

## 2022-03-15 RX ADMIN — SODIUM CHLORIDE 75 ML/HR: 900 INJECTION, SOLUTION INTRAVENOUS at 16:51

## 2022-03-15 RX ADMIN — LACTULOSE 45 ML: 20 SOLUTION ORAL at 21:57

## 2022-03-15 NOTE — PROGRESS NOTES
Chart review complete, CM met with pt and friend Jaspal Jaimes at bedside, pt states he lives alone in own mobile home with 6 steps to enter, has rollator walker in home for use, states he is normally independent with ADLS, does not drive friend Jaspal Jaimes assists with transportation as needed, states affords medications without difficulty with insurance. Demographics, insurance and PCP confirmed. CM will remain available to assist as needed. Pt states he was dc home with [facility] for RN, PT and they only came out to see him 1 time after dc. Care Management Interventions  PCP Verified by CM:  Yes (Dr Donya Obrien)  Transition of Care Consult (CM Consult): [address]: Yes  MyChart Signup: No  Discharge Durable Medical Equipment: No  Physical Therapy Consult: No  Occupational Therapy Consult: No  Speech Therapy Consult: No  Support Systems: Child(betsy),Friend/Neighbor  Confirm Follow Up Transport: Family  Discharge Location  Patient Expects to be Discharged to[de-identified] Unable to determine at this time

## 2022-03-15 NOTE — ACP (ADVANCE CARE PLANNING)
Advance Care Planning   Healthcare Decision Maker:     Primary decision maker salazar Owen [phone]    Click here to [address] including selection of the Healthcare Decision Maker Relationship (ie \"Primary\")  Today we documented Decision Maker(s) consistent with Legal Next of Kin hierarchy.

## 2022-03-15 NOTE — PROGRESS NOTES
Report called by Abi Campa in ED at 1600, pt arrived at this time, via stretcher accompanied by transport personnel. Pt settled in bed.

## 2022-03-15 NOTE — PROGRESS NOTES
VANCO DAILY FOLLOW UP NOTE  [facility] Pharmacokinetic Monitoring Service - Vancomycin    Consulting Provider: Cranford Hodgkins NP   Indication: sepsis  Target Concentration: Goal AUC/LETY 400-600 mg*hr/L  Day of Therapy: 1  Additional Antimicrobials: Cefepime    Pertinent Laboratory Values: Wt Readings from Last 1 Encounters:   03/15/22 66.2 kg (146 lb)     Temp Readings from Last 1 Encounters:   03/15/22 97.7 °F (36.5 °C)     No components found for: PROCAL  Recent Labs     03/15/22  1333 03/15/22  1145 03/15/22  1045 03/15/22  0831   BUN  --   --  25* 25*   CREA  --   --  0.90 1.30   WBC  --   --  11.0 10.9   LAC 2.6* 2.3*  --   --      Estimated Creatinine Clearance: 71.9 mL/min (based on SCr of 0.9 mg/dL). No results found for: Pinky Paget    MRSA Nasal Swab: N/A. Non-respiratory infection. .      Assessment:  Date/Time Dose Concentration AUC         Note: Serum concentrations collected for AUC dosing may appear elevated if collected in close proximity to the dose administered, this is not necessarily an indication of toxicity    Plan:  Dosing per [address] Start vancomycin 1500 mg X 1, then 750 mg Q12H.   Predicted AUC/Tr 481/15.5  Repeat vancomycin concentrations will be ordered as clinically appropriate   Pharmacy will continue to monitor patient and adjust therapy as indicated    Thank you for the consult,  DIMA RomnaD

## 2022-03-15 NOTE — ED NOTES
TRANSFER - OUT REPORT:    Verbal report given to Celi Lopez RN on Ashley Her  being transferred to  for routine progression of care       Report consisted of patients Situation, Background, Assessment and   Recommendations(SBAR). Information from the following report(s) SBAR was reviewed with the receiving nurse. Lines:   Venous Access Device Upper chest (subclavicular area), left (Active)   Central Line Being Utilized Yes 03/15/22 0943   Criteria for Appropriate Use Irritant/vesicant medication 03/15/22 0943   Site Assessment Clean, dry, & intact 03/15/22 0943   Date of Last Dressing Change 03/15/22 03/15/22 0943   Dressing Status Clean, dry, & intact 03/15/22 0943   Dressing Type Disk with Chlorhexadine gluconate (CHG); Transparent 03/15/22 0943   Date Accessed (Medial Site) 03/15/22 03/15/22 0943   Access Time (Medial Site) 0925 03/15/22 0943   Access Needle Size (Site #1) 20 G 03/15/22 0943   Access Needle Length (Medial Site) 0.75 inches 03/15/22 0943   Positive Blood Return (Medial Site) Yes 03/15/22 7802   Action Taken (Medial Site) Flushed; Infusing 03/15/22 0943        Opportunity for questions and clarification was provided.       Patient transported with:

## 2022-03-15 NOTE — H&P
[facility]: Admission H&P    Chief Complaint:    Esophageal carcinoma  Pancreatic carcinoma  Peritoneal carcinomatosis  SBO  Hypovolemic shock  Hyperkalemia  Hyper lactate    History of Present Illness:  78 y.o. M originally consulted for cancer of distal esophagus and pancreatic body in 12/2020. 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 He presented to [facility] on 12/29/20 and we discussed the situation being very unusual and need to distinguish the two cancer sites being separate primary vs metastasis for the prognosis and treatment plan would be rather different, pt was very motivated to aim at curative rx if possible, I discussed with Dr. Yared Stallworth and involved Dr. Paula Sanchez for further histology and IHC study for the two samples showed to be distinctive primaries, saw Dr Nidhi Mackenzie considered pancreatic mass unresectable but willing to reevaluate if shrunk, arranged PET showed peritoneal avidity c/w metastasis, I reviewed PET personally and discussed with pt this was considered stage IV unless proven otherwise, arrange diagnostic laproscopy for peritoneal carcinomatosis and biopsy for origin, which I suspect being pancreatic, tramadol prn abd pain.  He returned on 2/23/21, lost 10 lb and discussed nutrition, laproscopic biopsy 2/10/21 showed metastatic adenocarcinoma and I called Dr. Yared Stallworth to study the origin of panc vs esophageal cancer, discussed with Dr. Yared Stallworth the [facility] is more consistent with pancreatic cancer, started [facility] and tolerated reasonably well, discussed nausea control, added GCSF for neutropenia, need dental work and will arrange time probably after first scan, discussed of the weight loss in he declined appetite stimulant, add Emend for nausea control, reduce 5-FU infusion, return on 4/20/2021 due for cycle 5, however reported nausea vomiting and diarrhea for 2 weeks and did not call the office, lost 13 pounds, hypokalemia 2.5, discussed need to be admitted but patient adamantly declined, 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 and 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 Iesha Dia delivered 5 sessions of treatment in 10/2021, cycle 17 chemo was held for fatigue as patient desired, reported mild neuropathy with cold sensitivity and reduced oxaliplatin, return on 12/28/2021 and the dose reduction was better tolerated, had 2 episodes of vomiting dark emesis after cycle 20 better resolved, hemoglobin stable, still having neuropathy but not worse, CT 12/27/2021 showed stable disease but a 7 mm new liver lesion to be monitored.  He continued modified FOLFIRINOX but did not return for cycle 23 due to sore throat and fatigue, followed 1/26/2022, still with congestion and weakness, rule out Covid and will pursue to cycle 23 tomorrow if negative, CEA up to 16.6 after interruption and will monitor, added Remeron for insomnia and depression but made him cry, was in bed for most of the time and his son prepared food for him, lost weight, finally feeling better and started eating, like to defer chemo by a week, return 2/16/2022 not getting better, feeling bloated no bowel movement for 3 days, start lactulose for constipation and give IV fluid for dehydration, start Decadron 2 mg every morning for anorexia, concern of too much interruption of chemotherapy and proceed to cycle 24 with close follow-up next week, CEA up to 24 and continue to trend, plan to repeat PET scan next time, however rapidly declined and had paracentesis removing 6 liter ascites with positive cytology, Admitted for intractable abdominal pain, ascites, place Pleurx, Send cellblock for Caris, Empiric gemcitabine Abraxane, no bed yet till later and discussed with NP to admit at ER on 3/2/2022 to place Pleurx and arrange gemcitabine/Abraxane, had extensive discussion with inpatient team and pharmacy, patient was discharged on 3/7/2022 without chemo appointment and return to office on 3/15/2022, extremely sick and blood pressure not measurable in the office, and not been having much oral intake since discharge. Review of Systems:  Constitutional Fatigue. Weight loss. Denies fever or chills. HEENT Denies trauma, bluring vision, hearing loss, ear pain, nosebleeds, sore throat, neck pain and ear discharge. Skin Denies lesions or rashes. Lungs Denies shortness of breath, cough, sputum production or hemoptysis. Cardiovascular Denies chest pain, palpitations, orthopnea, claudication and leg swelling. Gastrointestinal Abd pain and nausea resolved, intermittent diarrhea. Denies vomiting. Denies bloody or black stools.  Denies dysuria, frequency or hesitancy of urination   Neuro  mild neuropathy. Denies headaches, visual changes or ataxia. Denies dizziness, speech change, focal weakness and headaches. Hematology Denies nasal/gum bleeding, denies easy bruise   Endo Denies heat/cold intolerance, denies diabetes. MSK Denies back pain, swollen legs, myalgias and falls. Psychiatric/Behavioral Denies depression and substance abuse. The patient is not nervous/anxious.        No Known Allergies  Past Medical History:   Diagnosis Date    Back pain     followed by pain management    Chronic pain     left-sided, lower back pain    CINV (chemotherapy-induced nausea and vomiting) 2021    Erectile dysfunction     Gastritis     GERD (gastroesophageal reflux disease)     daily medication    Hiatal hernia     \"medium\"    History of anemia     Hypertension     situational; has Amlodipine to take if systolic >664    Left bundle branch block (LBBB) on electrocardiogram 2021    Malignant ne[facility] of body of pancreas ([facility]     Malignant neoplasm of esophagus ([facility] 2020    Morbid obesity (HCC)     Nausea     takes antiemetic med prn     Past Surgical History:   Procedure Laterality Date    HX APPENDECTOMY      HX CHOLECYSTECTOMY      HX COLONOSCOPY  2020    with EGD    HX VASCULAR ACCESS      IR INSERT CATH PLEURAL INDWELL  3/3/2022    IR PARACENTESIS ABD W IMAGE  2022     Family History   Problem Relation Age of Onset    Cancer Mother     No Known Problems Father     Cancer Sister      Social History     Socioeconomic History    Marital status:      Spouse name: Not on file    Number of children: Not on file    Years of education: Not on file    Highest education level: Not on file   Occupational History    Not on file   Tobacco Use    Smoking status: Former Smoker     Packs/day: 1.50     Years: 15.00     Pack years: 22.50     Quit date: 1974     Years since quittin.3    Smokeless tobacco: Never Used   Vaping Use    Vaping Use: Never used   Substance and Sexual Activity    Alcohol use: Yes     Comment: socially    Drug use: Never    Sexual activity: Not on file   Other Topics Concern     Service Not Asked    Blood Transfusions Not Asked    Caffeine Concern Not Asked    Occupational Exposure Not Asked    Hobby Hazards Not Asked    Sleep Concern Not Asked    Stress Concern Not Asked    Weight Concern Not Asked    Special Diet Not Asked    Back Care Not Asked    Exercise Not Asked    Bike Helmet Not Asked    Seat Belt Not Asked    Self-Exams Not Asked   Social History Narrative    Not on file     Social Determinants of Health     Financial Resource Strain:     Difficulty of Paying Living Expenses: Not on file   Food Insecurity:     Worried About Running Out of Food in the Last Year: Not on file    Elian of Food in the Last Year: Not on file   Transportation Needs:     Lack of Transportation (Medical): Not on file    Lack of Transportation (Non-Medical):  Not on file   Physical Activity:     Days of Exercise per Week: Not on file    Minutes of Exercise per Session: Not on file   Stress:     Feeling of Stress : Not on file   Social Connections:     Frequency of Communication with Friends and Family: Not on file    Frequency of Social Gatherings with Friends and Family: Not on file    Attends [employer] Services: Not on file    Active Member of [address] [place] [address] [employer] or Organizations: Not on file    Attends Club or Organization Meetings: Not on file    Marital Status: Not on file   Intimate Partner Violence:     Fear of Current or Ex-Partner: Not on file    Emotionally Abused: Not on file    Physically Abused: Not on file    Sexually Abused: Not on file   Housing Stability:     Unable to Pay for Housing in the Last Year: Not on file    Number of [place] in the Last Year: Not on file    Unstable Housing in the Last Year: Not on file     Current Facility-Administered Medications   Medication Dose Route Frequency Provider Last Rate Last Admin    sodium chloride (NS) flush 5-10 mL  5-10 mL IntraVENous Q8H Paralee Nashville D, DO   10 mL at 03/15/22 1751    sodium chloride (NS) flush 5-10 mL  5-10 mL IntraVENous PRN Paulina Roberts,         diphenoxylate-atropine (LOMOTIL) tablet 1 Tablet  1 Tablet Oral QID PRN Vineet Torres NP        famotidine (PEPCID) tablet 20 mg  20 mg Oral BID Vineet Torres NP   20 mg at 03/15/22 1733    HYDROmorphone (DILAUDID) tablet 4 mg  4 mg Oral Q4H PRN Vineet Torres NP   4 mg at 03/15/22 1953    lactulose (CHRONULAC) 10 gram/15 mL solution 45 mL  30 g Oral TID Priya Weaver NP   45 mL at 03/15/22 1733    [START ON 3/16/2022] pantoprazole (PROTONIX) tablet 40 mg  40 mg Oral ACB Priya Weaver NP        promethazine (PHENERGAN) tablet 25 mg  25 mg Oral Q6H PRN Priya Weaver NP        [START ON 3/16/2022] tamsulosin (FLOMAX) capsule 0.4 mg  0.4 mg Oral DAILY Priya Weaver NP        0.9% sodium chloride infusion  75 mL/hr IntraVENous CONTINUOUS Priya Weaver NP 75 mL/hr at 03/15/22 1651 75 mL/hr at 03/15/22 1651    enoxaparin (LOVENOX) injection 40 mg  40 mg SubCUTAneous Q24H Priya Weaver NP   40 mg at 03/15/22 1733    ondansetron (ZOFRAN) injection 4 mg  4 mg IntraVENous Q4H PRN Priya Weaver NP   4 mg at 03/15/22 1953    morphine injection 2 mg  2 mg IntraVENous Q4H PRN Priya Weaver NP   2 mg at 03/15/22 1649    cefepime (MAXIPIME) 2 g in 0.9% sodium chloride (MBP/ADV) 100 mL MBP  2 g IntraVENous Q8H Priya Weaver NP   IV Completed at 03/15/22 1840    vancomycin (VANCOCIN) 1500 mg in  ml infusion  1,500 mg IntraVENous Jie Arambula  mL/hr at 03/15/22 1907 1,500 mg at 03/15/22 1907    [START ON 3/16/2022] vancomycin (VANCOCIN) 750 mg in 0.9% sodium chloride 250 mL ([id number])  750 mg IntraVENous Q12H Eloisa Hidalgo MD           OBJECTIVE:  Visit Vitals  /72 (BP 1 Location: Right arm, BP Patient Position: At rest)   Pulse 81   Temp 98.1 °F (36.7 °C)   Resp 18   Ht 5' 6\" (1.676 m)   Wt 146 lb (66.2 kg)   SpO2 100%   BMI 23.57 kg/m²       Physical Exam:  Constitutional: Oriented to person, place, and time. Very ill looking and poorly nourished   HEENT: Normocephalic and atraumatic. Oropharynx is clear and moist.   Conjunctivae and EOM are normal. Pupils are equal, round, and reactive to light. No scleral icterus. Neck supple. No JVD present. No tracheal deviation present. No thyromegaly present. Lymph node No palpable submandibular, cervical, supraclavicular, axillary and inguinal lymph nodes. Skin Warm and dry.   No bruising and no rash noted. No erythema. No pallor. Respiratory Effort normal and breath sounds normal.  No respiratory distress. No wheezes. No rales. No tenderness. CVS  tachycardic, regular rhythm and normal heart sounds. Exam reveals no gallop, no friction and no rub. No murmur heard. Abdomen Abd tenderness resolved. Soft. Bowel sounds are normal. Exhibits no distension. There is no rebound and no guarding. Neuro Normal reflexes. No cranial nerve deficit. Exhibits normal muscle tone, 5 of 5 strength of all extremities. MSK Normal range of motion in general.  No edema and no tenderness. Psych Normal mood, affect, behavior, judgment and thought content      Labs:  Recent Results (from the past 24 hour(s))   CANCER AG 19-9    Collection Time: 03/15/22  8:31 AM   Result Value Ref Range    Cancer antigen 19-9 427.50 (H) 2.0 - 37.0 U/mL   CBC WITH AUTOMATED DIFF    Collection Time: 03/15/22  8:31 AM   Result Value Ref Range    WBC 10.9 4.3 - 11.1 K/uL    RBC 5.53 4.23 - 5.6 M/uL    HGB 16.3 13.6 - 17.2 g/dL    HCT 50.6 %    MCV 91.5 79.6 - 97.8 FL    MCH 29.5 26.1 - 32.9 PG    MCHC 32.2 31.4 - 35.0 g/dL    RDW 15.5 (H) 11.9 - 14.6 %    PLATELET 671 239 - 253 K/uL    MPV 9.7 9.4 - 12.3 FL    ABSOLUTE NRBC 0.00 0.0 - 0.2 K/uL    DF AUTOMATED      NEUTROPHILS 75 43 - 78 %    LYMPHOCYTES 5 (L) 13 - 44 %    MONOCYTES 18 (H) 4.0 - 12.0 %    EOSINOPHILS 1 0.5 - 7.8 %    BASOPHILS 0 0.0 - 2.0 %    IMMATURE GRANULOCYTES 1 0.0 - 5.0 %    ABS. NEUTROPHILS 8.1 1.7 - 8.2 K/UL    ABS. LYMPHOCYTES 0.6 0.5 - 4.6 K/UL    ABS. MONOCYTES 2.0 (H) 0.1 - 1.3 K/UL    ABS. EOSINOPHILS 0.1 0.0 - 0.8 K/UL    ABS. BASOPHILS 0.0 0.0 - 0.2 K/UL    ABS. IMM.  GRANS. 0.1 0.0 - 0.5 K/UL   METABOLIC PANEL, COMPREHENSIVE    Collection Time: 03/15/22  8:31 AM   Result Value Ref Range    Sodium 126 (L) 136 - 145 mmol/L    Potassium 5.7 (H) 3.5 - 5.1 mmol/L    Chloride 92 (L) 98 - 107 mmol/L    CO2 24 21 - 32 mmol/L    Anion gap 10 7 - 16 mmol/L Glucose 158 (H) 65 - 100 mg/dL    BUN 25 (H) 8 - 23 MG/DL    Creatinine 1.30 0.8 - 1.5 MG/DL    GFR est AA >60 >60 ml/min/1.73m2    GFR est non-AA 59 (L) >60 ml/min/1.73m2    Calcium 9.6 8.3 - 10.4 MG/DL    Bilirubin, total 1.0 0.2 - 1.1 MG/DL    ALT (SGPT) 26 12 - 65 U/L    AST (SGOT) 36 15 - 37 U/L    Alk. phosphatase 511 (H) 50 - 136 U/L    Protein, total 7.0 6.3 - 8.2 g/dL    Albumin 2.6 (L) 3.2 - 4.6 g/dL    Globulin 4.4 (H) 2.3 - 3.5 g/dL    A-G Ratio 0.6 (L) 1.2 - 3.5     CBC WITH AUTOMATED DIFF    Collection Time: 03/15/22 10:45 AM   Result Value Ref Range    WBC 11.0 4.3 - 11.1 K/uL    RBC 4.99 4.23 - 5.6 M/uL    HGB 15.0 13.6 - 17.2 g/dL    HCT 44.9 41.1 - 50.3 %    MCV 90.0 79.6 - 97.8 FL    MCH 30.1 26.1 - 32.9 PG    MCHC 33.4 31.4 - 35.0 g/dL    RDW 15.1 (H) 11.9 - 14.6 %    PLATELET 173 070 - 049 K/uL    MPV 9.7 9.4 - 12.3 FL    ABSOLUTE NRBC 0.00 0.0 - 0.2 K/uL    DF AUTOMATED      NEUTROPHILS 79 (H) 43 - 78 %    LYMPHOCYTES 3 (L) 13 - 44 %    MONOCYTES 17 (H) 4.0 - 12.0 %    EOSINOPHILS 0 (L) 0.5 - 7.8 %    BASOPHILS 0 0.0 - 2.0 %    IMMATURE GRANULOCYTES 1 0.0 - 5.0 %    ABS. NEUTROPHILS 8.7 (H) 1.7 - 8.2 K/UL    ABS. LYMPHOCYTES 0.4 (L) 0.5 - 4.6 K/UL    ABS. MONOCYTES 1.8 (H) 0.1 - 1.3 K/UL    ABS. EOSINOPHILS 0.0 0.0 - 0.8 K/UL    ABS. BASOPHILS 0.0 0.0 - 0.2 K/UL    ABS. IMM. GRANS. 0.1 0.0 - 0.5 K/UL   METABOLIC PANEL, COMPREHENSIVE    Collection Time: 03/15/22 10:45 AM   Result Value Ref Range    Sodium 128 (L) 138 - 145 mmol/L    Potassium 5.1 3.5 - 5.1 mmol/L    Chloride 95 (L) 98 - 107 mmol/L    CO2 24 21 - 32 mmol/L    Anion gap 9 7 - 16 mmol/L    Glucose 124 (H) 65 - 100 mg/dL    BUN 25 (H) 8 - 23 MG/DL    Creatinine 0.90 0.8 - 1.5 MG/DL    GFR est AA >60 >60 ml/min/1.73m2    GFR est non-AA >60 >60 ml/min/1.73m2    Calcium 8.9 8.3 - 10.4 MG/DL    Bilirubin, total 0.9 0.2 - 1.1 MG/DL    ALT (SGPT) 21 12 - 65 U/L    AST (SGOT) 26 15 - 37 U/L    Alk.  phosphatase 439 (H) 50 - 136 U/L Protein, total 6.0 (L) 6.3 - 8.2 g/dL    Albumin 2.2 (L) 3.2 - 4.6 g/dL    Globulin 3.8 (H) 2.3 - 3.5 g/dL    A-G Ratio 0.6 (L) 1.2 - 3.5     MAGNESIUM    Collection Time: 03/15/22 10:45 AM   Result Value Ref Range    Magnesium 2.4 1.8 - 2.4 mg/dL   LIPASE    Collection Time: 03/15/22 10:45 AM   Result Value Ref Range    Lipase 69 (L) 73 - 393 U/L   HEP B SURFACE AG    Collection Time: 03/15/22 10:45 AM   Result Value Ref Range    Hep B Surface Ag NONREACTIVE NR     HEP B SURFACE AB    Collection Time: 03/15/22 10:45 AM   Result Value Ref Range    Hepatitis B surface Ab <3.10 mIU/mL   LACTIC ACID    Collection Time: 03/15/22 11:45 AM   Result Value Ref Range    Lactic acid 2.3 (H) 0.4 - 2.0 MMOL/L   LACTIC ACID    Collection Time: 03/15/22  1:33 PM   Result Value Ref Range    Lactic acid 2.6 (H) 0.4 - 2.0 MMOL/L   URINALYSIS W/ RFLX MICROSCOPIC    Collection Time: 03/15/22  1:34 PM   Result Value Ref Range    Color ORANGE      Appearance CLOUDY      Specific gravity 1.027 (H) 1.001 - 1.023      pH (UA) 5.5 5.0 - 9.0      Protein 100 (A) NEG mg/dL    Glucose Negative mg/dL    Ketone TRACE (A) NEG mg/dL    Bilirubin MODERATE (A) NEG      Blood Negative NEG      Urobilinogen 1.0 0.2 - 1.0 EU/dL    Nitrites Negative NEG      Leukocyte Esterase TRACE (A) NEG      WBC 0-3 0 /hpf    RBC 0-3 0 /hpf    Epithelial cells 3-5 0 /hpf    Bacteria 2+ (H) 0 /hpf    Mucus 2+ (H) 0 /lpf       Imaging:  No results found for this or any previous visit. ASSESSMENT/PLAN:    ICD-10-CM ICD-9-CM    1. Hyponatremia  E87.1 276.1    2. Non-intractable vomiting with nausea, unspecified vomiting type  R11.2 787.01    3. Malignant neoplasm of pancreas, unspecified location of malignancy (HCC)  C25.9 157.9    4. Hypomagnesemia  E83.42 275.2    5. Hypokalemia  E87.6 276.8    6. CINV (chemotherapy-induced nausea and vomiting)  R11.2 787.01     T45.1X5A E933.1    7. Dehydration  E86.0 276.51    8. Other neutropenia ([facility]  D70.8 288.09    9. Malignant neoplasm of lower third of esophagus (HCC)  C15.5 150.5    10. Malignant neoplasm of body of pancreas (HCC)  C25.1 157.1    11. Hypovolemic shock (HCC)  R57.1 785.59    12. Severe protein-calorie malnutrition ([facility]  E43 262    13. Peritoneal carcinomatosis (HCC)  C78.6 197.6    14. Malignant ascites  R18.0 789.51    15. High serum lactate  R79.89 790.99    16. Hyperkalemia  E87.5 276.7    17. SBO (small bowel obstruction) (HCC)  K56.609 560.9    18.  Failure to thrive in adult  R62.7 783.7      Problem List  Date Reviewed: 3/15/2022          Codes Class Noted    FTT (failure to thrive) in adult ICD-10-CM: R62.7  ICD-9-CM: 783.7  3/15/2022        Hypotension ICD-10-CM: I95.9  ICD-9-CM: 458.9  3/15/2022        Severe protein-calorie malnutrition ([facility] ICD-10-CM: E43  ICD-9-CM: 262  3/4/2022        Pancreatic cancer ([facility] ICD-10-CM: C25.9  ICD-9-CM: 157.9  3/2/2022        Ascites ICD-10-CM: R18.8  ICD-9-CM: 789.59  3/2/2022        Admission for antineoplastic chemotherapy ICD-10-CM: Z51.11  ICD-9-CM: V58.11  3/2/2022        Hypomagnesemia ICD-10-CM: E83.42  ICD-9-CM: 275.2  4/23/2021        Hypokalemia ICD-10-CM: E87.6  ICD-9-CM: 276.8  4/20/2021        CINV (chemotherapy-induced nausea and vomiting) ICD-10-CM: R11.2, T45.1X5A  ICD-9-CM: 787.01, E933.1  4/20/2021        Dehydration ICD-10-CM: E86.0  ICD-9-CM: 276.51  4/12/2021        Other neutropenia ([facility] ICD-10-CM: D70.8  ICD-9-CM: 288.09  3/9/2021        Malignant neoplasm of lower third of esophagus (HCC) ICD-10-CM: C15.5  ICD-9-CM: 150.5  1/5/2021        Malignant neoplasm of body of pancreas ([facility] ICD-10-CM: C25.1  ICD-9-CM: 157.1  1/5/2021        Severe obesity ([facility] ICD-10-CM: E66.01  ICD-9-CM: 278.01  12/10/2020        Elevated glucose ICD-10-CM: R73.09  ICD-9-CM: 790.29  7/22/2019        Anemia ICD-10-CM: D64.9  ICD-9-CM: 285.9  7/22/2019        Chronic left-sided low back pain ICD-10-CM: M54.50, G89.29  ICD-9-CM: 724.2, 338.29  7/22/2019 78 y.o. M pancreatic cancer and esophageal cancer with malignant ascites of previously controlled FOLFIRINOX but currently disease progressed and most recently admited on 3/2/2022 to place Pleurx and arrange gemcitabine/Abraxane, had extensive discussion with inpatient team and pharmacy, patient was discharged on 3/7/2022 without chemo appointment and return to office on 3/15/2022, extremely sick and blood pressure not measurable in the office, and not been having much oral intake since discharge, urgently establish IV access and called EMS to take to ER to stabilize and admit to hospital, apparently needed much supportive care and volume resuscitation, start TPN until oral intake can improve and give gemcitabine/Abraxane once clinically stable, discussed with the inpatient team.        Jorge Zuluaga M.D.   [address], [address] [place] [address]  Office : [phone]  Fax : [phone]

## 2022-03-15 NOTE — PROGRESS NOTES
Resting in bed, denies pain and nausea at this time.  (pt vomited after zofran given, at 1800, and reported relief). Bedside report given to [facility].

## 2022-03-15 NOTE — PROGRESS NOTES
03/15/22 1645   Dual Skin Pressure Injury Assessment   Dual Skin Pressure Injury Assessment WDL   Second Care Provider (Based on [address] [place] [address]) Sasha ROBIN   Skin Integumentary   Skin Integumentary (WDL) X    Pressure  Injury Documentation No Pressure Injury Noted-Pressure Ulcer Prevention Initiated   Skin Color Ecchymosis (comment)  (to BUE, scattered)   Skin Condition/Temp Warm;Dry;Fragile   Skin Integrity Scars (comment); Other (comment)  (RLQ abd pleurex drain with drsg c/d/i-by Sasha ROBIN)   Wound Prevention and Protection Methods   Orientation of Wound Prevention Posterior   Location of Wound Prevention Sacrum/Coccyx   Dressing Present  No   Wound Offloading (Prevention Methods) Bed, pressure redistribution/air

## 2022-03-15 NOTE — ED TRIAGE NOTES
Pt arrives via EMS from the cancer center when EMS was called for vomiting unable to eat or drink, no relief with zofran. Reports abdominal pain. bgl 157. Hx pancreatic ca, esophageal and kidney ca.  Facility unable to get BP , with /76 HR 79. 700 ml NSS given en route

## 2022-03-15 NOTE — ED PROVIDER NOTES
Patient is a 48-year-old male with a history of metastatic pancreatic cancer presenting to the emergency department today after being seen at the infusion clinic by his oncologist.  The patient was recently admitted to the hospital with abdominal pain and lactic acidosis which resolved with 2 L of fluid. The patient has not been eating or drinking. He states that the pain medicine Dilaudid 4 mg is too strong for him and that he does not like taking it it makes him more nauseous. The patient says he used to be on Norco 5 mg tablets and he thought those were better. The patient's history of present illness from his oncologist Dr. Fatoumata Ricks visit earlier today is below. HPI - Dr. Jinny Cam 3/15/22  Mr. Tosha Lin is a 78 y.o. male who presents today for evaluation regarding pain and symptom management in the setting of metastatic pancreatic cancer. Mr. Tosha Lin was initially diagnosed in December of 2020/Early 2021 after presenting with adenocarcinoma at the distal esophagus and a pancreatic body mass which was also adenocarcinoma. Pancreatic mass considered unresectable. Laparoscopic bx 2/2021 demonstrated metastatic adneocarcinoma and it was felt that this is pancreatic primary. He started FOLFIRINOX and continued for a total of  24 cycles. He did have SRS to pancreatic mass midway through. He then began to have a fairly rapid decline with accumulation of what was found to be malignant ascites. Paracentesis removed 6230ml on 2/24/22. He then presented to the ED 2/27/22 with n/v/d/abdominal pain. lactica acidosis cleared aafter 2L. Was then admitted directly from office 3/2/22 with abodminal pain, nausea, decreased appetite. He remained hospitalized from 3/2/22-3/7/22 with placement of abdominal pleurX to drain paracentesis. Pt presents today in f/u after 3/7/22 discharge to discuss start of gemzar+abraxane. He states he has not had anything to eat or drink since day before yesterday.   MA was unable to obtain BP, nor was 1 after 3 attempts. Pt is tachycardic. States he has had n/v at home. Rates abdominal paina 6/10. State last dilaudid 4mg tablet was 3/14/22 in the pm.  He is hyponatremic, hyperkalemic, hypotensive. States he isn't sure what he has been taking for nausea.            Past Medical History:   Diagnosis Date    Back pain     followed by pain management    Chronic pain     left-sided, lower back pain    CINV (chemotherapy-induced nausea and vomiting) 2021    Erectile dysfunction     Gastritis     GERD (gastroesophageal reflux disease)     daily medication    Hiatal hernia     \"medium\"    History of anemia     Hypertension     situational; has Amlodipine to take if systolic >504    Left bundle branch block (LBBB) on electrocardiogram 2021    Malignant neoplasm of body of pancreas ([address]     Malignant neoplasm of esophagus ([address] 2020    Morbid obesity (HCC)     Nausea     takes antiemetic med prn       Past Surgical History:   Procedure Laterality Date    HX APPENDECTOMY      HX CHOLECYSTECTOMY      HX COLONOSCOPY  2020    with EGD    HX VASCULAR ACCESS      IR INSERT CATH PLEURAL INDWELL  3/3/2022    IR PARACENTESIS ABD W IMAGE  2022         Family History:   Problem Relation Age of Onset    Cancer Mother     No Known Problems Father     Cancer Sister        Social History     Socioeconomic History    Marital status:      Spouse name: Not on file    Number of children: Not on file    Years of education: Not on file    Highest education level: Not on file   Occupational History    Not on file   Tobacco Use    Smoking status: Former Smoker     Packs/day: 1.50     Years: 15.00     Pack years: 22.50     Quit date: 1974     Years since quittin.3    Smokeless tobacco: Never Used   Vaping Use    Vaping Use: Never used   Substance and Sexual Activity    Alcohol use: Yes     Comment: socially    Drug use: Never    Sexual activity: Not on file   Other Topics Concern     Service Not Asked    Blood Transfusions Not Asked    Caffeine Concern Not Asked    Occupational Exposure Not Asked    Hobby Hazards Not Asked    Sleep Concern Not Asked    Stress Concern Not Asked    Weight Concern Not Asked    Special Diet Not Asked    Back Care Not Asked    Exercise Not Asked    Bike Helmet Not Asked   [address] Not Asked    Self-Exams Not Asked   Social History Narrative    Not on file     Social Determinants of Health     Financial Resource Strain:     Difficulty of Paying Living Expenses: Not on file   Food Insecurity:     Worried About Running Out of Food in the Last Year: Not on file    Elian of Food in the Last Year: Not on file   Transportation Needs:     Lack of Transportation (Medical): Not on file    Lack of Transportation (Non-Medical): Not on file   Physical Activity:     Days of Exercise per Week: Not on file    Minutes of Exercise per Session: Not on file   Stress:     Feeling of Stress : Not on file   Social Connections:     Frequency of Communication with Friends and Family: Not on file    Frequency of Social Gatherings with Friends and Family: Not on file    Attends Baptism Services: Not on file    Active Member of [address] [place] [address] [employer] or Organizations: Not on file    Attends Club or Organization Meetings: Not on file    Marital Status: Not on file   Intimate Partner Violence:     Fear of Current or Ex-Partner: Not on file    Emotionally Abused: Not on file    Physically Abused: Not on file    Sexually Abused: Not on file   Housing Stability:     Unable to Pay for Housing in the Last Year: Not on file    Number of [place] in the Last Year: Not on file    Unstable Housing in the Last Year: Not on file         ALLERGIES: Patient has no known allergies. Review of Systems   Constitutional: Positive for appetite change. Gastrointestinal: Positive for nausea and vomiting.    All other systems reviewed and are negative. Vitals:    03/15/22 1033 03/15/22 1044   BP: 115/86    Pulse: 84    Resp: 18    Temp:  97.2 °F (36.2 °C)   SpO2: 99%    Weight: 66.2 kg (146 lb)    Height: 5' 6\" (1.676 m)             Physical Exam     GENERAL:The patient has Body mass index is 23.57 kg/m². Well-hydrated. VITAL SIGNS: Heart rate, blood pressure, respiratory rate reviewed as recorded in  nurse's notes  EYES: Pupils reactive. Extraocular motion intact. No conjunctival redness or drainage. NECK: Supple, no meningeal signs. Trachea midline. No masses or thyromegaly. LUNGS: Breath sounds clear and equal bilaterally no accessory muscle use. CHEST: No deformity  CARDIOVASCULAR: Regular rate and rhythm  ABDOMEN: Soft with mild nonspecific tenderness. No palpable masses or organomegaly. No  peritoneal signs. No rigidity. EXTREMITIES: No clubbing or cyanosis. No joint swelling. Normal muscle tone. No  restricted range of motion appreciated. NEUROLOGIC: Sensation is grossly intact. Cranial nerve exam reveals face is  symmetrical, tongue is midline speech is clear. SKIN: No rash or petechiae. Good skin turgor palpated. PSYCHIATRIC: Alert and oriented. Appropriate behavior and judgment.       MDM  Number of Diagnoses or Management Options  Diagnosis management comments: Viral infection, gastroenteritis, viral adenitis, pseudomembranous colitis, inflammatory  bowel disease, infectious diarrhea    Abdominal wall pain,     Constipation, fecal impaction, small bowel obstruction, partial small bowel obstruction,  Ileus    UTI, pyelonephritis, renal colic, ureteral stone     Peptic ulcer disease, esophagitis, GERD    Pancreatitis, pancreatic pseudocyst,    hepatic cirrhosis, GI bleed, esophageal varices, poisoning,    gallbladder disease, cholecystitis, diverticulitis, appendicitis, appendicitis with rupture,    ingestion of foreign material         Amount and/or Complexity of Data Reviewed  Clinical lab tests: ordered and reviewed  Tests in the radiology section of CPT®: ordered and reviewed  Tests in the medicine section of CPT®: ordered and reviewed  Review and summarize past medical records: yes  Discuss the patient with other providers: yes  Independent visualization of images, tracings, or specimens: yes      ED Course as of 03/15/22 1230   Tue Mar 15, 2022   1229 Case was discussed with Dr. James Cuevas and they will admit the patient to the hospital. [KH]      ED Course User Index  [KH] Rj Bartlett DO       EKG    Date/Time: 3/15/2022 11:42 AM  Performed by: Rj Bartlett DO  Authorized by: Rj Bartlett DO     ECG reviewed by ED Physician in the absence of a cardiologist: yes    Previous ECG:     Previous ECG:  Compared to current    Similarity:  No change  Comments:      Wide-complex sinus rhythm at a rate of 84 bpm.

## 2022-03-15 NOTE — PROGRESS NOTES
Port accessed while in [facility] office.   Port remains accessed   Urgent transport via EMS from [facility] to [facility].

## 2022-03-16 LAB
ALBUMIN SERPL-MCNC: 2 G/DL (ref 3.2–4.6)
ALBUMIN/GLOB SERPL: 0.6 {RATIO} (ref 1.2–3.5)
ALP SERPL-CCNC: 372 U/L (ref 50–136)
ALT SERPL-CCNC: 26 U/L (ref 12–65)
ANION GAP SERPL CALC-SCNC: 9 MMOL/L (ref 7–16)
AST SERPL-CCNC: 25 U/L (ref 15–37)
ATRIAL RATE: 86 BPM
BASOPHILS # BLD: 0 K/UL (ref 0–0.2)
BASOPHILS NFR BLD: 0 % (ref 0–2)
BILIRUB SERPL-MCNC: 0.8 MG/DL (ref 0.2–1.1)
BUN SERPL-MCNC: 27 MG/DL (ref 8–23)
CALCIUM SERPL-MCNC: 8.5 MG/DL (ref 8.3–10.4)
CALCULATED P AXIS, ECG09: 17 DEGREES
CALCULATED R AXIS, ECG10: -40 DEGREES
CALCULATED T AXIS, ECG11: 141 DEGREES
CHLORIDE SERPL-SCNC: 100 MMOL/L (ref 98–107)
CO2 SERPL-SCNC: 23 MMOL/L (ref 21–32)
CREAT SERPL-MCNC: 1 MG/DL (ref 0.8–1.5)
DIAGNOSIS, 93000: NORMAL
DIFFERENTIAL METHOD BLD: ABNORMAL
EOSINOPHIL # BLD: 0 K/UL (ref 0–0.8)
EOSINOPHIL NFR BLD: 0 % (ref 0.5–7.8)
ERYTHROCYTE [DISTWIDTH] IN BLOOD BY AUTOMATED COUNT: 15.2 % (ref 11.9–14.6)
GLOBULIN SER CALC-MCNC: 3.3 G/DL (ref 2.3–3.5)
GLUCOSE SERPL-MCNC: 107 MG/DL (ref 65–100)
HBV CORE AB SERPL QL IA: NEGATIVE
HCT VFR BLD AUTO: 39.2 % (ref 41.1–50.3)
HGB BLD-MCNC: 12.8 G/DL (ref 13.6–17.2)
IMM GRANULOCYTES # BLD AUTO: 0.1 K/UL (ref 0–0.5)
IMM GRANULOCYTES NFR BLD AUTO: 1 % (ref 0–5)
LACTATE SERPL-SCNC: 1.8 MMOL/L (ref 0.4–2)
LYMPHOCYTES # BLD: 0.5 K/UL (ref 0.5–4.6)
LYMPHOCYTES NFR BLD: 5 % (ref 13–44)
MAGNESIUM SERPL-MCNC: 2.5 MG/DL (ref 1.8–2.4)
MCH RBC QN AUTO: 29.9 PG (ref 26.1–32.9)
MCHC RBC AUTO-ENTMCNC: 32.7 G/DL (ref 31.4–35)
MCV RBC AUTO: 91.6 FL (ref 79.6–97.8)
MONOCYTES # BLD: 1.6 K/UL (ref 0.1–1.3)
MONOCYTES NFR BLD: 19 % (ref 4–12)
NEUTS SEG # BLD: 6.7 K/UL (ref 1.7–8.2)
NEUTS SEG NFR BLD: 76 % (ref 43–78)
NRBC # BLD: 0 K/UL (ref 0–0.2)
P-R INTERVAL, ECG05: 144 MS
PHOSPHATE SERPL-MCNC: 3.7 MG/DL (ref 2.3–3.7)
PLATELET # BLD AUTO: 130 K/UL (ref 150–450)
PMV BLD AUTO: 9.7 FL (ref 9.4–12.3)
POTASSIUM SERPL-SCNC: 4.7 MMOL/L (ref 3.5–5.1)
PROT SERPL-MCNC: 5.3 G/DL (ref 6.3–8.2)
Q-T INTERVAL, ECG07: 404 MS
QRS DURATION, ECG06: 140 MS
QTC CALCULATION (BEZET), ECG08: 483 MS
RBC # BLD AUTO: 4.28 M/UL (ref 4.23–5.6)
SODIUM SERPL-SCNC: 132 MMOL/L (ref 138–145)
TRIGL SERPL-MCNC: 206 MG/DL (ref 35–150)
VENTRICULAR RATE, ECG03: 86 BPM
WBC # BLD AUTO: 8.9 K/UL (ref 4.3–11.1)

## 2022-03-16 PROCEDURE — 83605 ASSAY OF LACTIC ACID: CPT

## 2022-03-16 PROCEDURE — APPSS45 APP SPLIT SHARED TIME 31-45 MINUTES: Performed by: NURSE PRACTITIONER

## 2022-03-16 PROCEDURE — 84478 ASSAY OF TRIGLYCERIDES: CPT

## 2022-03-16 PROCEDURE — 74011000258 HC RX REV CODE- 258: Performed by: NURSE PRACTITIONER

## 2022-03-16 PROCEDURE — 74011000250 HC RX REV CODE- 250: Performed by: EMERGENCY MEDICINE

## 2022-03-16 PROCEDURE — 97165 OT EVAL LOW COMPLEX 30 MIN: CPT

## 2022-03-16 PROCEDURE — 74011250637 HC RX REV CODE- 250/637: Performed by: NURSE PRACTITIONER

## 2022-03-16 PROCEDURE — 74011250636 HC RX REV CODE- 250/636: Performed by: INTERNAL MEDICINE

## 2022-03-16 PROCEDURE — 97161 PT EVAL LOW COMPLEX 20 MIN: CPT

## 2022-03-16 PROCEDURE — 36415 COLL VENOUS BLD VENIPUNCTURE: CPT

## 2022-03-16 PROCEDURE — 84100 ASSAY OF PHOSPHORUS: CPT

## 2022-03-16 PROCEDURE — 74011250636 HC RX REV CODE- 250/636: Performed by: NURSE PRACTITIONER

## 2022-03-16 PROCEDURE — 97530 THERAPEUTIC ACTIVITIES: CPT

## 2022-03-16 PROCEDURE — 80053 COMPREHEN METABOLIC PANEL: CPT

## 2022-03-16 PROCEDURE — 83735 ASSAY OF MAGNESIUM: CPT

## 2022-03-16 PROCEDURE — 65270000015 HC RM PRIVATE ONCOLOGY

## 2022-03-16 PROCEDURE — 85025 COMPLETE CBC W/AUTO DIFF WBC: CPT

## 2022-03-16 PROCEDURE — 3331090002 HH PPS REVENUE DEBIT

## 2022-03-16 PROCEDURE — 93005 ELECTROCARDIOGRAM TRACING: CPT | Performed by: NURSE PRACTITIONER

## 2022-03-16 PROCEDURE — 86580 TB INTRADERMAL TEST: CPT | Performed by: INTERNAL MEDICINE

## 2022-03-16 PROCEDURE — 3331090001 HH PPS REVENUE CREDIT

## 2022-03-16 PROCEDURE — 74011000250 HC RX REV CODE- 250: Performed by: INTERNAL MEDICINE

## 2022-03-16 PROCEDURE — 99233 SBSQ HOSP IP/OBS HIGH 50: CPT | Performed by: INTERNAL MEDICINE

## 2022-03-16 RX ORDER — SODIUM CHLORIDE 9 MG/ML
30 INJECTION, SOLUTION INTRAVENOUS CONTINUOUS
Status: DISPENSED | OUTPATIENT
Start: 2022-03-16 | End: 2022-03-17

## 2022-03-16 RX ORDER — LOPERAMIDE HYDROCHLORIDE 2 MG/1
2 CAPSULE ORAL
Status: DISCONTINUED | OUTPATIENT
Start: 2022-03-16 | End: 2022-03-29

## 2022-03-16 RX ADMIN — SODIUM CHLORIDE, PRESERVATIVE FREE 5 ML: 5 INJECTION INTRAVENOUS at 14:00

## 2022-03-16 RX ADMIN — CALCIUM GLUCONATE: 98 INJECTION, SOLUTION INTRAVENOUS at 18:03

## 2022-03-16 RX ADMIN — MORPHINE SULFATE 2 MG: 4 INJECTION INTRAVENOUS at 03:40

## 2022-03-16 RX ADMIN — TUBERCULIN PURIFIED PROTEIN DERIVATIVE 5 UNITS: 5 INJECTION, SOLUTION INTRADERMAL at 10:11

## 2022-03-16 RX ADMIN — CEFEPIME HYDROCHLORIDE 2 G: 2 INJECTION, POWDER, FOR SOLUTION INTRAVENOUS at 10:12

## 2022-03-16 RX ADMIN — HYDROMORPHONE HYDROCHLORIDE 4 MG: 4 TABLET ORAL at 01:19

## 2022-03-16 RX ADMIN — VANCOMYCIN HYDROCHLORIDE 750 MG: 750 INJECTION, POWDER, LYOPHILIZED, FOR SOLUTION INTRAVENOUS at 18:01

## 2022-03-16 RX ADMIN — ONDANSETRON 4 MG: 2 INJECTION INTRAMUSCULAR; INTRAVENOUS at 09:03

## 2022-03-16 RX ADMIN — SODIUM CHLORIDE 75 ML/HR: 900 INJECTION, SOLUTION INTRAVENOUS at 10:20

## 2022-03-16 RX ADMIN — ENOXAPARIN SODIUM 40 MG: 100 INJECTION SUBCUTANEOUS at 18:19

## 2022-03-16 RX ADMIN — ONDANSETRON 4 MG: 2 INJECTION INTRAMUSCULAR; INTRAVENOUS at 05:43

## 2022-03-16 RX ADMIN — SODIUM CHLORIDE, PRESERVATIVE FREE 10 ML: 5 INJECTION INTRAVENOUS at 05:07

## 2022-03-16 RX ADMIN — CEFEPIME HYDROCHLORIDE 2 G: 2 INJECTION, POWDER, FOR SOLUTION INTRAVENOUS at 18:20

## 2022-03-16 RX ADMIN — CEFEPIME HYDROCHLORIDE 2 G: 2 INJECTION, POWDER, FOR SOLUTION INTRAVENOUS at 01:19

## 2022-03-16 RX ADMIN — MORPHINE SULFATE 2 MG: 4 INJECTION INTRAVENOUS at 18:19

## 2022-03-16 RX ADMIN — ONDANSETRON 4 MG: 2 INJECTION INTRAMUSCULAR; INTRAVENOUS at 20:25

## 2022-03-16 RX ADMIN — SODIUM CHLORIDE 30 ML/HR: 900 INJECTION, SOLUTION INTRAVENOUS at 18:00

## 2022-03-16 RX ADMIN — MORPHINE SULFATE 2 MG: 4 INJECTION INTRAVENOUS at 13:53

## 2022-03-16 RX ADMIN — ONDANSETRON 4 MG: 2 INJECTION INTRAMUSCULAR; INTRAVENOUS at 01:22

## 2022-03-16 RX ADMIN — SODIUM CHLORIDE, PRESERVATIVE FREE 10 ML: 5 INJECTION INTRAVENOUS at 21:53

## 2022-03-16 RX ADMIN — HYDROMORPHONE HYDROCHLORIDE 4 MG: 4 TABLET ORAL at 22:22

## 2022-03-16 RX ADMIN — VANCOMYCIN HYDROCHLORIDE 750 MG: 750 INJECTION, POWDER, LYOPHILIZED, FOR SOLUTION INTRAVENOUS at 05:07

## 2022-03-16 NOTE — PROGRESS NOTES
ACUTE OT GOALS:  (Developed with and agreed upon by patient and/or caregiver.)  1. Torrie Horton will be modified independent with functional mobility for ADL in room within 4 - 7 visits. 2. Torrie Horton will be modified independent with total body bathing and dressing within 4 - 7 visits. 3. Torrie Horton will state and demonstrate at least 5 energy conservation techniques during ADL/therapeutic activities within 4 - 7 visits. 4. Torrie Horton will voice a plan for 2-3 appropriate home modifications for home safety and fall prevention within 7 visits. 5. Torrie Horton will participate at least 30 minutes of ADL with 3 or less rest breaks within 7 visits. 6. Torrie Horton will complete a toilet transfer with modified independence within 7 visits. OCCUPATIONAL THERAPY ASSESSMENT: Initial Assessment and Daily Note OT Treatment Day # 1    Torrie Horton is a 78 y.o. male   PRIMARY DIAGNOSIS: <principal problem not specified>  FTT (failure to thrive) in adult [R62.7]  Hypotension [I95.9]        Reason for Referral:    ICD-10: Treatment Diagnosis: Generalized Muscle Weakness (M62.81)  INPATIENT: Payor: [facility] MEDICARE / Plan: [address] [place] [address] HMO / Product Type: Managed Care Medicare /   ASSESSMENT:     REHAB RECOMMENDATIONS:   Recommendation to date pending progress:  Setting:  Short-term Rehab  Equipment:   To Be Determined     PRIOR LEVEL OF FUNCTION:  (Prior to Hospitalization)  INITIAL/CURRENT LEVEL OF FUNCTION:  (Based on today's evaluation)   Bathing:  Modified Independent  Dressing:  Minimal Assistance  Feeding/Grooming:  Standby Assistance  Toileting:  Contact Guard Assistance  Functional Mobility:  Contact Guard Assistance Bathing:  Minimal Assistance  Dressing:  Minimal Assistance  Feeding/Grooming:  Set Up  Toileting:   Moderate Assistance  Functional Mobility:  Minimal Assistance     ASSESSMENT:  Mr. Casper Leigh has a history of metastatic cancer currently undergoing treatment under oncology. He lives alone with his dog, but receives assistance from children. Recent falls. Mr. Mame Monroy states he fatigues easily. Was wiped out from getting up earlier and sitting in the chair to eat. He was able to sit edge of bed and standing with CGA/SBA. He was left up sitting edge of bed with friend at bedside and all needs in reach. Ly Gongora presents with the following problems and would benefit from occupational therapy to maximize independence with self-care,instrumental activities of daily living, and functional transfers/mobility for activities of daily living/instrumental activities of daily living via the stated goals. SUBJECTIVE:   Mr. Mame Monroy states, \"I'm tired. \"    SOCIAL HISTORY/LIVING ENVIRONMENT: Lives alone in in home with 6 steps to enter. Walk-in shower. Receives assistance for ADL from family.   Home Environment: Private residence  One/Two Story Residence: One story  Living Alone: Yes  Support Systems: Child(betsy),Friend/Neighbor    OBJECTIVE:     PAIN: VITAL SIGNS: LINES/DRAINS:   Pre Treatment: Pain Screen  Pain Scale 1: Numeric (0 - 10)  Pain Intensity 1:  (no complaints of pain)  Post Treatment: no complaints of pain   IV  O2 Device: None (Room air)     GROSS EVALUATION:   Within Functional Limits Abnormal/ Functional Abnormal/ Non-Functional (see comments) Not Tested Comments:   AROM [] [x] [] []    PROM [] [x] [] []    Strength [] [x] [] []    Balance [] [x] [] []    Posture [] [x] [] []    Sensation [] [] [] [x]    Coordination [] [x] [] []    Tone [] [x] [] []    Edema [] [] [] [x]    Activity Tolerance [] [x] [] [] For light activity.    [] [] [] []      COGNITION/  PERCEPTION: Intact Impaired   (see comments) Comments:   Orientation [x] []    Vision [] []    Hearing [] []    Judgment/ Insight [] []    Attention [] []    Memory [] []    Command Following [x] []    Emotional Regulation [] []     [] []      ACTIVITIES OF DAILY LIVING: I Mod I S SBA CGA Min Mod Max Total NT Comments   BASIC ADLs:              Bathing/ Showering [] [] [] [] [] [] [] [] [] [x]    Toileting [] [] [] [] [] [] [] [] [] [x]    Dressing [] [] [] [] [] [] [] [] [] [x]    Feeding [] [] [] [] [] [] [] [] [] [x]    Grooming [] [] [] [] [] [] [] [] [] [x]    Personal Device Care [] [] [] [] [] [] [] [] [] [x]    Functional Mobility [] [] [] [] [x] [] [] [] [] []    I=Independent, Mod I=Modified Independent, S=Supervision, SBA=Standby Assistance, CGA=Contact Guard Assistance,   Min=Minimal Assistance, Mod=Moderate Assistance, Max=Maximal Assistance, Total=Total Assistance, NT=Not Tested    MOBILITY: I Mod I S SBA CGA Min Mod Max Total  NT x2 Comments:   Supine to sit [] [] [] [x] [] [] [] [] [] [] []    Sit to supine [] [] [] [x] [] [] [] [] [] [] []    Sit to stand [] [] [] [] [x] [] [] [] [] [] []    Bed to chair [] [] [] [] [] [] [] [] [] [x] []    I=Independent, Mod I=Modified Independent, S=Supervision, SBA=Standby Assistance, CGA=Contact Guard Assistance,   Min=Minimal Assistance, Mod=Moderate Assistance, Max=Maximal Assistance, Total=Total Assistance, NT=Not Tested    [employer] AM-PAC 6 Clicks   Daily Activity Inpatient Short Form        How much help from another person does the patient currently need. .. Total A Lot A Little None   1. Putting on and taking off regular lower body clothing? [] 1   [] 2   [x] 3   [] 4   2. Bathing (including washing, rinsing, drying)? [] 1   [] 2   [x] 3   [] 4   3. Toileting, which includes using toilet, bedpan or urinal?   [] 1   [] 2   [x] 3   [] 4   4. Putting on and taking off regular upper body clothing? [] 1   [] 2   [x] 3   [] 4   5. Taking care of personal grooming such as brushing teeth? [] 1   [] 2   [x] 3   [] 4   6. Eating meals? [] 1   [] 2   [x] 3   [] 4   © 2007, Trustees of [facility] [employer], under license to [employer].  All rights reserved     Score:  Initial: 18 Most Recent: X (Date: -- )   Interpretation of Tool:  Represents activities that are increasingly more difficult (i.e. Bed mobility, Transfers, Gait). PLAN:   FREQUENCY/DURATION: OT Plan of Care: 3 times/week for duration of hospital stay or until stated goals are met, whichever comes first.    PROBLEM LIST:   (Skilled intervention is medically necessary to address:)  Decreased ADL/Functional Activities  Decreased Activity Tolerance  Decreased AROM/PROM  Decreased Balance  Decreased Coordination  Decreased Strength  Decreased Transfer Abilities  Increased Pain   INTERVENTIONS PLANNED:   (Benefits and precautions of occupational therapy have been discussed with the patient.)  Self Care Training  Therapeutic Activity  Therapeutic Exercise/HEP  Neuromuscular Re-education  Education     TREATMENT:     EVALUATION: Low Complexity : (Untimed Charge)    TREATMENT:   (     )  No treatment rendered.     TREATMENT GRID:  N/A    AFTER TREATMENT POSITION/PRECAUTIONS:  Bed, Needs within reach, RN notified, and Visitors at bedside    INTERDISCIPLINARY COLLABORATION:  RN/PCT and OT/VILLAREAL    TOTAL TREATMENT DURATION:  OT Patient Time In/Time Out  Time In: 1300  Time Out: Alda Anne Laura [address], OTD, OTR/L

## 2022-03-16 NOTE — PROGRESS NOTES
LOS 1d   Admission: Failure to Thrive  Patient transferred from ED. PT/OT evals and PPD ordered for discharge planning. Patient does live alone has a friend Jacinto Villanueva that assist with transportation if needed. Will continue to follow for discharge planning needs  Please consult  if any new issues arise      Discharge plan is pending PT/OT zeb.   HH vs [facility]

## 2022-03-16 NOTE — PROGRESS NOTES
END OF SHIFT NOTE:    Intake/Output  No intake/output data recorded. Voiding: YES  Catheter: NO  Drain:   Drain PleurX peritoneal drain 03/03/22 Right; Outer Abdomen (Active)   Site Assessment Clean, dry, & intact 03/16/22 1848   Dressing Status Clean, dry, & intact 03/16/22 1848   Output (ml) 2000 ml 03/16/22 1848               Stool:  2 occurrences. Stool Assessment  Stool Color: Gilma Pastor (03/16/22 0558)  Stool Appearance: Watery (per pt ) (03/16/22 0800)  Stool Amount: Medium (03/16/22 0558)    Emesis:  0 occurrences. VITAL SIGNS  Patient Vitals for the past 12 hrs:   Temp Pulse Resp BP SpO2   03/16/22 1520 97.7 °F (36.5 °C) 76 -- 100/78 100 %   03/16/22 1156 97.9 °F (36.6 °C) 77 18 115/77 --   03/16/22 0752 97.4 °F (36.3 °C) 79 20 118/79 --       Pain Assessment  Pain 1  Pain Scale 1: Numeric (0 - 10) (03/16/22 1819)  Pain Intensity 1: 7 (03/16/22 1819)  Patient Stated Pain Goal: 0 (03/16/22 0800)  Pain Reassessment 1: Patient resting w/respiratory rate greater than 10 (03/16/22 0800)  Pain Location 1: Abdomen (03/16/22 1819)  Pain Orientation 1: Upper (03/16/22 0327)  Pain Description 1: Aching (03/16/22 1819)  Pain Intervention(s) 1: Medication (see MAR) (03/16/22 1819)    Ambulating  Yes    Additional Information: plurex drained today     Shift report given to oncoming nurse at the bedside.     Loleta Lombard, RN

## 2022-03-16 NOTE — PROGRESS NOTES
Problem: Falls - Risk of  Goal: *Absence of Falls  Description: Document [place] Fall Risk and appropriate interventions in the flowsheet.   Outcome: Progressing Towards Goal  Note: Fall Risk Interventions:  Mobility Interventions: Utilize walker, cane, or other assistive device         Medication Interventions: Teach patient to arise slowly    Elimination Interventions: Call light in reach

## 2022-03-16 NOTE — CONSULTS
Comprehensive Nutrition Assessment    Type and Reason for Visit: Initial,Consult  TPN Management (Oncology)    Nutrition Recommendations/Plan:   Parenteral Nutrition:  Total parenteral nutrition  to begin at 1800  Initiate: Dex 10% , 4.25% AA 1 L (45ml/hr)   Hold 250 ml 20% lipids daily  To provide: 510 kcal/d (30% of needs), 43 grams of protein/d (53% of needs), 100 grams of CHO/d and 1000 ml of total volume/d. Lytes/L:   Sodium 150 meq (150 meq NaCl), Potassium omit meq (0 meq KCl, 0 meq KPO4), omit Mg, 4.5 meq Calcium  Other additives: MTE, MVI MWF due to national shortages, Thiamine 100 mg (day 1/7 due to refeeding risk)  IVF:  Decrease to 30  ml/hr at 1800  Nutritional Supplement Therapy:   Implement electrolyte replacement per nutrition support protocols  Replacement indicated:  None  Labs:   CMP daily per primary  Mg daily per primary  Phos tomorrow and MWF    Triglyceride tomorrow  POC Glucoses/SSI Not indicated  Meals and Snacks:  Continue current diet.    Nutrition Supplement Therapy:   Medical food supplement therapy:  Initiate Ensure Clear three times per day (this provides 240 kcal and 8 grams protein per bottle) - h/o poor intake of regular Ensure products, reports does not like taste of Magic cup     Malnutrition Assessment:  Malnutrition Status: Severe malnutrition  Context: Chronic illness  Findings of clinical characteristics of malnutrition:   Energy Intake:  7 - 75% or less est energy requirements for 1 month or longer  Weight Loss:  7.0 - Greater than 7.5% over 3 months (34# (18.4%) )     Body Fat Loss:  1 - Mild body fat loss, Buccal region,Orbital,Triceps   Muscle Mass Loss:  1 - Mild muscle mass loss, Calf (gastrocnemius),Hand (interosseous),Scapula (trapezius),Temples (temporalis)  Fluid Accumulation:  No significant fluid accumulation,     Strength:  Not performed       Nutrition Assessment:   Nutrition History: Per RD assessment patient was able to maintain PO intake and UBW throughout most of chemo. During admmission early March patient had \"stopped eating\" due to nausea. Upon assessment this admission patient reports no PO of foods for ~4 weeks. States that he has been able to tolerate some fluids. He reports continued nausea and vomiting as primary barrier. Nutrition Background: Patient with pancreatic and esophageal cancer, Amos's esophagus, obesity, HTN, GERD, gastritis, ascites and peritoneal carcinomatosis s/p Plurex drain. He presented to oncology office extremely sick, BP was unable to be measured. He was admitted directly to [facility]. Nutrition Interval:  Patient seen reclined in bed. He does not talk much. Indicated with fingers continued poor PO and practically no solids for 4 weeks. When asked if he gets hungry, he shakes his head \"no. \" RN and PCT report bites of crackers and sips of tea this am. Discussed IV nutrition and patient denies any questions. Abdominal Status (last documented): Distended,Nausea abdomen with   bowel sounds. Last BM 03/15/22. Pertinent Medications: Maxipime, Pepcid, Lactulose - held, Zofran, Protonix, Vanco  IVF: NS @ 75 ml/hr  Pertinent Labs:   Lab Results   Component Value Date/Time    Sodium 132 (L) 03/16/2022 03:32 AM    Potassium 4.7 03/16/2022 03:32 AM    Chloride 100 03/16/2022 03:32 AM    CO2 23 03/16/2022 03:32 AM    Anion gap 9 03/16/2022 03:32 AM    Glucose 107 (H) 03/16/2022 03:32 AM    BUN 27 (H) 03/16/2022 03:32 AM    Creatinine 1.00 03/16/2022 03:32 AM    Calcium 8.5 03/16/2022 03:32 AM    Albumin 2.0 (L) 03/16/2022 03:32 AM    Magnesium 2.5 (H) 03/16/2022 03:32 AM    Phosphorus 3.7 03/16/2022 03:32 AM     Lab Results   Component Value Date/Time    Triglyceride 206 (H) 03/16/2022 03:32 AM   Labs remarkable for hyponatreamia, K upper end normal, glucose slightly elevated, Mg elevated, Phos upper end normal, TG elevated.  Suspect labs abnormal due to dehydration due to prolonged poor PO intake with vomiting and per review of lab history. Nutrition Related Findings:   Port in place, also with 1 PIV. Current Nutrition Therapies:  ADULT DIET Regular    Current Intake:   Average Meal Intake: 1-25%        Anthropometric Measures:  Height: 5' 6\" (167.6 cm)  Current Body Wt: 67.4 kg (148 lb 9.4 oz) (3/15), Weight source: Standing scale (oncology office)  BMI: 24, Normal weight (BMI 22.0-24.9) age over 72  Admission Body Weight: 148 lb 9.4 oz  Ideal Body Weight (lbs) (Calculated): 142 lbs (65 kg), 104.6 %  Usual Body Wt: 82.6 kg (182 lb) (12/14/21 office wt and per previous history), Percent weight change: -18.4          Edema: No data recorded   Estimated Daily Nutrient Needs:  Energy (kcal/day): 4197-9331 (Kcal/kg (25-30), Weight Used: Current (67.4 kg (3/15))  Protein (g/day): 81-88 (1.2-1.3 g/kg) Weight Used: (Current)  Fluid (ml/day):   (1 ml/kcal)    Nutrition Diagnosis:   · Inadequate oral intake related to  (nausea, vomiting) as evidenced by  (reportes barriers to PO, recall, wt loss)    · Severe malnutrition related to catabolic illness as evidenced by  (malnutrition criteria as above)    Nutrition Interventions:   Food and/or Nutrient Delivery: Continue current diet,Start oral nutrition supplement,Start parenteral nutrition     Coordination of Nutrition Care: Continue to monitor while inpatient  Plan of Care discussed with Srinivas Stoll RN    Goals: Active Goal: Tolerate TPN at goal within 3 days    Nutrition Monitoring and Evaluation:      Food/Nutrient Intake Outcomes: Food and nutrient intake,Supplement intake,Parenteral nutrition intake/tolerance  Physical Signs/Symptoms Outcomes: Biochemical data,GI status,Hemodynamic status,Weight    Discharge Planning:     Too soon to determine    [address] [place], LD on 3/16/2022 at 11:21 AM  Contact: [phone]

## 2022-03-16 NOTE — PROGRESS NOTES
ACUTE PHYSICAL THERAPY GOALS:  (Developed with and agreed upon by patient and/or caregiver. )  LTG:  (1.)Mr. Gonzalez will move from supine to sit and sit to supine  in bed with MODIFIED INDEPENDENCE within 7 treatment day(s). (2.)Mr. Gonzalez will transfer from bed to chair and chair to bed with MODIFIED INDEPENDENCE using the least restrictive device within 7 treatment day(s). (3.)Mr. Gonzalez will ambulate with MODIFIED INDEPENDENCE for 200 feet with the least restrictive device within 7 treatment day(s). (4.)Mr. Gonzalez will perform standing static and dynamic balance activities x 23 minutes with SUPERVISION to improve safety within 7 treatment day(s). (5.)Mr. Gonzalez will ambulate and/or perform functional activities for  23 consecutive minutes with stable vital signs and no rests required to improve activity tolerance within 7 treatment days  (6.) Caden Thompson will ascend/descend about 6 steps with SUPERVISION using least restrictive device within 7 treatment days. .  ________________________________________________________________________________________________        PHYSICAL THERAPY ASSESSMENT: Initial Assessment, Daily Note and AM PT Treatment Day # 1      Caden Thompson is a 78 y.o. male   PRIMARY DIAGNOSIS: <principal problem not specified>  FTT (failure to thrive) in adult [R62.7]  Hypotension [I95.9]       Reason for Referral:  Generalized weakness  ICD-10: Treatment Diagnosis: Generalized Muscle Weakness (M62.81)  Other abnormalities of gait and mobility (R26.89)  History of falling (Z91.81)  INPATIENT: Payor: [employer] MEDICARE / Plan: [facility] [employer] MEDICARE HMO / Product Type: Managed Care Medicare /     ASSESSMENT:     REHAB RECOMMENDATIONS:   Recommendation to date pending progress:  Setting:   Short-term Rehab   vs HHPT pending progress with therapy  Equipment:    3 in 1 Bedside Commode     PRIOR LEVEL OF FUNCTION:  (Prior to Hospitalization) INITIAL/CURRENT LEVEL OF FUNCTION:  (Most Recently Demonstrated)   Bed Mobility:   Independent  Sit to Stand:   Independent  Transfers:   Modified Independent  Gait/Mobility:   Modified Independent Bed Mobility:   Standby Assistance to CGA  Sit to Stand:   Contact Guard Assistance  Transfers:   Contact Guard Assistance  Gait/Mobility:   Contact Guard Assistance     ASSESSMENT:   Mr. Jeanne Sandoval is a 78year old M who presents admitted to hospital with abdominal pain/nausea and decreased appetite and hx of  Metastatic cancer. Pt states he lives alone in 1 level home with 6 LAURIE and has his son who lives behind him to help him dress and daughter, niece and nephew lives close by as well to assist with meals. Pt states he drives himself to the hospital and back and has a walk-in shower, no seat, raised toilet seat and RW that he uses since recent falls. This date pt performs mobility including supine<>sit with SBA/CGA and STS with CGA to RW and gait in room x20ft with slight impulsivity and cues for activity pacing needed. Good/fair+ balance noted standing, but fatigued with the small amount of activity. Pt left seated upright in chair with needs in reach and RN notified that patient is mobile and RW left outside of room for transfers. Pt presents as functioning below his baseline, with deficits in mobility including transfers, gait, balance, and activity tolerance. Pt will benefit from skilled therapy services to address stated deficits to promote return to highest level of function, independence, and safety. HHPT vs STR recommended pending progress. Will continue to follow. SUBJECTIVE:   Mr. Jeanne Sandoval states, \"So I have to get up and back down again. This is going to wear me out\"    SOCIAL HISTORY/LIVING ENVIRONMENT: lives alone with dog in 1 level home with 6 LAURIE, has walk-in shower, no seat/bench, has raised toilet seat, has RW for gait. PLOF assist for dressing, able to bathe independently.   Home Environment: Private residence  One/Two Story Residence: One story  Living Alone: Yes  Support Systems: Child(betsy),Friend/Neighbor  OBJECTIVE:     PAIN: VITAL SIGNS: LINES/DRAINS:   Pre Treatment:    Post Treatment: 0   IV  O2 Device: None (Room air)     GROSS EVALUATION:  B LE Within Functional Limits Abnormal/ Functional Abnormal/ Non-Functional (see comments) Not Tested Comments:   AROM [x] [] [] []    PROM [] [] [] []    Strength [] [x] [] [] Generalized weakness   Balance [x] [x] [] []    Posture [] [x] [] [] Rounded shoulders   Sensation [] [] [] []    Coordination [] [] [] []    Tone [] [] [] []    Edema [] [] [] []    Activity Tolerance [] [x] [] [] Fatigued with minimal activity    [] [] [] []      COGNITION/  PERCEPTION: Intact Impaired   (see comments) Comments:   Orientation [x] []    Vision [x] []    Hearing [x] []    Command Following [x] []    Safety Awareness [x] []     [] []      MOBILITY: I Mod I S SBA CGA Min Mod Max Total  NT x2 Comments:   Bed Mobility    Rolling [] [] [] [x] [] [] [] [] [] [] []    Supine to Sit [] [] [] [x] [x] [] [] [] [] [] []    Scooting [] [] [] [x] [] [] [] [] [] [] []    Sit to Supine [] [] [] [x] [] [] [] [] [] [] []    Transfers    Sit to Stand [] [] [] [] [x] [] [] [] [] [] []    Bed to Chair [] [] [] [] [x] [] [] [] [] [] []    Stand to Sit [] [] [] [] [x] [] [] [] [] [] []    I=Independent, Mod I=Modified Independent, S=Supervision, SBA=Standby Assistance, CGA=Contact Guard Assistance,   Min=Minimal Assistance, Mod=Moderate Assistance, Max=Maximal Assistance, Total=Total Assistance, NT=Not Tested  GAIT: I Mod I S SBA CGA Min Mod Max Total  NT x2 Comments:   Level of Assistance [] [] [] [] [x] [] [] [] [] [] []    Distance 20ft    DME Rolling Walker    Gait Quality Quick paced, shuffled    Weightbearing Status N/A     I=Independent, Mod I=Modified Independent, S=Supervision, SBA=Standby Assistance, CGA=Contact Guard Assistance,   Min=Minimal Assistance, Mod=Moderate Assistance, Max=Maximal Assistance, Total=Total Assistance, NT=Not Tested    [address] [place] [address] AM-PAC [facility] Form       How much difficulty does the patient currently have. .. Unable A Lot A Little None   1. Turning over in bed (including adjusting bedclothes, sheets and blankets)? [] 1   [] 2   [] 3   [x] 4   2. Sitting down on and standing up from a chair with arms ( e.g., wheelchair, bedside commode, etc.)   [] 1   [] 2   [] 3   [x] 4   3. Moving from lying on back to sitting on the side of the bed? [] 1   [] 2   [] 3   [x] 4   How much help from another person does the patient currently need. .. Total A Lot A Little None   4. Moving to and from a bed to a chair (including a wheelchair)? [] 1   [] 2   [x] 3   [] 4   5. Need to walk in hospital room? [] 1   [] 2   [x] 3   [] 4   6. Climbing 3-5 steps with a railing? [] 1   [] 2   [x] 3   [] 4   © 2007, Trustees of [address] [place] [address], under license to [employer]. All rights reserved     Score:  Initial: 21 Most Recent: X (Date: -- )    Interpretation of Tool:  Represents activities that are increasingly more difficult (i.e. Bed mobility, Transfers, Gait). PLAN:   FREQUENCY/DURATION: PT Plan of Care: 3 times/week for duration of hospital stay or until stated goals are met, whichever comes first.    PROBLEM LIST:   (Skilled intervention is medically necessary to address:)  1. Decreased ADL/Functional Activities  2. Decreased Activity Tolerance  3. Decreased Balance  4. Decreased Gait Ability  5. Decreased Strength  6. Decreased Transfer Abilities   INTERVENTIONS PLANNED:   (Benefits and precautions of physical therapy have been discussed with the patient.)  1. Therapeutic Activity  2. Therapeutic Exercise/HEP  3. Neuromuscular Re-education  4. Gait Training  5.  Education     TREATMENT:     EVALUATION: Low Complexity : (Untimed Charge)    TREATMENT:   ($$ Therapeutic Activity: 8-22 mins    )  Therapeutic Activity (10 Minutes): Therapeutic activity included Rolling, Supine to Sit, Sit to Supine, Scooting, Transfer Training, Ambulation on level ground, Sitting balance  and Standing balance to improve functional Mobility, Strength and Activity tolerance.     TREATMENT GRID:  N/A    AFTER TREATMENT POSITION/PRECAUTIONS:  Chair, Needs within reach and RN notified    INTERDISCIPLINARY COLLABORATION:  RN/PCT and PT/PTA    TOTAL TREATMENT DURATION:  PT Patient Time In/Time Out  Time In: 1103  Time Out: [address]

## 2022-03-16 NOTE — PROGRESS NOTES
VANCO DAILY FOLLOW UP NOTE  [address] Pharmacokinetic Monitoring Service - Vancomycin    Consulting Provider: Gume Draper NP   Indication: sepsis  Target Concentration: Goal AUC/LETY 400-600 mg*hr/L  Day of Therapy: 2  Additional Antimicrobials: Cefepime    Pertinent Laboratory Values: Wt Readings from Last 1 Encounters:   03/15/22 67.4 kg (148 lb 8 oz)     Temp Readings from Last 1 Encounters:   03/16/22 97.9 °F (36.6 °C)     No components found for: PROCAL  Recent Labs     03/16/22  0332 03/16/22  0131 03/15/22  1935 03/15/22  1333 03/15/22  1145 03/15/22  1045 03/15/22  0831   BUN 27*  --   --   --   --  25* 25*   CREA 1.00  --   --   --   --  0.90 1.30   WBC 8.9  --   --   --   --  11.0 10.9   LAC  --  1.8 2.3* 2.6*   < >  --   --     < > = values in this interval not displayed. Estimated Creatinine Clearance: 64.7 mL/min (based on SCr of 1 mg/dL). No results found for: Tez Perez    MRSA Nasal Swab: N/A. Non-respiratory infection. .      Assessment:  Date/Time Dose Concentration AUC         Note: Serum concentrations collected for AUC dosing may appear elevated if collected in close proximity to the dose administered, this is not necessarily an indication of toxicity    Plan:  Dosing per [address] Continue 750 mg q12h.     Repeat vancomycin concentrations will be ordered as clinically appropriate   Pharmacy will continue to monitor patient and adjust therapy as indicated    Thank you for the consult,  Geoffrey Malcolm, PharmD, BCOP  Clinical Pharmacist  Contact via [employer]

## 2022-03-16 NOTE — PROGRESS NOTES
[facility] Hematology & Oncology        Inpatient Hematology / Oncology Progress Note      Admission Date: 3/15/2022 10:28 AM  Reason for Admission/Hospital Course: FTT (failure to thrive) in adult [R62.7]  Hypotension [I95.9]      24 Hour Events:  VSS - BP labile  TPN to start today  Nausea/vomiting overnight  Some diarrhea  [place]/abraxane when clinically stable - hopefully tomorrow      ROS:  Constitutional: +weakness/fatigue. Negative for fever, chills, malaise. CV: Negative for chest pain, palpitations, edema. Respiratory: Negative for dyspnea, cough, wheezing. GI: +N/V, diarrhea. Negative for abdominal pain. 10 point review of systems is otherwise negative with the exception of the elements mentioned above in the HPI. No Known Allergies    OBJECTIVE:  Patient Vitals for the past 8 hrs:   BP Temp Pulse Resp SpO2   22 0433 112/87 -- 81 -- --   22 0327 (!) 170/110 98 °F (36.7 °C) (!) 43 18 99 %     Temp (24hrs), Av.9 °F (36.6 °C), Min:97.2 °F (36.2 °C), Max:98.2 °F (36.8 °C)    No intake/output data recorded. Physical Exam:  Constitutional: Well developed, well nourished male in no acute distress, sitting comfortably in the hospital bed. HEENT: Normocephalic and atraumatic. Oropharynx is clear, mucous membranes are moist.  Pupils are equal, round, and reactive to light. Extraocular muscles are intact. Sclerae anicteric. Neck supple without JVD. No thyromegaly present. Lymph node   No palpable submandibular, cervical, supraclavicular, axillary or inguinal lymph nodes. Skin Warm and dry. No bruising and no rash noted. No erythema. No pallor. Respiratory Lungs are clear to auscultation bilaterally without wheezes, rales or rhonchi, normal air exchange without accessory muscle use. CVS Normal rate, regular rhythm and normal S1 and S2. No murmurs, gallops, or rubs. Abdomen Soft, nontender and nondistended, normoactive bowel sounds. No palpable mass.   No hepatosplenomegaly. Neuro Grossly nonfocal with no obvious sensory or motor deficits. MSK Normal range of motion in general.  No edema and no tenderness. Psych Appropriate mood and affect.         Labs:      Recent Labs     03/16/22  0332 03/15/22  1045 03/15/22  0831   WBC 8.9 11.0 10.9   RBC 4.28 4.99 5.53   HGB 12.8* 15.0 16.3   HCT 39.2* 44.9 50.6   MCV 91.6 90.0 91.5   MCH 29.9 30.1 29.5   MCHC 32.7 33.4 32.2   RDW 15.2* 15.1* 15.5*   * 164 185   GRANS 76 79* 75   LYMPH 5* 3* 5*   MONOS 19* 17* 18*   EOS 0* 0* 1   BASOS 0 0 0   IG 1 1 1   DF AUTOMATED AUTOMATED AUTOMATED   ANEU 6.7 8.7* 8.1   ABL 0.5 0.4* 0.6   ABM 1.6* 1.8* 2.0*   JONNY 0.0 0.0 0.1   ABB 0.0 0.0 0.0   AIG 0.1 0.1 0.1        Recent Labs     03/16/22  0332 03/15/22  1045 03/15/22  0831   * 128* 126*   K 4.7 5.1 5.7*    95* 92*   CO2 23 24 24   AGAP 9 9 10   * 124* 158*   BUN 27* 25* 25*   CREA 1.00 0.90 1.30   GFRAA >60 >60 >60   GFRNA >60 >60 59*   CA 8.5 8.9 9.6   * 439* 511*   TP 5.3* 6.0* 7.0   ALB 2.0* 2.2* 2.6*   GLOB 3.3 3.8* 4.4*   AGRAT 0.6* 0.6* 0.6*   MG 2.5* 2.4  --          Imaging:    Medications:  Current Facility-Administered Medications   Medication Dose Route Frequency    sodium chloride (NS) flush 5-10 mL  5-10 mL IntraVENous Q8H    sodium chloride (NS) flush 5-10 mL  5-10 mL IntraVENous PRN    diphenoxylate-atropine (LOMOTIL) tablet 1 Tablet  1 Tablet Oral QID PRN    famotidine (PEPCID) tablet 20 mg  20 mg Oral BID    HYDROmorphone (DILAUDID) tablet 4 mg  4 mg Oral Q4H PRN    lactulose (CHRONULAC) 10 gram/15 mL solution 45 mL  30 g Oral TID    pantoprazole (PROTONIX) tablet 40 mg  40 mg Oral ACB    promethazine (PHENERGAN) tablet 25 mg  25 mg Oral Q6H PRN    tamsulosin (FLOMAX) capsule 0.4 mg  0.4 mg Oral DAILY    0.9% sodium chloride infusion  75 mL/hr IntraVENous CONTINUOUS    enoxaparin (LOVENOX) injection 40 mg  40 mg SubCUTAneous Q24H    ondansetron (ZOFRAN) injection 4 mg 4 mg IntraVENous Q4H PRN    morphine injection 2 mg  2 mg IntraVENous Q4H PRN    cefepime (MAXIPIME) 2 g in 0.9% sodium chloride (MBP/ADV) 100 mL MBP  2 g IntraVENous Q8H    vancomycin (VANCOCIN) 750 mg in 0.9% sodium chloride 250 mL ([id number])  750 mg IntraVENous Q12H         ASSESSMENT:    Problem List  Date Reviewed: 3/15/2022          Codes Class Noted    FTT (failure to thrive) in adult ICD-10-CM: R62.7  ICD-9-CM: 783.7  3/15/2022        Hypotension ICD-10-CM: I95.9  ICD-9-CM: 458.9  3/15/2022        Severe protein-calorie malnutrition ([facility] ICD-10-CM: E43  ICD-9-CM: 482  3/4/2022        Pancreatic cancer ([facility] ICD-10-CM: C25.9  ICD-9-CM: 157.9  3/2/2022        Ascites ICD-10-CM: R18.8  ICD-9-CM: 789.59  3/2/2022        Admission for antineoplastic chemotherapy ICD-10-CM: Z51.11  ICD-9-CM: V58.11  3/2/2022        Hypomagnesemia ICD-10-CM: E83.42  ICD-9-CM: 275.2  4/23/2021        Hypokalemia ICD-10-CM: E87.6  ICD-9-CM: 276.8  4/20/2021        CINV (chemotherapy-induced nausea and vomiting) ICD-10-CM: R11.2, T45.1X5A  ICD-9-CM: 787.01, E933.1  4/20/2021        Dehydration ICD-10-CM: E86.0  ICD-9-CM: 276.51  4/12/2021        Other neutropenia ([facility] ICD-10-CM: D70.8  ICD-9-CM: 288.09  3/9/2021        Malignant neoplasm of lower third of esophagus (HCC) ICD-10-CM: C15.5  ICD-9-CM: 150.5  1/5/2021        Malignant neoplasm of body of pancreas ([facility] ICD-10-CM: C25.1  ICD-9-CM: 157.1  1/5/2021        Severe obesity ([facility] ICD-10-CM: E66.01  ICD-9-CM: 278.01  12/10/2020        Elevated glucose ICD-10-CM: R73.09  ICD-9-CM: 790.29  7/22/2019        Anemia ICD-10-CM: D64.9  ICD-9-CM: 285.9  7/22/2019        Chronic left-sided low back pain ICD-10-CM: M54.50, G89.29  ICD-9-CM: 724.2, 338.29  7/22/2019              78 y.o.  M pancreatic cancer and esophageal cancer with malignant ascites of previously controlled FOLFIRINOX but currently disease progressed and most recently admited on 3/2/2022 to place Pleurx and arrange gemcitabine/Abraxane, had extensive discussion with inpatient team and pharmacy, patient was discharged on 3/7/2022 without chemo appointment and return to office on 3/15/2022, extremely sick and blood pressure not measurable in the office, and not been having much oral intake since discharge, urgently establish IV access and called EMS to take to ER to stabilize and admit to hospital, apparently needed much supportive care and volume resuscitation, start TPN until oral intake can improve and give gemcitabine/Abraxane once clinically stable, discussed with the inpatient team.    PLAN:    Metastatic pancreatic cancer / distal esophageal cancer  - Metastatic disease involving peritoneum, malignant ascites  - s/p 24 cycles of FOLFIRINOX with SD and recent POD with increasing CEA/ and malignant ascites  - Plan for gemcitabine/abraxane when clinically stable - hopefully tomorrow. Malignant ascites  - Has abdominal Pleurx, drain three times weekly    Cancer related pain  - Continue home dilaudid    Hypotension / poor PO intake / protein-calorie malnutrition  - Consult RD for TPN  - BC pending, started on Cefe/Vanc and may be able to de-escalate soon as hypotension likely secondary to dehydration. LA improved after IFV. Nausea  - Continue PRN antiemetics    Diarrhea  - Antidiarrheals PRN    Bradycardia / Hypertension  - One documented episode of HR 43 - check EKG  - No hx of hypertension, monitor    Continue home meds  Apoorva SOPs  VTE prophylaxis - Lovenox (hold for plts <50k)    Dispo - too soon to determine. Goals and plan of care reviewed with the patient. All questions answered to the best of our ability.             Yohan Hays  Hematology & Oncology  [address] [address]  Office : [phone]  Fax : [phone]

## 2022-03-17 ENCOUNTER — HOME CARE VISIT (OUTPATIENT)
Dept: HOME HEALTH SERVICES | Facility: HOME HEALTH | Age: 67
End: 2022-03-17
Payer: MEDICARE

## 2022-03-17 LAB
ALBUMIN SERPL-MCNC: 1.9 G/DL (ref 3.2–4.6)
ALBUMIN/GLOB SERPL: 0.6 {RATIO} (ref 1.2–3.5)
ALP SERPL-CCNC: 315 U/L (ref 50–136)
ALT SERPL-CCNC: 20 U/L (ref 12–65)
ANION GAP SERPL CALC-SCNC: 5 MMOL/L (ref 7–16)
AST SERPL-CCNC: 23 U/L (ref 15–37)
ATRIAL RATE: 78 BPM
BACTERIA SPEC CULT: NORMAL
BASOPHILS # BLD: 0 K/UL (ref 0–0.2)
BASOPHILS NFR BLD: 0 % (ref 0–2)
BILIRUB SERPL-MCNC: 0.4 MG/DL (ref 0.2–1.1)
BUN SERPL-MCNC: 22 MG/DL (ref 8–23)
CALCIUM SERPL-MCNC: 8.4 MG/DL (ref 8.3–10.4)
CALCULATED P AXIS, ECG09: 36 DEGREES
CALCULATED R AXIS, ECG10: -41 DEGREES
CALCULATED T AXIS, ECG11: 140 DEGREES
CHLORIDE SERPL-SCNC: 104 MMOL/L (ref 98–107)
CO2 SERPL-SCNC: 24 MMOL/L (ref 21–32)
CREAT SERPL-MCNC: 1 MG/DL (ref 0.8–1.5)
DIAGNOSIS, 93000: NORMAL
DIFFERENTIAL METHOD BLD: ABNORMAL
EOSINOPHIL # BLD: 0.1 K/UL (ref 0–0.8)
EOSINOPHIL NFR BLD: 1 % (ref 0.5–7.8)
ERYTHROCYTE [DISTWIDTH] IN BLOOD BY AUTOMATED COUNT: 15.2 % (ref 11.9–14.6)
GLOBULIN SER CALC-MCNC: 3.4 G/DL (ref 2.3–3.5)
GLUCOSE SERPL-MCNC: 113 MG/DL (ref 65–100)
HCT VFR BLD AUTO: 38.1 % (ref 41.1–50.3)
HGB BLD-MCNC: 12.2 G/DL (ref 13.6–17.2)
IMM GRANULOCYTES # BLD AUTO: 0.1 K/UL (ref 0–0.5)
IMM GRANULOCYTES NFR BLD AUTO: 1 % (ref 0–5)
LYMPHOCYTES # BLD: 0.5 K/UL (ref 0.5–4.6)
LYMPHOCYTES NFR BLD: 6 % (ref 13–44)
MAGNESIUM SERPL-MCNC: 2.3 MG/DL (ref 1.8–2.4)
MCH RBC QN AUTO: 29.7 PG (ref 26.1–32.9)
MCHC RBC AUTO-ENTMCNC: 32 G/DL (ref 31.4–35)
MCV RBC AUTO: 92.7 FL (ref 79.6–97.8)
MM INDURATION POC: 0 MM (ref 0–5)
MONOCYTES # BLD: 1.6 K/UL (ref 0.1–1.3)
MONOCYTES NFR BLD: 19 % (ref 4–12)
NEUTS SEG # BLD: 6.4 K/UL (ref 1.7–8.2)
NEUTS SEG NFR BLD: 74 % (ref 43–78)
NRBC # BLD: 0 K/UL (ref 0–0.2)
P-R INTERVAL, ECG05: 144 MS
PHOSPHATE SERPL-MCNC: 1.9 MG/DL (ref 2.3–3.7)
PLATELET # BLD AUTO: 114 K/UL (ref 150–450)
PMV BLD AUTO: 9.8 FL (ref 9.4–12.3)
POTASSIUM SERPL-SCNC: 4.5 MMOL/L (ref 3.5–5.1)
PPD POC: NEGATIVE NEGATIVE
PROT SERPL-MCNC: 5.3 G/DL (ref 6.3–8.2)
Q-T INTERVAL, ECG07: 410 MS
QRS DURATION, ECG06: 142 MS
QTC CALCULATION (BEZET), ECG08: 467 MS
RBC # BLD AUTO: 4.11 M/UL (ref 4.23–5.6)
SERVICE CMNT-IMP: NORMAL
SODIUM SERPL-SCNC: 133 MMOL/L (ref 138–145)
TRIGL SERPL-MCNC: 200 MG/DL (ref 35–150)
VANCOMYCIN SERPL-MCNC: 24.5 UG/ML
VENTRICULAR RATE, ECG03: 78 BPM
WBC # BLD AUTO: 8.7 K/UL (ref 4.3–11.1)

## 2022-03-17 PROCEDURE — 99233 SBSQ HOSP IP/OBS HIGH 50: CPT | Performed by: INTERNAL MEDICINE

## 2022-03-17 PROCEDURE — 74011250636 HC RX REV CODE- 250/636: Performed by: INTERNAL MEDICINE

## 2022-03-17 PROCEDURE — 74011000250 HC RX REV CODE- 250: Performed by: EMERGENCY MEDICINE

## 2022-03-17 PROCEDURE — 83735 ASSAY OF MAGNESIUM: CPT

## 2022-03-17 PROCEDURE — 74011250637 HC RX REV CODE- 250/637: Performed by: NURSE PRACTITIONER

## 2022-03-17 PROCEDURE — 74011000250 HC RX REV CODE- 250: Performed by: INTERNAL MEDICINE

## 2022-03-17 PROCEDURE — 3E04305 INTRODUCTION OF OTHER ANTINEOPLASTIC INTO CENTRAL VEIN, PERCUTANEOUS APPROACH: ICD-10-PCS | Performed by: INTERNAL MEDICINE

## 2022-03-17 PROCEDURE — 3331090001 HH PPS REVENUE CREDIT

## 2022-03-17 PROCEDURE — 80053 COMPREHEN METABOLIC PANEL: CPT

## 2022-03-17 PROCEDURE — 65270000015 HC RM PRIVATE ONCOLOGY

## 2022-03-17 PROCEDURE — APPSS60 APP SPLIT SHARED TIME 46-60 MINUTES: Performed by: NURSE PRACTITIONER

## 2022-03-17 PROCEDURE — 74011250636 HC RX REV CODE- 250/636: Performed by: NURSE PRACTITIONER

## 2022-03-17 PROCEDURE — 3E0436Z INTRODUCTION OF NUTRITIONAL SUBSTANCE INTO CENTRAL VEIN, PERCUTANEOUS APPROACH: ICD-10-PCS | Performed by: INTERNAL MEDICINE

## 2022-03-17 PROCEDURE — 74011000258 HC RX REV CODE- 258: Performed by: NURSE PRACTITIONER

## 2022-03-17 PROCEDURE — 85025 COMPLETE CBC W/AUTO DIFF WBC: CPT

## 2022-03-17 PROCEDURE — 80202 ASSAY OF VANCOMYCIN: CPT

## 2022-03-17 PROCEDURE — 3331090002 HH PPS REVENUE DEBIT

## 2022-03-17 PROCEDURE — 93005 ELECTROCARDIOGRAM TRACING: CPT | Performed by: NURSE PRACTITIONER

## 2022-03-17 PROCEDURE — 84478 ASSAY OF TRIGLYCERIDES: CPT

## 2022-03-17 PROCEDURE — 84100 ASSAY OF PHOSPHORUS: CPT

## 2022-03-17 PROCEDURE — 36591 DRAW BLOOD OFF VENOUS DEVICE: CPT

## 2022-03-17 RX ORDER — DEXAMETHASONE SODIUM PHOSPHATE 100 MG/10ML
10 INJECTION INTRAMUSCULAR; INTRAVENOUS ONCE
Status: COMPLETED | OUTPATIENT
Start: 2022-03-17 | End: 2022-03-17

## 2022-03-17 RX ORDER — DIPHENHYDRAMINE HYDROCHLORIDE 50 MG/ML
25 INJECTION, SOLUTION INTRAMUSCULAR; INTRAVENOUS
Status: ACTIVE | OUTPATIENT
Start: 2022-03-17 | End: 2022-03-18

## 2022-03-17 RX ORDER — HYDROCODONE BITARTRATE AND ACETAMINOPHEN 5; 325 MG/1; MG/1
1 TABLET ORAL
Status: DISCONTINUED | OUTPATIENT
Start: 2022-03-17 | End: 2022-03-24

## 2022-03-17 RX ORDER — ONDANSETRON 2 MG/ML
8 INJECTION INTRAMUSCULAR; INTRAVENOUS ONCE
Status: COMPLETED | OUTPATIENT
Start: 2022-03-17 | End: 2022-03-17

## 2022-03-17 RX ORDER — SODIUM CHLORIDE 9 MG/ML
25 INJECTION, SOLUTION INTRAVENOUS CONTINUOUS
Status: DISPENSED | OUTPATIENT
Start: 2022-03-17 | End: 2022-03-17

## 2022-03-17 RX ORDER — MORPHINE SULFATE 4 MG/ML
1 INJECTION INTRAVENOUS
Status: DISCONTINUED | OUTPATIENT
Start: 2022-03-17 | End: 2022-03-18

## 2022-03-17 RX ORDER — LORAZEPAM 2 MG/ML
0.5 INJECTION INTRAMUSCULAR
Status: ACTIVE | OUTPATIENT
Start: 2022-03-17 | End: 2022-03-18

## 2022-03-17 RX ORDER — HYDROMORPHONE HYDROCHLORIDE 4 MG/1
4 TABLET ORAL
Status: DISCONTINUED | OUTPATIENT
Start: 2022-03-17 | End: 2022-03-24

## 2022-03-17 RX ADMIN — MORPHINE SULFATE 2 MG: 4 INJECTION INTRAVENOUS at 01:42

## 2022-03-17 RX ADMIN — FAMOTIDINE 20 MG: 20 TABLET, FILM COATED ORAL at 17:28

## 2022-03-17 RX ADMIN — SODIUM CHLORIDE, PRESERVATIVE FREE 10 ML: 5 INJECTION INTRAVENOUS at 06:00

## 2022-03-17 RX ADMIN — ONDANSETRON 4 MG: 2 INJECTION INTRAMUSCULAR; INTRAVENOUS at 22:20

## 2022-03-17 RX ADMIN — MORPHINE SULFATE 1 MG: 4 INJECTION INTRAVENOUS at 18:18

## 2022-03-17 RX ADMIN — CEFEPIME HYDROCHLORIDE 2 G: 2 INJECTION, POWDER, FOR SOLUTION INTRAVENOUS at 01:42

## 2022-03-17 RX ADMIN — FAMOTIDINE 20 MG: 20 TABLET, FILM COATED ORAL at 08:06

## 2022-03-17 RX ADMIN — TAMSULOSIN HYDROCHLORIDE 0.4 MG: 0.4 CAPSULE ORAL at 08:06

## 2022-03-17 RX ADMIN — CEFEPIME HYDROCHLORIDE 2 G: 2 INJECTION, POWDER, FOR SOLUTION INTRAVENOUS at 10:10

## 2022-03-17 RX ADMIN — CALCIUM GLUCONATE: 98 INJECTION, SOLUTION INTRAVENOUS at 17:58

## 2022-03-17 RX ADMIN — ONDANSETRON 8 MG: 2 INJECTION INTRAMUSCULAR; INTRAVENOUS at 14:33

## 2022-03-17 RX ADMIN — GEMCITABINE 1770 MG: 38 INJECTION, SOLUTION INTRAVENOUS at 15:56

## 2022-03-17 RX ADMIN — PANTOPRAZOLE SODIUM 40 MG: 40 TABLET, DELAYED RELEASE ORAL at 08:06

## 2022-03-17 RX ADMIN — ONDANSETRON 4 MG: 2 INJECTION INTRAMUSCULAR; INTRAVENOUS at 06:14

## 2022-03-17 RX ADMIN — VANCOMYCIN HYDROCHLORIDE 750 MG: 750 INJECTION, POWDER, LYOPHILIZED, FOR SOLUTION INTRAVENOUS at 05:59

## 2022-03-17 RX ADMIN — MORPHINE SULFATE 1 MG: 4 INJECTION INTRAVENOUS at 14:17

## 2022-03-17 RX ADMIN — SODIUM CHLORIDE, PRESERVATIVE FREE 10 ML: 5 INJECTION INTRAVENOUS at 14:34

## 2022-03-17 RX ADMIN — MORPHINE SULFATE 1 MG: 4 INJECTION INTRAVENOUS at 22:20

## 2022-03-17 RX ADMIN — PACLITAXEL 200 MG: 100 INJECTION, POWDER, LYOPHILIZED, FOR SUSPENSION INTRAVENOUS at 15:09

## 2022-03-17 RX ADMIN — MORPHINE SULFATE 2 MG: 4 INJECTION INTRAVENOUS at 10:18

## 2022-03-17 RX ADMIN — ENOXAPARIN SODIUM 40 MG: 100 INJECTION SUBCUTANEOUS at 17:28

## 2022-03-17 RX ADMIN — SODIUM CHLORIDE, PRESERVATIVE FREE 10 ML: 5 INJECTION INTRAVENOUS at 22:00

## 2022-03-17 RX ADMIN — I.V. FAT EMULSION 250 ML: 20 EMULSION INTRAVENOUS at 18:02

## 2022-03-17 RX ADMIN — MORPHINE SULFATE 2 MG: 4 INJECTION INTRAVENOUS at 06:14

## 2022-03-17 RX ADMIN — HYDROMORPHONE HYDROCHLORIDE 4 MG: 4 TABLET ORAL at 17:28

## 2022-03-17 RX ADMIN — SODIUM PHOSPHATE, MONOBASIC, MONOHYDRATE AND SODIUM PHOSPHATE, DIBASIC, ANHYDROUS: 276; 142 INJECTION, SOLUTION INTRAVENOUS at 11:53

## 2022-03-17 RX ADMIN — DEXAMETHASONE SODIUM PHOSPHATE 10 MG: 10 INJECTION INTRAMUSCULAR; INTRAVENOUS at 14:33

## 2022-03-17 NOTE — PROGRESS NOTES
Comprehensive Nutrition Assessment    Type and Reason for Visit: Reassess  TPN Management (Oncology)    Nutrition Recommendations/Plan:   Parenteral Nutrition:  Total parenteral nutrition  to begin at 1800  Change: Dex 10% , 4.25% AA 1.8 L (75ml/hr)   Initiate 250 ml 20% lipids daily  To provide: 1418 kcal/d (84% of needs), 77 grams of protein/d (95% of needs), 180 grams of CHO/d and 2050 ml of total volume/d. Lytes/L:   Sodium 150 meq (130 meq NaCl, 20 meq NaPO4), Potassium omit, omit Mg, 4.5 meq Calcium  Other additives: MTE, MVI MWF due to national shortages, Thiamine 100 mg (day 2/7 due to refeeding risk)  IVF:  Discontinue at 1800  Nutritional Supplement Therapy:   Implement electrolyte replacement per nutrition support protocols  Replacement indicated:  Implemented  Replace NaPO4 per protocol  Labs:   CMP daily per primary  Mg daily per primary  Phos MWF    Triglyceride tomorrow  POC Glucoses/SSI Not indicated  Meals and Snacks:  Continue current diet.    Nutrition Supplement Therapy:   Medical food supplement therapy:  Continue Ensure Clear three times per day (this provides 240 kcal and 8 grams protein per bottle) - h/o poor intake of regular Ensure products, reports does not like taste of Magic cup     Malnutrition Assessment:  Malnutrition Status: Severe malnutrition  Context: Chronic illness  Findings of clinical characteristics of malnutrition:   Energy Intake:  7 - 75% or less est energy requirements for 1 month or longer  Weight Loss:  7.0 - Greater than 7.5% over 3 months (34# (18.4%) )     Body Fat Loss:  1 - Mild body fat loss, Buccal region,Orbital,Triceps   Muscle Mass Loss:  1 - Mild muscle mass loss, Calf (gastrocnemius),Hand (interosseous),Scapula (trapezius),Temples (temporalis)  Fluid Accumulation:  No significant fluid accumulation,     Strength:  Not performed       Nutrition Assessment:   Nutrition History: Per RD assessment patient was able to maintain PO intake and UBW throughout most of chemo. During admmission early March patient had \"stopped eating\" due to nausea. Upon assessment this admission patient reports no PO of foods for ~4 weeks. States that he has been able to tolerate some fluids. He reports continued nausea and vomiting as primary barrier. Nutrition Background: Patient with pancreatic and esophageal cancer, Amos's esophagus, obesity, HTN, GERD, gastritis, ascites and peritoneal carcinomatosis s/p Plurex drain. He presented to oncology office extremely sick, BP was unable to be measured. He was admitted directly to [facility]. Nutrition Interval:  Patient seen lying in bed. Much more talkative today. He states that he ate nearly all of his lunch yesterday. PCT reports ~50% meat and starch portions. He states he could not eat any dinner as he was still full from lunch. He states that he did not eat any breakfast but was snacking on a donut hole provided by nursing. He does endorse intermittent nausea and per [facility] has been PRN antiemetics consistently. PO intake limited due to early satiety and persistent nausea and likely secondary to peritoneal carcinomatosis, recurrent ascites with Plurex drain. Patient has demonstrated inability to meet needs orally with severe weight loss and malnutrition as above. Possible chemo today or tomorrow - Abraxane and Gemzar each ~30 min infusion. Abdominal Status (last documented): Diarrhea,Intact abdomen with   bowel sounds. Last BM 03/16/22.   Pertinent Medications: Maxipime, Pepcid, Lactulose - held, Zofran, Protonix, Vanco  IVF: NS @ 30 ml/hr  Pertinent Labs:   Lab Results   Component Value Date/Time    Sodium 133 (L) 03/17/2022 02:19 AM    Potassium 4.5 03/17/2022 02:19 AM    Chloride 104 03/17/2022 02:19 AM    CO2 24 03/17/2022 02:19 AM    Anion gap 5 (L) 03/17/2022 02:19 AM    Glucose 113 (H) 03/17/2022 02:19 AM    BUN 22 03/17/2022 02:19 AM    Creatinine 1.00 03/17/2022 02:19 AM    Calcium 8.4 03/17/2022 02:19 AM    Albumin 1.9 (L) 03/17/2022 02:19 AM    Magnesium 2.3 03/17/2022 02:19 AM    Phosphorus 1.9 (L) 03/17/2022 02:19 AM     Lab Results   Component Value Date/Time    Triglyceride 200 (H) 03/17/2022 02:19 AM   Labs remarkable for hyponatreamia but slightly improved, K WNL but will continue to hold in PN this evening as stable without provisions in PN, Mg WNL, Phos depleted - will add to TPN and replace, TG elevated, glucose slightly elevated but not within range to implement SSI/POC glucose. Nutrition Related Findings:   Port in place, also with 1 PIV.       Current Nutrition Therapies:  ADULT DIET Regular  ADULT ORAL NUTRITION SUPPLEMENT Breakfast, Lunch, Dinner; Clear Liquid  Current Parenteral Nutrition Orders:  · Type and Formula: Dex 10% , 4.25% AA    · Lipids: None  · Duration: Continuous  · Rate/Volume: 1 L (45ml/hr)   · Current PN Order Provides: 510 kcal/d (30% of needs), 43 grams of protein/d (53% of needs), 100 grams of CHO/d and 1000 ml of total volume/d  · Goal PN Orders Provides:        Current Intake:   Average Meal Intake: 1-25% Average Supplement Intake: Unable to assess      Anthropometric Measures:  Height: 5' 6\" (167.6 cm)  Current Body Wt: 68.4 kg (150 lb 12.7 oz) (3/16), Weight source: Bed scale  BMI: 24.4, Normal weight (BMI 22.0-24.9) age over 72  Admission Body Weight: 148 lb 9.4 oz  Ideal Body Weight (lbs) (Calculated): 142 lbs (65 kg), 104.6 %  Usual Body Wt: 82.6 kg (182 lb) (12/14/21 office wt and per previous history), Percent weight change: -18.4          Edema: No data recorded   Estimated Daily Nutrient Needs:  Energy (kcal/day): 0944-4913 (Kcal/kg (25-30), Weight Used: Current (67.4 kg (3/15))  Protein (g/day): 81-88 (1.2-1.3 g/kg) Weight Used: (Current)  Fluid (ml/day):   (1 ml/kcal)    Nutrition Diagnosis:   · Inadequate oral intake related to  (nausea, vomiting) as evidenced by  (reportes barriers to PO, recall, wt loss)    · Severe malnutrition related to catabolic illness as evidenced by (malnutrition criteria as above)    Nutrition Interventions:   Food and/or Nutrient Delivery: Continue current diet,Continue oral nutrition supplement,Modify parenteral nutrition     Coordination of Nutrition Care: Continue to monitor while inpatient  Plan of Care discussed with Esther Ruggiero RN    Goals:   Previous Goal Met: Progressing toward goal(s)  Active Goal: Tolerate TPN at goal within 3 days    Nutrition Monitoring and Evaluation:      Food/Nutrient Intake Outcomes: Food and nutrient intake,Supplement intake,Parenteral nutrition intake/tolerance  Physical Signs/Symptoms Outcomes: Biochemical data,GI status,Hemodynamic status,Weight    Discharge Planning:     Too soon to determine    [address] [place] ADALBERTO Isidro on 3/17/2022 at 11:21 AM  Contact: [phone]

## 2022-03-17 NOTE — PROGRESS NOTES
Physician Progress Note      Gautam Carver  CSN #:                  [id number]  :                       1955  ADMIT DATE:       3/15/2022 10:28 AM  DISCH DATE:  RESPONDING  PROVIDER #:        Vandana STREET          QUERY TEXT:    Pt admitted with Failure to Thrive. Pt noted to have hypotension, dehydration. If possible, please document in the progress notes and discharge summary if you are evaluating and/or treating any of the following: The medical record reflects the following:  Risk Factors: Esophageal ca, Pancreatic ca, Peritoneal ca, chemotherapy rx  Clinical Indicators: 3/15- BUN 25, Creat 1.30, GFR n-AA 59, GFR AA > 60; (3/7 BUN 13, Creat 0.65, GFR > 60)  Treatment: IV N/S Bolus infusion, IV N/S infusion,TPN    Defined by Kidney Disease Improving Global Outcomes (KDIGO) clinical practice guideline for acute kidney injury:  -Increase in SCr by greater than or equal to 0.3 mg/dl within 48?hours; or  -Increase or decrease in SCr to greater than or equal to 1.5 times baseline, which is known or presumed to have occurred within the prior 7 days; or  -Urine volume < 0.5ml/kg/h for 6 hours  Options provided:  -- Acute kidney failure  -- Acute kidney failure with acute tubular necrosis  -- Acute kidney injury  -- Other - I will add my own diagnosis  -- Disagree - Not applicable / Not valid  -- Disagree - Clinically unable to determine / Unknown  -- Refer to Clinical Documentation Reviewer    PROVIDER RESPONSE TEXT:    Provider disagreed with this query.   No evidence of MICHAEL    Query created by: rebeka means on 3/16/2022 12:52 PM      Electronically signed by:  Vandana STREET 3/17/2022 8:58 AM

## 2022-03-17 NOTE — PROGRESS NOTES
[facility]        Inpatient Hematology / Oncology Progress Note      Admission Date: 3/15/2022 10:28 AM  Reason for Admission/Hospital Course: FTT (failure to thrive) in adult [R62.7]  Hypotension [I95.9]      24 Hour Events:  VSS, afebrile  TPN started 3/16  Hypotension improved  Feeling better  [place]/abraxane today    ROS:  Constitutional: +weakness/fatigue. Negative for fever, chills, malaise. CV: Negative for chest pain, palpitations, edema. Respiratory: Negative for dyspnea, cough, wheezing. GI: +N/V, diarrhea. Negative for abdominal pain. 10 point review of systems is otherwise negative with the exception of the elements mentioned above in the HPI. No Known Allergies    OBJECTIVE:  Patient Vitals for the past 8 hrs:   BP Temp Pulse Resp SpO2   22 0810 -- -- -- -- 100 %   22 0741 136/73 97.5 °F (36.4 °C) 87 20 100 %   22 0313 (!) 96/59 97.5 °F (36.4 °C) 83 19 100 %     Temp (24hrs), Av.8 °F (36.6 °C), Min:97.5 °F (36.4 °C), Max:98.4 °F (36.9 °C)    No intake/output data recorded. Physical Exam:  Constitutional: Thin, cachectic elderly male in no acute distress, sitting comfortably in the hospital bed. HEENT: Normocephalic and atraumatic. Oropharynx is clear, mucous membranes are moist. Extraocular muscles are intact. Sclerae anicteric. Neck supple without JVD. No thyromegaly present. Skin Warm and dry. No bruising and no rash noted. No erythema. No pallor. Respiratory Lungs are clear to auscultation bilaterally without wheezes, rales or rhonchi, normal air exchange without accessory muscle use. CVS Normal rate, regular rhythm and normal S1 and S2. No murmurs, gallops, or rubs. Abdomen Soft, nontender and nondistended, normoactive bowel sounds. No palpable mass. No hepatosplenomegaly. Neuro Grossly nonfocal with no obvious sensory or motor deficits. MSK Normal range of motion in general.  No edema and no tenderness.    Psych Appropriate mood and affect.         Labs:      Recent Labs     03/17/22  0219 03/16/22  0332 03/15/22  1045   WBC 8.7 8.9 11.0   RBC 4.11* 4.28 4.99   HGB 12.2* 12.8* 15.0   HCT 38.1* 39.2* 44.9   MCV 92.7 91.6 90.0   MCH 29.7 29.9 30.1   MCHC 32.0 32.7 33.4   RDW 15.2* 15.2* 15.1*   * 130* 164   GRANS 74 76 79*   LYMPH 6* 5* 3*   MONOS 19* 19* 17*   EOS 1 0* 0*   BASOS 0 0 0   IG 1 1 1   DF AUTOMATED AUTOMATED AUTOMATED   ANEU 6.4 6.7 8.7*   ABL 0.5 0.5 0.4*   ABM 1.6* 1.6* 1.8*   JONNY 0.1 0.0 0.0   ABB 0.0 0.0 0.0   AIG 0.1 0.1 0.1        Recent Labs     03/17/22 0219 03/16/22  0332 03/15/22  1045   * 132* 128*   K 4.5 4.7 5.1    100 95*   CO2 24 23 24   AGAP 5* 9 9   * 107* 124*   BUN 22 27* 25*   CREA 1.00 1.00 0.90   GFRAA >60 >60 >60   GFRNA >60 >60 >60   CA 8.4 8.5 8.9   * 372* 439*   TP 5.3* 5.3* 6.0*   ALB 1.9* 2.0* 2.2*   GLOB 3.4 3.3 3.8*   AGRAT 0.6* 0.6* 0.6*   MG 2.3 2.5* 2.4   PHOS 1.9* 3.7  --          Imaging:    Medications:  Current Facility-Administered Medications   Medication Dose Route Frequency    tuberculin injection 5 Units  5 Units IntraDERMal ONCE    loperamide (IMODIUM) capsule 2 mg  2 mg Oral Q4H PRN    TPN ADULT - dextrose 10% amino acid 4.25%   IntraVENous QPM    0.9% sodium chloride infusion  30 mL/hr IntraVENous CONTINUOUS    NUTRITIONAL SUPPORT ELECTROLYTE PRN ORDERS   Does Not Apply PRN    sodium chloride (NS) flush 5-10 mL  5-10 mL IntraVENous Q8H    sodium chloride (NS) flush 5-10 mL  5-10 mL IntraVENous PRN    diphenoxylate-atropine (LOMOTIL) tablet 1 Tablet  1 Tablet Oral QID PRN    famotidine (PEPCID) tablet 20 mg  20 mg Oral BID    HYDROmorphone (DILAUDID) tablet 4 mg  4 mg Oral Q4H PRN    pantoprazole (PROTONIX) tablet 40 mg  40 mg Oral ACB    promethazine (PHENERGAN) tablet 25 mg  25 mg Oral Q6H PRN    tamsulosin (FLOMAX) capsule 0.4 mg  0.4 mg Oral DAILY    enoxaparin (LOVENOX) injection 40 mg  40 mg SubCUTAneous Q24H    ondansetron (ZOFRAN) injection 4 mg  4 mg IntraVENous Q4H PRN    morphine injection 2 mg  2 mg IntraVENous Q4H PRN    cefepime (MAXIPIME) 2 g in 0.9% sodium chloride (MBP/ADV) 100 mL MBP  2 g IntraVENous Q8H    vancomycin (VANCOCIN) 750 mg in 0.9% sodium chloride 250 mL ([id number])  750 mg IntraVENous Q12H         ASSESSMENT:    Problem List  Date Reviewed: 3/15/2022          Codes Class Noted    FTT (failure to thrive) in adult ICD-10-CM: R62.7  ICD-9-CM: 783.7  3/15/2022        Hypotension ICD-10-CM: I95.9  ICD-9-CM: 458.9  3/15/2022        Severe protein-calorie malnutrition ([facility] ICD-10-CM: E43  ICD-9-CM: 713  3/4/2022        Pancreatic cancer ([facility] ICD-10-CM: C25.9  ICD-9-CM: 157.9  3/2/2022        Ascites ICD-10-CM: R18.8  ICD-9-CM: 789.59  3/2/2022        Admission for antineoplastic chemotherapy ICD-10-CM: Z51.11  ICD-9-CM: V58.11  3/2/2022        Hypomagnesemia ICD-10-CM: E83.42  ICD-9-CM: 275.2  4/23/2021        Hypokalemia ICD-10-CM: E87.6  ICD-9-CM: 276.8  4/20/2021        CINV (chemotherapy-induced nausea and vomiting) ICD-10-CM: R11.2, T45.1X5A  ICD-9-CM: 787.01, E933.1  4/20/2021        Dehydration ICD-10-CM: E86.0  ICD-9-CM: 276.51  4/12/2021        Other neutropenia ([facility] ICD-10-CM: D70.8  ICD-9-CM: 288.09  3/9/2021        Malignant neoplasm of lower third of esophagus (HCC) ICD-10-CM: C15.5  ICD-9-CM: 150.5  1/5/2021        Malignant neoplasm of body of pancreas ([facility] ICD-10-CM: C25.1  ICD-9-CM: 157.1  1/5/2021        Severe obesity ([facility] ICD-10-CM: E66.01  ICD-9-CM: 278.01  12/10/2020        Elevated glucose ICD-10-CM: R73.09  ICD-9-CM: 790.29  7/22/2019        Anemia ICD-10-CM: D64.9  ICD-9-CM: 285.9  7/22/2019        Chronic left-sided low back pain ICD-10-CM: M54.50, G89.29  ICD-9-CM: 724.2, 338.29  7/22/2019              78 y.o.  M pancreatic cancer and esophageal cancer with malignant ascites of previously controlled FOLFIRINOX but currently disease progressed and most recently admited on 3/2/2022 to place Pleurx and arrange gemcitabine/Abraxane, had extensive discussion with inpatient team and pharmacy, patient was discharged on 3/7/2022 without chemo appointment and return to office on 3/15/2022, extremely sick and blood pressure not measurable in the office, and not been having much oral intake since discharge, urgently establish IV access and called EMS to take to ER to stabilize and admit to hospital, apparently needed much supportive care and volume resuscitation, start TPN until oral intake can improve and give gemcitabine/Abraxane once clinically stable, discussed with the inpatient team.    PLAN:    Metastatic pancreatic cancer / distal esophageal cancer  - Metastatic disease involving peritoneum, malignant ascites  - s/p 24 cycles of FOLFIRINOX with SD and recent POD with increasing CEA/ and malignant ascites  - Plan for gemcitabine/abraxane when clinically stable - hopefully tomorrow. 3/17 [place]/abraxane today    Malignant ascites  - Has abdominal Pleurx, drain three times weekly  3/17 Pleurx drained 3/16 - 1100cc out. Cancer related pain  - Continue home dilaudid  3/17 Utilizing IV morphine. Will decrease dosing and encourage use of home Norco.    Hypotension / poor PO intake / protein-calorie malnutrition  - Consult RD for TPN  - BC pending, started on Cefe/Vanc and may be able to de-escalate soon as hypotension likely secondary to dehydration. LA improved after IFV. 3/18 On TPN. Continues on Cefe/Vanc although infectious work-up unremarkable. Will stop antibiotics. Nausea  - Continue PRN antiemetics    Diarrhea  - Antidiarrheals PRN    Bradycardia / Hypertension  - One documented episode of HR 43 - check EKG  - No hx of hypertension, monitor  3/17 EKG with bi-geminal PACs and known LBBB. Recheck EGK. Thrombocytopenia  3/17 Possibly related to antibiotics - stopping today. Monitor.     Continue home meds  Apoorva SOPs  VTE prophylaxis - Lovenox (hold for plts <50k)    Dispo - too soon to determine. PT/OT following - HHPT vs STR. Goals and plan of care reviewed with the patient. All questions answered to the best of our ability.             Yohan Enriquez  Hematology & Oncology  [address] [address]  Office : [phone]  Fax : [phone]

## 2022-03-17 NOTE — PROGRESS NOTES
END OF SHIFT NOTE:    Intake/Output  03/17 0701 - 03/17 1900  In: 720 [P.O.:720]  Out: -    Voiding: YES  Catheter: NO  Drain:   Drain PleurX peritoneal drain 03/03/22 Right; Outer Abdomen (Active)   Site Assessment Clean, dry, & intact 03/17/22 0433   Dressing Status Clean, dry, & intact 03/17/22 0433   Output (ml) 1100 ml 03/16/22 1848               Stool:  0 occurrences. Stool Assessment  Stool Color: Sourav Penning (03/16/22 0558)  Stool Appearance: Watery (per pt ) (03/16/22 0800)  Stool Amount: Medium (03/16/22 0558)    Emesis:  0 occurrences. VITAL SIGNS  Patient Vitals for the past 12 hrs:   Temp Pulse Resp BP SpO2   03/17/22 1601 97.3 °F (36.3 °C) 75 18 115/74 98 %   03/17/22 1236 97.9 °F (36.6 °C) 87 18 104/67 99 %   03/17/22 0810 -- -- -- -- 100 %   03/17/22 0741 97.5 °F (36.4 °C) 87 20 136/73 100 %       Pain Assessment  Pain 1  Pain Scale 1: Numeric (0 - 10) (03/17/22 1818)  Pain Intensity 1: 6 (03/17/22 1818)  Patient Stated Pain Goal: 0 (03/17/22 1430)  Pain Reassessment 1: Yes (03/17/22 1107)  Pain Location 1: Abdomen (03/17/22 1818)  Pain Orientation 1: Medial (03/17/22 1818)  Pain Description 1: Aching; Sharp (03/17/22 1818)  Pain Intervention(s) 1: Medication (see MAR) (03/17/22 1728)    Ambulating  Yes    Additional Information: port dressing changed    Shift report given to oncoming nurse at the bedside.     Elli Guevara RN

## 2022-03-18 LAB
ALBUMIN SERPL-MCNC: 1.8 G/DL (ref 3.2–4.6)
ALBUMIN/GLOB SERPL: 0.5 {RATIO} (ref 1.2–3.5)
ALP SERPL-CCNC: 275 U/L (ref 50–136)
ALT SERPL-CCNC: 18 U/L (ref 12–65)
ANION GAP SERPL CALC-SCNC: 7 MMOL/L (ref 7–16)
AST SERPL-CCNC: 25 U/L (ref 15–37)
ATRIAL RATE: 85 BPM
BASOPHILS # BLD: 0 K/UL (ref 0–0.2)
BASOPHILS NFR BLD: 0 % (ref 0–2)
BILIRUB SERPL-MCNC: 0.4 MG/DL (ref 0.2–1.1)
BUN SERPL-MCNC: 20 MG/DL (ref 8–23)
CALCIUM SERPL-MCNC: 7.8 MG/DL (ref 8.3–10.4)
CALCULATED P AXIS, ECG09: 35 DEGREES
CALCULATED R AXIS, ECG10: -41 DEGREES
CALCULATED T AXIS, ECG11: 145 DEGREES
CHLORIDE SERPL-SCNC: 106 MMOL/L (ref 98–107)
CO2 SERPL-SCNC: 22 MMOL/L (ref 21–32)
CREAT SERPL-MCNC: 0.8 MG/DL (ref 0.8–1.5)
DIAGNOSIS, 93000: NORMAL
DIFFERENTIAL METHOD BLD: ABNORMAL
EOSINOPHIL # BLD: 0 K/UL (ref 0–0.8)
EOSINOPHIL NFR BLD: 0 % (ref 0.5–7.8)
ERYTHROCYTE [DISTWIDTH] IN BLOOD BY AUTOMATED COUNT: 15.2 % (ref 11.9–14.6)
GLOBULIN SER CALC-MCNC: 3.3 G/DL (ref 2.3–3.5)
GLUCOSE SERPL-MCNC: 140 MG/DL (ref 65–100)
HCT VFR BLD AUTO: 35.3 % (ref 41.1–50.3)
HGB BLD-MCNC: 11.4 G/DL (ref 13.6–17.2)
IMM GRANULOCYTES # BLD AUTO: 0.1 K/UL (ref 0–0.5)
IMM GRANULOCYTES NFR BLD AUTO: 1 % (ref 0–5)
LYMPHOCYTES # BLD: 0.1 K/UL (ref 0.5–4.6)
LYMPHOCYTES NFR BLD: 2 % (ref 13–44)
MAGNESIUM SERPL-MCNC: 2.1 MG/DL (ref 1.8–2.4)
MCH RBC QN AUTO: 29.6 PG (ref 26.1–32.9)
MCHC RBC AUTO-ENTMCNC: 32.3 G/DL (ref 31.4–35)
MCV RBC AUTO: 91.7 FL (ref 79.6–97.8)
MM INDURATION POC: 0 MM (ref 0–5)
MONOCYTES # BLD: 0.7 K/UL (ref 0.1–1.3)
MONOCYTES NFR BLD: 12 % (ref 4–12)
NEUTS SEG # BLD: 4.7 K/UL (ref 1.7–8.2)
NEUTS SEG NFR BLD: 85 % (ref 43–78)
NRBC # BLD: 0 K/UL (ref 0–0.2)
P-R INTERVAL, ECG05: 160 MS
PHOSPHATE SERPL-MCNC: 2.8 MG/DL (ref 2.3–3.7)
PLATELET # BLD AUTO: 100 K/UL (ref 150–450)
PMV BLD AUTO: 10.5 FL (ref 9.4–12.3)
POTASSIUM SERPL-SCNC: 4.3 MMOL/L (ref 3.5–5.1)
PPD POC: NEGATIVE NEGATIVE
PROT SERPL-MCNC: 5.1 G/DL (ref 6.3–8.2)
Q-T INTERVAL, ECG07: 404 MS
QRS DURATION, ECG06: 144 MS
QTC CALCULATION (BEZET), ECG08: 480 MS
RBC # BLD AUTO: 3.85 M/UL (ref 4.23–5.6)
SODIUM SERPL-SCNC: 135 MMOL/L (ref 138–145)
TRIGL SERPL-MCNC: 178 MG/DL (ref 35–150)
TSH SERPL DL<=0.005 MIU/L-ACNC: 0.77 UIU/ML (ref 0.36–3.74)
VENTRICULAR RATE, ECG03: 85 BPM
WBC # BLD AUTO: 5.5 K/UL (ref 4.3–11.1)

## 2022-03-18 PROCEDURE — 99233 SBSQ HOSP IP/OBS HIGH 50: CPT | Performed by: INTERNAL MEDICINE

## 2022-03-18 PROCEDURE — APPSS60 APP SPLIT SHARED TIME 46-60 MINUTES: Performed by: NURSE PRACTITIONER

## 2022-03-18 PROCEDURE — 80053 COMPREHEN METABOLIC PANEL: CPT

## 2022-03-18 PROCEDURE — 36591 DRAW BLOOD OFF VENOUS DEVICE: CPT

## 2022-03-18 PROCEDURE — 74011250637 HC RX REV CODE- 250/637: Performed by: NURSE PRACTITIONER

## 2022-03-18 PROCEDURE — 74011250636 HC RX REV CODE- 250/636: Performed by: NURSE PRACTITIONER

## 2022-03-18 PROCEDURE — 99223 1ST HOSP IP/OBS HIGH 75: CPT | Performed by: INTERNAL MEDICINE

## 2022-03-18 PROCEDURE — 93005 ELECTROCARDIOGRAM TRACING: CPT | Performed by: INTERNAL MEDICINE

## 2022-03-18 PROCEDURE — 74011000250 HC RX REV CODE- 250: Performed by: INTERNAL MEDICINE

## 2022-03-18 PROCEDURE — 85025 COMPLETE CBC W/AUTO DIFF WBC: CPT

## 2022-03-18 PROCEDURE — 3331090002 HH PPS REVENUE DEBIT

## 2022-03-18 PROCEDURE — 83735 ASSAY OF MAGNESIUM: CPT

## 2022-03-18 PROCEDURE — 84478 ASSAY OF TRIGLYCERIDES: CPT

## 2022-03-18 PROCEDURE — 74011000250 HC RX REV CODE- 250: Performed by: EMERGENCY MEDICINE

## 2022-03-18 PROCEDURE — 84443 ASSAY THYROID STIM HORMONE: CPT

## 2022-03-18 PROCEDURE — 84100 ASSAY OF PHOSPHORUS: CPT

## 2022-03-18 PROCEDURE — 74011250636 HC RX REV CODE- 250/636: Performed by: INTERNAL MEDICINE

## 2022-03-18 PROCEDURE — 3331090001 HH PPS REVENUE CREDIT

## 2022-03-18 PROCEDURE — 65270000015 HC RM PRIVATE ONCOLOGY

## 2022-03-18 RX ORDER — MORPHINE SULFATE 4 MG/ML
1 INJECTION INTRAVENOUS
Status: DISCONTINUED | OUTPATIENT
Start: 2022-03-18 | End: 2022-03-23

## 2022-03-18 RX ADMIN — SODIUM CHLORIDE, PRESERVATIVE FREE 10 ML: 5 INJECTION INTRAVENOUS at 13:25

## 2022-03-18 RX ADMIN — FAMOTIDINE 20 MG: 20 TABLET, FILM COATED ORAL at 17:31

## 2022-03-18 RX ADMIN — SODIUM CHLORIDE 500 ML: 900 INJECTION, SOLUTION INTRAVENOUS at 18:56

## 2022-03-18 RX ADMIN — ONDANSETRON 4 MG: 2 INJECTION INTRAMUSCULAR; INTRAVENOUS at 02:10

## 2022-03-18 RX ADMIN — MORPHINE SULFATE 1 MG: 4 INJECTION INTRAVENOUS at 13:24

## 2022-03-18 RX ADMIN — MORPHINE SULFATE 1 MG: 4 INJECTION INTRAVENOUS at 07:39

## 2022-03-18 RX ADMIN — HYDROCODONE BITARTRATE AND ACETAMINOPHEN 1 TABLET: 5; 325 TABLET ORAL at 17:38

## 2022-03-18 RX ADMIN — I.V. FAT EMULSION 250 ML: 20 EMULSION INTRAVENOUS at 17:32

## 2022-03-18 RX ADMIN — FAMOTIDINE 20 MG: 20 TABLET, FILM COATED ORAL at 09:09

## 2022-03-18 RX ADMIN — SODIUM CHLORIDE, PRESERVATIVE FREE 10 ML: 5 INJECTION INTRAVENOUS at 19:58

## 2022-03-18 RX ADMIN — SODIUM CHLORIDE, PRESERVATIVE FREE 10 ML: 5 INJECTION INTRAVENOUS at 05:57

## 2022-03-18 RX ADMIN — ONDANSETRON 4 MG: 2 INJECTION INTRAMUSCULAR; INTRAVENOUS at 17:43

## 2022-03-18 RX ADMIN — TAMSULOSIN HYDROCHLORIDE 0.4 MG: 0.4 CAPSULE ORAL at 09:09

## 2022-03-18 RX ADMIN — ONDANSETRON 4 MG: 2 INJECTION INTRAMUSCULAR; INTRAVENOUS at 13:24

## 2022-03-18 RX ADMIN — MORPHINE SULFATE 1 MG: 4 INJECTION INTRAVENOUS at 02:10

## 2022-03-18 RX ADMIN — CALCIUM GLUCONATE: 98 INJECTION, SOLUTION INTRAVENOUS at 17:32

## 2022-03-18 RX ADMIN — ENOXAPARIN SODIUM 40 MG: 100 INJECTION SUBCUTANEOUS at 17:31

## 2022-03-18 RX ADMIN — PANTOPRAZOLE SODIUM 40 MG: 40 TABLET, DELAYED RELEASE ORAL at 07:39

## 2022-03-18 RX ADMIN — MORPHINE SULFATE 1 MG: 4 INJECTION INTRAVENOUS at 19:54

## 2022-03-18 NOTE — H&P
[facility] Cardiology Initial Cardiac Evaluation   Primary Cardiologist: none  Attending Cardiologist: Dr. Yusuf Charles     Date of  Admission: 3/15/2022 10:28 AM     HPI:  Thuan Swain is a 78 y.o. WM with pancreatic and esophageal cancer on chemotherapy per oncology with last admission from 3/2-3/7 malignant ascites s/p pleurex drain placement. He followed up with Dr Porsche Tong, Oncologist on 3/15 and was found to be hypotensive and unable to eat. EMS was called for transport to [facility] for admission, hydration. On 3/16, Pt had a single vital check that showed P 43 and /110. ECG showed NSR with PACs. Pt reportedly refused a telemetry monitor. On vital check 3/17, P 48 and /66. He continued to refuse monitor until this AM. Repeat ECG shows NSR with PACs. Monitor shows NSR with PACs with HR in the 80's. Pt reports feeling so much better since admission. He denies increased fatigue, dizziness, CP, SOB, palpitations or syncope.       Past Medical History:   Diagnosis Date    Back pain     followed by pain management    Chronic pain     left-sided, lower back pain    CINV (chemotherapy-induced nausea and vomiting) 4/20/2021    Erectile dysfunction     Gastritis     GERD (gastroesophageal reflux disease)     daily medication    Hiatal hernia     \"medium\"    History of anemia     Hypertension     situational; has Amlodipine to take if systolic >158    Left bundle branch block (LBBB) on electrocardiogram 02/05/2021    Malignant neoplasm of body of pancreas ([address]     Malignant neoplasm of esophagus ([address] 12/07/2020    Morbid obesity (HCC)     Nausea     takes antiemetic med prn      Past Surgical History:   Procedure Laterality Date    HX APPENDECTOMY      HX CHOLECYSTECTOMY      HX COLONOSCOPY  12/01/2020    with EGD    HX VASCULAR ACCESS      IR INSERT CATH PLEURAL INDWELL  3/3/2022    IR PARACENTESIS ABD W IMAGE  2/24/2022       No Known Allergies   Social History     Socioeconomic History  Marital status:      Spouse name: Not on file    Number of children: Not on file    Years of education: Not on file    Highest education level: Not on file   Occupational History    Not on file   Tobacco Use    Smoking status: Former Smoker     Packs/day: 1.50     Years: 15.00     Pack years: 22.50     Quit date: 1974     Years since quittin.3    Smokeless tobacco: Never Used   Vaping Use    Vaping Use: Never used   Substance and Sexual Activity    Alcohol use: Yes     Comment: socially    Drug use: Never    Sexual activity: Not on file   Other Topics Concern     Service Not Asked    Blood Transfusions Not Asked    Caffeine Concern Not Asked    Occupational Exposure Not Asked    Hobby Hazards Not Asked    Sleep Concern Not Asked    Stress Concern Not Asked    Weight Concern Not Asked    Special Diet Not Asked    Back Care Not Asked    Exercise Not Asked    Bike Helmet Not Asked    [address] Not Asked    Self-Exams Not Asked   Social History Narrative    Not on file     Social Determinants of Health     Financial Resource Strain:     Difficulty of Paying Living Expenses: Not on file   Food Insecurity:     Worried About Running Out of Food in the Last Year: Not on file    Elian of Food in the Last Year: Not on file   Transportation Needs:     Lack of Transportation (Medical): Not on file    Lack of Transportation (Non-Medical):  Not on file   Physical Activity:     Days of Exercise per Week: Not on file    Minutes of Exercise per Session: Not on file   Stress:     Feeling of Stress : Not on file   Social Connections:     Frequency of Communication with Friends and Family: Not on file    Frequency of Social Gatherings with Friends and Family: Not on file    Attends Sabianist Services: Not on file    Active Member of Clubs or Organizations: Not on file    Attends Club or Organization Meetings: Not on file    Marital Status: Not on file   Intimate Partner Violence:     Fear of Current or Ex-Partner: Not on file    Emotionally Abused: Not on file    Physically Abused: Not on file    Sexually Abused: Not on file   Housing Stability:     Unable to Pay for Housing in the Last Year: Not on file    Number of Places Lived in the Last Year: Not on file    Unstable Housing in the Last Year: Not on file     Family History   Problem Relation Age of Onset    Cancer Mother     No Known Problems Father     Cancer Sister         Current Facility-Administered Medications   Medication Dose Route Frequency    fat emulsion 20% (LIPOSYN, INTRAlipid) infusion 250 mL  250 mL IntraVENous QPM    diphenhydrAMINE (BENADRYL) injection 25 mg  25 mg IntraVENous ONCE PRN    LORazepam (ATIVAN) injection 0.5 mg  0.5 mg IntraVENous ONCE PRN    TPN ADULT - CENTRAL -  dextrose 10% amino acid 4.25%   IntraVENous QPM    morphine injection 1 mg  1 mg IntraVENous Q4H PRN    HYDROcodone-acetaminophen (NORCO) 5-325 mg per tablet 1 Tablet  1 Tablet Oral Q4H PRN    HYDROmorphone (DILAUDID) tablet 4 mg  4 mg Oral Q4H PRN    loperamide (IMODIUM) capsule 2 mg  2 mg Oral Q4H PRN    NUTRITIONAL SUPPORT ELECTROLYTE PRN ORDERS   Does Not Apply PRN    sodium chloride (NS) flush 5-10 mL  5-10 mL IntraVENous Q8H    sodium chloride (NS) flush 5-10 mL  5-10 mL IntraVENous PRN    diphenoxylate-atropine (LOMOTIL) tablet 1 Tablet  1 Tablet Oral QID PRN    famotidine (PEPCID) tablet 20 mg  20 mg Oral BID    pantoprazole (PROTONIX) tablet 40 mg  40 mg Oral ACB    promethazine (PHENERGAN) tablet 25 mg  25 mg Oral Q6H PRN    tamsulosin (FLOMAX) capsule 0.4 mg  0.4 mg Oral DAILY    enoxaparin (LOVENOX) injection 40 mg  40 mg SubCUTAneous Q24H    ondansetron (ZOFRAN) injection 4 mg  4 mg IntraVENous Q4H PRN       Review of symptoms:  General: no recent weight loss/gain, weakness, fatigue, fever or chills   Skin: no rashes, lumps, or other skin changes   HEENT: no headache, dizziness, lightheadedness, vision changes, hearing changes, tinnitus, vertigo, sinus pressure/pain, bleeding gums, sore throat, or hoarseness   Neck: no swollen glands, goiter, pain or stiffness   Respiratory: no cough, sputum, hemoptysis, dyspnea, wheezing   Cardiovascular: no chest pain or discomfort, palpitations, dyspnea, orthopnea, paroxysmal nocturnal dyspnea, peripheral edema   Gastrointestinal: no trouble swallowing, heartburn,+ change of appetite, +nausea, change in bowel habits, pain with defecation, rectal bleeding or black/tarry stools, hemorrhoids, constipation, diarrhea, +abdominal pain, jaundice, liver or gallbladder problems   Urinary: no frequency, urgency , hematuria, burning/pain with urination, recent flank pain, polyuria, nocturia, or difficulty urinating   Peripheral Vascular: no claudication, leg cramps, prior DVTs, swelling of calves, legs, or feet, color change, or swelling with redness or tenderness   Musculoskeletal: no muscle or joint pain/stiffness, joint swelling, erythema of joints, or back pain   Psychiatric: +depression, mental disorders, or excessive stress   Neurological: no history of CVA, dizziness, no sensory or motor loss, seizures, syncope, tremors, numbness, tingling, no changes in mood, attention, or speech, no changes in orientation, memory, insight, or judgment. no headache, vertigo.    Hematologic: +anemia, easy bruising or bleeding   Endocrine: no diabetes, thyroid problems, heat or cold intolerance, excessive sweating, polyuria, polydipsia                 Labs:   Recent Results (from the past 24 hour(s))   EKG, 12 LEAD, SUBSEQUENT    Collection Time: 03/17/22 12:07 PM   Result Value Ref Range    Ventricular Rate 78 BPM    Atrial Rate 78 BPM    P-R Interval 144 ms    QRS Duration 142 ms    Q-T Interval 410 ms    QTC Calculation (Bezet) 467 ms    Calculated P Axis 36 degrees    Calculated R Axis -41 degrees    Calculated T Axis 140 degrees    Diagnosis       Sinus rhythm with Premature atrial complexes  Left axis deviation  Left bundle branch block  Abnormal ECG  When compared with ECG of 16-MAR-2022 10:11,  No significant change was found  Confirmed by [facility]  MD ()BAILEY ([id number]) on 3/17/2022 6:89:03 PM     METABOLIC PANEL, COMPREHENSIVE    Collection Time: 03/18/22  2:16 AM   Result Value Ref Range    Sodium 135 (L) 138 - 145 mmol/L    Potassium 4.3 3.5 - 5.1 mmol/L    Chloride 106 98 - 107 mmol/L    CO2 22 21 - 32 mmol/L    Anion gap 7 7 - 16 mmol/L    Glucose 140 (H) 65 - 100 mg/dL    BUN 20 8 - 23 MG/DL    Creatinine 0.80 0.8 - 1.5 MG/DL    GFR est AA >60 >60 ml/min/1.73m2    GFR est non-AA >60 >60 ml/min/1.73m2    Calcium 7.8 (L) 8.3 - 10.4 MG/DL    Bilirubin, total 0.4 0.2 - 1.1 MG/DL    ALT (SGPT) 18 12 - 65 U/L    AST (SGOT) 25 15 - 37 U/L    Alk. phosphatase 275 (H) 50 - 136 U/L    Protein, total 5.1 (L) 6.3 - 8.2 g/dL    Albumin 1.8 (L) 3.2 - 4.6 g/dL    Globulin 3.3 2.3 - 3.5 g/dL    A-G Ratio 0.5 (L) 1.2 - 3.5     MAGNESIUM    Collection Time: 03/18/22  2:16 AM   Result Value Ref Range    Magnesium 2.1 1.8 - 2.4 mg/dL   CBC WITH AUTOMATED DIFF    Collection Time: 03/18/22  2:16 AM   Result Value Ref Range    WBC 5.5 4.3 - 11.1 K/uL    RBC 3.85 (L) 4.23 - 5.6 M/uL    HGB 11.4 (L) 13.6 - 17.2 g/dL    HCT 35.3 (L) 41.1 - 50.3 %    MCV 91.7 79.6 - 97.8 FL    MCH 29.6 26.1 - 32.9 PG    MCHC 32.3 31.4 - 35.0 g/dL    RDW 15.2 (H) 11.9 - 14.6 %    PLATELET 519 (L) 503 - 450 K/uL    MPV 10.5 9.4 - 12.3 FL    ABSOLUTE NRBC 0.00 0.0 - 0.2 K/uL    DF AUTOMATED      NEUTROPHILS 85 (H) 43 - 78 %    LYMPHOCYTES 2 (L) 13 - 44 %    MONOCYTES 12 4.0 - 12.0 %    EOSINOPHILS 0 (L) 0.5 - 7.8 %    BASOPHILS 0 0.0 - 2.0 %    IMMATURE GRANULOCYTES 1 0.0 - 5.0 %    ABS. NEUTROPHILS 4.7 1.7 - 8.2 K/UL    ABS. LYMPHOCYTES 0.1 (L) 0.5 - 4.6 K/UL    ABS. MONOCYTES 0.7 0.1 - 1.3 K/UL    ABS. EOSINOPHILS 0.0 0.0 - 0.8 K/UL    ABS. BASOPHILS 0.0 0.0 - 0.2 K/UL    ABS. IMM.  GRANS. 0.1 0.0 - 0.5 K/UL PHOSPHORUS    Collection Time: 03/18/22  2:16 AM   Result Value Ref Range    Phosphorus 2.8 2.3 - 3.7 MG/DL   TRIGLYCERIDE    Collection Time: 03/18/22  2:16 AM   Result Value Ref Range    Triglyceride 178 (H) 35 - 150 MG/DL   TSH 3RD GENERATION    Collection Time: 03/18/22  2:16 AM   Result Value Ref Range    TSH 0.774 0.358 - 3.740 uIU/mL   EKG, 12 LEAD, SUBSEQUENT    Collection Time: 03/18/22  6:06 AM   Result Value Ref Range    Ventricular Rate 85 BPM    Atrial Rate 85 BPM    P-R Interval 160 ms    QRS Duration 144 ms    Q-T Interval 404 ms    QTC Calculation (Bezet) 480 ms    Calculated P Axis 35 degrees    Calculated R Axis -41 degrees    Calculated T Axis 145 degrees    Diagnosis       Sinus rhythm with Premature atrial complexes in a pattern of bigeminy  Left axis deviation  Left bundle branch block  Abnormal ECG  When compared with ECG of 17-MAR-2022 12:07,  No significant change was found  Confirmed by Ash Coburn ([id number]) on 3/18/2022 7:05:48 AM         Pt was seen and examined by Dr. Taran Phan, he agrees with the following A&P. Assessment/Plan:       Diagnosis    Bradycardia- vitals check on 2 occasions showed P43 and P48, Pt previously refused telemetry monitor but is now wearing this morning. Both ECGs and monitor show NSR in the 80s with PACs. No bradycardia noted, continue to monitor, TSH within normal rnage    Hypotension- resolved    Pancreatic cancer and esophageal cancer- Chemo per primary team    Ascites- drain per IR/primary team    CINV (chemotherapy-induced nausea and vomiting)    FTT (failure to thrive) with severe protein-calorie malnutrition- per primary team    Malignant neoplasm of lower third of esophagus ([address]    Malignant neoplasm of body of pancreas ([address]    Anemia    Chronic left-sided low back pain       Thank you for allowing us to participate in the care of this patient, we will continue to follow along with you.     Sarah Crisostomo PA-C      [facility] CARDIOLOGY 3/18/2022     12:41 PM    I have personally seen and examined Jr Weaver with  Sarah OSWALD. I agree and confirm findings in history, physical exam, and assessment/plan as outlined above with following pertinent additions/exceptions:       69-year-old male with pancreatic and esophageal cancer. Admitted with hypotension and anorexia. Noted in transport to have a heart rate of 43 but EKG performed shows sinus rhythm with PACs. Again noted on vital sign check yesterday to have a heart rate of 48. His repeat EKG shows sinus rhythm with left bundle branch block and PACs. Denies any dizziness or syncope. No cardiac history but does have a left bundle branch block. Subjective:   Physical Exam    Visit Vitals  /74 (BP 1 Location: Left arm, BP Patient Position: Supine)   Pulse 86   Temp 97.5 °F (36.4 °C)   Resp 18   Ht 5' 6\" (1.676 m)   Wt 69.8 kg (153 lb 14.5 oz)   SpO2 98%   BMI 24.84 kg/m²     General Appearance:  Well developed, well nourished, alert and oriented x 3, and individual in no acute distress. Ears/Nose/Mouth/Throat:   Hearing grossly normal.         Neck: Supple. No JVD   Chest:   Lungs clear to auscultation bilaterally. Cardiovascular:  Regular rate and rhythm, S1, S2, Occasional ecyopy. Abdomen:   Soft, non-tender, bowel sounds are active. Extremities: No edema bilaterally. Skin: Warm and dry. Neuro:  A and O           Ass/Plan: 1. Bradycardia: Suspect that this was inaccurately calculated due to ectopy. Agree with telemetry. Monitor for any high degree AV block given his underlying conduction system disease  2. LBBB: Check echo. No previous assessment that I can find. 3.  Hypotension:  Improved with hydration  4.   Pancreatic/esophageal cancer: Per primary team        Hua Dunn MD

## 2022-03-18 NOTE — PROGRESS NOTES
Comprehensive Nutrition Assessment    Type and Reason for Visit: Reassess  TPN Management (Oncology)    Nutrition Recommendations/Plan:   Parenteral Nutrition:  Total parenteral nutrition  to begin at 1800  Change: Dex 15%, 5% AA 1.8 L (75ml/hr)   Continue 250 ml 20% lipids daily  To provide: 1778 kcal/d (100% of needs), 90 grams of protein/d (100% of needs), 270 grams of CHO/d and 2050 ml of total volume/d. Lytes/L: No changes to lytes today  Sodium 150 meq (130 meq NaCl, 20 meq NaPO4), Potassium omit, omit Mg, 4.5 meq Calcium  Other additives: MTE, MVI MWF due to national shortages, Thiamine 100 mg (day 3/7 due to refeeding risk)  Nutritional Supplement Therapy:   Active electrolyte replacement per nutrition support protocols  Replacement indicated:  None   Labs:   CMP daily per primary  Mg daily per primary  Phos tomorrow and MWF    Triglyceride 3/20  POC Glucoses/SSI Not indicated  Meals and Snacks:  Continue current diet. Nutrition Supplement Therapy:   Medical food supplement therapy:  Change Glucerna Shake three times per day (this provides 220 kcal and 10 grams protein per bottle) - h/o poor intake of regular Ensure products, reports does not like taste of Magic cup     Malnutrition Assessment:  Malnutrition Status: Severe malnutrition  Context: Chronic illness  Findings of clinical characteristics of malnutrition:   Energy Intake:  7 - 75% or less est energy requirements for 1 month or longer  Weight Loss:  7.0 - Greater than 7.5% over 3 months (34# (18.4%) )     Body Fat Loss:  1 - Mild body fat loss, Buccal region,Orbital,Triceps   Muscle Mass Loss:  1 - Mild muscle mass loss, Calf (gastrocnemius),Hand (interosseous),Scapula (trapezius),Temples (temporalis)  Fluid Accumulation:  No significant fluid accumulation,     Strength:  Not performed       Nutrition Assessment:   Nutrition History: Per RD assessment patient was able to maintain PO intake and UBW throughout most of chemo.  During admmission early March patient had \"stopped eating\" due to nausea. Upon assessment this admission patient reports no PO of foods for ~4 weeks. States that he has been able to tolerate some fluids. He reports continued nausea and vomiting as primary barrier. Nutrition Background: Patient with pancreatic and esophageal cancer, Amos's esophagus, obesity, HTN, GERD, gastritis, ascites and peritoneal carcinomatosis s/p Plurex drain. He presented to oncology office extremely sick, BP was unable to be measured. [facility] Nutrition Interval:  Patient seen reclined in bed, daughter in law at bedside helping with grooming. Patient reports no lunch yesterday as he was full from 4 donut holes for breakfast. He states that he was able to eat 2 meatballs and most of mashed potatoes last evening. He states he was able to tolerate about 25% of breakfast. When RD reviewing that patient is really only tolerating ~1 partial meal per day he agrees. He states he is not drinking Ensure Clear as it causes his GERD to be worse. He asks if he could try the diabetic supplement as he thinks he remembers it is thinner. Recommended regardless of supplement used, to try to eat at least part of meal before drinking Ensure due to his early satiety. Reports nausea this am and poorly controlled nausea over night. PO intake continues to limited due to early satiety and persistent nausea and likely secondary to peritoneal carcinomatosis, recurrent ascites with Plurex drain. Patient has demonstrated inability to meet needs orally with severe weight loss and malnutrition as above. Intake trends since admission reveal daily PO tolerance of ~1 partial meal per day which is not sufficient to sustain weight, functional status, or life. Abdominal Status (last documented): Diarrhea,Soft,Other (comment) (pleurex removed by pt) abdomen with   bowel sounds. Last BM 03/17/22.   Pertinent Medications: Maxipime, Pepcid, Lactulose - held, Zofran, Protonix, Vanco  Pertinent Labs:   Lab Results   Component Value Date/Time    Sodium 135 (L) 03/18/2022 02:16 AM    Potassium 4.3 03/18/2022 02:16 AM    Chloride 106 03/18/2022 02:16 AM    CO2 22 03/18/2022 02:16 AM    Anion gap 7 03/18/2022 02:16 AM    Glucose 140 (H) 03/18/2022 02:16 AM    BUN 20 03/18/2022 02:16 AM    Creatinine 0.80 03/18/2022 02:16 AM    Calcium 7.8 (L) 03/18/2022 02:16 AM    Albumin 1.8 (L) 03/18/2022 02:16 AM    Magnesium 2.1 03/18/2022 02:16 AM    Phosphorus 2.8 03/18/2022 02:16 AM     Lab Results   Component Value Date/Time    Triglyceride 178 (H) 03/18/2022 02:16 AM   Labs remarkable for hyponatreamia but trending up, K WNL but will continue to hold in PN this evening as stable without provisions in PN, Mg trending down - will consider including in PN pending lab trends, Phos WNL, TG elevated but within range to continue daily lipids, glucose slightly elevated but not within range to implement SSI/POC glucose. Nutrition Related Findings:   Port in place, also with 1 PIV. TPN intiated 3/16. Current Nutrition Therapies:  ADULT ORAL NUTRITION SUPPLEMENT Breakfast, Lunch, Dinner; Clear Liquid  ADULT DIET Regular  Current Parenteral Nutrition Orders:  · Type and Formula: Dex 10% , 4.25% AA    · Lipids: 250ml,Daily  · Duration: Continuous  · Rate/Volume: 1.8 L (75ml/hr)   · Current PN Order Provides: 1418 kcal/d (84% of needs), 77 grams of protein/d (95% of needs), 180 grams of CHO/d and 2050 ml of total volume/d.    · Goal PN Orders Provides:        Current Intake:   Average Meal Intake: 26-50% (eating 1 partial meal per day) Average Supplement Intake: 1-25%      Anthropometric Measures:  Height: 5' 6\" (167.6 cm)  Current Body Wt: 68.4 kg (150 lb 12.7 oz) (3/16), Weight source: Bed scale  BMI: 24.4, Normal weight (BMI 22.0-24.9) age over 72  Admission Body Weight: 148 lb 9.4 oz  Ideal Body Weight (lbs) (Calculated): 142 lbs (65 kg), 104.6 %  Usual Body Wt: 82.6 kg (182 lb) (12/14/21 office wt and per previous history), Percent weight change: -18.4          Edema: No data recorded   Estimated Daily Nutrient Needs:  Energy (kcal/day): 2471-5813 (Kcal/kg (25-30), Weight Used: Current (67.4 kg (3/15))  Protein (g/day): 81-88 (1.2-1.3 g/kg) Weight Used: (Current)  Fluid (ml/day):   (1 ml/kcal)    Nutrition Diagnosis:   · Inadequate oral intake related to altered GI function,early satiety (peritoneal carcinomatosis ) as evidenced by  (reportes barriers to PO, recall, wt loss)    · Severe malnutrition related to catabolic illness as evidenced by  (malnutrition criteria as above)    Nutrition Interventions:   Food and/or Nutrient Delivery: Continue current diet,Modify oral nutrition supplement,Modify parenteral nutrition     Coordination of Nutrition Care: Continue to monitor while inpatient  Plan of Care discussed with Vita Wilkinson RN    Goals:   Previous Goal Met: Progressing toward goal(s)  Active Goal: Tolerate TPN at goal within 3 days    Nutrition Monitoring and Evaluation:      Food/Nutrient Intake Outcomes: Food and nutrient intake,Supplement intake,Parenteral nutrition intake/tolerance  Physical Signs/Symptoms Outcomes: Biochemical data,GI status,Hemodynamic status,Weight    Discharge Planning:     Too soon to determine (likely TPN)    [address] [place], LD on 3/18/2022 at 11:21 AM  Contact: [phone]

## 2022-03-18 NOTE — PROGRESS NOTES
PT note:    Pt sound asleep in bed with covers pulled over his head. Called his name numerous times with pt not waking up. Will follow up with pt later as schedule/time allow.     Justice Ponce, PTA

## 2022-03-18 NOTE — PROGRESS NOTES
Initial visit by  to convey care and concern and to explore spiritual needs. Patient appeared asleep and I did not wake him. Chaplains remain available for follow-up care.      Tate Anderson 67  Board Certified

## 2022-03-18 NOTE — PROGRESS NOTES
END OF SHIFT NOTE:    Intake/Output  03/17 1901 - 03/18 0700  In: -   Out: 1800 [Drains:1800]   Voiding: YES  Catheter: NO  Drain:   Drain PleurX peritoneal drain 03/03/22 Right; Outer Abdomen (Active)   Site Assessment Clean, dry, & intact 03/17/22 1930   Dressing Status Clean, dry, & intact 03/17/22 1930   Status Removed, accidental via patient 03/18/22 0501   Output (ml) 1800 ml 03/18/22 0210               Stool:  0 occurrences. Stool Assessment  Stool Color: Woody Kevin (03/16/22 0558)  Stool Appearance: Watery (per pt ) (03/16/22 0800)  Stool Amount: Medium (03/16/22 0558)    Emesis:  0 occurrences. VITAL SIGNS  Patient Vitals for the past 12 hrs:   Temp Pulse Resp BP SpO2   03/18/22 0339 97.4 °F (36.3 °C) 80 20 (!) 102/47 95 %   03/17/22 2251 96.8 °F (36 °C) 85 18 122/63 100 %   03/17/22 1928 98.3 °F (36.8 °C) (!) 48 17 115/66 94 %       Pain Assessment  Pain 1  Pain Scale 1: Numeric (0 - 10) (03/18/22 0501)  Pain Intensity 1: 0 (03/18/22 0501)  Patient Stated Pain Goal: 0 (03/18/22 0501)  Pain Reassessment 1: Patient resting w/respiratory rate greater than 10 (03/18/22 0250)  Pain Location 1: Abdomen (03/18/22 0210)  Pain Orientation 1: Medial (03/17/22 2220)  Pain Description 1: Aching (03/18/22 0210)  Pain Intervention(s) 1: Medication (see MAR) (03/18/22 0210)    Ambulating  Yes    Additional Information:     Tele box reordered, EKG performed. Pt refused tele box but stated that he will accept it \"later. \"     Consult to cardiology and IR put in. Gave prn morphine x2    Shift report given to oncoming nurse at the bedside.     Liset Denis RN

## 2022-03-18 NOTE — PROGRESS NOTES
Pt had witnessed fall in bathroom. Pt states he did not hit his head but he hit his buttom and back. Pt has a red abrasion that did not break the skin on the back of his left arm, on his left collar bone, and under his left arm. Pt stated pain is 0/10  Emergency contact, Marcia March present and notified of fall. House supervisor and Bertha Rao NP notified. 1840: Beatriz Rice NP notified that patient has been having low bp's post fall. Orders received for 500cc fluid bolus. Will continue to monitor.

## 2022-03-18 NOTE — PROGRESS NOTES
OT note:    Pt sound asleep in bed with covers pulled over his head. Called his name numerous times with pt not waking up. Will follow up with pt later as schedule/time allow.     Devon Johnson

## 2022-03-18 NOTE — PROGRESS NOTES
Post Fall Documentation    Juan Manuel Romero witnessed/unwitnessed fall occurred on t   (Date) at [phone]   (Time). The answers to the following questions summarize the fall: In the patient's own words:  · What were you attempting to do when you fell? Walk into the shower  · Do you know why you fell? Floor was wet  · Do you have any pain/discomfort or any other complaints? no  · Which part of your body made contact with the floor or other object? Back and bottom  Nurse:  Esha Devi Was this an assisted fall? no   Was fall witnessed? Yes     By Whom? CNA   If witnessed, what part of the body made contact with the floor or other object? Back and bottom   Patients mental status after the fall/when found: Alert and oriented   Any apparent injury:  Abrasion(s)   Immediate interventions for injury/suspected injury? No interventions needed   Patient assisted back to bed? Assist X2   Name of provider notified and time, any comments? Viktoria Smalls NP [id number]          Name of family member notified and time: Manny León was present visiting during fall    Document Immediate VS and physical assessment in flowsheets. Document Neuro assessment every hour x 4 (for potential head injury or unwitnessed fall) in flowsheets.         Christine Simpson RN

## 2022-03-18 NOTE — PROGRESS NOTES
LOS 3d   Per IDR with ONC NP possible Home TPN required PT notes state [facility]. PPD placed on 3/16  Discharge plan is undetermined at this time.    Will continue to follow for discharge planning needs  Please consult  if any new issues arise

## 2022-03-18 NOTE — PROGRESS NOTES
Pt pulled out port needle and pulled on pleurex drain. He stated that he was going to the bathroom and the \"cords got caught. \" Pleurex drain was visibly out about four inches. Contacted provider. Drained 1800 from pleurex. Fluid coming from around tube. Contacted ICU rover. Mikana removed tube and applied a non-adhesive dressing with abd and tape (chest tube dressing). Consult to IR put in. Port needle replaced, pt is refusing tele box.

## 2022-03-18 NOTE — PROGRESS NOTES
[facility] Hematology & Oncology        Inpatient Hematology / Oncology Progress Note      Admission Date: 3/15/2022 10:28 AM  Reason for Admission/Hospital Course: FTT (failure to thrive) in adult [R62.7]  Hypotension [I95.9]      24 Hour Events:  VSS, afebrile  TPN started 3/16  [place]/abraxane C1D1 3/17  Pulled out his Pleurx on accident  Overall, feeling better and slowly improving PO intake    ROS:  Constitutional: +weakness/fatigue. Negative for fever, chills, malaise. CV: Negative for chest pain, palpitations, edema. Respiratory: Negative for dyspnea, cough, wheezing. GI: +N/V (better). Negative for abdominal pain. 10 point review of systems is otherwise negative with the exception of the elements mentioned above in the HPI. No Known Allergies    OBJECTIVE:  Patient Vitals for the past 8 hrs:   BP Temp Pulse Resp SpO2   22 1053 99/64 97.7 °F (36.5 °C) 74 18 99 %   22 0800 -- -- -- -- 98 %   22 0756 105/74 97.5 °F (36.4 °C) 86 18 98 %     Temp (24hrs), Av.6 °F (36.4 °C), Min:96.8 °F (36 °C), Max:98.3 °F (36.8 °C)     0701 -  1900  In: 120 [P.O.:120]  Out: -     Physical Exam:  Constitutional: Thin, cachectic elderly male in no acute distress, sitting comfortably in the hospital bed. HEENT: Normocephalic and atraumatic. Oropharynx is clear, mucous membranes are moist. Extraocular muscles are intact. Sclerae anicteric. Neck supple without JVD. No thyromegaly present. Skin Warm and dry. No bruising and no rash noted. No erythema. No pallor. Respiratory Lungs are clear to auscultation bilaterally without wheezes, rales or rhonchi, normal air exchange without accessory muscle use. CVS Normal rate, regular rhythm and normal S1 and S2. No murmurs, gallops, or rubs. Abdomen Soft, nontender and nondistended, normoactive bowel sounds. No palpable mass. No hepatosplenomegaly. Neuro Grossly nonfocal with no obvious sensory or motor deficits.    MSK Normal range of motion in general.  No edema and no tenderness. Psych Appropriate mood and affect.         Labs:      Recent Labs     03/18/22 0216 03/17/22 0219 03/16/22  0332   WBC 5.5 8.7 8.9   RBC 3.85* 4.11* 4.28   HGB 11.4* 12.2* 12.8*   HCT 35.3* 38.1* 39.2*   MCV 91.7 92.7 91.6   MCH 29.6 29.7 29.9   MCHC 32.3 32.0 32.7   RDW 15.2* 15.2* 15.2*   * 114* 130*   GRANS 85* 74 76   LYMPH 2* 6* 5*   MONOS 12 19* 19*   EOS 0* 1 0*   BASOS 0 0 0   IG 1 1 1   DF AUTOMATED AUTOMATED AUTOMATED   ANEU 4.7 6.4 6.7   ABL 0.1* 0.5 0.5   ABM 0.7 1.6* 1.6*   JONNY 0.0 0.1 0.0   ABB 0.0 0.0 0.0   AIG 0.1 0.1 0.1        Recent Labs     03/18/22 0216 03/17/22 0219 03/16/22  0332   * 133* 132*   K 4.3 4.5 4.7    104 100   CO2 22 24 23   AGAP 7 5* 9   * 113* 107*   BUN 20 22 27*   CREA 0.80 1.00 1.00   GFRAA >60 >60 >60   GFRNA >60 >60 >60   CA 7.8* 8.4 8.5   * 315* 372*   TP 5.1* 5.3* 5.3*   ALB 1.8* 1.9* 2.0*   GLOB 3.3 3.4 3.3   AGRAT 0.5* 0.6* 0.6*   MG 2.1 2.3 2.5*   PHOS 2.8 1.9* 3.7         Imaging:    Medications:  Current Facility-Administered Medications   Medication Dose Route Frequency    TPN ADULT-CENTRAL - dextrose 15% amino acid 5%   IntraVENous QPM    fat emulsion 20% (LIPOSYN, INTRAlipid) infusion 250 mL  250 mL IntraVENous QPM    TPN ADULT - CENTRAL -  dextrose 10% amino acid 4.25%   IntraVENous QPM    morphine injection 1 mg  1 mg IntraVENous Q4H PRN    HYDROcodone-acetaminophen (NORCO) 5-325 mg per tablet 1 Tablet  1 Tablet Oral Q4H PRN    HYDROmorphone (DILAUDID) tablet 4 mg  4 mg Oral Q4H PRN    loperamide (IMODIUM) capsule 2 mg  2 mg Oral Q4H PRN    NUTRITIONAL SUPPORT ELECTROLYTE PRN ORDERS   Does Not Apply PRN    sodium chloride (NS) flush 5-10 mL  5-10 mL IntraVENous Q8H    sodium chloride (NS) flush 5-10 mL  5-10 mL IntraVENous PRN    diphenoxylate-atropine (LOMOTIL) tablet 1 Tablet  1 Tablet Oral QID PRN    famotidine (PEPCID) tablet 20 mg  20 mg Oral BID    pantoprazole (PROTONIX) tablet 40 mg  40 mg Oral ACB    promethazine (PHENERGAN) tablet 25 mg  25 mg Oral Q6H PRN    tamsulosin (FLOMAX) capsule 0.4 mg  0.4 mg Oral DAILY    enoxaparin (LOVENOX) injection 40 mg  40 mg SubCUTAneous Q24H    ondansetron (ZOFRAN) injection 4 mg  4 mg IntraVENous Q4H PRN         ASSESSMENT:    Problem List  Date Reviewed: 3/15/2022          Codes Class Noted    FTT (failure to thrive) in adult ICD-10-CM: R62.7  ICD-9-CM: 783.7  3/15/2022        Hypotension ICD-10-CM: I95.9  ICD-9-CM: 458.9  3/15/2022        Severe protein-calorie malnutrition ([facility] ICD-10-CM: E43  ICD-9-CM: 262  3/4/2022        Pancreatic cancer ([facility] ICD-10-CM: C25.9  ICD-9-CM: 157.9  3/2/2022        Ascites ICD-10-CM: R18.8  ICD-9-CM: 789.59  3/2/2022        Admission for antineoplastic chemotherapy ICD-10-CM: Z51.11  ICD-9-CM: V58.11  3/2/2022        Hypomagnesemia ICD-10-CM: E83.42  ICD-9-CM: 275.2  4/23/2021        Hypokalemia ICD-10-CM: E87.6  ICD-9-CM: 276.8  4/20/2021        CINV (chemotherapy-induced nausea and vomiting) ICD-10-CM: R11.2, T45.1X5A  ICD-9-CM: 787.01, E933.1  4/20/2021        Dehydration ICD-10-CM: E86.0  ICD-9-CM: 276.51  4/12/2021        Other neutropenia ([facility] ICD-10-CM: D70.8  ICD-9-CM: 288.09  3/9/2021        Malignant neoplasm of lower third of esophagus ([facility] ICD-10-CM: C15.5  ICD-9-CM: 150.5  1/5/2021        Malignant neoplasm of body of pancreas ([email or web address] ICD-10-CM: C25.1  ICD-9-CM: 157.1  1/5/2021        Severe obesity ([email or web address] ICD-10-CM: E66.01  ICD-9-CM: 278.01  12/10/2020        Elevated glucose ICD-10-CM: R73.09  ICD-9-CM: 790.29  7/22/2019        Anemia ICD-10-CM: D64.9  ICD-9-CM: 285.9  7/22/2019        Chronic left-sided low back pain ICD-10-CM: M54.50, G89.29  ICD-9-CM: 724.2, 338.29  7/22/2019              78 y.o.  M pancreatic cancer and esophageal cancer with malignant ascites of previously controlled FOLFIRINOX but currently disease progressed and most recently admited on 3/2/2022 to place Pleurx and arrange gemcitabine/Abraxane, had extensive discussion with inpatient team and pharmacy, patient was discharged on 3/7/2022 without chemo appointment and return to office on 3/15/2022, extremely sick and blood pressure not measurable in the office, and not been having much oral intake since discharge, urgently establish IV access and called EMS to take to ER to stabilize and admit to hospital, apparently needed much supportive care and volume resuscitation, start TPN until oral intake can improve and give gemcitabine/Abraxane once clinically stable, discussed with the inpatient team.    PLAN:    Metastatic pancreatic cancer / distal esophageal cancer  - Metastatic disease involving peritoneum, malignant ascites  - s/p 24 cycles of FOLFIRINOX with SD and recent POD with increasing CEA/ and malignant ascites  - Plan for gemcitabine/abraxane when clinically stable - hopefully tomorrow. 3/17 [place]/abraxane today  3/18 Tolerated [place]/abraxane yesterday - D8 due 3/24    Malignant ascites  - Has abdominal Pleurx, drain three times weekly  3/17 Pleurx drained 3/16 - 1100cc out. 3/18 Pleurx accidentally dislodged last night - 1800cc removed before completely removed. IR notified and will re-evaluate Monday. Cancer related pain  - Continue home dilaudid  3/17 Utilizing IV morphine. Will decrease dosing and encourage use of home Norco.  3/18 Continue to wean IV meds - encourage PO. Hypotension / poor PO intake / protein-calorie malnutrition  - Consult RD for TPN  - BC pending, started on Cefe/Vanc and may be able to de-escalate soon as hypotension likely secondary to dehydration. LA improved after IFV. 3/18 On TPN. Continues on Cefe/Vanc although infectious work-up unremarkable. Will stop antibiotics. 3/19 Hypotension improved and remains afebrile off antibiotics. Continue with nutritional support via TPN.     Nausea  - Continue PRN antiemetics    Diarrhea  - Antidiarrheals PRN    Bradycardia / Hypertension  - One documented episode of HR 43 - check EKG  - No hx of hypertension, monitor  3/17 EKG with bi-geminal PACs and known LBBB. Recheck EGK.  3/18 EKG with same as above. Palpated pulse on several occasions documented in the 40s. Tele applied overnight. Cardiology following. Thrombocytopenia  3/17 Possibly related to antibiotics - stopping today. Monitor. 3/18 Plts 100k - monitor. Continue home meds  Apoorva SOPs  VTE prophylaxis - Lovenox (hold for plts <50k)    Dispo - too soon to determine. PT/OT following - HHPT vs STR. CM following for home TPN. Goals and plan of care reviewed with the patient. All questions answered to the best of our ability.             [facility]  [address] [address]  Office : [phone]  Fax : [phone]

## 2022-03-19 ENCOUNTER — APPOINTMENT (OUTPATIENT)
Dept: CT IMAGING | Age: 67
DRG: 435 | End: 2022-03-19
Attending: NURSE PRACTITIONER
Payer: MEDICARE

## 2022-03-19 ENCOUNTER — APPOINTMENT (OUTPATIENT)
Dept: MRI IMAGING | Age: 67
DRG: 435 | End: 2022-03-19
Attending: NURSE PRACTITIONER
Payer: MEDICARE

## 2022-03-19 ENCOUNTER — APPOINTMENT (OUTPATIENT)
Dept: GENERAL RADIOLOGY | Age: 67
DRG: 435 | End: 2022-03-19
Attending: NURSE PRACTITIONER
Payer: MEDICARE

## 2022-03-19 PROBLEM — I44.7 LBBB (LEFT BUNDLE BRANCH BLOCK): Status: ACTIVE | Noted: 2022-03-19

## 2022-03-19 PROBLEM — R00.1 BRADYCARDIA: Status: ACTIVE | Noted: 2022-03-19

## 2022-03-19 LAB
ALBUMIN SERPL-MCNC: 1.4 G/DL (ref 3.2–4.6)
ALBUMIN/GLOB SERPL: 0.6 {RATIO} (ref 1.2–3.5)
ALP SERPL-CCNC: 217 U/L (ref 50–136)
ALT SERPL-CCNC: 18 U/L (ref 12–65)
ANION GAP SERPL CALC-SCNC: 6 MMOL/L (ref 7–16)
AST SERPL-CCNC: 26 U/L (ref 15–37)
BASOPHILS # BLD: 0 K/UL (ref 0–0.2)
BASOPHILS NFR BLD: 0 % (ref 0–2)
BILIRUB SERPL-MCNC: 0.3 MG/DL (ref 0.2–1.1)
BUN SERPL-MCNC: 18 MG/DL (ref 8–23)
CALCIUM SERPL-MCNC: 7.3 MG/DL (ref 8.3–10.4)
CHLORIDE SERPL-SCNC: 111 MMOL/L (ref 98–107)
CO2 SERPL-SCNC: 20 MMOL/L (ref 21–32)
CREAT SERPL-MCNC: 0.6 MG/DL (ref 0.8–1.5)
DIFFERENTIAL METHOD BLD: ABNORMAL
EOSINOPHIL # BLD: 0 K/UL (ref 0–0.8)
EOSINOPHIL NFR BLD: 1 % (ref 0.5–7.8)
ERYTHROCYTE [DISTWIDTH] IN BLOOD BY AUTOMATED COUNT: 15 % (ref 11.9–14.6)
GLOBULIN SER CALC-MCNC: 2.5 G/DL (ref 2.3–3.5)
GLUCOSE SERPL-MCNC: 127 MG/DL (ref 65–100)
HCT VFR BLD AUTO: 33.9 % (ref 41.1–50.3)
HGB BLD-MCNC: 10.9 G/DL (ref 13.6–17.2)
IMM GRANULOCYTES # BLD AUTO: 0.1 K/UL (ref 0–0.5)
IMM GRANULOCYTES NFR BLD AUTO: 1 % (ref 0–5)
LYMPHOCYTES # BLD: 0.2 K/UL (ref 0.5–4.6)
LYMPHOCYTES NFR BLD: 3 % (ref 13–44)
MAGNESIUM SERPL-MCNC: 1.8 MG/DL (ref 1.8–2.4)
MCH RBC QN AUTO: 29.7 PG (ref 26.1–32.9)
MCHC RBC AUTO-ENTMCNC: 32.2 G/DL (ref 31.4–35)
MCV RBC AUTO: 92.4 FL (ref 79.6–97.8)
MONOCYTES # BLD: 0.2 K/UL (ref 0.1–1.3)
MONOCYTES NFR BLD: 4 % (ref 4–12)
NEUTS SEG # BLD: 5.2 K/UL (ref 1.7–8.2)
NEUTS SEG NFR BLD: 92 % (ref 43–78)
NRBC # BLD: 0 K/UL (ref 0–0.2)
PHOSPHATE SERPL-MCNC: 2.2 MG/DL (ref 2.3–3.7)
PLATELET # BLD AUTO: 71 K/UL (ref 150–450)
PMV BLD AUTO: 10.8 FL (ref 9.4–12.3)
POTASSIUM SERPL-SCNC: 3.1 MMOL/L (ref 3.5–5.1)
PROT SERPL-MCNC: 3.9 G/DL (ref 6.3–8.2)
RBC # BLD AUTO: 3.67 M/UL (ref 4.23–5.6)
SODIUM SERPL-SCNC: 137 MMOL/L (ref 138–145)
WBC # BLD AUTO: 5.7 K/UL (ref 4.3–11.1)

## 2022-03-19 PROCEDURE — 74011250636 HC RX REV CODE- 250/636: Performed by: NURSE PRACTITIONER

## 2022-03-19 PROCEDURE — 70553 MRI BRAIN STEM W/O & W/DYE: CPT

## 2022-03-19 PROCEDURE — 74011000636 HC RX REV CODE- 636: Performed by: INTERNAL MEDICINE

## 2022-03-19 PROCEDURE — 3331090001 HH PPS REVENUE CREDIT

## 2022-03-19 PROCEDURE — 65270000015 HC RM PRIVATE ONCOLOGY

## 2022-03-19 PROCEDURE — 80053 COMPREHEN METABOLIC PANEL: CPT

## 2022-03-19 PROCEDURE — 74011250637 HC RX REV CODE- 250/637: Performed by: NURSE PRACTITIONER

## 2022-03-19 PROCEDURE — 74177 CT ABD & PELVIS W/CONTRAST: CPT

## 2022-03-19 PROCEDURE — 74011250637 HC RX REV CODE- 250/637: Performed by: INTERNAL MEDICINE

## 2022-03-19 PROCEDURE — 70450 CT HEAD/BRAIN W/O DYE: CPT

## 2022-03-19 PROCEDURE — 85025 COMPLETE CBC W/AUTO DIFF WBC: CPT

## 2022-03-19 PROCEDURE — 74011250636 HC RX REV CODE- 250/636: Performed by: INTERNAL MEDICINE

## 2022-03-19 PROCEDURE — 84100 ASSAY OF PHOSPHORUS: CPT

## 2022-03-19 PROCEDURE — A9576 INJ PROHANCE MULTIPACK: HCPCS | Performed by: INTERNAL MEDICINE

## 2022-03-19 PROCEDURE — 74011000250 HC RX REV CODE- 250: Performed by: EMERGENCY MEDICINE

## 2022-03-19 PROCEDURE — 74011000250 HC RX REV CODE- 250: Performed by: INTERNAL MEDICINE

## 2022-03-19 PROCEDURE — 73090 X-RAY EXAM OF FOREARM: CPT

## 2022-03-19 PROCEDURE — 83735 ASSAY OF MAGNESIUM: CPT

## 2022-03-19 PROCEDURE — 99232 SBSQ HOSP IP/OBS MODERATE 35: CPT | Performed by: INTERNAL MEDICINE

## 2022-03-19 PROCEDURE — 3331090002 HH PPS REVENUE DEBIT

## 2022-03-19 PROCEDURE — 73080 X-RAY EXAM OF ELBOW: CPT

## 2022-03-19 PROCEDURE — 74011000258 HC RX REV CODE- 258: Performed by: INTERNAL MEDICINE

## 2022-03-19 PROCEDURE — 99233 SBSQ HOSP IP/OBS HIGH 50: CPT | Performed by: INTERNAL MEDICINE

## 2022-03-19 RX ORDER — SODIUM CHLORIDE 0.9 % (FLUSH) 0.9 %
10 SYRINGE (ML) INJECTION
Status: COMPLETED | OUTPATIENT
Start: 2022-03-19 | End: 2022-03-19

## 2022-03-19 RX ADMIN — ONDANSETRON 4 MG: 2 INJECTION INTRAMUSCULAR; INTRAVENOUS at 21:56

## 2022-03-19 RX ADMIN — PANTOPRAZOLE SODIUM 40 MG: 40 TABLET, DELAYED RELEASE ORAL at 08:51

## 2022-03-19 RX ADMIN — GADOTERIDOL 14 ML: 279.3 INJECTION, SOLUTION INTRAVENOUS at 16:34

## 2022-03-19 RX ADMIN — SODIUM CHLORIDE 500 ML: 900 INJECTION, SOLUTION INTRAVENOUS at 00:13

## 2022-03-19 RX ADMIN — ONDANSETRON 4 MG: 2 INJECTION INTRAMUSCULAR; INTRAVENOUS at 05:55

## 2022-03-19 RX ADMIN — POTASSIUM PHOSPHATE, MONOBASIC AND POTASSIUM PHOSPHATE, DIBASIC: 224; 236 INJECTION, SOLUTION, CONCENTRATE INTRAVENOUS at 13:41

## 2022-03-19 RX ADMIN — SODIUM ACETATE: 164 INJECTION, SOLUTION, CONCENTRATE INTRAVENOUS at 17:34

## 2022-03-19 RX ADMIN — Medication 10 ML: at 12:16

## 2022-03-19 RX ADMIN — SODIUM CHLORIDE, PRESERVATIVE FREE 10 ML: 5 INJECTION INTRAVENOUS at 13:51

## 2022-03-19 RX ADMIN — Medication 10 ML: at 16:37

## 2022-03-19 RX ADMIN — HYDROCODONE BITARTRATE AND ACETAMINOPHEN 1 TABLET: 5; 325 TABLET ORAL at 01:08

## 2022-03-19 RX ADMIN — I.V. FAT EMULSION 250 ML: 20 EMULSION INTRAVENOUS at 17:34

## 2022-03-19 RX ADMIN — IOPAMIDOL 100 ML: 755 INJECTION, SOLUTION INTRAVENOUS at 12:15

## 2022-03-19 RX ADMIN — SODIUM CHLORIDE, PRESERVATIVE FREE 10 ML: 5 INJECTION INTRAVENOUS at 21:57

## 2022-03-19 RX ADMIN — SODIUM CHLORIDE 100 ML: 9 INJECTION, SOLUTION INTRAVENOUS at 12:15

## 2022-03-19 RX ADMIN — PROMETHAZINE HYDROCHLORIDE 25 MG: 25 TABLET ORAL at 02:54

## 2022-03-19 RX ADMIN — FAMOTIDINE 20 MG: 20 TABLET, FILM COATED ORAL at 08:51

## 2022-03-19 RX ADMIN — PROMETHAZINE HYDROCHLORIDE 25 MG: 25 TABLET ORAL at 08:54

## 2022-03-19 RX ADMIN — SODIUM CHLORIDE, PRESERVATIVE FREE 10 ML: 5 INJECTION INTRAVENOUS at 05:35

## 2022-03-19 RX ADMIN — HYDROCODONE BITARTRATE AND ACETAMINOPHEN 1 TABLET: 5; 325 TABLET ORAL at 10:56

## 2022-03-19 RX ADMIN — HYDROMORPHONE HYDROCHLORIDE 4 MG: 4 TABLET ORAL at 15:38

## 2022-03-19 RX ADMIN — TAMSULOSIN HYDROCHLORIDE 0.4 MG: 0.4 CAPSULE ORAL at 08:51

## 2022-03-19 RX ADMIN — ONDANSETRON 4 MG: 2 INJECTION INTRAMUSCULAR; INTRAVENOUS at 01:12

## 2022-03-19 RX ADMIN — HYDROMORPHONE HYDROCHLORIDE 4 MG: 4 TABLET ORAL at 21:13

## 2022-03-19 NOTE — PROGRESS NOTES
Spoke with RN, patient is not currently willing to complete MRI Screening or come down to MRI. RN will continue to work on getting patient to do so.

## 2022-03-19 NOTE — PROGRESS NOTES
END OF SHIFT NOTE:    Intake/Output  03/19 0701 - 03/19 1900  In: 360 [P.O.:360]  Out: 700 [Urine:700]   Voiding: YES  Catheter: NO  Drain:              Stool:  0 occurrences. Stool Assessment  Stool Color: [place] (03/16/22 0558)  Stool Appearance: Watery (per pt ) (03/16/22 0800)  Stool Amount: Medium (03/16/22 0558)    Emesis:  0 occurrences. VITAL SIGNS  Patient Vitals for the past 12 hrs:   Temp Pulse Resp BP SpO2   03/19/22 1801 98.1 °F (36.7 °C) 91 20 101/62 98 %   03/19/22 1540 97.6 °F (36.4 °C) 94 20 (!) 82/48 99 %   03/19/22 1048 97.5 °F (36.4 °C) 75 20 (!) 85/63 99 %   03/19/22 0800 -- 100 -- -- --   03/19/22 0714 -- (!) 59 20 (!) 95/55 100 %   03/19/22 0712 97.6 °F (36.4 °C) 78 20 (!) 86/63 96 %       Pain Assessment  Pain 1  Pain Scale 1: Numeric (0 - 10) (03/19/22 1544)  Pain Intensity 1: 7 (03/19/22 1544)  Patient Stated Pain Goal: 0 (03/19/22 1544)  Pain Reassessment 1: Patient resting w/respiratory rate greater than 10 (03/19/22 1145)  Pain Onset 1: PTA (03/19/22 1544)  Pain Location 1: Abdomen (03/19/22 1544)  Pain Orientation 1: Medial (03/19/22 1544)  Pain Description 1: Aching; Sharp (03/19/22 1544)  Pain Intervention(s) 1: Medication (see MAR) (03/19/22 1544)    Ambulating  Yes    Additional Information: pt with some neuro changes this AM- CT of head and MRI brain completed, xray of LUE negative for fracture, CT ABD/Pelvis for ABD pain, hypotension improving    Shift report given to oncoming nurse at the bedside.     Perri Doherty

## 2022-03-19 NOTE — PROGRESS NOTES
[facility] Hematology & Oncology        Inpatient Hematology / Oncology Progress Note      Admission Date: 3/15/2022 10:28 AM  Reason for Admission/Hospital Course: FTT (failure to thrive) in adult [R62.7]  Hypotension [I95.9]      24 Hour Events:  VSS, afebrile - hypotensive (adequate MAP)  TPN started 3/16  [place]/abraxane C1D1 3/17  Ave [place]obs yesterday - left arm pain, right hip pain  Abdominal pain worse    ROS:  Constitutional: +weakness/fatigue. Negative for fever, chills, malaise. CV: Negative for chest pain, palpitations, edema. Respiratory: Negative for dyspnea, cough, wheezing. GI: +N/V (better). Negative for abdominal pain. 10 point review of systems is otherwise negative with the exception of the elements mentioned above in the HPI. No Known Allergies    OBJECTIVE:  Patient Vitals for the past 8 hrs:   BP Temp Pulse Resp SpO2   22 1048 (!) 85/63 97.5 °F (36.4 °C) 75 20 99 %   22 0800 -- -- 100 -- --   22 0714 (!) 95/55 -- (!) 59 20 100 %   22 0712 (!) 86/63 97.6 °F (36.4 °C) 78 20 96 %     Temp (24hrs), Av.6 °F (36.4 °C), Min:97.3 °F (36.3 °C), Max:98 °F (36.7 °C)     0701 -  1900  In: 360 [P.O.:360]  Out: 500 [Urine:500]    Physical Exam:  Constitutional: Thin, cachectic elderly male in no acute distress, sitting comfortably in the hospital bed. HEENT: Normocephalic and atraumatic. Oropharynx is clear, mucous membranes are moist. Extraocular muscles are intact. Sclerae anicteric. Neck supple without JVD. No thyromegaly present. Skin +R gluteal hematoma/lipoma palpated. Warm and dry. No bruising and no rash noted. No erythema. No pallor. Respiratory Lungs are clear to auscultation bilaterally without wheezes, rales or rhonchi, normal air exchange without accessory muscle use. CVS Normal rate, regular rhythm and normal S1 and S2. No murmurs, gallops, or rubs. Abdomen Soft, nontender and nondistended, normoactive bowel sounds.   No palpable mass.  No hepatosplenomegaly. Neuro Grossly nonfocal with no obvious sensory or motor deficits. MSK Normal range of motion in general.  No edema and no tenderness. Psych Appropriate mood and affect.         Labs:      Recent Labs     03/19/22 0247 03/18/22 0216 03/17/22 0219   WBC 5.7 5.5 8.7   RBC 3.67* 3.85* 4.11*   HGB 10.9* 11.4* 12.2*   HCT 33.9* 35.3* 38.1*   MCV 92.4 91.7 92.7   MCH 29.7 29.6 29.7   MCHC 32.2 32.3 32.0   RDW 15.0* 15.2* 15.2*   PLT 71* 100* 114*   GRANS 92* 85* 74   LYMPH 3* 2* 6*   MONOS 4 12 19*   EOS 1 0* 1   BASOS 0 0 0   IG 1 1 1   DF AUTOMATED AUTOMATED AUTOMATED   ANEU 5.2 4.7 6.4   ABL 0.2* 0.1* 0.5   ABM 0.2 0.7 1.6*   JONNY 0.0 0.0 0.1   ABB 0.0 0.0 0.0   AIG 0.1 0.1 0.1        Recent Labs     03/19/22 0247 03/18/22 0216 03/17/22 0219   * 135* 133*   K 3.1* 4.3 4.5   * 106 104   CO2 20* 22 24   AGAP 6* 7 5*   * 140* 113*   BUN 18 20 22   CREA 0.60* 0.80 1.00   GFRAA >60 >60 >60   GFRNA >60 >60 >60   CA 7.3* 7.8* 8.4   * 275* 315*   TP 3.9* 5.1* 5.3*   ALB 1.4* 1.8* 1.9*   GLOB 2.5 3.3 3.4   AGRAT 0.6* 0.5* 0.6*   MG 1.8 2.1 2.3   PHOS 2.2* 2.8 1.9*         Imaging:    Medications:  Current Facility-Administered Medications   Medication Dose Route Frequency    TPN ADULT-CENTRAL - dextrose 15% amino acid 5%   IntraVENous QPM    potassium phosphate 20 mmol in 0.9% sodium chloride 250 mL infusion   IntraVENous ONCE    TPN ADULT-CENTRAL - dextrose 15% amino acid 5%   IntraVENous QPM    morphine injection 1 mg  1 mg IntraVENous Q6H PRN    fat emulsion 20% (LIPOSYN, INTRAlipid) infusion 250 mL  250 mL IntraVENous QPM    HYDROcodone-acetaminophen (NORCO) 5-325 mg per tablet 1 Tablet  1 Tablet Oral Q4H PRN    HYDROmorphone (DILAUDID) tablet 4 mg  4 mg Oral Q4H PRN    loperamide (IMODIUM) capsule 2 mg  2 mg Oral Q4H PRN    NUTRITIONAL SUPPORT ELECTROLYTE PRN ORDERS   Does Not Apply PRN    sodium chloride (NS) flush 5-10 mL  5-10 mL IntraVENous Q8H  sodium chloride (NS) flush 5-10 mL  5-10 mL IntraVENous PRN    diphenoxylate-atropine (LOMOTIL) tablet 1 Tablet  1 Tablet Oral QID PRN    famotidine (PEPCID) tablet 20 mg  20 mg Oral BID    pantoprazole (PROTONIX) tablet 40 mg  40 mg Oral ACB    promethazine (PHENERGAN) tablet 25 mg  25 mg Oral Q6H PRN    tamsulosin (FLOMAX) capsule 0.4 mg  0.4 mg Oral DAILY    enoxaparin (LOVENOX) injection 40 mg  40 mg SubCUTAneous Q24H    ondansetron (ZOFRAN) injection 4 mg  4 mg IntraVENous Q4H PRN         ASSESSMENT:    Problem List  Date Reviewed: 3/15/2022          Codes Class Noted    Bradycardia ICD-10-CM: R00.1  ICD-9-CM: 427.89  3/19/2022        LBBB (left bundle branch block) ICD-10-CM: I44.7  ICD-9-CM: 426.3  3/19/2022        FTT (failure to thrive) in adult ICD-10-CM: R62.7  ICD-9-CM: 783.7  3/15/2022        * (Principal) Hypotension ICD-10-CM: I95.9  ICD-9-CM: 458.9  3/15/2022        Severe protein-calorie malnutrition ([facility] ICD-10-CM: E43  ICD-9-CM: 512  3/4/2022        Pancreatic cancer ([facility] ICD-10-CM: C25.9  ICD-9-CM: 157.9  3/2/2022        Ascites ICD-10-CM: R18.8  ICD-9-CM: 789.59  3/2/2022        Admission for antineoplastic chemotherapy ICD-10-CM: Z51.11  ICD-9-CM: V58.11  3/2/2022        Hypomagnesemia ICD-10-CM: E83.42  ICD-9-CM: 275.2  4/23/2021        Hypokalemia ICD-10-CM: E87.6  ICD-9-CM: 276.8  4/20/2021        CINV (chemotherapy-induced nausea and vomiting) ICD-10-CM: R11.2, T45.1X5A  ICD-9-CM: 787.01, E933.1  4/20/2021        Dehydration ICD-10-CM: E86.0  ICD-9-CM: 276.51  4/12/2021        Other neutropenia ([facility] ICD-10-CM: D70.8  ICD-9-CM: 288.09  3/9/2021        Malignant neoplasm of lower third of esophagus ([facility] ICD-10-CM: C15.5  ICD-9-CM: 150.5  1/5/2021        Malignant neoplasm of body of pancreas ([facility] ICD-10-CM: C25.1  ICD-9-CM: 157.1  1/5/2021        Severe obesity ([facility] ICD-10-CM: E66.01  ICD-9-CM: 278.01  12/10/2020        Elevated glucose ICD-10-CM: R73.09  ICD-9-CM: 790.29 7/22/2019        Anemia ICD-10-CM: D64.9  ICD-9-CM: 285.9  7/22/2019        Chronic left-sided low back pain ICD-10-CM: M54.50, G89.29  ICD-9-CM: 724.2, 338.29  7/22/2019              66 y.o. M pancreatic cancer and esophageal cancer with malignant ascites of previously controlled FOLFIRINOX but currently disease progressed and most recently admited on 3/2/2022 to place Pleurx and arrange gemcitabine/Abraxane, had extensive discussion with inpatient team and pharmacy, patient was discharged on 3/7/2022 without chemo appointment and return to office on 3/15/2022, extremely sick and blood pressure not measurable in the office, and not been having much oral intake since discharge, urgently establish IV access and called EMS to take to ER to stabilize and admit to hospital, apparently needed much supportive care and volume resuscitation, start TPN until oral intake can improve and give gemcitabine/Abraxane once clinically stable, discussed with the inpatient team.    PLAN:    Metastatic pancreatic cancer / distal esophageal cancer  - Metastatic disease involving peritoneum, malignant ascites  - s/p 24 cycles of FOLFIRINOX with SD and recent POD with increasing CEA/ and malignant ascites  - Plan for gemcitabine/abraxane when clinically stable - hopefully tomorrow. 3/17 [place]/abraxane today  3/18 Tolerated [place]/abraxane yesterday - D8 due 3/24    Malignant ascites  - Has abdominal Pleurx, drain three times weekly  3/17 Pleurx drained 3/16 - 1100cc out. 3/18 Pleurx accidentally dislodged last night - 1800cc removed before completely removed. IR notified and will re-evaluate Monday. Cancer related pain  - Continue home dilaudid  3/17 Utilizing IV morphine. Will decrease dosing and encourage use of home Norco.  3/18 Continue to wean IV meds - encourage PO.     Hypotension / poor PO intake / protein-calorie malnutrition  - Consult RD for TPN  - BC pending, started on Cefe/Vanc and may be able to de-escalate soon as hypotension likely secondary to dehydration. LA improved after IFV. 3/17 On TPN. Continues on Cefe/Vanc although infectious work-up unremarkable. Will stop antibiotics. 3/18 Hypotension improved and remains afebrile off antibiotics. Continue with nutritional support via TPN.  3/19 Ongoing hypotension, although improved yesterday AM, worse through evening. Had 6L of IVF yesterday and continues on TPN. May consider midodrine although MAP consistently ~65. Nausea  - Continue PRN antiemetics    Diarrhea  - Antidiarrheals PRN    Bradycardia  - One documented episode of HR 43 - check EKG  - No hx of hypertension, monitor  3/17 EKG with bi-geminal PACs and known LBBB. Recheck EGK.  3/18 EKG with same as above. Palpated pulse on several occasions documented in the 40s. Tele applied overnight. Cardiology following. 3/19 Cardiology recommending K>4 (on TPN) and Mg replacements. Thrombocytopenia, anemia  3/17 Possibly related to antibiotics - stopping today. Monitor. 3/19 Plts 70k - likely exacerbated by recent chemotherapy as other counts dropping as well. Continue Lovenox for now (hold for plts <50k)    Fall  3/19 Witnessed slip and fall on wet floor yesterday. Today with L arm bruising - check X-ray. Will check CT head and CT AP (hematoma/lipoma palpated on R buttock) and also c/o worsening abdominal pain. Continue home meds  Apoorva SOPs  VTE prophylaxis - Lovenox (hold for plts <50k)    Dispo - too soon to determine. PT/OT following - HHPT vs STR. CM following for home TPN. Goals and plan of care reviewed with the patient. All questions answered to the best of our ability.             [facility]  [address] [place] [address]  Office : [phone]  Fax : [phone]

## 2022-03-19 NOTE — PROGRESS NOTES
[facility] CARDIOLOGY PROGRESS NOTE           3/19/2022 11:36 AM    Admit Date: 3/15/2022      Subjective:   Hemodynamically sable overnight. Denies chest pain or shortness of breath. Borderline hypotensive this morning but feels good and said he is urinating reasonably well. He says his appetite has improved. On TPN. -Abdominal distention has improved following paracentesis    ROS:  Cardiovascular:  As noted above    Objective:      Vitals:    03/19/22 0712 03/19/22 0714 03/19/22 0800 03/19/22 1048   BP: (!) 86/63 (!) 95/55  (!) 85/63   Pulse: 78 (!) 59 100 75   Resp: 20 20  20   Temp: 97.6 °F (36.4 °C)   97.5 °F (36.4 °C)   SpO2: 96% 100%  99%   Weight:       Height:           Physical Exam:  General-No Acute Distress  Neck- supple, no JVD  CV- regular rate and rhythm no MRG  Lung- clear bilaterally  Abd- soft, nontender, mildly distended with shifting dullness present in the flanks  Ext-trace edema noted bilaterally. Skin- warm and dry    Data Review:   Recent Labs     03/19/22  0247 03/18/22  0216   * 135*   K 3.1* 4.3   MG 1.8 2.1   BUN 18 20   CREA 0.60* 0.80   * 140*   WBC 5.7 5.5   HGB 10.9* 11.4*   HCT 33.9* 35.3*   PLT 71* 100*       Assessment/Plan:     Principal Problem:    Hypotension (3/15/2022)  -Improved with hydration    Active Problems:   1. Bradycardia (3/19/2022)  -Suspect that this was inaccurately calculated due to ectopy- atrial bigeminy on ekg and tele. Agree with telemetry. Monitor for any high degree AV block given his underlying conduction system disease    2. LBBB: Check echo. No previous assessment- pending     3. FTT (failure to thrive) in adult (3/15/2022)    Severe protein-calorie malnutrition ([address] (3/4/2022)  -Per primary team     4. Malignant neoplasm of body of pancreas ([address] (1/5/2021), cancer of the esophagus  -Per primary team    5. Hypokalemia- repalce for goal K over 4.0- Noted 20 mmol of Kphos ordered.   -Magnesium level slightly low at 1.8 he is getting 2 g of IV magnesium sulfate x1 today through his TPN. Being rechecked tomorrow morning again.     6. Pancytopenia- mx per primary      Linda Hicks MD  3/19/2022 11:36 AM

## 2022-03-19 NOTE — PROGRESS NOTES
Comprehensive Nutrition Assessment    Type and Reason for Visit: Reassess  TPN Management (Oncology)    Nutrition Recommendations/Plan:   Parenteral Nutrition:  Total parenteral nutrition  to begin at 1800  Change: Dex 15%, 5% AA 2 L (85ml/hr)   Continue 250 ml 20% lipids daily  To provide: 1940 kcal/d (100% of needs), 100 grams of protein/d (100% of needs), 300 grams of CHO/d and 2250 ml of total volume/d. Lytes/L:   Sodium 150 meq (65 meq NaCl, 85 meq NaAcetate), Potassium 30 meq (30 meq KPO4), 8 meq Mg, 4.5 meq Calcium. Solution will now be 1:1.5 Cl:Acetate  Other additives: MTE, MVI MWF due to national shortages, Thiamine 100 mg (day 4/7 due to refeeding risk)  Nutritional Supplement Therapy:   Active electrolyte replacement per nutrition support protocols  Replacement indicated:  Implemented  KPO4 per protocol  Labs:   CMP daily per primary  Mg daily per primary  Phos tomorrow and MWF    Triglyceride 3/20  POC Glucoses/SSI Not indicated  Meals and Snacks:  Continue current diet. Nutrition Supplement Therapy:   Medical food supplement therapy: Discontinue as patient not accepting     Malnutrition Assessment:  Malnutrition Status: Severe malnutrition  Context: Chronic illness  Findings of clinical characteristics of malnutrition:   Energy Intake:  7 - 75% or less est energy requirements for 1 month or longer  Weight Loss:  7.0 - Greater than 7.5% over 3 months (34# (18.4%) )     Body Fat Loss:  1 - Mild body fat loss, Buccal region,Orbital,Triceps   Muscle Mass Loss:  1 - Mild muscle mass loss, Calf (gastrocnemius),Hand (interosseous),Scapula (trapezius),Temples (temporalis)  Fluid Accumulation:  No significant fluid accumulation,     Strength:  Not performed       Nutrition Assessment:   Nutrition History: Per RD assessment patient was able to maintain PO intake and UBW throughout most of chemo. During admmission early March patient had \"stopped eating\" due to nausea.  Upon assessment this admission patient reports no PO of foods for ~4 weeks. States that he has been able to tolerate some fluids. He reports continued nausea and vomiting as primary barrier. Nutrition Background: Patient with pancreatic and esophageal cancer, Amos's esophagus, obesity, HTN, GERD, gastritis, ascites and peritoneal carcinomatosis s/p Plurex drain. He presented to oncology office extremely sick, BP was unable to be measured. He was admitted directly to [facility]. Nutrition Interval:  PO intake continues to limited due to early satiety and persistent nausea and likely secondary to peritoneal carcinomatosis, recurrent ascites with Plurex drain. Patient has demonstrated inability to meet needs orally with severe weight loss and malnutrition as above. Intake trends since admission reveal daily PO tolerance of ~1 partial meal per day which is not sufficient to sustain weight, functional status, or life. Patient seen reclined in bed. No breakfast this am due to abdominal pain. He states that he ate real well last evening. PCT, Anabelle Chin, at bedside states more like 25-50%. Patient states that he is not tolerating change in supplement and does not want to receive any longer. Blood pressure marginal this am. May benefit from increase in volume. Abdominal Status (last documented): Soft,Other (comment) (pleurex drain removed to RLQ; drsg c/d/i) abdomen with   bowel sounds. Last BM 03/18/22.   Pertinent Medications: Maxipime, Pepcid, Lactulose - held, Zofran, Protonix, Vanco  Pertinent Labs:   Lab Results   Component Value Date/Time    Sodium 137 (L) 03/19/2022 02:47 AM    Potassium 3.1 (L) 03/19/2022 02:47 AM    Chloride 111 (H) 03/19/2022 02:47 AM    CO2 20 (L) 03/19/2022 02:47 AM    Anion gap 6 (L) 03/19/2022 02:47 AM    Glucose 127 (H) 03/19/2022 02:47 AM    BUN 18 03/19/2022 02:47 AM    Creatinine 0.60 (L) 03/19/2022 02:47 AM    Calcium 7.3 (L) 03/19/2022 02:47 AM    Albumin 1.4 (L) 03/19/2022 02:47 AM    Magnesium 1.8 03/19/2022 02:47 AM    Phosphorus 2.2 (L) 03/19/2022 02:47 AM     Lab Results   Component Value Date/Time    Triglyceride 178 (H) 03/18/2022 02:16 AM   Labs remarkable for hyponatreamia but continues trending up, Cl elevated, CO@ low K depleted, Mg continues to trend down and now low normal, Phos depleted, glucose slightly elevated but not within range to implement SSI/POC glucose. Nutrition Related Findings:   Port in place, also with 1 PIV. TPN intiated 3/16. Current Nutrition Therapies:  ADULT DIET Regular  ADULT ORAL NUTRITION SUPPLEMENT Breakfast, Lunch, Dinner; Diabetic Supplement  Current Parenteral Nutrition Orders:  · Type and Formula: Dex 15%, 5% AA    · Lipids: 250ml,Daily  · Duration: Continuous  · Rate/Volume: 1.8 L (75ml/hr)   · Current PN Order Provides: infusing at goal  · Goal PN Orders Provides: 1778 kcal/d (100% of needs), 90 grams of protein/d (100% of needs), 270 grams of CHO/d and 2050 ml of total volume/d.        Current Intake:   Average Meal Intake: 1-25% Average Supplement Intake: Refusing to take      Anthropometric Measures:  Height: 5' 6\" (167.6 cm)  Current Body Wt: 70 kg (154 lb 5.2 oz) (3/18), Weight source: Bed scale  BMI: 24.9,  (current BMI skewed by fluid)  Admission Body Weight: 148 lb 9.4 oz  Ideal Body Weight (lbs) (Calculated): 142 lbs (65 kg), 104.6 %  Usual Body Wt: 82.6 kg (182 lb) (12/14/21 office wt and per previous history), Percent weight change: -18.4          Edema: No data recorded   Estimated Daily Nutrient Needs:  Energy (kcal/day): 1482-7694 (Kcal/kg (25-30), Weight Used: Current (67.4 kg (3/15))  Protein (g/day):  (1.3-1.5 g/kg) Weight Used: (Admission (67.3 kg))  Fluid (ml/day):   (1 ml/kcal)    Nutrition Diagnosis:   · Inadequate oral intake related to altered GI function,early satiety (peritoneal carcinomatosis ) as evidenced by  (reportes barriers to PO, recall, wt loss)    · Severe malnutrition related to catabolic illness as evidenced by  (malnutrition criteria as above)    Nutrition Interventions:   Food and/or Nutrient Delivery: Continue current diet,Modify parenteral nutrition     Coordination of Nutrition Care: Continue to monitor while inpatient  Plan of Care discussed with LOBITO Gusman    Goals:   Previous Goal Met: Goal(s) achieved  Active Goal: Continue to toleate TPN at goal    Nutrition Monitoring and Evaluation:      Food/Nutrient Intake Outcomes: Food and nutrient intake,Parenteral nutrition intake/tolerance  Physical Signs/Symptoms Outcomes: Biochemical data,GI status,Fluid status or edema,Weight    Discharge Planning:     Too soon to determine (likely TPN)    [address] Isidro on 3/19/2022 at 11:21 AM  Contact: [phone]

## 2022-03-19 NOTE — PROGRESS NOTES
Pt's BP 80/58 with no new complaints or concerns. Johnny Dunn, NP made aware and new orders received for 500 ml bolus. Will continue with POC.

## 2022-03-19 NOTE — PROGRESS NOTES
Pt refusing to lie back in bed or to sit back far enough that alarm can be turned on, pt refusing to have alarm on, states he will not call for help and he doesn't need help and he will not fall if he just pays attention, RN educated pt on fall risks and safety of pt, RN will continue to monitor pt with safety video monitoring

## 2022-03-20 ENCOUNTER — APPOINTMENT (OUTPATIENT)
Dept: CT IMAGING | Age: 67
DRG: 435 | End: 2022-03-20
Attending: NURSE PRACTITIONER
Payer: MEDICARE

## 2022-03-20 ENCOUNTER — APPOINTMENT (OUTPATIENT)
Dept: NON INVASIVE DIAGNOSTICS | Age: 67
DRG: 435 | End: 2022-03-20
Attending: INTERNAL MEDICINE
Payer: MEDICARE

## 2022-03-20 LAB
ALBUMIN SERPL-MCNC: 1.3 G/DL (ref 3.2–4.6)
ALBUMIN/GLOB SERPL: 0.5 {RATIO} (ref 1.2–3.5)
ALP SERPL-CCNC: 198 U/L (ref 50–136)
ALT SERPL-CCNC: 31 U/L (ref 12–65)
ANION GAP SERPL CALC-SCNC: 8 MMOL/L (ref 7–16)
AST SERPL-CCNC: 56 U/L (ref 15–37)
BACTERIA SPEC CULT: NORMAL
BACTERIA SPEC CULT: NORMAL
BASOPHILS # BLD: 0 K/UL (ref 0–0.2)
BASOPHILS NFR BLD: 0 % (ref 0–2)
BILIRUB SERPL-MCNC: 0.3 MG/DL (ref 0.2–1.1)
BUN SERPL-MCNC: 15 MG/DL (ref 8–23)
CALCIUM SERPL-MCNC: 7.4 MG/DL (ref 8.3–10.4)
CHLORIDE SERPL-SCNC: 112 MMOL/L (ref 98–107)
CO2 SERPL-SCNC: 22 MMOL/L (ref 21–32)
CREAT SERPL-MCNC: 0.4 MG/DL (ref 0.8–1.5)
DIFFERENTIAL METHOD BLD: ABNORMAL
ECHO AO ASC DIAM: 3.1 CM
ECHO AO ASCENDING AORTA INDEX: 1.73 CM/M2
ECHO AO ROOT DIAM: 3.2 CM
ECHO AO ROOT INDEX: 1.79 CM/M2
ECHO AV AREA PEAK VELOCITY: 3.1 CM2
ECHO AV AREA VTI: 3.6 CM2
ECHO AV AREA/BSA PEAK VELOCITY: 1.7 CM2/M2
ECHO AV AREA/BSA VTI: 2 CM2/M2
ECHO AV MEAN GRADIENT: 4 MMHG
ECHO AV MEAN VELOCITY: 0.9 M/S
ECHO AV PEAK GRADIENT: 7 MMHG
ECHO AV PEAK VELOCITY: 1.3 M/S
ECHO AV VELOCITY RATIO: 0.77
ECHO AV VTI: 15.1 CM
ECHO LA AREA 4C: 25.9 CM2
ECHO LA MAJOR AXIS: 6.7 CM
ECHO LV EDV A2C: 59 ML
ECHO LV EDV A4C: 94 ML
ECHO LV EDV INDEX A4C: 53 ML/M2
ECHO LV EDV NDEX A2C: 33 ML/M2
ECHO LV EJECTION FRACTION A2C: 32 %
ECHO LV EJECTION FRACTION A4C: 55 %
ECHO LV EJECTION FRACTION BIPLANE: 44 % (ref 55–100)
ECHO LV ESV A2C: 41 ML
ECHO LV ESV A4C: 43 ML
ECHO LV ESV INDEX A2C: 23 ML/M2
ECHO LV ESV INDEX A4C: 24 ML/M2
ECHO LV FRACTIONAL SHORTENING: 29 % (ref 28–44)
ECHO LV INTERNAL DIMENSION DIASTOLE INDEX: 2.29 CM/M2
ECHO LV INTERNAL DIMENSION DIASTOLIC: 4.1 CM (ref 4.2–5.9)
ECHO LV INTERNAL DIMENSION SYSTOLIC INDEX: 1.62 CM/M2
ECHO LV INTERNAL DIMENSION SYSTOLIC: 2.9 CM
ECHO LV IVSD: 1.4 CM (ref 0.6–1)
ECHO LV MASS 2D: 253.8 G (ref 88–224)
ECHO LV MASS INDEX 2D: 141.8 G/M2 (ref 49–115)
ECHO LV POSTERIOR WALL DIASTOLIC: 1.7 CM (ref 0.6–1)
ECHO LV RELATIVE WALL THICKNESS RATIO: 0.83
ECHO LVOT AREA: 4.2 CM2
ECHO LVOT AV VTI INDEX: 0.87
ECHO LVOT DIAM: 2.3 CM
ECHO LVOT MEAN GRADIENT: 2 MMHG
ECHO LVOT PEAK GRADIENT: 4 MMHG
ECHO LVOT PEAK VELOCITY: 1 M/S
ECHO LVOT STROKE VOLUME INDEX: 30.6 ML/M2
ECHO LVOT SV: 54.8 ML
ECHO LVOT VTI: 13.2 CM
ECHO MV A VELOCITY: 1.48 M/S
ECHO MV E DECELERATION TIME (DT): 104 MS
ECHO MV E VELOCITY: 1.03 M/S
ECHO MV E/A RATIO: 0.7
ECHO RV INTERNAL DIMENSION: 2.8 CM
ECHO RV TAPSE: 2.1 CM (ref 1.5–2)
EOSINOPHIL # BLD: 0 K/UL (ref 0–0.8)
EOSINOPHIL NFR BLD: 1 % (ref 0.5–7.8)
ERYTHROCYTE [DISTWIDTH] IN BLOOD BY AUTOMATED COUNT: 15.4 % (ref 11.9–14.6)
GLOBULIN SER CALC-MCNC: 2.6 G/DL (ref 2.3–3.5)
GLUCOSE SERPL-MCNC: 148 MG/DL (ref 65–100)
HCT VFR BLD AUTO: 32 % (ref 41.1–50.3)
HGB BLD-MCNC: 10.3 G/DL (ref 13.6–17.2)
IMM GRANULOCYTES # BLD AUTO: 0 K/UL (ref 0–0.5)
IMM GRANULOCYTES NFR BLD AUTO: 1 % (ref 0–5)
LYMPHOCYTES # BLD: 0.1 K/UL (ref 0.5–4.6)
LYMPHOCYTES NFR BLD: 2 % (ref 13–44)
MAGNESIUM SERPL-MCNC: 1.9 MG/DL (ref 1.8–2.4)
MCH RBC QN AUTO: 29.6 PG (ref 26.1–32.9)
MCHC RBC AUTO-ENTMCNC: 32.2 G/DL (ref 31.4–35)
MCV RBC AUTO: 92 FL (ref 79.6–97.8)
MONOCYTES # BLD: 0.1 K/UL (ref 0.1–1.3)
MONOCYTES NFR BLD: 2 % (ref 4–12)
NEUTS SEG # BLD: 6.3 K/UL (ref 1.7–8.2)
NEUTS SEG NFR BLD: 95 % (ref 43–78)
NRBC # BLD: 0 K/UL (ref 0–0.2)
PHOSPHATE SERPL-MCNC: 1.7 MG/DL (ref 2.3–3.7)
PLATELET # BLD AUTO: 53 K/UL (ref 150–450)
PMV BLD AUTO: 12.1 FL (ref 9.4–12.3)
POTASSIUM SERPL-SCNC: 3.2 MMOL/L (ref 3.5–5.1)
PROT SERPL-MCNC: 3.9 G/DL (ref 6.3–8.2)
RBC # BLD AUTO: 3.48 M/UL (ref 4.23–5.6)
SERVICE CMNT-IMP: NORMAL
SERVICE CMNT-IMP: NORMAL
SODIUM SERPL-SCNC: 142 MMOL/L (ref 138–145)
TRIGL SERPL-MCNC: 152 MG/DL (ref 35–150)
WBC # BLD AUTO: 6.7 K/UL (ref 4.3–11.1)

## 2022-03-20 PROCEDURE — 74011250637 HC RX REV CODE- 250/637: Performed by: NURSE PRACTITIONER

## 2022-03-20 PROCEDURE — 74011250636 HC RX REV CODE- 250/636: Performed by: NURSE PRACTITIONER

## 2022-03-20 PROCEDURE — 74011250636 HC RX REV CODE- 250/636: Performed by: INTERNAL MEDICINE

## 2022-03-20 PROCEDURE — 71260 CT THORAX DX C+: CPT

## 2022-03-20 PROCEDURE — 3331090002 HH PPS REVENUE DEBIT

## 2022-03-20 PROCEDURE — 74011000250 HC RX REV CODE- 250: Performed by: INTERNAL MEDICINE

## 2022-03-20 PROCEDURE — 74011000250 HC RX REV CODE- 250: Performed by: EMERGENCY MEDICINE

## 2022-03-20 PROCEDURE — 36591 DRAW BLOOD OFF VENOUS DEVICE: CPT

## 2022-03-20 PROCEDURE — 84478 ASSAY OF TRIGLYCERIDES: CPT

## 2022-03-20 PROCEDURE — 80053 COMPREHEN METABOLIC PANEL: CPT

## 2022-03-20 PROCEDURE — 74011250637 HC RX REV CODE- 250/637: Performed by: INTERNAL MEDICINE

## 2022-03-20 PROCEDURE — 99356 PR PROLONGED SVC I/P OR OBS SETTING 1ST HOUR: CPT | Performed by: INTERNAL MEDICINE

## 2022-03-20 PROCEDURE — 65270000015 HC RM PRIVATE ONCOLOGY

## 2022-03-20 PROCEDURE — 99232 SBSQ HOSP IP/OBS MODERATE 35: CPT | Performed by: INTERNAL MEDICINE

## 2022-03-20 PROCEDURE — 99233 SBSQ HOSP IP/OBS HIGH 50: CPT | Performed by: INTERNAL MEDICINE

## 2022-03-20 PROCEDURE — 3331090001 HH PPS REVENUE CREDIT

## 2022-03-20 PROCEDURE — 85025 COMPLETE CBC W/AUTO DIFF WBC: CPT

## 2022-03-20 PROCEDURE — APPSS45 APP SPLIT SHARED TIME 31-45 MINUTES: Performed by: NURSE PRACTITIONER

## 2022-03-20 PROCEDURE — 84100 ASSAY OF PHOSPHORUS: CPT

## 2022-03-20 PROCEDURE — 74011000636 HC RX REV CODE- 636: Performed by: INTERNAL MEDICINE

## 2022-03-20 PROCEDURE — 74011000258 HC RX REV CODE- 258: Performed by: INTERNAL MEDICINE

## 2022-03-20 PROCEDURE — C8929 TTE W OR WO FOL WCON,DOPPLER: HCPCS

## 2022-03-20 PROCEDURE — 83735 ASSAY OF MAGNESIUM: CPT

## 2022-03-20 RX ORDER — POTASSIUM CHLORIDE 20 MEQ/1
20 TABLET, EXTENDED RELEASE ORAL
Status: COMPLETED | OUTPATIENT
Start: 2022-03-20 | End: 2022-03-20

## 2022-03-20 RX ORDER — MAGNESIUM SULFATE HEPTAHYDRATE 40 MG/ML
2 INJECTION, SOLUTION INTRAVENOUS ONCE
Status: COMPLETED | OUTPATIENT
Start: 2022-03-20 | End: 2022-03-20

## 2022-03-20 RX ORDER — SODIUM CHLORIDE 0.9 % (FLUSH) 0.9 %
10 SYRINGE (ML) INJECTION
Status: COMPLETED | OUTPATIENT
Start: 2022-03-20 | End: 2022-03-20

## 2022-03-20 RX ADMIN — I.V. FAT EMULSION 250 ML: 20 EMULSION INTRAVENOUS at 17:22

## 2022-03-20 RX ADMIN — IOPAMIDOL 100 ML: 755 INJECTION, SOLUTION INTRAVENOUS at 16:46

## 2022-03-20 RX ADMIN — POTASSIUM CHLORIDE 20 MEQ: 20 TABLET, EXTENDED RELEASE ORAL at 11:17

## 2022-03-20 RX ADMIN — SODIUM CHLORIDE, PRESERVATIVE FREE 10 ML: 5 INJECTION INTRAVENOUS at 05:19

## 2022-03-20 RX ADMIN — FAMOTIDINE 20 MG: 20 TABLET, FILM COATED ORAL at 17:22

## 2022-03-20 RX ADMIN — SODIUM CHLORIDE, PRESERVATIVE FREE 10 ML: 5 INJECTION INTRAVENOUS at 15:00

## 2022-03-20 RX ADMIN — SODIUM CHLORIDE 100 ML: 9 INJECTION, SOLUTION INTRAVENOUS at 16:46

## 2022-03-20 RX ADMIN — HYDROMORPHONE HYDROCHLORIDE 4 MG: 4 TABLET ORAL at 07:36

## 2022-03-20 RX ADMIN — POTASSIUM PHOSPHATE, MONOBASIC AND POTASSIUM PHOSPHATE, DIBASIC: 224; 236 INJECTION, SOLUTION, CONCENTRATE INTRAVENOUS at 13:38

## 2022-03-20 RX ADMIN — PROMETHAZINE HYDROCHLORIDE 25 MG: 25 TABLET ORAL at 07:41

## 2022-03-20 RX ADMIN — PANTOPRAZOLE SODIUM 40 MG: 40 TABLET, DELAYED RELEASE ORAL at 07:36

## 2022-03-20 RX ADMIN — SODIUM CHLORIDE, PRESERVATIVE FREE 10 ML: 5 INJECTION INTRAVENOUS at 21:53

## 2022-03-20 RX ADMIN — TAMSULOSIN HYDROCHLORIDE 0.4 MG: 0.4 CAPSULE ORAL at 07:36

## 2022-03-20 RX ADMIN — FAMOTIDINE 20 MG: 20 TABLET, FILM COATED ORAL at 07:36

## 2022-03-20 RX ADMIN — SODIUM ACETATE: 164 INJECTION, SOLUTION, CONCENTRATE INTRAVENOUS at 17:28

## 2022-03-20 RX ADMIN — MAGNESIUM SULFATE HEPTAHYDRATE 2 G: 40 INJECTION, SOLUTION INTRAVENOUS at 11:17

## 2022-03-20 RX ADMIN — PROMETHAZINE HYDROCHLORIDE 25 MG: 25 TABLET ORAL at 13:38

## 2022-03-20 RX ADMIN — PERFLUTREN 1 ML: 6.52 INJECTION, SUSPENSION INTRAVENOUS at 13:50

## 2022-03-20 RX ADMIN — ONDANSETRON 4 MG: 2 INJECTION INTRAMUSCULAR; INTRAVENOUS at 19:38

## 2022-03-20 RX ADMIN — Medication 10 ML: at 16:46

## 2022-03-20 NOTE — PROGRESS NOTES
[facility] CARDIOLOGY PROGRESS NOTE           3/20/2022 7:56 AM    Admit Date: 3/15/2022      Subjective:   -Still complains of fatigue and weakness. Telemetry overnight shows multiple runs of atrial tachycardia with evidence of aberrant conduction-left bundle branch block. ROS:  Cardiovascular:  As noted above    Objective:      Vitals:    03/19/22 2059 03/19/22 2237 03/20/22 0246 03/20/22 0700   BP:  (!) 111/59 114/63 106/76   Pulse:  62 65 78   Resp:  20 20 20   Temp:  97.9 °F (36.6 °C) 98 °F (36.7 °C) 97.4 °F (36.3 °C)   SpO2:  99% 99% 96%   Weight: 154 lb 5 oz (70 kg)      Height:           Physical Exam:  General-No Acute Distress, appears frail and fatigued. Neck- supple, no JVD  CV- regular rate and rhythm no MRG, intermittent short runs of tachycardia noted even while we auscultated him. Lung- clear bilaterally  Abd- soft, nontender, nondistended  Ext- no edema bilaterally. Skin- warm and dry    Data Review:   Recent Labs     03/20/22 0254 03/19/22  0247    137*   K 3.2* 3.1*   MG 1.9 1.8   BUN 15 18   CREA 0.40* 0.60*   * 127*   WBC 6.7 5.7   HGB 10.3* 10.9*   HCT 32.0* 33.9*   PLT 53* 71*       Assessment/Plan:     Principal Problem:    Hypotension (3/15/2022)  -Improved with hydration     Active Problems:   1. Bradycardia (3/19/2022)  -Suspect that this was inaccurately calculated due to ectopy- atrial bigeminy on ekg and tele. Agree with continuing telemetry. No evidence of any high degree of AV block noted. 2.  LBBB: Check echo.  No previous assessment- pending      3. FTT (failure to thrive) in adult (3/15/2022)    Severe protein-calorie malnutrition ([address] (3/4/2022)  -Per primary team      4. Malignant neoplasm of body of pancreas ([address] (1/5/2021), cancer of the esophagus  -Per primary team     5.  Hypokalemia- repalce for goal K over 4.0- Noted 20 mmol of Kphos given on 3/19/2022.  -Magnesium level slightly low at 1.9 after 2 g of IV magnesium sulfate x1 on 3/19/2022  Through his TPN.    6. Pancytopenia- mx per primary    Plan: Rechecking phosphorus level, flow, give 20 mmol of potassium phosphate with additional 20 mEq of potassium chloride. Also wrote for 2 g of IV magnesium sulfate in addition with all his ectopy. Ideally, would like to put him on low-dose beta-blocker but his blood pressures are clearly on the low side. If he has any persistent tachycardia, we can use IV metoprolol 2.5 mg every 4 hours as needed for persistent heart rates greater than 110 bpm as long as systolic blood pressures greater than 95 mmHg.  -Echo ordered to review LV function.  -Continue to monitor him on telemetry.  -Encouraged oral intake-cause for extremely low electrolytes.     Mel Stevens MD  3/20/2022 7:56 AM

## 2022-03-20 NOTE — PROGRESS NOTES
Comprehensive Nutrition Assessment    Type and Reason for Visit: Reassess  TPN Management (Oncology)    Nutrition Recommendations/Plan:   Parenteral Nutrition:  Total parenteral nutrition  to begin at 1800  Change: Dex 15%, 5% AA 2 L (85ml/hr)   Continue 250 ml 20% lipids daily  To provide: 1940 kcal/d (100% of needs), 100 grams of protein/d (100% of needs), 300 grams of CHO/d and 2250 ml of total volume/d. Lytes/L:   Sodium 120 meq (45 meq NaCl, 75 meq NaAcetate), Potassium 35 meq (35 meq KPO4), 10 meq Mg, 4.5 meq Calcium. Solution will remain 1:1.8 Cl:Acetate  Other additives: MTE, MVI MWF due to national shortages, Thiamine 100 mg (day 5/7 due to refeeding risk)  Nutritional Supplement Therapy:   Active electrolyte replacement per nutrition support protocols  Replacement indicated:  Implemented  per cardiology  Labs:   CMP daily per primary  Mg daily per primary  Phos tomorrow and MWF    POC Glucoses/SSI Not indicated  Meals and Snacks:  Continue current diet. Nutrition Supplement Therapy:   Medical food supplement therapy: Discontinued as patient not accepting     Malnutrition Assessment:  Malnutrition Status: Severe malnutrition  Context: Chronic illness  Findings of clinical characteristics of malnutrition:   Energy Intake:  7 - 75% or less est energy requirements for 1 month or longer  Weight Loss:  7.0 - Greater than 7.5% over 3 months (34# (18.4%) )     Body Fat Loss:  1 - Mild body fat loss, Buccal region,Orbital,Triceps   Muscle Mass Loss:  1 - Mild muscle mass loss, Calf (gastrocnemius),Hand (interosseous),Scapula (trapezius),Temples (temporalis)  Fluid Accumulation:  No significant fluid accumulation,     Strength:  Not performed       Nutrition Assessment:   Nutrition History: Per RD assessment patient was able to maintain PO intake and UBW throughout most of chemo. During admmission early March patient had \"stopped eating\" due to nausea.  Upon assessment this admission patient reports no PO of foods for ~4 weeks. States that he has been able to tolerate some fluids. He reports continued nausea and vomiting as primary barrier. Nutrition Background: Patient with pancreatic and esophageal cancer, Amos's esophagus, obesity, HTN, GERD, gastritis, ascites and peritoneal carcinomatosis s/p Plurex drain. He presented to oncology office extremely sick, BP was unable to be measured. He was admitted directly to [facility]. Nutrition Interval:  PO intake continues to limited due to early satiety and persistent nausea and likely secondary to peritoneal carcinomatosis, recurrent ascites with Plurex drain. Patient has demonstrated inability to meet needs orally with severe weight loss and malnutrition as above. Intake trends since admission reveal daily PO tolerance of ~1 partial meal per day which is not sufficient to sustain weight, functional status, or life. Patient seen with daughter in law at bedside. He states he did not sleep well last evening. DIL states that he ate most of creamed potatoes last evening but only bites of lasagna. She states he took a bite or two of each breakfast item this am. Patient states that nausea controlled as long as he asks for his antiemetics on time. He reports continued abdominal pain and bloating. Discussed with RN, Rossi Naylor, and patient was off the floor for several scans so missed ~4-5 hrs of TPN yesterday. She also states that patient turned off lipids at some point overnight, but she resumed this am and he got ~75% of lipid infusion. Abdominal Status (last documented): Ascites,Distended,Nausea abdomen with   bowel sounds. Last BM 03/18/22.   Pertinent Medications: Pepcid, Lactulose - held, Zofran, Protonix, Phenergan  Electrolyte replacements: 3/19 20 mmol KPO4; 3/20 2 g Mg, 20 meq Klor-Con, 20 mmol KPO4 (ordered per cardiology)  Pertinent Labs:   Lab Results   Component Value Date/Time    Sodium 142 03/20/2022 02:54 AM    Potassium 3.2 (L) 03/20/2022 02:54 AM    Chloride 112 (H) 03/20/2022 02:54 AM    CO2 22 03/20/2022 02:54 AM    Anion gap 8 03/20/2022 02:54 AM    Glucose 148 (H) 03/20/2022 02:54 AM    BUN 15 03/20/2022 02:54 AM    Creatinine 0.40 (L) 03/20/2022 02:54 AM    Calcium 7.4 (L) 03/20/2022 02:54 AM    Albumin 1.3 (L) 03/20/2022 02:54 AM    Magnesium 1.9 03/20/2022 02:54 AM    Phosphorus 1.7 (L) 03/20/2022 02:54 AM     Lab Results   Component Value Date/Time    Triglyceride 152 (H) 03/20/2022 02:54 AM   Labs remarkable for Na now WNL with significant increase, Cl elevated, CO2 low but improved, K depleted despite increase in TPN and replacement 3/19, Mg improved with increase in TPN last evening but still low end normal, Phos depleted, glucose slightly elevated but not within range to implement SSI/POC glucose, Triglycerides within range to continue daily lipids. Nutrition Related Findings:   Port in place, also with 1 PIV. TPN intiated 3/16. TPN increased in volume 3/19.       Current Nutrition Therapies:  ADULT DIET Regular  ADULT ORAL NUTRITION SUPPLEMENT Breakfast, Lunch, Dinner; Diabetic Supplement  Current Parenteral Nutrition Orders:  · Type and Formula: Dex 15%, 5% AA    · Lipids: 250ml,Daily  · Duration: Continuous  · Rate/Volume: 2 L (85ml/hr)  · Current PN Order Provides: infusing at goal  · Goal PN Orders Provides: 1940 kcal/d (100% of needs), 100 grams of protein/d (100% of needs), 300 grams of CHO/d and 2250 ml of total volume/d      Current Intake:   Average Meal Intake: 1-25% (average per records 0-50%) Average Supplement Intake: Refusing to take      Anthropometric Measures:  Height: 5' 6\" (167.6 cm)  Current Body Wt: 70 kg (154 lb 5.2 oz) (3/18), Weight source: Bed scale  BMI: 24.9,  (current BMI skewed by fluid)  Admission Body Weight: 148 lb 9.4 oz  Ideal Body Weight (lbs) (Calculated): 142 lbs (65 kg), 104.6 %  Usual Body Wt: 82.6 kg (182 lb) (12/14/21 office wt and per previous history), Percent weight change: -18.4          Edema: No data recorded   Estimated Daily Nutrient Needs:  Energy (kcal/day): 1366-1184 (Kcal/kg (25-30), Weight Used: Current (67.4 kg (3/15))  Protein (g/day):  (1.3-1.5 g/kg) Weight Used: (Admission (67.3 kg))  Fluid (ml/day):   (1 ml/kcal)    Nutrition Diagnosis:   · Inadequate oral intake related to altered GI function,early satiety (peritoneal carcinomatosis ) as evidenced by  (reportes barriers to PO, recall, wt loss)    · Severe malnutrition related to catabolic illness as evidenced by  (malnutrition criteria as above)    Nutrition Interventions:   Food and/or Nutrient Delivery: Continue current diet,Modify parenteral nutrition     Coordination of Nutrition Care: Continue to monitor while inpatient  Plan of Care discussed with LOBITO Gusman    Goals:   Previous Goal Met: Goal(s) achieved  Active Goal: Continue to toleate TPN at goal    Nutrition Monitoring and Evaluation:      Food/Nutrient Intake Outcomes: Food and nutrient intake,Parenteral nutrition intake/tolerance  Physical Signs/Symptoms Outcomes: Biochemical data,GI status,Hemodynamic status,Meal time behavior,Weight    Discharge Planning:     Too soon to determine (likely TPN)    [address], LD on 3/20/2022 at 11:21 AM  Contact: [phone]

## 2022-03-20 NOTE — PROGRESS NOTES
[facility] Hematology & Oncology        Inpatient Hematology / Oncology Progress Note      Admission Date: 3/15/2022 10:28 AM  Reason for Admission/Hospital Course: FTT (failure to thrive) in adult [R62.7]  Hypotension [I95.9]      24 Hour Events:  VSS, afebrile - hypotension improved  TPN started 3/16  Lea/abraxane C1D1 3/17 (D8 due 3/24)  MRI / CT AP / x-rays unremarkable after fall  Complains of pain, leakage around previous Pleurx site    ROS:  Constitutional: +weakness/fatigue. Negative for fever, chills, malaise. CV: Negative for chest pain, palpitations, edema. Respiratory: Negative for dyspnea, cough, wheezing. GI: +N/V (better). Negative for abdominal pain. 10 point review of systems is otherwise negative with the exception of the elements mentioned above in the HPI. No Known Allergies    OBJECTIVE:  Patient Vitals for the past 8 hrs:   BP Temp Pulse Resp SpO2   22 0700 106/76 97.4 °F (36.3 °C) 78 20 96 %   22 0246 114/63 98 °F (36.7 °C) 65 20 99 %     Temp (24hrs), Av.8 °F (36.6 °C), Min:97.4 °F (36.3 °C), Max:98.1 °F (36.7 °C)    No intake/output data recorded. Physical Exam:  Constitutional: Thin, cachectic elderly male in no acute distress, sitting comfortably in the hospital bed. HEENT: Normocephalic and atraumatic. Oropharynx is clear, mucous membranes are moist. Extraocular muscles are intact. Sclerae anicteric. Neck supple without JVD. No thyromegaly present. Skin +R gluteal hematoma/lipoma palpated. Warm and dry. No bruising and no rash noted. No erythema. No pallor. Respiratory Lungs are clear to auscultation bilaterally without wheezes, rales or rhonchi, normal air exchange without accessory muscle use. CVS Normal rate, regular rhythm and normal S1 and S2. No murmurs, gallops, or rubs. Abdomen Soft, nontender and nondistended, normoactive bowel sounds. No palpable mass. No hepatosplenomegaly.    Neuro Grossly nonfocal with no obvious sensory or motor deficits. MSK Normal range of motion in general.  No edema and no tenderness. Psych Appropriate mood and affect.         Labs:      Recent Labs     03/20/22  0254 03/19/22 0247 03/18/22 0216   WBC 6.7 5.7 5.5   RBC 3.48* 3.67* 3.85*   HGB 10.3* 10.9* 11.4*   HCT 32.0* 33.9* 35.3*   MCV 92.0 92.4 91.7   MCH 29.6 29.7 29.6   MCHC 32.2 32.2 32.3   RDW 15.4* 15.0* 15.2*   PLT 53* 71* 100*   GRANS 95* 92* 85*   LYMPH 2* 3* 2*   MONOS 2* 4 12   EOS 1 1 0*   BASOS 0 0 0   IG 1 1 1   DF AUTOMATED AUTOMATED AUTOMATED   ANEU 6.3 5.2 4.7   ABL 0.1* 0.2* 0.1*   ABM 0.1 0.2 0.7   JONNY 0.0 0.0 0.0   ABB 0.0 0.0 0.0   AIG 0.0 0.1 0.1        Recent Labs     03/20/22  0254 03/19/22 0247 03/18/22 0216    137* 135*   K 3.2* 3.1* 4.3   * 111* 106   CO2 22 20* 22   AGAP 8 6* 7   * 127* 140*   BUN 15 18 20   CREA 0.40* 0.60* 0.80   GFRAA >60 >60 >60   GFRNA >60 >60 >60   CA 7.4* 7.3* 7.8*   * 217* 275*   TP 3.9* 3.9* 5.1*   ALB 1.3* 1.4* 1.8*   GLOB 2.6 2.5 3.3   AGRAT 0.5* 0.6* 0.5*   MG 1.9 1.8 2.1   PHOS  --  2.2* 2.8         Imaging:    Medications:  Current Facility-Administered Medications   Medication Dose Route Frequency    TPN ADULT-CENTRAL - dextrose 15% amino acid 5%   IntraVENous QPM    morphine injection 1 mg  1 mg IntraVENous Q6H PRN    fat emulsion 20% (LIPOSYN, INTRAlipid) infusion 250 mL  250 mL IntraVENous QPM    HYDROcodone-acetaminophen (NORCO) 5-325 mg per tablet 1 Tablet  1 Tablet Oral Q4H PRN    HYDROmorphone (DILAUDID) tablet 4 mg  4 mg Oral Q4H PRN    loperamide (IMODIUM) capsule 2 mg  2 mg Oral Q4H PRN    NUTRITIONAL SUPPORT ELECTROLYTE PRN ORDERS   Does Not Apply PRN    sodium chloride (NS) flush 5-10 mL  5-10 mL IntraVENous Q8H    sodium chloride (NS) flush 5-10 mL  5-10 mL IntraVENous PRN    diphenoxylate-atropine (LOMOTIL) tablet 1 Tablet  1 Tablet Oral QID PRN    famotidine (PEPCID) tablet 20 mg  20 mg Oral BID    pantoprazole (PROTONIX) tablet 40 mg  40 mg Oral ACB    promethazine (PHENERGAN) tablet 25 mg  25 mg Oral Q6H PRN    tamsulosin (FLOMAX) capsule 0.4 mg  0.4 mg Oral DAILY    [Held by provider] enoxaparin (LOVENOX) injection 40 mg  40 mg SubCUTAneous Q24H    ondansetron (ZOFRAN) injection 4 mg  4 mg IntraVENous Q4H PRN         ASSESSMENT:    Problem List  Date Reviewed: 3/15/2022          Codes Class Noted    Bradycardia ICD-10-CM: R00.1  ICD-9-CM: 427.89  3/19/2022        LBBB (left bundle branch block) ICD-10-CM: I44.7  ICD-9-CM: 426.3  3/19/2022        FTT (failure to thrive) in adult ICD-10-CM: R62.7  ICD-9-CM: 783.7  3/15/2022        * (Principal) Hypotension ICD-10-CM: I95.9  ICD-9-CM: 458.9  3/15/2022        Severe protein-calorie malnutrition ([facility] ICD-10-CM: E43  ICD-9-CM: 262  3/4/2022        Pancreatic cancer ([facility] ICD-10-CM: C25.9  ICD-9-CM: 157.9  3/2/2022        Ascites ICD-10-CM: R18.8  ICD-9-CM: 789.59  3/2/2022        Admission for antineoplastic chemotherapy ICD-10-CM: Z51.11  ICD-9-CM: V58.11  3/2/2022        Hypomagnesemia ICD-10-CM: E83.42  ICD-9-CM: 275.2  4/23/2021        Hypokalemia ICD-10-CM: E87.6  ICD-9-CM: 276.8  4/20/2021        CINV (chemotherapy-induced nausea and vomiting) ICD-10-CM: R11.2, T45.1X5A  ICD-9-CM: 787.01, E933.1  4/20/2021        Dehydration ICD-10-CM: E86.0  ICD-9-CM: 276.51  4/12/2021        Other neutropenia ([facility] ICD-10-CM: D70.8  ICD-9-CM: 288.09  3/9/2021        Malignant neoplasm of lower third of esophagus ([facility] ICD-10-CM: C15.5  ICD-9-CM: 150.5  1/5/2021        Malignant neoplasm of body of pancreas ([facility] ICD-10-CM: C25.1  ICD-9-CM: 157.1  1/5/2021        Severe obesity ([facility] ICD-10-CM: E66.01  ICD-9-CM: 278.01  12/10/2020        Elevated glucose ICD-10-CM: R73.09  ICD-9-CM: 790.29  7/22/2019        Anemia ICD-10-CM: D64.9  ICD-9-CM: 285.9  7/22/2019        Chronic left-sided low back pain ICD-10-CM: M54.50, G89.29  ICD-9-CM: 724.2, 338.29  7/22/2019              78 y.o.  M pancreatic cancer and esophageal cancer with malignant ascites of previously controlled FOLFIRINOX but currently disease progressed and most recently admited on 3/2/2022 to place Pleurx and arrange gemcitabine/Abraxane, had extensive discussion with inpatient team and pharmacy, patient was discharged on 3/7/2022 without chemo appointment and return to office on 3/15/2022, extremely sick and blood pressure not measurable in the office, and not been having much oral intake since discharge, urgently establish IV access and called EMS to take to ER to stabilize and admit to hospital, apparently needed much supportive care and volume resuscitation, start TPN until oral intake can improve and give gemcitabine/Abraxane once clinically stable, discussed with the inpatient team.    PLAN:    Metastatic pancreatic cancer / distal esophageal cancer  - Metastatic disease involving peritoneum, malignant ascites  - s/p 24 cycles of FOLFIRINOX with SD and recent POD with increasing CEA/ and malignant ascites  - Plan for gemcitabine/abraxane when clinically stable - hopefully tomorrow. 3/17 [place]/abraxane today  3/18 Tolerated [place]/abraxane yesterday - D8 due 3/24    Malignant ascites  - Has abdominal Pleurx, drain three times weekly  3/17 Pleurx drained 3/16 - 1100cc out. 3/18 Pleurx accidentally dislodged last night - 1800cc removed before completely removed. IR notified and will re-evaluate Monday. 3/20 Worsening abdominal pain possibly secondary to re-accumulation, some drainage reported at Pleurx site. IR evaluating tomorrow. Cancer related pain  - Continue home dilaudid  3/17 Utilizing IV morphine. Will decrease dosing and encourage use of home Norco.  3/18 Continue to wean IV meds - encourage PO.  3/20 Has not been receiving IV morphine due to hypotension. Continue with PRN oral medications.     Hypotension / poor PO intake / protein-calorie malnutrition  - Consult RD for TPN  - BC pending, started on Cefe/Vanc and may be able to de-escalate soon as hypotension likely secondary to dehydration. LA improved after IFV. 3/17 On TPN. Continues on Cefe/Vanc although infectious work-up unremarkable. Will stop antibiotics. 3/18 Hypotension improved and remains afebrile off antibiotics. Continue with nutritional support via TPN.  3/19 Ongoing hypotension, although improved yesterday AM, worse through evening. Had 6L of IVF yesterday and continues on TPN. May consider midodrine although MAP consistently ~65.  3/20 Hypotension improving, continues TPN. Nausea  - Continue PRN antiemetics    Diarrhea  - Antidiarrheals PRN    Bradycardia  - One documented episode of HR 43 - check EKG  - No hx of hypertension, monitor  3/17 EKG with bi-geminal PACs and known LBBB. Recheck EGK.  3/18 EKG with same as above. Palpated pulse on several occasions documented in the 40s. Tele applied overnight. Cardiology following. 3/19 Cardiology recommending K>4 (on TPN) and Mg replacements. Echo pending. Thrombocytopenia, anemia  3/17 Possibly related to antibiotics - stopping today. Monitor. 3/19 Plts 70k - likely exacerbated by recent chemotherapy as other counts dropping as well. Continue Lovenox for now (hold for plts <50k)  3/20 Lovenox on hold for plts 53k. Fall  3/19 Witnessed slip and fall on wet floor yesterday. Today with L arm bruising - check X-ray. Will check CT head and CT AP (hematoma/lipoma palpated on R buttock) and also c/o worsening abdominal pain. 3/20 X-ray of L elbow/forearm negative. CT head with ?lacunar infarct but MRI showed no acute findings and ?lacunar infarct appears to be prominent perivascular space. CT AP with progressing peritoneal/liver disease and ?new splenic lesion. Last imaging obtained 12/2021 for comparison. Elevated LFTs    Continue home meds  Apoorva SOPs  VTE prophylaxis - Lovenox (held for plts <50k), SCDs    Dispo - too soon to determine. PT/OT following - HHPT vs STR. CM following for home TPN.      Goals and plan of care reviewed with the patient. All questions answered to the best of our ability.             [facility] Hematology [facility] Oncology  [address] [address]  Office : [phone]  Fax : [phone]

## 2022-03-20 NOTE — PROGRESS NOTES
END OF SHIFT NOTE:    Intake/Output  03/20 0701 - 03/20 1900  In: -   Out: 550 [Urine:250]   Voiding: YES  Catheter: NO  Drain:              Stool:  0 occurrences. Stool Assessment  Stool Color: Gilma Pastor (03/16/22 0558)  Stool Appearance: Watery (per pt ) (03/16/22 0800)  Stool Amount: Medium (03/16/22 0558)    Emesis:  1 occurrences. VITAL SIGNS  Patient Vitals for the past 12 hrs:   Temp Pulse Resp BP SpO2   03/20/22 1549 -- 73 20 100/68 97 %   03/20/22 1532 97.9 °F (36.6 °C) 97 20 97/62 97 %   03/20/22 1046 97.6 °F (36.4 °C) 77 20 (!) 96/58 97 %   03/20/22 0800 -- 78 -- -- --   03/20/22 0700 97.4 °F (36.3 °C) 78 20 106/76 96 %       Pain Assessment  Pain 1  Pain Scale 1: Numeric (0 - 10) (03/20/22 1440)  Pain Intensity 1: 0 (03/20/22 1440)  Patient Stated Pain Goal: 0 (03/20/22 1440)  Pain Reassessment 1: Patient resting w/respiratory rate greater than 10 (03/20/22 0830)  Pain Onset 1: PTA (03/20/22 0730)  Pain Location 1: Abdomen (03/20/22 0730)  Pain Orientation 1: Mid (03/20/22 0730)  Pain Description 1: Aching (03/20/22 0730)  Pain Intervention(s) 1: Medication (see MAR) (03/20/22 0730)    Ambulating  No    Additional Information: pt weaker than yesterday, fatigued, CT Chest with contrast- hiatal hernia anteriorly displacing heart    Shift report given to oncoming nurse at the bedside.     Fer Garcia

## 2022-03-20 NOTE — PROGRESS NOTES
Pt's Nephew called the floor and stated the pt stated he could not breathe, this nurse entered to room to find pt resting with eyes closed, inquired about resp status and pt said he was fine and did not say he could not breathe, VS were checked at this time, pt alert and oriented x 4, all VS are currently stable. Will continue to monitor pt.

## 2022-03-21 ENCOUNTER — APPOINTMENT (OUTPATIENT)
Dept: INTERVENTIONAL RADIOLOGY/VASCULAR | Age: 67
DRG: 435 | End: 2022-03-21
Attending: INTERNAL MEDICINE
Payer: MEDICARE

## 2022-03-21 PROBLEM — I50.22 CHRONIC SYSTOLIC CONGESTIVE HEART FAILURE (HCC): Status: ACTIVE | Noted: 2022-03-21

## 2022-03-21 LAB
ALBUMIN SERPL-MCNC: 1.3 G/DL (ref 3.2–4.6)
ALBUMIN/GLOB SERPL: 0.4 {RATIO} (ref 1.2–3.5)
ALP SERPL-CCNC: 219 U/L (ref 50–136)
ALT SERPL-CCNC: 58 U/L (ref 12–65)
AMMONIA PLAS-SCNC: 24 UMOL/L (ref 11–32)
ANION GAP SERPL CALC-SCNC: 5 MMOL/L (ref 7–16)
AST SERPL-CCNC: 84 U/L (ref 15–37)
BASOPHILS # BLD: 0 K/UL (ref 0–0.2)
BASOPHILS NFR BLD: 0 % (ref 0–2)
BILIRUB SERPL-MCNC: 0.4 MG/DL (ref 0.2–1.1)
BUN SERPL-MCNC: 16 MG/DL (ref 8–23)
CALCIUM SERPL-MCNC: 7.9 MG/DL (ref 8.3–10.4)
CHLORIDE SERPL-SCNC: 108 MMOL/L (ref 98–107)
CO2 SERPL-SCNC: 27 MMOL/L (ref 21–32)
CREAT SERPL-MCNC: 0.4 MG/DL (ref 0.8–1.5)
DIFFERENTIAL METHOD BLD: ABNORMAL
EOSINOPHIL # BLD: 0 K/UL (ref 0–0.8)
EOSINOPHIL NFR BLD: 0 % (ref 0.5–7.8)
ERYTHROCYTE [DISTWIDTH] IN BLOOD BY AUTOMATED COUNT: 15.4 % (ref 11.9–14.6)
GLOBULIN SER CALC-MCNC: 3 G/DL (ref 2.3–3.5)
GLUCOSE SERPL-MCNC: 139 MG/DL (ref 65–100)
HCT VFR BLD AUTO: 33.8 % (ref 41.1–50.3)
HGB BLD-MCNC: 10.9 G/DL (ref 13.6–17.2)
IMM GRANULOCYTES # BLD AUTO: 0.1 K/UL (ref 0–0.5)
IMM GRANULOCYTES NFR BLD AUTO: 1 % (ref 0–5)
LYMPHOCYTES # BLD: 0.2 K/UL (ref 0.5–4.6)
LYMPHOCYTES NFR BLD: 4 % (ref 13–44)
MAGNESIUM SERPL-MCNC: 2.5 MG/DL (ref 1.8–2.4)
MCH RBC QN AUTO: 29.8 PG (ref 26.1–32.9)
MCHC RBC AUTO-ENTMCNC: 32.2 G/DL (ref 31.4–35)
MCV RBC AUTO: 92.3 FL (ref 79.6–97.8)
MONOCYTES # BLD: 0.1 K/UL (ref 0.1–1.3)
MONOCYTES NFR BLD: 2 % (ref 4–12)
NEUTS SEG # BLD: 4.4 K/UL (ref 1.7–8.2)
NEUTS SEG NFR BLD: 93 % (ref 43–78)
NRBC # BLD: 0 K/UL (ref 0–0.2)
PHOSPHATE SERPL-MCNC: 1.7 MG/DL (ref 2.3–3.7)
PLATELET # BLD AUTO: 35 K/UL (ref 150–450)
PMV BLD AUTO: 12.7 FL (ref 9.4–12.3)
POTASSIUM SERPL-SCNC: 4.3 MMOL/L (ref 3.5–5.1)
PROT SERPL-MCNC: 4.3 G/DL (ref 6.3–8.2)
RBC # BLD AUTO: 3.66 M/UL (ref 4.23–5.6)
SODIUM SERPL-SCNC: 140 MMOL/L (ref 138–145)
WBC # BLD AUTO: 4.7 K/UL (ref 4.3–11.1)

## 2022-03-21 PROCEDURE — 74011250637 HC RX REV CODE- 250/637: Performed by: NURSE PRACTITIONER

## 2022-03-21 PROCEDURE — 76450000000

## 2022-03-21 PROCEDURE — 84100 ASSAY OF PHOSPHORUS: CPT

## 2022-03-21 PROCEDURE — 82140 ASSAY OF AMMONIA: CPT

## 2022-03-21 PROCEDURE — 83735 ASSAY OF MAGNESIUM: CPT

## 2022-03-21 PROCEDURE — 97535 SELF CARE MNGMENT TRAINING: CPT

## 2022-03-21 PROCEDURE — 99232 SBSQ HOSP IP/OBS MODERATE 35: CPT | Performed by: INTERNAL MEDICINE

## 2022-03-21 PROCEDURE — 74011250637 HC RX REV CODE- 250/637: Performed by: INTERNAL MEDICINE

## 2022-03-21 PROCEDURE — 3331090002 HH PPS REVENUE DEBIT

## 2022-03-21 PROCEDURE — 74011250636 HC RX REV CODE- 250/636: Performed by: INTERNAL MEDICINE

## 2022-03-21 PROCEDURE — 80053 COMPREHEN METABOLIC PANEL: CPT

## 2022-03-21 PROCEDURE — 74011250636 HC RX REV CODE- 250/636: Performed by: NURSE PRACTITIONER

## 2022-03-21 PROCEDURE — 65270000015 HC RM PRIVATE ONCOLOGY

## 2022-03-21 PROCEDURE — 36591 DRAW BLOOD OFF VENOUS DEVICE: CPT

## 2022-03-21 PROCEDURE — 97530 THERAPEUTIC ACTIVITIES: CPT

## 2022-03-21 PROCEDURE — 3331090001 HH PPS REVENUE CREDIT

## 2022-03-21 PROCEDURE — 99233 SBSQ HOSP IP/OBS HIGH 50: CPT | Performed by: INTERNAL MEDICINE

## 2022-03-21 PROCEDURE — 85025 COMPLETE CBC W/AUTO DIFF WBC: CPT

## 2022-03-21 PROCEDURE — 74011000250 HC RX REV CODE- 250: Performed by: INTERNAL MEDICINE

## 2022-03-21 PROCEDURE — 74011000250 HC RX REV CODE- 250: Performed by: EMERGENCY MEDICINE

## 2022-03-21 RX ADMIN — ONDANSETRON 4 MG: 2 INJECTION INTRAMUSCULAR; INTRAVENOUS at 17:21

## 2022-03-21 RX ADMIN — SODIUM ACETATE: 164 INJECTION, SOLUTION, CONCENTRATE INTRAVENOUS at 17:21

## 2022-03-21 RX ADMIN — FAMOTIDINE 20 MG: 20 TABLET, FILM COATED ORAL at 17:21

## 2022-03-21 RX ADMIN — PROMETHAZINE HYDROCHLORIDE 25 MG: 25 TABLET ORAL at 09:23

## 2022-03-21 RX ADMIN — I.V. FAT EMULSION 250 ML: 20 EMULSION INTRAVENOUS at 17:21

## 2022-03-21 RX ADMIN — SODIUM CHLORIDE, PRESERVATIVE FREE 10 ML: 5 INJECTION INTRAVENOUS at 13:50

## 2022-03-21 RX ADMIN — HYDROMORPHONE HYDROCHLORIDE 4 MG: 4 TABLET ORAL at 20:46

## 2022-03-21 RX ADMIN — PANTOPRAZOLE SODIUM 40 MG: 40 TABLET, DELAYED RELEASE ORAL at 09:23

## 2022-03-21 RX ADMIN — TAMSULOSIN HYDROCHLORIDE 0.4 MG: 0.4 CAPSULE ORAL at 09:23

## 2022-03-21 RX ADMIN — HYDROMORPHONE HYDROCHLORIDE 4 MG: 4 TABLET ORAL at 15:27

## 2022-03-21 RX ADMIN — SODIUM CHLORIDE, PRESERVATIVE FREE 10 ML: 5 INJECTION INTRAVENOUS at 05:26

## 2022-03-21 RX ADMIN — SODIUM CHLORIDE, PRESERVATIVE FREE 10 ML: 5 INJECTION INTRAVENOUS at 21:05

## 2022-03-21 RX ADMIN — HYDROCODONE BITARTRATE AND ACETAMINOPHEN 1 TABLET: 5; 325 TABLET ORAL at 09:23

## 2022-03-21 RX ADMIN — ALTEPLASE 1 MG: 2.2 INJECTION, POWDER, LYOPHILIZED, FOR SOLUTION INTRAVENOUS at 05:24

## 2022-03-21 RX ADMIN — SODIUM PHOSPHATE, MONOBASIC, MONOHYDRATE AND SODIUM PHOSPHATE, DIBASIC, ANHYDROUS: 276; 142 INJECTION, SOLUTION INTRAVENOUS at 14:46

## 2022-03-21 RX ADMIN — ALTEPLASE 1 MG: 2.2 INJECTION, POWDER, LYOPHILIZED, FOR SOLUTION INTRAVENOUS at 03:54

## 2022-03-21 RX ADMIN — FAMOTIDINE 20 MG: 20 TABLET, FILM COATED ORAL at 09:23

## 2022-03-21 NOTE — PROGRESS NOTES
Attempted to draw labs this am form port, no blood return, cathflo to line twice, unsuccessful blood return, Lab called to have am labs drawn.

## 2022-03-21 NOTE — PROGRESS NOTES
ACUTE PHYSICAL THERAPY GOALS:  (Developed with and agreed upon by patient and/or caregiver. )  LTG:  (1.)Mr. Gonzalez will move from supine to sit and sit to supine  in bed with MODIFIED INDEPENDENCE within 7 treatment day(s). (2.)Mr. Gonzalez will transfer from bed to chair and chair to bed with MODIFIED INDEPENDENCE using the least restrictive device within 7 treatment day(s). (3.)Mr. Gonzalez will ambulate with MODIFIED INDEPENDENCE for 200 feet with the least restrictive device within 7 treatment day(s). (4.)Mr. Gonzalez will perform standing static and dynamic balance activities x 23 minutes with SUPERVISION to improve safety within 7 treatment day(s). (5.)Mr. Gonzalez will ambulate and/or perform functional activities for  23 consecutive minutes with stable vital signs and no rests required to improve activity tolerance within 7 treatment days  (6.) Мария Everett will ascend/descend about 6 steps with SUPERVISION using least restrictive device within 7 treatment days. PHYSICAL THERAPY: Daily Note and AM Treatment Day # 3    Мария Everett is a 78 y.o. male   PRIMARY DIAGNOSIS: Hypotension  FTT (failure to thrive) in adult [R62.7]  Hypotension [I95.9]         ASSESSMENT:     REHAB RECOMMENDATIONS: CURRENT LEVEL OF FUNCTION:  (Most Recently Demonstrated)   Recommendation to date pending progress:  Setting:   Short-term Rehab    Vs HHPT pending progress   Equipment:    3 in 1 Bedside Commode Bed Mobility:   Minimal Assistance /mod assist x 2   Sit to Stand:   Minimal Assistance x 2  Transfers:   Minimal Assistance x 2  Gait/Mobility:   Minimal Assistance x 2     ASSESSMENT:  Mr. Juanita eDras is supine in the bed and agreeable to therapy. Visitor at bedside. Bed mobility is min to mod assist x 2 to the edge of the bed. Sitting balance good. Transfer to the recliner with min assist x 2. Tolerated well. States he feels better now that he is sitting in the recliner.   Patient left in the recliner with needs within reach with PCT at bedside. Slow progress. Continue PT efforts. STR vs HHPT pending progress.      SUBJECTIVE:   Mr. Roderick Santiago states, \"Hey\"    SOCIAL HISTORY/ LIVING ENVIRONMENT: see eval  Home Environment: Private residence  One/Two Story Residence: One story  Living Alone: Yes  Support Systems: Child(betsy),Friend/Neighbor  OBJECTIVE:     PAIN: VITAL SIGNS: LINES/DRAINS:   Pre Treatment: Pain Screen  Pain Scale 1: Numeric (0 - 10)  Pain Intensity 1:  (no number given)  Post Treatment: no number given   IV  O2 Device: None (Room air)     MOBILITY: I Mod I S SBA CGA Min Mod Max Total  NT x2 Comments:   Bed Mobility    Rolling [] [] [] [] [] [x] [x] [] [] [] [x]    Supine to Sit [] [] [] [] [] [x] [x] [] [] [] [x]    Scooting [] [] [] [] [] [x] [x] [] [] [] [x]    Sit to Supine [] [] [] [] [] [] [] [] [] [x] []    Transfers    Sit to Stand [] [] [] [] [] [x] [] [] [] [] [x]    Bed to Chair [] [] [] [] [] [x] [] [] [] [] [x]    Stand to Sit [] [] [] [] [] [x] [] [] [] [] [x]    I=Independent, Mod I=Modified Independent, S=Supervision, SBA=Standby Assistance, CGA=Contact Guard Assistance,   Min=Minimal Assistance, Mod=Moderate Assistance, Max=Maximal Assistance, Total=Total Assistance, NT=Not Tested    BALANCE: Good Fair+ Fair Fair- Poor NT Comments   Sitting Static [x] [] [] [] [] []    Sitting Dynamic [x] [] [] [] [] []              Standing Static [] [x] [] [] [] []    Standing Dynamic [] [x] [] [] [] []      GAIT: I Mod I S SBA CGA Min Mod Max Total  NT x2 Comments:   Level of Assistance [] [] [] [] [] [x] [] [] [] [] []    Distance 2-3 steps    DME hand held assist     Gait Quality     Weightbearing  Status N/A     I=Independent, Mod I=Modified Independent, S=Supervision, SBA=Standby Assistance, CGA=Contact Guard Assistance,   Min=Minimal Assistance, Mod=Moderate Assistance, Max=Maximal Assistance, Total=Total Assistance, NT=Not Tested    PLAN:   FREQUENCY/DURATION: PT Plan of Care: 3 times/week for duration of hospital stay or until stated goals are met, whichever comes first.  TREATMENT:     TREATMENT:   ($$ Therapeutic Activity: 23-37 mins    )  Co-Treatment PT/OT necessary due to patient's decreased overall endurance/tolerance levels, as well as need for high level skilled assistance to complete functional transfers/mobility and functional tasks  Therapeutic Activity (27 Minutes): Therapeutic activity included Rolling, Supine to Sit, Scooting, Transfer Training, Ambulation on level ground, Sitting balance , Standing balance and LE exercises to improve functional Mobility, Strength and Activity tolerance.     TREATMENT GRID:  N/A    AFTER TREATMENT POSITION/PRECAUTIONS:  Chair, Needs within reach, RN notified and PCT at bedside    INTERDISCIPLINARY COLLABORATION:  RN/PCT, PT/PTA and OT/VILLAREAL    TOTAL TREATMENT DURATION:  PT Patient Time In/Time Out  Time In: 1055  Time Out: Sonia Keyes-Luis, PTA

## 2022-03-21 NOTE — PROGRESS NOTES
ACUTE OT GOALS:  (Developed with and agreed upon by patient and/or caregiver.)  1. Amada Harvey will be modified independent with functional mobility for ADL in room within 4 - 7 visits. 2. Amada Harevy will be modified independent with total body bathing and dressing within 4 - 7 visits. 3. Amada Harvey will state and demonstrate at least 5 energy conservation techniques during ADL/therapeutic activities within 4 - 7 visits. 4. Amada Harvey will voice a plan for 2-3 appropriate home modifications for home safety and fall prevention within 7 visits. 5. Amada Harvey will participate at least 30 minutes of ADL with 3 or less rest breaks within 7 visits. 6. Amada Harvey will complete a toilet transfer with modified independence within 7 visits. OCCUPATIONAL THERAPY: Daily Note OT Treatment Day # 2    Amaad Harvey is a 78 y.o. male   PRIMARY DIAGNOSIS: Hypotension  FTT (failure to thrive) in adult [R62.7]  Hypotension [I95.9]       Payor: Gurmeet Roblero / Plan: [address] [place] [address] HMO / Product Type: [employer] Medicare /   ASSESSMENT:     REHAB RECOMMENDATIONS: CURRENT LEVEL OF FUNCTION:  (Most Recently Demonstrated)   Recommendation to date pending progress:  Setting:   Short-term Rehab  Equipment:    To Be Determined Bathing:   Minimal Assistance  Dressing:   Minimal Assistance  Feeding/Grooming:   Set Up  Toileting:   Not tested  Functional Mobility:   Minimal Assistance x 2     ASSESSMENT:  Mr. Saumya Cesar was supine in bed upon arrival. Pt completed bed mobility with min A. Pt completed sponge bath and dressing with the assistance listed below. Pt completed functional transfer with min A X 2. Continue POC. SUBJECTIVE:   Mr. Saumya Cesar states, \"I am weak. \"    SOCIAL HISTORY/LIVING ENVIRONMENT:   Home Environment: Private residence  One/Two Story Residence: One story  Living Alone: Yes  Support Systems: Child(betsy),Friend/Neighbor    OBJECTIVE:     PAIN: VITAL SIGNS: LINES/DRAINS:   Pre Treatment: Pain Screen  Pain Scale 1: Numeric (0 - 10)  Pain Intensity 1: 0  Post Treatment: 0   IV and Purewick  O2 Device: None (Room air)     ACTIVITIES OF DAILY LIVING: I Mod I S SBA CGA Min Mod Max Total NT Comments   BASIC ADLs:              Bathing/ Showering [] [] [x] [] [] [x] [] [] [] [] S-UB  Min A-LB   Toileting [] [] [] [] [] [] [] [] [] [x]    Dressing [] [] [x] [] [] [x] [] [] [] [] S-UB  Min A-LB    Feeding [] [] [] [] [] [] [] [] [] []    Grooming [] [] [x] [] [] [] [] [] [] []    Personal Device Care [] [] [] [] [] [] [] [] [] []    Functional Mobility [] [] [] [] [] [x] [] [] [] []    I=Independent, Mod I=Modified Independent, S=Supervision, SBA=Standby Assistance, CGA=Contact Guard Assistance,   Min=Minimal Assistance, Mod=Moderate Assistance, Max=Maximal Assistance, Total=Total Assistance, NT=Not Tested    MOBILITY: I Mod I S SBA CGA Min Mod Max Total  NT x2 Comments:   Supine to sit [] [] [] [] [] [x] [] [] [] [] [x]    Sit to supine [] [] [] [] [] [] [] [] [] [] []    Sit to stand [] [] [] [] [] [x] [] [] [] [] [x]    Bed to chair [] [] [] [] [] [x] [] [] [] [] [x]    I=Independent, Mod I=Modified Independent, S=Supervision, SBA=Standby Assistance, CGA=Contact Guard Assistance,   Min=Minimal Assistance, Mod=Moderate Assistance, Max=Maximal Assistance, Total=Total Assistance, NT=Not Tested    BALANCE: Good Fair+ Fair Fair- Poor NT Comments   Sitting Static [] [] [] [] [] []    Sitting Dynamic [] [] [] [] [] []              Standing Static [] [] [] [] [] []    Standing Dynamic [] [] [] [] [] []      PLAN:   FREQUENCY/DURATION: OT Plan of Care: 3 times/week for duration of hospital stay or until stated goals are met, whichever comes first.    TREATMENT:   TREATMENT:   ($$ Self Care/Home Management: 23-37 mins    )  Co-Treatment PT/OT necessary due to patient's decreased overall endurance/tolerance levels, as well as need for high level skilled assistance to complete functional transfers/mobility and functional tasks  Self Care (24 Minutes): Self care including Upper Body Bathing, Lower Body Bathing, Upper Body Dressing, Lower Body Dressing and Grooming to increase independence and decrease level of assistance required.     TREATMENT GRID:  N/A    AFTER TREATMENT POSITION/PRECAUTIONS:  Chair, Needs within reach and RN notified    INTERDISCIPLINARY COLLABORATION:  RN/PCT, PT/PTA and OT/VILLAREAL    TOTAL TREATMENT DURATION:  OT Patient Time In/Time Out  Time In: 1055  Time Out: 3600 [facility]

## 2022-03-21 NOTE — PROGRESS NOTES
Comprehensive Nutrition Assessment    Type and Reason for Visit: Reassess  TPN Management (Oncology)    Nutrition Recommendations/Plan:   Parenteral Nutrition:  Total parenteral nutrition  to begin at 1800  Continue: Dex 15%, 5% AA 2 L (85ml/hr)   Continue 250 ml 20% lipids daily  To provide: 1940 kcal/d (100% of needs), 100 grams of protein/d (100% of needs), 300 grams of CHO/d and 2250 ml of total volume/d. Lytes/L:   Sodium 120 meq (60 meq NaCl, 60 meq NaAcetate), Potassium 35 meq (35 meq KPO4), 10 meq Mg, 4.5 meq Calcium. Solution will be compounded in a ~1:1.3 Cl:Acetate ratio  Other additives: MTE, MVI MWF due to national shortages, Thiamine 100 mg (day 6/7 due to refeeding risk)  Nutritional Supplement Therapy:   Active electrolyte replacement per nutrition support protocols  Replacement indicated:  30 mmole NaP04 replacement ordered per protocol  Labs:   CMP daily per primary  Mg daily per primary  Phos tomorrow and MWF    POC Glucoses/SSI Not indicated  Meals and Snacks:  Continue current diet. Nutrition Supplement Therapy:   Medical food supplement therapy: Discontinued as patient not accepting     Malnutrition Assessment:  Malnutrition Status: Severe malnutrition  Context: Chronic illness  Findings of clinical characteristics of malnutrition:   Energy Intake:  7 - 75% or less est energy requirements for 1 month or longer  Weight Loss:  7.0 - Greater than 7.5% over 3 months (34# (18.4%) )     Body Fat Loss:  1 - Mild body fat loss, Buccal region,Orbital,Triceps   Muscle Mass Loss:  1 - Mild muscle mass loss, Calf (gastrocnemius),Hand (interosseous),Scapula (trapezius),Temples (temporalis)  Fluid Accumulation:  No significant fluid accumulation,     Strength:  Not performed       Nutrition Assessment:   Nutrition History: Per RD assessment patient was able to maintain PO intake and UBW throughout most of chemo. During admmission early March patient had \"stopped eating\" due to nausea.  Upon assessment this admission patient reports no PO of foods for ~4 weeks. States that he has been able to tolerate some fluids. He reports continued nausea and vomiting as primary barrier. Nutrition Background: Patient with pancreatic and esophageal cancer, Amos's esophagus, obesity, HTN, GERD, gastritis, ascites and peritoneal carcinomatosis s/p Plurex drain. He presented to oncology office extremely sick, BP was unable to be measured. He was admitted directly to [facility]. Nutrition Interval:  PO intake continues to limited due to early satiety and persistent nausea and likely secondary to peritoneal carcinomatosis, recurrent ascites with Plurex drain. Patient has demonstrated inability to meet needs orally with severe weight loss and malnutrition as above. Intake trends since admission reveal daily PO tolerance of ~1 partial meal per day which is not sufficient to sustain weight, functional status, or life. Patient seen with female visitor at bedside. TPN infusing @ 85 ml/hr per order. Pt reports no intake of breakfast this morning. Glucxochilt gardnerrosaura seen at bedside unopened. NPO this afternoon for IR procedure. Discussed with RN, Mason Rojas, and patient did miss a few hours of TPN last evening for unknown reasons, and briefly this AM d/t an issue with the port. Abdominal Status (last documented): Ascites,Distended abdomen. Last BM 03/21/22. Pertinent Medications: Pepcid, PRN Zofran, PRN Phenergan, protonix  Lactulose d/c'd.   Electrolyte replacements: 3/19 20 mmol KPO4; 3/20 2 g Mg, 20 meq Klor-Con, 20 mmol KPO4 (ordered per cardiology); 3/21: 30 mmol NaP04 ordered  Pertinent Labs:   Lab Results   Component Value Date/Time    Sodium 140 03/21/2022 06:22 AM    Potassium 4.3 03/21/2022 06:22 AM    Chloride 108 (H) 03/21/2022 06:22 AM    CO2 27 03/21/2022 06:22 AM    Anion gap 5 (L) 03/21/2022 06:22 AM    Glucose 139 (H) 03/21/2022 06:22 AM    BUN 16 03/21/2022 06:22 AM    Creatinine 0.40 (L) 03/21/2022 06:22 AM Calcium 7.9 (L) 03/21/2022 06:22 AM    Albumin 1.3 (L) 03/21/2022 06:22 AM    Magnesium 2.5 (H) 03/21/2022 06:22 AM    Phosphorus 1.7 (L) 03/21/2022 06:22 AM     Lab Results   Component Value Date/Time    Triglyceride 152 (H) 03/20/2022 02:54 AM   Labs remarkable for Na remaining WNL, Cl elevated although trending down today, CO2 improved and WNL. K WNL w/ replacements 3/20 and increase in TPN to ~70 meq K+/day 3/20. Mg elevated. Phos remains depleted - maximum phosphorus in TPN w/ addition of calcium. Glucose slightly elevated but not within range to implement SSI/POC glucose. NH3 24 (3/21)    Nutrition Related Findings:   Port in place, also with 1 PIV. TPN intiated 3/16. TPN increased in volume 3/19.       Current Nutrition Therapies:  ADULT ORAL NUTRITION SUPPLEMENT Breakfast, Lunch, Dinner; Diabetic Supplement  DIET NPO  Current Parenteral Nutrition Orders:  · Type and Formula: Dex 15%, 5% AA    · Lipids: 250ml,Daily  · Duration: Continuous  · Rate/Volume: 2 L (85ml/hr)  · Current PN Order Provides: infusing at goal  · Goal PN Orders Provides: 1940 kcal/d (100% of needs), 100 grams of protein/d (100% of needs), 300 grams of CHO/d and 2250 ml of total volume/d      Current Intake:   Average Meal Intake: 1-25% (0% at last recorded meal) Average Supplement Intake: Refusing to take      Anthropometric Measures:  Height: 5' 6\" (167.6 cm)  Current Body Wt: 77 kg (169 lb 12.1 oz) (3/20), Weight source: Bed scale (\"including pillow, blanket\")  BMI: 27.4,  (current BMI skewed by fluid)  Admission Body Weight: 148 lb 9.4 oz  Ideal Body Weight (lbs) (Calculated): 142 lbs (65 kg), 104.6 %  Usual Body Wt: 82.6 kg (182 lb) (12/14/21 office wt and per previous history), Percent weight change: -18.4          Edema: No data recorded   Estimated Daily Nutrient Needs:  Energy (kcal/day): 9280-6475 (Kcal/kg (25-30), Weight Used: Current (67.4 kg (3/15))  Protein (g/day):  (1.3-1.5 g/kg) Weight Used: (Admission (67.3 kg))  Fluid (ml/day):   (1 ml/kcal)    Nutrition Diagnosis:   · Inadequate oral intake related to altered GI function,early satiety (peritoneal carcinomatosis ) as evidenced by  (reportes barriers to PO, recall, wt loss)    · Severe malnutrition related to catabolic illness as evidenced by  (malnutrition criteria as above)    Nutrition Interventions:   Food and/or Nutrient Delivery: Continue current diet,Continue oral nutrition supplement,Modify parenteral nutrition (Replace electrolytes per protocol.)     Coordination of Nutrition Care: Continue to monitor while inpatient  Plan of Care discussed with LOBITO Gusman    Goals:   Previous Goal Met: Goal(s) achieved  Active Goal: Continue to toleate TPN at goal    Nutrition Monitoring and Evaluation:      Food/Nutrient Intake Outcomes: Food and nutrient intake,Supplement intake,Parenteral nutrition intake/tolerance  Physical Signs/Symptoms Outcomes: Biochemical data,GI status,Hemodynamic status,Meal time behavior,Weight    Discharge Planning:     Too soon to determine (likely TPN)    Halima Delvalle Benjy [address], [address], [address] [place] [address], [address] [place] [address] Ave.

## 2022-03-21 NOTE — PROGRESS NOTES
Patient states that he ate \"2 bites of cheeseburger, some fries and a small tea. \" Patient unable to have procedure done today. Patient should be NPO. Charge nurse and PA aware. PAtient to be sent back to room via transport at this time.

## 2022-03-21 NOTE — PROGRESS NOTES
END OF SHIFT NOTE:    Intake/Output  03/21 0701 - 03/21 1900  In: 4814 [P.O.:380; I.V.:1945]  Out: 375 [Urine:375]   Voiding: YES  Catheter: NO  Drain:              Stool:  1 occurrences. Stool Assessment  Stool Color: [place] (03/16/22 0558)  Stool Appearance: Watery (per pt ) (03/16/22 0800)  Stool Amount: Medium (03/16/22 0558)    Emesis:  0 occurrences. VITAL SIGNS  Patient Vitals for the past 12 hrs:   Temp Pulse Resp BP SpO2   03/21/22 1533 97.3 °F (36.3 °C) (!) 52 20 110/65 98 %   03/21/22 1415 -- (!) 50 18 (!) 99/57 99 %   03/21/22 1118 97.4 °F (36.3 °C) (!) 53 18 113/64 98 %   03/21/22 0809 97.6 °F (36.4 °C) 60 20 121/64 97 %       Pain Assessment  Pain 1  Pain Scale 1: Visual (03/21/22 1615)  Pain Intensity 1: 0 (03/21/22 1615)  Patient Stated Pain Goal: 0 (03/21/22 1615)  Pain Reassessment 1: Patient resting w/respiratory rate greater than 10 (03/21/22 1615)  Pain Onset 1: PTA (03/21/22 1528)  Pain Location 1: Abdomen (03/21/22 1528)  Pain Orientation 1: Mid (03/20/22 0730)  Pain Description 1: Aching (03/21/22 1528)  Pain Intervention(s) 1: Medication (see MAR) (03/21/22 1528)    Ambulating  Yes    Additional Information: worked with PT, more interactive this evening, pt to have pleurex replaced in AM,     Shift report given to oncoming nurse at the bedside.     Bebo Trejo

## 2022-03-21 NOTE — PROGRESS NOTES
[facility] CARDIOLOGY PROGRESS NOTE           3/21/2022 8:23 AM    Admit Date: 3/15/2022      Subjective:   Patient feels unwell. Denies any dyspnea or chest pain. Lying flat on room air. Echo yesterday shows moderate LV dysfunction with EF 35 to 40%. Telemetry shows sinus rhythm with frequent atrial ectopy. ROS:  Cardiovascular:  As noted above    Objective:      Vitals:    03/20/22 1949 03/20/22 2254 03/21/22 0333 03/21/22 0809   BP: 100/63 92/75 102/71 121/64   Pulse: 69 74 67 60   Resp: 18 18 20 20   Temp: 97.5 °F (36.4 °C) 98 °F (36.7 °C) 98.5 °F (36.9 °C) 97.6 °F (36.4 °C)   SpO2: 98% 97% 97% 97%   Weight:  169 lb 11.2 oz (77 kg)     Height:           Physical Exam:  General-No Acute Distress  Neck- supple, no JVD  CV- regular rate and rhythm with ectopy  Lung- clear bilaterally  Abd- soft, nontender, nondistended  Ext- no edema bilaterally. Skin- warm and dry      Data Review:   Recent Labs     03/21/22  0622 03/20/22  0254    142   K 4.3 3.2*   MG 2.5* 1.9   BUN 16 15   CREA 0.40* 0.40*   * 148*   WBC 4.7 6.7   HGB 10.9* 10.3*   HCT 33.8* 32.0*   PLT 35* 53*   TRIGL  --  152*      No results found for: STONEY Lyles     Echo (3/20/22):   Left Ventricle: Left ventricle size is normal. Mildly increased wall thickness. Findings consistent with mild concentric hypertrophy. Severe apical hypokinesis noted extending to the mid anteroseptal walls. Moderately reduced left ventricular systolic function with a visually estimated EF of 35 - 40%. Grade I diastolic dysfunction with normal LAP.   Mitral Valve: Mild annular calcification of the mitral valve. Mild transvalvular regurgitation.   Left Atrium: Left atrium is mildly dilated.   Left pleural effusion.   Technical qualifiers: Echo study was technically difficult with poor endocardial visualization.   Contrast used: Definity. No Apical thrombus noted.       Assessment/Plan:     Principal Problem: Hypotension (3/15/2022)  Continue to follow. Avoid aggressive hydration at this point given LV dysfunction    Active Problems:    Malignant neoplasm of body of pancreas ([facility] (1/5/2021)  Defer to primary team.  Please note LV dysfunction which has not been diagnosed in the past.      Severe protein-calorie malnutrition ([facility] (3/4/2022)  Encourage oral intake      Bradycardia (3/19/2022)  Pulse rate was incorrectly recorded due to frequent ectopy. No bradycardia has been noted with telemetry. LBBB (left bundle branch block) (3/19/2022)  Noted. LV dysfunction as outlined below      Chronic systolic congestive heart failure ([facility] (3/21/2022)  Moderate LV dysfunction. Blood pressure limits titration of medical therapy but would consider adding beta-blockers if blood pressure improves.                   Alejandra Zhu MD  3/21/2022 8:23 AM

## 2022-03-21 NOTE — PROGRESS NOTES
[facility] Hematology & Oncology        Inpatient Hematology / Oncology Progress Note      Admission Date: 3/15/2022 10:28 AM  Reason for Admission/Hospital Course: FTT (failure to thrive) in adult [R62.7]  Hypotension [I95.9]      24 Hour Events:  VSS, afebrile - hypotension improved  TPN started 3/16  [place]/abraxane C1D1 3/17 (D8 due 3/24)  Pleurx replacement pending   No complaints  Family at bedside    ROS:  Constitutional: +weakness/fatigue. Negative for fever, chills, malaise. CV: Negative for chest pain, palpitations, edema. Respiratory: Negative for dyspnea, cough, wheezing. GI: +N/V (better). Negative for abdominal pain. 10 point review of systems is otherwise negative with the exception of the elements mentioned above in the HPI. No Known Allergies    OBJECTIVE:  Patient Vitals for the past 8 hrs:   BP Temp Pulse Resp SpO2   22 1415 (!) 99/57 -- (!) 50 18 99 %   22 1118 113/64 97.4 °F (36.3 °C) (!) 53 18 98 %   22 0809 121/64 97.6 °F (36.4 °C) 60 20 97 %     Temp (24hrs), Av.8 °F (36.6 °C), Min:97.4 °F (36.3 °C), Max:98.5 °F (36.9 °C)     0701 -  1900  In: 360 [P.O.:360]  Out: 175 [Urine:175]    Physical Exam:  Constitutional: Thin, cachectic elderly male in no acute distress, sitting comfortably in the hospital bed. HEENT: Normocephalic and atraumatic. Oropharynx is clear, mucous membranes are moist. Extraocular muscles are intact. Sclerae anicteric. Neck supple without JVD. No thyromegaly present. Skin +R gluteal hematoma/lipoma palpated. Warm and dry. No bruising and no rash noted. No erythema. No pallor. Respiratory Lungs are clear to auscultation bilaterally without wheezes, rales or rhonchi, normal air exchange without accessory muscle use. CVS Normal rate, regular rhythm and normal S1 and S2. No murmurs, gallops, or rubs. Abdomen Soft, nontender and nondistended, normoactive bowel sounds. No palpable mass. No hepatosplenomegaly. Neuro Grossly nonfocal with no obvious sensory or motor deficits. MSK Normal range of motion in general.  No edema and no tenderness. Psych Appropriate mood and affect.         Labs:      Recent Labs     03/21/22 0622 03/20/22  0254 03/19/22  0247   WBC 4.7 6.7 5.7   RBC 3.66* 3.48* 3.67*   HGB 10.9* 10.3* 10.9*   HCT 33.8* 32.0* 33.9*   MCV 92.3 92.0 92.4   MCH 29.8 29.6 29.7   MCHC 32.2 32.2 32.2   RDW 15.4* 15.4* 15.0*   PLT 35* 53* 71*   GRANS 93* 95* 92*   LYMPH 4* 2* 3*   MONOS 2* 2* 4   EOS 0* 1 1   BASOS 0 0 0   IG 1 1 1   DF AUTOMATED AUTOMATED AUTOMATED   ANEU 4.4 6.3 5.2   ABL 0.2* 0.1* 0.2*   ABM 0.1 0.1 0.2   JONNY 0.0 0.0 0.0   ABB 0.0 0.0 0.0   AIG 0.1 0.0 0.1        Recent Labs     03/21/22 0622 03/20/22  0254 03/19/22  0247    142 137*   K 4.3 3.2* 3.1*   * 112* 111*   CO2 27 22 20*   AGAP 5* 8 6*   * 148* 127*   BUN 16 15 18   CREA 0.40* 0.40* 0.60*   GFRAA >60 >60 >60   GFRNA >60 >60 >60   CA 7.9* 7.4* 7.3*   * 198* 217*   TP 4.3* 3.9* 3.9*   ALB 1.3* 1.3* 1.4*   GLOB 3.0 2.6 2.5   AGRAT 0.4* 0.5* 0.6*   MG 2.5* 1.9 1.8   PHOS 1.7* 1.7* 2.2*         Imaging:    Medications:  Current Facility-Administered Medications   Medication Dose Route Frequency    sodium phosphate 30 mmol in 0.9% sodium chloride 250 mL infusion   IntraVENous ONCE    TPN ADULT-CENTRAL - dextrose 15% amino acid 5%   IntraVENous QPM    TPN ADULT-CENTRAL - dextrose 15% amino acid 5%   IntraVENous QPM    morphine injection 1 mg  1 mg IntraVENous Q6H PRN    fat emulsion 20% (LIPOSYN, INTRAlipid) infusion 250 mL  250 mL IntraVENous QPM    HYDROcodone-acetaminophen (NORCO) 5-325 mg per tablet 1 Tablet  1 Tablet Oral Q4H PRN    HYDROmorphone (DILAUDID) tablet 4 mg  4 mg Oral Q4H PRN    loperamide (IMODIUM) capsule 2 mg  2 mg Oral Q4H PRN    NUTRITIONAL SUPPORT ELECTROLYTE PRN ORDERS   Does Not Apply PRN    sodium chloride (NS) flush 5-10 mL  5-10 mL IntraVENous Q8H    sodium chloride (NS) flush 5-10 mL  5-10 mL IntraVENous PRN    diphenoxylate-atropine (LOMOTIL) tablet 1 Tablet  1 Tablet Oral QID PRN    famotidine (PEPCID) tablet 20 mg  20 mg Oral BID    pantoprazole (PROTONIX) tablet 40 mg  40 mg Oral ACB    promethazine (PHENERGAN) tablet 25 mg  25 mg Oral Q6H PRN    tamsulosin (FLOMAX) capsule 0.4 mg  0.4 mg Oral DAILY    [Held by provider] enoxaparin (LOVENOX) injection 40 mg  40 mg SubCUTAneous Q24H    ondansetron (ZOFRAN) injection 4 mg  4 mg IntraVENous Q4H PRN         ASSESSMENT:    Problem List  Date Reviewed: 3/15/2022          Codes Class Noted    Chronic systolic congestive heart failure (HCC) ICD-10-CM: I50.22  ICD-9-CM: 428.22, 428.0  3/21/2022        Bradycardia ICD-10-CM: R00.1  ICD-9-CM: 427.89  3/19/2022        LBBB (left bundle branch block) ICD-10-CM: I44.7  ICD-9-CM: 426.3  3/19/2022        FTT (failure to thrive) in adult ICD-10-CM: R62.7  ICD-9-CM: 783.7  3/15/2022        * (Principal) Hypotension ICD-10-CM: I95.9  ICD-9-CM: 458.9  3/15/2022        Severe protein-calorie malnutrition ([facility] ICD-10-CM: E43  ICD-9-CM: 262  3/4/2022        Pancreatic cancer ([facility] ICD-10-CM: C25.9  ICD-9-CM: 157.9  3/2/2022        Ascites ICD-10-CM: R18.8  ICD-9-CM: 789.59  3/2/2022        Admission for antineoplastic chemotherapy ICD-10-CM: Z51.11  ICD-9-CM: V58.11  3/2/2022        Hypomagnesemia ICD-10-CM: E83.42  ICD-9-CM: 275.2  4/23/2021        Hypokalemia ICD-10-CM: E87.6  ICD-9-CM: 276.8  4/20/2021        CINV (chemotherapy-induced nausea and vomiting) ICD-10-CM: R11.2, T45.1X5A  ICD-9-CM: 787.01, E933.1  4/20/2021        Dehydration ICD-10-CM: E86.0  ICD-9-CM: 276.51  4/12/2021        Other neutropenia ([facility] ICD-10-CM: D70.8  ICD-9-CM: 288.09  3/9/2021        Malignant neoplasm of lower third of esophagus ([facility] ICD-10-CM: C15.5  ICD-9-CM: 150.5  1/5/2021        Malignant neoplasm of body of pancreas ([facility] ICD-10-CM: C25.1  ICD-9-CM: 157.1  1/5/2021        Severe obesity ([facility] ICD-10-CM: E66.01  ICD-9-CM: 278.01  12/10/2020        Elevated glucose ICD-10-CM: R73.09  ICD-9-CM: 790.29  7/22/2019        Anemia ICD-10-CM: D64.9  ICD-9-CM: 285.9  7/22/2019        Chronic left-sided low back pain ICD-10-CM: M54.50, G89.29  ICD-9-CM: 724.2, 338.29  7/22/2019              78 y.o. M pancreatic cancer and esophageal cancer with malignant ascites of previously controlled FOLFIRINOX but currently disease progressed and most recently admited on 3/2/2022 to place Pleurx and arrange gemcitabine/Abraxane, had extensive discussion with inpatient team and pharmacy, patient was discharged on 3/7/2022 without chemo appointment and return to office on 3/15/2022, extremely sick and blood pressure not measurable in the office, and not been having much oral intake since discharge, urgently establish IV access and called EMS to take to ER to stabilize and admit to hospital, apparently needed much supportive care and volume resuscitation, start TPN until oral intake can improve and give gemcitabine/Abraxane once clinically stable, discussed with the inpatient team.    PLAN:    Metastatic pancreatic cancer / distal esophageal cancer  - Metastatic disease involving peritoneum, malignant ascites  - s/p 24 cycles of FOLFIRINOX with SD and recent POD with increasing CEA/ and malignant ascites  - Plan for gemcitabine/abraxane when clinically stable - hopefully tomorrow. 3/17 [place]/abraxane today  3/18 Tolerated [place]/abraxane yesterday - D8 due 3/24    Malignant ascites  - Has abdominal Pleurx, drain three times weekly  3/17 Pleurx drained 3/16 - 1100cc out. 3/18 Pleurx accidentally dislodged last night - 1800cc removed before completely removed. IR notified and will re-evaluate Monday. 3/20 Worsening abdominal pain possibly secondary to re-accumulation, some drainage reported at Pleurx site. IR evaluating tomorrow. 3/21 IR to re-evaluate tomorrow, was not NPO today.     Cancer related pain  - Continue home dilaudid  3/17 Utilizing IV morphine. Will decrease dosing and encourage use of home Norco.  3/18 Continue to wean IV meds - encourage PO.  3/20 Has not been receiving IV morphine due to hypotension. Continue with PRN oral medications. Hypotension / poor PO intake / protein-calorie malnutrition  - Consult RD for TPN  - BC pending, started on Cefe/Vanc and may be able to de-escalate soon as hypotension likely secondary to dehydration. LA improved after IFV. 3/17 On TPN. Continues on Cefe/Vanc although infectious work-up unremarkable. Will stop antibiotics. 3/18 Hypotension improved and remains afebrile off antibiotics. Continue with nutritional support via TPN.  3/19 Ongoing hypotension, although improved yesterday AM, worse through evening. Had 6L of IVF yesterday and continues on TPN. May consider midodrine although MAP consistently ~65.  3/21 Hypotension improved but borderline, continues TPN. Nausea  - Continue PRN antiemetics    Diarrhea  - Antidiarrheals PRN    Bradycardia / HFrEF  - One documented episode of HR 43 - check EKG  - No hx of hypertension, monitor  3/17 EKG with bi-geminal PACs and known LBBB. Recheck EGK.  3/18 EKG with same as above. Palpated pulse on several occasions documented in the 40s. Tele applied overnight. Cardiology following. 3/19 Cardiology recommending K>4 (on TPN) and Mg replacements. Echo pending. 3/21 No bradycardia noted on telemetry. Does have ectopy which may have led to incorrect pulse rate palpation. Echo with EF 35-40%. Cardiology following. Thrombocytopenia, anemia  3/17 Possibly related to antibiotics - stopping today. Monitor. 3/19 Plts 70k - likely exacerbated by recent chemotherapy as other counts dropping as well. Continue Lovenox for now (hold for plts <50k)  3/20 Lovenox on hold for plts 53k.  3/21 Plts down to 35k    Fall  3/19 Witnessed slip and fall on wet floor yesterday. Today with L arm bruising - check X-ray.  Will check CT head and CT AP (hematoma/lipoma palpated on R buttock) and also c/o worsening abdominal pain. 3/20 X-ray of L elbow/forearm negative. CT head with ?lacunar infarct but MRI showed no acute findings and ?lacunar infarct appears to be prominent perivascular space. CT AP with progressing peritoneal/liver disease and ?new splenic lesion. Last imaging obtained 12/2021 for comparison. Elevated LFTs  3/21 Monitor, possibly secondary to TPN or recent chemo. Continue home meds  Apoorva SOPs  VTE prophylaxis - Lovenox (held for plts <50k), SCDs    Dispo - too soon to determine. PT/OT following - HHPT vs STR. CM following for home TPN. Goals and plan of care reviewed with the patient. All questions answered to the best of our ability. Yohan Daniel  Hematology & Oncology  [address] [place] [address]  Office : [phone]  Fax : [phone]         Attending Addendum:  I have personally performed a face to face diagnostic evaluation on this patient. I have reviewed and agree with the care plan as documented by Coty Durand N.P. 36 minutes were spent on patient care, including but not limited to, reviewing the chart and time with the patient and family, more than 50% of the time documented was spent in face-to-face contact with the patient and in the care of the patient on the floor/unit where the patient is located. My findings are as follows:  He has esophageal cancer and pancreatic cancer, appears weak, heart rate regular without murmurs, abdomen is non-tender, bowel sounds are positive, we will continue TPN.               Radha Sharif MD      [facility] Hematology/Oncology  [id number] [place] [address]  Office : [phone]  Fax : [phone]

## 2022-03-22 ENCOUNTER — APPOINTMENT (OUTPATIENT)
Dept: INTERVENTIONAL RADIOLOGY/VASCULAR | Age: 67
DRG: 435 | End: 2022-03-22
Attending: INTERNAL MEDICINE
Payer: MEDICARE

## 2022-03-22 LAB
ABO + RH BLD: NORMAL
ALBUMIN SERPL-MCNC: 1.1 G/DL (ref 3.2–4.6)
ALBUMIN/GLOB SERPL: 0.4 {RATIO} (ref 1.2–3.5)
ALP SERPL-CCNC: 220 U/L (ref 50–136)
ALT SERPL-CCNC: 49 U/L (ref 12–65)
ANION GAP SERPL CALC-SCNC: 2 MMOL/L (ref 7–16)
AST SERPL-CCNC: 50 U/L (ref 15–37)
BASOPHILS # BLD: 0 K/UL (ref 0–0.2)
BASOPHILS NFR BLD: 0 % (ref 0–2)
BILIRUB SERPL-MCNC: 0.3 MG/DL (ref 0.2–1.1)
BLOOD GROUP ANTIBODIES SERPL: NORMAL
BUN SERPL-MCNC: 17 MG/DL (ref 8–23)
CALCIUM SERPL-MCNC: 7.8 MG/DL (ref 8.3–10.4)
CHLORIDE SERPL-SCNC: 108 MMOL/L (ref 98–107)
CO2 SERPL-SCNC: 30 MMOL/L (ref 21–32)
CREAT SERPL-MCNC: 0.5 MG/DL (ref 0.8–1.5)
DIFFERENTIAL METHOD BLD: ABNORMAL
EOSINOPHIL # BLD: 0 K/UL (ref 0–0.8)
EOSINOPHIL NFR BLD: 2 % (ref 0.5–7.8)
ERYTHROCYTE [DISTWIDTH] IN BLOOD BY AUTOMATED COUNT: 15.6 % (ref 11.9–14.6)
GLOBULIN SER CALC-MCNC: 3.1 G/DL (ref 2.3–3.5)
GLUCOSE SERPL-MCNC: 138 MG/DL (ref 65–100)
HCT VFR BLD AUTO: 31.5 % (ref 41.1–50.3)
HGB BLD-MCNC: 10.2 G/DL (ref 13.6–17.2)
IMM GRANULOCYTES # BLD AUTO: 0.4 K/UL (ref 0–0.5)
IMM GRANULOCYTES NFR BLD AUTO: 15 % (ref 0–5)
LYMPHOCYTES # BLD: 0.3 K/UL (ref 0.5–4.6)
LYMPHOCYTES NFR BLD: 11 % (ref 13–44)
MAGNESIUM SERPL-MCNC: 2.4 MG/DL (ref 1.8–2.4)
MCH RBC QN AUTO: 30.2 PG (ref 26.1–32.9)
MCHC RBC AUTO-ENTMCNC: 32.4 G/DL (ref 31.4–35)
MCV RBC AUTO: 93.2 FL (ref 79.6–97.8)
MONOCYTES # BLD: 0 K/UL (ref 0.1–1.3)
MONOCYTES NFR BLD: 2 % (ref 4–12)
NEUTS SEG # BLD: 1.7 K/UL (ref 1.7–8.2)
NEUTS SEG NFR BLD: 70 % (ref 43–78)
NRBC # BLD: 0 K/UL (ref 0–0.2)
PHOSPHATE SERPL-MCNC: 2.4 MG/DL (ref 2.3–3.7)
PLATELET # BLD AUTO: 28 K/UL (ref 150–450)
PLATELET COMMENTS,PCOM: ABNORMAL
PMV BLD AUTO: 12.6 FL (ref 9.4–12.3)
POTASSIUM SERPL-SCNC: 4.2 MMOL/L (ref 3.5–5.1)
PROT SERPL-MCNC: 4.2 G/DL (ref 6.3–8.2)
RBC # BLD AUTO: 3.38 M/UL (ref 4.23–5.6)
RBC MORPH BLD: ABNORMAL
SODIUM SERPL-SCNC: 140 MMOL/L (ref 138–145)
SPECIMEN EXP DATE BLD: NORMAL
WBC # BLD AUTO: 2.4 K/UL (ref 4.3–11.1)

## 2022-03-22 PROCEDURE — 74011250637 HC RX REV CODE- 250/637: Performed by: INTERNAL MEDICINE

## 2022-03-22 PROCEDURE — C1729 CATH, DRAINAGE: HCPCS

## 2022-03-22 PROCEDURE — 36430 TRANSFUSION BLD/BLD COMPNT: CPT

## 2022-03-22 PROCEDURE — 86644 CMV ANTIBODY: CPT

## 2022-03-22 PROCEDURE — 74011000250 HC RX REV CODE- 250: Performed by: PHYSICIAN ASSISTANT

## 2022-03-22 PROCEDURE — 85025 COMPLETE CBC W/AUTO DIFF WBC: CPT

## 2022-03-22 PROCEDURE — 74011250637 HC RX REV CODE- 250/637: Performed by: NURSE PRACTITIONER

## 2022-03-22 PROCEDURE — 99223 1ST HOSP IP/OBS HIGH 75: CPT | Performed by: NURSE PRACTITIONER

## 2022-03-22 PROCEDURE — 77030002916 HC SUT ETHLN J&J -A

## 2022-03-22 PROCEDURE — 30233R1 TRANSFUSION OF NONAUTOLOGOUS PLATELETS INTO PERIPHERAL VEIN, PERCUTANEOUS APPROACH: ICD-10-PCS | Performed by: INTERNAL MEDICINE

## 2022-03-22 PROCEDURE — 77030010507 HC ADH SKN DERMBND J&J -B

## 2022-03-22 PROCEDURE — 74011250636 HC RX REV CODE- 250/636: Performed by: RADIOLOGY

## 2022-03-22 PROCEDURE — APPSS60 APP SPLIT SHARED TIME 46-60 MINUTES: Performed by: NURSE PRACTITIONER

## 2022-03-22 PROCEDURE — 76942 ECHO GUIDE FOR BIOPSY: CPT

## 2022-03-22 PROCEDURE — C1769 GUIDE WIRE: HCPCS

## 2022-03-22 PROCEDURE — 74011000250 HC RX REV CODE- 250: Performed by: INTERNAL MEDICINE

## 2022-03-22 PROCEDURE — 74011250636 HC RX REV CODE- 250/636: Performed by: INTERNAL MEDICINE

## 2022-03-22 PROCEDURE — 77030031131 HC SUT MXN P COVD -B

## 2022-03-22 PROCEDURE — 80053 COMPREHEN METABOLIC PANEL: CPT

## 2022-03-22 PROCEDURE — P9037 PLATE PHERES LEUKOREDU IRRAD: HCPCS

## 2022-03-22 PROCEDURE — 74011000250 HC RX REV CODE- 250: Performed by: RADIOLOGY

## 2022-03-22 PROCEDURE — 74011250636 HC RX REV CODE- 250/636: Performed by: NURSE PRACTITIONER

## 2022-03-22 PROCEDURE — 74011000258 HC RX REV CODE- 258: Performed by: INTERNAL MEDICINE

## 2022-03-22 PROCEDURE — 99232 SBSQ HOSP IP/OBS MODERATE 35: CPT | Performed by: INTERNAL MEDICINE

## 2022-03-22 PROCEDURE — 0W9F30Z DRAINAGE OF ABDOMINAL WALL WITH DRAINAGE DEVICE, PERCUTANEOUS APPROACH: ICD-10-PCS | Performed by: RADIOLOGY

## 2022-03-22 PROCEDURE — 74011000250 HC RX REV CODE- 250: Performed by: EMERGENCY MEDICINE

## 2022-03-22 PROCEDURE — 3331090002 HH PPS REVENUE DEBIT

## 2022-03-22 PROCEDURE — 86900 BLOOD TYPING SEROLOGIC ABO: CPT

## 2022-03-22 PROCEDURE — 3331090001 HH PPS REVENUE CREDIT

## 2022-03-22 PROCEDURE — 65270000015 HC RM PRIVATE ONCOLOGY

## 2022-03-22 PROCEDURE — 84100 ASSAY OF PHOSPHORUS: CPT

## 2022-03-22 PROCEDURE — 36591 DRAW BLOOD OFF VENOUS DEVICE: CPT

## 2022-03-22 PROCEDURE — 83735 ASSAY OF MAGNESIUM: CPT

## 2022-03-22 PROCEDURE — 99233 SBSQ HOSP IP/OBS HIGH 50: CPT | Performed by: INTERNAL MEDICINE

## 2022-03-22 RX ORDER — LIDOCAINE HYDROCHLORIDE 20 MG/ML
20-300 INJECTION, SOLUTION INFILTRATION; PERINEURAL
Status: DISCONTINUED | OUTPATIENT
Start: 2022-03-22 | End: 2022-03-22

## 2022-03-22 RX ORDER — SODIUM CHLORIDE 9 MG/ML
500 INJECTION, SOLUTION INTRAVENOUS CONTINUOUS
Status: DISCONTINUED | OUTPATIENT
Start: 2022-03-22 | End: 2022-03-22

## 2022-03-22 RX ORDER — LIDOCAINE HYDROCHLORIDE AND EPINEPHRINE 20; 10 MG/ML; UG/ML
1-20 INJECTION, SOLUTION INFILTRATION; PERINEURAL
Status: DISCONTINUED | OUTPATIENT
Start: 2022-03-22 | End: 2022-03-22

## 2022-03-22 RX ORDER — AMIODARONE HYDROCHLORIDE 200 MG/1
400 TABLET ORAL 2 TIMES DAILY
Status: DISCONTINUED | OUTPATIENT
Start: 2022-03-22 | End: 2022-04-06 | Stop reason: HOSPADM

## 2022-03-22 RX ORDER — ACETAMINOPHEN 325 MG/1
650 TABLET ORAL
Status: DISCONTINUED | OUTPATIENT
Start: 2022-03-22 | End: 2022-04-06 | Stop reason: HOSPADM

## 2022-03-22 RX ORDER — MIDAZOLAM HYDROCHLORIDE 1 MG/ML
.5-2 INJECTION, SOLUTION INTRAMUSCULAR; INTRAVENOUS
Status: DISCONTINUED | OUTPATIENT
Start: 2022-03-22 | End: 2022-03-22

## 2022-03-22 RX ORDER — CEFAZOLIN SODIUM/WATER 2 G/20 ML
2 SYRINGE (ML) INTRAVENOUS ONCE
Status: COMPLETED | OUTPATIENT
Start: 2022-03-22 | End: 2022-03-22

## 2022-03-22 RX ORDER — DIPHENHYDRAMINE HCL 25 MG
25 CAPSULE ORAL
Status: DISCONTINUED | OUTPATIENT
Start: 2022-03-22 | End: 2022-04-06 | Stop reason: HOSPADM

## 2022-03-22 RX ORDER — FENTANYL CITRATE 50 UG/ML
25-100 INJECTION, SOLUTION INTRAMUSCULAR; INTRAVENOUS
Status: DISCONTINUED | OUTPATIENT
Start: 2022-03-22 | End: 2022-03-22

## 2022-03-22 RX ADMIN — SODIUM CHLORIDE 500 ML: 900 INJECTION, SOLUTION INTRAVENOUS at 14:52

## 2022-03-22 RX ADMIN — HYDROMORPHONE HYDROCHLORIDE 4 MG: 4 TABLET ORAL at 21:21

## 2022-03-22 RX ADMIN — TAMSULOSIN HYDROCHLORIDE 0.4 MG: 0.4 CAPSULE ORAL at 10:11

## 2022-03-22 RX ADMIN — DIPHENHYDRAMINE HYDROCHLORIDE 25 MG: 25 CAPSULE ORAL at 06:37

## 2022-03-22 RX ADMIN — MIDAZOLAM 0.5 MG: 1 INJECTION INTRAMUSCULAR; INTRAVENOUS at 15:14

## 2022-03-22 RX ADMIN — MORPHINE SULFATE 1 MG: 4 INJECTION INTRAVENOUS at 18:25

## 2022-03-22 RX ADMIN — MIDAZOLAM 1 MG: 1 INJECTION INTRAMUSCULAR; INTRAVENOUS at 14:52

## 2022-03-22 RX ADMIN — FENTANYL CITRATE 50 MCG: 0.05 INJECTION, SOLUTION INTRAMUSCULAR; INTRAVENOUS at 14:52

## 2022-03-22 RX ADMIN — I.V. FAT EMULSION 250 ML: 20 EMULSION INTRAVENOUS at 18:27

## 2022-03-22 RX ADMIN — ONDANSETRON 4 MG: 2 INJECTION INTRAMUSCULAR; INTRAVENOUS at 14:13

## 2022-03-22 RX ADMIN — FAMOTIDINE 20 MG: 20 TABLET, FILM COATED ORAL at 10:11

## 2022-03-22 RX ADMIN — SODIUM CHLORIDE, PRESERVATIVE FREE 10 ML: 5 INJECTION INTRAVENOUS at 05:05

## 2022-03-22 RX ADMIN — FAMOTIDINE 20 MG: 20 TABLET, FILM COATED ORAL at 18:17

## 2022-03-22 RX ADMIN — FENTANYL CITRATE 25 MCG: 0.05 INJECTION, SOLUTION INTRAMUSCULAR; INTRAVENOUS at 15:13

## 2022-03-22 RX ADMIN — ACETAMINOPHEN 650 MG: 325 TABLET ORAL at 06:37

## 2022-03-22 RX ADMIN — AMIODARONE HYDROCHLORIDE 400 MG: 200 TABLET ORAL at 18:17

## 2022-03-22 RX ADMIN — PANTOPRAZOLE SODIUM 40 MG: 40 TABLET, DELAYED RELEASE ORAL at 10:11

## 2022-03-22 RX ADMIN — SODIUM CHLORIDE, PRESERVATIVE FREE 10 ML: 5 INJECTION INTRAVENOUS at 21:00

## 2022-03-22 RX ADMIN — ONDANSETRON 4 MG: 2 INJECTION INTRAMUSCULAR; INTRAVENOUS at 07:12

## 2022-03-22 RX ADMIN — LIDOCAINE HYDROCHLORIDE,EPINEPHRINE BITARTRATE 50 MG: 20; .01 INJECTION, SOLUTION INFILTRATION; PERINEURAL at 15:19

## 2022-03-22 RX ADMIN — CEFAZOLIN SODIUM 2 G: 100 INJECTION, POWDER, LYOPHILIZED, FOR SOLUTION INTRAVENOUS at 14:55

## 2022-03-22 RX ADMIN — ONDANSETRON 4 MG: 2 INJECTION INTRAMUSCULAR; INTRAVENOUS at 18:16

## 2022-03-22 RX ADMIN — SODIUM ACETATE: 164 INJECTION, SOLUTION, CONCENTRATE INTRAVENOUS at 18:29

## 2022-03-22 RX ADMIN — LIDOCAINE HYDROCHLORIDE,EPINEPHRINE BITARTRATE 50 MG: 20; .01 INJECTION, SOLUTION INFILTRATION; PERINEURAL at 15:17

## 2022-03-22 RX ADMIN — MIDAZOLAM 0.5 MG: 1 INJECTION INTRAMUSCULAR; INTRAVENOUS at 15:19

## 2022-03-22 RX ADMIN — FENTANYL CITRATE 25 MCG: 0.05 INJECTION, SOLUTION INTRAMUSCULAR; INTRAVENOUS at 15:19

## 2022-03-22 RX ADMIN — PROMETHAZINE HYDROCHLORIDE 25 MG: 25 TABLET ORAL at 21:21

## 2022-03-22 NOTE — PROGRESS NOTES
Interventional Radiology Post Paracentesis/Thoracentesis Note    3/22/2022    Procedure(s): Ultrasound guided Therapeutic Paracentesis Performed with 8 Pakistani catheter total volume 2250 ml. Preliminary Findings: medium clear and yellow. Complications: None    Plan:  Observation, check labs if drawn.           Chest X-Ray:  no    Full dictated report to follow

## 2022-03-22 NOTE — PROGRESS NOTES
[facility] CARDIOLOGY PROGRESS NOTE           3/22/2022 8:23 AM    Admit Date: 3/15/2022      Subjective:   Patient had Pleurex drain placed for ascitis. Multiple runs of SVT over last 24 hours. BP remains low. ROS:  Cardiovascular:  As noted above    Objective:      Vitals:    03/22/22 1544 03/22/22 1555 03/22/22 1605 03/22/22 1626   BP: 115/71 113/63 113/65 108/63   Pulse: (!) 102 94 88 88   Resp: 17 16 16 17   Temp:    98 °F (36.7 °C)   SpO2: 98% 97% 94% 97%   Weight:       Height:           Physical Exam:  General-No Acute Distress  Neck- supple, no JVD  CV- regular rate and rhythm with ectopy  Lung- clear bilaterally  Abd- soft, nontender, nondistended  Ext- trivial edema bilaterally. Skin- warm and dry      Data Review:   Recent Labs     03/22/22  0314 03/21/22  0622 03/20/22  0254 03/20/22  0254    140   < > 142   K 4.2 4.3   < > 3.2*   MG 2.4 2.5*   < > 1.9   BUN 17 16   < > 15   CREA 0.50* 0.40*   < > 0.40*   * 139*   < > 148*   WBC 2.4* 4.7   < > 6.7   HGB 10.2* 10.9*   < > 10.3*   HCT 31.5* 33.8*   < > 32.0*   PLT 28* 35*   < > 53*   TRIGL  --   --   --  152*    < > = values in this interval not displayed. No results found for: STONEY Romero     Echo (3/20/22):   Left Ventricle: Left ventricle size is normal. Mildly increased wall thickness. Findings consistent with mild concentric hypertrophy. Severe apical hypokinesis noted extending to the mid anteroseptal walls. Moderately reduced left ventricular systolic function with a visually estimated EF of 35 - 40%. Grade I diastolic dysfunction with normal LAP.   Mitral Valve: Mild annular calcification of the mitral valve. Mild transvalvular regurgitation.   Left Atrium: Left atrium is mildly dilated.   Left pleural effusion.   Technical qualifiers: Echo study was technically difficult with poor endocardial visualization.   Contrast used: Definity.  No Apical thrombus noted.      Assessment/Plan:     Principal Problem:    Hypotension (3/15/2022)  Continue to follow. Will need to monitor volume status closely. Active Problems:    Malignant neoplasm of body of pancreas ([facility] [address] (1/5/2021)  Defer to primary team.  Please note LV dysfunction which has not been diagnosed in the past.      Severe protein-calorie malnutrition ([facility] (3/4/2022)  Encourage oral intake      Bradycardia (3/19/2022)  Pulse rate was incorrectly recorded due to frequent ectopy. No bradycardia has been noted with telemetry. SVT:   Limited options. Start amiodarone. Continue telemetry. LBBB (left bundle branch block) (3/19/2022)  Noted. LV dysfunction as outlined below      Chronic systolic congestive heart failure ([facility] (3/21/2022)  Moderate LV dysfunction. Blood pressure limits titration of medical therapy but would consider adding beta-blockers if blood pressure improves.                   Tanmay Marx MD  3/22/2022 8:23 AM

## 2022-03-22 NOTE — PROGRESS NOTES
[facility] Hematology & Oncology        Inpatient Hematology / Oncology Progress Note      Admission Date: 3/15/2022 10:28 AM  Reason for Admission/Hospital Course: FTT (failure to thrive) in adult [R62.7]  Hypotension [I95.9]      24 Hour Events:  VSS, afebrile - hypotension slowly improving  TPN started 3/16  [place]/abraxane C1D1 3/17 (D8 3/24 if plts >100k)  Pleurx replacement today after dislodged last week  Feeling ok, complains \"pipes and drains\" are bothering him  RN reported run of SVT     ROS:  Constitutional: +weakness/fatigue. Negative for fever, chills, malaise. CV: Negative for chest pain, palpitations, edema. Respiratory: Negative for dyspnea, cough, wheezing. GI: +N/V (better). Negative for abdominal pain. 10 point review of systems is otherwise negative with the exception of the elements mentioned above in the HPI. No Known Allergies    OBJECTIVE:  Patient Vitals for the past 8 hrs:   BP Temp Pulse Resp SpO2   22 0758 103/73 97.2 °F (36.2 °C) 97 18 97 %   22 0713 114/64 97.5 °F (36.4 °C) 86 18 96 %   22 0644 (!) 150/85 97.7 °F (36.5 °C) 97 20 96 %   22 0310 133/61 97.9 °F (36.6 °C) 85 18 98 %     Temp (24hrs), Av.6 °F (36.4 °C), Min:97.2 °F (36.2 °C), Max:98 °F (36.7 °C)     0701 -  1900  In: -   Out: 150 [Urine:150]    Physical Exam:  Constitutional: Thin, cachectic elderly male in no acute distress, sitting comfortably in the hospital bed. HEENT: Normocephalic and atraumatic. Oropharynx is clear, mucous membranes are moist. Extraocular muscles are intact. Sclerae anicteric. Neck supple without JVD. No thyromegaly present. Skin +R gluteal hematoma/lipoma palpated. Warm and dry. No bruising and no rash noted. No erythema. No pallor. Respiratory Lungs are clear to auscultation bilaterally without wheezes, rales or rhonchi, normal air exchange without accessory muscle use. CVS Normal rate, regular rhythm and normal S1 and S2.   No murmurs, gallops, or rubs. Abdomen Soft, nontender and nondistended, normoactive bowel sounds. No palpable mass. No hepatosplenomegaly. Neuro Grossly nonfocal with no obvious sensory or motor deficits. MSK Normal range of motion in general.  No edema and no tenderness. Psych Appropriate mood and affect.         Labs:      Recent Labs     03/22/22 0314 03/21/22  0622 03/20/22  0254   WBC 2.4* 4.7 6.7   RBC 3.38* 3.66* 3.48*   HGB 10.2* 10.9* 10.3*   HCT 31.5* 33.8* 32.0*   MCV 93.2 92.3 92.0   MCH 30.2 29.8 29.6   MCHC 32.4 32.2 32.2   RDW 15.6* 15.4* 15.4*   PLT 28* 35* 53*   GRANS 70 93* 95*   LYMPH 11* 4* 2*   MONOS 2* 2* 2*   EOS 2 0* 1   BASOS 0 0 0   IG 15* 1 1   DF AUTOMATED AUTOMATED AUTOMATED   ANEU 1.7 4.4 6.3   ABL 0.3* 0.2* 0.1*   ABM 0.0* 0.1 0.1   JONNY 0.0 0.0 0.0   ABB 0.0 0.0 0.0   AIG 0.4 0.1 0.0        Recent Labs     03/22/22 0314 03/21/22  0622 03/20/22  0254    140 142   K 4.2 4.3 3.2*   * 108* 112*   CO2 30 27 22   AGAP 2* 5* 8   * 139* 148*   BUN 17 16 15   CREA 0.50* 0.40* 0.40*   GFRAA >60 >60 >60   GFRNA >60 >60 >60   CA 7.8* 7.9* 7.4*   * 219* 198*   TP 4.2* 4.3* 3.9*   ALB 1.1* 1.3* 1.3*   GLOB 3.1 3.0 2.6   AGRAT 0.4* 0.4* 0.5*   MG 2.4 2.5* 1.9   PHOS 2.4 1.7* 1.7*         Imaging:    Medications:  Current Facility-Administered Medications   Medication Dose Route Frequency    acetaminophen (TYLENOL) tablet 650 mg  650 mg Oral Q6H PRN    diphenhydrAMINE (BENADRYL) capsule 25 mg  25 mg Oral Q6H PRN    TPN ADULT-CENTRAL - dextrose 15% amino acid 5%   IntraVENous QPM    morphine injection 1 mg  1 mg IntraVENous Q6H PRN    fat emulsion 20% (LIPOSYN, INTRAlipid) infusion 250 mL  250 mL IntraVENous QPM    HYDROcodone-acetaminophen (NORCO) 5-325 mg per tablet 1 Tablet  1 Tablet Oral Q4H PRN    HYDROmorphone (DILAUDID) tablet 4 mg  4 mg Oral Q4H PRN    loperamide (IMODIUM) capsule 2 mg  2 mg Oral Q4H PRN    NUTRITIONAL SUPPORT ELECTROLYTE PRN ORDERS Does Not Apply PRN    sodium chloride (NS) flush 5-10 mL  5-10 mL IntraVENous Q8H    sodium chloride (NS) flush 5-10 mL  5-10 mL IntraVENous PRN    diphenoxylate-atropine (LOMOTIL) tablet 1 Tablet  1 Tablet Oral QID PRN    famotidine (PEPCID) tablet 20 mg  20 mg Oral BID    pantoprazole (PROTONIX) tablet 40 mg  40 mg Oral ACB    promethazine (PHENERGAN) tablet 25 mg  25 mg Oral Q6H PRN    tamsulosin (FLOMAX) capsule 0.4 mg  0.4 mg Oral DAILY    [Held by provider] enoxaparin (LOVENOX) injection 40 mg  40 mg SubCUTAneous Q24H    ondansetron (ZOFRAN) injection 4 mg  4 mg IntraVENous Q4H PRN         ASSESSMENT:    Problem List  Date Reviewed: 3/15/2022          Codes Class Noted    Chronic systolic congestive heart failure (HCC) ICD-10-CM: I50.22  ICD-9-CM: 428.22, 428.0  3/21/2022        Bradycardia ICD-10-CM: R00.1  ICD-9-CM: 427.89  3/19/2022        LBBB (left bundle branch block) ICD-10-CM: I44.7  ICD-9-CM: 426.3  3/19/2022        FTT (failure to thrive) in adult ICD-10-CM: R62.7  ICD-9-CM: 783.7  3/15/2022        * (Principal) Hypotension ICD-10-CM: I95.9  ICD-9-CM: 458.9  3/15/2022        Severe protein-calorie malnutrition ([facility] [address] [facility] ICD-10-CM: E43  ICD-9-CM: 633  3/4/2022        Pancreatic cancer ([facility] ICD-10-CM: C25.9  ICD-9-CM: 157.9  3/2/2022        Ascites ICD-10-CM: R18.8  ICD-9-CM: 789.59  3/2/2022        Admission for antineoplastic chemotherapy ICD-10-CM: Z51.11  ICD-9-CM: V58.11  3/2/2022        Hypomagnesemia ICD-10-CM: E83.42  ICD-9-CM: 275.2  4/23/2021        Hypokalemia ICD-10-CM: E87.6  ICD-9-CM: 276.8  4/20/2021        CINV (chemotherapy-induced nausea and vomiting) ICD-10-CM: R11.2, T45.1X5A  ICD-9-CM: 787.01, E933.1  4/20/2021        Dehydration ICD-10-CM: E86.0  ICD-9-CM: 276.51  4/12/2021        Other neutropenia ([facility] [address] ICD-10-CM: D70.8  ICD-9-CM: 288.09  3/9/2021        Malignant neoplasm of lower third of esophagus ([facility] [address] ICD-10-CM: C15.5  ICD-9-CM: 150.5  1/5/2021        Malignant [facility] body of [facility] ICD-10-CM: C25.1  ICD-9-CM: 157.1  1/5/2021        Severe obesity ([facility] [address] ICD-10-CM: E66.01  ICD-9-CM: 278.01  12/10/2020        Elevated glucose ICD-10-CM: R73.09  ICD-9-CM: 790.29  7/22/2019        Anemia ICD-10-CM: D64.9  ICD-9-CM: 285.9  7/22/2019        Chronic left-sided low back pain ICD-10-CM: M54.50, G89.29  ICD-9-CM: 724.2, 338.29  7/22/2019              78 y.o. M pancreatic cancer and esophageal cancer with malignant ascites of previously controlled FOLFIRINOX but currently disease progressed and most recently admited on 3/2/2022 to place Pleurx and arrange gemcitabine/Abraxane, had extensive discussion with inpatient team and pharmacy, patient was discharged on 3/7/2022 without chemo appointment and return to office on 3/15/2022, extremely sick and blood pressure not measurable in the office, and not been having much oral intake since discharge, urgently establish IV access and called EMS to take to ER to stabilize and admit to hospital, apparently needed much supportive care and volume resuscitation, start TPN until oral intake can improve and give gemcitabine/Abraxane once clinically stable, discussed with the inpatient team.    PLAN:    Metastatic pancreatic cancer / distal esophageal cancer  - Metastatic disease involving peritoneum, malignant ascites  - s/p 24 cycles of FOLFIRINOX with SD and recent POD with increasing CEA/ and malignant ascites  - Plan for gemcitabine/abraxane when clinically stable - hopefully tomorrow. 3/17 [place]/abraxane today  3/18 Tolerated [place]/abraxane yesterday - D8 due 3/24 if plts >100k    Malignant ascites  - Has abdominal Pleurx, drain three times weekly  3/17 Pleurx drained 3/16 - 1100cc out. 3/18 Pleurx accidentally dislodged last night - 1800cc removed before completely removed. IR notified and will re-evaluate Monday. 3/20 Worsening abdominal pain possibly secondary to re-accumulation, some drainage reported at Pleurx site.  IR evaluating tomorrow. 3/21 IR to re-evaluate tomorrow, was not NPO today. 3/22 To IR today. Cancer related pain  - Continue home dilaudid  3/17 Utilizing IV morphine. Will decrease dosing and encourage use of home Norco.  3/18 Continue to wean IV meds - encourage PO.  3/20 Has not been receiving IV morphine due to hypotension. Continue with PRN oral medications. 3/22 Consult to [place] regarding [address], symptom management. Hypotension / poor PO intake / protein-calorie malnutrition  - Consult RD for TPN  - BC pending, started on Cefe/Vanc and may be able to de-escalate soon as hypotension likely secondary to dehydration. LA improved after IFV. 3/17 On TPN. Continues on Cefe/Vanc although infectious work-up unremarkable. Will stop antibiotics. 3/18 Hypotension improved and remains afebrile off antibiotics. Continue with nutritional support via TPN.  3/19 Ongoing hypotension, although improved yesterday AM, worse through evening. Had 6L of IVF yesterday and continues on TPN. May consider midodrine although MAP consistently ~65.  3/21 Hypotension improved but borderline, continues TPN.   3/22 Discussed with RD volume status of TPN given new finding of HFrEF and CT chest with evidence of fluid overload. CM following for home TPN. Holding IV morphine. Nausea  - Continue PRN antiemetics    Diarrhea  - Antidiarrheals PRN    Bradycardia / HFrEF / LV dysfunction / LBBB / ectopy  - One documented episode of HR 43 - check EKG  - No hx of hypertension, monitor  3/17 EKG with bi-geminal PACs and known LBBB. Recheck EGK.  3/18 EKG with same as above. Palpated pulse on several occasions documented in the 40s. Tele applied overnight. Cardiology following. 3/19 Cardiology recommending K>4 (on TPN) and Mg replacements. Echo pending. 3/21 No bradycardia noted on telemetry. Does have ectopy which may have led to incorrect pulse rate palpation. Echo with EF 35-40%.  CT chest completed due to echo findings rebeka noted hiatal hernia/mesenteric fat herniation/abdominal fluid displacing heart and small BL effusions, anasarca. Cardiology following.  3/22 Cardiology considering BB if BP improve for HF/LV dysfunction. Weight up 6# overnight and increased since admission. Clinically no evidence of overload but asking RD to adjust TPN volume. Reported run of NS-SVT - cardiology following. Thrombocytopenia, anemia  3/17 Possibly related to antibiotics - stopping today. Monitor. 3/19 Plts 70k - likely exacerbated by recent chemotherapy as other counts dropping as well. Continue Lovenox for now (hold for plts <50k)  3/20 Lovenox on hold for plts 53k.  3/21 Plts down to 35k  3/22 Plts down to 28k - receiving plts for IR procedure today. Fall  3/19 Witnessed slip and fall on wet floor yesterday. Today with L arm bruising - check X-ray. Will check CT head and CT AP (hematoma/lipoma palpated on R buttock) and also c/o worsening abdominal pain. 3/20 X-ray of L elbow/forearm negative. CT head with ?lacunar infarct but MRI showed no acute findings and ?lacunar infarct appears to be prominent perivascular space. CT AP with progressing peritoneal/liver disease and ?new splenic lesion. Last imaging obtained 12/2021 for comparison. Elevated LFTs  3/21 Monitor, possibly secondary to TPN or recent chemo. 3/22 Appear to be improving    Continue home meds  Apoorva SOPs  VTE prophylaxis - Lovenox (held for plts <50k), SCDs    Dispo - too soon to determine. PT/OT following - HHPT vs STR. CM following for home TPN. He will need close follow-up with Dr Adriel Siddiqi upon DC and abraxane rescheduled if plts preclude D8 treatment. Goals and plan of care reviewed with the patient. All questions answered to the best of our ability.             Cephas Gilford, [address] [facility] Hematology [facility] Oncology  [address] [address]  Office : [phone]  Fax : [phone]         Attending Addendum:  I have personally performed a face to face diagnostic evaluation on this patient. I have reviewed and agree with the care plan as documented by Liz Dykes N.P. 36 minutes were spent on patient care, including but not limited to, reviewing the chart and time with the patient and family, more than 50% of the time documented was spent in face-to-face contact with the patient and in the care of the patient on the floor/unit where the patient is located. My findings are as follows:  He has esophageal cancer and pancreatic cancer, appears weak, heart rate regular without murmurs, abdomen is non-tender, bowel sounds are positive, we will arrange for him to receive chemotherapy when his Platelet count improves.               Virgen Garcia MD      [facility] Hematology/Oncology  [address] [place] [address]  Office : [phone]  Fax : [phone]

## 2022-03-22 NOTE — PROGRESS NOTES
Pt to IR [address] 2 via stretcher with RN.       IR Nurse Pre-Procedure Checklist Part 2          Consent signed: Yes    H&P complete:  Yes    Antibiotics: Yes    Airway/Mallampati Done: Yes    Shaved: Yes    Pregnancy Form:Not applicable    Patient Position: Yes    MD Side: Yes     Biopsy Worksheet: Not applicable    Specimen Medium: Not applicable

## 2022-03-22 NOTE — PROGRESS NOTES
END OF SHIFT NOTE:    Intake/Output  No intake/output data recorded. Voiding: YES  Catheter: NO  Drain:   Pleural Catheter/Drain 03/22/22 15.5 fr x 66 cm Right (Active)               Stool:  0 occurrences. Stool Assessment  Stool Color: Wily Nap (03/16/22 0558)  Stool Appearance: Watery (per pt ) (03/16/22 0800)  Stool Amount: Medium (03/16/22 0558)    Emesis:  0 occurrences. VITAL SIGNS  Patient Vitals for the past 12 hrs:   Temp Pulse Resp BP SpO2   03/22/22 1855 97.2 °F (36.2 °C) 73 17 (!) 91/47 97 %   03/22/22 1626 98 °F (36.7 °C) 88 17 108/63 97 %   03/22/22 1605 -- 88 16 113/65 94 %   03/22/22 1555 -- 94 16 113/63 97 %   03/22/22 1544 -- (!) 102 17 115/71 98 %   03/22/22 1534 -- (!) 103 17 110/70 100 %   03/22/22 1529 -- 98 17 116/69 100 %   03/22/22 1525 -- 96 16 124/82 100 %   03/22/22 1519 -- 92 16 120/84 93 %   03/22/22 1514 -- 93 16 114/83 100 %   03/22/22 1509 -- 94 16 120/80 100 %   03/22/22 1459 -- 93 16 118/82 100 %   03/22/22 1454 -- (!) 56 18 117/77 100 %   03/22/22 1449 -- 70 18 130/81 98 %   03/22/22 1415 -- 91 -- 118/78 99 %   03/22/22 1309 97.2 °F (36.2 °C) 89 16 119/87 97 %   03/22/22 1135 97.3 °F (36.3 °C) 64 16 (!) 88/50 97 %   03/22/22 0758 97.2 °F (36.2 °C) 97 18 103/73 97 %       Pain Assessment  Pain 1  Pain Scale 1: Numeric (0 - 10) (03/22/22 1825)  Pain Intensity 1: 9 (03/22/22 1825)  Patient Stated Pain Goal: 0 (03/22/22 0830)  Pain Reassessment 1: Patient resting w/respiratory rate greater than 10 (03/22/22 0830)  Pain Onset 1: pta (03/21/22 2046)  Pain Location 1: Abdomen (03/22/22 1825)  Pain Orientation 1: Mid (03/21/22 2046)  Pain Description 1: Aching (03/22/22 1825)  Pain Intervention(s) 1: Medication (see MAR) (03/22/22 1825)    Ambulating  Yes    Additional Information: plurex drain placement. Shift report given to oncoming nurse at the bedside.     Jacqueline Romero RN

## 2022-03-22 NOTE — PROGRESS NOTES
Comprehensive Nutrition Assessment    Type and Reason for Visit: Reassess  TPN Management (Oncology)    Nutrition Recommendations/Plan:   Parenteral Nutrition:  Total parenteral nutrition  to begin at 1800  Change: Dex 14%, 8% AA 1.56 L (65ml/hr)   Continue 250 ml 20% lipids daily  To provide: 1742 kcal/d (100% of needs), 125 grams of protein/d (100% of needs), 218 grams of CHO/d and 1810 ml of total volume/d. Lytes/L:   Sodium 120 meq (100 meq NaCl, 20 meq NaAcetate), Potassium 35 meq (35 meq KPO4), 10 meq Mg, 4.5 meq Calcium. Solution will be compounded in a ~1.5:1 Cl:Acetate ratio  Other additives: MTE, MVI MWF due to national shortages, Thiamine 100 mg (day 7/7 due to refeeding risk - to be removed 3/23). Nutritional Supplement Therapy:   Active electrolyte replacement per nutrition support protocols  Replacement indicated:  No replacement warranted. Labs:   CMP daily per primary  Mg daily per primary  Phos MWF  POC Glucoses/SSI Not indicated  Meals and Snacks:  Continue current diet. Malnutrition Assessment:  Malnutrition Status: Severe malnutrition  Context: Chronic illness  Findings of clinical characteristics of malnutrition:   Energy Intake:  7 - 75% or less est energy requirements for 1 month or longer  Weight Loss:  7.0 - Greater than 7.5% over 3 months (34# (18.4%) )     Body Fat Loss:  1 - Mild body fat loss, Buccal region,Orbital,Triceps   Muscle Mass Loss:  1 - Mild muscle mass loss, Calf (gastrocnemius),Hand (interosseous),Scapula (trapezius),Temples (temporalis)  Fluid Accumulation:  No significant fluid accumulation,     Strength:  Not performed       Nutrition Assessment:   Nutrition History: Per RD assessment patient was able to maintain PO intake and UBW throughout most of chemo. During admmission early March patient had \"stopped eating\" due to nausea. Upon assessment this admission patient reports no PO of foods for ~4 weeks. States that he has been able to tolerate some fluids.  He reports continued nausea and vomiting as primary barrier. Nutrition Background: Patient with pancreatic and esophageal cancer, Amos's esophagus, obesity, HTN, GERD, gastritis, ascites and peritoneal carcinomatosis s/p Plurex drain. He presented to oncology office extremely sick, BP was unable to be measured. He was admitted directly to [facility]. Nutrition Interval:  PO intake continues to limited due to early satiety and persistent nausea and likely secondary to peritoneal carcinomatosis, recurrent ascites with Plurex drain. Patient has demonstrated inability to meet needs orally with severe weight loss and malnutrition as above. Intake trends since admission reveal daily PO tolerance of ~1 partial meal per day which is not sufficient to sustain weight, functional status, or life. Patient seen reclined in bed, sleeping. Wakes to RD voice. States he feels fine todau. TPN infusing @ 85 ml/hr per order. NPO this AM for pleurx replacement. TPN discussed with TRACI Nieto 3/21. TPN to be concentrated and volume decreased tonight. Abdominal Status (last documented): Ascites,Distended abdomen. Last BM 03/21/22.   Pertinent Medications: Pepcid, PRN Zofran, PRN Phenergan, protonix  Electrolyte replacements: 3/19 20 mmol KPO4; 3/20 2 g Mg, 20 meq Klor-Con, 20 mmol KPO4 (ordered per cardiology); 3/21: 30 mmol NaP04 ordered  Pertinent Labs:   Lab Results   Component Value Date/Time    Sodium 140 03/22/2022 03:14 AM    Potassium 4.2 03/22/2022 03:14 AM    Chloride 108 (H) 03/22/2022 03:14 AM    CO2 30 03/22/2022 03:14 AM    Anion gap 2 (L) 03/22/2022 03:14 AM    Glucose 138 (H) 03/22/2022 03:14 AM    BUN 17 03/22/2022 03:14 AM    Creatinine 0.50 (L) 03/22/2022 03:14 AM    Calcium 7.8 (L) 03/22/2022 03:14 AM    Albumin 1.1 (L) 03/22/2022 03:14 AM    Magnesium 2.4 03/22/2022 03:14 AM    Phosphorus 2.4 03/22/2022 03:14 AM     Lab Results   Component Value Date/Time    Triglyceride 152 (H) 03/20/2022 02:54 AM   Labs remarkable for Na remaining WNL and unchanged. Cl elevated and unchanged, CO2 trending up despite alteration in Cl: Ace ratio last evening. K WNL - current TPN contains 70 meq K+/day. Mg WNL - TPN contains 10 meq Mg/L. Phos WNL. Glucose slightly elevated but not within range to implement SSI/POC glucose. NH3 24 (3/21)    Nutrition Related Findings:   Port in place, also with 1 PIV. TPN intiated 3/16. TPN increased in volume 3/19.  TPN to be concentrated and volume decreased 3/22 per NP request.      Current Nutrition Therapies:  DIET NPO  Current Parenteral Nutrition Orders:  · Type and Formula: Dex 15%, 5% AA    · Lipids: 250ml,Daily  · Duration: Continuous  · Rate/Volume: 2 L (85ml/hr)  · Current PN Order Provides: infusing at goal  · Goal PN Orders Provides: 1940 kcal/d (100% of needs), 100 grams of protein/d (100% of needs), 300 grams of CHO/d and 2250 ml of total volume/d      Current Intake:   Average Meal Intake: NPO Average Supplement Intake: Refusing to take      Anthropometric Measures:  Height: 5' 6\" (167.6 cm)  Current Body Wt: 79.8 kg (175 lb 14.8 oz) (3/21), Weight source: Bed scale  BMI: 28.4,  (current BMI skewed by fluid)  Admission Body Weight: 148 lb 9.4 oz  Ideal Body Weight (lbs) (Calculated): 142 lbs (65 kg), 104.6 %  Usual Body Wt: 82.6 kg (182 lb) (12/14/21 office wt and per previous history), Percent weight change: -18.4          Edema: No data recorded   Estimated Daily Nutrient Needs:  Energy (kcal/day): 3947-9349 (Kcal/kg (25-30), Weight Used: Current (67.4 kg (3/15))  Protein (g/day):  (1.3-1.5 g/kg) Weight Used: (Admission (67.3 kg))  Fluid (ml/day):   (1 ml/kcal)    Nutrition Diagnosis:   · Inadequate oral intake related to altered GI function,early satiety (peritoneal carcinomatosis ) as evidenced by  (reportes barriers to PO, recall, wt loss)    · Severe malnutrition related to catabolic illness as evidenced by  (malnutrition criteria as above)    Nutrition Interventions:   Food and/or Nutrient Delivery: Continue NPO,Modify parenteral nutrition     Coordination of Nutrition Care: Continue to monitor while inpatient    Goals:   Previous Goal Met: Goal(s) achieved  Active Goal: Continue to toleate TPN at goal    Nutrition Monitoring and Evaluation:      Food/Nutrient Intake Outcomes: Diet advancement/tolerance,Parenteral nutrition intake/tolerance  Physical Signs/Symptoms Outcomes: Biochemical data,GI status,Fluid status or edema,Hemodynamic status,Meal time behavior,Weight    Discharge Planning:     Too soon to determine (likely TPN)    Halima Mckeon [address], [address] [place] [address], [address] [place] [address], [address] [place] [address] Ave.

## 2022-03-22 NOTE — PROGRESS NOTES
TRANSFER - OUT REPORT:    Verbal report given to Valentine Rocha on Mary Beth Coffey  being transferred to 5th floor for routine progression of care       Report consisted of patients Situation, Background, Assessment and   Recommendations(SBAR). Information from the following report(s) SBAR, Procedure Summary, Intake/Output and MAR was reviewed with the receiving nurse. Opportunity for questions and clarification was provided. Conscious Sedation:   100 Mcg of Fentanyl administered  2 Mg of Versed administered    Pt tolerated procedure well.      Visit Vitals  /71   Pulse (!) 102   Temp 97.2 °F (36.2 °C)   Resp 17   Ht 5' 6\" (1.676 m)   Wt 79.8 kg (175 lb 14.4 oz)   SpO2 98%   BMI 28.39 kg/m²     Past Medical History:   Diagnosis Date    Back pain     followed by pain management    Chronic pain     left-sided, lower back pain    CINV (chemotherapy-induced nausea and vomiting) 4/20/2021    Erectile dysfunction     Gastritis     GERD (gastroesophageal reflux disease)     daily medication    Hiatal hernia     \"medium\"    History of anemia     Hypertension     situational; has Amlodipine to take if systolic >453    Left bundle branch block (LBBB) on electrocardiogram 02/05/2021    Malignant ne[facility] of body of pancreas [facility]     Malignant neoplasm of esophagus ([facility] 12/07/2020    Morbid obesity (HCC)     Nausea     takes antiemetic med prn     Peripheral IV 03/17/22 Left Antecubital (Active)   Site Assessment Clean, dry, & intact 03/22/22 0134   Phlebitis Assessment 0 03/22/22 0134   Infiltration Assessment 0 03/22/22 0134   Dressing Status Clean, dry, & intact 03/22/22 0134   Dressing Type Disk with Chlorhexadine gluconate (CHG) 03/22/22 0134   Hub Color/Line Status Blue;Flushed 03/22/22 0134   Action Taken Other (comment) 03/19/22 1945   Alcohol Cap Used Yes 03/21/22 0830              Venous Access Device Upper chest (subclavicular area), left (Active)   Central Line Being Utilized Yes 03/22/22 0134   Criteria for Appropriate Use Total parenteral nutrition 03/22/22 0134   Site Assessment Clean, dry, & intact 03/22/22 0134   Date of Last Dressing Change 03/21/22 03/22/22 0134   Dressing Status Clean, dry, & intact 03/22/22 0134   Dressing Type Disk with Chlorhexadine gluconate (CHG) 03/22/22 0134   Action Taken Blood drawn 03/22/22 0310   Date Accessed (Medial Site) 03/21/22 03/22/22 0134   Access Time (Medial Site) 0200 03/18/22 0210   Access Needle Size (Site #1) 20 G 03/18/22 0210   Access Needle Length (Medial Site) 1 inch 03/18/22 0210   Positive Blood Return (Medial Site) Yes 03/22/22 0134   Action Taken (Medial Site) Flushed;Blood drawn; Infusing 03/22/22 0310   Date Needle Changed (Medial Site) 03/21/22 03/22/22 0134   Alcohol Cap Used Yes 03/22/22 0134     Pleural Catheter/Drain 03/22/22 15.5 fr x 66 cm Right (Active)

## 2022-03-22 NOTE — PROGRESS NOTES
Problem: Falls - Risk of  Goal: *Absence of Falls  Description: Document Rogers Ponce Fall Risk and appropriate interventions in the flowsheet.   Outcome: Progressing Towards Goal  Note: Fall Risk Interventions:  Mobility Interventions: Communicate number of staff needed for ambulation/transfer,Patient to call before getting OOB    Mentation Interventions: Adequate sleep, hydration, pain control,Reorient patient,More frequent rounding,Increase mobility    Medication Interventions: Evaluate medications/consider consulting pharmacy,Patient to call before getting OOB    Elimination Interventions: Bed/chair exit alarm,Call light in reach,Toileting schedule/hourly rounds    History of Falls Interventions: Bed/chair exit alarm,Investigate reason for fall,Evaluate medications/consider consulting pharmacy,Room close to nurse's station

## 2022-03-22 NOTE — PROGRESS NOTES
Tele called had one run of SVt with  returned to nS, Josi Leigh NP was notified.   Patient showing now signs of distress

## 2022-03-22 NOTE — PROGRESS NOTES
[facility] CARDIOLOGY PROGRESS NOTE           3/22/2022 8:23 AM    Admit Date: 3/15/2022      Subjective:   Patient had Pleurex drain placed for ascitis. Multiple runs of SVT over last 24 hours. BP remains low. ROS:  Cardiovascular:  As noted above    Objective:      Vitals:    03/22/22 1534 03/22/22 1544 03/22/22 1555 03/22/22 1605   BP: 110/70 115/71 113/63 113/65   Pulse: (!) 103 (!) 102 94 88   Resp: 17 17 16 16   Temp:       SpO2: 100% 98% 97% 94%   Weight:       Height:           Physical Exam:  General-No Acute Distress  Neck- supple, no JVD  CV- regular rate and rhythm with ectopy  Lung- clear bilaterally  Abd- soft, nontender, nondistended  Ext- trivial edema bilaterally. Skin- warm and dry      Data Review:   Recent Labs     03/22/22  0314 03/21/22  0622 03/20/22  0254 03/20/22  0254    140   < > 142   K 4.2 4.3   < > 3.2*   MG 2.4 2.5*   < > 1.9   BUN 17 16   < > 15   CREA 0.50* 0.40*   < > 0.40*   * 139*   < > 148*   WBC 2.4* 4.7   < > 6.7   HGB 10.2* 10.9*   < > 10.3*   HCT 31.5* 33.8*   < > 32.0*   PLT 28* 35*   < > 53*   TRIGL  --   --   --  152*    < > = values in this interval not displayed. No results found for: STONEY Rubio     Echo (3/20/22):   Left Ventricle: Left ventricle size is normal. Mildly increased wall thickness. Findings consistent with mild concentric hypertrophy. Severe apical hypokinesis noted extending to the mid anteroseptal walls. Moderately reduced left ventricular systolic function with a visually estimated EF of 35 - 40%. Grade I diastolic dysfunction with normal LAP.   Mitral Valve: Mild annular calcification of the mitral valve. Mild transvalvular regurgitation.   Left Atrium: Left atrium is mildly dilated.   Left pleural effusion.   Technical qualifiers: Echo study was technically difficult with poor endocardial visualization.   Contrast used: Definity. No Apical thrombus noted.       Assessment/Plan: Principal Problem:    Hypotension (3/15/2022)  Continue to follow. Will need to monitor volume status closely. Active Problems:    Malignant neoplasm of body of pancreas ([facility] [address] (1/5/2021)  Defer to primary team.  Please note LV dysfunction which has not been diagnosed in the past.      Severe protein-calorie malnutrition ([facility] (3/4/2022)  Encourage oral intake      Bradycardia (3/19/2022)  Pulse rate was incorrectly recorded due to frequent ectopy. No bradycardia has been noted with telemetry. SVT:   Limited options. Start amiodarone. Continue telemetry. LBBB (left bundle branch block) (3/19/2022)  Noted. LV dysfunction as outlined below      Chronic systolic congestive heart failure ([facility] (3/21/2022)  Moderate LV dysfunction. Blood pressure limits titration of medical therapy but would consider adding beta-blockers if blood pressure improves.                   Delores Nunez MD  3/22/2022 8:23 AM

## 2022-03-22 NOTE — ACP (ADVANCE CARE PLANNING)
In discussing his wishes for his care, Mr Kimberly Farley stated that he would not want be intubated or be given CPR. He agreed that he wanted to change his code status to DNR, and this was done. The pt said that he wants his first surrogate medical decision maker to be his son Nereyda Lima, and the alternate surrogate decision maker to be his \"50-year friend\" Susie Chisholm. He has another son, but does not wish to include him on the list of surrogate medical decision makers. Mr Kimberly Farley was not interested in completing a HCPOA document.

## 2022-03-22 NOTE — PROGRESS NOTES
CM reviewed pt chart for continued stay. CM noted consult for Palliative Care. Will finalize a referral to [facility] for home TPN as pt condition stabilizes and a tentative discharge date is identified. Will continue to follow pt plan of care and assist with any supportive care needs  as appropriate.

## 2022-03-22 NOTE — PROGRESS NOTES
PT note:  Treatment deferred as the patient is scheduled to have a procedure. Will continue PT efforts.   Chuck Lebron, NBA

## 2022-03-22 NOTE — PROGRESS NOTES
END OF SHIFT NOTE:    Patient Vitals for the past 12 hrs:   Temp Pulse Resp BP SpO2   03/22/22 0310 97.9 °F (36.6 °C) 85 18 133/61 98 %   03/21/22 2250 98 °F (36.7 °C) 77 18 (!) 100/55 97 %   03/21/22 1928 97.9 °F (36.6 °C) 95 18 106/63 97 %   03/21/22 1533 97.3 °F (36.3 °C) (!) 52 20 110/65 98 %     -pt given dilaudid PO 1x  -pt resting in bed  -NPO at [place] for pleurex replacement  -plts ordered and started since plts are below 50 for procedure today  -no needs at this time  -vss

## 2022-03-22 NOTE — PROCEDURES
Department of Interventional Radiology  [phone]        Interventional Radiology Brief Procedure Note    Patient: Errol Winters MRN: [id number]  SSN: [id number]    YOB: 1955  Age: 78 y.o. Sex: male      Date of Procedure: 3/22/2022    Pre-Procedure Diagnosis: ascites, pancreatic cancer    Post-Procedure Diagnosis: SAME    Procedure(s): placement of abdominal Pleurx catheter, 2250 ml thin yellow fluid removed.     Brief Description of Procedure: as above    Performed By: Crow Dior PA-C     Assistants: None    Anesthesia:Moderate Sedation per Shannon Cogan, MD    Estimated Blood Loss: Less than 10ml    Specimens:  None    Implants:  Abdominal Pleurx catheter    Findings: large volume ascites     Complications: None    Recommendations: drain abdomen every other day x 2 weeks; then, prn comfort     Follow Up: prn    Signed By: Crow Dior PA-C     March 22, 2022

## 2022-03-23 LAB
ALBUMIN SERPL-MCNC: 1.1 G/DL (ref 3.2–4.6)
ALBUMIN/GLOB SERPL: 0.4 {RATIO} (ref 1.2–3.5)
ALP SERPL-CCNC: 259 U/L (ref 50–136)
ALT SERPL-CCNC: 35 U/L (ref 12–65)
ANION GAP SERPL CALC-SCNC: 3 MMOL/L (ref 7–16)
AST SERPL-CCNC: 38 U/L (ref 15–37)
BASOPHILS # BLD: 0 K/UL (ref 0–0.2)
BASOPHILS NFR BLD: 0 % (ref 0–2)
BILIRUB SERPL-MCNC: 0.2 MG/DL (ref 0.2–1.1)
BLD PROD TYP BPU: NORMAL
BPU ID: NORMAL
BUN SERPL-MCNC: 20 MG/DL (ref 8–23)
CALCIUM SERPL-MCNC: 7.6 MG/DL (ref 8.3–10.4)
CHLORIDE SERPL-SCNC: 110 MMOL/L (ref 98–107)
CO2 SERPL-SCNC: 28 MMOL/L (ref 21–32)
CREAT SERPL-MCNC: 0.3 MG/DL (ref 0.8–1.5)
DIFFERENTIAL METHOD BLD: ABNORMAL
EOSINOPHIL # BLD: 0 K/UL (ref 0–0.8)
EOSINOPHIL NFR BLD: 2 % (ref 0.5–7.8)
ERYTHROCYTE [DISTWIDTH] IN BLOOD BY AUTOMATED COUNT: 15.3 % (ref 11.9–14.6)
GLOBULIN SER CALC-MCNC: 3 G/DL (ref 2.3–3.5)
GLUCOSE SERPL-MCNC: 123 MG/DL (ref 65–100)
HCT VFR BLD AUTO: 32 % (ref 41.1–50.3)
HGB BLD-MCNC: 10.1 G/DL (ref 13.6–17.2)
IMM GRANULOCYTES # BLD AUTO: 0 K/UL (ref 0–0.5)
IMM GRANULOCYTES NFR BLD AUTO: 1 % (ref 0–5)
LYMPHOCYTES # BLD: 0.3 K/UL (ref 0.5–4.6)
LYMPHOCYTES NFR BLD: 15 % (ref 13–44)
MAGNESIUM SERPL-MCNC: 2.2 MG/DL (ref 1.8–2.4)
MCH RBC QN AUTO: 29.7 PG (ref 26.1–32.9)
MCHC RBC AUTO-ENTMCNC: 31.6 G/DL (ref 31.4–35)
MCV RBC AUTO: 94.1 FL (ref 79.6–97.8)
MONOCYTES # BLD: 0.1 K/UL (ref 0.1–1.3)
MONOCYTES NFR BLD: 4 % (ref 4–12)
NEUTS SEG # BLD: 1.4 K/UL (ref 1.7–8.2)
NEUTS SEG NFR BLD: 78 % (ref 43–78)
NRBC # BLD: 0 K/UL (ref 0–0.2)
PHOSPHATE SERPL-MCNC: 2.1 MG/DL (ref 2.3–3.7)
PLATELET # BLD AUTO: 39 K/UL (ref 150–450)
PLATELET COMMENTS,PCOM: ABNORMAL
PMV BLD AUTO: 11.2 FL (ref 9.4–12.3)
POTASSIUM SERPL-SCNC: 4.3 MMOL/L (ref 3.5–5.1)
PROT SERPL-MCNC: 4.1 G/DL (ref 6.3–8.2)
RBC # BLD AUTO: 3.4 M/UL (ref 4.23–5.6)
RBC MORPH BLD: ABNORMAL
SODIUM SERPL-SCNC: 141 MMOL/L (ref 136–145)
STATUS OF UNIT,%ST: NORMAL
UNIT DIVISION, %UDIV: 0
WBC # BLD AUTO: 1.8 K/UL (ref 4.3–11.1)
WBC MORPH BLD: ABNORMAL

## 2022-03-23 PROCEDURE — 74011250637 HC RX REV CODE- 250/637: Performed by: INTERNAL MEDICINE

## 2022-03-23 PROCEDURE — 3331090001 HH PPS REVENUE CREDIT

## 2022-03-23 PROCEDURE — 3331090002 HH PPS REVENUE DEBIT

## 2022-03-23 PROCEDURE — 74011000250 HC RX REV CODE- 250: Performed by: INTERNAL MEDICINE

## 2022-03-23 PROCEDURE — APPSS60 APP SPLIT SHARED TIME 46-60 MINUTES: Performed by: NURSE PRACTITIONER

## 2022-03-23 PROCEDURE — 80053 COMPREHEN METABOLIC PANEL: CPT

## 2022-03-23 PROCEDURE — 99233 SBSQ HOSP IP/OBS HIGH 50: CPT | Performed by: INTERNAL MEDICINE

## 2022-03-23 PROCEDURE — 36591 DRAW BLOOD OFF VENOUS DEVICE: CPT

## 2022-03-23 PROCEDURE — 84100 ASSAY OF PHOSPHORUS: CPT

## 2022-03-23 PROCEDURE — 74011250637 HC RX REV CODE- 250/637: Performed by: NURSE PRACTITIONER

## 2022-03-23 PROCEDURE — 74011000258 HC RX REV CODE- 258: Performed by: INTERNAL MEDICINE

## 2022-03-23 PROCEDURE — 74011250636 HC RX REV CODE- 250/636: Performed by: NURSE PRACTITIONER

## 2022-03-23 PROCEDURE — 74011250636 HC RX REV CODE- 250/636: Performed by: INTERNAL MEDICINE

## 2022-03-23 PROCEDURE — 99233 SBSQ HOSP IP/OBS HIGH 50: CPT | Performed by: NURSE PRACTITIONER

## 2022-03-23 PROCEDURE — 65270000015 HC RM PRIVATE ONCOLOGY

## 2022-03-23 PROCEDURE — 85025 COMPLETE CBC W/AUTO DIFF WBC: CPT

## 2022-03-23 PROCEDURE — 83735 ASSAY OF MAGNESIUM: CPT

## 2022-03-23 PROCEDURE — 99232 SBSQ HOSP IP/OBS MODERATE 35: CPT | Performed by: INTERNAL MEDICINE

## 2022-03-23 PROCEDURE — 74011000250 HC RX REV CODE- 250: Performed by: NURSE PRACTITIONER

## 2022-03-23 PROCEDURE — 74011000250 HC RX REV CODE- 250: Performed by: EMERGENCY MEDICINE

## 2022-03-23 RX ORDER — HYDROMORPHONE HYDROCHLORIDE 1 MG/ML
0.5 INJECTION, SOLUTION INTRAMUSCULAR; INTRAVENOUS; SUBCUTANEOUS
Status: DISCONTINUED | OUTPATIENT
Start: 2022-03-23 | End: 2022-03-27

## 2022-03-23 RX ADMIN — PANTOPRAZOLE SODIUM 40 MG: 40 TABLET, DELAYED RELEASE ORAL at 08:00

## 2022-03-23 RX ADMIN — PROMETHAZINE HYDROCHLORIDE 25 MG: 25 TABLET ORAL at 04:54

## 2022-03-23 RX ADMIN — TAMSULOSIN HYDROCHLORIDE 0.4 MG: 0.4 CAPSULE ORAL at 08:00

## 2022-03-23 RX ADMIN — AMIODARONE HYDROCHLORIDE 400 MG: 200 TABLET ORAL at 18:02

## 2022-03-23 RX ADMIN — HYDROMORPHONE HYDROCHLORIDE 4 MG: 4 TABLET ORAL at 07:57

## 2022-03-23 RX ADMIN — HYDROMORPHONE HYDROCHLORIDE 0.5 MG: 1 INJECTION, SOLUTION INTRAMUSCULAR; INTRAVENOUS; SUBCUTANEOUS at 18:10

## 2022-03-23 RX ADMIN — ONDANSETRON 4 MG: 2 INJECTION INTRAMUSCULAR; INTRAVENOUS at 23:00

## 2022-03-23 RX ADMIN — I.V. FAT EMULSION 250 ML: 20 EMULSION INTRAVENOUS at 18:13

## 2022-03-23 RX ADMIN — SODIUM PHOSPHATE, MONOBASIC, MONOHYDRATE AND SODIUM PHOSPHATE, DIBASIC, ANHYDROUS: 276; 142 INJECTION, SOLUTION INTRAVENOUS at 12:09

## 2022-03-23 RX ADMIN — SODIUM CHLORIDE 5 MG: 9 INJECTION INTRAMUSCULAR; INTRAVENOUS; SUBCUTANEOUS at 11:03

## 2022-03-23 RX ADMIN — SODIUM CHLORIDE 5 MG: 9 INJECTION INTRAMUSCULAR; INTRAVENOUS; SUBCUTANEOUS at 19:51

## 2022-03-23 RX ADMIN — MORPHINE SULFATE 1 MG: 4 INJECTION INTRAVENOUS at 02:30

## 2022-03-23 RX ADMIN — FAMOTIDINE 20 MG: 20 TABLET, FILM COATED ORAL at 08:00

## 2022-03-23 RX ADMIN — AMIODARONE HYDROCHLORIDE 400 MG: 200 TABLET ORAL at 08:00

## 2022-03-23 RX ADMIN — SODIUM CHLORIDE, PRESERVATIVE FREE 10 ML: 5 INJECTION INTRAVENOUS at 22:00

## 2022-03-23 RX ADMIN — SODIUM CHLORIDE, PRESERVATIVE FREE 10 ML: 5 INJECTION INTRAVENOUS at 05:00

## 2022-03-23 RX ADMIN — SODIUM ACETATE: 164 INJECTION, SOLUTION, CONCENTRATE INTRAVENOUS at 18:13

## 2022-03-23 RX ADMIN — ONDANSETRON 4 MG: 2 INJECTION INTRAMUSCULAR; INTRAVENOUS at 02:38

## 2022-03-23 RX ADMIN — ONDANSETRON 4 MG: 2 INJECTION INTRAMUSCULAR; INTRAVENOUS at 18:08

## 2022-03-23 RX ADMIN — SODIUM CHLORIDE, PRESERVATIVE FREE 10 ML: 5 INJECTION INTRAVENOUS at 14:00

## 2022-03-23 RX ADMIN — FAMOTIDINE 20 MG: 20 TABLET, FILM COATED ORAL at 18:02

## 2022-03-23 RX ADMIN — HYDROMORPHONE HYDROCHLORIDE 0.5 MG: 1 INJECTION, SOLUTION INTRAMUSCULAR; INTRAVENOUS; SUBCUTANEOUS at 14:17

## 2022-03-23 RX ADMIN — HYDROMORPHONE HYDROCHLORIDE 0.5 MG: 1 INJECTION, SOLUTION INTRAMUSCULAR; INTRAVENOUS; SUBCUTANEOUS at 11:04

## 2022-03-23 RX ADMIN — SODIUM CHLORIDE 500 ML: 900 INJECTION, SOLUTION INTRAVENOUS at 22:00

## 2022-03-23 NOTE — PROGRESS NOTES
END OF SHIFT NOTE:    Patient Vitals for the past 12 hrs:   Temp Pulse Resp BP SpO2   03/23/22 0226 97.5 °F (36.4 °C) 90 20 (!) 142/80 96 %   03/22/22 2315 98.3 °F (36.8 °C) 88 18 (!) 105/54 97 %   03/22/22 1855 97.2 °F (36.2 °C) 73 17 (!) 91/47 97 %   03/22/22 1626 98 °F (36.7 °C) 88 17 108/63 97 %     -pt given phenergan 1x  -pt given zofran 1x  -pt given dilaudid PO 1x  -pt given morphine 1x  -pt resting in bed  -no needs at this time

## 2022-03-23 NOTE — PROGRESS NOTES
Problem: Falls - Risk of  Goal: *Absence of Falls  Description: Document Eliezer Catrina Fall Risk and appropriate interventions in the flowsheet.   Outcome: Progressing Towards Goal  Note: Fall Risk Interventions:  Mobility Interventions: Communicate number of staff needed for ambulation/transfer,Patient to call before getting OOB    Mentation Interventions: Adequate sleep, hydration, pain control,More frequent rounding,Reorient patient,Room close to nurse's station    Medication Interventions: Evaluate medications/consider consulting pharmacy,Patient to call before getting OOB    Elimination Interventions: Call light in reach,Urinal in reach    History of Falls Interventions: Evaluate medications/consider consulting pharmacy,Investigate reason for fall,Room close to nurse's station

## 2022-03-23 NOTE — PROGRESS NOTES
END OF SHIFT NOTE:    Intake/Output  No intake/output data recorded. Voiding: YES  Catheter: NO  Drain:   Pleural Catheter/Drain 03/22/22 15.5 fr x 66 cm Right (Active)   Site Assessment Clean, dry, & intact 03/23/22 0758               Stool:  1 occurrences. Stool Assessment  Stool Color: Franchesca Dys (03/23/22 0452)  Stool Appearance: Soft (03/23/22 0452)  Stool Amount: Medium (03/23/22 0452)  Stool Source/Status: Rectum (03/23/22 0452)    Emesis:  0 occurrences. VITAL SIGNS  Patient Vitals for the past 12 hrs:   Temp Pulse Resp BP SpO2   03/23/22 1531 98.3 °F (36.8 °C) 95 18 103/75 93 %   03/23/22 1107 (!) 96.3 °F (35.7 °C) 86 19 105/76 98 %   03/23/22 0742 97.8 °F (36.6 °C) 60 22 110/60 97 %       Pain Assessment  Pain 1  Pain Scale 1: Numeric (0 - 10) (03/23/22 1810)  Pain Intensity 1: 8 (03/23/22 1810)  Patient Stated Pain Goal: 0 (03/23/22 0301)  Pain Reassessment 1: Yes (03/23/22 1500)  Pain Onset 1: pta (03/21/22 2046)  Pain Location 1: Abdomen (03/23/22 1810)  Pain Orientation 1: Right (03/23/22 0230)  Pain Description 1: Aching (03/23/22 1810)  Pain Intervention(s) 1: Medication (see MAR) (03/23/22 1810)    Ambulating  Yes    Additional Information:     Shift report given to oncoming nurse at the bedside.     Tiffani Pantoja, RN

## 2022-03-23 NOTE — PROGRESS NOTES
CM following pt for discharge needs. Palliative consult completed. Pain medication adjusted for pt's continued hypotension. Prior CM has intiated home TPN infusion with [facility]. CM will finalize referral at discharge. PT/OT recommending STR to [facility]. CM will continue to follow recommendations for supportive care needs. CM remains available for any new discharge needs. Addendum: 1600   CM followed up PT/OT recommendations with pt. Pt states he'd prefer to wait to decide on supportive care. PPD completed.

## 2022-03-23 NOTE — PROGRESS NOTES
[facility] CARDIOLOGY PROGRESS NOTE           3/23/2022 8:23 AM    Admit Date: 3/15/2022      Subjective:   Patient feels poorly. Feels weak. Less SVT with starting amiodarone. No palpitations or tachycardia. ROS:  Cardiovascular:  As noted above    Objective:      Vitals:    03/22/22 2315 03/23/22 0226 03/23/22 0742 03/23/22 1107   BP: (!) 105/54 (!) 142/80 110/60 105/76   Pulse: 88 90 60 86   Resp: 18 20 22 19   Temp: 98.3 °F (36.8 °C) 97.5 °F (36.4 °C) 97.8 °F (36.6 °C) (!) 96.3 °F (35.7 °C)   SpO2: 97% 96% 97% 98%   Weight:       Height:           Physical Exam:  General-No Acute Distress  Neck- supple, no JVD  CV- regular rate and rhythm with ectopy  Lung- clear bilaterally  Abd- soft, nontender, nondistended  Ext- trivial edema bilaterally. Skin- warm and dry      Data Review:   Recent Labs     03/23/22  0231 03/22/22  0314    140   K 4.3 4.2   MG 2.2 2.4   BUN 20 17   CREA 0.30* 0.50*   * 138*   WBC 1.8* 2.4*   HGB 10.1* 10.2*   HCT 32.0* 31.5*   PLT 39* 28*      No results found for: STONEY Bertrand     Echo (3/20/22):   Left Ventricle: Left ventricle size is normal. Mildly increased wall thickness. Findings consistent with mild concentric hypertrophy. Severe apical hypokinesis noted extending to the mid anteroseptal walls. Moderately reduced left ventricular systolic function with a visually estimated EF of 35 - 40%. Grade I diastolic dysfunction with normal LAP.   Mitral Valve: Mild annular calcification of the mitral valve. Mild transvalvular regurgitation.   Left Atrium: Left atrium is mildly dilated.   Left pleural effusion.   Technical qualifiers: Echo study was technically difficult with poor endocardial visualization.   Contrast used: Definity. No Apical thrombus noted. Assessment/Plan:     Principal Problem:    Hypotension (3/15/2022)  Continue to follow. Will need to monitor volume status closely.      Active Problems:    Malignant ne[facility] of pancreas ([facility] (1/5/2021)  Defer to primary team.  Please note LV dysfunction which has not been diagnosed in the past.      Severe protein-calorie malnutrition ([facility] [address] (3/4/2022)  Encourage oral intake      Bradycardia (3/19/2022)  Pulse rate was incorrectly recorded due to frequent ectopy. No bradycardia has been noted with telemetry. SVT:   Limited options. Started on amiodarone. Continue telemetry. LBBB (left bundle branch block) (3/19/2022)  Noted. LV dysfunction as outlined below      Chronic systolic congestive heart failure ([facility] (3/21/2022)  Moderate LV dysfunction. Blood pressure limits titration of medical therapy but would consider adding beta-blockers if blood pressure improves. Assess volume status daily.                    Serg Salamanca MD  3/23/2022 8:23 AM

## 2022-03-23 NOTE — PROGRESS NOTES
Comprehensive Nutrition Assessment    Type and Reason for Visit: Reassess  TPN Management (oncology)    Nutrition Recommendations/Plan:   Parenteral Nutrition:  Total parenteral nutrition  to begin at 1800  Continue: Dex 14%, 8% AA 1.56 L (65ml/hr)   Continue 250 ml 20% lipids daily  Lytes/L:   Sodium 100 meq (20 meq NaCl, 65 meq NaAcetate, 15 meq NaPhos), Potassium 25 meq (25 meq KPO4), 10 meq Mg, 4.5 meq Calcium (toatl phos from K and Na 40 meq/L)  Other additives: MTE, MVI due to national shortages  Formula will ~1:1 Cl:Acetate  Nutritional Supplement Therapy:   Active electrolyte replacement per nutrition support protocols  Replacement indicated: Active  Labs:   BMP daily  Mg MWF  Phos MWF    POC Glucoses/SSI Not indicated     Malnutrition Assessment:  Malnutrition Status: Severe malnutrition  Context: Chronic illness  Findings of clinical characteristics of malnutrition:   Energy Intake:  7 - 75% or less est energy requirements for 1 month or longer  Weight Loss:  7.0 - Greater than 7.5% over 3 months (34# (18.4%) )     Body Fat Loss:  1 - Mild body fat loss, Buccal region,Orbital,Triceps   Muscle Mass Loss:  1 - Mild muscle mass loss, Calf (gastrocnemius),Hand (interosseous),Scapula (trapezius),Temples (temporalis)  Fluid Accumulation:  No significant fluid accumulation,     Strength:  Not performed     Nutrition Assessment:   Nutrition History: Per RD assessment patient was able to maintain PO intake and UBW throughout most of chemo. During admmission early March patient had \"stopped eating\" due to nausea. Upon assessment this admission patient reports no PO of foods for ~4 weeks. States that he has been able to tolerate some fluids. He reports continued nausea and vomiting as primary barrier. Nutrition Background: Patient with pancreatic and esophageal cancer, Amos's esophagus, obesity, HTN, GERD, gastritis, ascites and peritoneal carcinomatosis s/p Plurex drain.  He presented to oncology office extremely sick, BP was unable to be measured. He was admitted directly to [facility]. Nutrition Interval:  Abdominal pleurex 3/22: 2250 ml removed. Visit with pt, [place] and son at bedside. He reports eating bites of mashed potatoes. C/o early satiety, no appetite, + abdominal fullness. Remains TPN dependent d/t peritoneal carcinomatosis, severe malnutrition. Abdominal Status (last documented): Ascites,Distended abdomen with Active  bowel sounds. Last BM 03/23/22. Pertinent Medications: pepcid, protonix  PRN last administered: zofran 3/23 0238; compazine 3/23 1103, phenergan 3/23 0454  Pertinent Labs:   Lab Results   Component Value Date/Time    Sodium 141 03/23/2022 02:31 AM    Potassium 4.3 03/23/2022 02:31 AM    Chloride 110 (H) 03/23/2022 02:31 AM    CO2 28 03/23/2022 02:31 AM    Anion gap 3 (L) 03/23/2022 02:31 AM    Glucose 123 (H) 03/23/2022 02:31 AM    BUN 20 03/23/2022 02:31 AM    Creatinine 0.30 (L) 03/23/2022 02:31 AM    Calcium 7.6 (L) 03/23/2022 02:31 AM    Albumin 1.1 (L) 03/23/2022 02:31 AM    Magnesium 2.2 03/23/2022 02:31 AM    Phosphorus 2.1 (L) 03/23/2022 02:31 AM     Lab are remarkable for increase in Na, K, CL, CO2 trending down, glu mildly elevated, depleted phos, Mg trending down. TPN formulated 1.5:1 Cl:Acetate ratio 3/22. Nutrition Related Findings:   Port in place, also with 1 PIV. TPN intiated 3/16. TPN increased in volume 3/19.  TPN to be concentrated and volume decreased 3/22 per NP request.      Current Nutrition Therapies:  ADULT DIET Regular  Current Parenteral Nutrition Orders:  · Type and Formula: Dex 14%, 8% AA    · Lipids: 250ml,Daily  · Duration: Continuous  · Rate/Volume: 1.56 L (65ml/hr)  · Current PN Order Provides: infusing as ordered  · Goal PN Orders Provides: 1742 kcal/d (100% of needs), 125 grams of protein/d (100% of needs), 218 grams of CHO/d and 1810 ml of total volume/d      Current Intake:   Average Meal Intake: 1-25% Average Supplement Intake: Refusing to take Anthropometric Measures:  Height: 5' 6\" (167.6 cm)  Current Body Wt: 77.6 kg (171 lb 1.2 oz) (3/22), Weight source: Bed scale  BMI: 27.6,  (current BMI skewed by fluid)  Admission Body Weight: 148 lb 9.4 oz  Ideal Body Weight (lbs) (Calculated): 142 lbs (65 kg), 104.6 %  Usual Body Wt: 82.6 kg (182 lb) (12/14/21 office wt and per previous history), Percent weight change: -18.4          Edema: No data recorded   Estimated Daily Nutrient Needs:  Energy (kcal/day): 7902-9249 (Kcal/kg (25-30), Weight Used: Current (67.4 kg (3/15))  Protein (g/day):  (1.3-1.5 g/kg) Weight Used: (Admission (67.3 kg))  Fluid (ml/day):   (1 ml/kcal)    Nutrition Diagnosis:   · Inadequate oral intake related to altered GI function,early satiety (peritoneal carcinomatosis ) as evidenced by  (reports barriers to PO, recall, wt loss)    · Severe malnutrition related to catabolic illness as evidenced by  (malnutrition criteria as above)    Nutrition Interventions:   Food and/or Nutrient Delivery: Continue NPO,Modify parenteral nutrition,Continue current diet     Coordination of Nutrition Care: Continue to monitor while inpatient    Goals:   Previous Goal Met: Goal(s) achieved  Active Goal: Continue to toleate TPN at goal    Nutrition Monitoring and Evaluation:      Food/Nutrient Intake Outcomes: Parenteral nutrition intake/tolerance,Food and nutrient intake  Physical Signs/Symptoms Outcomes: Biochemical data,GI status,Fluid status or edema,Weight    Discharge Planning:     Too soon to determine (likely TPN)    Bobby Johnson, MA, RD, LDN   Contact: [phone]

## 2022-03-23 NOTE — PROGRESS NOTES
Palliative Care Progress Note    Patient: Mary Beth Coffey MRN: [id number]  SSN: [id number]    YOB: 1955  Age: 78 y.o. Sex: male       Assessment/Plan:     Chief Complaint/Interval History: reports abdominal pain and nausea       Principal Diagnosis:    · Pain, abdomen  R10.9    Additional Diagnoses:   · Debility, Unspecified  R53.81  · Frailty  R54  · Nausea/Vomiting  R11.2  · Counseling, Encounter for Medical Advice  Z71.9  · Encounter for Palliative Care  Z51.5    Palliative Performance Scale (PPS)       Medical Decision Making:   Reviewed and summarized notes over last 24 hours   Discussed case with appropriate providers- Maribell James RN  Reviewed laboratory and x-ray data over last 24 hours     Pt sitting in recliner, appears uncomfortable. Visitor at bedside. Pt reports abdominal pain and nausea at present. He states he feels to nauseated to take oral medication, and requests dose of IV Morphine. Discussed with Maribell James RN. Pt had some hypotension earlier, and Morphine tends to worsen hypotension. Will discontinue Morphine IV, and start Dilaudid 0.5 mg IV q 3 hours PRN. Will also provide Compazine 5 mg IV q 6 hours PRN. Will continue to follow. Will discuss findings with members of the interdisciplinary team.         More than 50% of this 35 minute visit was spent counseling and coordination of care as outlined above. Subjective:     Review of Systems:  A comprehensive review of systems was negative except for:   Constitutional: Positive for fatigue. Gastrointestinal: Positive for abdominal pain, nausea      Objective:     Visit Vitals  /60 (BP 1 Location: Left upper arm, BP Patient Position: Lying right side)   Pulse 60   Temp 97.8 °F (36.6 °C)   Resp 22   Ht 5' 6\" (1.676 m)   Wt 171 lb (77.6 kg)   SpO2 97%   BMI 27.60 kg/m²       Physical Exam:    General:  Cooperative. Looks uncomfortable. Eyes:  Conjunctivae/corneas clear    Nose: Nares normal. Septum midline.    Neck: Supple, symmetrical, trachea midline   Lungs:   Clear to auscultation bilaterally, unlabored   Heart:  Regular rate and rhythm   Abdomen:   Soft, mildly tender, non-distended.  Pleurx catheter   Extremities: Normal, atraumatic, no cyanosis or edema   Skin: Skin color, texture, turgor normal.    Neurologic: Nonfocal   Psych: Alert and oriented     Signed By: Jalen Viera NP     March 23, 2022

## 2022-03-24 ENCOUNTER — APPOINTMENT (OUTPATIENT)
Dept: GENERAL RADIOLOGY | Age: 67
DRG: 435 | End: 2022-03-24
Attending: NURSE PRACTITIONER
Payer: MEDICARE

## 2022-03-24 LAB
ALBUMIN SERPL-MCNC: 1.1 G/DL (ref 3.2–4.6)
ALBUMIN/GLOB SERPL: 0.3 {RATIO} (ref 1.2–3.5)
ALP SERPL-CCNC: 337 U/L (ref 50–136)
ALT SERPL-CCNC: 43 U/L (ref 12–65)
ANION GAP SERPL CALC-SCNC: 7 MMOL/L (ref 7–16)
AST SERPL-CCNC: 53 U/L (ref 15–37)
BILIRUB SERPL-MCNC: 0.5 MG/DL (ref 0.2–1.1)
BLASTS NFR BLD MANUAL: 1 %
BUN SERPL-MCNC: 23 MG/DL (ref 8–23)
CALCIUM SERPL-MCNC: 7.9 MG/DL (ref 8.3–10.4)
CHLORIDE SERPL-SCNC: 106 MMOL/L (ref 98–107)
CO2 SERPL-SCNC: 26 MMOL/L (ref 21–32)
CREAT SERPL-MCNC: 0.4 MG/DL (ref 0.8–1.5)
DIFFERENTIAL METHOD BLD: ABNORMAL
ERYTHROCYTE [DISTWIDTH] IN BLOOD BY AUTOMATED COUNT: 15.3 % (ref 11.9–14.6)
GLOBULIN SER CALC-MCNC: 3.3 G/DL (ref 2.3–3.5)
GLUCOSE SERPL-MCNC: 142 MG/DL (ref 65–100)
HCT VFR BLD AUTO: 33.1 % (ref 41.1–50.3)
HGB BLD-MCNC: 10.4 G/DL (ref 13.6–17.2)
LYMPHOCYTES # BLD: 0.2 K/UL (ref 0.5–4.6)
LYMPHOCYTES NFR BLD MANUAL: 13 % (ref 16–44)
MAGNESIUM SERPL-MCNC: 2.1 MG/DL (ref 1.8–2.4)
MCH RBC QN AUTO: 29.6 PG (ref 26.1–32.9)
MCHC RBC AUTO-ENTMCNC: 31.4 G/DL (ref 31.4–35)
MCV RBC AUTO: 94.3 FL (ref 79.6–97.8)
MONOCYTES # BLD: 0.1 K/UL (ref 0.1–1.3)
MONOCYTES NFR BLD MANUAL: 7 % (ref 3–9)
NEUTS SEG # BLD: 1.3 K/UL (ref 1.7–8.2)
NEUTS SEG NFR BLD MANUAL: 79 % (ref 47–75)
NRBC # BLD: 0 K/UL (ref 0–0.2)
PLATELET # BLD AUTO: 23 K/UL (ref 150–450)
PLATELET COMMENTS,PCOM: ABNORMAL
PMV BLD AUTO: 13.8 FL (ref 9.4–12.3)
POTASSIUM SERPL-SCNC: 4 MMOL/L (ref 3.5–5.1)
PROT SERPL-MCNC: 4.4 G/DL (ref 6.3–8.2)
RBC # BLD AUTO: 3.51 M/UL (ref 4.23–5.6)
RBC MORPH BLD: ABNORMAL
SODIUM SERPL-SCNC: 139 MMOL/L (ref 138–145)
WBC # BLD AUTO: 1.7 K/UL (ref 4.3–11.1)
WBC MORPH BLD: ABNORMAL

## 2022-03-24 PROCEDURE — 99232 SBSQ HOSP IP/OBS MODERATE 35: CPT | Performed by: INTERNAL MEDICINE

## 2022-03-24 PROCEDURE — 99233 SBSQ HOSP IP/OBS HIGH 50: CPT | Performed by: NURSE PRACTITIONER

## 2022-03-24 PROCEDURE — 74011250636 HC RX REV CODE- 250/636: Performed by: NURSE PRACTITIONER

## 2022-03-24 PROCEDURE — 74011000250 HC RX REV CODE- 250: Performed by: NURSE PRACTITIONER

## 2022-03-24 PROCEDURE — 71045 X-RAY EXAM CHEST 1 VIEW: CPT

## 2022-03-24 PROCEDURE — 74011250637 HC RX REV CODE- 250/637: Performed by: INTERNAL MEDICINE

## 2022-03-24 PROCEDURE — 74011000258 HC RX REV CODE- 258: Performed by: INTERNAL MEDICINE

## 2022-03-24 PROCEDURE — 74011000250 HC RX REV CODE- 250: Performed by: INTERNAL MEDICINE

## 2022-03-24 PROCEDURE — 74011250636 HC RX REV CODE- 250/636: Performed by: INTERNAL MEDICINE

## 2022-03-24 PROCEDURE — 97530 THERAPEUTIC ACTIVITIES: CPT

## 2022-03-24 PROCEDURE — 99233 SBSQ HOSP IP/OBS HIGH 50: CPT | Performed by: INTERNAL MEDICINE

## 2022-03-24 PROCEDURE — 83735 ASSAY OF MAGNESIUM: CPT

## 2022-03-24 PROCEDURE — 3331090002 HH PPS REVENUE DEBIT

## 2022-03-24 PROCEDURE — 74011250637 HC RX REV CODE- 250/637: Performed by: NURSE PRACTITIONER

## 2022-03-24 PROCEDURE — 74011000250 HC RX REV CODE- 250: Performed by: EMERGENCY MEDICINE

## 2022-03-24 PROCEDURE — 80053 COMPREHEN METABOLIC PANEL: CPT

## 2022-03-24 PROCEDURE — 65270000015 HC RM PRIVATE ONCOLOGY

## 2022-03-24 PROCEDURE — 3331090001 HH PPS REVENUE CREDIT

## 2022-03-24 PROCEDURE — APPSS45 APP SPLIT SHARED TIME 31-45 MINUTES: Performed by: NURSE PRACTITIONER

## 2022-03-24 PROCEDURE — 85025 COMPLETE CBC W/AUTO DIFF WBC: CPT

## 2022-03-24 PROCEDURE — 99356 PR PROLONGED SVC I/P OR OBS SETTING 1ST HOUR: CPT | Performed by: NURSE PRACTITIONER

## 2022-03-24 PROCEDURE — 36591 DRAW BLOOD OFF VENOUS DEVICE: CPT

## 2022-03-24 RX ORDER — HYDROCODONE BITARTRATE AND ACETAMINOPHEN 5; 325 MG/1; MG/1
2 TABLET ORAL
Status: DISCONTINUED | OUTPATIENT
Start: 2022-03-24 | End: 2022-03-25

## 2022-03-24 RX ORDER — HYDROCODONE BITARTRATE AND ACETAMINOPHEN 5; 325 MG/1; MG/1
1 TABLET ORAL
Status: DISCONTINUED | OUTPATIENT
Start: 2022-03-24 | End: 2022-03-24 | Stop reason: SDUPTHER

## 2022-03-24 RX ADMIN — FAMOTIDINE 20 MG: 20 TABLET, FILM COATED ORAL at 17:05

## 2022-03-24 RX ADMIN — DIPHENOXYLATE HYDROCHLORIDE AND ATROPINE SULFATE 1 TABLET: 2.5; .025 TABLET ORAL at 05:40

## 2022-03-24 RX ADMIN — TAMSULOSIN HYDROCHLORIDE 0.4 MG: 0.4 CAPSULE ORAL at 08:56

## 2022-03-24 RX ADMIN — I.V. FAT EMULSION 250 ML: 20 EMULSION INTRAVENOUS at 17:22

## 2022-03-24 RX ADMIN — SODIUM CHLORIDE, PRESERVATIVE FREE 10 ML: 5 INJECTION INTRAVENOUS at 06:00

## 2022-03-24 RX ADMIN — AMIODARONE HYDROCHLORIDE 400 MG: 200 TABLET ORAL at 17:05

## 2022-03-24 RX ADMIN — SODIUM ACETATE: 164 INJECTION, SOLUTION, CONCENTRATE INTRAVENOUS at 17:22

## 2022-03-24 RX ADMIN — AMIODARONE HYDROCHLORIDE 400 MG: 200 TABLET ORAL at 08:57

## 2022-03-24 RX ADMIN — HYDROMORPHONE HYDROCHLORIDE 4 MG: 4 TABLET ORAL at 09:13

## 2022-03-24 RX ADMIN — FAMOTIDINE 20 MG: 20 TABLET, FILM COATED ORAL at 08:57

## 2022-03-24 RX ADMIN — SODIUM CHLORIDE 5 MG: 9 INJECTION INTRAMUSCULAR; INTRAVENOUS; SUBCUTANEOUS at 05:39

## 2022-03-24 RX ADMIN — HYDROMORPHONE HYDROCHLORIDE 0.5 MG: 1 INJECTION, SOLUTION INTRAMUSCULAR; INTRAVENOUS; SUBCUTANEOUS at 17:15

## 2022-03-24 RX ADMIN — SODIUM CHLORIDE 5 MG: 9 INJECTION INTRAMUSCULAR; INTRAVENOUS; SUBCUTANEOUS at 17:20

## 2022-03-24 RX ADMIN — HYDROMORPHONE HYDROCHLORIDE 0.5 MG: 1 INJECTION, SOLUTION INTRAMUSCULAR; INTRAVENOUS; SUBCUTANEOUS at 11:35

## 2022-03-24 RX ADMIN — PANTOPRAZOLE SODIUM 40 MG: 40 TABLET, DELAYED RELEASE ORAL at 08:57

## 2022-03-24 RX ADMIN — PROMETHAZINE HYDROCHLORIDE 25 MG: 25 TABLET ORAL at 02:36

## 2022-03-24 RX ADMIN — LOPERAMIDE HYDROCHLORIDE 2 MG: 2 CAPSULE ORAL at 09:13

## 2022-03-24 RX ADMIN — LOPERAMIDE HYDROCHLORIDE 2 MG: 2 CAPSULE ORAL at 02:36

## 2022-03-24 RX ADMIN — SODIUM CHLORIDE, PRESERVATIVE FREE 10 ML: 5 INJECTION INTRAVENOUS at 14:00

## 2022-03-24 NOTE — PROGRESS NOTES
[facility] CARDIOLOGY PROGRESS NOTE           3/24/2022 8:06 AM    Admit Date: 3/15/2022         Subjective: Last run of SVT was at 22:00 yesterday. Continues to feel poorly. ROS:  Cardiovascular:  As noted above    Objective:      Vitals:    03/24/22 0252 03/24/22 0309 03/24/22 0510 03/24/22 0751   BP: 119/74 (!) 104/59 109/75 (!) 104/56   Pulse: (!) 101 65  (!) 52   Resp:  21 22   Temp:  97.4 °F (36.3 °C)  99.6 °F (37.6 °C)   SpO2: 96% 98%  97%   Weight:       Height:           On telemetry: sinus tach      Physical Exam:  General-No Acute Distress  Neck- supple, no JVD  CV- regular rate and rhythm with ectopy  Lung- clear bilaterally  Abd- soft, nontender, nondistended  Ext- trivial edema bilaterally. Skin- warm and dry      Data Review:   Recent Labs     03/24/22  0222 03/23/22  0231    141   K 4.0 4.3   MG 2.1 2.2   BUN 23 20   CREA 0.40* 0.30*   * 123*   WBC 1.7* 1.8*   HGB 10.4* 10.1*   HCT 33.1* 32.0*   PLT 23* 39*       No results for input(s): TNIPOC, TROIQ in the last 72 hours.         Assessment/Plan:     Principal Problem:    Hypotension (3/15/2022)    Active Problems:    Malignant neoplasm of body of pancreas ([address] (1/5/2021)      Severe protein-calorie malnutrition ([address] (3/4/2022)      FTT (failure to thrive) in adult (3/15/2022)      Bradycardia (3/19/2022)      LBBB (left bundle branch block) (3/19/2022)      Chronic systolic congestive heart failure ([address] (3/21/2022)    A/P  1) SVT - continue amiodarone, continue tele  2) Pancreatic cancer - per primary team  3) Heart failure - lvef 35-40% - bp is limiting therapy for heart failure      Ricky Morales MD  3/24/2022 8:06 AM

## 2022-03-24 NOTE — PROGRESS NOTES
Comprehensive Nutrition Assessment    Type and Reason for Visit: Reassess  TPN Management (oncology)    Nutrition Recommendations/Plan:   Parenteral Nutrition:  Total parenteral nutrition  to begin at 1800  Continue: Dex 14%, 8% AA 1.56 L (65ml/hr)   Continue 250 ml 20% lipids daily  Lytes/L: No changes to lytes today. Sodium 100 meq (20 meq NaCl, 65 meq NaAcetate, 15 meq NaPhos), Potassium 25 meq (25 meq KPO4), 10 meq Mg, 4.5 meq Calcium (toatl phos from K and Na 40 meq/L)  Other additives: MTE, MVI due to national shortages  Formula will ~1:1 Cl:Acetate  Nutritional Supplement Therapy:   Active electrolyte replacement per nutrition support protocols  Replacement indicated: Active  Labs:   BMP daily  Mg MWF  Phos MWF    POC Glucoses/SSI Not indicated     Malnutrition Assessment:  Malnutrition Status: Severe malnutrition  Context: Chronic illness  Findings of clinical characteristics of malnutrition:   Energy Intake:  7 - 75% or less est energy requirements for 1 month or longer  Weight Loss:  7.0 - Greater than 7.5% over 3 months (34# (18.4%) )     Body Fat Loss:  1 - Mild body fat loss, Buccal region,Orbital,Triceps   Muscle Mass Loss:  1 - Mild muscle mass loss, Calf (gastrocnemius),Hand (interosseous),Scapula (trapezius),Temples (temporalis)  Fluid Accumulation:  No significant fluid accumulation,     Strength:  Not performed     Nutrition Assessment:   Nutrition History: Per RD assessment patient was able to maintain PO intake and UBW throughout most of chemo. During admmission early March patient had \"stopped eating\" due to nausea. Upon assessment this admission patient reports no PO of foods for ~4 weeks. States that he has been able to tolerate some fluids. He reports continued nausea and vomiting as primary barrier. Nutrition Background: Patient with pancreatic and esophageal cancer, Amos's esophagus, obesity, HTN, GERD, gastritis, ascites and peritoneal carcinomatosis s/p Plurex drain.  He presented to oncology office extremely sick, BP was unable to be measured. He was admitted directly to [facility]. Nutrition Interval:  PO intake continues to limited due to early satiety and persistent nausea and likely secondary to peritoneal carcinomatosis, recurrent ascites with Plurex drain. Patient has demonstrated inability to meet needs orally with severe weight loss and malnutrition as above. Intake trends since admission reveal daily PO tolerance of ~1 partial meal per day which is not sufficient to sustain weight, functional status, or life. Abdominal pleurex replaced 3/22: 2250 ml removed. Remains TPN dependent d/t peritoneal carcinomatosis, severe malnutrition. Visit with pt and Sulema Grande RN at bedside, TPN to resume pending access. Pt reports he desires to get better and get home. Expressed concern to RN that TPN is causing diarrhea, physiology explained to pt. Abdominal Status (last documented): Intact,Other (comment) (pain /drain intact ) abdomen with Active  bowel sounds. Last BM 03/23/22. Pertinent Medications: pepcid, protonix, 500 ml NS bolus 3/23  PRN last administered: lomotil 3/24 0540; imodium 3/24 0913; zofran 3/23 2300; compazine 3/24 0913; phenergan 3/24 0236  Electrolyte replacement: 3/23: 20 mmol NaPhos  Pertinent Labs:   Lab Results   Component Value Date/Time    Sodium 139 03/24/2022 02:22 AM    Potassium 4.0 03/24/2022 02:22 AM    Chloride 106 03/24/2022 02:22 AM    CO2 26 03/24/2022 02:22 AM    Anion gap 7 03/24/2022 02:22 AM    Glucose 142 (H) 03/24/2022 02:22 AM    BUN 23 03/24/2022 02:22 AM    Creatinine 0.40 (L) 03/24/2022 02:22 AM    Calcium 7.9 (L) 03/24/2022 02:22 AM    Albumin 1.1 (L) 03/24/2022 02:22 AM    Magnesium 2.1 03/24/2022 02:22 AM    Phosphorus 2.1 (L) 03/23/2022 02:31 AM     Lab are remarkable for decrease in Na, K, Cl (now WNL) and CO2. mildly elevated gluc persists,  Mg continues with downward trend (remains mid range).       TPN formulated 1.5:1 Cl:Acetate ratio 3/22, ~1:1 Cl:Acetate ratio 3/23. Nutrition Related Findings:   Port in place, also with 1 PIV. TPN intiated 3/16. TPN increased in volume 3/19.  TPN to be concentrated and volume decreased 3/22 per NP request.      Current Nutrition Therapies:  ADULT DIET Regular  Current Parenteral Nutrition Orders:  · Type and Formula: Dex 14%, 8% AA    · Lipids: 250ml,Daily  · Duration: Continuous  · Rate/Volume: 1.56 L (65ml/hr)  · Current PN Order Provides: held since ~750 secondary to port access  · Goal PN Orders Provides: 1742 kcal/d (100% of needs), 125 grams of protein/d (100% of needs), 218 grams of CHO/d and 1810 ml of total volume/d      Current Intake:   Average Meal Intake:  (continues w bites per pt recall) Average Supplement Intake: None ordered      Anthropometric Measures:  Height: 5' 6\" (167.6 cm)  Current Body Wt: 78.6 kg (173 lb 4.5 oz) (3/23), Weight source: Bed scale  BMI: 28,  (current BMI skewed by fluid)  Admission Body Weight: 148 lb 9.4 oz  Ideal Body Weight (lbs) (Calculated): 142 lbs (65 kg), 104.6 %  Usual Body Wt: 82.6 kg (182 lb) (12/14/21 office wt and per previous history), Percent weight change: -18.4          Edema: No data recorded   Estimated Daily Nutrient Needs:  Energy (kcal/day): 3476-3414 (Kcal/kg (25-30), Weight Used: Current (67.4 kg (3/15))  Protein (g/day):  (1.3-1.5 g/kg) Weight Used: (Admission (67.3 kg))  Fluid (ml/day):   (1 ml/kcal)    Nutrition Diagnosis:   · Inadequate oral intake related to altered GI function,early satiety (peritoneal carcinomatosis ) as evidenced by  (poor oral tolerance, wt loss, requires TPN for primary needs)    · Severe malnutrition related to catabolic illness as evidenced by  (malnutrition criteria as above)    Nutrition Interventions:   Food and/or Nutrient Delivery: Continue current diet,Modify parenteral nutrition     Coordination of Nutrition Care: Continue to monitor while inpatient    Goals:   Previous Goal Met: Goal(s) achieved  Active Goal: Continue to toleate TPN at goal    Nutrition Monitoring and Evaluation:      Food/Nutrient Intake Outcomes: Parenteral nutrition intake/tolerance,Food and nutrient intake  Physical Signs/Symptoms Outcomes: Biochemical data,GI status,Fluid status or edema,Weight    Discharge Planning:    Parenteral nutrition    April Lion MA, RD, LDN   Contact: [phone]

## 2022-03-24 NOTE — PROGRESS NOTES
[facility] Hematology & Oncology        Inpatient Hematology / Oncology Progress Note      Admission Date: 3/15/2022 10:28 AM  Reason for Admission/Hospital Course: FTT (failure to thrive) in adult [R62.7]  Hypotension [I95.9]      24 Hour Events:  Afebrile, hypotensive at times  On TPN  S/p C1D1 Bert[place]/abraxane on 3/17   D8 due today, holding off d/t cytopenias  C/o diarrhea  Family at bedside    Transfusions: None  Replacements: None    ROS:  Constitutional: +weakness/fatigue. Negative for fever, chills. CV: Negative for chest pain, palpitations, edema. Respiratory: Negative for dyspnea, cough, wheezing. GI: +N/V (better), diarrhea. Negative for abdominal pain. 10 point review of systems is otherwise negative with the exception of the elements mentioned above in the HPI. No Known Allergies    OBJECTIVE:  Patient Vitals for the past 8 hrs:   BP Temp Pulse Resp SpO2   22 0914 (!) 90/56 -- -- -- --   22 0751 (!) 104/56 99.6 °F (37.6 °C) (!) 52 22 97 %   22 0510 109/75 -- -- -- --   22 0309 (!) 104/59 97.4 °F (36.3 °C) 65 21 98 %   22 0252 119/74 -- (!) 101 -- 96 %     Temp (24hrs), Av.9 °F (36.6 °C), Min:96.3 °F (35.7 °C), Max:99.6 °F (37.6 °C)     0701 -  1900  In: -   Out: 100 [Urine:100]    Physical Exam:  Constitutional: Thin, cachectic elderly male in no acute distress, lying comfortably in the hospital bed. HEENT: Normocephalic and atraumatic. Oropharynx is clear, mucous membranes are moist. Extraocular muscles are intact. Sclerae anicteric. Neck supple without JVD. No thyromegaly present. Skin +R gluteal hematoma/lipoma palpated. Warm and dry. No bruising and no rash noted. No erythema. No pallor. Respiratory Lungs are clear to auscultation bilaterally without wheezes, rales or rhonchi, normal air exchange without accessory muscle use. CVS Normal rate, regular rhythm and normal S1 and S2. No murmurs, gallops, or rubs.    Abdomen Soft, nontender and distended, normoactive bowel sounds. +Abd pleurx   Neuro Grossly nonfocal with no obvious sensory or motor deficits. MSK Normal range of motion in general.  No edema and no tenderness. Psych Appropriate mood and affect. Labs:      Recent Labs     03/24/22 0222 03/23/22 0231 03/22/22 0314   WBC 1.7* 1.8* 2.4*   RBC 3.51* 3.40* 3.38*   HGB 10.4* 10.1* 10.2*   HCT 33.1* 32.0* 31.5*   MCV 94.3 94.1 93.2   MCH 29.6 29.7 30.2   MCHC 31.4 31.6 32.4   RDW 15.3* 15.3* 15.6*   PLT 23* 39* 28*   GRANS  --  78 70   LYMPH  --  15 11*   MONOS  --  4 2*   EOS  --  2 2   BASOS  --  0 0   IG  --  1 15*   DF PENDING AUTOMATED AUTOMATED   ANEU  --  1.4* 1.7   ABL  --  0.3* 0.3*   ABM  --  0.1 0.0*   JONNY  --  0.0 0.0   ABB  --  0.0 0.0   AIG  --  0.0 0.4        Recent Labs     03/24/22 0222 03/23/22 0231 03/22/22 0314    141 140   K 4.0 4.3 4.2    110* 108*   CO2 26 28 30   AGAP 7 3* 2*   * 123* 138*   BUN 23 20 17   CREA 0.40* 0.30* 0.50*   GFRAA >60 >60 >60   GFRNA >60 >60 >60   CA 7.9* 7.6* 7.8*   * 259* 220*   TP 4.4* 4.1* 4.2*   ALB 1.1* 1.1* 1.1*   GLOB 3.3 3.0 3.1   AGRAT 0.3* 0.4* 0.4*   MG 2.1 2.2 2.4   PHOS  --  2.1* 2.4         Imaging:  IR INSERT CATH PLEURAL INDWELL [[id number]] Collected: 03/22/22 1614   Order Status: Completed Updated: 03/22/22 1652   Narrative:     Title: Dominant PleurX drain placement. Indication: Pancreatic cancer. Recurrent abdominal ascites.  Tunneled abdominal   PleurX drain catheter requested. : Payton Anderson PA-C     Supervising Physician: Susan Orlando M.D. Consent:  Informed written and oral consent was obtained from the patient after   explanation of benefits and risks (including, but not limited to: Infection,   hemorrhage, visceral injury and pneumothorax).  The patient's questions were   answered to their satisfaction. The patient stated understanding and requested   that we proceed.      Procedure:  With the patient supine, the abdomen was prepped and draped in the   standard fashion.  Lidocaine was administered for local field block.  Ultrasound   evaluation was performed. Using real-time ultrasound guidance, with appropriate   image recording, a Yueh needle was advanced into the ascites in the abdomen. Using fluoroscopy, the needle was exchanged over a wire for a peel-away sheath. The PleurX drain was brought through a subcutaneous tunnel and passed down the   peel-away sheath positioning the drain across the midline, lower and the pelvis. The skin incision was closed with absorbable suture.  The catheter was secured   with nonabsorbable suture. A total of 2250 cc of thin yellow fluid was removed.  Bandages were applied. Complications: Large-volume ascites. Medications:  37 minutes based placed under moderate sedation was provided under   the direction and supervision of Kyle Gaston M.D. using frontal and Versed. Continuous cardiopulmonary monitoring was provided by trained independent   observer present. Ancef was infused prior to the procedure. Contrast:  None. Radiation dose:   Fluoroscopy time: 18 seconds. Reference air kerma (mGy): 8   Kerma area product (cGy.cm2):  201   Fluoroscopic images: 1     Findings:  Large volume ascites. Plan: Bedrest for 1 hour. Recommend performing a drainage procedure daily or every-other-day for the next   two weeks in order to promote healing of the catheter site.        CT CHEST W CONT [[id number]] Collected: 03/20/22 1811   Order Status: Completed Updated: 03/20/22 1823   Narrative:     CT CHEST WITH CONTRAST DATED 3/20/2022. History: Echo with extra cardiac structure compressing the left atrium.      Comparison: CT abdomen and pelvis with contrast 3/19/2022     Technique:   Multiple contiguous helical CT images reconstructed at 5 mm were   obtained from the neck base to the mid abdomen following the administration of   100 cc of Isovue 370 without acute complication. All CT scans performed at this   facility use one or all of the following: Automated exposure control, adjustment   of the mA and/or kVp according to patient's size, iterative reconstruction. Findings: The base of the neck is unremarkable in appearance. A left-sided venous port is   present. No lymphadenopathy is seen.  The thoracic aorta is normal in caliber. The heart is not clearly enlarged on the CT. Moderate to advanced   atherosclerotic calcification is seen of the coronary arteries. No significant   pericardial effusion is seen. The only abnormal space-occupying process adjacent   to the heart is a moderate size hiatal hernia. In addition to the stomach, there   is additional herniation of mesenteric fat as well as abdominal fluid. These all   appear to anteriorly displace the heart. The esophagus proximal to the hernia   sac is fluid distended which could indicate reflux. Obstruction at the   gastroesophageal junction is felt to be less likely given the additional fluid   distention of the hernia sac. Evaluation with lung windows demonstrates a trace right, and small left   dependent pleural effusion. These do appear worsened from the prior examination. Nonspecific pulmonary infiltrates are otherwise seen. Lungs are expanded without   evidence for pneumothorax.  No acute osseous abnormality is seen. Mild anasarca   is seen of the chest wall. Limited evaluation of the upper abdomen demonstrate multiple findings which are   not significantly changed and better assessed on a dedicated contrasted CT scan   of the abdomen performed on 3/19/2022. Impression:     1.  The only abnormal space-occupying process adjacent to the heart is a   moderate size hiatal hernia. In addition to the stomach, there is additional   herniation of mesenteric fat as well as abdominal fluid. These all appear to   anteriorly displace the heart.       2.  Worsening although trace to small bilateral dependent pleural effusions. Additional mild anasarca seen of the chest wall. These findings suggest fluid   overload. 3. Nonspecific pulmonary infiltrates. These could represent pulmonary edema   although the distribution is not classic. Additional allergies such as pneumonia   are not excluded if suggested by clinical findings. This report was made using voice transcription. Despite my best efforts to avoid   any, transcription errors may persist. If there is any question about the   accuracy of the report or need for clarification, then please call [phone], or text me through [email or web address] for clarification or correction. CT ABD PELV W CONT [[id number]] Collected: 03/19/22 1300   Order Status: Completed Updated: 03/20/22 1526   Addenda: Addendum: Addendum: A request was made to assess for potential right   gluteal hematoma. There is appear to be edema in the right gluteal soft tissues   which is slightly more pronounced than on the left. No clearly demonstrated defined collection is seen to suggest significant hematoma. The most inferior   gluteal soft tissues have been excluded from the field of imaging. Signed: 03/20/22 1523 by Kierra Childs MD   Narrative:     CT ABDOMEN AND PELVIS WITH INTRAVENOUS CONTRAST DATED 3/19/2022. History: Fall hitting bottom and back. Comparison: CT abdomen and pelvis with contrast 12/27/2021     Technique:   Multiple contiguous helical CT images reconstructed at 5 mm   intervals were obtained from above the diaphragms through the ischial   tuberosities following oral and 100 cc Isovue-370 without acute complication. All CT scans performed at this facility use one or all of the following:   Automated exposure control, adjustment of the mA and/or kVp according to   patient's size, iterative reconstruction.      Findings:   CT ABDOMEN:     Limited evaluation of the lung bases and base of the mediastinum demonstrates nonspecific infiltrates in the bilateral lung bases, left greater than right. A   small dependent left pleural effusion is seen. A small to moderate size hiatal   hernia is present. Ascites within the abdomen is also seen within the hernia   sac. The Liver is clearly cirrhotic in its appearance. Multiple hepatic masses are   seen the largest of which resides in the inferior right lobe measuring 3 cm in   size. The appearance is highly concerning for malignancy either representing   multifocal hepatomas, or hepatic metastases. Hepatic metastases are favored   given the appearance. Asymmetric intraperitoneal air ductal dilation is seen in   the left lobe of the liver which may be obstructed by a central hepatic mass   seen on axial image 23 measuring 1.1 cm in size. .  The spleen demonstrates an   irregular area of decreased attenuation in the superior pole seen on axial image   17 measuring 3.2 cm x 1.7 cm. This is not as well characterized. This does not   appear to represent an acute defect. This could result from flow artifact.  No   contour deforming or enhancing mass lesions are seen of the adrenal glands. The   pancreas is grossly unchanged in its appearance with the patient having a known   primary pancreas tumor described on the prior study. The gallbladder has been   removed.  The kidneys enhance symmetrically and no evidence of hydronephrosis is   seen. A stable benign cyst is seen in the posterior upper to midpole cortex of   the right kidney measuring 2.6 cm in size. The visualized loops of small bowel and colon are normal in caliber.  The   appendix appears to have been removed. Mild to moderate diverticulosis is seen   of the left colon. No free air is seen. Moderate ascites is seen which   demonstrates low attenuation suggesting simple ascites. Diffuse mesenteric edema   is seen.  Abnormal peritoneal nodularity and caking is suggested with additional   peritoneal thickening which at times appears nodular. This is highly concerning   for peritoneal metastatic process which appears worsened from the prior   examination.  No evolving adenopathy is seen.  The abdominal aorta demonstrate   moderate atherosclerotic calcification. No acute osseous abnormality is seen. Compression deformities are seen at T12, L1, and L4 although these have a   chronic appearance and are stable from the prior comparison study. CT PELVIS:   Small to moderate ascites extends into the pelvis. There is once again nodular   peritoneal thickening best appreciated on axial image 73 highly concerning for   evolving peritoneal metastatic disease. There appears to be serosal involvement   of the mid sigmoid colon seen on axial image 74. No abnormal dilation is seen to   suggest significant obstruction at this time. Lamesa Fusi evolving pelvic adenopathy is   seen.  The urinary bladder is unremarkable. No acute osseous abnormality is   seen. Impression:     1.  No clear acute injury from recent fall. Specifically, no acute osseous   abnormality is seen. 2. Grossly stable appearance of pancreatic tumor although this is suboptimally   assessed on this exam. However, there is clear evidence for evolving metastatic   disease with new hepatic masses, and multiple findings as described above which   are highly concerning for evolving peritoneal metastatic disease. Lastly,   irregular decreased attenuation is seen in the superior pole of the spleen which   does not appear clearly acute and does not demonstrate adjacent complex ascites. This could represent an additional metastasis although is not as well   characterized. This report was made using voice transcription.  Despite my best efforts to avoid   any, transcription errors may persist. If there is any question about the   accuracy of the report or need for clarification, then please call [phone], or text me through [email or web address] for clarification or correction.     MRI BRAIN W WO CONT [[id number]] Collected: 03/19/22 1649   Order Status: Completed Updated: 03/19/22 1657   Narrative:     EXAMINATION: BRAIN MRI 3/19/2022 4:47 PM     ACCESSION NUMBER: [id number]     INDICATION: 70-year-old male with fall, altered mental status and confusion. History of pancreatic cancer on chemotherapy with recent progression of   intra-abdominal disease, no prior imaging of brain. COMPARISON: CT head 3/19/2022. TECHNIQUE: Multiplanar multisequence MRI of the brain without and with   intravenous administration of 14 mL contrast agent. FINDINGS:     Previously described subcentimeter focus along the posterior limb of the right   internal capsule follows CSF signal intensity on all sequences and is favored to   represent a prominent perivascular space. There is a mild degree of scattered and confluent foci of T2/FLAIR   hyperintensity within the periventricular and deep white matter, nonspecific but   most commonly seen with chronic small vessel ischemic changes. Faint chronic   hemosiderin involving the left posterior putamen. No midline shift or mass lesion. There is no evidence of intracranial hemorrhage   or acute infarct. The ventricles are within normal limits in size and   configuration. There are no extra-axial fluid collections present.  No diffusion   weighted signal abnormality is identified. There is no abnormal enhancement. Impression:       No acute finding or evidence of intracranial metastatic disease. XR ELBOW LT MIN 3 V [[id number]] Collected: 03/19/22 1534   Order Status: Completed Updated: 03/19/22 1538   Narrative:     XR FOREARM LT AP/LAT, XR ELBOW LT MIN 3 V 3/19/2022 3:19 PM     HISTORY: Fall with left arm and left elbow pain. Impression:       Two views left forearm. No fracture or dislocation. No significant soft tissue   swelling. Old healed distal ulnar fracture deformity is present. Three views left elbow. No fracture or dislocation.  No significant soft tissue   swelling. No fat pad elevation. XR FOREARM LT AP/LAT [[id number]] Collected: 03/19/22 1534   Order Status: Completed Updated: 03/19/22 1538   Narrative:     XR FOREARM LT AP/LAT, XR ELBOW LT MIN 3 V 3/19/2022 3:19 PM     HISTORY: Fall with left arm and left elbow pain. Impression:       Two views left forearm. No fracture or dislocation. No significant soft tissue   swelling. Old healed distal ulnar fracture deformity is present. Three views left elbow. No fracture or dislocation. No significant soft tissue   swelling. No fat pad elevation. CT HEAD WO CONT [[id number]] Collected: 03/19/22 1254   Order Status: Completed Updated: 03/19/22 1258   Narrative:     CT BRAIN WITHOUT CONTRAST   3/19/2022 12:26 PM     INDICATION: Fall, altered mental status and confusion. COMPARISON: None available at this hospital PACS     Technique:  Multiple contiguous axial images are obtained encompassing the brain   from the skull base to the vertex.  For this CT scanner at least one of the   following techniques is utilized to decrease patient radiation dose: Automatic   exposure control, KVP and mA modulation based on patient weight, and iterative   reconstruction. FINDINGS: The ventricles are midline and of appropriate size and configuration. Mild hypoattenuating foci seen in the periventricular deep white matter. Inferiorly at the junction of the thalamus and posterior limb of the internal   capsule on the right there is a rounded mildly hypodense focus that measures 9   mm. The midline structures and posterior fossa are intact.  There is no finding of   an acute cortical stroke, mass lesion, or acute bleed.  No extra-axial fluid   collection. The skull is intact, no discreet abnormality. The paranasal sinuses are not   remarkable. The visualized orbits and mastoid air-cells are patent.     Impression:     9 mm rounded hypoattenuating focus on the right at the inferior   junction of the thalamus and posterior limb of the internal capsule is noted. Suspicious for lacunar infarct and of uncertain chronicity-cannot exclude that   this is new. Elsewhere only mild chronic changes in the periventricular white matter   suggested. For further imaging evaluation of this as clinically indicated-recommend brain   MRI with diffusion imaging. XR CHEST Lisa Riding [[id number]] Collected: 03/15/22 125   Order Status: Completed Updated: 03/15/22 1302   Narrative:     Chest X-ray     INDICATION: Hypotension. COMPARISON: Chest x-ray 2/10/2021. A portable AP view of the chest was obtained. FINDINGS: The lungs are clear. There are no infiltrates or effusions. Left chest   port is unchanged in position. No pneumothorax. The heart size is normal.  The   bony thorax is intact.      Impression:     No acute findings in the chest. Lungs remain clear. No interval   change.           Medications:  Current Facility-Administered Medications   Medication Dose Route Frequency    HYDROmorphone (DILAUDID) injection 0.5 mg  0.5 mg IntraVENous Q3H PRN    prochlorperazine (COMPAZINE) with saline injection 5 mg  5 mg IntraVENous Q6H PRN    TPN ADULT-CENTRAL - dextrose 14% amino acid 8%   IntraVENous QPM    acetaminophen (TYLENOL) tablet 650 mg  650 mg Oral Q6H PRN    diphenhydrAMINE (BENADRYL) capsule 25 mg  25 mg Oral Q6H PRN    amiodarone (CORDARONE) tablet 400 mg  400 mg Oral BID    fat emulsion 20% (LIPOSYN, INTRAlipid) infusion 250 mL  250 mL IntraVENous QPM    HYDROcodone-acetaminophen (NORCO) 5-325 mg per tablet 1 Tablet  1 Tablet Oral Q4H PRN    HYDROmorphone (DILAUDID) tablet 4 mg  4 mg Oral Q4H PRN    loperamide (IMODIUM) capsule 2 mg  2 mg Oral Q4H PRN    NUTRITIONAL SUPPORT ELECTROLYTE PRN ORDERS   Does Not Apply PRN    sodium chloride (NS) flush 5-10 mL  5-10 mL IntraVENous Q8H    sodium chloride (NS) flush 5-10 mL  5-10 mL IntraVENous PRN    diphenoxylate-atropine (LOMOTIL) tablet 1 Tablet  1 Tablet Oral QID PRN    famotidine (PEPCID) tablet 20 mg  20 mg Oral BID    pantoprazole (PROTONIX) tablet 40 mg  40 mg Oral ACB    promethazine (PHENERGAN) tablet 25 mg  25 mg Oral Q6H PRN    tamsulosin (FLOMAX) capsule 0.4 mg  0.4 mg Oral DAILY    [Held by provider] enoxaparin (LOVENOX) injection 40 mg  40 mg SubCUTAneous Q24H    ondansetron (ZOFRAN) injection 4 mg  4 mg IntraVENous Q4H PRN         ASSESSMENT:    Problem List  Date Reviewed: 3/15/2022          Codes Class Noted    Chronic systolic congestive heart failure (HCC) ICD-10-CM: I50.22  ICD-9-CM: 428.22, 428.0  3/21/2022        Bradycardia ICD-10-CM: R00.1  ICD-9-CM: 427.89  3/19/2022        LBBB (left bundle branch block) ICD-10-CM: I44.7  ICD-9-CM: 426.3  3/19/2022        FTT (failure to thrive) in adult ICD-10-CM: R62.7  ICD-9-CM: 783.7  3/15/2022        * (Principal) Hypotension ICD-10-CM: I95.9  ICD-9-CM: 458.9  3/15/2022        Severe protein-calorie malnutrition ([facility] ICD-10-CM: E43  ICD-9-CM: 262  3/4/2022        Pancreatic cancer ([facility] ICD-10-CM: C25.9  ICD-9-CM: 157.9  3/2/2022        Ascites ICD-10-CM: R18.8  ICD-9-CM: 789.59  3/2/2022        Admission for antineoplastic chemotherapy ICD-10-CM: Z51.11  ICD-9-CM: V58.11  3/2/2022        Hypomagnesemia ICD-10-CM: E83.42  ICD-9-CM: 275.2  4/23/2021        Hypokalemia ICD-10-CM: E87.6  ICD-9-CM: 276.8  4/20/2021        CINV (chemotherapy-induced nausea and vomiting) ICD-10-CM: R11.2, T45.1X5A  ICD-9-CM: 787.01, E933.1  4/20/2021        Dehydration ICD-10-CM: E86.0  ICD-9-CM: 276.51  4/12/2021        Other neutropenia ([facility] ICD-10-CM: D70.8  ICD-9-CM: 288.09  3/9/2021        Malignant neoplasm of lower third of esophagus ([facility] ICD-10-CM: C15.5  ICD-9-CM: 150.5  1/5/2021        Malignant neoplasm of body of pancreas ([facility] ICD-10-CM: C25.1  ICD-9-CM: 157.1  1/5/2021        Severe obesity ([facility] ICD-10-CM: E66.01  ICD-9-CM: 278.01  12/10/2020        Elevated glucose ICD-10-CM: R73.09  ICD-9-CM: 790.29  7/22/2019        Anemia ICD-10-CM: D64.9  ICD-9-CM: 285.9  7/22/2019        Chronic left-sided low back pain ICD-10-CM: M54.50, G89.29  ICD-9-CM: 724.2, 338.29  7/22/2019              78 y.o. M pancreatic cancer and esophageal cancer with malignant ascites of previously controlled FOLFIRINOX but currently disease progressed and most recently admited on 3/2/2022 to place Pleurx and arrange gemcitabine/Abraxane, had extensive discussion with inpatient team and pharmacy, patient was discharged on 3/7/2022 without chemo appointment and return to office on 3/15/2022, extremely sick and blood pressure not measurable in the office, and not been having much oral intake since discharge, urgently establish IV access and called EMS to take to ER to stabilize and admit to hospital, apparently needed much supportive care and volume resuscitation, start TPN until oral intake can improve and give gemcitabine/Abraxane once clinically stable, discussed with the inpatient team.    PLAN:    Metastatic pancreatic cancer / distal esophageal cancer  - Metastatic disease involving peritoneum, malignant ascites  - s/p 24 cycles of FOLFIRINOX with SD and recent POD with increasing CEA/ and malignant ascites  - Plan for gemcitabine/abraxane when clinically stable - hopefully tomorrow. 3/17 [place]/abraxane today, tolerated well  3/24 D8 due today, deferring tx d/t cytopenias. Dr. Aylin Arroyo discussed that if patient's condition does not improve, that he may want to consider Hospice services. Palliative care following. Malignant ascites  - Has abdominal Pleurx, drain three times weekly  3/17 Pleurx drained 3/16 - 1100cc out. 3/18 Pleurx accidentally dislodged last night - 1800cc removed before completely removed. IR notified and will re-evaluate Monday. 3/20 Worsening abdominal pain possibly secondary to re-accumulation, some drainage reported at Pleurx site. IR evaluating tomorrow.   3/21 IR to re-evaluate tomorrow, was not NPO today. 3/22 To IR today. 3/23 Abd pleurx replaced yesterday    Cancer related pain  - Continue home dilaudid  3/17 Utilizing IV morphine. Will decrease dosing and encourage use of home Norco.  3/18 Continue to wean IV meds - encourage PO.  3/20 Has not been receiving IV morphine due to hypotension. Continue with PRN oral medications. 3/22 Consult to [facility] regarding [address], symptom management. 3/23 PC following. Pt changed code status to DNR, wants to pursue further cancer-related treatment. 3/24 PC following    Hypotension / poor PO intake / protein-calorie malnutrition  - Consult RD for TPN  - BC pending, started on Cefe/Vanc and may be able to de-escalate soon as hypotension likely secondary to dehydration. LA improved after IFV. 3/17 On TPN. Continues on Cefe/Vanc although infectious work-up unremarkable. Will stop antibiotics. 3/18 Hypotension improved and remains afebrile off antibiotics. Continue with nutritional support via TPN.  3/19 Ongoing hypotension, although improved yesterday AM, worse through evening. Had 6L of IVF yesterday and continues on TPN. May consider midodrine although MAP consistently ~65.  3/21 Hypotension improved but borderline, continues TPN.   3/22 Discussed with RD volume status of TPN given new finding of HFrEF and CT chest with evidence of fluid overload. CM following for home TPN. Holding IV morphine. 3/23 BPs improved  3/24 Episode of hypotension last PM, required small fluid bolus, BP improved    Nausea  - Continue PRN antiemetics    Diarrhea  - Antidiarrheals PRN  3/24 c/o worsening diarrhea. Check Cdiff. Bradycardia / HFrEF / LV dysfunction / LBBB / ectopy  - One documented episode of HR 43 - check EKG  - No hx of hypertension, monitor  3/17 EKG with bi-geminal PACs and known LBBB. Recheck EGK.  3/18 EKG with same as above. Palpated pulse on several occasions documented in the 40s. Tele applied overnight. Cardiology following.   3/19 Cardiology recommending K>4 (on TPN) and Mg replacements. Echo pending. 3/21 No bradycardia noted on telemetry. Does have ectopy which may have led to incorrect pulse rate palpation. Echo with EF 35-40%. CT chest completed due to echo findings rebeka noted hiatal hernia/mesenteric fat herniation/abdominal fluid displacing heart and small BL effusions, anasarca. Cardiology following.  3/22 Cardiology considering BB if BP improve for HF/LV dysfunction. Weight up 6# overnight and increased since admission. Clinically no evidence of overload but asking RD to adjust TPN volume. Reported run of NS-SVT - cardiology following. Thrombocytopenia, anemia secondary to chemotherapy  3/17 Possibly related to antibiotics - stopping today. Monitor. 3/19 Plts 70k - likely exacerbated by recent chemotherapy as other counts dropping as well. Continue Lovenox for now (hold for plts <50k)  3/20 Lovenox on hold for plts 53k.  3/21 Plts down to 35k  3/22 Plts down to 28k - receiving plts for IR procedure today. Fall  3/19 Witnessed slip and fall on wet floor yesterday. Today with L arm bruising - check X-ray. Will check CT head and CT AP (hematoma/lipoma palpated on R buttock) and also c/o worsening abdominal pain. 3/20 X-ray of L elbow/forearm negative. CT head with ?lacunar infarct but MRI showed no acute findings and ?lacunar infarct appears to be prominent perivascular space. CT AP with progressing peritoneal/liver disease and ?new splenic lesion. Last imaging obtained 12/2021 for comparison. Elevated LFTs  3/21 Monitor, possibly secondary to TPN or recent chemo. 3/22 Appear to be improving  3/23 AST improved, ALP increased  3/24 Improving    Continue home meds  Apoorva SOPs  AC contraindicated d/t thrombocytopenia    Goals and plan of care reviewed with the patient. All questions answered to the best of our ability. Disposition:  TBD pending clinical course. PT/OT following - HHPT vs STR. CM following for home TPN.  He will need close follow-up with Dr Sea Tran upon DC.     3/24 Dr. Tanvi Gold discussed that if patient's condition does not improve, that he may want to consider Hospice services. Palliative care following. Coley Severe, NP   [address] [place] [address] Hematology & Oncology  [address] [place] [address]  Office : [phone]  Fax : [phone]           Attending Addendum:  I have personally performed a face to face diagnostic evaluation on this patient. I have reviewed and agree with the care plan as documented by Coley Severe, N.P. 36 minutes were spent on patient care, including but not limited to, reviewing the chart and time with the patient and family, more than 50% of the time documented was spent in face-to-face contact with the patient and in the care of the patient on the floor/unit where the patient is located. My findings are as follows:  He has esophageal cancer and pancreatic cancer, appears weak, heart rate regular without murmurs, abdomen is non-tender, bowel sounds are positive, we will continue TPN, he will receive chemotherapy when his Platelet count is above 100k.               Akilah Staton MD      [address] [place] [address] Hematology/Oncology  [address] [place] [address]  Office : [phone]  Fax : [phone]

## 2022-03-24 NOTE — PROGRESS NOTES
Problem: Falls - Risk of  Goal: *Absence of Falls  Description: Document [place] Fall Risk and appropriate interventions in the flowsheet.   Outcome: Progressing Towards Goal  Note: Fall Risk Interventions:  Mobility Interventions: Communicate number of staff needed for ambulation/transfer    Mentation Interventions: Adequate sleep, hydration, pain control    Medication Interventions: Teach patient to arise slowly    Elimination Interventions: Call light in reach    History of Falls Interventions: Evaluate medications/consider consulting pharmacy,Investigate reason for fall,Room close to nurse's station

## 2022-03-24 NOTE — PROGRESS NOTES
Palliative Care Progress Note    Patient: Valdo Mcdowell MRN: [id number]  SSN: [id number]    YOB: 1955  Age: 78 y.o. Sex: male       Assessment/Plan:     Chief Complaint/Interval History: reports abdominal pain and nausea       Principal Diagnosis:    · Pain, abdomen  R10.9    Additional Diagnoses:   · Ascites  R18.8  · Debility, Unspecified  R53.81  · Frailty  R54  · Nausea/Vomiting  R11.2  · Counseling, Encounter for Medical Advice  Z71.9  · Encounter for Palliative Care  Z51.5    Palliative Performance Scale (PPS)       Medical Decision Making:   Reviewed and summarized notes over last 24 hours   Discussed case with appropriate providers- Jv Capellan RN  Reviewed laboratory and x-ray data over last 24 hours     Pt resting in bed, no distress noted. Mika Hope, at bedside. Pt reports ongoing abdominal pain and nausea, which he attributes to increasing ascites. He asks if his Pleurx can be drained today, which it can. He states he does not find the oral Dilaudid helpful, and asks if he can have Norco, which he has used in the past with success. Discontinued Dilaudid 4 mg PO and added Norco 5 mg 1 or 2 tabs q 4 hours PRN. Continue Dilaudid 0.5 mg IV q 3 hours PRN, which pt states is helpful. Order placed to drain Pleurx. Additional time was spent talking with pt and roni regarding his goals of care. Pt is sad today because his children do not come to visit him. He has 3 children- one is incarcerated, and the other 2 he reports are using drugs. He fully financially supports his youngest son, and voiced frustration that his son can't even find time to call and check on him or visit him. Provide support and affirmed his feelings. Pt states he is not ready to die, and wants to live so he can change his relationship with his children. He does not feel the burden of treatment outweighs the benefits, and wants to continue treatment for as long as he can.   Assured him of our ongoing support and care. Will continue to follow. Will discuss findings with members of the interdisciplinary team.         In addition to complete visit, prolonged Time In: 1035     Time Out: 1055   Total time spent 55 minutes    Subjective:     Review of Systems:  A comprehensive review of systems was negative except for:   Constitutional: Positive for fatigue. Gastrointestinal: Positive for abdominal pain, nausea      Objective:     Visit Vitals  BP (!) 90/56 (BP Patient Position: Lying left side)   Pulse (!) 52   Temp 99.6 °F (37.6 °C)   Resp 22   Ht 5' 6\" (1.676 m)   Wt 173 lb 5.6 oz (78.6 kg)   SpO2 97%   BMI 27.98 kg/m²       Physical Exam:    General:  Cooperative. Looks uncomfortable. Eyes:  Conjunctivae/corneas clear    Nose: Nares normal. Septum midline. Neck: Supple, symmetrical, trachea midline   Lungs:   Clear to auscultation bilaterally, unlabored   Heart:  Regular rate and rhythm   Abdomen:   Soft, mildly tender, non-distended.  Pleurx catheter   Extremities: Normal, atraumatic, no cyanosis or edema   Skin: Skin color, texture, turgor normal.    Neurologic: Nonfocal   Psych: Alert and oriented     Signed By: Ara Schuster NP     March 24, 2022

## 2022-03-24 NOTE — PROGRESS NOTES
PT note:  Treatment deferred as the patient has lots of visitors and talking on the phone. Will continue PT efforts.   Shari Lion, PTA

## 2022-03-24 NOTE — PROGRESS NOTES
Pt's BP 79/64.  per tele. Oncology NP notified. 500 CC ordered and administered. Will continue to monitor.

## 2022-03-24 NOTE — PROGRESS NOTES
ACUTE OT GOALS:  (Developed with and agreed upon by patient and/or caregiver.)  1. Nkechi Arevalo will be modified independent with functional mobility for ADL in room within 4 - 7 visits. 2. Nkechi Arevalo will be modified independent with total body bathing and dressing within 4 - 7 visits. 3. Nkechi Arevalo will state and demonstrate at least 5 energy conservation techniques during ADL/therapeutic activities within 4 - 7 visits. 4. Nkechi Arevalo will voice a plan for 2-3 appropriate home modifications for home safety and fall prevention within 7 visits. 5. Nkechi Arevalo will participate at least 30 minutes of ADL with 3 or less rest breaks within 7 visits. 6. Nkechi Arevalo will complete a toilet transfer with modified independence within 7 visits. OCCUPATIONAL THERAPY: Daily Note OT Treatment Day # 3    Nkechi Arevalo is a 78 y.o. male   PRIMARY DIAGNOSIS: Hypotension  FTT (failure to thrive) in adult [R62.7]  Hypotension [I95.9]       Payor: Hilario Sherrie / Plan: [address] [place] [address] HMO / Product Type: Managed Care Medicare /   ASSESSMENT:     REHAB RECOMMENDATIONS: CURRENT LEVEL OF FUNCTION:  (Most Recently Demonstrated)   Recommendation to date pending progress:  Setting:  [facility] Pending family wishes  Equipment:    To Be Determined Bathing:   Not tested  Dressing:   Minimal Assistance  Feeding/Grooming:   Set Up  Toileting:   Not tested  Functional Mobility:   Contact Guard Assistance     ASSESSMENT:  Mr. Darek Zhu was supine in bed upon arrival. Pt overall CGA RW for functional transfers and mobility of household distances. Pt able to ambulate 3 laps in room with CGA RW without RB and with sats >90% throughout on 1L O2 NC. Pt able to don elastic waist shorts with min A seated/standing from EOB and completed grooming ADLs seated in chair with set up. Pt educated on energy conservation techniques in prep for ADLs.  Pt making progress toward goals, continue POC. SUBJECTIVE:   Mr. Roxy Patrick states, \"Look at me go! \"    SOCIAL HISTORY/LIVING ENVIRONMENT: See initial evaluation  Home Environment: Private residence  One/Two Story Residence: One story  Living Alone: Yes  Support Systems: Child(betsy),Friend/Neighbor    OBJECTIVE:     PAIN: VITAL SIGNS: LINES/DRAINS:   Pre Treatment: Pain Screen  Pain Scale 1: Numeric (0 - 10)  Pain Intensity 1: 0  Post Treatment: 0   None  O2 Device: Nasal cannula     ACTIVITIES OF DAILY LIVING: I Mod I S SBA CGA Min Mod Max Total NT Comments   BASIC ADLs:              Bathing/ Showering [] [] [] [] [] [] [] [] [] [x]    Toileting [] [] [] [] [] [] [] [] [] [x]    Dressing [] [] [] [] [] [x] [] [] [] [] Donning elastic waist shorts EOB   Feeding [] [] [] [] [] [] [] [] [] [x]    Grooming [] [] [x] [] [] [] [] [] [] [] Brushing hair seated in chair   Personal Device Care [] [] [] [] [] [] [] [] [] [x]    Functional Mobility [] [] [] [] [x] [] [] [] [] [] RW   I=Independent, Mod I=Modified Independent, S=Supervision, SBA=Standby Assistance, CGA=Contact Guard Assistance,   Min=Minimal Assistance, Mod=Moderate Assistance, Max=Maximal Assistance, Total=Total Assistance, NT=Not Tested    MOBILITY: I Mod I S SBA CGA Min Mod Max Total  NT x2 Comments:   Supine to sit [] [] [] [] [x] [] [] [] [] [] []    Sit to supine [] [] [] [] [] [] [] [] [] [x] []    Sit to stand [] [] [] [] [x] [] [] [] [] [] [] RW   Bed to chair [] [] [] [] [x] [] [] [] [] [] [] RW   I=Independent, Mod I=Modified Independent, S=Supervision, SBA=Standby Assistance, CGA=Contact Guard Assistance,   Min=Minimal Assistance, Mod=Moderate Assistance, Max=Maximal Assistance, Total=Total Assistance, NT=Not Tested    BALANCE: Good Fair+ Fair Fair- Poor NT Comments   Sitting Static [x] [] [] [] [] [] EOB   Sitting Dynamic [] [x] [] [] [] [] SBA EOB             Standing Static [] [x] [] [] [] [] RW   Standing Dynamic [] [x] [] [] [] [] RW     PLAN: FREQUENCY/DURATION: OT Plan of Care: 3 times/week for duration of hospital stay or until stated goals are met, whichever comes first.    TREATMENT:   TREATMENT:   ( $$ Therapeutic Activity: 8-22 mins   )  Co-Treatment PT/OT necessary due to patient's decreased overall endurance/tolerance levels, as well as need for high level skilled assistance to complete functional transfers/mobility and functional tasks  Self Care (24 Minutes): Self care including Upper Body Bathing, Lower Body Bathing, Upper Body Dressing, Lower Body Dressing and Grooming to increase independence and decrease level of assistance required.     TREATMENT GRID:  N/A    AFTER TREATMENT POSITION/PRECAUTIONS:  Chair, Needs within reach and RN notified    INTERDISCIPLINARY COLLABORATION:  RN/PCT, PT/PTA and OT/VILLAREAL    TOTAL TREATMENT DURATION:  OT Patient Time In/Time Out  Time In: 1441  Time Out: 1612 Sam Emery OT

## 2022-03-24 NOTE — PROGRESS NOTES
END OF SHIFT NOTE:    Intake/Output  03/24 0701 - 03/24 1900  In: 360 [P.O.:360]  Out: 1300 [Urine:200; Drains:1100]   Voiding: YES  Catheter: NO  Drain:   Pleural Catheter/Drain 03/22/22 15.5 fr x 66 cm Right (Active)   Drainage Description Yellow 03/24/22 1111   Site Assessment Clean, dry, & intact 03/24/22 1111   Dressing Status New;Clean, dry, & intact 03/24/22 1111   Dressing Type Non-adherent dressing;Transparent 03/24/22 1111   Output (ml) 1100 ml 03/24/22 1111               Stool:  1 occurrences. Stool Assessment  Stool Color: Brown (03/24/22 0912)  Stool Appearance: Loose (03/24/22 0912)  Stool Amount: Medium (03/24/22 0912)  Stool Source/Status: Rectum (03/24/22 0912)    Emesis:  0 occurrences. VITAL SIGNS  Patient Vitals for the past 12 hrs:   Temp Pulse Resp BP SpO2   03/24/22 1458 97.4 °F (36.3 °C) 92 20 114/89 98 %   03/24/22 1133 97.9 °F (36.6 °C) 66 20 106/61 96 %   03/24/22 0914 -- -- -- (!) 90/56 --   03/24/22 0751 99.6 °F (37.6 °C) (!) 52 22 (!) 104/56 97 %       Pain Assessment  Pain 1  Pain Scale 1: Numeric (0 - 10) (03/24/22 1715)  Pain Intensity 1: 9 (03/24/22 1715)  Patient Stated Pain Goal: 0 (03/24/22 0850)  Pain Reassessment 1: Patient resting w/respiratory rate greater than 10 (sleeping ) (03/24/22 1749)  Pain Onset 1: pta (03/21/22 2046)  Pain Location 1: Abdomen (03/24/22 1715)  Pain Orientation 1: Right (03/23/22 0230)  Pain Description 1: Aching (03/24/22 1715)  Pain Intervention(s) 1: Medication (see MAR) (03/24/22 1715)    Ambulating  Yes    Additional Information:   - stopped TPN this morning per patient request. Restarted new bag this evening  - pleurex drained 1100ml  - pain management/nausea     Shift report given to oncoming nurse at the bedside.     Tono Abbasi RN

## 2022-03-24 NOTE — PROGRESS NOTES
Patient pulled out port needle. Stated that he was tired of all the wires. I explained the importance of having a needle and having TPN. Re accessed port and he refused to be restarted on the TPN. He wanted to rest.  NP notified    BP low notified NP since he wanted IV pain medication NP suggested to give PO for now and continue to monitor.

## 2022-03-25 LAB
ALBUMIN SERPL-MCNC: 1 G/DL (ref 3.2–4.6)
ALBUMIN/GLOB SERPL: 0.3 {RATIO} (ref 1.2–3.5)
ALP SERPL-CCNC: 286 U/L (ref 50–136)
ALT SERPL-CCNC: 48 U/L (ref 12–65)
ANION GAP SERPL CALC-SCNC: 5 MMOL/L (ref 7–16)
AST SERPL-CCNC: 58 U/L (ref 15–37)
BASOPHILS # BLD: 0 K/UL (ref 0–0.2)
BASOPHILS NFR BLD: 0 % (ref 0–2)
BILIRUB SERPL-MCNC: 0.4 MG/DL (ref 0.2–1.1)
BUN SERPL-MCNC: 21 MG/DL (ref 8–23)
C DIFF GDH STL QL: NORMAL
C DIFF TOX A+B STL QL IA: NORMAL
CALCIUM SERPL-MCNC: 7.9 MG/DL (ref 8.3–10.4)
CHLORIDE SERPL-SCNC: 105 MMOL/L (ref 98–107)
CLINICAL CONSIDERATION: NORMAL
CO2 SERPL-SCNC: 28 MMOL/L (ref 21–32)
CREAT SERPL-MCNC: 0.5 MG/DL (ref 0.8–1.5)
DIFFERENTIAL METHOD BLD: ABNORMAL
EOSINOPHIL # BLD: 0 K/UL (ref 0–0.8)
EOSINOPHIL NFR BLD: 1 % (ref 0.5–7.8)
ERYTHROCYTE [DISTWIDTH] IN BLOOD BY AUTOMATED COUNT: 15.2 % (ref 11.9–14.6)
GLOBULIN SER CALC-MCNC: 3.2 G/DL (ref 2.3–3.5)
GLUCOSE SERPL-MCNC: 134 MG/DL (ref 65–100)
HCT VFR BLD AUTO: 29 % (ref 41.1–50.3)
HGB BLD-MCNC: 9.3 G/DL (ref 13.6–17.2)
IMM GRANULOCYTES # BLD AUTO: 0 K/UL (ref 0–0.5)
IMM GRANULOCYTES NFR BLD AUTO: 2 % (ref 0–5)
INTERPRETATION: NORMAL
LYMPHOCYTES # BLD: 0.3 K/UL (ref 0.5–4.6)
LYMPHOCYTES NFR BLD: 17 % (ref 13–44)
MAGNESIUM SERPL-MCNC: 2.1 MG/DL (ref 1.8–2.4)
MCH RBC QN AUTO: 30.2 PG (ref 26.1–32.9)
MCHC RBC AUTO-ENTMCNC: 32.1 G/DL (ref 31.4–35)
MCV RBC AUTO: 94.2 FL (ref 79.6–97.8)
MONOCYTES # BLD: 0.3 K/UL (ref 0.1–1.3)
MONOCYTES NFR BLD: 21 % (ref 4–12)
NEUTS SEG # BLD: 1 K/UL (ref 1.7–8.2)
NEUTS SEG NFR BLD: 59 % (ref 43–78)
NRBC # BLD: 0 K/UL (ref 0–0.2)
PCR REFLEX: NORMAL
PHOSPHATE SERPL-MCNC: 2.6 MG/DL (ref 2.3–3.7)
PLATELET # BLD AUTO: 16 K/UL (ref 150–450)
PLATELET COMMENTS,PCOM: ABNORMAL
PMV BLD AUTO: 13.7 FL (ref 9.4–12.3)
POTASSIUM SERPL-SCNC: 4.1 MMOL/L (ref 3.5–5.1)
PROCALCITONIN SERPL-MCNC: 0.89 NG/ML (ref 0–0.49)
PROT SERPL-MCNC: 4.2 G/DL (ref 6.3–8.2)
RBC # BLD AUTO: 3.08 M/UL (ref 4.23–5.6)
RBC MORPH BLD: ABNORMAL
SODIUM SERPL-SCNC: 138 MMOL/L (ref 138–145)
WBC # BLD AUTO: 1.6 K/UL (ref 4.3–11.1)
WBC MORPH BLD: ABNORMAL

## 2022-03-25 PROCEDURE — 74011250637 HC RX REV CODE- 250/637: Performed by: NURSE PRACTITIONER

## 2022-03-25 PROCEDURE — APPSS60 APP SPLIT SHARED TIME 46-60 MINUTES: Performed by: NURSE PRACTITIONER

## 2022-03-25 PROCEDURE — 74011250636 HC RX REV CODE- 250/636: Performed by: NURSE PRACTITIONER

## 2022-03-25 PROCEDURE — 74011250636 HC RX REV CODE- 250/636: Performed by: INTERNAL MEDICINE

## 2022-03-25 PROCEDURE — 83735 ASSAY OF MAGNESIUM: CPT

## 2022-03-25 PROCEDURE — 74011000258 HC RX REV CODE- 258: Performed by: NURSE PRACTITIONER

## 2022-03-25 PROCEDURE — 80053 COMPREHEN METABOLIC PANEL: CPT

## 2022-03-25 PROCEDURE — 87324 CLOSTRIDIUM AG IA: CPT

## 2022-03-25 PROCEDURE — 84145 PROCALCITONIN (PCT): CPT

## 2022-03-25 PROCEDURE — 74011250637 HC RX REV CODE- 250/637: Performed by: INTERNAL MEDICINE

## 2022-03-25 PROCEDURE — 74011000250 HC RX REV CODE- 250: Performed by: INTERNAL MEDICINE

## 2022-03-25 PROCEDURE — 84100 ASSAY OF PHOSPHORUS: CPT

## 2022-03-25 PROCEDURE — 3331090002 HH PPS REVENUE DEBIT

## 2022-03-25 PROCEDURE — 36591 DRAW BLOOD OFF VENOUS DEVICE: CPT

## 2022-03-25 PROCEDURE — 74011000250 HC RX REV CODE- 250: Performed by: NURSE PRACTITIONER

## 2022-03-25 PROCEDURE — 74011000258 HC RX REV CODE- 258: Performed by: INTERNAL MEDICINE

## 2022-03-25 PROCEDURE — 99232 SBSQ HOSP IP/OBS MODERATE 35: CPT | Performed by: NURSE PRACTITIONER

## 2022-03-25 PROCEDURE — 99232 SBSQ HOSP IP/OBS MODERATE 35: CPT | Performed by: INTERNAL MEDICINE

## 2022-03-25 PROCEDURE — 65270000015 HC RM PRIVATE ONCOLOGY

## 2022-03-25 PROCEDURE — 99233 SBSQ HOSP IP/OBS HIGH 50: CPT | Performed by: INTERNAL MEDICINE

## 2022-03-25 PROCEDURE — 85025 COMPLETE CBC W/AUTO DIFF WBC: CPT

## 2022-03-25 PROCEDURE — 3331090001 HH PPS REVENUE CREDIT

## 2022-03-25 RX ORDER — VANCOMYCIN HYDROCHLORIDE
1250 EVERY 12 HOURS
Status: DISCONTINUED | OUTPATIENT
Start: 2022-03-26 | End: 2022-03-26

## 2022-03-25 RX ORDER — VANCOMYCIN/0.9 % SOD CHLORIDE 1.5G/250ML
1500 PLASTIC BAG, INJECTION (ML) INTRAVENOUS ONCE
Status: COMPLETED | OUTPATIENT
Start: 2022-03-25 | End: 2022-03-25

## 2022-03-25 RX ORDER — HYDROCODONE BITARTRATE AND ACETAMINOPHEN 5; 325 MG/1; MG/1
1 TABLET ORAL
Status: DISCONTINUED | OUTPATIENT
Start: 2022-03-25 | End: 2022-03-30

## 2022-03-25 RX ADMIN — FAMOTIDINE 20 MG: 20 TABLET, FILM COATED ORAL at 08:00

## 2022-03-25 RX ADMIN — AMIODARONE HYDROCHLORIDE 400 MG: 200 TABLET ORAL at 18:08

## 2022-03-25 RX ADMIN — PANTOPRAZOLE SODIUM 40 MG: 40 TABLET, DELAYED RELEASE ORAL at 07:58

## 2022-03-25 RX ADMIN — TAMSULOSIN HYDROCHLORIDE 0.4 MG: 0.4 CAPSULE ORAL at 08:00

## 2022-03-25 RX ADMIN — HYDROMORPHONE HYDROCHLORIDE 0.5 MG: 1 INJECTION, SOLUTION INTRAMUSCULAR; INTRAVENOUS; SUBCUTANEOUS at 07:59

## 2022-03-25 RX ADMIN — ONDANSETRON 4 MG: 2 INJECTION INTRAMUSCULAR; INTRAVENOUS at 07:58

## 2022-03-25 RX ADMIN — DIPHENOXYLATE HYDROCHLORIDE AND ATROPINE SULFATE 1 TABLET: 2.5; .025 TABLET ORAL at 20:00

## 2022-03-25 RX ADMIN — SODIUM CHLORIDE 5 MG: 9 INJECTION INTRAMUSCULAR; INTRAVENOUS; SUBCUTANEOUS at 23:47

## 2022-03-25 RX ADMIN — HYDROCODONE BITARTRATE AND ACETAMINOPHEN 1 TABLET: 5; 325 TABLET ORAL at 14:05

## 2022-03-25 RX ADMIN — ONDANSETRON 4 MG: 2 INJECTION INTRAMUSCULAR; INTRAVENOUS at 11:58

## 2022-03-25 RX ADMIN — ONDANSETRON 4 MG: 2 INJECTION INTRAMUSCULAR; INTRAVENOUS at 00:26

## 2022-03-25 RX ADMIN — CEFEPIME HYDROCHLORIDE 2 G: 2 INJECTION, POWDER, FOR SOLUTION INTRAVENOUS at 11:46

## 2022-03-25 RX ADMIN — VANCOMYCIN HYDROCHLORIDE 1500 MG: 100 INJECTION, POWDER, LYOPHILIZED, FOR SOLUTION INTRAVENOUS at 12:53

## 2022-03-25 RX ADMIN — HYDROMORPHONE HYDROCHLORIDE 0.5 MG: 1 INJECTION, SOLUTION INTRAMUSCULAR; INTRAVENOUS; SUBCUTANEOUS at 12:06

## 2022-03-25 RX ADMIN — AMIODARONE HYDROCHLORIDE 400 MG: 200 TABLET ORAL at 08:00

## 2022-03-25 RX ADMIN — VANCOMYCIN HYDROCHLORIDE 1250 MG: 500 INJECTION, POWDER, LYOPHILIZED, FOR SOLUTION INTRAVENOUS at 23:47

## 2022-03-25 RX ADMIN — HYDROMORPHONE HYDROCHLORIDE 0.5 MG: 1 INJECTION, SOLUTION INTRAMUSCULAR; INTRAVENOUS; SUBCUTANEOUS at 19:59

## 2022-03-25 RX ADMIN — I.V. FAT EMULSION 250 ML: 20 EMULSION INTRAVENOUS at 18:18

## 2022-03-25 RX ADMIN — CEFEPIME HYDROCHLORIDE 2 G: 2 INJECTION, POWDER, FOR SOLUTION INTRAVENOUS at 19:59

## 2022-03-25 RX ADMIN — HYDROMORPHONE HYDROCHLORIDE 0.5 MG: 1 INJECTION, SOLUTION INTRAMUSCULAR; INTRAVENOUS; SUBCUTANEOUS at 00:26

## 2022-03-25 RX ADMIN — FILGRASTIM-AAFI 300 MCG: 300 INJECTION, SOLUTION SUBCUTANEOUS at 14:04

## 2022-03-25 RX ADMIN — ONDANSETRON 4 MG: 2 INJECTION INTRAMUSCULAR; INTRAVENOUS at 19:59

## 2022-03-25 RX ADMIN — FAMOTIDINE 20 MG: 20 TABLET, FILM COATED ORAL at 18:09

## 2022-03-25 RX ADMIN — SODIUM ACETATE: 164 INJECTION, SOLUTION, CONCENTRATE INTRAVENOUS at 18:18

## 2022-03-25 NOTE — PROGRESS NOTES
Problem: Falls - Risk of  Goal: *Absence of Falls  Description: Document [place] Fall Risk and appropriate interventions in the flowsheet. Outcome: Progressing Towards Goal  Note: Fall Risk Interventions:  Mobility Interventions: Patient to call before getting OOB,PT Consult for mobility concerns    Mentation Interventions: Adequate sleep, hydration, pain control    Medication Interventions: Teach patient to arise slowly,Patient to call before getting OOB    Elimination Interventions: Patient to call for help with toileting needs,Urinal in reach    History of Falls Interventions: Investigate reason for fall         Problem: Pain  Goal: *Control of Pain  Outcome: Progressing Towards Goal     Problem: Pain  Goal: *PALLIATIVE CARE:  Alleviation of Pain  Outcome: Progressing Towards Goal     Problem: Pressure Injury - Risk of  Goal: *Prevention of pressure injury  Description: Document Carlos Scale and appropriate interventions in the flowsheet. Outcome: Progressing Towards Goal  Note: Pressure Injury Interventions:  Sensory Interventions: Assess changes in LOC,Maintain/enhance activity level    Moisture Interventions: Absorbent underpads    Activity Interventions: PT/OT evaluation,Increase time out of bed    Mobility Interventions: HOB 30 degrees or less    Nutrition Interventions: Document food/fluid/supplement intake,Offer support with meals,snacks and hydration                     Problem: Risk for Spread of Infection  Goal: Prevent transmission of infectious organism to others  Description: Prevent the transmission of infectious organisms to other patients, staff members, and visitors.   Outcome: Progressing Towards Goal

## 2022-03-25 NOTE — PROGRESS NOTES
[facility]        Inpatient Hematology / Oncology Progress Note      Admission Date: 3/15/2022 10:28 AM  Reason for Admission/Hospital Course: FTT (failure to thrive) in adult [R62.7]  Hypotension [I95.9]      24 Hour Events:  Afebrile, hypotensive at times, on O2 @ 2L  On TPN  S/p C1D1 [place]/abraxane on 3/17   D8 due 3/24, held d/t cytopenias  CXR with possible pneumonia; PCT elevated    Transfusions: None  Replacements: None    ROS:  Constitutional: +weakness/fatigue. Negative for fever, chills. CV: Negative for chest pain, palpitations, edema. Respiratory: Negative for dyspnea, cough, wheezing. GI: +N/V (better), diarrhea. Negative for abdominal pain. 10 point review of systems is otherwise negative with the exception of the elements mentioned above in the HPI. No Known Allergies    OBJECTIVE:  Patient Vitals for the past 8 hrs:   BP Temp Pulse Resp SpO2   22 0735 106/70 97.4 °F (36.3 °C) 87 16 94 %   22 0305 (!) 83/67 98.1 °F (36.7 °C) 78 18 92 %     Temp (24hrs), Av.9 °F (36.6 °C), Min:97.4 °F (36.3 °C), Max:98.7 °F (37.1 °C)     0701 -  1900  In: -   Out: 100 [Urine:100]    Physical Exam:  Constitutional: Thin, cachectic elderly male in no acute distress, lying comfortably in the hospital bed. HEENT: Normocephalic and atraumatic. Oropharynx is clear, mucous membranes are moist. Extraocular muscles are intact. Sclerae anicteric. Neck supple without JVD. No thyromegaly present. Skin +R gluteal hematoma/lipoma palpated. Warm and dry. No bruising and no rash noted. No erythema. No pallor. Respiratory Lungs are clear to auscultation bilaterally without wheezes, rales or rhonchi, normal air exchange without accessory muscle use. CVS Normal rate, regular rhythm and normal S1 and S2. No murmurs, gallops, or rubs. Abdomen Soft, nontender and distended, normoactive bowel sounds.   +Abd pleurx   Neuro Grossly nonfocal with no obvious sensory or motor deficits. MSK Normal range of motion in general.  No edema and no tenderness. Psych Appropriate mood and affect. Labs:      Recent Labs     03/25/22 0307 03/24/22 0222 03/23/22  0231   WBC 1.6* 1.7* 1.8*   RBC 3.08* 3.51* 3.40*   HGB 9.3* 10.4* 10.1*   HCT 29.0* 33.1* 32.0*   MCV 94.2 94.3 94.1   MCH 30.2 29.6 29.7   MCHC 32.1 31.4 31.6   RDW 15.2* 15.3* 15.3*   PLT 16* 23* 39*   GRANS 59 79* 78   LYMPH 17 13* 15   MONOS 21* 7 4   EOS 1  --  2   BASOS 0  --  0   IG 2  --  1   DF MANUAL AUTOMATED AUTOMATED   ANEU 1.0* 1.3* 1.4*   ABL 0.3* 0.2* 0.3*   ABM 0.3 0.1 0.1   JONNY 0.0  --  0.0   ABB 0.0  --  0.0   AIG 0.0  --  0.0        Recent Labs     03/25/22 0307 03/24/22 0222 03/23/22  0231    139 141   K 4.1 4.0 4.3    106 110*   CO2 28 26 28   AGAP 5* 7 3*   * 142* 123*   BUN 21 23 20   CREA 0.50* 0.40* 0.30*   GFRAA >60 >60 >60   GFRNA >60 >60 >60   CA 7.9* 7.9* 7.6*   * 337* 259*   TP 4.2* 4.4* 4.1*   ALB 1.0* 1.1* 1.1*   GLOB 3.2 3.3 3.0   AGRAT 0.3* 0.3* 0.4*   MG 2.1 2.1 2.2   PHOS 2.6  --  2.1*         Imaging:  IR INSERT CATH PLEURAL INDWELL [[id number]] Collected: 03/22/22 1614   Order Status: Completed Updated: 03/22/22 1652   Narrative:     Title: Dominant PleurX drain placement. Indication: Pancreatic cancer. Recurrent abdominal ascites.  Tunneled abdominal   PleurX drain catheter requested. : Giovanny Simmons PA-C     Supervising Physician: Shekhar Graves M.D. Consent:  Informed written and oral consent was obtained from the patient after   explanation of benefits and risks (including, but not limited to: Infection,   hemorrhage, visceral injury and pneumothorax).  The patient's questions were   answered to their satisfaction. The patient stated understanding and requested   that we proceed.      Procedure:  With the patient supine, the abdomen was prepped and draped in the   standard fashion.  Lidocaine was administered for local field block.  Ultrasound   evaluation was performed. Using real-time ultrasound guidance, with appropriate   image recording, a Yueh needle was advanced into the ascites in the abdomen. Using fluoroscopy, the needle was exchanged over a wire for a peel-away sheath. The PleurX drain was brought through a subcutaneous tunnel and passed down the   peel-away sheath positioning the drain across the midline, lower and the pelvis. The skin incision was closed with absorbable suture.  The catheter was secured   with nonabsorbable suture. A total of 2250 cc of thin yellow fluid was removed.  Bandages were applied. Complications: Large-volume ascites. Medications:  37 minutes based placed under moderate sedation was provided under   the direction and supervision of Aftab Tierney M.D. using frontal and Versed. Continuous cardiopulmonary monitoring was provided by trained independent   observer present. Ancef was infused prior to the procedure. Contrast:  None. Radiation dose:   Fluoroscopy time: 18 seconds. Reference air kerma (mGy): 8   Kerma area product (cGy.cm2):  764   Fluoroscopic images: 1     Findings:  Large volume ascites. Plan: Bedrest for 1 hour. Recommend performing a drainage procedure daily or every-other-day for the next   two weeks in order to promote healing of the catheter site.        CT CHEST W CONT [[id number]] Collected: 03/20/22 1811   Order Status: Completed Updated: 03/20/22 1823   Narrative:     CT CHEST WITH CONTRAST DATED 3/20/2022. History: Echo with extra cardiac structure compressing the left atrium. Comparison: CT abdomen and pelvis with contrast 3/19/2022     Technique:   Multiple contiguous helical CT images reconstructed at 5 mm were   obtained from the neck base to the mid abdomen following the administration of   100 cc of Isovue 370 without acute complication.  All CT scans performed at this   facility use one or all of the following: Automated exposure control, adjustment   of the mA and/or kVp according to patient's size, iterative reconstruction. Findings: The base of the neck is unremarkable in appearance. A left-sided venous port is   present. No lymphadenopathy is seen.  The thoracic aorta is normal in caliber. The heart is not clearly enlarged on the CT. Moderate to advanced   atherosclerotic calcification is seen of the coronary arteries. No significant   pericardial effusion is seen. The only abnormal space-occupying process adjacent   to the heart is a moderate size hiatal hernia. In addition to the stomach, there   is additional herniation of mesenteric fat as well as abdominal fluid. These all   appear to anteriorly displace the heart. The esophagus proximal to the hernia   sac is fluid distended which could indicate reflux. Obstruction at the   gastroesophageal junction is felt to be less likely given the additional fluid   distention of the hernia sac. Evaluation with lung windows demonstrates a trace right, and small left   dependent pleural effusion. These do appear worsened from the prior examination. Nonspecific pulmonary infiltrates are otherwise seen. Lungs are expanded without   evidence for pneumothorax.  No acute osseous abnormality is seen. Mild anasarca   is seen of the chest wall. Limited evaluation of the upper abdomen demonstrate multiple findings which are   not significantly changed and better assessed on a dedicated contrasted CT scan   of the abdomen performed on 3/19/2022. Impression:     1.  The only abnormal space-occupying process adjacent to the heart is a   moderate size hiatal hernia. In addition to the stomach, there is additional   herniation of mesenteric fat as well as abdominal fluid. These all appear to   anteriorly displace the heart.       2. Worsening although trace to small bilateral dependent pleural effusions. Additional mild anasarca seen of the chest wall.  These findings suggest fluid   overload. 3. Nonspecific pulmonary infiltrates. These could represent pulmonary edema   although the distribution is not classic. Additional allergies such as pneumonia   are not excluded if suggested by clinical findings. This report was made using voice transcription. Despite my best efforts to avoid   any, transcription errors may persist. If there is any question about the   accuracy of the report or need for clarification, then please call [phone], or text me through [email or web address] for clarification or correction. CT ABD PELV W CONT [[id number]] Collected: 03/19/22 1300   Order Status: Completed Updated: 03/20/22 1526   Addenda: Addendum: Addendum: A request was made to assess for potential right   gluteal hematoma. There is appear to be edema in the right gluteal soft tissues   which is slightly more pronounced than on the left. No clearly demonstrated defined collection is seen to suggest significant hematoma. The most inferior   gluteal soft tissues have been excluded from the field of imaging. Signed: 03/20/22 1523 by Dez Cruz MD   Narrative:     CT ABDOMEN AND PELVIS WITH INTRAVENOUS CONTRAST DATED 3/19/2022. History: Fall hitting bottom and back. Comparison: CT abdomen and pelvis with contrast 12/27/2021     Technique:   Multiple contiguous helical CT images reconstructed at 5 mm   intervals were obtained from above the diaphragms through the ischial   tuberosities following oral and 100 cc Isovue-370 without acute complication. All CT scans performed at this facility use one or all of the following:   Automated exposure control, adjustment of the mA and/or kVp according to   patient's size, iterative reconstruction. Findings:   CT ABDOMEN:     Limited evaluation of the lung bases and base of the mediastinum demonstrates   nonspecific infiltrates in the bilateral lung bases, left greater than right.  A   small dependent left pleural effusion is seen. A small to moderate size hiatal   hernia is present. Ascites within the abdomen is also seen within the hernia   sac. The Liver is clearly cirrhotic in its appearance. Multiple hepatic masses are   seen the largest of which resides in the inferior right lobe measuring 3 cm in   size. The appearance is highly concerning for malignancy either representing   multifocal hepatomas, or hepatic metastases. Hepatic metastases are favored   given the appearance. Asymmetric intraperitoneal air ductal dilation is seen in   the left lobe of the liver which may be obstructed by a central hepatic mass   seen on axial image 23 measuring 1.1 cm in size. .  The spleen demonstrates an   irregular area of decreased attenuation in the superior pole seen on axial image   17 measuring 3.2 cm x 1.7 cm. This is not as well characterized. This does not   appear to represent an acute defect. This could result from flow artifact.  No   contour deforming or enhancing mass lesions are seen of the adrenal glands. The   pancreas is grossly unchanged in its appearance with the patient having a known   primary pancreas tumor described on the prior study. The gallbladder has been   removed.  The kidneys enhance symmetrically and no evidence of hydronephrosis is   seen. A stable benign cyst is seen in the posterior upper to midpole cortex of   the right kidney measuring 2.6 cm in size. The visualized loops of small bowel and colon are normal in caliber.  The   appendix appears to have been removed. Mild to moderate diverticulosis is seen   of the left colon. No free air is seen. Moderate ascites is seen which   demonstrates low attenuation suggesting simple ascites. Diffuse mesenteric edema   is seen. Abnormal peritoneal nodularity and caking is suggested with additional   peritoneal thickening which at times appears nodular.  This is highly concerning   for peritoneal metastatic process which appears worsened from the prior examination.  No evolving adenopathy is seen.  The abdominal aorta demonstrate   moderate atherosclerotic calcification. No acute osseous abnormality is seen. Compression deformities are seen at T12, L1, and L4 although these have a   chronic appearance and are stable from the prior comparison study. CT PELVIS:   Small to moderate ascites extends into the pelvis. There is once again nodular   peritoneal thickening best appreciated on axial image 73 highly concerning for   evolving peritoneal metastatic disease. There appears to be serosal involvement   of the mid sigmoid colon seen on axial image 74. No abnormal dilation is seen to   suggest significant obstruction at this time. Lossie Mutter evolving pelvic adenopathy is   seen.  The urinary bladder is unremarkable. No acute osseous abnormality is   seen. Impression:     1.  No clear acute injury from recent fall. Specifically, no acute osseous   abnormality is seen. 2. Grossly stable appearance of pancreatic tumor although this is suboptimally   assessed on this exam. However, there is clear evidence for evolving metastatic   disease with new hepatic masses, and multiple findings as described above which   are highly concerning for evolving peritoneal metastatic disease. Lastly,   irregular decreased attenuation is seen in the superior pole of the spleen which   does not appear clearly acute and does not demonstrate adjacent complex ascites. This could represent an additional metastasis although is not as well   characterized. This report was made using voice transcription.  Despite my best efforts to avoid   any, transcription errors may persist. If there is any question about the   accuracy of the report or need for clarification, then please call [phone], or text me through [email or web address] for clarification or correction.     MRI BRAIN W WO CONT [[id number]] Collected: 03/19/22 1649   Order Status: Completed Updated: 03/19/22 1657   Narrative:   EXAMINATION: BRAIN MRI 3/19/2022 4:47 PM     ACCESSION NUMBER: [id number]     INDICATION: 70-year-old male with fall, altered mental status and confusion. History of pancreatic cancer on chemotherapy with recent progression of   intra-abdominal disease, no prior imaging of brain. COMPARISON: CT head 3/19/2022. TECHNIQUE: Multiplanar multisequence MRI of the brain without and with   intravenous administration of 14 mL contrast agent. FINDINGS:     Previously described subcentimeter focus along the posterior limb of the right   internal capsule follows CSF signal intensity on all sequences and is favored to   represent a prominent perivascular space. There is a mild degree of scattered and confluent foci of T2/FLAIR   hyperintensity within the periventricular and deep white matter, nonspecific but   most commonly seen with chronic small vessel ischemic changes. Faint chronic   hemosiderin involving the left posterior putamen. No midline shift or mass lesion. There is no evidence of intracranial hemorrhage   or acute infarct. The ventricles are within normal limits in size and   configuration. There are no extra-axial fluid collections present.  No diffusion   weighted signal abnormality is identified. There is no abnormal enhancement. Impression:       No acute finding or evidence of intracranial metastatic disease. XR ELBOW LT MIN 3 V [[id number]] Collected: 03/19/22 1534   Order Status: Completed Updated: 03/19/22 1538   Narrative:     XR FOREARM LT AP/LAT, XR ELBOW LT MIN 3 V 3/19/2022 3:19 PM     HISTORY: Fall with left arm and left elbow pain. Impression:       Two views left forearm. No fracture or dislocation. No significant soft tissue   swelling. Old healed distal ulnar fracture deformity is present. Three views left elbow. No fracture or dislocation. No significant soft tissue   swelling. No fat pad elevation.    XR FOREARM LT AP/LAT [[id number]] Collected: 03/19/22 1534   Order Status: Completed Updated: 03/19/22 1538   Narrative:     XR FOREARM LT AP/LAT, XR ELBOW LT MIN 3 V 3/19/2022 3:19 PM     HISTORY: Fall with left arm and left elbow pain. Impression:       Two views left forearm. No fracture or dislocation. No significant soft tissue   swelling. Old healed distal ulnar fracture deformity is present. Three views left elbow. No fracture or dislocation. No significant soft tissue   swelling. No fat pad elevation. CT HEAD WO CONT [[id number]] Collected: 03/19/22 1254   Order Status: Completed Updated: 03/19/22 1258   Narrative:     CT BRAIN WITHOUT CONTRAST   3/19/2022 12:26 PM     INDICATION: Fall, altered mental status and confusion. COMPARISON: None available at this hospital PACS     Technique:  Multiple contiguous axial images are obtained encompassing the brain   from the skull base to the vertex.  For this CT scanner at least one of the   following techniques is utilized to decrease patient radiation dose: Automatic   exposure control, KVP and mA modulation based on patient weight, and iterative   reconstruction. FINDINGS: The ventricles are midline and of appropriate size and configuration. Mild hypoattenuating foci seen in the periventricular deep white matter. Inferiorly at the junction of the thalamus and posterior limb of the internal   capsule on the right there is a rounded mildly hypodense focus that measures 9   mm. The midline structures and posterior fossa are intact.  There is no finding of   an acute cortical stroke, mass lesion, or acute bleed.  No extra-axial fluid   collection. The skull is intact, no discreet abnormality. The paranasal sinuses are not   remarkable. The visualized orbits and mastoid air-cells are patent. Impression:     9 mm rounded hypoattenuating focus on the right at the inferior   junction of the thalamus and posterior limb of the internal capsule is noted.    Suspicious for lacunar infarct and of uncertain chronicity-cannot exclude that   this is new. Elsewhere only mild chronic changes in the periventricular white matter   suggested. For further imaging evaluation of this as clinically indicated-recommend brain   MRI with diffusion imaging. XR CHEST Steve Melo [[id number]] Collected: 03/15/22 1259   Order Status: Completed Updated: 03/15/22 1302   Narrative:     Chest X-ray     INDICATION: Hypotension. COMPARISON: Chest x-ray 2/10/2021. A portable AP view of the chest was obtained. FINDINGS: The lungs are clear. There are no infiltrates or effusions. Left chest   port is unchanged in position. No pneumothorax. The heart size is normal.  The   bony thorax is intact.      Impression:     No acute findings in the chest. Lungs remain clear. No interval   change.           Medications:  Current Facility-Administered Medications   Medication Dose Route Frequency    HYDROcodone-acetaminophen (NORCO) 5-325 mg per tablet 2 Tablet  2 Tablet Oral Q4H PRN    TPN ADULT-CENTRAL - dextrose 14% amino acid 8%   IntraVENous QPM    HYDROmorphone (DILAUDID) injection 0.5 mg  0.5 mg IntraVENous Q3H PRN    prochlorperazine (COMPAZINE) with saline injection 5 mg  5 mg IntraVENous Q6H PRN    acetaminophen (TYLENOL) tablet 650 mg  650 mg Oral Q6H PRN    diphenhydrAMINE (BENADRYL) capsule 25 mg  25 mg Oral Q6H PRN    amiodarone (CORDARONE) tablet 400 mg  400 mg Oral BID    fat emulsion 20% (LIPOSYN, INTRAlipid) infusion 250 mL  250 mL IntraVENous QPM    loperamide (IMODIUM) capsule 2 mg  2 mg Oral Q4H PRN    NUTRITIONAL SUPPORT ELECTROLYTE PRN ORDERS   Does Not Apply PRN    sodium chloride (NS) flush 5-10 mL  5-10 mL IntraVENous PRN    diphenoxylate-atropine (LOMOTIL) tablet 1 Tablet  1 Tablet Oral QID PRN    famotidine (PEPCID) tablet 20 mg  20 mg Oral BID    pantoprazole (PROTONIX) tablet 40 mg  40 mg Oral ACB    promethazine (PHENERGAN) tablet 25 mg  25 mg Oral Q6H PRN    tamsulosin (FLOMAX) capsule 0.4 mg  0.4 mg Oral DAILY    ondansetron (ZOFRAN) injection 4 mg  4 mg IntraVENous Q4H PRN         ASSESSMENT:    Problem List  Date Reviewed: 3/15/2022          Codes Class Noted    Chronic systolic congestive heart failure (HCC) ICD-10-CM: I50.22  ICD-9-CM: 428.22, 428.0  3/21/2022        Bradycardia ICD-10-CM: R00.1  ICD-9-CM: 427.89  3/19/2022        LBBB (left bundle branch block) ICD-10-CM: I44.7  ICD-9-CM: 426.3  3/19/2022        FTT (failure to thrive) in adult ICD-10-CM: R62.7  ICD-9-CM: 783.7  3/15/2022        * (Principal) Hypotension ICD-10-CM: I95.9  ICD-9-CM: 458.9  3/15/2022        Severe protein-calorie malnutrition ([facility] ICD-10-CM: E43  ICD-9-CM: 262  3/4/2022        Pancreatic cancer ([facility] ICD-10-CM: C25.9  ICD-9-CM: 157.9  3/2/2022        Ascites ICD-10-CM: R18.8  ICD-9-CM: 789.59  3/2/2022        Admission for antineoplastic chemotherapy ICD-10-CM: Z51.11  ICD-9-CM: V58.11  3/2/2022        Hypomagnesemia ICD-10-CM: E83.42  ICD-9-CM: 275.2  4/23/2021        Hypokalemia ICD-10-CM: E87.6  ICD-9-CM: 276.8  4/20/2021        CINV (chemotherapy-induced nausea and vomiting) ICD-10-CM: R11.2, T45.1X5A  ICD-9-CM: 787.01, E933.1  4/20/2021        Dehydration ICD-10-CM: E86.0  ICD-9-CM: 276.51  4/12/2021        Other neutropenia ([facility] ICD-10-CM: D70.8  ICD-9-CM: 288.09  3/9/2021        Malignant neoplasm of lower third of esophagus (HCC) ICD-10-CM: C15.5  ICD-9-CM: 150.5  1/5/2021        Malignant neoplasm of body of pancreas ([facility] ICD-10-CM: C25.1  ICD-9-CM: 157.1  1/5/2021        Severe obesity ([facility] ICD-10-CM: E66.01  ICD-9-CM: 278.01  12/10/2020        Elevated glucose ICD-10-CM: R73.09  ICD-9-CM: 790.29  7/22/2019        Anemia ICD-10-CM: D64.9  ICD-9-CM: 285.9  7/22/2019        Chronic left-sided low back pain ICD-10-CM: M54.50, G89.29  ICD-9-CM: 724.2, 338.29  7/22/2019              78 y.o.  M pancreatic cancer and esophageal cancer with malignant ascites of previously controlled FOLFIRINOX but currently disease progressed and most recently admited on 3/2/2022 to place Pleurx and arrange gemcitabine/Abraxane, had extensive discussion with inpatient team and pharmacy, patient was discharged on 3/7/2022 without chemo appointment and return to office on 3/15/2022, extremely sick and blood pressure not measurable in the office, and not been having much oral intake since discharge, urgently establish IV access and called EMS to take to ER to stabilize and admit to hospital, apparently needed much supportive care and volume resuscitation, start TPN until oral intake can improve and give gemcitabine/Abraxane once clinically stable, discussed with the inpatient team.    PLAN:    Metastatic pancreatic cancer / distal esophageal cancer  - Metastatic disease involving peritoneum, malignant ascites  - s/p 24 cycles of FOLFIRINOX with SD and recent POD with increasing CEA/ and malignant ascites  - Plan for gemcitabine/abraxane when clinically stable - hopefully tomorrow. 3/17 [place]/abraxane today, tolerated well  3/24 D8 due today, deferring tx d/t cytopenias. Dr. Norma Goodman discussed that if patient's condition does not improve, that he may want to consider Hospice services. Palliative care following. 3/25 Pt wants to con't to pursue treatment as he wants to change his relationship with his children. He does not feel the burden of tx outweighs the benefits and wants to continue tx for as long as he can. D15 due 3/31. Malignant ascites  - Has abdominal Pleurx, drain three times weekly  3/17 Pleurx drained 3/16 - 1100cc out. 3/18 Pleurx accidentally dislodged last night - 1800cc removed before completely removed. IR notified and will re-evaluate Monday. 3/20 Worsening abdominal pain possibly secondary to re-accumulation, some drainage reported at Pleurx site. IR evaluating tomorrow. 3/21 IR to re-evaluate tomorrow, was not NPO today. 3/22 To IR today.   3/23 Abd pleurx replaced yesterday  3/25 Pleurx drained yesterday, 1.1L. Con't to drain prn. Cancer related pain  - Continue home dilaudid  3/17 Utilizing IV morphine. Will decrease dosing and encourage use of home Norco.  3/18 Continue to wean IV meds - encourage PO.  3/20 Has not been receiving IV morphine due to hypotension. Continue with PRN oral medications. 3/22 [facility] to [facility] regarding [address], symptom management. 3/23 PC following. Pt changed code status to DNR, wants to pursue further cancer-related treatment. 3/25 PC following    Hypotension / poor PO intake / protein-calorie malnutrition  - Consult RD for TPN  - BC pending, started on Cefe/Vanc and may be able to de-escalate soon as hypotension likely secondary to dehydration. LA improved after IFV. 3/17 On TPN. Continues on Cefe/Vanc although infectious work-up unremarkable. Will stop antibiotics. 3/18 Hypotension improved and remains afebrile off antibiotics. Continue with nutritional support via TPN.  3/19 Ongoing hypotension, although improved yesterday AM, worse through evening. Had 6L of IVF yesterday and continues on TPN. May consider midodrine although MAP consistently ~65.  3/21 Hypotension improved but borderline, continues TPN.   3/22 Discussed with RD volume status of TPN given new finding of HFrEF and CT chest with evidence of fluid overload. CM following for home TPN. Holding IV morphine. 3/23 BPs improved  3/24 Episode of hypotension last PM, required small fluid bolus, BP improved  3/25 Continues on TPN, however pt declined to be attached to TPN yesterday AM and then disconnected himself from it last night. Nausea  - Continue PRN antiemetics    Diarrhea  - Antidiarrheals PRN  3/24 c/o worsening diarrhea. Check Cdiff.   3/25 Cdiff neg. Con't using antidiarrheals prn. Bradycardia / HFrEF / LV dysfunction / LBBB / ectopy  - One documented episode of HR 43 - check EKG  - No hx of hypertension, monitor  3/17 EKG with bi-geminal PACs and known LBBB.  Recheck EGK.  3/18 EKG with same as above. Palpated pulse on several occasions documented in the 40s. Tele applied overnight. Cardiology following. 3/19 Cardiology recommending K>4 (on TPN) and Mg replacements. Echo pending. 3/21 No bradycardia noted on telemetry. Does have ectopy which may have led to incorrect pulse rate palpation. Echo with EF 35-40%. CT chest completed due to echo findings rebeka noted hiatal hernia/mesenteric fat herniation/abdominal fluid displacing heart and small BL effusions, anasarca. Cardiology following.  3/22 Cardiology considering BB if BP improve for HF/LV dysfunction. Weight up 6# overnight and increased since admission. Clinically no evidence of overload but asking RD to adjust TPN volume. Reported run of NS-SVT - cardiology following. 3/25 Cards following. Hypotension limiting therapy for heart failure. Pancytopenia secondary to chemotherapy  - Transfuse prn per [facility] SOPs  3/25 ANC down to 1000. Start G-CSF. Fall  3/19 Witnessed slip and fall on wet floor yesterday. Today with L arm bruising - check X-ray. Will check CT head and CT AP (hematoma/lipoma palpated on R buttock) and also c/o worsening abdominal pain. 3/20 X-ray of L elbow/forearm negative. CT head with ?lacunar infarct but MRI showed no acute findings and ?lacunar infarct appears to be prominent perivascular space. CT AP with progressing peritoneal/liver disease and ?new splenic lesion. Last imaging obtained 12/2021 for comparison. Elevated LFTs  3/21 Monitor, possibly secondary to TPN or recent chemo. 3/22 Appear to be improving  3/23 AST improved, ALP increased  3/24 Improving    Hypoxia / ?Pneumonia  3/24 On O2 @ 2L now. Check CXR.  3/25 CXR with low lung volumes with b/l infiltrates ?edema vs infx? Check PCT - elevated. Start Cef/Vanc. Continue home meds  [facility] SOPs  AC contraindicated d/t thrombocytopenia    Goals and plan of care reviewed with the patient.   All questions answered to the best of our ability. Disposition:  TBD pending clinical course. PT/OT following - HHPT vs STR. CM following for home TPN. He will need close follow-up with Dr Flora Camacho upon DC. Toby Patel NP   [facility]  [address] [place] [address]  Office : [phone]  Fax : [phone]         Attending Addendum:  I have personally performed a face to face diagnostic evaluation on this patient. I have reviewed and agree with the care plan as documented by Toby Patel, N.P. 36 minutes were spent on patient care, including but not limited to, reviewing the chart and time with the patient and family, more than 50% of the time documented was spent in face-to-face contact with the patient and in the care of the patient on the floor/unit where the patient is located. My findings are as follows:  He has pancreatic cancer and esophageal cancer, appears , heart rate regular without murmurs, abdomen is non-tender, bowel sounds are positive, we will continue TPN and IV ABX.               Adrián Christie MD      [facility]ology/On[facility]  [address] [place] [address]  Office : [phone]  Fax : [phone]

## 2022-03-25 NOTE — PROGRESS NOTES
LOS 10d  Chart reviewed  By [facility] for discharge planning. PT/ OT and Palliative Care are following. Undetermined at this time time what the discharge plan is. Home TPN referral with [facility] has been placed they are following.   Will continue to follow for discharge planning needs  Please consult  if any new issues arise

## 2022-03-25 NOTE — PROGRESS NOTES
Comprehensive Nutrition Assessment    Type and Reason for Visit: Reassess  TPN Management (oncology)    Nutrition Recommendations/Plan:   Parenteral Nutrition:  Total parenteral nutrition  to begin at 1800  Continue: Dex 14%, 8% AA 1.56 L (65ml/hr)   Continue 250 ml 20% lipids daily  Lytes/L: No changes indicated today  Sodium 100 meq (20 meq NaCl, 65 meq NaAcetate, 15 meq NaPhos), Potassium 25 meq (25 meq KPO4), 10 meq Mg, 4.5 meq Calcium (total phos from K and Na 40 meq/L)  Other additives: MTE, MVI due to national shortages  Formula reamins ~1:1 Cl:Acetate  Nutritional Supplement Therapy:   Active electrolyte replacement per nutrition support protocols  Replacement indicated:  None  Labs:   BMP daily  Mg MWF  Phos MWF    POC Glucoses/SSI Not indicated     Malnutrition Assessment:  Malnutrition Status: Severe malnutrition  Context: Chronic illness  Findings of clinical characteristics of malnutrition:   Energy Intake:  7 - 75% or less est energy requirements for 1 month or longer  Weight Loss:  7.0 - Greater than 7.5% over 3 months (34# (18.4%) )     Body Fat Loss:  1 - Mild body fat loss, Buccal region,Orbital,Triceps   Muscle Mass Loss:  1 - Mild muscle mass loss, Calf (gastrocnemius),Hand (interosseous),Scapula (trapezius),Temples (temporalis)  Fluid Accumulation:  No significant fluid accumulation,     Strength:  Not performed     Nutrition Assessment:   Nutrition History: Per RD assessment patient was able to maintain PO intake and UBW throughout most of chemo. During admmission early March patient had \"stopped eating\" due to nausea. Upon assessment this admission patient reports no PO of foods for ~4 weeks. States that he has been able to tolerate some fluids. He reports continued nausea and vomiting as primary barrier. Nutrition Background: Patient with pancreatic and esophageal cancer, Amos's esophagus, obesity, HTN, GERD, gastritis, ascites and peritoneal carcinomatosis s/p Plurex drain.  He presented to oncology office extremely sick, BP was unable to be measured. He was admitted directly to [facility]. Nutrition Interval:  PO intake continues to limited due to early satiety and persistent nausea and likely secondary to peritoneal carcinomatosis, recurrent ascites with Plurex drain. Patient has demonstrated inability to meet needs orally with severe weight loss and malnutrition as above. Intake trends since admission reveal daily PO tolerance of declining now less than 1 meal per day which is not sufficient to sustain weight, functional status, or life. Abdominal pleurex replaced 3/22: 2250 ml removed. Remains TPN dependent d/t peritoneal carcinomatosis, severe malnutrition. Pt sleeping at RD visit, does not awaken to name or light touch. No family present. Discussed with Adrián Kirby RN. Pt is awake at second RD visit, he denies any difficulties, no recollection of IV events overnight. Abdominal Status (last documented): Diarrhea,Distended abdomen with Active  bowel sounds. Last BM 03/25/22. Pertinent Medications: maxipime, pepcid, protonix  PRN last administered: lomotil 3/24 0540; imodium 3/24 0913; zofran 3/25 0758; compazine 3/24 1720; phenergan 3/24 0236  Electrolyte replacement: 3/23: 20 mmol NaPhos  Pertinent Labs:   Lab Results   Component Value Date/Time    Sodium 138 03/25/2022 03:07 AM    Potassium 4.1 03/25/2022 03:07 AM    Chloride 105 03/25/2022 03:07 AM    CO2 28 03/25/2022 03:07 AM    Anion gap 5 (L) 03/25/2022 03:07 AM    Glucose 134 (H) 03/25/2022 03:07 AM    BUN 21 03/25/2022 03:07 AM    Creatinine 0.50 (L) 03/25/2022 03:07 AM    Calcium 7.9 (L) 03/25/2022 03:07 AM    Albumin 1.0 (L) 03/25/2022 03:07 AM    Magnesium 2.1 03/25/2022 03:07 AM    Phosphorus 2.6 03/25/2022 03:07 AM     Lab are remarkable for persistent mildly elevated glucose, corrected phos. TPN formulated 1.5:1 Cl:Acetate ratio 3/22, ~1:1 Cl:Acetate ratio starting 3/23.       Nutrition Related Findings:   Port in place, also with 1 PIV. TPN intiated 3/16. TPN increased in volume 3/19.  TPN to be concentrated and volume decreased 3/22 per NP request.      Current Nutrition Therapies:  ADULT DIET Regular  Current Parenteral Nutrition Orders:  · Type and Formula: Dex 14%, 8% AA    · Lipids: 250ml,Daily  · Duration: Continuous  · Rate/Volume: 1.56 L (65ml/hr)  · Current PN Order Provides: infusing per current order  · Goal PN Orders Provides: 1742 kcal/d (100% of needs), 125 grams of protein/d (100% of needs), 218 grams of CHO/d and 1810 ml of total volume/d    Current Intake:   Average Meal Intake:  (ate choclate ice cream last night with family) Average Supplement Intake: None ordered      Anthropometric Measures:  Height: 5' 6\" (167.6 cm)  Current Body Wt: 77.7 kg (171 lb 4.8 oz) (3/24), Weight source: Standing scale  BMI: 27.7,  (current BMI skewed by fluid)  Admission Body Weight: 148 lb 9.4 oz  Ideal Body Weight (lbs) (Calculated): 142 lbs (65 kg), 104.6 %  Usual Body Wt: 82.6 kg (182 lb) (12/14/21 office wt and per previous history), Percent weight change: -18.4          Edema: No data recorded   Estimated Daily Nutrient Needs:  Energy (kcal/day): 0061-8952 (Kcal/kg (25-30), Weight Used: Current (67.4 kg (3/15))  Protein (g/day):  (1.3-1.5 g/kg) Weight Used: (Admission (67.3 kg))  Fluid (ml/day):   (1 ml/kcal)    Nutrition Diagnosis:   · Inadequate oral intake related to altered GI function,early satiety (peritoneal carcinomatosis ) as evidenced by  (poor oral tolerance, wt loss, requires TPN for primary needs)    · Severe malnutrition related to catabolic illness as evidenced by  (malnutrition criteria as above)    Nutrition Interventions:   Food and/or Nutrient Delivery: Continue current diet,Modify parenteral nutrition     Coordination of Nutrition Care: Continue to monitor while inpatient    Goals:   Previous Goal Met: Goal(s) achieved  Active Goal: Continue to toleate TPN at goal    Nutrition Monitoring and Evaluation:      Food/Nutrient Intake Outcomes: Parenteral nutrition intake/tolerance,Food and nutrient intake  Physical Signs/Symptoms Outcomes: Biochemical data,GI status,Fluid status or edema,Weight    Discharge Planning:    Parenteral nutrition    King Dandy, MA, RD, LDN   Contact: [phone]

## 2022-03-25 NOTE — PROGRESS NOTES
VANCO DAILY FOLLOW UP NOTE  [address] [facility] Pharmacokinetic Monitoring Service - Vancomycin    Consulting Provider: Cranford Hodgkins, NP   Indication: HAP  Target Concentration: Goal AUC/LETY 400-600 mg*hr/L  Day of Therapy: 1  Additional Antimicrobials: cefepime    Pertinent Laboratory Values: Wt Readings from Last 1 Encounters:   03/24/22 77.7 kg (171 lb 4.8 oz)     Temp Readings from Last 1 Encounters:   03/25/22 97.4 °F (36.3 °C)     No components found for: PROCAL  Recent Labs     03/25/22  0307 03/24/22  0222 03/23/22  0231   BUN 21 23 20   CREA 0.50* 0.40* 0.30*   WBC 1.6* 1.7* 1.8*   PCT 0.89*  --   --      Estimated Creatinine Clearance: 100.5 mL/min (A) (by C-G formula based on SCr of 0.5 mg/dL (L)). Lab Results   Component Value Date/Time    Vancomycin, random 24.5 03/17/2022 02:19 AM       MRSA Nasal Swab: not ordered. Order placed by pharmacy. .      Assessment:  Date/Time Dose Concentration AUC         Note: Serum concentrations collected for AUC dosing may appear elevated if collected in close proximity to the dose administered, this is not necessarily an indication of toxicity    Plan:  Dosing recommendations based on Bayesian software  Start vancomycin with 1500 mg x 1 dose and then 1250 mg q12h  Anticipated AUC of 462 and trough concentration of 13.2 at steady state  Renal labs as indicated   Vancomycin concentrations will be ordered as clinically appropriate   Pharmacy will continue to monitor patient and adjust therapy as indicated    Thank you for the consult,  Corinna Dc, PharmD, BCOP  Clinical Pharmacist  Contact via Perfect Serve

## 2022-03-25 NOTE — PROGRESS NOTES
Attempted PT however Mr. Ivette Loyd had just gotten back to bed after using [facility] and he was worn out. Return as time allows.   Duncan Medrano, PT

## 2022-03-25 NOTE — PROGRESS NOTES
END OF SHIFT NOTE:    Intake/Output  No intake/output data recorded. Voiding: YES  Catheter: NO  Drain:   Pleural Catheter/Drain 03/22/22 15.5 fr x 66 cm Right (Active)   Drainage Description Yellow 03/25/22 0930   Site Assessment Clean, dry, & intact 03/25/22 0930   Dressing Status New;Clean, dry, & intact 03/25/22 0930   Dressing Type Gauze;Transparent 03/25/22 0930   Output (ml) 1100 ml 03/25/22 0930               Stool:  2 occurrences. Stool Assessment  Stool Color: Yellow (03/25/22 1424)  Stool Appearance: Loose; Watery (03/25/22 1424)  Stool Amount: Small (03/25/22 1424)  Stool Source/Status: Rectum (03/25/22 1424)    Emesis:  0 occurrences. VITAL SIGNS  Patient Vitals for the past 12 hrs:   Temp Pulse Resp BP SpO2   03/25/22 1516 97.6 °F (36.4 °C) 86 18 107/67 97 %   03/25/22 1358 -- -- -- 96/65 --   03/25/22 1115 97.5 °F (36.4 °C) 94 22 119/66 91 %   03/25/22 0746 -- -- -- -- 94 %   03/25/22 0735 97.4 °F (36.3 °C) 87 16 106/70 94 %       Pain Assessment  Pain 1  Pain Scale 1: Numeric (0 - 10) (03/25/22 1358)  Pain Intensity 1: 5 (03/25/22 1358)  Patient Stated Pain Goal: 0 (03/25/22 1358)  Pain Reassessment 1: Yes (03/25/22 0815)  Pain Onset 1: pta (03/25/22 0026)  Pain Location 1: Abdomen (03/25/22 0735)  Pain Orientation 1: Right (03/25/22 0026)  Pain Description 1: Aching (03/25/22 0026)  Pain Intervention(s) 1: Medication (see MAR) (03/25/22 1358)    Ambulating  Yes    Additional Information:  - started cefepime and vancomycin   - began filgrastrim. - had diarrhea twice: CDiff came back neg  - drained 1100ml from pleurex drain. Constant complaining of belly pain   - elevated procalcitonin. No fevers today     Shift report given to oncoming nurse at the bedside.     Erika Brandt, RN

## 2022-03-25 NOTE — PROGRESS NOTES
Palliative Care Progress Note    Patient: Caden Thompson MRN: [id number]  SSN: [id number]    YOB: 1955  Age: 78 y.o. Sex: male       Assessment/Plan:     Chief Complaint/Interval History: reports he feels a little better today       Principal Diagnosis:    · Pain, abdomen  R10.9    Additional Diagnoses:   · Ascites  R18.8  · Debility, Unspecified  R53.81  · Frailty  R54  · Nausea/Vomiting  R11.2  · Counseling, Encounter for Medical Advice  Z71.9  · Encounter for Palliative Care  Z51.5    Palliative Performance Scale (PPS)       Medical Decision Making:   Reviewed and summarized notes over last 24 hours   Discussed case with appropriate providers  Reviewed laboratory and x-ray data over last 24 hours     Pt resting in bed, no distress noted. No visitors at bedside. Pt appears happier today. He states his daughter visited last night, and brought her children. He reports the visit really lifted his spirits. The nurse drained 1100 ml of ascites from his Pleurx yesterday, and this has helped his abdominal pain and nausea. He continues to utilize Dilaudid IV PRN. Encouraged use of Norco 5 mg PO q 4 hours PRN, which he requested yesterday. Pt's platelets remain too low for D8 gem/abraxane, and he remains on TPN. Pt denies needs at this time. Will continue to follow. Will discuss findings with members of the interdisciplinary team.         More than 50% of this 25 minute visit was spent counseling and coordination of care as outlined above. Subjective:     Review of Systems:  A comprehensive review of systems was negative except for:   Constitutional: Positive for fatigue.   Gastrointestinal: Positive for abdominal pain, nausea      Objective:     Visit Vitals  /70 (BP 1 Location: Right upper arm, BP Patient Position: At rest;Lying left side)   Pulse 87   Temp 97.4 °F (36.3 °C)   Resp 16   Ht 5' 6\" (1.676 m)   Wt 171 lb 4.8 oz (77.7 kg)   SpO2 94%   BMI 27.65 kg/m²       Physical Exam:    General:  Cooperative. Debilitated. Eyes:  Conjunctivae/corneas clear    Nose: Nares normal. Septum midline. Neck: Supple, symmetrical, trachea midline   Lungs:   Clear to auscultation bilaterally, unlabored   Heart:  Regular rate and rhythm   Abdomen:   Soft, mildly tender, non-distended.  Pleurx catheter   Extremities: Normal, atraumatic, no cyanosis or edema   Skin: Skin color, texture, turgor normal.    Neurologic: Nonfocal   Psych: Alert and oriented     Signed By: Tiara Hagen NP     March 25, 2022

## 2022-03-25 NOTE — PROGRESS NOTES
[facility] CARDIOLOGY PROGRESS NOTE           3/25/2022 10:55 AM    Admit Date: 3/15/2022         Subjective: Not much in the way of SVT yesterday or today. Remains feeling poorly. ROS:  Cardiovascular:  As noted above    Objective:      Vitals:    03/24/22 2313 03/25/22 0305 03/25/22 0735 03/25/22 0746   BP: 119/67 (!) 83/67 106/70    Pulse: 79 78 87    Resp: 18 18 16    Temp: 98.7 °F (37.1 °C) 98.1 °F (36.7 °C) 97.4 °F (36.3 °C)    SpO2: 98% 92% 94% 94%   Weight:       Height:           On telemetry:sr      Physical Exam:  General-No Acute Distress  Neck- supple, no JVD  CV- regular rate and rhythm with ectopy  Lung- clear bilaterally  Abd- soft, nontender, nondistended  Ext- trivial edema bilaterally. Skin- warm and dry       Data Review:   Recent Labs     03/25/22  0307 03/24/22  0222    139   K 4.1 4.0   MG 2.1 2.1   BUN 21 23   CREA 0.50* 0.40*   * 142*   WBC 1.6* 1.7*   HGB 9.3* 10.4*   HCT 29.0* 33.1*   PLT 16* 23*       No results for input(s): TNIPOC, TROIQ in the last 72 hours. Assessment/Plan:     Principal Problem:    Hypotension (3/15/2022)    Active Problems:    Malignant neoplasm of body of pancreas ([address] (1/5/2021)      Severe protein-calorie malnutrition ([address] (3/4/2022)      FTT (failure to thrive) in adult (3/15/2022)      Bradycardia (3/19/2022)      LBBB (left bundle branch block) (3/19/2022)      Chronic systolic congestive heart failure ([address] (3/21/2022)    A/P  1) SVT - continue amiodarone, continue tele, this is the 4th day of amiodarone 400 mg bid, will continue through 3/28 then reduce to 200 mg BID on 3/29.   2) Pancreatic cancer - per primary team  3) Heart failure - lvef 35-40% - bp is limiting therapy for heart failure      Zhen Casillas MD  3/25/2022 10:55 AM

## 2022-03-25 NOTE — PROGRESS NOTES
1910 Pt disconnected TPN line from port cap and was bleeding profusely. Cleaned pt and instructed not to manipulate IV's. Pt verbalizes understanding. Pt AxO. BP's remain low but with MAP > 65. Medicated once with Dilaudid IV prn abdominal pain. Loose stool x 1. Specimen collected and sent to lab to r/o C. Diff. T max 99.1  0349 Critical CBC readback/addressed per SOP's. Chemo was held yesterday secondary to cytopenias. Daughter and grandchildren visited last night and ate chocolate ice cream with pt. Very pleasant gentleman. Continue with POC. Report to be given to oncoming RN.

## 2022-03-26 LAB
ALBUMIN SERPL-MCNC: 1.1 G/DL (ref 3.2–4.6)
ALBUMIN/GLOB SERPL: 0.3 {RATIO} (ref 1.2–3.5)
ALP SERPL-CCNC: 382 U/L (ref 50–136)
ALT SERPL-CCNC: 51 U/L (ref 12–65)
ANION GAP SERPL CALC-SCNC: 6 MMOL/L (ref 7–16)
AST SERPL-CCNC: 52 U/L (ref 15–37)
BASOPHILS # BLD: 0 K/UL (ref 0–0.2)
BASOPHILS NFR BLD: 1 % (ref 0–2)
BILIRUB SERPL-MCNC: 0.7 MG/DL (ref 0.2–1.1)
BUN SERPL-MCNC: 20 MG/DL (ref 8–23)
CALCIUM SERPL-MCNC: 8.1 MG/DL (ref 8.3–10.4)
CHLORIDE SERPL-SCNC: 105 MMOL/L (ref 98–107)
CO2 SERPL-SCNC: 25 MMOL/L (ref 21–32)
CREAT SERPL-MCNC: 0.4 MG/DL (ref 0.8–1.5)
DIFFERENTIAL METHOD BLD: ABNORMAL
EOSINOPHIL # BLD: 0.1 K/UL (ref 0–0.8)
EOSINOPHIL NFR BLD: 2 % (ref 0.5–7.8)
ERYTHROCYTE [DISTWIDTH] IN BLOOD BY AUTOMATED COUNT: 14.8 % (ref 11.9–14.6)
GLOBULIN SER CALC-MCNC: 3.6 G/DL (ref 2.3–3.5)
GLUCOSE SERPL-MCNC: 93 MG/DL (ref 65–100)
HCT VFR BLD AUTO: 33 % (ref 41.1–50.3)
HGB BLD-MCNC: 10.6 G/DL (ref 13.6–17.2)
IMM GRANULOCYTES # BLD AUTO: 0 K/UL (ref 0–0.5)
IMM GRANULOCYTES NFR BLD AUTO: 1 % (ref 0–5)
LYMPHOCYTES # BLD: 0.4 K/UL (ref 0.5–4.6)
LYMPHOCYTES NFR BLD: 9 % (ref 13–44)
MAGNESIUM SERPL-MCNC: 2.1 MG/DL (ref 1.8–2.4)
MCH RBC QN AUTO: 29.6 PG (ref 26.1–32.9)
MCHC RBC AUTO-ENTMCNC: 32.1 G/DL (ref 31.4–35)
MCV RBC AUTO: 92.2 FL (ref 79.6–97.8)
MONOCYTES # BLD: 0.8 K/UL (ref 0.1–1.3)
MONOCYTES NFR BLD: 20 % (ref 4–12)
NEUTS SEG # BLD: 2.6 K/UL (ref 1.7–8.2)
NEUTS SEG NFR BLD: 67 % (ref 43–78)
NRBC # BLD: 0.05 K/UL (ref 0–0.2)
PLATELET # BLD AUTO: 23 K/UL (ref 150–450)
PLATELET COMMENTS,PCOM: ABNORMAL
PMV BLD AUTO: ABNORMAL FL (ref 9.4–12.3)
POTASSIUM SERPL-SCNC: 3.9 MMOL/L (ref 3.5–5.1)
PROT SERPL-MCNC: 4.7 G/DL (ref 6.3–8.2)
RBC # BLD AUTO: 3.58 M/UL (ref 4.23–5.6)
RBC MORPH BLD: ABNORMAL
SODIUM SERPL-SCNC: 136 MMOL/L (ref 138–145)
VANCOMYCIN SERPL-MCNC: 35 UG/ML
WBC # BLD AUTO: 3.9 K/UL (ref 4.3–11.1)
WBC MORPH BLD: ABNORMAL

## 2022-03-26 PROCEDURE — 3331090002 HH PPS REVENUE DEBIT

## 2022-03-26 PROCEDURE — 74011000250 HC RX REV CODE- 250: Performed by: INTERNAL MEDICINE

## 2022-03-26 PROCEDURE — 74011000258 HC RX REV CODE- 258: Performed by: INTERNAL MEDICINE

## 2022-03-26 PROCEDURE — 99233 SBSQ HOSP IP/OBS HIGH 50: CPT | Performed by: INTERNAL MEDICINE

## 2022-03-26 PROCEDURE — 74011000250 HC RX REV CODE- 250: Performed by: NURSE PRACTITIONER

## 2022-03-26 PROCEDURE — 74011250637 HC RX REV CODE- 250/637: Performed by: INTERNAL MEDICINE

## 2022-03-26 PROCEDURE — 85025 COMPLETE CBC W/AUTO DIFF WBC: CPT

## 2022-03-26 PROCEDURE — 36591 DRAW BLOOD OFF VENOUS DEVICE: CPT

## 2022-03-26 PROCEDURE — 74011000258 HC RX REV CODE- 258: Performed by: NURSE PRACTITIONER

## 2022-03-26 PROCEDURE — 74011250636 HC RX REV CODE- 250/636: Performed by: NURSE PRACTITIONER

## 2022-03-26 PROCEDURE — 65270000029 HC RM PRIVATE

## 2022-03-26 PROCEDURE — 80053 COMPREHEN METABOLIC PANEL: CPT

## 2022-03-26 PROCEDURE — 74011250636 HC RX REV CODE- 250/636: Performed by: INTERNAL MEDICINE

## 2022-03-26 PROCEDURE — 83735 ASSAY OF MAGNESIUM: CPT

## 2022-03-26 PROCEDURE — 3331090001 HH PPS REVENUE CREDIT

## 2022-03-26 PROCEDURE — 80202 ASSAY OF VANCOMYCIN: CPT

## 2022-03-26 PROCEDURE — 74011250637 HC RX REV CODE- 250/637: Performed by: NURSE PRACTITIONER

## 2022-03-26 RX ADMIN — VANCOMYCIN HYDROCHLORIDE 750 MG: 750 INJECTION, POWDER, LYOPHILIZED, FOR SOLUTION INTRAVENOUS at 15:08

## 2022-03-26 RX ADMIN — VANCOMYCIN HYDROCHLORIDE 750 MG: 750 INJECTION, POWDER, LYOPHILIZED, FOR SOLUTION INTRAVENOUS at 23:18

## 2022-03-26 RX ADMIN — HYDROMORPHONE HYDROCHLORIDE 0.5 MG: 1 INJECTION, SOLUTION INTRAMUSCULAR; INTRAVENOUS; SUBCUTANEOUS at 19:22

## 2022-03-26 RX ADMIN — CEFEPIME HYDROCHLORIDE 2 G: 2 INJECTION, POWDER, FOR SOLUTION INTRAVENOUS at 20:07

## 2022-03-26 RX ADMIN — TAMSULOSIN HYDROCHLORIDE 0.4 MG: 0.4 CAPSULE ORAL at 08:17

## 2022-03-26 RX ADMIN — CEFEPIME HYDROCHLORIDE 2 G: 2 INJECTION, POWDER, FOR SOLUTION INTRAVENOUS at 11:24

## 2022-03-26 RX ADMIN — HYDROMORPHONE HYDROCHLORIDE 0.5 MG: 1 INJECTION, SOLUTION INTRAMUSCULAR; INTRAVENOUS; SUBCUTANEOUS at 23:25

## 2022-03-26 RX ADMIN — FAMOTIDINE 20 MG: 20 TABLET, FILM COATED ORAL at 08:17

## 2022-03-26 RX ADMIN — ONDANSETRON 4 MG: 2 INJECTION INTRAMUSCULAR; INTRAVENOUS at 05:01

## 2022-03-26 RX ADMIN — PANTOPRAZOLE SODIUM 40 MG: 40 TABLET, DELAYED RELEASE ORAL at 08:17

## 2022-03-26 RX ADMIN — FAMOTIDINE 20 MG: 20 TABLET, FILM COATED ORAL at 17:30

## 2022-03-26 RX ADMIN — HYDROMORPHONE HYDROCHLORIDE 0.5 MG: 1 INJECTION, SOLUTION INTRAMUSCULAR; INTRAVENOUS; SUBCUTANEOUS at 15:10

## 2022-03-26 RX ADMIN — ONDANSETRON 4 MG: 2 INJECTION INTRAMUSCULAR; INTRAVENOUS at 19:22

## 2022-03-26 RX ADMIN — HYDROMORPHONE HYDROCHLORIDE 0.5 MG: 1 INJECTION, SOLUTION INTRAMUSCULAR; INTRAVENOUS; SUBCUTANEOUS at 05:01

## 2022-03-26 RX ADMIN — AMIODARONE HYDROCHLORIDE 400 MG: 200 TABLET ORAL at 08:17

## 2022-03-26 RX ADMIN — CEFEPIME HYDROCHLORIDE 2 G: 2 INJECTION, POWDER, FOR SOLUTION INTRAVENOUS at 03:52

## 2022-03-26 RX ADMIN — ONDANSETRON 4 MG: 2 INJECTION INTRAMUSCULAR; INTRAVENOUS at 15:09

## 2022-03-26 RX ADMIN — SODIUM CHLORIDE 5 MG: 9 INJECTION INTRAMUSCULAR; INTRAVENOUS; SUBCUTANEOUS at 08:13

## 2022-03-26 RX ADMIN — SODIUM ACETATE: 164 INJECTION, SOLUTION, CONCENTRATE INTRAVENOUS at 17:31

## 2022-03-26 RX ADMIN — HYDROCODONE BITARTRATE AND ACETAMINOPHEN 1 TABLET: 5; 325 TABLET ORAL at 17:00

## 2022-03-26 RX ADMIN — AMIODARONE HYDROCHLORIDE 400 MG: 200 TABLET ORAL at 17:30

## 2022-03-26 RX ADMIN — I.V. FAT EMULSION 250 ML: 20 EMULSION INTRAVENOUS at 17:31

## 2022-03-26 RX ADMIN — FILGRASTIM-AAFI 300 MCG: 300 INJECTION, SOLUTION SUBCUTANEOUS at 08:16

## 2022-03-26 NOTE — PROGRESS NOTES
VANCO DAILY FOLLOW UP NOTE  [address] [facility] Pharmacokinetic Monitoring Service - Vancomycin    Consulting Provider: Mónica Cintron NP   Indication: HAP  Target Concentration: Goal AUC/LETY 400-600 mg*hr/L  Day of Therapy: 2  Additional Antimicrobials: cefepime    Pertinent Laboratory Values: Wt Readings from Last 1 Encounters:   03/25/22 78 kg (171 lb 15.3 oz)     Temp Readings from Last 1 Encounters:   03/26/22 98.3 °F (36.8 °C)     No components found for: PROCAL  Recent Labs     03/26/22  0352 03/25/22  0307 03/24/22  0222   BUN 20 21 23   CREA 0.40* 0.50* 0.40*   WBC 3.9* 1.6* 1.7*   PCT  --  0.89*  --      Estimated Creatinine Clearance: 100.7 mL/min (A) (by C-G formula based on SCr of 0.4 mg/dL (L)). Lab Results   Component Value Date/Time    Vancomycin, random 35.0 03/26/2022 03:52 AM       MRSA Nasal Swab: not ordered. Order placed by pharmacy. .      Assessment:  Date/Time Dose Concentration AUC   3/26 0352 1250 mg q12h 35 698   Note: Serum concentrations collected for AUC dosing may appear elevated if collected in close proximity to the dose administered, this is not necessarily an indication of toxicity    Plan:  Current dosing regimen is supra-therapeutic  Decrease dose to 750 mg q12h for predicted AUC/Tr of 420/11.4  Repeat vancomycin concentrations will be ordered as clinically appropriate   Pharmacy will continue to monitor patient and adjust therapy as indicated    Thank you for the consult,  Megha Fong, PharmD, BCOP  Clinical Pharmacist  Contact via Perfect Serve

## 2022-03-26 NOTE — PROGRESS NOTES
[facility]        Inpatient Hematology / Oncology Progress Note      Admission Date: 3/15/2022 10:28 AM  Reason for Admission/Hospital Course: FTT (failure to thrive) in adult [R62.7]  Hypotension [I95.9]      24 Hour Events:  Afebrile, hypotensive at times, on O2 @ 2L  On TPN  S/p C1D1 [place]/abraxane on 3/17   D8 due 3/24, held d/t cytopenias  CXR with possible pneumonia; PCT elevated; on cef/vanc. Transfusions: None  Replacements: None    ROS:  Constitutional: +weakness/fatigue. Negative for fever, chills. CV: Negative for chest pain, palpitations, edema. Respiratory: Negative for dyspnea, cough, wheezing. GI: +N/V (better), diarrhea. Negative for abdominal pain. 10 point review of systems is otherwise negative with the exception of the elements mentioned above in the HPI. No Known Allergies    OBJECTIVE:  Patient Vitals for the past 8 hrs:   BP Temp Pulse Resp SpO2   22 1102 119/76 97.8 °F (36.6 °C) 83 28 94 %   22 0745 117/72 98.3 °F (36.8 °C) 81 24 94 %     Temp (24hrs), Av.9 °F (36.6 °C), Min:97.3 °F (36.3 °C), Max:98.3 °F (36.8 °C)     0701 -  1900  In: 36 [P.O.:355]  Out: 1550 [Urine:50; Drains:1500]    Physical Exam:  Constitutional: Thin, cachectic elderly male in no acute distress, lying comfortably in the hospital bed. HEENT: Normocephalic and atraumatic. Oropharynx is clear, mucous membranes are moist. Extraocular muscles are intact. Sclerae anicteric. Neck supple without JVD. No thyromegaly present. Skin +R gluteal hematoma/lipoma palpated. Warm and dry. No bruising and no rash noted. No erythema. No pallor. Respiratory Lungs are clear to auscultation bilaterally without wheezes, rales or rhonchi, normal air exchange without accessory muscle use. CVS Normal rate, regular rhythm and normal S1 and S2. No murmurs, gallops, or rubs. Abdomen Soft, nontender and distended, normoactive bowel sounds.   +Abd pleurx   Neuro Grossly nonfocal with no obvious sensory or motor deficits. MSK Normal range of motion in general.  No edema and no tenderness. Psych Appropriate mood and affect. Labs:      Recent Labs     03/26/22 0352 03/25/22 0307 03/24/22 0222   WBC 3.9* 1.6* 1.7*   RBC 3.58* 3.08* 3.51*   HGB 10.6* 9.3* 10.4*   HCT 33.0* 29.0* 33.1*   MCV 92.2 94.2 94.3   MCH 29.6 30.2 29.6   MCHC 32.1 32.1 31.4   RDW 14.8* 15.2* 15.3*   PLT 23* 16* 23*   GRANS 67 59 79*   LYMPH 9* 17 13*   MONOS 20* 21* 7   EOS 2 1  --    BASOS 1 0  --    IG 1 2  --    DF AUTOMATED MANUAL AUTOMATED   ANEU 2.6 1.0* 1.3*   ABL 0.4* 0.3* 0.2*   ABM 0.8 0.3 0.1   JONNY 0.1 0.0  --    ABB 0.0 0.0  --    AIG 0.0 0.0  --         Recent Labs     03/26/22 0352 03/25/22 0307 03/24/22 0222   * 138 139   K 3.9 4.1 4.0    105 106   CO2 25 28 26   AGAP 6* 5* 7   GLU 93 134* 142*   BUN 20 21 23   CREA 0.40* 0.50* 0.40*   GFRAA >60 >60 >60   GFRNA >60 >60 >60   CA 8.1* 7.9* 7.9*   * 286* 337*   TP 4.7* 4.2* 4.4*   ALB 1.1* 1.0* 1.1*   GLOB 3.6* 3.2 3.3   AGRAT 0.3* 0.3* 0.3*   MG 2.1 2.1 2.1   PHOS  --  2.6  --          Imaging:  IR INSERT CATH PLEURAL INDWELL [[id number]] Collected: 03/22/22 1614   Order Status: Completed Updated: 03/22/22 1652   Narrative:     Title: Dominant PleurX drain placement. Indication: Pancreatic cancer. Recurrent abdominal ascites.  Tunneled abdominal   PleurX drain catheter requested. : Elmo Rubinstein, PA-C     Supervising Physician: Eben Hernández M.D. Consent:  Informed written and oral consent was obtained from the patient after   explanation of benefits and risks (including, but not limited to: Infection,   hemorrhage, visceral injury and pneumothorax).  The patient's questions were   answered to their satisfaction. The patient stated understanding and requested   that we proceed.      Procedure:  With the patient supine, the abdomen was prepped and draped in the   standard joselin. Miles Romp was administered for local field block.  Ultrasound   evaluation was performed. Using real-time ultrasound guidance, with appropriate   image recording, a Yueh needle was advanced into the ascites in the abdomen. Using fluoroscopy, the needle was exchanged over a wire for a peel-away sheath. The PleurX drain was brought through a subcutaneous tunnel and passed down the   peel-away sheath positioning the drain across the midline, lower and the pelvis. The skin incision was closed with absorbable suture.  The catheter was secured   with nonabsorbable suture. A total of 2250 cc of thin yellow fluid was removed.  Bandages were applied. Complications: Large-volume ascites. Medications:  37 minutes based placed under moderate sedation was provided under   the direction and supervision of Phyllis Councilman, M.D. using frontal and Versed. Continuous cardiopulmonary monitoring was provided by trained independent   observer present. Ancef was infused prior to the procedure. Contrast:  None. Radiation dose:   Fluoroscopy time: 18 seconds. Reference air kerma (mGy): 8   Kerma area product (cGy.cm2):  171   Fluoroscopic images: 1     Findings:  Large volume ascites. Plan: Bedrest for 1 hour. Recommend performing a drainage procedure daily or every-other-day for the next   two weeks in order to promote healing of the catheter site.        CT CHEST W CONT [[id number]] Collected: 03/20/22 1811   Order Status: Completed Updated: 03/20/22 1823   Narrative:     CT CHEST WITH CONTRAST DATED 3/20/2022. History: Echo with extra cardiac structure compressing the left atrium. Comparison: CT abdomen and pelvis with contrast 3/19/2022     Technique:   Multiple contiguous helical CT images reconstructed at 5 mm were   obtained from the neck base to the mid abdomen following the administration of   100 cc of Isovue 370 without acute complication.  All CT scans performed at this facility use one or all of the following: Automated exposure control, adjustment   of the mA and/or kVp according to patient's size, iterative reconstruction. Findings: The base of the neck is unremarkable in appearance. A left-sided venous port is   present. No lymphadenopathy is seen.  The thoracic aorta is normal in caliber. The heart is not clearly enlarged on the CT. Moderate to advanced   atherosclerotic calcification is seen of the coronary arteries. No significant   pericardial effusion is seen. The only abnormal space-occupying process adjacent   to the heart is a moderate size hiatal hernia. In addition to the stomach, there   is additional herniation of mesenteric fat as well as abdominal fluid. These all   appear to anteriorly displace the heart. The esophagus proximal to the hernia   sac is fluid distended which could indicate reflux. Obstruction at the   gastroesophageal junction is felt to be less likely given the additional fluid   distention of the hernia sac. Evaluation with lung windows demonstrates a trace right, and small left   dependent pleural effusion. These do appear worsened from the prior examination. Nonspecific pulmonary infiltrates are otherwise seen. Lungs are expanded without   evidence for pneumothorax.  No acute osseous abnormality is seen. Mild anasarca   is seen of the chest wall. Limited evaluation of the upper abdomen demonstrate multiple findings which are   not significantly changed and better assessed on a dedicated contrasted CT scan   of the abdomen performed on 3/19/2022. Impression:     1.  The only abnormal space-occupying process adjacent to the heart is a   moderate size hiatal hernia. In addition to the stomach, there is additional   herniation of mesenteric fat as well as abdominal fluid. These all appear to   anteriorly displace the heart.       2. Worsening although trace to small bilateral dependent pleural effusions.    Additional mild anasarca seen of the chest wall. These findings suggest fluid   overload. 3. Nonspecific pulmonary infiltrates. These could represent pulmonary edema   although the distribution is not classic. Additional allergies such as pneumonia   are not excluded if suggested by clinical findings. This report was made using voice transcription. Despite my best efforts to avoid   any, transcription errors may persist. If there is any question about the   accuracy of the report or need for clarification, then please call [phone], or text me through [email or web address] for clarification or correction. CT ABD PELV W CONT [[id number]] Collected: 03/19/22 1300   Order Status: Completed Updated: 03/20/22 1526   Addenda: Addendum: Addendum: A request was made to assess for potential right   gluteal hematoma. There is appear to be edema in the right gluteal soft tissues   which is slightly more pronounced than on the left. No clearly demonstrated defined collection is seen to suggest significant hematoma. The most inferior   gluteal soft tissues have been excluded from the field of imaging. Signed: 03/20/22 1523 by Rajwinder Zamarripa MD   Narrative:     CT ABDOMEN AND PELVIS WITH INTRAVENOUS CONTRAST DATED 3/19/2022. History: Fall hitting bottom and back. Comparison: CT abdomen and pelvis with contrast 12/27/2021     Technique:   Multiple contiguous helical CT images reconstructed at 5 mm   intervals were obtained from above the diaphragms through the ischial   tuberosities following oral and 100 cc Isovue-370 without acute complication. All CT scans performed at this facility use one or all of the following:   Automated exposure control, adjustment of the mA and/or kVp according to   patient's size, iterative reconstruction.      Findings:   CT ABDOMEN:     Limited evaluation of the lung bases and base of the mediastinum demonstrates   nonspecific infiltrates in the bilateral lung bases, left greater than right. A   small dependent left pleural effusion is seen. A small to moderate size hiatal   hernia is present. Ascites within the abdomen is also seen within the hernia   sac. The Liver is clearly cirrhotic in its appearance. Multiple hepatic masses are   seen the largest of which resides in the inferior right lobe measuring 3 cm in   size. The appearance is highly concerning for malignancy either representing   multifocal hepatomas, or hepatic metastases. Hepatic metastases are favored   given the appearance. Asymmetric intraperitoneal air ductal dilation is seen in   the left lobe of the liver which may be obstructed by a central hepatic mass   seen on axial image 23 measuring 1.1 cm in size. .  The spleen demonstrates an   irregular area of decreased attenuation in the superior pole seen on axial image   17 measuring 3.2 cm x 1.7 cm. This is not as well characterized. This does not   appear to represent an acute defect. This could result from flow artifact.  No   contour deforming or enhancing mass lesions are seen of the adrenal glands. The   pancreas is grossly unchanged in its appearance with the patient having a known   primary pancreas tumor described on the prior study. The gallbladder has been   removed.  The kidneys enhance symmetrically and no evidence of hydronephrosis is   seen. A stable benign cyst is seen in the posterior upper to midpole cortex of   the right kidney measuring 2.6 cm in size. The visualized loops of small bowel and colon are normal in caliber.  The   appendix appears to have been removed. Mild to moderate diverticulosis is seen   of the left colon. No free air is seen. Moderate ascites is seen which   demonstrates low attenuation suggesting simple ascites. Diffuse mesenteric edema   is seen. Abnormal peritoneal nodularity and caking is suggested with additional   peritoneal thickening which at times appears nodular.  This is highly concerning   for peritoneal metastatic process which appears worsened from the prior   examination.  No evolving adenopathy is seen.  The abdominal aorta demonstrate   moderate atherosclerotic calcification. No acute osseous abnormality is seen. Compression deformities are seen at T12, L1, and L4 although these have a   chronic appearance and are stable from the prior comparison study. CT PELVIS:   Small to moderate ascites extends into the pelvis. There is once again nodular   peritoneal thickening best appreciated on axial image 73 highly concerning for   evolving peritoneal metastatic disease. There appears to be serosal involvement   of the mid sigmoid colon seen on axial image 74. No abnormal dilation is seen to   suggest significant obstruction at this time. Christel Cheeks evolving pelvic adenopathy is   seen.  The urinary bladder is unremarkable. No acute osseous abnormality is   seen. Impression:     1.  No clear acute injury from recent fall. Specifically, no acute osseous   abnormality is seen. 2. Grossly stable appearance of pancreatic tumor although this is suboptimally   assessed on this exam. However, there is clear evidence for evolving metastatic   disease with new hepatic masses, and multiple findings as described above which   are highly concerning for evolving peritoneal metastatic disease. Lastly,   irregular decreased attenuation is seen in the superior pole of the spleen which   does not appear clearly acute and does not demonstrate adjacent complex ascites. This could represent an additional metastasis although is not as well   characterized. This report was made using voice transcription.  Despite my best efforts to avoid   any, transcription errors may persist. If there is any question about the   accuracy of the report or need for clarification, then please call [phone], or text me through [email or web address] for clarification or correction.     MRI BRAIN W WO CONT [[id number]] Collected: 03/19/22 8008   Order Status: Completed Updated: 03/19/22 1657   Narrative:     EXAMINATION: BRAIN MRI 3/19/2022 4:47 PM     ACCESSION NUMBER: [id number]     INDICATION: 48-year-old male with fall, altered mental status and confusion. History of pancreatic cancer on chemotherapy with recent progression of   intra-abdominal disease, no prior imaging of brain. COMPARISON: CT head 3/19/2022. TECHNIQUE: Multiplanar multisequence MRI of the brain without and with   intravenous administration of 14 mL contrast agent. FINDINGS:     Previously described subcentimeter focus along the posterior limb of the right   internal capsule follows CSF signal intensity on all sequences and is favored to   represent a prominent perivascular space. There is a mild degree of scattered and confluent foci of T2/FLAIR   hyperintensity within the periventricular and deep white matter, nonspecific but   most commonly seen with chronic small vessel ischemic changes. Faint chronic   hemosiderin involving the left posterior putamen. No midline shift or mass lesion. There is no evidence of intracranial hemorrhage   or acute infarct. The ventricles are within normal limits in size and   configuration. There are no extra-axial fluid collections present.  No diffusion   weighted signal abnormality is identified. There is no abnormal enhancement. Impression:       No acute finding or evidence of intracranial metastatic disease. XR ELBOW LT MIN 3 V [[id number]] Collected: 03/19/22 1534   Order Status: Completed Updated: 03/19/22 1538   Narrative:     XR FOREARM LT AP/LAT, XR ELBOW LT MIN 3 V 3/19/2022 3:19 PM     HISTORY: Fall with left arm and left elbow pain. Impression:       Two views left forearm. No fracture or dislocation. No significant soft tissue   swelling. Old healed distal ulnar fracture deformity is present. Three views left elbow. No fracture or dislocation. No significant soft tissue   swelling. No fat pad elevation.    XR FOREARM LT AP/LAT [[id number]] Collected: 03/19/22 1534   Order Status: Completed Updated: 03/19/22 1538   Narrative:     XR FOREARM LT AP/LAT, XR ELBOW LT MIN 3 V 3/19/2022 3:19 PM     HISTORY: Fall with left arm and left elbow pain. Impression:       Two views left forearm. No fracture or dislocation. No significant soft tissue   swelling. Old healed distal ulnar fracture deformity is present. Three views left elbow. No fracture or dislocation. No significant soft tissue   swelling. No fat pad elevation. CT HEAD WO CONT [[id number]] Collected: 03/19/22 1254   Order Status: Completed Updated: 03/19/22 1258   Narrative:     CT BRAIN WITHOUT CONTRAST   3/19/2022 12:26 PM     INDICATION: Fall, altered mental status and confusion. COMPARISON: None available at this hospital PACS     Technique:  Multiple contiguous axial images are obtained encompassing the brain   from the skull base to the vertex.  For this CT scanner at least one of the   following techniques is utilized to decrease patient radiation dose: Automatic   exposure control, KVP and mA modulation based on patient weight, and iterative   reconstruction. FINDINGS: The ventricles are midline and of appropriate size and configuration. Mild hypoattenuating foci seen in the periventricular deep white matter. Inferiorly at the junction of the thalamus and posterior limb of the internal   capsule on the right there is a rounded mildly hypodense focus that measures 9   mm. The midline structures and posterior fossa are intact.  There is no finding of   an acute cortical stroke, mass lesion, or acute bleed.  No extra-axial fluid   collection. The skull is intact, no discreet abnormality. The paranasal sinuses are not   remarkable. The visualized orbits and mastoid air-cells are patent. Impression:     9 mm rounded hypoattenuating focus on the right at the inferior   junction of the thalamus and posterior limb of the internal capsule is noted. Suspicious for lacunar infarct and of uncertain chronicity-cannot exclude that   this is new. Elsewhere only mild chronic changes in the periventricular white matter   suggested. For further imaging evaluation of this as clinically indicated-recommend brain   MRI with diffusion imaging. XR CHEST Shelby Richards [[id number]] Collected: 03/15/22 1259   Order Status: Completed Updated: 03/15/22 1302   Narrative:     Chest X-ray     INDICATION: Hypotension. COMPARISON: Chest x-ray 2/10/2021. A portable AP view of the chest was obtained. FINDINGS: The lungs are clear. There are no infiltrates or effusions. Left chest   port is unchanged in position. No pneumothorax. The heart size is normal.  The   bony thorax is intact.      Impression:     No acute findings in the chest. Lungs remain clear. No interval   change.           Medications:  Current Facility-Administered Medications   Medication Dose Route Frequency    vancomycin (VANCOCIN) 750 mg in 0.9% sodium chloride 250 mL ([id number])  750 mg IntraVENous Q12H    TPN ADULT-CENTRAL - dextrose 14% amino acid 8%   IntraVENous QPM    HYDROcodone-acetaminophen (NORCO) 5-325 mg per tablet 1 Tablet  1 Tablet Oral Q4H PRN    cefepime (MAXIPIME) 2 g in 0.9% sodium chloride (MBP/ADV) 100 mL MBP  2 g IntraVENous Q8H    TPN ADULT-CENTRAL - dextrose 14% amino acid 8%   IntraVENous QPM    filgrastim-aafi (NIVESTYM) injection syringe 300 mcg  300 mcg SubCUTAneous DAILY    HYDROmorphone (DILAUDID) injection 0.5 mg  0.5 mg IntraVENous Q3H PRN    prochlorperazine (COMPAZINE) with saline injection 5 mg  5 mg IntraVENous Q6H PRN    acetaminophen (TYLENOL) tablet 650 mg  650 mg Oral Q6H PRN    diphenhydrAMINE (BENADRYL) capsule 25 mg  25 mg Oral Q6H PRN    amiodarone (CORDARONE) tablet 400 mg  400 mg Oral BID    fat emulsion 20% (LIPOSYN, INTRAlipid) infusion 250 mL  250 mL IntraVENous QPM    loperamide (IMODIUM) capsule 2 mg  2 mg Oral Q4H PRN    NUTRITIONAL SUPPORT ELECTROLYTE PRN ORDERS   Does Not Apply PRN    sodium chloride (NS) flush 5-10 mL  5-10 mL IntraVENous PRN    diphenoxylate-atropine (LOMOTIL) tablet 1 Tablet  1 Tablet Oral QID PRN    famotidine (PEPCID) tablet 20 mg  20 mg Oral BID    pantoprazole (PROTONIX) tablet 40 mg  40 mg Oral ACB    promethazine (PHENERGAN) tablet 25 mg  25 mg Oral Q6H PRN    tamsulosin (FLOMAX) capsule 0.4 mg  0.4 mg Oral DAILY    ondansetron (ZOFRAN) injection 4 mg  4 mg IntraVENous Q4H PRN         ASSESSMENT:    Problem List  Date Reviewed: 3/15/2022          Codes Class Noted    Chronic systolic congestive heart failure (HCC) ICD-10-CM: I50.22  ICD-9-CM: 428.22, 428.0  3/21/2022        Bradycardia ICD-10-CM: R00.1  ICD-9-CM: 427.89  3/19/2022        LBBB (left bundle branch block) ICD-10-CM: I44.7  ICD-9-CM: 426.3  3/19/2022        FTT (failure to thrive) in adult ICD-10-CM: R62.7  ICD-9-CM: 783.7  3/15/2022        * (Principal) Hypotension ICD-10-CM: I95.9  ICD-9-CM: 458.9  3/15/2022        Severe protein-calorie malnutrition ([facility] ICD-10-CM: E43  ICD-9-CM: 262  3/4/2022        Pancreatic cancer ([facility] ICD-10-CM: C25.9  ICD-9-CM: 157.9  3/2/2022        Ascites ICD-10-CM: R18.8  ICD-9-CM: 789.59  3/2/2022        Admission for antineoplastic chemotherapy ICD-10-CM: Z51.11  ICD-9-CM: V58.11  3/2/2022        Hypomagnesemia ICD-10-CM: E83.42  ICD-9-CM: 275.2  4/23/2021        Hypokalemia ICD-10-CM: E87.6  ICD-9-CM: 276.8  4/20/2021        CINV (chemotherapy-induced nausea and vomiting) ICD-10-CM: R11.2, T45.1X5A  ICD-9-CM: 787.01, E933.1  4/20/2021        Dehydration ICD-10-CM: E86.0  ICD-9-CM: 276.51  4/12/2021        Other neutropenia ([facility] ICD-10-CM: D70.8  ICD-9-CM: 288.09  3/9/2021        Malignant neoplasm of lower third of esophagus (HCC) ICD-10-CM: C15.5  ICD-9-CM: 150.5  1/5/2021        Malignant neoplasm of body of pancreas (HCC) ICD-10-CM: C25.1  ICD-9-CM: 157.1  1/5/2021        Severe obesity ([facility] ICD-10-CM: E66.01  ICD-9-CM: 278.01  12/10/2020        Elevated glucose ICD-10-CM: R73.09  ICD-9-CM: 790.29  7/22/2019        Anemia ICD-10-CM: D64.9  ICD-9-CM: 285.9  7/22/2019        Chronic left-sided low back pain ICD-10-CM: M54.50, G89.29  ICD-9-CM: 724.2, 338.29  7/22/2019              78 y.o. M pancreatic cancer and esophageal cancer with malignant ascites of previously controlled FOLFIRINOX but currently disease progressed and most recently admited on 3/2/2022 to place Pleurx and arrange gemcitabine/Abraxane, had extensive discussion with inpatient team and pharmacy, patient was discharged on 3/7/2022 without chemo appointment and return to office on 3/15/2022, extremely sick and blood pressure not measurable in the office, and not been having much oral intake since discharge, urgently establish IV access and called EMS to take to ER to stabilize and admit to hospital, apparently needed much supportive care and volume resuscitation, start TPN until oral intake can improve and give gemcitabine/Abraxane once clinically stable, discussed with the inpatient team.    PLAN:    Metastatic pancreatic cancer / distal esophageal cancer  - Metastatic disease involving peritoneum, malignant ascites  - s/p 24 cycles of FOLFIRINOX with SD and recent POD with increasing CEA/ and malignant ascites  - Plan for gemcitabine/abraxane when clinically stable - hopefully tomorrow. 3/17 [place]/abraxane today, tolerated well  3/24 D8 due today, deferring tx d/t cytopenias. Dr. Lizy Lechuga discussed that if patient's condition does not improve, that he may want to consider Hospice services. Palliative care following. 3/25 Pt wants to con't to pursue treatment as he wants to change his relationship with his children. He does not feel the burden of tx outweighs the benefits and wants to continue tx for as long as he can. D15 due 3/31.     Malignant ascites  - Has abdominal Pleurx, drain three times weekly  3/17 Pleurx drained 3/16 - 1100cc out.  3/18 Pleurx accidentally dislodged last night - 1800cc removed before completely removed. IR notified and will re-evaluate Monday. 3/20 Worsening abdominal pain possibly secondary to re-accumulation, some drainage reported at Pleurx site. IR evaluating tomorrow. 3/21 IR to re-evaluate tomorrow, was not NPO today. 3/22 To IR today. 3/23 Abd pleurx replaced yesterday  3/25 Pleurx drained yesterday, 1.1L. Con't to drain prn.  3/26 drained today as he was feeling uncomfortable. Monitor blood pressures. Cancer related pain  - Continue home dilaudid  3/17 Utilizing IV morphine. Will decrease dosing and encourage use of home Norco.  3/18 Continue to wean IV meds - encourage PO.  3/20 Has not been receiving IV morphine due to hypotension. Continue with PRN oral medications. 3/22 Consult to LU AND [facility] regarding [address] 63, symptom management. 3/23 PC following. Pt changed code status to DNR, wants to pursue further cancer-related treatment. 3/25 PC following    Hypotension / poor PO intake / protein-calorie malnutrition  - Consult RD for TPN  - BC pending, started on Cefe/Vanc and may be able to de-escalate soon as hypotension likely secondary to dehydration. LA improved after IFV. 3/17 On TPN. Continues on Cefe/Vanc although infectious work-up unremarkable. Will stop antibiotics. 3/18 Hypotension improved and remains afebrile off antibiotics. Continue with nutritional support via TPN.  3/19 Ongoing hypotension, although improved yesterday AM, worse through evening. Had 6L of IVF yesterday and continues on TPN. May consider midodrine although MAP consistently ~65.  3/21 Hypotension improved but borderline, continues TPN.   3/22 Discussed with RD volume status of TPN given new finding of HFrEF and CT chest with evidence of fluid overload. CM following for home TPN. Holding IV morphine.   3/23 BPs improved  3/24 Episode of hypotension last PM, required small fluid bolus, BP improved  3/25 Continues on TPN, however pt declined to be attached to TPN yesterday AM and then disconnected himself from it last night. Nausea  - Continue PRN antiemetics    Diarrhea  - Antidiarrheals PRN  3/24 c/o worsening diarrhea. Check Cdiff.   3/25 Cdiff neg. Con't using antidiarrheals prn. Bradycardia / HFrEF / LV dysfunction / LBBB / ectopy  - One documented episode of HR 43 - check EKG  - No hx of hypertension, monitor  3/17 EKG with bi-geminal PACs and known LBBB. Recheck EGK.  3/18 EKG with same as above. Palpated pulse on several occasions documented in the 40s. Tele applied overnight. Cardiology following. 3/19 Cardiology recommending K>4 (on TPN) and Mg replacements. Echo pending. 3/21 No bradycardia noted on telemetry. Does have ectopy which may have led to incorrect pulse rate palpation. Echo with EF 35-40%. CT chest completed due to echo findings rebeka noted hiatal hernia/mesenteric fat herniation/abdominal fluid displacing heart and small BL effusions, anasarca. Cardiology following.  3/22 Cardiology considering BB if BP improve for HF/LV dysfunction. Weight up 6# overnight and increased since admission. Clinically no evidence of overload but asking RD to adjust TPN volume. Reported run of NS-SVT - cardiology following. 3/25 Cards following. Hypotension limiting therapy for heart failure. 3/26 cards signed off and recommended cont Amiodarone     Pancytopenia secondary to chemotherapy  - Transfuse prn per [facility] SOPs  3/25 [address] down to 1000. Start G-CSF.  3/26 ANC improved to 2.6. Fall  3/19 Witnessed slip and fall on wet floor yesterday. Today with L arm bruising - check X-ray. Will check CT head and CT AP (hematoma/lipoma palpated on R buttock) and also c/o worsening abdominal pain. 3/20 X-ray of L elbow/forearm negative. CT head with ?lacunar infarct but MRI showed no acute findings and ?lacunar infarct appears to be prominent perivascular space. CT AP with progressing peritoneal/liver disease and ?new splenic lesion. Last imaging obtained 12/2021 for comparison. Elevated LFTs  3/21 Monitor, possibly secondary to TPN or recent chemo. 3/22 Appear to be improving  3/23 AST improved, ALP increased  3/24 Improving    Hypoxia / ?Pneumonia  3/24 On O2 @ 2L now. Check CXR.  3/25 CXR with low lung volumes with b/l infiltrates ?edema vs infx? Check PCT - elevated. Start Cef/Vanc.  3/26 continues on cef/vanc; on room air     Continue home meds  [facility] SOPs  AC contraindicated d/t thrombocytopenia    Goals and plan of care reviewed with the patient. All questions answered to the best of our ability. Disposition:  TBD pending clinical course. PT/OT following - HHPT vs STR. CM following for home TPN. He will need close follow-up with Dr Popeye Burks upon DC. Tila Iglesias, [address] [facility]/Radiation Oncology   [address] [place] [address]  Office : [phone]  Fax : [phone]          Attending Addendum:  I have personally performed a face to face diagnostic evaluation on this patient. I have reviewed and agree with the care plan as documented by Adriana Lanier, N.P. 36 minutes were spent on patient care, including but not limited to, reviewing the chart and time with the patient and family, more than 50% of the time documented was spent in face-to-face contact with the patient and in the care of the patient on the floor/unit where the patient is located. My findings are as follows:  He has pancreatic cancer and esophageal cancer, appears weak, heart rate regular without murmurs, abdomen is non-tender, bowel sounds are positive, we will continue TPN along with IV ABX, he will receive Gemcitabine/Abraxane when his Platelet count rises above 100k.               Sudarshan Womack MD      [facility] Hematology/Oncology  [address] [address]  Office : [phone]  Fax : [phone]

## 2022-03-26 NOTE — MANAGEMENT PLAN
Cardiology Management Plan    --no recent SVT  --cont amiodarone per Dr. Ara Buenrostro and progress note  --cardiology will sign off, call with questions or changes in clinical status

## 2022-03-26 NOTE — PROGRESS NOTES
Comprehensive Nutrition Assessment    Type and Reason for Visit: Reassess  TPN Management (oncology)    Nutrition Recommendations/Plan:   Parenteral Nutrition:  Total parenteral nutrition  to begin at 1800  Continue: Dex 14%, 8% AA 1.56 L (65ml/hr)   Continue 250 ml 20% lipids daily  Lytes/L: No changes indicated today  Sodium 120 meq (30 meq NaCl, 75 meq NaAcetate, 15 meq NaPhos), Potassium 25 meq (25 meq KPO4), 10 meq Mg, 4.5 meq Calcium (total phos from K and Na 40 meq/L)  Other additives: MTE, MVI due to national shortages  Formula reamins ~1:1 Cl:Acetate  Nutritional Supplement Therapy:   Active electrolyte replacement per nutrition support protocols  Replacement indicated:  None  Labs:   BMP daily  Mg MWF  Phos MWF    POC Glucoses/SSI Not indicated     Malnutrition Assessment:  Malnutrition Status: Severe malnutrition  Context: Chronic illness  Findings of clinical characteristics of malnutrition:   Energy Intake:  7 - 75% or less est energy requirements for 1 month or longer  Weight Loss:  7.0 - Greater than 7.5% over 3 months (34# (18.4%) )     Body Fat Loss:  1 - Mild body fat loss, Buccal region,Orbital,Triceps   Muscle Mass Loss:  1 - Mild muscle mass loss, Calf (gastrocnemius),Hand (interosseous),Scapula (trapezius),Temples (temporalis)  Fluid Accumulation:  No significant fluid accumulation,     Strength:  Not performed     Nutrition Assessment:   Nutrition History: Per RD assessment patient was able to maintain PO intake and UBW throughout most of chemo. During admmission early March patient had \"stopped eating\" due to nausea. Upon assessment this admission patient reports no PO of foods for ~4 weeks. States that he has been able to tolerate some fluids. He reports continued nausea and vomiting as primary barrier. Nutrition Background: Patient with pancreatic and esophageal cancer, Amos's esophagus, obesity, HTN, GERD, gastritis, ascites and peritoneal carcinomatosis s/p Plurex drain.  He presented to oncology office extremely sick, BP was unable to be measured. He was admitted directly to [facility]. Nutrition Interval:  PO intake continues to limited due to early satiety and persistent nausea and likely secondary to peritoneal carcinomatosis, recurrent ascites with Plurex drain. Patient has demonstrated inability to meet needs orally with severe weight loss and malnutrition as above. Intake trends since admission reveal daily PO tolerance of declining now less than 1 meal per day which is not sufficient to sustain weight, functional status, or life. Abdominal pleurex replaced 3/22: 2250 ml removed. Remains TPN dependent d/t peritoneal carcinomatosis, severe malnutrition. Pt is awake and oriented at RD visit, he again denies knowing about prior TPN disconnections overnight. He asks about D/C to home and pump connection at home. Explained to pt he can be transitioned to cyclic feeding for home once stable. Abdominal Status (last documented): Distended,Tender abdomen with Active  bowel sounds. Last BM 03/25/22. Pertinent Medications: maxipime, pepcid, protonix  PRN last administered: lomotil 3/24 0540; imodium 3/24 0913; zofran 3/25 0758; compazine 3/24 1720; phenergan 3/24 0236  Electrolyte replacement: 3/23: 20 mmol NaPhos  Pertinent Labs:   Lab Results   Component Value Date/Time    Sodium 136 (L) 03/26/2022 03:52 AM    Potassium 3.9 03/26/2022 03:52 AM    Chloride 105 03/26/2022 03:52 AM    CO2 25 03/26/2022 03:52 AM    Anion gap 6 (L) 03/26/2022 03:52 AM    Glucose 93 03/26/2022 03:52 AM    BUN 20 03/26/2022 03:52 AM    Creatinine 0.40 (L) 03/26/2022 03:52 AM    Calcium 8.1 (L) 03/26/2022 03:52 AM    Albumin 1.1 (L) 03/26/2022 03:52 AM    Magnesium 2.1 03/26/2022 03:52 AM    Phosphorus 2.6 03/25/2022 03:07 AM   Na decreasing, remainder labs relatively stable. TPN formulated 1.5:1 Cl:Acetate ratio 3/22, ~1:1 Cl:Acetate ratio starting 3/23.       Nutrition Related Findings:   Port in place, also with 1 PIV. TPN intiated 3/16. TPN increased in volume 3/19.  TPN to be concentrated and volume decreased 3/22 per NP request.      Current Nutrition Therapies:  ADULT DIET Regular  Current Parenteral Nutrition Orders:  · Type and Formula: Dex 14%, 8% AA    · Lipids: 250ml,Daily  · Duration: Continuous  · Rate/Volume: 1.56 L (65ml/hr)  · Current PN Order Provides: paused at RD visit as nursing preparing for bath  · Goal PN Orders Provides: 1742 kcal/d (100% of needs), 125 grams of protein/d (100% of needs), 218 grams of CHO/d and 1810 ml of total volume/d    Current Intake:   Average Meal Intake:  (bites at best) Average Supplement Intake: None ordered      Anthropometric Measures:  Height: 5' 6\" (167.6 cm)  Current Body Wt: 78 kg (171 lb 15.3 oz) (3/25), Weight source: Bed scale  BMI: 27.8,  (current BMI skewed by fluid)  Admission Body Weight: 148 lb 9.4 oz  Ideal Body Weight (lbs) (Calculated): 142 lbs (65 kg), 104.6 %  Usual Body Wt: 82.6 kg (182 lb) (12/14/21 office wt and per previous history), Percent weight change: -18.4          Edema: LLE: 2+; Pitting (3/26/2022  8:15 AM)  RLE: 2+; Pitting (3/26/2022  8:15 AM)     Estimated Daily Nutrient Needs:  Energy (kcal/day): 9434-1001 (Kcal/kg (25-30), Weight Used: Current (67.4 kg (3/15))  Protein (g/day):  (1.3-1.5 g/kg) Weight Used: (Admission (67.3 kg))  Fluid (ml/day):   (1 ml/kcal)    Nutrition Diagnosis:   · Inadequate oral intake related to altered GI function,early satiety (peritoneal carcinomatosis ) as evidenced by  (poor oral tolerance, wt loss, requires TPN for primary needs)    · Severe malnutrition related to catabolic illness as evidenced by  (malnutrition criteria as above)    Nutrition Interventions:   Food and/or Nutrient Delivery: Continue current diet,Modify parenteral nutrition     Coordination of Nutrition Care: Continue to monitor while inpatient    Goals:   Previous Goal Met: Goal(s) achieved  Active Goal: Continue to toleate TPN at goal    Nutrition Monitoring and Evaluation:      Food/Nutrient Intake Outcomes: Parenteral nutrition intake/tolerance,Food and nutrient intake  Physical Signs/Symptoms Outcomes: Biochemical data,GI status,Fluid status or edema,Weight    Discharge Planning:    Parenteral nutrition    Verena Hobson MA, RD, LDN   Contact: [phone]

## 2022-03-26 NOTE — PROGRESS NOTES
Problem: Falls - Risk of  Goal: *Absence of Falls  Description: Document Yecenia Lucero Fall Risk and appropriate interventions in the flowsheet.   Outcome: Progressing Towards Goal  Note: Fall Risk Interventions:  Mobility Interventions: Bed/chair exit alarm,Communicate number of staff needed for ambulation/transfer,Patient to call before getting OOB,PT Consult for mobility concerns,PT Consult for assist device competence    Mentation Interventions: Adequate sleep, hydration, pain control    Medication Interventions: Bed/chair exit alarm,Patient to call before getting OOB,Teach patient to arise slowly    Elimination Interventions: Bed/chair exit alarm,Call light in reach,Elevated toilet seat,Patient to call for help with toileting needs,Stay With Me (per policy),Toilet paper/wipes in reach,Toileting schedule/hourly rounds,Urinal in reach    History of Falls Interventions: Bed/chair exit alarm,Consult care management for discharge planning,Room close to nurse's station

## 2022-03-27 LAB
ALBUMIN SERPL-MCNC: 1 G/DL (ref 3.2–4.6)
ALBUMIN/GLOB SERPL: 0.3 {RATIO} (ref 1.2–3.5)
ALP SERPL-CCNC: 348 U/L (ref 50–136)
ALT SERPL-CCNC: 38 U/L (ref 12–65)
ANION GAP SERPL CALC-SCNC: 5 MMOL/L (ref 7–16)
ANION GAP SERPL CALC-SCNC: 6 MMOL/L (ref 7–16)
AST SERPL-CCNC: 39 U/L (ref 15–37)
BASOPHILS # BLD: 0.1 K/UL (ref 0–0.2)
BASOPHILS NFR BLD: 1 % (ref 0–2)
BILIRUB SERPL-MCNC: 0.6 MG/DL (ref 0.2–1.1)
BUN SERPL-MCNC: 19 MG/DL (ref 8–23)
BUN SERPL-MCNC: 21 MG/DL (ref 8–23)
CALCIUM SERPL-MCNC: 7.7 MG/DL (ref 8.3–10.4)
CALCIUM SERPL-MCNC: 8.2 MG/DL (ref 8.3–10.4)
CHLORIDE SERPL-SCNC: 107 MMOL/L (ref 98–107)
CHLORIDE SERPL-SCNC: 108 MMOL/L (ref 98–107)
CO2 SERPL-SCNC: 24 MMOL/L (ref 21–32)
CO2 SERPL-SCNC: 24 MMOL/L (ref 21–32)
CREAT SERPL-MCNC: 0.5 MG/DL (ref 0.8–1.5)
CREAT SERPL-MCNC: 0.6 MG/DL (ref 0.8–1.5)
DIFFERENTIAL METHOD BLD: ABNORMAL
EOSINOPHIL # BLD: 0.1 K/UL (ref 0–0.8)
EOSINOPHIL NFR BLD: 1 % (ref 0.5–7.8)
ERYTHROCYTE [DISTWIDTH] IN BLOOD BY AUTOMATED COUNT: 15.3 % (ref 11.9–14.6)
GLOBULIN SER CALC-MCNC: 3.6 G/DL (ref 2.3–3.5)
GLUCOSE SERPL-MCNC: 125 MG/DL (ref 65–100)
GLUCOSE SERPL-MCNC: 135 MG/DL (ref 65–100)
HCT VFR BLD AUTO: 31.7 % (ref 41.1–50.3)
HGB BLD-MCNC: 10.2 G/DL (ref 13.6–17.2)
IMM GRANULOCYTES # BLD AUTO: 1.2 K/UL (ref 0–0.5)
IMM GRANULOCYTES NFR BLD AUTO: 18 % (ref 0–5)
LYMPHOCYTES # BLD: 0.5 K/UL (ref 0.5–4.6)
LYMPHOCYTES NFR BLD: 7 % (ref 13–44)
MAGNESIUM SERPL-MCNC: 2 MG/DL (ref 1.8–2.4)
MCH RBC QN AUTO: 30.2 PG (ref 26.1–32.9)
MCHC RBC AUTO-ENTMCNC: 32.2 G/DL (ref 31.4–35)
MCV RBC AUTO: 93.8 FL (ref 79.6–97.8)
MONOCYTES # BLD: 1.1 K/UL (ref 0.1–1.3)
MONOCYTES NFR BLD: 16 % (ref 4–12)
NEUTS SEG # BLD: 3.8 K/UL (ref 1.7–8.2)
NEUTS SEG NFR BLD: 57 % (ref 43–78)
NRBC # BLD: 0.09 K/UL (ref 0–0.2)
PLATELET # BLD AUTO: 42 K/UL (ref 150–450)
PLATELET COMMENTS,PCOM: ABNORMAL
PMV BLD AUTO: 12.9 FL (ref 9.4–12.3)
POTASSIUM SERPL-SCNC: 3.7 MMOL/L (ref 3.5–5.1)
POTASSIUM SERPL-SCNC: 4.1 MMOL/L (ref 3.5–5.1)
PROT SERPL-MCNC: 4.6 G/DL (ref 6.3–8.2)
RBC # BLD AUTO: 3.38 M/UL (ref 4.23–5.6)
RBC MORPH BLD: ABNORMAL
SODIUM SERPL-SCNC: 137 MMOL/L (ref 136–145)
SODIUM SERPL-SCNC: 137 MMOL/L (ref 138–145)
WBC # BLD AUTO: 6.8 K/UL (ref 4.3–11.1)

## 2022-03-27 PROCEDURE — 74011250637 HC RX REV CODE- 250/637: Performed by: NURSE PRACTITIONER

## 2022-03-27 PROCEDURE — 3331090002 HH PPS REVENUE DEBIT

## 2022-03-27 PROCEDURE — 99233 SBSQ HOSP IP/OBS HIGH 50: CPT | Performed by: INTERNAL MEDICINE

## 2022-03-27 PROCEDURE — 83735 ASSAY OF MAGNESIUM: CPT

## 2022-03-27 PROCEDURE — 74011250636 HC RX REV CODE- 250/636: Performed by: NURSE PRACTITIONER

## 2022-03-27 PROCEDURE — 74011000258 HC RX REV CODE- 258: Performed by: INTERNAL MEDICINE

## 2022-03-27 PROCEDURE — 74011000250 HC RX REV CODE- 250: Performed by: INTERNAL MEDICINE

## 2022-03-27 PROCEDURE — 65270000029 HC RM PRIVATE

## 2022-03-27 PROCEDURE — 74011000250 HC RX REV CODE- 250: Performed by: EMERGENCY MEDICINE

## 2022-03-27 PROCEDURE — 85025 COMPLETE CBC W/AUTO DIFF WBC: CPT

## 2022-03-27 PROCEDURE — 3331090001 HH PPS REVENUE CREDIT

## 2022-03-27 PROCEDURE — 74011000258 HC RX REV CODE- 258: Performed by: NURSE PRACTITIONER

## 2022-03-27 PROCEDURE — 74011250636 HC RX REV CODE- 250/636: Performed by: INTERNAL MEDICINE

## 2022-03-27 PROCEDURE — 36591 DRAW BLOOD OFF VENOUS DEVICE: CPT

## 2022-03-27 PROCEDURE — 80053 COMPREHEN METABOLIC PANEL: CPT

## 2022-03-27 RX ORDER — HYDROMORPHONE HYDROCHLORIDE 1 MG/ML
1 INJECTION, SOLUTION INTRAMUSCULAR; INTRAVENOUS; SUBCUTANEOUS
Status: DISCONTINUED | OUTPATIENT
Start: 2022-03-27 | End: 2022-04-01

## 2022-03-27 RX ADMIN — HYDROMORPHONE HYDROCHLORIDE 1 MG: 1 INJECTION, SOLUTION INTRAMUSCULAR; INTRAVENOUS; SUBCUTANEOUS at 22:09

## 2022-03-27 RX ADMIN — CEFEPIME HYDROCHLORIDE 2 G: 2 INJECTION, POWDER, FOR SOLUTION INTRAVENOUS at 17:35

## 2022-03-27 RX ADMIN — ONDANSETRON 4 MG: 2 INJECTION INTRAMUSCULAR; INTRAVENOUS at 22:09

## 2022-03-27 RX ADMIN — VANCOMYCIN HYDROCHLORIDE 750 MG: 750 INJECTION, POWDER, LYOPHILIZED, FOR SOLUTION INTRAVENOUS at 11:25

## 2022-03-27 RX ADMIN — HYDROMORPHONE HYDROCHLORIDE 0.5 MG: 1 INJECTION, SOLUTION INTRAMUSCULAR; INTRAVENOUS; SUBCUTANEOUS at 10:13

## 2022-03-27 RX ADMIN — TAMSULOSIN HYDROCHLORIDE 0.4 MG: 0.4 CAPSULE ORAL at 08:30

## 2022-03-27 RX ADMIN — ONDANSETRON 4 MG: 2 INJECTION INTRAMUSCULAR; INTRAVENOUS at 02:24

## 2022-03-27 RX ADMIN — SODIUM ACETATE: 164 INJECTION, SOLUTION, CONCENTRATE INTRAVENOUS at 18:15

## 2022-03-27 RX ADMIN — HYDROMORPHONE HYDROCHLORIDE 1 MG: 1 INJECTION, SOLUTION INTRAMUSCULAR; INTRAVENOUS; SUBCUTANEOUS at 17:30

## 2022-03-27 RX ADMIN — SODIUM CHLORIDE, PRESERVATIVE FREE 10 ML: 5 INJECTION INTRAVENOUS at 10:17

## 2022-03-27 RX ADMIN — FILGRASTIM-AAFI 300 MCG: 300 INJECTION, SOLUTION SUBCUTANEOUS at 08:29

## 2022-03-27 RX ADMIN — ONDANSETRON 4 MG: 2 INJECTION INTRAMUSCULAR; INTRAVENOUS at 16:14

## 2022-03-27 RX ADMIN — CEFEPIME HYDROCHLORIDE 2 G: 2 INJECTION, POWDER, FOR SOLUTION INTRAVENOUS at 10:18

## 2022-03-27 RX ADMIN — PANTOPRAZOLE SODIUM 40 MG: 40 TABLET, DELAYED RELEASE ORAL at 08:30

## 2022-03-27 RX ADMIN — HYDROMORPHONE HYDROCHLORIDE 0.5 MG: 1 INJECTION, SOLUTION INTRAMUSCULAR; INTRAVENOUS; SUBCUTANEOUS at 02:43

## 2022-03-27 RX ADMIN — HYDROMORPHONE HYDROCHLORIDE 1 MG: 1 INJECTION, SOLUTION INTRAMUSCULAR; INTRAVENOUS; SUBCUTANEOUS at 11:18

## 2022-03-27 RX ADMIN — CEFEPIME HYDROCHLORIDE 2 G: 2 INJECTION, POWDER, FOR SOLUTION INTRAVENOUS at 02:27

## 2022-03-27 RX ADMIN — I.V. FAT EMULSION 250 ML: 20 EMULSION INTRAVENOUS at 18:15

## 2022-03-27 RX ADMIN — FAMOTIDINE 20 MG: 20 TABLET, FILM COATED ORAL at 17:26

## 2022-03-27 RX ADMIN — ONDANSETRON 4 MG: 2 INJECTION INTRAMUSCULAR; INTRAVENOUS at 10:11

## 2022-03-27 RX ADMIN — FAMOTIDINE 20 MG: 20 TABLET, FILM COATED ORAL at 08:30

## 2022-03-27 NOTE — PROGRESS NOTES
Problem: Falls - Risk of  Goal: *Absence of Falls  Description: Document Marycruz Garcia Fall Risk and appropriate interventions in the flowsheet. Outcome: Progressing Towards Goal  Note: Fall Risk Interventions:  Mobility Interventions: Communicate number of staff needed for ambulation/transfer,Patient to call before getting OOB,PT Consult for mobility concerns,PT Consult for assist device competence    Mentation Interventions: Adequate sleep, hydration, pain control    Medication Interventions: Evaluate medications/consider consulting pharmacy,Patient to call before getting OOB,Teach patient to arise slowly    Elimination Interventions: Call light in reach,Patient to call for help with toileting needs,Toileting schedule/hourly rounds,Toilet paper/wipes in reach    History of Falls Interventions: Bed/chair exit alarm,Consult care management for discharge planning,Evaluate medications/consider consulting pharmacy,Room close to nurse's station         Problem: Nausea/Vomiting (Adult)  Goal: *Absence of nausea/vomiting  Outcome: Progressing Towards Goal     Problem: Pressure Injury - Risk of  Goal: *Prevention of pressure injury  Description: Document Carlos Scale and appropriate interventions in the flowsheet.   Outcome: Progressing Towards Goal  Note: Pressure Injury Interventions:  Sensory Interventions: Assess changes in LOC    Moisture Interventions: Absorbent underpads,Apply protective barrier, creams and emollients,Check for incontinence Q2 hours and as needed,Maintain skin hydration (lotion/cream),Minimize layers,Offer toileting Q_hr    Activity Interventions: Increase time out of bed,Pressure redistribution bed/mattress(bed type),PT/OT evaluation    Mobility Interventions: HOB 30 degrees or less,PT/OT evaluation    Nutrition Interventions: Document food/fluid/supplement intake,Discuss nutritional consult with provider,Offer support with meals,snacks and hydration

## 2022-03-27 NOTE — PROGRESS NOTES
Comprehensive Nutrition Assessment    Type and Reason for Visit: Reassess  TPN Management (oncology)    Nutrition Recommendations/Plan:   Parenteral Nutrition:  Total parenteral nutrition  to begin at 1800  Continue: Dex 14%, 8% AA 1.56 L (65ml/hr)   Continue 250 ml 20% lipids daily  Lytes/L: No changes indicated today. Sodium 120 meq (30 meq NaCl, 75 meq NaAcetate, 15 meq NaPhos), Potassium 25 meq (25 meq KPO4), 10 meq Mg, 4.5 meq Calcium (total phos from K and Na 40 meq/L)  Other additives: MTE, MVI due to national shortages  Formula reamins ~1:1 Cl:Acetate  Discussed with pt plan to transition to cyclic feeds 5/46 as appropriate. Nutritional Supplement Therapy:   Active electrolyte replacement per nutrition support protocols  Replacement indicated:  None  Labs:   BMP daily  Mg MWF  Phos MWF    POC Glucoses/SSI Not indicated     Malnutrition Assessment:  Malnutrition Status: Severe malnutrition  Context: Chronic illness  Findings of clinical characteristics of malnutrition:   Energy Intake:  7 - 75% or less est energy requirements for 1 month or longer  Weight Loss:  7.0 - Greater than 7.5% over 3 months (34# (18.4%) )     Body Fat Loss:  1 - Mild body fat loss, Buccal region,Orbital,Triceps   Muscle Mass Loss:  1 - Mild muscle mass loss, Calf (gastrocnemius),Hand (interosseous),Scapula (trapezius),Temples (temporalis)  Fluid Accumulation:  No significant fluid accumulation,     Strength:  Not performed     Nutrition Assessment:   Nutrition History: Per RD assessment patient was able to maintain PO intake and UBW throughout most of chemo. During admmission early March patient had \"stopped eating\" due to nausea. Upon assessment this admission patient reports no PO of foods for ~4 weeks. States that he has been able to tolerate some fluids. He reports continued nausea and vomiting as primary barrier.        Nutrition Background: Patient with pancreatic and esophageal cancer, Amos's esophagus, obesity, HTN, GERD, gastritis, ascites and peritoneal carcinomatosis s/p Plurex drain. He presented to oncology office extremely sick, BP was unable to be measured. He was admitted directly to [facility]. Nutrition Interval:  PO intake continues to limited due to early satiety and persistent nausea and likely secondary to peritoneal carcinomatosis, recurrent ascites with Plurex drain. Patient has demonstrated inability to meet needs orally with severe weight loss and malnutrition as above. Intake trends since admission reveal daily PO tolerance of declining now less than 1 meal per day which is not sufficient to sustain weight, functional status, or life. Abdominal pleurex replaced 3/22: 2250 ml removed. Remains TPN dependent d/t peritoneal carcinomatosis, severe malnutrition. Pt is alert and oriented x 4 at RD visit. 2 male friends visiting. On 2 prior encounters pt has denied awareness of knowing his TPN was disconnected from PORT. On this encounter pt indicates he thinks he gets tangled up at night due to restless sleep. When asked his goals he reports he wants to go home. Discussed with pt that he is unable to achieve oral intake to sustain life. Pt indicates he wants to continue TPN at this time for primary needs. He expressed interest in wanting to turn TPN off to go outside when his daughter visits. Abdominal Status (last documented): Distended abdomen with Active  bowel sounds. Last BM 03/26/22.   Pertinent Medications: maxipime, pepcid, protonix, vanco  PRN last administered: lomotil 3/25; imodium 3/24; zofran 3/27; compazine 3/26; phenergan 3/24   Electrolyte replacement: 3/23: 20 mmol NaPhos  Pertinent Labs:   Lab Results   Component Value Date/Time    Sodium 137 (L) 03/27/2022 10:13 AM    Potassium 3.7 03/27/2022 10:13 AM    Chloride 107 03/27/2022 10:13 AM    CO2 24 03/27/2022 10:13 AM    Anion gap 6 (L) 03/27/2022 10:13 AM    Glucose 135 (H) 03/27/2022 10:13 AM    BUN 21 03/27/2022 10:13 AM    Creatinine 0.60 (L) 03/27/2022 10:13 AM    Calcium 8.2 (L) 03/27/2022 10:13 AM    Albumin 1.0 (L) 03/27/2022 02:24 AM    Magnesium 2.0 03/27/2022 02:24 AM    Phosphorus 2.6 03/25/2022 03:07 AM   Na stable (increased in TPN 3/26), remainder labs relatively stable. Mg above is hemolyzed. TPN formulated 1.5:1 Cl:Acetate ratio 3/22, ~1:1 Cl:Acetate ratio starting 3/23. Nutrition Related Findings:   Port in place, also with 1 PIV. TPN intiated 3/16. TPN increased in volume 3/19.  TPN to be concentrated and volume decreased 3/22 per NP request.      Current Nutrition Therapies:  ADULT DIET Regular  Current Parenteral Nutrition Orders:  · Type and Formula: Dex 14%, 8% AA    · Lipids: 250ml,Daily  · Duration: Continuous  · Rate/Volume: 1.56 L (65ml/hr)  · Current PN Order Provides: infusing per current order  · Goal PN Orders Provides: 1742 kcal/d (100% of needs), 125 grams of protein/d (100% of needs), 218 grams of CHO/d and 1810 ml of total volume/d    Current Intake:   Average Meal Intake:  (bites at best) Average Supplement Intake: None ordered      Anthropometric Measures:  Height: 5' 6\" (167.6 cm)  Current Body Wt: 78.4 kg (172 lb 13.5 oz) (3/26), Weight source: Not specified  BMI: 27.9,  (current BMI skewed by fluid)  Admission Body Weight: 148 lb 9.4 oz  Ideal Body Weight (lbs) (Calculated): 142 lbs (65 kg), 104.6 %  Usual Body Wt: 82.6 kg (182 lb) (12/14/21 office wt and per previous history), Percent weight change: -18.4          Edema: LLE: 2+; Pitting (3/27/2022  8:30 AM)  RLE: 2+; Pitting (3/27/2022  8:30 AM)     Estimated Daily Nutrient Needs:  Energy (kcal/day): 3823-1695 (Kcal/kg (25-30), Weight Used: Current (67.4 kg (3/15))  Protein (g/day):  (1.3-1.5 g/kg) Weight Used: (Admission (67.3 kg))  Fluid (ml/day):   (1 ml/kcal)    Nutrition Diagnosis:   · Inadequate oral intake related to altered GI function,early satiety (peritoneal carcinomatosis ) as evidenced by  (poor oral tolerance, wt loss, requires TPN for primary needs)    · Severe malnutrition related to catabolic illness as evidenced by  (malnutrition criteria as above)    Nutrition Interventions:   Food and/or Nutrient Delivery: Continue current diet,Modify parenteral nutrition     Coordination of Nutrition Care: Continue to monitor while inpatient    Goals:   Previous Goal Met: Goal(s) achieved  Active Goal: Continue to toleate TPN at goal    Nutrition Monitoring and Evaluation:      Food/Nutrient Intake Outcomes: Parenteral nutrition intake/tolerance  Physical Signs/Symptoms Outcomes: Biochemical data,GI status,Fluid status or edema,Weight    Discharge Planning:    Parenteral nutrition    Luanne Douglas MA, RD, LDN   Contact: [phone]

## 2022-03-27 NOTE — PROGRESS NOTES
[facility]        Inpatient Hematology / Oncology Progress Note      Admission Date: 3/15/2022 10:28 AM  Reason for Admission/Hospital Course: FTT (failure to thrive) in adult [R62.7]  Hypotension [I95.9]      24 Hour Events:  Afebrile, hypotensive at times, on room air   On TPN  S/p C1D1 [place]/abraxane on 3/17   D8 due 3/24, held d/t cytopenias  CXR with possible pneumonia; PCT elevated; on cef/vanc. Says pain better controlled with increased dose. Pulled port needle out and lipids were found leaking onto the floor this AM.     Transfusions: None  Replacements: None    ROS:  Constitutional: +weakness/fatigue. Negative for fever, chills. CV: Negative for chest pain, palpitations, edema. Respiratory: Negative for dyspnea, cough, wheezing. GI: +N/V (better), diarrhea. Negative for abdominal pain. 10 point review of systems is otherwise negative with the exception of the elements mentioned above in the HPI. No Known Allergies    OBJECTIVE:  Patient Vitals for the past 8 hrs:   BP Temp Pulse Resp SpO2   22 0807 (!) 92/59 97.7 °F (36.5 °C) 73 17 94 %   22 0345 (!) 101/58 97.9 °F (36.6 °C) 77 18 94 %     Temp (24hrs), Av °F (36.7 °C), Min:97.7 °F (36.5 °C), Max:98.2 °F (36.8 °C)    No intake/output data recorded. Physical Exam:  Constitutional: Thin, cachectic elderly male in no acute distress, lying comfortably in the hospital bed. HEENT: Normocephalic and atraumatic. Oropharynx is clear, mucous membranes are moist. Extraocular muscles are intact. Sclerae anicteric. Neck supple without JVD. No thyromegaly present. Skin +R gluteal hematoma/lipoma palpated. Warm and dry. No bruising and no rash noted. No erythema. No pallor. Respiratory Lungs are clear to auscultation bilaterally without wheezes, rales or rhonchi, normal air exchange without accessory muscle use. CVS Normal rate, regular rhythm and normal S1 and S2. No murmurs, gallops, or rubs. Abdomen Soft, nontender and distended, normoactive bowel sounds. +Abd pleurx   Neuro Grossly nonfocal with no obvious sensory or motor deficits. MSK Normal range of motion in general.  No edema and no tenderness. Psych Appropriate mood and affect. Labs:      Recent Labs     03/27/22 0224 03/26/22 0352 03/25/22  0307   WBC 6.8 3.9* 1.6*   RBC 3.38* 3.58* 3.08*   HGB 10.2* 10.6* 9.3*   HCT 31.7* 33.0* 29.0*   MCV 93.8 92.2 94.2   MCH 30.2 29.6 30.2   MCHC 32.2 32.1 32.1   RDW 15.3* 14.8* 15.2*   PLT 42* 23* 16*   GRANS 57 67 59   LYMPH 7* 9* 17   MONOS 16* 20* 21*   EOS 1 2 1   BASOS 1 1 0   IG 18* 1 2   DF AUTOMATED AUTOMATED MANUAL   ANEU 3.8 2.6 1.0*   ABL 0.5 0.4* 0.3*   ABM 1.1 0.8 0.3   JONNY 0.1 0.1 0.0   ABB 0.1 0.0 0.0   AIG 1.2* 0.0 0.0        Recent Labs     03/27/22 0224 03/26/22 0352 03/25/22  0307    136* 138   K 4.1 3.9 4.1   * 105 105   CO2 24 25 28   AGAP 5* 6* 5*   * 93 134*   BUN 19 20 21   CREA 0.50* 0.40* 0.50*   GFRAA >60 >60 >60   GFRNA >60 >60 >60   CA 7.7* 8.1* 7.9*   * 382* 286*   TP 4.6* 4.7* 4.2*   ALB 1.0* 1.1* 1.0*   GLOB 3.6* 3.6* 3.2   AGRAT 0.3* 0.3* 0.3*   MG 2.0 2.1 2.1   PHOS  --   --  2.6         Imaging:  IR INSERT CATH PLEURAL INDWELL [[id number]] Collected: 03/22/22 1614   Order Status: Completed Updated: 03/22/22 0521   Narrative:     Title: Dominant PleurX drain placement. Indication: Pancreatic cancer. Recurrent abdominal ascites.  Tunneled abdominal   PleurX drain catheter requested. : Dami Schmidt PA-C     Supervising Physician: Chidi Feldman M.D. Consent:  Informed written and oral consent was obtained from the patient after   explanation of benefits and risks (including, but not limited to: Infection,   hemorrhage, visceral injury and pneumothorax).  The patient's questions were   answered to their satisfaction. The patient stated understanding and requested   that we proceed.      Procedure:  With the patient supine, the abdomen was prepped and draped in the   standard fashion.  Lidocaine was administered for local field block.  Ultrasound   evaluation was performed. Using real-time ultrasound guidance, with appropriate   image recording, a Yueh needle was advanced into the ascites in the abdomen. Using fluoroscopy, the needle was exchanged over a wire for a peel-away sheath. The PleurX drain was brought through a subcutaneous tunnel and passed down the   peel-away sheath positioning the drain across the midline, lower and the pelvis. The skin incision was closed with absorbable suture.  The catheter was secured   with nonabsorbable suture. A total of 2250 cc of thin yellow fluid was removed.  Bandages were applied. Complications: Large-volume ascites. Medications:  37 minutes based placed under moderate sedation was provided under   the direction and supervision of Radha Mott M.D. using frontal and Versed. Continuous cardiopulmonary monitoring was provided by trained independent   observer present. Ancef was infused prior to the procedure. Contrast:  None. Radiation dose:   Fluoroscopy time: 18 seconds. Reference air kerma (mGy): 8   Kerma area product (cGy.cm2):  232   Fluoroscopic images: 1     Findings:  Large volume ascites. Plan: Bedrest for 1 hour. Recommend performing a drainage procedure daily or every-other-day for the next   two weeks in order to promote healing of the catheter site.        CT CHEST W CONT [[id number]] Collected: 03/20/22 1811   Order Status: Completed Updated: 03/20/22 1823   Narrative:     CT CHEST WITH CONTRAST DATED 3/20/2022. History: Echo with extra cardiac structure compressing the left atrium.      Comparison: CT abdomen and pelvis with contrast 3/19/2022     Technique:   Multiple contiguous helical CT images reconstructed at 5 mm were   obtained from the neck base to the mid abdomen following the administration of   100 cc of Isovue 370 without acute complication. All CT scans performed at this   facility use one or all of the following: Automated exposure control, adjustment   of the mA and/or kVp according to patient's size, iterative reconstruction. Findings: The base of the neck is unremarkable in appearance. A left-sided venous port is   present. No lymphadenopathy is seen.  The thoracic aorta is normal in caliber. The heart is not clearly enlarged on the CT. Moderate to advanced   atherosclerotic calcification is seen of the coronary arteries. No significant   pericardial effusion is seen. The only abnormal space-occupying process adjacent   to the heart is a moderate size hiatal hernia. In addition to the stomach, there   is additional herniation of mesenteric fat as well as abdominal fluid. These all   appear to anteriorly displace the heart. The esophagus proximal to the hernia   sac is fluid distended which could indicate reflux. Obstruction at the   gastroesophageal junction is felt to be less likely given the additional fluid   distention of the hernia sac. Evaluation with lung windows demonstrates a trace right, and small left   dependent pleural effusion. These do appear worsened from the prior examination. Nonspecific pulmonary infiltrates are otherwise seen. Lungs are expanded without   evidence for pneumothorax.  No acute osseous abnormality is seen. Mild anasarca   is seen of the chest wall. Limited evaluation of the upper abdomen demonstrate multiple findings which are   not significantly changed and better assessed on a dedicated contrasted CT scan   of the abdomen performed on 3/19/2022. Impression:     1.  The only abnormal space-occupying process adjacent to the heart is a   moderate size hiatal hernia. In addition to the stomach, there is additional   herniation of mesenteric fat as well as abdominal fluid. These all appear to   anteriorly displace the heart.       2.  Worsening although trace to small bilateral dependent pleural effusions. Additional mild anasarca seen of the chest wall. These findings suggest fluid   overload. 3. Nonspecific pulmonary infiltrates. These could represent pulmonary edema   although the distribution is not classic. Additional allergies such as pneumonia   are not excluded if suggested by clinical findings. This report was made using voice transcription. Despite my best efforts to avoid   any, transcription errors may persist. If there is any question about the   accuracy of the report or need for clarification, then please call [phone], or text me through [email or web address] for clarification or correction. CT ABD PELV W CONT [[id number]] Collected: 03/19/22 1300   Order Status: Completed Updated: 03/20/22 1526   Addenda: Addendum: Addendum: A request was made to assess for potential right   gluteal hematoma. There is appear to be edema in the right gluteal soft tissues   which is slightly more pronounced than on the left. No clearly demonstrated defined collection is seen to suggest significant hematoma. The most inferior   gluteal soft tissues have been excluded from the field of imaging. Signed: 03/20/22 1523 by Adarsh Borrego MD   Narrative:     CT ABDOMEN AND PELVIS WITH INTRAVENOUS CONTRAST DATED 3/19/2022. History: Fall hitting bottom and back. Comparison: CT abdomen and pelvis with contrast 12/27/2021     Technique:   Multiple contiguous helical CT images reconstructed at 5 mm   intervals were obtained from above the diaphragms through the ischial   tuberosities following oral and 100 cc Isovue-370 without acute complication. All CT scans performed at this facility use one or all of the following:   Automated exposure control, adjustment of the mA and/or kVp according to   patient's size, iterative reconstruction.      Findings:   CT ABDOMEN:     Limited evaluation of the lung bases and base of the mediastinum demonstrates nonspecific infiltrates in the bilateral lung bases, left greater than right. A   small dependent left pleural effusion is seen. A small to moderate size hiatal   hernia is present. Ascites within the abdomen is also seen within the hernia   sac. The Liver is clearly cirrhotic in its appearance. Multiple hepatic masses are   seen the largest of which resides in the inferior right lobe measuring 3 cm in   size. The appearance is highly concerning for malignancy either representing   multifocal hepatomas, or hepatic metastases. Hepatic metastases are favored   given the appearance. Asymmetric intraperitoneal air ductal dilation is seen in   the left lobe of the liver which may be obstructed by a central hepatic mass   seen on axial image 23 measuring 1.1 cm in size. .  The spleen demonstrates an   irregular area of decreased attenuation in the superior pole seen on axial image   17 measuring 3.2 cm x 1.7 cm. This is not as well characterized. This does not   appear to represent an acute defect. This could result from flow artifact.  No   contour deforming or enhancing mass lesions are seen of the adrenal glands. The   pancreas is grossly unchanged in its appearance with the patient having a known   primary pancreas tumor described on the prior study. The gallbladder has been   removed.  The kidneys enhance symmetrically and no evidence of hydronephrosis is   seen. A stable benign cyst is seen in the posterior upper to midpole cortex of   the right kidney measuring 2.6 cm in size. The visualized loops of small bowel and colon are normal in caliber.  The   appendix appears to have been removed. Mild to moderate diverticulosis is seen   of the left colon. No free air is seen. Moderate ascites is seen which   demonstrates low attenuation suggesting simple ascites. Diffuse mesenteric edema   is seen.  Abnormal peritoneal nodularity and caking is suggested with additional   peritoneal thickening which at times appears nodular. This is highly concerning   for peritoneal metastatic process which appears worsened from the prior   examination.  No evolving adenopathy is seen.  The abdominal aorta demonstrate   moderate atherosclerotic calcification. No acute osseous abnormality is seen. Compression deformities are seen at T12, L1, and L4 although these have a   chronic appearance and are stable from the prior comparison study. CT PELVIS:   Small to moderate ascites extends into the pelvis. There is once again nodular   peritoneal thickening best appreciated on axial image 73 highly concerning for   evolving peritoneal metastatic disease. There appears to be serosal involvement   of the mid sigmoid colon seen on axial image 74. No abnormal dilation is seen to   suggest significant obstruction at this time. Albin Blanks evolving pelvic adenopathy is   seen.  The urinary bladder is unremarkable. No acute osseous abnormality is   seen. Impression:     1.  No clear acute injury from recent fall. Specifically, no acute osseous   abnormality is seen. 2. Grossly stable appearance of pancreatic tumor although this is suboptimally   assessed on this exam. However, there is clear evidence for evolving metastatic   disease with new hepatic masses, and multiple findings as described above which   are highly concerning for evolving peritoneal metastatic disease. Lastly,   irregular decreased attenuation is seen in the superior pole of the spleen which   does not appear clearly acute and does not demonstrate adjacent complex ascites. This could represent an additional metastasis although is not as well   characterized. This report was made using voice transcription.  Despite my best efforts to avoid   any, transcription errors may persist. If there is any question about the   accuracy of the report or need for clarification, then please call [phone], or text me through [email or web address] for clarification or correction.     MRI BRAIN W WO CONT [[id number]] Collected: 03/19/22 1649   Order Status: Completed Updated: 03/19/22 1657   Narrative:     EXAMINATION: BRAIN MRI 3/19/2022 4:47 PM     ACCESSION NUMBER: [id number]     INDICATION: 79-year-old male with fall, altered mental status and confusion. History of pancreatic cancer on chemotherapy with recent progression of   intra-abdominal disease, no prior imaging of brain. COMPARISON: CT head 3/19/2022. TECHNIQUE: Multiplanar multisequence MRI of the brain without and with   intravenous administration of 14 mL contrast agent. FINDINGS:     Previously described subcentimeter focus along the posterior limb of the right   internal capsule follows CSF signal intensity on all sequences and is favored to   represent a prominent perivascular space. There is a mild degree of scattered and confluent foci of T2/FLAIR   hyperintensity within the periventricular and deep white matter, nonspecific but   most commonly seen with chronic small vessel ischemic changes. Faint chronic   hemosiderin involving the left posterior putamen. No midline shift or mass lesion. There is no evidence of intracranial hemorrhage   or acute infarct. The ventricles are within normal limits in size and   configuration. There are no extra-axial fluid collections present.  No diffusion   weighted signal abnormality is identified. There is no abnormal enhancement. Impression:       No acute finding or evidence of intracranial metastatic disease. XR ELBOW LT MIN 3 V [[id number]] Collected: 03/19/22 1534   Order Status: Completed Updated: 03/19/22 1538   Narrative:     XR FOREARM LT AP/LAT, XR ELBOW LT MIN 3 V 3/19/2022 3:19 PM     HISTORY: Fall with left arm and left elbow pain. Impression:       Two views left forearm. No fracture or dislocation. No significant soft tissue   swelling. Old healed distal ulnar fracture deformity is present. Three views left elbow. No fracture or dislocation.  No significant soft tissue   swelling. No fat pad elevation. XR FOREARM LT AP/LAT [[id number]] Collected: 03/19/22 1534   Order Status: Completed Updated: 03/19/22 1538   Narrative:     XR FOREARM LT AP/LAT, XR ELBOW LT MIN 3 V 3/19/2022 3:19 PM     HISTORY: Fall with left arm and left elbow pain. Impression:       Two views left forearm. No fracture or dislocation. No significant soft tissue   swelling. Old healed distal ulnar fracture deformity is present. Three views left elbow. No fracture or dislocation. No significant soft tissue   swelling. No fat pad elevation. CT HEAD WO CONT [[id number]] Collected: 03/19/22 1254   Order Status: Completed Updated: 03/19/22 1258   Narrative:     CT BRAIN WITHOUT CONTRAST   3/19/2022 12:26 PM     INDICATION: Fall, altered mental status and confusion. COMPARISON: None available at this hospital PACS     Technique:  Multiple contiguous axial images are obtained encompassing the brain   from the skull base to the vertex.  For this CT scanner at least one of the   following techniques is utilized to decrease patient radiation dose: Automatic   exposure control, KVP and mA modulation based on patient weight, and iterative   reconstruction. FINDINGS: The ventricles are midline and of appropriate size and configuration. Mild hypoattenuating foci seen in the periventricular deep white matter. Inferiorly at the junction of the thalamus and posterior limb of the internal   capsule on the right there is a rounded mildly hypodense focus that measures 9   mm. The midline structures and posterior fossa are intact.  There is no finding of   an acute cortical stroke, mass lesion, or acute bleed.  No extra-axial fluid   collection. The skull is intact, no discreet abnormality. The paranasal sinuses are not   remarkable. The visualized orbits and mastoid air-cells are patent.     Impression:     9 mm rounded hypoattenuating focus on the right at the inferior   junction of the thalamus and posterior limb of the internal capsule is noted. Suspicious for lacunar infarct and of uncertain chronicity-cannot exclude that   this is new. Elsewhere only mild chronic changes in the periventricular white matter   suggested. For further imaging evaluation of this as clinically indicated-recommend brain   MRI with diffusion imaging. XR CHEST Artelia Glassing [[id number]] Collected: 03/15/22 1252   Order Status: Completed Updated: 03/15/22 1302   Narrative:     Chest X-ray     INDICATION: Hypotension. COMPARISON: Chest x-ray 2/10/2021. A portable AP view of the chest was obtained. FINDINGS: The lungs are clear. There are no infiltrates or effusions. Left chest   port is unchanged in position. No pneumothorax. The heart size is normal.  The   bony thorax is intact.      Impression:     No acute findings in the chest. Lungs remain clear. No interval   change.           Medications:  Current Facility-Administered Medications   Medication Dose Route Frequency    vancomycin (VANCOCIN) 750 mg in 0.9% sodium chloride 250 mL ([id number])  750 mg IntraVENous Q12H    TPN ADULT-CENTRAL - dextrose 14% amino acid 8%   IntraVENous QPM    HYDROcodone-acetaminophen (NORCO) 5-325 mg per tablet 1 Tablet  1 Tablet Oral Q4H PRN    cefepime (MAXIPIME) 2 g in 0.9% sodium chloride (MBP/ADV) 100 mL MBP  2 g IntraVENous Q8H    filgrastim-aafi (NIVESTYM) injection syringe 300 mcg  300 mcg SubCUTAneous DAILY    HYDROmorphone (DILAUDID) injection 0.5 mg  0.5 mg IntraVENous Q3H PRN    prochlorperazine (COMPAZINE) with saline injection 5 mg  5 mg IntraVENous Q6H PRN    acetaminophen (TYLENOL) tablet 650 mg  650 mg Oral Q6H PRN    diphenhydrAMINE (BENADRYL) capsule 25 mg  25 mg Oral Q6H PRN    amiodarone (CORDARONE) tablet 400 mg  400 mg Oral BID    fat emulsion 20% (LIPOSYN, INTRAlipid) infusion 250 mL  250 mL IntraVENous QPM    loperamide (IMODIUM) capsule 2 mg  2 mg Oral Q4H PRN    NUTRITIONAL SUPPORT ELECTROLYTE PRN ORDERS   Does Not Apply PRN    sodium chloride (NS) flush 5-10 mL  5-10 mL IntraVENous PRN    diphenoxylate-atropine (LOMOTIL) tablet 1 Tablet  1 Tablet Oral QID PRN    famotidine (PEPCID) tablet 20 mg  20 mg Oral BID    pantoprazole (PROTONIX) tablet 40 mg  40 mg Oral ACB    promethazine (PHENERGAN) tablet 25 mg  25 mg Oral Q6H PRN    tamsulosin (FLOMAX) capsule 0.4 mg  0.4 mg Oral DAILY    ondansetron (ZOFRAN) injection 4 mg  4 mg IntraVENous Q4H PRN         ASSESSMENT:    Problem List  Date Reviewed: 3/15/2022          Codes Class Noted    Chronic systolic congestive heart failure (HCC) ICD-10-CM: I50.22  ICD-9-CM: 428.22, 428.0  3/21/2022        Bradycardia ICD-10-CM: R00.1  ICD-9-CM: 427.89  3/19/2022        LBBB (left bundle branch block) ICD-10-CM: I44.7  ICD-9-CM: 426.3  3/19/2022        FTT (failure to thrive) in adult ICD-10-CM: R62.7  ICD-9-CM: 783.7  3/15/2022        * (Principal) Hypotension ICD-10-CM: I95.9  ICD-9-CM: 458.9  3/15/2022        Severe protein-calorie malnutrition ([facility] ICD-10-CM: E43  ICD-9-CM: 262  3/4/2022        Pancreatic cancer ([facility] ICD-10-CM: C25.9  ICD-9-CM: 157.9  3/2/2022        Ascites ICD-10-CM: R18.8  ICD-9-CM: 789.59  3/2/2022        Admission for antineoplastic chemotherapy ICD-10-CM: Z51.11  ICD-9-CM: V58.11  3/2/2022        Hypomagnesemia ICD-10-CM: E83.42  ICD-9-CM: 275.2  4/23/2021        Hypokalemia ICD-10-CM: E87.6  ICD-9-CM: 276.8  4/20/2021        CINV (chemotherapy-induced nausea and vomiting) ICD-10-CM: R11.2, T45.1X5A  ICD-9-CM: 787.01, E933.1  4/20/2021        Dehydration ICD-10-CM: E86.0  ICD-9-CM: 276.51  4/12/2021        Other neutropenia ([facility] ICD-10-CM: D70.8  ICD-9-CM: 288.09  3/9/2021        Malignant neoplasm of lower third of esophagus (HCC) ICD-10-CM: C15.5  ICD-9-CM: 150.5  1/5/2021        Malignant neoplasm of body of pancreas (HCC) ICD-10-CM: C25.1  ICD-9-CM: 157.1  1/5/2021        Severe obesity ([facility] ICD-10-CM: E66.01  ICD-9-CM: 278.01  12/10/2020        Elevated glucose ICD-10-CM: R73.09  ICD-9-CM: 790.29  7/22/2019        Anemia ICD-10-CM: D64.9  ICD-9-CM: 285.9  7/22/2019        Chronic left-sided low back pain ICD-10-CM: M54.50, G89.29  ICD-9-CM: 724.2, 338.29  7/22/2019              78 y.o. M pancreatic cancer and esophageal cancer with malignant ascites of previously controlled FOLFIRINOX but currently disease progressed and most recently admited on 3/2/2022 to place Pleurx and arrange gemcitabine/Abraxane, had extensive discussion with inpatient team and pharmacy, patient was discharged on 3/7/2022 without chemo appointment and return to office on 3/15/2022, extremely sick and blood pressure not measurable in the office, and not been having much oral intake since discharge, urgently establish IV access and called EMS to take to ER to stabilize and admit to hospital, apparently needed much supportive care and volume resuscitation, start TPN until oral intake can improve and give gemcitabine/Abraxane once clinically stable, discussed with the inpatient team.    PLAN:    Metastatic pancreatic cancer / distal esophageal cancer  - Metastatic disease involving peritoneum, malignant ascites  - s/p 24 cycles of FOLFIRINOX with SD and recent POD with increasing CEA/ and malignant ascites  - Plan for gemcitabine/abraxane when clinically stable - hopefully tomorrow. 3/17 [place]/abraxane today, tolerated well  3/24 D8 due today, deferring tx d/t cytopenias. Dr. Shari Marx discussed that if patient's condition does not improve, that he may want to consider Hospice services. Palliative care following. 3/25 Pt wants to con't to pursue treatment as he wants to change his relationship with his children. He does not feel the burden of tx outweighs the benefits and wants to continue tx for as long as he can.   D15 due 3/31.  3/27 treatment still on hold due to thrombocytopenia     Malignant ascites  - Has abdominal Pleurx, drain three times weekly  3/17 Pleurx drained 3/16 - 1100cc out. 3/18 Pleurx accidentally dislodged last night - 1800cc removed before completely removed. IR notified and will re-evaluate Monday. 3/20 Worsening abdominal pain possibly secondary to re-accumulation, some drainage reported at Pleurx site. IR evaluating tomorrow. 3/21 IR to re-evaluate tomorrow, was not NPO today. 3/22 To IR today. 3/23 Abd pleurx replaced yesterday  3/25 Pleurx drained yesterday, 1.1L. Con't to drain prn.  3/26 drained today as he was feeling uncomfortable. Monitor blood pressures. 3/27 continue to drain PRN, but be cautious with blood pressures as he is hypotensive at times. Cancer related pain  - Continue home dilaudid  3/17 Utilizing IV morphine. Will decrease dosing and encourage use of home Norco.  3/18 Continue to wean IV meds - encourage PO.  3/20 Has not been receiving IV morphine due to hypotension. Continue with PRN oral medications. 3/22 Consult to [facility] regarding [address], symptom management. 3/23 PC following. Pt changed code status to DNR, wants to pursue further cancer-related treatment. 3/25 PC following  3/27 Dilaudid increased today. During rounds, he stated his pain was better controlled with increased dose. Hypotension / poor PO intake / protein-calorie malnutrition  - Consult RD for TPN  - BC pending, started on Cefe/Vanc and may be able to de-escalate soon as hypotension likely secondary to dehydration. LA improved after IFV. 3/17 On TPN. Continues on Cefe/Vanc although infectious work-up unremarkable. Will stop antibiotics. 3/18 Hypotension improved and remains afebrile off antibiotics. Continue with nutritional support via TPN.  3/19 Ongoing hypotension, although improved yesterday AM, worse through evening. Had 6L of IVF yesterday and continues on TPN.  May consider midodrine although MAP consistently ~65.  3/21 Hypotension improved but borderline, continues TPN.   3/22 Discussed with RD volume status of TPN given new finding of HFrEF and CT chest with evidence of fluid overload. CM following for home TPN. Holding IV morphine. 3/23 BPs improved  3/24 Episode of hypotension last PM, required small fluid bolus, BP improved  3/25 Continues on TPN, however pt declined to be attached to TPN yesterday AM and then disconnected himself from it last night. 3/27 per RD note, patient unaware about prior TPN disconnections. This AM during rounds, visitor noted that \"something was leaking. \" He had pulled his port needle out again and his TPN was leaking onto the floor. RD plans to discuss further goals regarding TPN with pt when she visits him today. Nausea  - Continue PRN antiemetics    Diarrhea  - Antidiarrheals PRN  3/24 c/o worsening diarrhea. Check Cdiff.   3/25 Cdiff neg. Con't using antidiarrheals prn. Bradycardia / HFrEF / LV dysfunction / LBBB / ectopy  - One documented episode of HR 43 - check EKG  - No hx of hypertension, monitor  3/17 EKG with bi-geminal PACs and known LBBB. Recheck EGK.  3/18 EKG with same as above. Palpated pulse on several occasions documented in the 40s. Tele applied overnight. Cardiology following. 3/19 Cardiology recommending K>4 (on TPN) and Mg replacements. Echo pending. 3/21 No bradycardia noted on telemetry. Does have ectopy which may have led to incorrect pulse rate palpation. Echo with EF 35-40%. CT chest completed due to echo findings rebeka noted hiatal hernia/mesenteric fat herniation/abdominal fluid displacing heart and small BL effusions, anasarca. Cardiology following.  3/22 Cardiology considering BB if BP improve for HF/LV dysfunction. Weight up 6# overnight and increased since admission. Clinically no evidence of overload but asking RD to adjust TPN volume. Reported run of NS-SVT - cardiology following. 3/25 Cards following. Hypotension limiting therapy for heart failure.   3/26 cards signed off and recommended cont Amiodarone     Pancytopenia secondary to chemotherapy  - Transfuse prn per [facility] SOPs  3/25 ANC down to 1000. Start G-CSF.  3/26 ANC improved to 2.6. Fall  3/19 Witnessed slip and fall on wet floor yesterday. Today with L arm bruising - check X-ray. Will check CT head and CT AP (hematoma/lipoma palpated on R buttock) and also c/o worsening abdominal pain. 3/20 X-ray of L elbow/forearm negative. CT head with ?lacunar infarct but MRI showed no acute findings and ?lacunar infarct appears to be prominent perivascular space. CT AP with progressing peritoneal/liver disease and ?new splenic lesion. Last imaging obtained 12/2021 for comparison. Elevated LFTs  3/21 Monitor, possibly secondary to TPN or recent chemo. 3/22 Appear to be improving  3/23 AST improved, ALP increased  3/24 Improving    Hypoxia / ?Pneumonia  3/24 On O2 @ 2L now. Check CXR.  3/25 CXR with low lung volumes with b/l infiltrates ?edema vs infx? Check PCT - elevated. Start Cef/Vanc.  3/26 continues on cef/vanc; on room air       Continue home meds  [facility] SOPs  AC contraindicated d/t thrombocytopenia    Goals and plan of care reviewed with the patient. All questions answered to the best of our ability. Disposition:  TBD pending clinical course. PT/OT following - HHPT vs STR. CM following for home TPN. He will need close follow-up with Dr Sree Barajas upon DC. Tila Oates, [facility] Hematology and Oncology/Radiation Oncology   [address] [place] [address]  Office : [phone]  Fax : [phone]        Attending Addendum:  I have personally performed a face to face diagnostic evaluation on this patient.  I have reviewed and agree with the care plan as documented by Martha Stroud N.P. 36 minutes were spent on patient care, including but not limited to, reviewing the chart and time with the patient and family, more than 50% of the time documented was spent in face-to-face contact with the patient and in the care of the patient on the floor/unit where the patient is located. My findings are as follows:  He has pancreatic cancer and esophageal cancer, appears weak, heart rate regular without murmurs, abdomen is non-tender, bowel sounds are positive, we will continue TPN and IV ABX, he will receive Gemcitabine/Abraxane when his Platelet count rises above 100k.               Meron Phelps MD      [facility] Hematology/Oncology  [address] [place] [address]  Office : [phone]  Fax : [phone]

## 2022-03-27 NOTE — PROGRESS NOTES
Problem: Falls - Risk of  Goal: *Absence of Falls  Description: Document Cathy Mancera Fall Risk and appropriate interventions in the flowsheet.   Outcome: Progressing Towards Goal  Note: Fall Risk Interventions:  Mobility Interventions: Communicate number of staff needed for ambulation/transfer,OT consult for ADLs,PT Consult for mobility concerns,Patient to call before getting OOB    Mentation Interventions: Adequate sleep, hydration, pain control    Medication Interventions: Evaluate medications/consider consulting pharmacy,Patient to call before getting OOB,Teach patient to arise slowly    Elimination Interventions: Call light in reach,Patient to call for help with toileting needs,Toileting schedule/hourly rounds,Urinal in reach,Toilet paper/wipes in reach    History of Falls Interventions: Bed/chair exit alarm,Consult care management for discharge planning,Evaluate medications/consider consulting pharmacy,Room close to nurse's station

## 2022-03-27 NOTE — PROGRESS NOTES
VANCO DAILY FOLLOW UP NOTE  [facility] Pharmacokinetic Monitoring Service - Vancomycin    Consulting Provider: Gume Draper NP   Indication: HAP  Target Concentration: Goal AUC/LETY 400-600 mg*hr/L  Day of Therapy: 3  Additional Antimicrobials: cefepime    Pertinent Laboratory Values: Wt Readings from Last 1 Encounters:   03/26/22 78.4 kg (172 lb 14.4 oz)     Temp Readings from Last 1 Encounters:   03/27/22 97.7 °F (36.5 °C)     No components found for: PROCAL  Recent Labs     03/27/22  0224 03/26/22  0352 03/25/22  0307   BUN 19 20 21   CREA 0.50* 0.40* 0.50*   WBC 6.8 3.9* 1.6*   PCT  --   --  0.89*     Estimated Creatinine Clearance: 100.8 mL/min (A) (by C-G formula based on SCr of 0.5 mg/dL (L)). Lab Results   Component Value Date/Time    Vancomycin, random 35.0 03/26/2022 03:52 AM       MRSA Nasal Swab: not ordered. Order placed by pharmacy. .      Assessment:  Date/Time Dose Concentration AUC   3/26 0352 1250 mg q12h 35 698   Note: Serum concentrations collected for AUC dosing may appear elevated if collected in close proximity to the dose administered, this is not necessarily an indication of toxicity    Plan:  Continue 750 mg q12h.     Repeat vancomycin concentrations will be ordered as clinically appropriate   Pharmacy will continue to monitor patient and adjust therapy as indicated    Thank you for the consult,  Geoffrey Malcolm, PharmD, BCOP  Clinical Pharmacist  Contact via [employer]

## 2022-03-28 LAB
ALBUMIN SERPL-MCNC: 1.2 G/DL (ref 3.2–4.6)
ALBUMIN/GLOB SERPL: 0.3 {RATIO} (ref 1.2–3.5)
ALP SERPL-CCNC: 399 U/L (ref 50–136)
ALT SERPL-CCNC: 37 U/L (ref 12–65)
ANION GAP SERPL CALC-SCNC: 7 MMOL/L (ref 7–16)
AST SERPL-CCNC: 30 U/L (ref 15–37)
BILIRUB SERPL-MCNC: 0.5 MG/DL (ref 0.2–1.1)
BLASTS NFR BLD MANUAL: 2 %
BUN SERPL-MCNC: 24 MG/DL (ref 8–23)
CALCIUM SERPL-MCNC: 8.2 MG/DL (ref 8.3–10.4)
CHLORIDE SERPL-SCNC: 106 MMOL/L (ref 98–107)
CO2 SERPL-SCNC: 24 MMOL/L (ref 21–32)
CREAT SERPL-MCNC: 0.6 MG/DL (ref 0.8–1.5)
DIFFERENTIAL METHOD BLD: ABNORMAL
EOSINOPHIL # BLD: 0.2 K/UL (ref 0–0.8)
EOSINOPHIL NFR BLD MANUAL: 1 % (ref 1–8)
ERYTHROCYTE [DISTWIDTH] IN BLOOD BY AUTOMATED COUNT: 15.6 % (ref 11.9–14.6)
GLOBULIN SER CALC-MCNC: 3.6 G/DL (ref 2.3–3.5)
GLUCOSE SERPL-MCNC: 114 MG/DL (ref 65–100)
HCT VFR BLD AUTO: 35.4 % (ref 41.1–50.3)
HGB BLD-MCNC: 11.1 G/DL (ref 13.6–17.2)
LYMPHOCYTES # BLD: 0.6 K/UL (ref 0.5–4.6)
LYMPHOCYTES NFR BLD MANUAL: 3 % (ref 16–44)
MAGNESIUM SERPL-MCNC: 2.1 MG/DL (ref 1.8–2.4)
MCH RBC QN AUTO: 29.6 PG (ref 26.1–32.9)
MCHC RBC AUTO-ENTMCNC: 31.4 G/DL (ref 31.4–35)
MCV RBC AUTO: 94.4 FL (ref 79.6–97.8)
MONOCYTES # BLD: 2.5 K/UL (ref 0.1–1.3)
MONOCYTES NFR BLD MANUAL: 12 % (ref 3–9)
MYELOCYTES NFR BLD MANUAL: 2 %
NEUTS BAND NFR BLD MANUAL: 7 % (ref 0–10)
NEUTS SEG # BLD: 17.1 K/UL (ref 1.7–8.2)
NEUTS SEG NFR BLD MANUAL: 72 % (ref 47–75)
NRBC # BLD: 0.19 K/UL (ref 0–0.2)
PHOSPHATE SERPL-MCNC: 2.8 MG/DL (ref 2.3–3.7)
PLATELET # BLD AUTO: 53 K/UL (ref 150–450)
PLATELET COMMENTS,PCOM: ABNORMAL
PMV BLD AUTO: 12.2 FL (ref 9.4–12.3)
POTASSIUM SERPL-SCNC: 3.9 MMOL/L (ref 3.5–5.1)
PROMYELOCYTES NFR BLD MANUAL: 1 %
PROT SERPL-MCNC: 4.8 G/DL (ref 6.3–8.2)
RBC # BLD AUTO: 3.75 M/UL (ref 4.23–5.6)
RBC MORPH BLD: ABNORMAL
SODIUM SERPL-SCNC: 137 MMOL/L (ref 138–145)
VANCOMYCIN SERPL-MCNC: 26.6 UG/ML
WBC # BLD AUTO: 20.8 K/UL (ref 4.3–11.1)
WBC MORPH BLD: ABNORMAL

## 2022-03-28 PROCEDURE — 74011000258 HC RX REV CODE- 258: Performed by: INTERNAL MEDICINE

## 2022-03-28 PROCEDURE — 3331090001 HH PPS REVENUE CREDIT

## 2022-03-28 PROCEDURE — 74011250636 HC RX REV CODE- 250/636: Performed by: INTERNAL MEDICINE

## 2022-03-28 PROCEDURE — 99233 SBSQ HOSP IP/OBS HIGH 50: CPT | Performed by: INTERNAL MEDICINE

## 2022-03-28 PROCEDURE — 80053 COMPREHEN METABOLIC PANEL: CPT

## 2022-03-28 PROCEDURE — 74011250637 HC RX REV CODE- 250/637: Performed by: INTERNAL MEDICINE

## 2022-03-28 PROCEDURE — 74011000250 HC RX REV CODE- 250: Performed by: INTERNAL MEDICINE

## 2022-03-28 PROCEDURE — 84100 ASSAY OF PHOSPHORUS: CPT

## 2022-03-28 PROCEDURE — 65270000029 HC RM PRIVATE

## 2022-03-28 PROCEDURE — 97535 SELF CARE MNGMENT TRAINING: CPT

## 2022-03-28 PROCEDURE — 85025 COMPLETE CBC W/AUTO DIFF WBC: CPT

## 2022-03-28 PROCEDURE — 74011250637 HC RX REV CODE- 250/637: Performed by: NURSE PRACTITIONER

## 2022-03-28 PROCEDURE — APPSS45 APP SPLIT SHARED TIME 31-45 MINUTES: Performed by: NURSE PRACTITIONER

## 2022-03-28 PROCEDURE — 74011000258 HC RX REV CODE- 258: Performed by: NURSE PRACTITIONER

## 2022-03-28 PROCEDURE — 83735 ASSAY OF MAGNESIUM: CPT

## 2022-03-28 PROCEDURE — 80202 ASSAY OF VANCOMYCIN: CPT

## 2022-03-28 PROCEDURE — 74011250636 HC RX REV CODE- 250/636: Performed by: NURSE PRACTITIONER

## 2022-03-28 PROCEDURE — 99232 SBSQ HOSP IP/OBS MODERATE 35: CPT | Performed by: NURSE PRACTITIONER

## 2022-03-28 PROCEDURE — 3331090002 HH PPS REVENUE DEBIT

## 2022-03-28 RX ADMIN — ONDANSETRON 4 MG: 2 INJECTION INTRAMUSCULAR; INTRAVENOUS at 02:39

## 2022-03-28 RX ADMIN — VANCOMYCIN HYDROCHLORIDE 750 MG: 750 INJECTION, POWDER, LYOPHILIZED, FOR SOLUTION INTRAVENOUS at 11:58

## 2022-03-28 RX ADMIN — I.V. FAT EMULSION 250 ML: 20 EMULSION INTRAVENOUS at 18:00

## 2022-03-28 RX ADMIN — SODIUM CHLORIDE 5 MG: 9 INJECTION INTRAMUSCULAR; INTRAVENOUS; SUBCUTANEOUS at 15:55

## 2022-03-28 RX ADMIN — AMIODARONE HYDROCHLORIDE 400 MG: 200 TABLET ORAL at 18:01

## 2022-03-28 RX ADMIN — HYDROMORPHONE HYDROCHLORIDE 1 MG: 1 INJECTION, SOLUTION INTRAMUSCULAR; INTRAVENOUS; SUBCUTANEOUS at 14:36

## 2022-03-28 RX ADMIN — ONDANSETRON 4 MG: 2 INJECTION INTRAMUSCULAR; INTRAVENOUS at 06:32

## 2022-03-28 RX ADMIN — ONDANSETRON 4 MG: 2 INJECTION INTRAMUSCULAR; INTRAVENOUS at 21:59

## 2022-03-28 RX ADMIN — CEFEPIME HYDROCHLORIDE 2 G: 2 INJECTION, POWDER, FOR SOLUTION INTRAVENOUS at 11:19

## 2022-03-28 RX ADMIN — AMIODARONE HYDROCHLORIDE 400 MG: 200 TABLET ORAL at 09:05

## 2022-03-28 RX ADMIN — HYDROMORPHONE HYDROCHLORIDE 1 MG: 1 INJECTION, SOLUTION INTRAMUSCULAR; INTRAVENOUS; SUBCUTANEOUS at 21:59

## 2022-03-28 RX ADMIN — LOPERAMIDE HYDROCHLORIDE 2 MG: 2 CAPSULE ORAL at 18:01

## 2022-03-28 RX ADMIN — ONDANSETRON 4 MG: 2 INJECTION INTRAMUSCULAR; INTRAVENOUS at 15:04

## 2022-03-28 RX ADMIN — PROMETHAZINE HYDROCHLORIDE 25 MG: 25 TABLET ORAL at 18:01

## 2022-03-28 RX ADMIN — FAMOTIDINE 20 MG: 20 TABLET, FILM COATED ORAL at 18:02

## 2022-03-28 RX ADMIN — VANCOMYCIN HYDROCHLORIDE 750 MG: 750 INJECTION, POWDER, LYOPHILIZED, FOR SOLUTION INTRAVENOUS at 00:12

## 2022-03-28 RX ADMIN — CEFEPIME HYDROCHLORIDE 2 G: 2 INJECTION, POWDER, FOR SOLUTION INTRAVENOUS at 18:49

## 2022-03-28 RX ADMIN — HYDROMORPHONE HYDROCHLORIDE 1 MG: 1 INJECTION, SOLUTION INTRAMUSCULAR; INTRAVENOUS; SUBCUTANEOUS at 02:39

## 2022-03-28 RX ADMIN — TAMSULOSIN HYDROCHLORIDE 0.4 MG: 0.4 CAPSULE ORAL at 09:05

## 2022-03-28 RX ADMIN — FAMOTIDINE 20 MG: 20 TABLET, FILM COATED ORAL at 09:05

## 2022-03-28 RX ADMIN — HYDROMORPHONE HYDROCHLORIDE 1 MG: 1 INJECTION, SOLUTION INTRAMUSCULAR; INTRAVENOUS; SUBCUTANEOUS at 06:32

## 2022-03-28 RX ADMIN — DIPHENOXYLATE HYDROCHLORIDE AND ATROPINE SULFATE 1 TABLET: 2.5; .025 TABLET ORAL at 14:58

## 2022-03-28 RX ADMIN — SODIUM ACETATE: 164 INJECTION, SOLUTION, CONCENTRATE INTRAVENOUS at 18:00

## 2022-03-28 RX ADMIN — PANTOPRAZOLE SODIUM 40 MG: 40 TABLET, DELAYED RELEASE ORAL at 09:05

## 2022-03-28 RX ADMIN — HYDROMORPHONE HYDROCHLORIDE 1 MG: 1 INJECTION, SOLUTION INTRAMUSCULAR; INTRAVENOUS; SUBCUTANEOUS at 10:49

## 2022-03-28 RX ADMIN — CEFEPIME HYDROCHLORIDE 2 G: 2 INJECTION, POWDER, FOR SOLUTION INTRAVENOUS at 02:39

## 2022-03-28 NOTE — PROGRESS NOTES
PT Note    Attempted to see pt this AM, but he refused twice due to fatigue. Will attempt again pending pt status and scheduling.     Thank you,  Alexandrea Reddy, PT, DPT

## 2022-03-28 NOTE — PROGRESS NOTES
ACUTE OT GOALS:  (Developed with and agreed upon by patient and/or caregiver.)  1. Derrick Barlow will be modified independent with functional mobility for ADL in room within 4 - 7 visits. 2. Derrick Barlow will be modified independent with total body bathing and dressing within 4 - 7 visits. 3. Derrick Barlow will state and demonstrate at least 5 energy conservation techniques during ADL/therapeutic activities within 4 - 7 visits. 4. Derrick Barlow will voice a plan for 2-3 appropriate home modifications for home safety and fall prevention within 7 visits. 5. Derrick Barlow will participate at least 30 minutes of ADL with 3 or less rest breaks within 7 visits. 6. Derrick Barlow will complete a toilet transfer with modified independence within 7 visits. OCCUPATIONAL THERAPY: Daily Note OT Treatment Day # 4    Derrick Barlow is a 78 y.o. male   PRIMARY DIAGNOSIS: Hypotension  FTT (failure to thrive) in adult [R62.7]  Hypotension [I95.9]       Payor: Dmitri Purvis / Plan: [address] [place] [address] HMO / Product Type: Managed Care Medicare /   ASSESSMENT:     REHAB RECOMMENDATIONS: CURRENT LEVEL OF FUNCTION:  (Most Recently Demonstrated)   Recommendation to date pending progress:  Setting:  [facility] Pending family wishes  Equipment:    To Be Determined Bathing:   Not tested  Dressing:   Not tested  Feeding/Grooming:   Not tested  Toileting:   Contact Guard Assistance  Functional Mobility:   Contact Guard Assistance     ASSESSMENT:  Mr. Zenaida Pinon was supine in bed upon arrival. Pt now on RA, however SOB and with poor activity tolerance throughout treatment. Pt educated on energy conservation techniques in prep for ADLs and pursed lip breathing during ADLs and ambulation. Pt overall CGA RW for functional transfers and mobility of household distances. Pt completed toilet transfer with CGA RW.  Pt required frequent rest breaks during treatment and reinforcement of pursed lip breathing and spreading out ADLs throughout the day to minimize fatigue. Pt is making progress toward goals, continue POC.       SUBJECTIVE:   Mr. Scanlon Monday states, \"I'm wore out\"    SOCIAL HISTORY/LIVING ENVIRONMENT: See initial evaluation  Home Environment: Private residence  One/Two Story Residence: One story  Living Alone: Yes  Support Systems: Child(betsy),Friend/Neighbor    OBJECTIVE:     PAIN: VITAL SIGNS: LINES/DRAINS:   Pre Treatment: Pain Screen  Pain Scale 1: Numeric (0 - 10)  Pain Intensity 1: 0  Post Treatment: 0   None  O2 Device: None (Room air)     ACTIVITIES OF DAILY LIVING: I Mod I S SBA CGA Min Mod Max Total NT Comments   BASIC ADLs:              Bathing/ Showering [] [] [] [] [] [] [] [] [] [x]    Toileting [] [] [] [] [x] [] [] [] [] [] For toilet transfer with RW   Dressing [] [] [] [] [] [] [] [] [] [x]    Feeding [] [] [] [] [] [] [] [] [] [x]    Grooming [] [] [] [] [] [] [] [] [] [x]    Personal Device Care [] [] [] [] [] [] [] [] [] [x]    Functional Mobility [] [] [] [] [x] [] [] [] [] [] RW   I=Independent, Mod I=Modified Independent, S=Supervision, SBA=Standby Assistance, CGA=Contact Guard Assistance,   Min=Minimal Assistance, Mod=Moderate Assistance, Max=Maximal Assistance, Total=Total Assistance, NT=Not Tested    MOBILITY: I Mod I S SBA CGA Min Mod Max Total  NT x2 Comments:   Supine to sit [] [] [] [] [x] [] [] [] [] [] []    Sit to supine [] [] [] [] [] [] [] [] [] [x] []    Sit to stand [] [] [] [] [x] [] [] [] [] [] [] RW   Bed to chair [] [] [] [] [x] [] [] [] [] [] [] RW   I=Independent, Mod I=Modified Independent, S=Supervision, SBA=Standby Assistance, CGA=Contact Guard Assistance,   Min=Minimal Assistance, Mod=Moderate Assistance, Max=Maximal Assistance, Total=Total Assistance, NT=Not Tested    BALANCE: Good Fair+ Fair Fair- Poor NT Comments   Sitting Static [x] [] [] [] [] [] EOB   Sitting Dynamic [x] [] [] [] [] []              Standing Static [] [x] [] [] [] [] RW   Standing Dynamic [] [x] [] [] [] [] RW     PLAN:   FREQUENCY/DURATION: OT Plan of Care: 3 times/week for duration of hospital stay or until stated goals are met, whichever comes first.    TREATMENT:   TREATMENT:   ($$ Self Care/Home Management: 23-37 mins    )  Co-Treatment PT/OT necessary due to patient's decreased overall endurance/tolerance levels, as well as need for high level skilled assistance to complete functional transfers/mobility and functional tasks  Self Care (23 Minutes): Self care including Toileting, ADL Adaptive Equipment Training, Energy Conservation Training and functional transfers in prep for ADLs and ambulating household distances to increase independence and decrease level of assistance required.     TREATMENT GRID:  N/A    AFTER TREATMENT POSITION/PRECAUTIONS:  Chair, Needs within reach, RN notified and Visitors at bedside    INTERDISCIPLINARY COLLABORATION:  RN/PCT and OT/VILLAREAL    TOTAL TREATMENT DURATION:  OT Patient Time In/Time Out  Time In: 0900  Time Out: 1540 [facility], OT

## 2022-03-28 NOTE — PROGRESS NOTES
[facility] Hematology & Oncology        Inpatient Hematology / Oncology Progress Note      Admission Date: 3/15/2022 10:28 AM  Reason for Admission/Hospital Course: FTT (failure to thrive) in adult [R62.7]  Hypotension [I95.9]      24 Hour Events:  Afebrile, hypotensive at times, on room air   On TPN  - starting cyclic today  S/p D1L3 [place]/abraxane on 3/17   D8 due 3/24, held d/t cytopenias  Continues on Cefe/Vanc  Feeling ok - poor PO intake and diarrhea ongoing    Transfusions: None  Replacements: None    ROS:  Constitutional: +weakness/fatigue. Negative for fever, chills. CV: Negative for chest pain, palpitations, edema. Respiratory: Negative for dyspnea, cough, wheezing. GI: +N/V (better), diarrhea. Negative for abdominal pain. 10 point review of systems is otherwise negative with the exception of the elements mentioned above in the HPI. No Known Allergies    OBJECTIVE:  Patient Vitals for the past 8 hrs:   BP Temp Pulse Resp SpO2   22 0807 110/79 98.2 °F (36.8 °C) 88 16 92 %   22 0401 (!) 127/58 99.1 °F (37.3 °C) 83 18 91 %     Temp (24hrs), Av.2 °F (36.8 °C), Min:97.3 °F (36.3 °C), Max:99.1 °F (37.3 °C)     0701 -  1900  In: -   Out: 100 [Urine:100]    Physical Exam:  Constitutional: Thin, cachectic elderly male in no acute distress, lying comfortably in the hospital bed. HEENT: Normocephalic and atraumatic. Oropharynx is clear, mucous membranes are moist. Extraocular muscles are intact. Sclerae anicteric. Neck supple without JVD. No thyromegaly present. Skin Warm and dry. No bruising and no rash noted. No erythema. No pallor. Respiratory Lungs are clear to auscultation bilaterally without wheezes, rales or rhonchi, normal air exchange without accessory muscle use. CVS Normal rate, regular rhythm and normal S1 and S2. No murmurs, gallops, or rubs. Abdomen Soft, nontender and distended, normoactive bowel sounds.   +Abd pleurx   Neuro Grossly nonfocal with no obvious sensory or motor deficits. MSK 2+ BLE edema. Normal range of motion in general.  No tenderness. Psych Appropriate mood and affect. Labs:      Recent Labs     03/28/22  0400 03/27/22  0224 03/26/22  0352   WBC 20.8* 6.8 3.9*   RBC 3.75* 3.38* 3.58*   HGB 11.1* 10.2* 10.6*   HCT 35.4* 31.7* 33.0*   MCV 94.4 93.8 92.2   MCH 29.6 30.2 29.6   MCHC 31.4 32.2 32.1   RDW 15.6* 15.3* 14.8*   PLT 53* 42* 23*   GRANS 72 57 67   LYMPH 3* 7* 9*   MONOS 12* 16* 20*   EOS 1 1 2   BASOS  --  1 1   IG  --  18* 1   DF MANUAL AUTOMATED AUTOMATED   ANEU 17.1* 3.8 2.6   ABL 0.6 0.5 0.4*   ABM 2.5* 1.1 0.8   JONNY 0.2 0.1 0.1   ABB  --  0.1 0.0   AIG  --  1.2* 0.0        Recent Labs     03/28/22  0400 03/27/22  1013 03/27/22 0224 03/26/22  0352 03/26/22  0352   * 137* 137   < > 136*   K 3.9 3.7 4.1   < > 3.9    107 108*   < > 105   CO2 24 24 24   < > 25   AGAP 7 6* 5*   < > 6*   * 135* 125*   < > 93   BUN 24* 21 19   < > 20   CREA 0.60* 0.60* 0.50*   < > 0.40*   GFRAA >60 >60 >60   < > >60   GFRNA >60 >60 >60   < > >60   CA 8.2* 8.2* 7.7*   < > 8.1*   *  --  348*  --  382*   TP 4.8*  --  4.6*  --  4.7*   ALB 1.2*  --  1.0*  --  1.1*   GLOB 3.6*  --  3.6*  --  3.6*   AGRAT 0.3*  --  0.3*  --  0.3*   MG 2.1  --  2.0  --  2.1   PHOS 2.8  --   --   --   --     < > = values in this interval not displayed. Imaging:  IR INSERT CATH PLEURAL INDWELL [[id number]] Collected: 03/22/22 1614   Order Status: Completed Updated: 03/22/22 1652   Narrative:     Title: Dominant PleurX drain placement. Indication: Pancreatic cancer. Recurrent abdominal ascites.  Tunneled abdominal   PleurX drain catheter requested. : Aldo Kee PA-C     Supervising Physician: Aftab Tierney M.D. Consent:  Informed written and oral consent was obtained from the patient after   explanation of benefits and risks (including, but not limited to:  Infection,   hemorrhage, visceral injury and pneumothorax).  The patient's questions were   answered to their satisfaction. The patient stated understanding and requested   that we proceed. Procedure:  With the patient supine, the abdomen was prepped and draped in the   standard fashion.  Lidocaine was administered for local field block.  Ultrasound   evaluation was performed. Using real-time ultrasound guidance, with appropriate   image recording, a Yueh needle was advanced into the ascites in the abdomen. Using fluoroscopy, the needle was exchanged over a wire for a peel-away sheath. The PleurX drain was brought through a subcutaneous tunnel and passed down the   peel-away sheath positioning the drain across the midline, lower and the pelvis. The skin incision was closed with absorbable suture.  The catheter was secured   with nonabsorbable suture. A total of 2250 cc of thin yellow fluid was removed.  Bandages were applied. Complications: Large-volume ascites. Medications:  37 minutes based placed under moderate sedation was provided under   the direction and supervision of John Deluca M.D. using frontal and Versed. Continuous cardiopulmonary monitoring was provided by trained independent   observer present. Ancef was infused prior to the procedure. Contrast:  None. Radiation dose:   Fluoroscopy time: 18 seconds. Reference air kerma (mGy): 8   Kerma area product (cGy.cm2):  281   Fluoroscopic images: 1     Findings:  Large volume ascites. Plan: Bedrest for 1 hour. Recommend performing a drainage procedure daily or every-other-day for the next   two weeks in order to promote healing of the catheter site.        CT CHEST W CONT [[id number]] Collected: 03/20/22 1811   Order Status: Completed Updated: 03/20/22 1823   Narrative:     CT CHEST WITH CONTRAST DATED 3/20/2022. History: Echo with extra cardiac structure compressing the left atrium.      Comparison: CT abdomen and pelvis with contrast 3/19/2022 Technique:   Multiple contiguous helical CT images reconstructed at 5 mm were   obtained from the neck base to the mid abdomen following the administration of   100 cc of Isovue 370 without acute complication. All CT scans performed at this   facility use one or all of the following: Automated exposure control, adjustment   of the mA and/or kVp according to patient's size, iterative reconstruction. Findings: The base of the neck is unremarkable in appearance. A left-sided venous port is   present. No lymphadenopathy is seen.  The thoracic aorta is normal in caliber. The heart is not clearly enlarged on the CT. Moderate to advanced   atherosclerotic calcification is seen of the coronary arteries. No significant   pericardial effusion is seen. The only abnormal space-occupying process adjacent   to the heart is a moderate size hiatal hernia. In addition to the stomach, there   is additional herniation of mesenteric fat as well as abdominal fluid. These all   appear to anteriorly displace the heart. The esophagus proximal to the hernia   sac is fluid distended which could indicate reflux. Obstruction at the   gastroesophageal junction is felt to be less likely given the additional fluid   distention of the hernia sac. Evaluation with lung windows demonstrates a trace right, and small left   dependent pleural effusion. These do appear worsened from the prior examination. Nonspecific pulmonary infiltrates are otherwise seen. Lungs are expanded without   evidence for pneumothorax.  No acute osseous abnormality is seen. Mild anasarca   is seen of the chest wall. Limited evaluation of the upper abdomen demonstrate multiple findings which are   not significantly changed and better assessed on a dedicated contrasted CT scan   of the abdomen performed on 3/19/2022. Impression:     1.  The only abnormal space-occupying process adjacent to the heart is a   moderate size hiatal hernia.  In addition to the stomach, there is additional   herniation of mesenteric fat as well as abdominal fluid. These all appear to   anteriorly displace the heart.       2. Worsening although trace to small bilateral dependent pleural effusions. Additional mild anasarca seen of the chest wall. These findings suggest fluid   overload. 3. Nonspecific pulmonary infiltrates. These could represent pulmonary edema   although the distribution is not classic. Additional allergies such as pneumonia   are not excluded if suggested by clinical findings. This report was made using voice transcription. Despite my best efforts to avoid   any, transcription errors may persist. If there is any question about the   accuracy of the report or need for clarification, then please call [phone], or text me through [email or web address] for clarification or correction. CT ABD PELV W CONT [[id number]] Collected: 03/19/22 1300   Order Status: Completed Updated: 03/20/22 1526   Addenda: Addendum: Addendum: A request was made to assess for potential right   gluteal hematoma. There is appear to be edema in the right gluteal soft tissues   which is slightly more pronounced than on the left. No clearly demonstrated defined collection is seen to suggest significant hematoma. The most inferior   gluteal soft tissues have been excluded from the field of imaging. Signed: 03/20/22 1523 by Louie Palacios MD   Narrative:     CT ABDOMEN AND PELVIS WITH INTRAVENOUS CONTRAST DATED 3/19/2022. History: Fall hitting bottom and back. Comparison: CT abdomen and pelvis with contrast 12/27/2021     Technique:   Multiple contiguous helical CT images reconstructed at 5 mm   intervals were obtained from above the diaphragms through the ischial   tuberosities following oral and 100 cc Isovue-370 without acute complication.    All CT scans performed at this facility use one or all of the following:   Automated exposure control, adjustment of the mA and/or kVp according to   patient's size, iterative reconstruction. Findings:   CT ABDOMEN:     Limited evaluation of the lung bases and base of the mediastinum demonstrates   nonspecific infiltrates in the bilateral lung bases, left greater than right. A   small dependent left pleural effusion is seen. A small to moderate size hiatal   hernia is present. Ascites within the abdomen is also seen within the hernia   sac. The Liver is clearly cirrhotic in its appearance. Multiple hepatic masses are   seen the largest of which resides in the inferior right lobe measuring 3 cm in   size. The appearance is highly concerning for malignancy either representing   multifocal hepatomas, or hepatic metastases. Hepatic metastases are favored   given the appearance. Asymmetric intraperitoneal air ductal dilation is seen in   the left lobe of the liver which may be obstructed by a central hepatic mass   seen on axial image 23 measuring 1.1 cm in size. .  The spleen demonstrates an   irregular area of decreased attenuation in the superior pole seen on axial image   17 measuring 3.2 cm x 1.7 cm. This is not as well characterized. This does not   appear to represent an acute defect. This could result from flow artifact.  No   contour deforming or enhancing mass lesions are seen of the adrenal glands. The   pancreas is grossly unchanged in its appearance with the patient having a known   primary pancreas tumor described on the prior study. The gallbladder has been   removed.  The kidneys enhance symmetrically and no evidence of hydronephrosis is   seen. A stable benign cyst is seen in the posterior upper to midpole cortex of   the right kidney measuring 2.6 cm in size. The visualized loops of small bowel and colon are normal in caliber.  The   appendix appears to have been removed. Mild to moderate diverticulosis is seen   of the left colon. No free air is seen.  Moderate ascites is seen which   demonstrates low attenuation suggesting simple ascites. Diffuse mesenteric edema   is seen. Abnormal peritoneal nodularity and caking is suggested with additional   peritoneal thickening which at times appears nodular. This is highly concerning   for peritoneal metastatic process which appears worsened from the prior   examination.  No evolving adenopathy is seen.  The abdominal aorta demonstrate   moderate atherosclerotic calcification. No acute osseous abnormality is seen. Compression deformities are seen at T12, L1, and L4 although these have a   chronic appearance and are stable from the prior comparison study. CT PELVIS:   Small to moderate ascites extends into the pelvis. There is once again nodular   peritoneal thickening best appreciated on axial image 73 highly concerning for   evolving peritoneal metastatic disease. There appears to be serosal involvement   of the mid sigmoid colon seen on axial image 74. No abnormal dilation is seen to   suggest significant obstruction at this time. Orlean Khadijah evolving pelvic adenopathy is   seen.  The urinary bladder is unremarkable. No acute osseous abnormality is   seen. Impression:     1.  No clear acute injury from recent fall. Specifically, no acute osseous   abnormality is seen. 2. Grossly stable appearance of pancreatic tumor although this is suboptimally   assessed on this exam. However, there is clear evidence for evolving metastatic   disease with new hepatic masses, and multiple findings as described above which   are highly concerning for evolving peritoneal metastatic disease. Lastly,   irregular decreased attenuation is seen in the superior pole of the spleen which   does not appear clearly acute and does not demonstrate adjacent complex ascites. This could represent an additional metastasis although is not as well   characterized. This report was made using voice transcription.  Despite my best efforts to avoid   any, transcription errors may persist. If there is any question about the   accuracy of the report or need for clarification, then please call [phone], or text me through [email or web address] for clarification or correction.     MRI BRAIN W WO CONT [[id number]] Collected: 03/19/22 1649   Order Status: Completed Updated: 03/19/22 1657   Narrative:     EXAMINATION: BRAIN MRI 3/19/2022 4:47 PM     ACCESSION NUMBER: [id number]     INDICATION: 70-year-old male with fall, altered mental status and confusion. History of pancreatic cancer on chemotherapy with recent progression of   intra-abdominal disease, no prior imaging of brain. COMPARISON: CT head 3/19/2022. TECHNIQUE: Multiplanar multisequence MRI of the brain without and with   intravenous administration of 14 mL contrast agent. FINDINGS:     Previously described subcentimeter focus along the posterior limb of the right   internal capsule follows CSF signal intensity on all sequences and is favored to   represent a prominent perivascular space. There is a mild degree of scattered and confluent foci of T2/FLAIR   hyperintensity within the periventricular and deep white matter, nonspecific but   most commonly seen with chronic small vessel ischemic changes. Faint chronic   hemosiderin involving the left posterior putamen. No midline shift or mass lesion. There is no evidence of intracranial hemorrhage   or acute infarct. The ventricles are within normal limits in size and   configuration. There are no extra-axial fluid collections present.  No diffusion   weighted signal abnormality is identified. There is no abnormal enhancement. Impression:       No acute finding or evidence of intracranial metastatic disease. XR ELBOW LT MIN 3 V [[id number]] Collected: 03/19/22 1534   Order Status: Completed Updated: 03/19/22 1538   Narrative:     XR FOREARM LT AP/LAT, XR ELBOW LT MIN 3 V 3/19/2022 3:19 PM     HISTORY: Fall with left arm and left elbow pain. Impression:       Two views left forearm.  No fracture or dislocation. No significant soft tissue   swelling. Old healed distal ulnar fracture deformity is present. Three views left elbow. No fracture or dislocation. No significant soft tissue   swelling. No fat pad elevation. XR FOREARM LT AP/LAT [[id number]] Collected: 03/19/22 1534   Order Status: Completed Updated: 03/19/22 1538   Narrative:     XR FOREARM LT AP/LAT, XR ELBOW LT MIN 3 V 3/19/2022 3:19 PM     HISTORY: Fall with left arm and left elbow pain. Impression:       Two views left forearm. No fracture or dislocation. No significant soft tissue   swelling. Old healed distal ulnar fracture deformity is present. Three views left elbow. No fracture or dislocation. No significant soft tissue   swelling. No fat pad elevation. CT HEAD WO CONT [[id number]] Collected: 03/19/22 1254   Order Status: Completed Updated: 03/19/22 1258   Narrative:     CT BRAIN WITHOUT CONTRAST   3/19/2022 12:26 PM     INDICATION: Fall, altered mental status and confusion. COMPARISON: None available at this hospital PACS     Technique:  Multiple contiguous axial images are obtained encompassing the brain   from the skull base to the vertex.  For this CT scanner at least one of the   following techniques is utilized to decrease patient radiation dose: Automatic   exposure control, KVP and mA modulation based on patient weight, and iterative   reconstruction. FINDINGS: The ventricles are midline and of appropriate size and configuration. Mild hypoattenuating foci seen in the periventricular deep white matter. Inferiorly at the junction of the thalamus and posterior limb of the internal   capsule on the right there is a rounded mildly hypodense focus that measures 9   mm. The midline structures and posterior fossa are intact.  There is no finding of   an acute cortical stroke, mass lesion, or acute bleed.  No extra-axial fluid   collection. The skull is intact, no discreet abnormality.  The paranasal sinuses are not remarkable. The visualized orbits and mastoid air-cells are patent. Impression:     9 mm rounded hypoattenuating focus on the right at the inferior   junction of the thalamus and posterior limb of the internal capsule is noted. Suspicious for lacunar infarct and of uncertain chronicity-cannot exclude that   this is new. Elsewhere only mild chronic changes in the periventricular white matter   suggested. For further imaging evaluation of this as clinically indicated-recommend brain   MRI with diffusion imaging. XR CHEST Gita Love [[id number]] Collected: 03/15/22 1259   Order Status: Completed Updated: 03/15/22 1302   Narrative:     Chest X-ray     INDICATION: Hypotension. COMPARISON: Chest x-ray 2/10/2021. A portable AP view of the chest was obtained. FINDINGS: The lungs are clear. There are no infiltrates or effusions. Left chest   port is unchanged in position. No pneumothorax. The heart size is normal.  The   bony thorax is intact.      Impression:     No acute findings in the chest. Lungs remain clear. No interval   change.           Medications:  Current Facility-Administered Medications   Medication Dose Route Frequency    HYDROmorphone (DILAUDID) injection 1 mg  1 mg IntraVENous Q4H PRN    TPN ADULT-CENTRAL - dextrose 14% amino acid 8%   IntraVENous QPM    vancomycin (VANCOCIN) 750 mg in 0.9% sodium chloride 250 mL ([id number])  750 mg IntraVENous Q12H    HYDROcodone-acetaminophen (NORCO) 5-325 mg per tablet 1 Tablet  1 Tablet Oral Q4H PRN    cefepime (MAXIPIME) 2 g in 0.9% sodium chloride (MBP/ADV) 100 mL MBP  2 g IntraVENous Q8H    prochlorperazine (COMPAZINE) with saline injection 5 mg  5 mg IntraVENous Q6H PRN    acetaminophen (TYLENOL) tablet 650 mg  650 mg Oral Q6H PRN    diphenhydrAMINE (BENADRYL) capsule 25 mg  25 mg Oral Q6H PRN    amiodarone (CORDARONE) tablet 400 mg  400 mg Oral BID    fat emulsion 20% (LIPOSYN, INTRAlipid) infusion 250 mL  250 mL IntraVENous QPM  loperamide (IMODIUM) capsule 2 mg  2 mg Oral Q4H PRN    NUTRITIONAL SUPPORT ELECTROLYTE PRN ORDERS   Does Not Apply PRN    sodium chloride (NS) flush 5-10 mL  5-10 mL IntraVENous PRN    diphenoxylate-atropine (LOMOTIL) tablet 1 Tablet  1 Tablet Oral QID PRN    famotidine (PEPCID) tablet 20 mg  20 mg Oral BID    pantoprazole (PROTONIX) tablet 40 mg  40 mg Oral ACB    promethazine (PHENERGAN) tablet 25 mg  25 mg Oral Q6H PRN    tamsulosin (FLOMAX) capsule 0.4 mg  0.4 mg Oral DAILY    ondansetron (ZOFRAN) injection 4 mg  4 mg IntraVENous Q4H PRN         ASSESSMENT:    Problem List  Date Reviewed: 3/15/2022          Codes Class Noted    Chronic systolic congestive heart failure (HCC) ICD-10-CM: I50.22  ICD-9-CM: 428.22, 428.0  3/21/2022        Bradycardia ICD-10-CM: R00.1  ICD-9-CM: 427.89  3/19/2022        LBBB (left bundle branch block) ICD-10-CM: I44.7  ICD-9-CM: 426.3  3/19/2022        FTT (failure to thrive) in adult ICD-10-CM: R62.7  ICD-9-CM: 783.7  3/15/2022        * (Principal) Hypotension ICD-10-CM: I95.9  ICD-9-CM: 458.9  3/15/2022        Severe protein-calorie malnutrition ([facility] ICD-10-CM: E43  ICD-9-CM: 262  3/4/2022        Pancreatic cancer ([facility] ICD-10-CM: C25.9  ICD-9-CM: 157.9  3/2/2022        Ascites ICD-10-CM: R18.8  ICD-9-CM: 789.59  3/2/2022        Admission for antineoplastic chemotherapy ICD-10-CM: Z51.11  ICD-9-CM: V58.11  3/2/2022        Hypomagnesemia ICD-10-CM: E83.42  ICD-9-CM: 275.2  4/23/2021        Hypokalemia ICD-10-CM: E87.6  ICD-9-CM: 276.8  4/20/2021        CINV (chemotherapy-induced nausea and vomiting) ICD-10-CM: R11.2, T45.1X5A  ICD-9-CM: 787.01, E933.1  4/20/2021        Dehydration ICD-10-CM: E86.0  ICD-9-CM: 276.51  4/12/2021        Other neutropenia ([facility] ICD-10-CM: D70.8  ICD-9-CM: 288.09  3/9/2021        Malignant neoplasm of lower third of esophagus (HCC) ICD-10-CM: C15.5  ICD-9-CM: 150.5  1/5/2021        Malignant neoplasm of body of pancreas ([facility] ICD-10-CM: C25.1  ICD-9-CM: 157.1  1/5/2021        Severe obesity (HCC) ICD-10-CM: E66.01  ICD-9-CM: 278.01  12/10/2020        Elevated glucose ICD-10-CM: R73.09  ICD-9-CM: 790.29  7/22/2019        Anemia ICD-10-CM: D64.9  ICD-9-CM: 285.9  7/22/2019        Chronic left-sided low back pain ICD-10-CM: M54.50, G89.29  ICD-9-CM: 724.2, 338.29  7/22/2019              78 y.o. M pancreatic cancer and esophageal cancer with malignant ascites of previously controlled FOLFIRINOX but currently disease progressed and most recently admited on 3/2/2022 to place Pleurx and arrange gemcitabine/Abraxane, had extensive discussion with inpatient team and pharmacy, patient was discharged on 3/7/2022 without chemo appointment and return to office on 3/15/2022, extremely sick and blood pressure not measurable in the office, and not been having much oral intake since discharge, urgently establish IV access and called EMS to take to ER to stabilize and admit to hospital, apparently needed much supportive care and volume resuscitation, start TPN until oral intake can improve and give gemcitabine/Abraxane once clinically stable, discussed with the inpatient team.    PLAN:    Metastatic pancreatic cancer / distal esophageal cancer  - Metastatic disease involving peritoneum, malignant ascites  - s/p 24 cycles of FOLFIRINOX with SD and recent POD with increasing CEA/ and malignant ascites  - Plan for gemcitabine/abraxane when clinically stable - hopefully tomorrow. 3/17 [place]/abraxane today, tolerated well  3/24 D8 due today, deferring tx d/t cytopenias. Dr. Tamiko Buchanan discussed that if patient's condition does not improve, that he may want to consider Hospice services. Palliative care following. 3/25 Pt wants to con't to pursue treatment as he wants to change his relationship with his children. He does not feel the burden of tx outweighs the benefits and wants to continue tx for as long as he can. D15 due 3/31.     Malignant ascites  - Has abdominal Pleurx, drain three times weekly  3/17 Pleurx drained 3/16 - 1100cc out. 3/18 Pleurx accidentally dislodged last night - 1800cc removed before completely removed. IR notified and will re-evaluate Monday. 3/20 Worsening abdominal pain possibly secondary to re-accumulation, some drainage reported at Pleurx site. IR evaluating tomorrow. 3/21 IR to re-evaluate tomorrow, was not NPO today. 3/22 To IR today. 3/23 Abd pleurx replaced yesterday  3/25 Pleurx drained yesterday, 1.1L. Con't to drain prn.  3/26 drained today as he was feeling uncomfortable. Monitor blood pressures. 3/27 continue to drain PRN, but be cautious with blood pressures as he is hypotensive at times. Cancer related pain  - Continue home dilaudid  3/17 Utilizing IV morphine. Will decrease dosing and encourage use of home Norco.  3/18 Continue to wean IV meds - encourage PO.  3/20 Has not been receiving IV morphine due to hypotension. Continue with PRN oral medications. 3/22 Consult to [facility] [place] [facility]"" regarding [address], symptom management. 3/23 PC following. Pt changed code status to DNR, wants to pursue further cancer-related treatment. 3/25 PC following  3/27 Dilaudid increased today. During rounds, he stated his pain was better controlled with increased dose. Hypotension / poor PO intake / protein-calorie malnutrition  - Consult RD for TPN  - BC pending, started on Cefe/Vanc and may be able to de-escalate soon as hypotension likely secondary to dehydration. LA improved after IFV. 3/17 On TPN. Continues on Cefe/Vanc although infectious work-up unremarkable. Will stop antibiotics. 3/18 Hypotension improved and remains afebrile off antibiotics. Continue with nutritional support via TPN.  3/19 Ongoing hypotension, although improved yesterday AM, worse through evening. Had 6L of IVF yesterday and continues on TPN.  May consider midodrine although MAP consistently ~65.  3/21 Hypotension improved but borderline, continues TPN.   3/22 Discussed with KAREN volume status of TPN given new finding of HFrEF and CT chest with evidence of fluid overload. CM following for home TPN. Holding IV morphine. 3/23 BPs improved  3/24 Episode of hypotension last PM, required small fluid bolus, BP improved  3/25 Continues on TPN, however pt declined to be attached to TPN yesterday AM and then disconnected himself from it last night. 3/27 per RD note, patient unaware about prior TPN disconnections. This AM during rounds, visitor noted that \"something was leaking. \" He had pulled his port needle out again and his TPN was leaking onto the floor. RD plans to discuss further goals regarding TPN with pt when she visits him today. 6/35 Cyclic TPN to start today per RD. Encouraged PO intake and he agrees. Nausea  - Continue PRN antiemetics    Diarrhea  - Antidiarrheals PRN  3/24 c/o worsening diarrhea. Check Cdiff.   3/25 Cdiff neg. Con't using antidiarrheals prn.  3/28 Reports diarrhea - only 1 documented stool. Continue PRN antidiarrheals. Bradycardia / HFrEF / LV dysfunction / LBBB / ectopy  - One documented episode of HR 43 - check EKG  - No hx of hypertension, monitor  3/17 EKG with bi-geminal PACs and known LBBB. Recheck EGK.  3/18 EKG with same as above. Palpated pulse on several occasions documented in the 40s. Tele applied overnight. Cardiology following. 3/19 Cardiology recommending K>4 (on TPN) and Mg replacements. Echo pending. 3/21 No bradycardia noted on telemetry. Does have ectopy which may have led to incorrect pulse rate palpation. Echo with EF 35-40%. CT chest completed due to echo findings rebeka noted hiatal hernia/mesenteric fat herniation/abdominal fluid displacing heart and small BL effusions, anasarca. Cardiology following.  3/22 Cardiology considering BB if BP improve for HF/LV dysfunction. Weight up 6# overnight and increased since admission. Clinically no evidence of overload but asking RD to adjust TPN volume.  Reported run of NS-SVT - cardiology following. 3/25 Cards following. Hypotension limiting therapy for heart failure. 3/26 cards signed off and recommended cont Amiodarone     Pancytopenia secondary to chemotherapy  - Transfuse prn per [facility] SOPs  3/25 [address] down to 1000. Start G-CSF.  3/26 ANC improved to 2.6.   3/28 WBC up to 20 - stop G-CSF. Fall  3/19 Witnessed slip and fall on wet floor yesterday. Today with L arm bruising - check X-ray. Will check CT head and CT AP (hematoma/lipoma palpated on R buttock) and also c/o worsening abdominal pain. 3/20 X-ray of L elbow/forearm negative. CT head with ?lacunar infarct but MRI showed no acute findings and ?lacunar infarct appears to be prominent perivascular space. CT AP with progressing peritoneal/liver disease and ?new splenic lesion. Last imaging obtained 12/2021 for comparison. Elevated LFTs  3/21 Monitor, possibly secondary to TPN or recent chemo. 3/22 Appear to be improving  3/23 AST improved, ALP increased  3/28 Improving    Hypoxia / ?Pneumonia  3/24 On O2 @ 2L now. Check CXR.  3/25 CXR with low lung volumes with b/l infiltrates ?edema vs infx? Check PCT - elevated. Start Cef/Vanc.  3/26 continues on cef/vanc; on room air   3/28 D4 Cefepime/Vancomycin - afebrile. DC Vancomycin. Continue home meds  [facility] SOPs  AC contraindicated d/t thrombocytopenia    Goals and plan of care reviewed with the patient. All questions answered to the best of our ability. Disposition:  TBD pending clinical course. PT/OT following - HHPT vs STR. Encouraged physical activity/OOB. CM following for home TPN. He will need close follow-up with Dr Fito Ron upon DC. Jacqueline Saab, [address] Hematology & Oncology  [address] [address]  [address] [facility]  Office : [phone]  Fax : [phone]   I personally saw, exammed and counselled the patient, and discussed with NP, agree with above history/assessment/plan.  78 y.o.male pancreatic cancer and esophageal cancer with peritoneal carcinomatosis, status post first-line FOLFIRINOX, disease progression and admitted for failure to thrive/obstruction, received TPN and 1 cycle of gemcitabine/Abraxane, reported abdominal pain improved and started having bowel movement/gas, which is encouraging sign of peritoneal disease response, discussed the need to enhance nutrition and improve hypoalbuminemia, also found EF 35 to 40%, sinusitis as needed, week 2 chemo was on hold for thrombocytopenia, continue above care, check C. difficile, repeat tumor markers. Heike Valerio M.D.   [address]  Office : [phone]  Fax : [phone]

## 2022-03-28 NOTE — PROGRESS NOTES
Palliative Care Progress Note    Patient: Torrie Horton MRN: [id number]  SSN: [id number]    YOB: 1955  Age: 78 y.o. Sex: male       Assessment/Plan:     Chief Complaint/Interval History: reports abdominal distention and pain        Principal Diagnosis:    · Pain, abdomen  R10.9    Additional Diagnoses:   · Ascites  R18.8  · Debility, Unspecified  R53.81  · Frailty  R54  · Nausea/Vomiting  R11.2  · Counseling, Encounter for Medical Advice  Z71.9  · Encounter for Palliative Care  Z51.5    Palliative Performance Scale (PPS)       Medical Decision Making:   Reviewed and summarized notes over last 48 hours   Discussed case with appropriate providers  Reviewed laboratory and x-ray data over last 48 hours     Pt resting in bed, no distress noted. His niece is at bedside visiting. Pt reports worsening abdominal pain and distention since last week. He asks if his Pleurx can be drained today. Nursing miscellaneous order placed to drain. Pt's platelet counts are slowly improving, and he is hopeful he can resume treatment soon. Pt denies other needs at this time. He continues to utilize IV Dilaudid, stating he gets more nauseated as his stomach gets more distended with ascites. Encouraged use of PO Lortab after the Pleurx is drained. Will continue to follow. Will discuss findings with members of the interdisciplinary team.         More than 50% of this 25 minute visit was spent counseling and coordination of care as outlined above. Subjective:     Review of Systems:  A comprehensive review of systems was negative except for:   Constitutional: Positive for fatigue. Gastrointestinal: Positive for abdominal pain, nausea      Objective:     Visit Vitals  /79 (BP 1 Location: Right upper arm, BP Patient Position: At rest)   Pulse 88   Temp 98.2 °F (36.8 °C)   Resp 16   Ht 5' 6\" (1.676 m)   Wt 180 lb (81.6 kg)   SpO2 92%   BMI 29.05 kg/m²       Physical Exam:    General:  Cooperative. Debilitated. Eyes:  Conjunctivae/corneas clear    Nose: Nares normal. Septum midline. Neck: Supple, symmetrical, trachea midline   Lungs:   Clear to auscultation bilaterally, unlabored   Heart:  Regular rate and rhythm   Abdomen:   Soft, mildly tender, non-distended.  Pleurx catheter   Extremities: Normal, atraumatic, no cyanosis or edema   Skin: Skin color, texture, turgor normal.    Neurologic: Nonfocal   Psych: Alert and oriented     Signed By: Tad Barth NP     March 28, 2022

## 2022-03-28 NOTE — PROGRESS NOTES
EOS:    - on 2L Nc  - lomotil given 1x  - imodium given 1x  - dilaudid given 2x  - zofran 1x, compazine 1x, and phenergan 1x for nausea and vomiting.  Pt vomited 200mL   - pleurix drained 1100mL yellow fluid

## 2022-03-28 NOTE — PROGRESS NOTES
LOS 13d  Chart reviewed by [facility] for discharge planning. Dispo is pending on clinical course. Pt is being seen by PT/OT recommendations is HH vs STR at this time. Patient will need Home TPN at discharge referral sent [facility]. Will continue to follow for discharge planning needs  Please consult  if any new issues arise   Discharge plan is undetermined at this time.

## 2022-03-28 NOTE — PROGRESS NOTES
Comprehensive Nutrition Assessment    Type and Reason for Visit: Reassess  TPN Management (oncology)    Nutrition Recommendations/Plan:   Parenteral Nutrition:  Total parenteral nutrition  to begin at 1800  Continue: Dex 14%, 8% AA 1.56 L (65ml/hr)   Change to 18 hr cyclic infusion. Infuse at 45 ml/hr from 4032-3478, then increase to 82 ml/hr and infuse from 5693-0817, then decrease to 45 ml/hr from 5026-3725, then discontinue  Continue 250 ml 20% lipids daily  Lytes/L:    Sodium 150 meq (70 meq NaCl, 80 meq NaAcetate), Potassium 30 meq (30 meq KPO4), 10 meq Mg, 4.5 meq Calcium  Other additives: MTE, MVI MWF due to national shortages  Formula ~1:1.5 Cl:Acetate  Nutritional Supplement Therapy:   Active electrolyte replacement per nutrition support protocols  Replacement indicated:  None  Labs:   BMP daily  Mg MWF  Phos MWF    POC Glucoses/SSI Not indicated     Malnutrition Assessment:  Malnutrition Status: Severe malnutrition  Context: Chronic illness  Findings of clinical characteristics of malnutrition:   Energy Intake:  7 - 75% or less est energy requirements for 1 month or longer  Weight Loss:  7.0 - Greater than 7.5% over 3 months (34# (18.4%) )     Body Fat Loss:  1 - Mild body fat loss, Buccal region,Orbital,Triceps   Muscle Mass Loss:  1 - Mild muscle mass loss, Calf (gastrocnemius),Hand (interosseous),Scapula (trapezius),Temples (temporalis)  Fluid Accumulation:  No significant fluid accumulation,     Strength:  Not performed     Nutrition Assessment:   Nutrition History: Per RD assessment patient was able to maintain PO intake and UBW throughout most of chemo. During admmission early March patient had \"stopped eating\" due to nausea. Upon assessment this admission patient reports no PO of foods for ~4 weeks. States that he has been able to tolerate some fluids. He reports continued nausea and vomiting as primary barrier.        Nutrition Background: Patient with pancreatic and esophageal cancer, Amos's esophagus, obesity, HTN, GERD, gastritis, ascites and peritoneal carcinomatosis s/p Plurex drain. He presented to oncology office extremely sick, BP was unable to be measured. He was admitted directly to [facility]. Nutrition Interval:  PO intake continues to limited due to early satiety and persistent nausea and likely secondary to peritoneal carcinomatosis, recurrent ascites with Plurex drain. Patient has demonstrated inability to meet needs orally with severe weight loss and malnutrition as above. Intake trends since admission reveal daily PO tolerance of declining now less than 1 meal per day which is not sufficient to sustain weight, functional status, or life. Abdominal pleurex replaced 3/22: 2250 ml removed. Remains TPN dependent d/t peritoneal carcinomatosis, severe malnutrition. Patient seen with male visitor, RN, and 2 student RNs at bedside. RN, Jumana Sanchez, assisting student RN with Plurex drain with 1L removed. Patient states \"that feels better. \" Patient reports no PO thus far today. He reports only a small portion of mashed potatoes yesterday. Has been able to take small amounts of liquid through the day. Abdominal Status (last documented): Distended abdomen with Active  bowel sounds. Last BM 03/26/22.   Pertinent Medications: maxipime, pepcid, protonix, vanco  PRN last administered: lomotil 3/25; imodium 3/24; zofran 3/27; compazine 3/26; phenergan 3/24   Electrolyte replacement: 3/23: 20 mmol NaPhos  Pertinent Labs:   Lab Results   Component Value Date/Time    Sodium 137 (L) 03/28/2022 04:00 AM    Potassium 3.9 03/28/2022 04:00 AM    Chloride 106 03/28/2022 04:00 AM    CO2 24 03/28/2022 04:00 AM    Anion gap 7 03/28/2022 04:00 AM    Glucose 114 (H) 03/28/2022 04:00 AM    BUN 24 (H) 03/28/2022 04:00 AM    Creatinine 0.60 (L) 03/28/2022 04:00 AM    Calcium 8.2 (L) 03/28/2022 04:00 AM    Albumin 1.2 (L) 03/28/2022 04:00 AM    Magnesium 2.1 03/28/2022 04:00 AM    Phosphorus 2.8 03/28/2022 04:00 AM   Na low to low normal, K with slight downward trend, CO2 with downward trend despite previous solution ~1:2.4 Cl:Acetate. Nutrition Related Findings:   Port in place, also with 1 PIV. TPN intiated 3/16. TPN increased in volume 3/19. TPN to be concentrated and volume decreased 3/22 per NP request due to new CHF.       Current Nutrition Therapies:  ADULT DIET Regular  Current Parenteral Nutrition Orders:  · Type and Formula: Dex 14%, 8% AA    · Lipids: 250ml,Daily  · Duration: Continuous  · Rate/Volume: 1.56 L (65ml/hr)  · Current PN Order Provides: infusing per current order  · Goal PN Orders Provides: 1742 kcal/d (100% of needs), 125 grams of protein/d (100% of needs), 218 grams of CHO/d and 1810 ml of total volume/d    Current Intake:   Average Meal Intake:  (bites) Average Supplement Intake: None ordered      Anthropometric Measures:  Height: 5' 6\" (167.6 cm)  Current Body Wt: 81.6 kg (179 lb 14.3 oz) (3/27), Weight source: Bed scale  BMI: 29,  (current BMI skewed by fluid)  Admission Body Weight: 148 lb 9.4 oz  Ideal Body Weight (lbs) (Calculated): 142 lbs (65 kg), 104.6 %  Usual Body Wt: 82.6 kg (182 lb) (12/14/21 office wt and per previous history), Percent weight change: -18.4          Edema: LLE: 2+; Pitting (3/27/2022  7:50 PM)  RLE: 2+; Pitting (3/27/2022  7:50 PM)     Estimated Daily Nutrient Needs:  Energy (kcal/day): 0729-4663 (Kcal/kg (25-30), Weight Used: Current (67.4 kg (3/15))  Protein (g/day):  (1.3-1.5 g/kg) Weight Used: (Admission (67.3 kg))  Fluid (ml/day):   (1 ml/kcal)    Nutrition Diagnosis:   · Inadequate oral intake related to altered GI function,early satiety (peritoneal carcinomatosis ) as evidenced by  (poor oral tolerance, wt loss, requires TPN for primary needs)    · Severe malnutrition related to catabolic illness as evidenced by  (malnutrition criteria as above)    Nutrition Interventions:   Food and/or Nutrient Delivery: Continue current diet,Modify parenteral nutrition Coordination of Nutrition Care: Continue to monitor while inpatient    Goals:   Previous Goal Met: Goal(s) achieved  Active Goal: Tolerate transition to cyclic TPN by RD follow-up    Nutrition Monitoring and Evaluation:      Food/Nutrient Intake Outcomes: Food and nutrient intake,Parenteral nutrition intake/tolerance  Physical Signs/Symptoms Outcomes: Biochemical data,GI status,Fluid status or edema,Weight    Discharge Planning:    Parenteral nutrition    [address] ADALBERTO Isidro on 3/28/2022 at 11:17 AM  Contact: [phone]

## 2022-03-28 NOTE — PROGRESS NOTES
VANCO DAILY FOLLOW UP NOTE  [address] [facility] Pharmacokinetic Monitoring Service - Vancomycin    Consulting Provider: Emmalene Najjar, NP   Indication: HAP  Target Concentration: Goal AUC/LETY 400-600 mg*hr/L  Day of Therapy: 4  Additional Antimicrobials: cefepime    Pertinent Laboratory Values: Wt Readings from Last 1 Encounters:   03/27/22 81.6 kg (180 lb)     Temp Readings from Last 1 Encounters:   03/28/22 99.1 °F (37.3 °C)     No components found for: PROCAL  Recent Labs     03/28/22  0400 03/27/22  1013 03/27/22  0224 03/26/22  0352 03/26/22  0352   BUN 24* 21 19   < > 20   CREA 0.60* 0.60* 0.50*   < > 0.40*   WBC 20.8*  --  6.8  --  3.9*    < > = values in this interval not displayed. Estimated Creatinine Clearance: 102.7 mL/min (A) (by C-G formula based on SCr of 0.6 mg/dL (L)). Lab Results   Component Value Date/Time    Vancomycin, random 26.6 03/28/2022 04:00 AM       MRSA Nasal Swab: not ordered. Order placed by pharmacy. .      Assessment:  Date/Time Dose Concentration AUC   3/26 0352 1250 mg q12h 35 698   3/28 0400 750 mg q12h 26.6 536   Note: Serum concentrations collected for AUC dosing may appear elevated if collected in close proximity to the dose administered, this is not necessarily an indication of toxicity    Plan:  Continue 750 mg q12h  Repeat vancomycin concentrations will be ordered as clinically appropriate   Pharmacy will continue to monitor patient and adjust therapy as indicated    Thank you for the consult,  Trisha Mcdonald, PharmD  PGY1 Pharmacy Resident

## 2022-03-29 LAB
ALBUMIN SERPL-MCNC: 1.1 G/DL (ref 3.2–4.6)
ALBUMIN/GLOB SERPL: 0.4 {RATIO} (ref 1.2–3.5)
ALP SERPL-CCNC: 365 U/L (ref 50–136)
ALT SERPL-CCNC: 32 U/L (ref 12–65)
ANION GAP SERPL CALC-SCNC: 9 MMOL/L (ref 7–16)
AST SERPL-CCNC: 27 U/L (ref 15–37)
BASOPHILS # BLD: 0.2 K/UL (ref 0–0.2)
BASOPHILS NFR BLD MANUAL: 1 % (ref 0–2)
BILIRUB SERPL-MCNC: 0.3 MG/DL (ref 0.2–1.1)
BLASTS NFR BLD MANUAL: 1 %
BUN SERPL-MCNC: 24 MG/DL (ref 8–23)
CALCIUM SERPL-MCNC: 7.9 MG/DL (ref 8.3–10.4)
CANCER AG19-9 SERPL-ACNC: 374.4 U/ML (ref 2–37)
CEA SERPL-MCNC: 41.4 NG/ML (ref 0–3)
CHLORIDE SERPL-SCNC: 106 MMOL/L (ref 98–107)
CO2 SERPL-SCNC: 23 MMOL/L (ref 21–32)
CREAT SERPL-MCNC: 0.6 MG/DL (ref 0.8–1.5)
DIFFERENTIAL METHOD BLD: ABNORMAL
EOSINOPHIL # BLD: 0.4 K/UL (ref 0–0.8)
EOSINOPHIL NFR BLD: 2 %
ERYTHROCYTE [DISTWIDTH] IN BLOOD BY AUTOMATED COUNT: 15.8 %
GLOBULIN SER CALC-MCNC: 3.1 G/DL (ref 2.3–3.5)
GLUCOSE SERPL-MCNC: 116 MG/DL (ref 65–100)
HCT VFR BLD AUTO: 31.6 % (ref 41.1–50.3)
HGB BLD-MCNC: 10 G/DL (ref 13.6–17.2)
LYMPHOCYTES # BLD: 2.1 K/UL (ref 0.5–4.6)
LYMPHOCYTES NFR BLD: 11 %
MAGNESIUM SERPL-MCNC: 2 MG/DL (ref 1.8–2.4)
MCH RBC QN AUTO: 29.9 PG (ref 26.1–32.9)
MCHC RBC AUTO-ENTMCNC: 31.6 G/DL (ref 31.4–35)
MCV RBC AUTO: 94.6 FL (ref 79.6–97.8)
MONOCYTES # BLD: 2.1 K/UL (ref 0.1–1.3)
MONOCYTES NFR BLD MANUAL: 3 % (ref 3–9)
MONOCYTES NFR BLD: 8 %
MYELOCYTES NFR BLD MANUAL: 3 %
NEUTS BAND NFR BLD MANUAL: 8 % (ref 0–10)
NEUTS SEG # BLD: 14.4 K/UL (ref 1.7–8.2)
NEUTS SEG NFR BLD: 62 %
NRBC # BLD: 0.14 K/UL
PLATELET # BLD AUTO: 71 K/UL
PLATELET COMMENTS,PCOM: ABNORMAL
PMV BLD AUTO: 12.5 FL (ref 9.4–12.3)
POTASSIUM SERPL-SCNC: 3.5 MMOL/L (ref 3.5–5.1)
PROMYELOCYTES NFR BLD MANUAL: 1 %
PROT SERPL-MCNC: 4.2 G/DL (ref 6.3–8.2)
RBC # BLD AUTO: 3.34 M/UL
RBC MORPH BLD: ABNORMAL
RBC MORPH BLD: ABNORMAL
SODIUM SERPL-SCNC: 138 MMOL/L (ref 138–145)
WBC # BLD AUTO: 19.4 K/UL (ref 4.3–11.1)
WBC MORPH BLD: ABNORMAL

## 2022-03-29 PROCEDURE — 74011000258 HC RX REV CODE- 258: Performed by: NURSE PRACTITIONER

## 2022-03-29 PROCEDURE — 3331090002 HH PPS REVENUE DEBIT

## 2022-03-29 PROCEDURE — 97110 THERAPEUTIC EXERCISES: CPT

## 2022-03-29 PROCEDURE — 99233 SBSQ HOSP IP/OBS HIGH 50: CPT | Performed by: INTERNAL MEDICINE

## 2022-03-29 PROCEDURE — 36591 DRAW BLOOD OFF VENOUS DEVICE: CPT

## 2022-03-29 PROCEDURE — 74011000250 HC RX REV CODE- 250: Performed by: INTERNAL MEDICINE

## 2022-03-29 PROCEDURE — 80053 COMPREHEN METABOLIC PANEL: CPT

## 2022-03-29 PROCEDURE — 74011000258 HC RX REV CODE- 258: Performed by: INTERNAL MEDICINE

## 2022-03-29 PROCEDURE — 74011250637 HC RX REV CODE- 250/637: Performed by: INTERNAL MEDICINE

## 2022-03-29 PROCEDURE — 65270000029 HC RM PRIVATE

## 2022-03-29 PROCEDURE — 85025 COMPLETE CBC W/AUTO DIFF WBC: CPT

## 2022-03-29 PROCEDURE — 74011250636 HC RX REV CODE- 250/636: Performed by: INTERNAL MEDICINE

## 2022-03-29 PROCEDURE — 83735 ASSAY OF MAGNESIUM: CPT

## 2022-03-29 PROCEDURE — 86301 IMMUNOASSAY TUMOR CA 19-9: CPT

## 2022-03-29 PROCEDURE — 74011250637 HC RX REV CODE- 250/637: Performed by: NURSE PRACTITIONER

## 2022-03-29 PROCEDURE — 3331090001 HH PPS REVENUE CREDIT

## 2022-03-29 PROCEDURE — 74011250636 HC RX REV CODE- 250/636: Performed by: NURSE PRACTITIONER

## 2022-03-29 PROCEDURE — 99232 SBSQ HOSP IP/OBS MODERATE 35: CPT | Performed by: NURSE PRACTITIONER

## 2022-03-29 PROCEDURE — 82378 CARCINOEMBRYONIC ANTIGEN: CPT

## 2022-03-29 RX ORDER — LOPERAMIDE HYDROCHLORIDE 2 MG/1
2 CAPSULE ORAL EVERY 4 HOURS
Status: DISCONTINUED | OUTPATIENT
Start: 2022-03-29 | End: 2022-03-29

## 2022-03-29 RX ORDER — LOPERAMIDE HYDROCHLORIDE 2 MG/1
2 CAPSULE ORAL EVERY 8 HOURS
Status: DISCONTINUED | OUTPATIENT
Start: 2022-03-29 | End: 2022-03-31

## 2022-03-29 RX ORDER — CALCIUM CARBONATE 200(500)MG
200 TABLET,CHEWABLE ORAL
Status: DISCONTINUED | OUTPATIENT
Start: 2022-03-29 | End: 2022-04-06 | Stop reason: HOSPADM

## 2022-03-29 RX ORDER — MAG HYDROX/ALUMINUM HYD/SIMETH 200-200-20
30 SUSPENSION, ORAL (FINAL DOSE FORM) ORAL
Status: DISCONTINUED | OUTPATIENT
Start: 2022-03-29 | End: 2022-04-06 | Stop reason: HOSPADM

## 2022-03-29 RX ADMIN — HYDROMORPHONE HYDROCHLORIDE 1 MG: 1 INJECTION, SOLUTION INTRAMUSCULAR; INTRAVENOUS; SUBCUTANEOUS at 04:32

## 2022-03-29 RX ADMIN — HYDROMORPHONE HYDROCHLORIDE 1 MG: 1 INJECTION, SOLUTION INTRAMUSCULAR; INTRAVENOUS; SUBCUTANEOUS at 11:54

## 2022-03-29 RX ADMIN — TAMSULOSIN HYDROCHLORIDE 0.4 MG: 0.4 CAPSULE ORAL at 07:53

## 2022-03-29 RX ADMIN — FAMOTIDINE 20 MG: 20 TABLET, FILM COATED ORAL at 17:57

## 2022-03-29 RX ADMIN — CEFEPIME HYDROCHLORIDE 2 G: 2 INJECTION, POWDER, FOR SOLUTION INTRAVENOUS at 03:05

## 2022-03-29 RX ADMIN — DIPHENOXYLATE HYDROCHLORIDE AND ATROPINE SULFATE 1 TABLET: 2.5; .025 TABLET ORAL at 10:37

## 2022-03-29 RX ADMIN — HYDROMORPHONE HYDROCHLORIDE 1 MG: 1 INJECTION, SOLUTION INTRAMUSCULAR; INTRAVENOUS; SUBCUTANEOUS at 08:06

## 2022-03-29 RX ADMIN — ONDANSETRON 4 MG: 2 INJECTION INTRAMUSCULAR; INTRAVENOUS at 21:53

## 2022-03-29 RX ADMIN — AMIODARONE HYDROCHLORIDE 400 MG: 200 TABLET ORAL at 07:53

## 2022-03-29 RX ADMIN — LOPERAMIDE HYDROCHLORIDE 2 MG: 2 CAPSULE ORAL at 21:53

## 2022-03-29 RX ADMIN — HYDROCODONE BITARTRATE AND ACETAMINOPHEN 1 TABLET: 5; 325 TABLET ORAL at 10:37

## 2022-03-29 RX ADMIN — FAMOTIDINE 20 MG: 20 TABLET, FILM COATED ORAL at 07:53

## 2022-03-29 RX ADMIN — ONDANSETRON 4 MG: 2 INJECTION INTRAMUSCULAR; INTRAVENOUS at 04:32

## 2022-03-29 RX ADMIN — CALCIUM GLUCONATE: 98 INJECTION, SOLUTION INTRAVENOUS at 17:59

## 2022-03-29 RX ADMIN — HYDROMORPHONE HYDROCHLORIDE 1 MG: 1 INJECTION, SOLUTION INTRAMUSCULAR; INTRAVENOUS; SUBCUTANEOUS at 17:56

## 2022-03-29 RX ADMIN — PANTOPRAZOLE SODIUM 40 MG: 40 TABLET, DELAYED RELEASE ORAL at 07:53

## 2022-03-29 RX ADMIN — CEFEPIME HYDROCHLORIDE 2 G: 2 INJECTION, POWDER, FOR SOLUTION INTRAVENOUS at 18:45

## 2022-03-29 RX ADMIN — ALUMINUM HYDROXIDE, MAGNESIUM HYDROXIDE, DIMETHICONE 30 ML: 200; 200; 20 LIQUID ORAL at 08:06

## 2022-03-29 RX ADMIN — HYDROCODONE BITARTRATE AND ACETAMINOPHEN 1 TABLET: 5; 325 TABLET ORAL at 19:28

## 2022-03-29 RX ADMIN — LOPERAMIDE HYDROCHLORIDE 2 MG: 2 CAPSULE ORAL at 15:11

## 2022-03-29 RX ADMIN — AMIODARONE HYDROCHLORIDE 400 MG: 200 TABLET ORAL at 17:57

## 2022-03-29 RX ADMIN — HYDROMORPHONE HYDROCHLORIDE 1 MG: 1 INJECTION, SOLUTION INTRAMUSCULAR; INTRAVENOUS; SUBCUTANEOUS at 21:53

## 2022-03-29 RX ADMIN — ONDANSETRON 4 MG: 2 INJECTION INTRAMUSCULAR; INTRAVENOUS at 17:56

## 2022-03-29 RX ADMIN — ONDANSETRON 4 MG: 2 INJECTION INTRAMUSCULAR; INTRAVENOUS at 12:47

## 2022-03-29 RX ADMIN — I.V. FAT EMULSION 250 ML: 20 EMULSION INTRAVENOUS at 18:01

## 2022-03-29 RX ADMIN — CEFEPIME HYDROCHLORIDE 2 G: 2 INJECTION, POWDER, FOR SOLUTION INTRAVENOUS at 10:38

## 2022-03-29 RX ADMIN — SODIUM CHLORIDE 5 MG: 9 INJECTION INTRAMUSCULAR; INTRAVENOUS; SUBCUTANEOUS at 10:38

## 2022-03-29 NOTE — PROGRESS NOTES
Palliative Care Progress Note    Patient: Gigi Reese MRN: [id number]  SSN: [id number]    YOB: 1955  Age: 78 y.o. Sex: male       Assessment/Plan:     Chief Complaint/Interval History: reports ongoing abdominal distention and pain        Principal Diagnosis:    · Pain, abdomen  R10.9    Additional Diagnoses:   · Ascites  R18.8  · Debility, Unspecified  R53.81  · Frailty  R54  · Nausea/Vomiting  R11.2  · Counseling, Encounter for Medical Advice  Z71.9  · Encounter for Palliative Care  Z51.5    Palliative Performance Scale (PPS)       Medical Decision Making:   Reviewed and summarized notes over last 24 hours   Discussed case with appropriate providers  Reviewed laboratory and x-ray data over last 24 hours     Pt resting in bed, no distress noted. His  at bedside, completing a prayer. Pt reports he is doing okay. He reports ongoing abdominal pain, distention, and n/v.  His ascites was drained yesterday, with 1100 ml removed. Pt states he feels like it needs to be drained again today. He denies need for adjustments in his pain and nausea medications. We probably need to consider addition of a Fentanyl patch, given his medications requirements. He did not feel up to discussing today. Oncology has placed order for Pleurx to be drained. Discussed with Shelle Oppenheim, RN. Will continue to follow. Will discuss findings with members of the interdisciplinary team.         More than 50% of this 25 minute visit was spent counseling and coordination of care as outlined above. Subjective:     Review of Systems:  A comprehensive review of systems was negative except for:   Constitutional: Positive for fatigue. Gastrointestinal: Positive for abdominal pain/distention, nausea      Objective:     Visit Vitals  /72   Pulse 78   Temp 97.7 °F (36.5 °C)   Resp 18   Ht 5' 6\" (1.676 m)   Wt 183 lb 3.2 oz (83.1 kg)   SpO2 98%   BMI 29.57 kg/m²       Physical Exam:    General:  Cooperative. Debilitated. Eyes:  Conjunctivae/corneas clear    Nose: Nares normal. Septum midline. Neck: Supple, symmetrical, trachea midline   Lungs:   Clear to auscultation bilaterally, unlabored   Heart:  Regular rate and rhythm   Abdomen:   Soft, mildly tender, non-distended.  Pleurx catheter   Extremities: Normal, atraumatic, no cyanosis or edema   Skin: Skin color, texture, turgor normal.    Neurologic: Nonfocal   Psych: Alert and oriented     Signed By: Micaela Flores NP     March 29, 2022

## 2022-03-29 NOTE — PROGRESS NOTES
Comprehensive Nutrition Assessment    Type and Reason for Visit: Reassess  TPN Management (oncology)    Nutrition Recommendations/Plan:   Parenteral Nutrition:  Total parenteral nutrition  to begin at 1800  Continue: Dex 14%, 8% AA 1.56 L (65ml/hr)   Change to 16 hr cyclic infusion. Infuse at 60 ml/hr from 9027-8328, then increase to 103 ml/hr and infuse from 9098-2665, then decrease to 60 ml/hr from 7605-8633, then discontinue  Continue 250 ml 20% lipids daily  Lytes/L:    Sodium 150 meq (50 meq NaCl, 100 meq NaAcetate), Potassium 35 meq (35 meq KPO4), 10 meq Mg, 4.5 meq Calcium  Other additives: MTE, MVI MWF due to national shortages  Formula ~1:2 Cl:Acetate  Nutritional Supplement Therapy:   Active electrolyte replacement per nutrition support protocols  Replacement indicated:  None  Labs:   BMP daily  Mg MWF  Phos MWF    POC Glucoses/SSI Not indicated     Malnutrition Assessment:  Malnutrition Status: Severe malnutrition  Context: Chronic illness  Findings of clinical characteristics of malnutrition:   Energy Intake:  7 - 75% or less est energy requirements for 1 month or longer  Weight Loss:  7.0 - Greater than 7.5% over 3 months (34# (18.4%) )     Body Fat Loss:  1 - Mild body fat loss, Buccal region,Orbital,Triceps   Muscle Mass Loss:  1 - Mild muscle mass loss, Calf (gastrocnemius),Hand (interosseous),Scapula (trapezius),Temples (temporalis)  Fluid Accumulation:  No significant fluid accumulation,     Strength:  Not performed     Nutrition Assessment:   Nutrition History: Per RD assessment patient was able to maintain PO intake and UBW throughout most of chemo. During admmission early March patient had \"stopped eating\" due to nausea. Upon assessment this admission patient reports no PO of foods for ~4 weeks. States that he has been able to tolerate some fluids. He reports continued nausea and vomiting as primary barrier.        Nutrition Background: Patient with pancreatic and esophageal cancer, Amos's esophagus, obesity, HTN, GERD, gastritis, ascites and peritoneal carcinomatosis s/p Plurex drain. He presented to oncology office extremely sick, BP was unable to be measured. He was admitted directly to [facility]. Nutrition Interval:  PO intake continues to limited due to early satiety and persistent nausea and likely secondary to peritoneal carcinomatosis, recurrent ascites with Plurex drain. Patient has demonstrated inability to meet needs orally with severe weight loss and malnutrition as above. Intake trends since admission reveal daily PO tolerance of declining now less than 1 meal per day which is not sufficient to sustain weight, functional status, or life. Abdominal pleurex replaced 3/22: 2250 ml removed. Remains TPN dependent d/t peritoneal carcinomatosis, severe malnutrition. Patient seen up to chair this am. Dr. Babak Black and NP, Yoko Keen discussing chemo options. MD explaining nutrition status limiting further chemo. Patient states that he is eating a little better post Plurex drainage yesterday and thinks he will eat better today if diarrhea stops. Patient states he was able to eat mashed potatoes with gravy for lunch and dinner yesterday. Noted emesis with PO yesterday requiring additional antiemetics  Abdominal Status (last documented): Soft,Distended abdomen with Active  bowel sounds. Last BM 03/28/22.   Pertinent Medications: maxipime, pepcid, protonix, vanco, Zofran being utilized multiple times daily, Compazine and Phenergan 3/28, Mylanta, Tums, Lomotil PRN, Imodium PRN  Electrolyte replacement: 3/23: 20 mmol NaPhos  Pertinent Labs:   Lab Results   Component Value Date/Time    Sodium 138 03/29/2022 03:15 AM    Potassium 3.5 03/29/2022 03:15 AM    Chloride 106 03/29/2022 03:15 AM    CO2 23 03/29/2022 03:15 AM    Anion gap 9 03/29/2022 03:15 AM    Glucose 116 (H) 03/29/2022 03:15 AM    BUN 24 (H) 03/29/2022 03:15 AM    Creatinine 0.60 (L) 03/29/2022 03:15 AM    Calcium 7.9 (L) 03/29/2022 03:15 AM Albumin 1.1 (L) 03/29/2022 03:15 AM    Magnesium 2.0 03/29/2022 03:15 AM    Phosphorus 2.8 03/28/2022 04:00 AM   Na low to low normal, K continues to trend down, CO2 continues to trend down. Nutrition Related Findings:   Port in place, also with 1 PIV. TPN intiated 3/16. TPN increased in volume 3/19. TPN to be concentrated and volume decreased 3/22 per NP request due to new CHF. TPN changed to 18 hr cyclic infusion 8/39.       Current Nutrition Therapies:  ADULT DIET Regular  Current Parenteral Nutrition Orders:  · Type and Formula: Dex 14%, 8% AA    · Lipids: 250ml,Daily  · Duration: Cyclic (18 hr)  · Rate/Volume: 1.56 L (65ml/hr)  · Current PN Order Provides: infusing per current order  · Goal PN Orders Provides: 1742 kcal/d (100% of needs), 125 grams of protein/d (100% of needs), 218 grams of CHO/d and 1810 ml of total volume/d    Current Intake:   Average Meal Intake: 1-25% Average Supplement Intake: None ordered      Anthropometric Measures:  Height: 5' 6\" (167.6 cm)  Current Body Wt: 83.1 kg (183 lb 3.2 oz) (3/29), Weight source: Bed scale  BMI: 29.6,  (current BMI skewed by fluid)  Admission Body Weight: 148 lb 9.4 oz  Ideal Body Weight (lbs) (Calculated): 142 lbs (65 kg), 104.6 %  Usual Body Wt: 82.6 kg (182 lb) (12/14/21 office wt and per previous history), Percent weight change: -18.4          Edema: LLE: 2+; Pitting (3/28/2022  8:40 PM)  RLE: 2+; Pitting (3/28/2022  8:40 PM)     Estimated Daily Nutrient Needs:  Energy (kcal/day): 3906-9180 (Kcal/kg (25-30), Weight Used: Current (67.4 kg (3/15))  Protein (g/day):  (1.3-1.5 g/kg) Weight Used: (Admission (67.3 kg))  Fluid (ml/day):   (1 ml/kcal)    Nutrition Diagnosis:   · Inadequate oral intake related to altered GI function,early satiety (peritoneal carcinomatosis ) as evidenced by  (poor oral tolerance, wt loss, requires TPN for primary needs)    · Severe malnutrition related to catabolic illness as evidenced by  (malnutrition criteria as above)    Nutrition Interventions:   Food and/or Nutrient Delivery: Continue current diet,Modify parenteral nutrition     Coordination of Nutrition Care: Continue to monitor while inpatient    Goals:   Previous Goal Met: Progressing toward goal(s)  Active Goal: Tolerate transition to cyclic TPN by RD follow-up    Nutrition Monitoring and Evaluation:      Food/Nutrient Intake Outcomes: Food and nutrient intake,Parenteral nutrition intake/tolerance  Physical Signs/Symptoms Outcomes: Biochemical data,GI status,Fluid status or edema,Hemodynamic status,Weight    Discharge Planning:    Continue current diet,Parenteral nutrition    [address] ADALBERTO Isidro on 3/29/2022 at 11:17 AM  Contact: [phone]

## 2022-03-29 NOTE — PROGRESS NOTES
END OF SHIFT NOTE:    Patient Vitals for the past 12 hrs:   Temp Pulse Resp BP SpO2   03/29/22 0348 98.1 °F (36.7 °C) 81 16 114/85 93 %   03/28/22 2202 -- -- -- 120/77 --   03/28/22 2000 98.2 °F (36.8 °C) 75 17 98/61 92 %   03/28/22 1800 -- 81 -- 112/81 95 %   03/28/22 1749 -- -- -- 709/49 --     -on cyclic TPN 82 mL/hr  -pt given zofran 1x  -pt given dilaudid 1x  -pt resting in bed, vss

## 2022-03-29 NOTE — PROGRESS NOTES
END OF SHIFT NOTE:    Intake/Output  03/29 0701 - 03/29 1900  In: 187 [P.O.:120; I.V.:743]  Out: 1700 [Urine:200; Drains:1100]   Voiding: YES  Catheter: NO  Drain:   Pleural Catheter/Drain 03/22/22 15.5 fr x 66 cm Right (Active)   Drainage Description Yellow 03/29/22 1415   Site Assessment Clean, dry, & intact 03/29/22 1415   Dressing Status Clean, dry, & intact 03/29/22 1415   Dressing Type Transparent;Gauze 03/29/22 1415   Output (ml) 1100 ml 03/29/22 1149               Stool:  0 occurrences. Stool Assessment  Stool Color: Brown;Tan (Comment) (03/27/22 1014)  Stool Appearance: Loose (03/29/22 0752)  Stool Amount: Small (03/27/22 1014)  Stool Source/Status: Rectum (03/27/22 1014)    Emesis:  1 occurrences.      Emesis Assessment  Appearance: Brown (03/29/22 1243)  Emesis Amount: Large (03/29/22 1243)    VITAL SIGNS  Patient Vitals for the past 12 hrs:   Temp Pulse Resp BP SpO2   03/29/22 1554 97.3 °F (36.3 °C) 79 18 105/73 93 %   03/29/22 1241 -- 92 -- 127/79 --   03/29/22 1148 -- 78 -- 100/72 98 %   03/29/22 1128 97.7 °F (36.5 °C) 83 18 101/75 93 %   03/29/22 0739 97.5 °F (36.4 °C) 78 18 112/70 93 %       Pain Assessment  Pain 1  Pain Scale 1: Numeric (0 - 10) (03/29/22 1800)  Pain Intensity 1: 7 (03/29/22 1800)  Patient Stated Pain Goal: 0 (03/29/22 1800)  Pain Reassessment 1: Yes (03/29/22 1219)  Pain Onset 1: ongoing (03/29/22 1800)  Pain Location 1: Abdomen (03/29/22 1800)  Pain Orientation 1: Medial (03/29/22 1800)  Pain Description 1: Aching;Constant (03/29/22 1800)  Pain Intervention(s) 1: Medication (see MAR) (03/29/22 1800)    Ambulating  Yes    Additional Information:     - Mylanta given 1x for heartburn  - lomotil prn given 1x   - norco 1x for pain  - dilaudid 3x for pain  - drained 1100mL from pleurix per pt request and NP approval  - pt very weak today and BP drop when sit to stand   - TPN and Lipids running    Will Toni, RN

## 2022-03-29 NOTE — PROGRESS NOTES
ACUTE PHYSICAL THERAPY GOALS:  (Developed with and agreed upon by patient and/or caregiver. )  LTG:  (1.)Mr. Gonzalez will move from supine to sit and sit to supine  in bed with MODIFIED INDEPENDENCE within 7 treatment day(s). (2.)Mr. Gonzalez will transfer from bed to chair and chair to bed with MODIFIED INDEPENDENCE using the least restrictive device within 7 treatment day(s). (3.)Mr. Gonzalez will ambulate with MODIFIED INDEPENDENCE for 200 feet with the least restrictive device within 7 treatment day(s). (4.)Mr. Gonzalez will perform standing static and dynamic balance activities x 23 minutes with SUPERVISION to improve safety within 7 treatment day(s). (5.)Mr. Gonzalez will ambulate and/or perform functional activities for  23 consecutive minutes with stable vital signs and no rests required to improve activity tolerance within 7 treatment days  (6.) Kain Naejra will ascend/descend about 6 steps with SUPERVISION using least restrictive device within 7 treatment days. PHYSICAL THERAPY: Daily Note and AM Treatment Day # 4    Kain Najera is a 78 y.o. male   PRIMARY DIAGNOSIS: Hypotension  FTT (failure to thrive) in adult [R62.7]  Hypotension [I95.9]         ASSESSMENT:     REHAB RECOMMENDATIONS: CURRENT LEVEL OF FUNCTION:  (Most Recently Demonstrated)   Recommendation to date pending progress:  Setting:   Short-term Rehab    Vs HHPT pending progress   Equipment:    3 in 1 Bedside Commode Bed Mobility:   Standby Assistance  Sit to Stand:  [facility] [employer] Assistance  Transfers:   Standby Assistance  Gait/Mobility:   Standby Assistance     ASSESSMENT:  Mr. Roxy Patrick is supine in the bed and agreeable to therapy with encouragement. He is only willing to walk to the door and the chair with SBA and use of the RW but he has progressed in his safety and stability. Pt demonstrates increased activity tolerance with seated therex as well. Slow progress. Continue PT efforts.   STR vs HHPT pending progress.      SUBJECTIVE:   Mr. Darek Zhu states, \"Oh I don't think I can sit in the chair today\"    SOCIAL HISTORY/ LIVING ENVIRONMENT: see eval  Home Environment: Private residence  One/Two Story Residence: One story  Living Alone: Yes  Support Systems: Child(betsy),Friend/Neighbor  OBJECTIVE:     PAIN: VITAL SIGNS: LINES/DRAINS:   Pre Treatment: Pain Screen  Pain Scale 1: Numeric (0 - 10)  Pain Intensity 1: 0  Post Treatment: 0   IV  O2 Device: None (Room air)     MOBILITY: I Mod I S SBA CGA Min Mod Max Total  NT x2 Comments:   Bed Mobility    Rolling [] [] [] [x] [] [] [x] [] [] [] []    Supine to Sit [] [] [] [x] [] [] [] [] [] [] []    Scooting [] [] [] [] [] [] [] [] [] [x] []    Sit to Supine [] [] [] [] [] [] [] [] [] [x] []    Transfers    Sit to Stand [] [] [] [x] [] [] [] [] [] [] []    Bed to Chair [] [] [] [x] [] [] [] [] [] [] []    Stand to Sit [] [] [] [x] [] [] [] [] [] [] [x]    I=Independent, Mod I=Modified Independent, S=Supervision, SBA=Standby Assistance, CGA=Contact Guard Assistance,   Min=Minimal Assistance, Mod=Moderate Assistance, Max=Maximal Assistance, Total=Total Assistance, NT=Not Tested    BALANCE: Good Fair+ Fair Fair- Poor NT Comments   Sitting Static [x] [] [] [] [] []    Sitting Dynamic [x] [] [] [] [] []              Standing Static [] [x] [] [] [] []    Standing Dynamic [] [x] [] [] [] []      GAIT: I Mod I S SBA CGA Min Mod Max Total  NT x2 Comments:   Level of Assistance [] [] [] [x] [] [] [] [] [] [] []    Distance 25'    DME Rolling Walker    Gait Quality Decreased step length, decreased speed    Weightbearing  Status N/A     I=Independent, Mod I=Modified Independent, S=Supervision, SBA=Standby Assistance, CGA=Contact Guard Assistance,   Min=Minimal Assistance, Mod=Moderate Assistance, Max=Maximal Assistance, Total=Total Assistance, NT=Not Tested    PLAN:   FREQUENCY/DURATION: PT Plan of Care: 3 times/week for duration of hospital stay or until stated goals are met, whichever comes first.  TREATMENT:     TREATMENT:   ($$ Therapeutic Exercises: 23-37 mins    )  Therapeutic Exercise (23 Minutes): Therapeutic exercises noted below to improve functional activity tolerance, strength and mobility.      TREATMENT GRID:  N/A    AFTER TREATMENT POSITION/PRECAUTIONS:  Chair, Needs within reach and RN notified    INTERDISCIPLINARY COLLABORATION:  RN/PCT and PT/PTA    TOTAL TREATMENT DURATION:  PT Patient Time In/Time Out  Time In: 0932  Time Out: [address], [place]

## 2022-03-29 NOTE — PROGRESS NOTES
's follow-up visit requested by staff. Prior to my visit Mr. Jeanne Sandoval received a visit from his . Mr. Jeanne Sandoval welcomed my visit and I conveyed care and concern for him. Mr. Jeanne Sandoval spoke at length about his [address] and God's work in his life. Mr. Jeanne Sandoval is hoping to survive his cancer while also expressing his acceptance to God's will. He seemed peaceful and certain that God will take care of him, whatever the outcome. I offered spiritual interventions including affirmation of emotionsl & herminio, exploration of coping skills & support system, and prayer as requested. Mr. Jeanne Sandoval expressed gratitude for the visit.      Tate [address] 67  Board Certified

## 2022-03-29 NOTE — PROGRESS NOTES
[facility] Hematology & Oncology        Inpatient Hematology / Oncology Progress Note      Admission Date: 3/15/2022 10:28 AM  Reason for Admission/Hospital Course: FTT (failure to thrive) in adult [R62.7]  Hypotension [I95.9]      24 Hour Events:  Afebrile, VSS  Hypotension improving  Remains on cyclic TPN  Q25 [place]/abraxane due 3/31  PO intake minimal due to diarrhea  Up to bedside chair    Transfusions: None  Replacements: None    ROS:  Constitutional: +weakness/fatigue. Negative for fever, chills. CV: Negative for chest pain, palpitations, edema. Respiratory: Negative for dyspnea, cough, wheezing. GI: +N/V (better), diarrhea. Negative for abdominal pain. 10 point review of systems is otherwise negative with the exception of the elements mentioned above in the HPI. No Known Allergies    OBJECTIVE:  Patient Vitals for the past 8 hrs:   BP Temp Pulse Resp SpO2   22 0739 112/70 97.5 °F (36.4 °C) 78 18 93 %   22 0348 114/85 98.1 °F (36.7 °C) 81 16 93 %     Temp (24hrs), Av.7 °F (36.5 °C), Min:97.3 °F (36.3 °C), Max:98.2 °F (36.8 °C)     0701 -  1900  In: -   Out: 100 [Urine:100]    Physical Exam:  Constitutional: Thin, cachectic elderly male in no acute distress, lying comfortably in the hospital bed. HEENT: Normocephalic and atraumatic. Oropharynx is clear, mucous membranes are moist. Extraocular muscles are intact. Sclerae anicteric. Neck supple without JVD. No thyromegaly present. Skin Warm and dry. No bruising and no rash noted. No erythema. No pallor. Respiratory Lungs are clear to auscultation bilaterally without wheezes, rales or rhonchi, normal air exchange without accessory muscle use. CVS Normal rate, regular rhythm and normal S1 and S2. No murmurs, gallops, or rubs. Abdomen Soft, nontender and distended, normoactive bowel sounds. +Abd pleurx   Neuro Grossly nonfocal with no obvious sensory or motor deficits. MSK 2+ BLE edema.  Normal range of motion in general.  No tenderness. Psych Appropriate mood and affect. Labs:      Recent Labs     03/29/22 0315 03/28/22  0400 03/27/22  0224   WBC 19.4* 20.8* 6.8   RBC 3.34 3.75* 3.38*   HGB 10.0* 11.1* 10.2*   HCT 31.6* 35.4* 31.7*   MCV 94.6 94.4 93.8   MCH 29.9 29.6 30.2   MCHC 31.6 31.4 32.2   RDW 15.8 15.6* 15.3*   PLT 71 53* 42*   GRANS 62 72 57   LYMPH 11 3* 7*   MONOS 8  3 12* 16*   EOS 2 1 1   BASOS 1  --  1   IG  --   --  18*   DF MANUAL MANUAL AUTOMATED   ANEU 14.4* 17.1* 3.8   ABL 2.1 0.6 0.5   ABM 2.1* 2.5* 1.1   JONNY 0.4 0.2 0.1   ABB 0.2  --  0.1   AIG  --   --  1.2*        Recent Labs     03/29/22 0315 03/28/22  0400 03/27/22  1013 03/27/22  0224 03/27/22 0224    137* 137*   < > 137   K 3.5 3.9 3.7   < > 4.1    106 107   < > 108*   CO2 23 24 24   < > 24   AGAP 9 7 6*   < > 5*   * 114* 135*   < > 125*   BUN 24* 24* 21   < > 19   CREA 0.60* 0.60* 0.60*   < > 0.50*   GFRAA >60 >60 >60   < > >60   GFRNA >60 >60 >60   < > >60   CA 7.9* 8.2* 8.2*   < > 7.7*   * 399*  --   --  348*   TP 4.2* 4.8*  --   --  4.6*   ALB 1.1* 1.2*  --   --  1.0*   GLOB 3.1 3.6*  --   --  3.6*   AGRAT 0.4* 0.3*  --   --  0.3*   MG 2.0 2.1  --   --  2.0   PHOS  --  2.8  --   --   --     < > = values in this interval not displayed. Imaging:  IR INSERT CATH PLEURAL INDWELL [[id number]] Collected: 03/22/22 1614   Order Status: Completed Updated: 03/22/22 1652   Narrative:     Title: Dominant PleurX drain placement. Indication: Pancreatic cancer. Recurrent abdominal ascites.  Tunneled abdominal   PleurX drain catheter requested. : Chaz Hernandez PA-C     Supervising Physician: Patricia López M.D. Consent:  Informed written and oral consent was obtained from the patient after   explanation of benefits and risks (including, but not limited to:  Infection,   hemorrhage, visceral injury and pneumothorax).  The patient's questions were   answered to their satisfaction. The patient stated understanding and requested   that we proceed. Procedure:  With the patient supine, the abdomen was prepped and draped in the   standard fashion.  Lidocaine was administered for local field block.  Ultrasound   evaluation was performed. Using real-time ultrasound guidance, with appropriate   image recording, a Yueh needle was advanced into the ascites in the abdomen. Using fluoroscopy, the needle was exchanged over a wire for a peel-away sheath. The PleurX drain was brought through a subcutaneous tunnel and passed down the   peel-away sheath positioning the drain across the midline, lower and the pelvis. The skin incision was closed with absorbable suture.  The catheter was secured   with nonabsorbable suture. A total of 2250 cc of thin yellow fluid was removed.  Bandages were applied. Complications: Large-volume ascites. Medications:  37 minutes based placed under moderate sedation was provided under   the direction and supervision of Kyle Gaston M.D. using frontal and Versed. Continuous cardiopulmonary monitoring was provided by trained independent   observer present. Ancef was infused prior to the procedure. Contrast:  None. Radiation dose:   Fluoroscopy time: 18 seconds. Reference air kerma (mGy): 8   Kerma area product (cGy.cm2):  974   Fluoroscopic images: 1     Findings:  Large volume ascites. Plan: Bedrest for 1 hour. Recommend performing a drainage procedure daily or every-other-day for the next   two weeks in order to promote healing of the catheter site.        CT CHEST W CONT [[id number]] Collected: 03/20/22 1811   Order Status: Completed Updated: 03/20/22 1823   Narrative:     CT CHEST WITH CONTRAST DATED 3/20/2022. History: Echo with extra cardiac structure compressing the left atrium.      Comparison: CT abdomen and pelvis with contrast 3/19/2022     Technique:   Multiple contiguous helical CT images reconstructed at 5 mm were   obtained from the neck base to the mid abdomen following the administration of   100 cc of Isovue 370 without acute complication. All CT scans performed at this   facility use one or all of the following: Automated exposure control, adjustment   of the mA and/or kVp according to patient's size, iterative reconstruction. Findings: The base of the neck is unremarkable in appearance. A left-sided venous port is   present. No lymphadenopathy is seen.  The thoracic aorta is normal in caliber. The heart is not clearly enlarged on the CT. Moderate to advanced   atherosclerotic calcification is seen of the coronary arteries. No significant   pericardial effusion is seen. The only abnormal space-occupying process adjacent   to the heart is a moderate size hiatal hernia. In addition to the stomach, there   is additional herniation of mesenteric fat as well as abdominal fluid. These all   appear to anteriorly displace the heart. The esophagus proximal to the hernia   sac is fluid distended which could indicate reflux. Obstruction at the   gastroesophageal junction is felt to be less likely given the additional fluid   distention of the hernia sac. Evaluation with lung windows demonstrates a trace right, and small left   dependent pleural effusion. These do appear worsened from the prior examination. Nonspecific pulmonary infiltrates are otherwise seen. Lungs are expanded without   evidence for pneumothorax.  No acute osseous abnormality is seen. Mild anasarca   is seen of the chest wall. Limited evaluation of the upper abdomen demonstrate multiple findings which are   not significantly changed and better assessed on a dedicated contrasted CT scan   of the abdomen performed on 3/19/2022. Impression:     1.  The only abnormal space-occupying process adjacent to the heart is a   moderate size hiatal hernia.  In addition to the stomach, there is additional   herniation of mesenteric fat as well as abdominal fluid. These all appear to   anteriorly displace the heart.       2. Worsening although trace to small bilateral dependent pleural effusions. Additional mild anasarca seen of the chest wall. These findings suggest fluid   overload. 3. Nonspecific pulmonary infiltrates. These could represent pulmonary edema   although the distribution is not classic. Additional allergies such as pneumonia   are not excluded if suggested by clinical findings. This report was made using voice transcription. Despite my best efforts to avoid   any, transcription errors may persist. If there is any question about the   accuracy of the report or need for clarification, then please call [phone], or text me through [email or web address] for clarification or correction. CT ABD PELV W CONT [[id number]] Collected: 03/19/22 1300   Order Status: Completed Updated: 03/20/22 1526   Addenda: Addendum: Addendum: A request was made to assess for potential right   gluteal hematoma. There is appear to be edema in the right gluteal soft tissues   which is slightly more pronounced than on the left. No clearly demonstrated defined collection is seen to suggest significant hematoma. The most inferior   gluteal soft tissues have been excluded from the field of imaging. Signed: 03/20/22 1523 by Rashi Holly MD   Narrative:     CT ABDOMEN AND PELVIS WITH INTRAVENOUS CONTRAST DATED 3/19/2022. History: Fall hitting bottom and back. Comparison: CT abdomen and pelvis with contrast 12/27/2021     Technique:   Multiple contiguous helical CT images reconstructed at 5 mm   intervals were obtained from above the diaphragms through the ischial   tuberosities following oral and 100 cc Isovue-370 without acute complication. All CT scans performed at this facility use one or all of the following:   Automated exposure control, adjustment of the mA and/or kVp according to   patient's size, iterative reconstruction.      Findings: CT ABDOMEN:     Limited evaluation of the lung bases and base of the mediastinum demonstrates   nonspecific infiltrates in the bilateral lung bases, left greater than right. A   small dependent left pleural effusion is seen. A small to moderate size hiatal   hernia is present. Ascites within the abdomen is also seen within the hernia   sac. The Liver is clearly cirrhotic in its appearance. Multiple hepatic masses are   seen the largest of which resides in the inferior right lobe measuring 3 cm in   size. The appearance is highly concerning for malignancy either representing   multifocal hepatomas, or hepatic metastases. Hepatic metastases are favored   given the appearance. Asymmetric intraperitoneal air ductal dilation is seen in   the left lobe of the liver which may be obstructed by a central hepatic mass   seen on axial image 23 measuring 1.1 cm in size. .  The spleen demonstrates an   irregular area of decreased attenuation in the superior pole seen on axial image   17 measuring 3.2 cm x 1.7 cm. This is not as well characterized. This does not   appear to represent an acute defect. This could result from flow artifact.  No   contour deforming or enhancing mass lesions are seen of the adrenal glands. The   pancreas is grossly unchanged in its appearance with the patient having a known   primary pancreas tumor described on the prior study. The gallbladder has been   removed.  The kidneys enhance symmetrically and no evidence of hydronephrosis is   seen. A stable benign cyst is seen in the posterior upper to midpole cortex of   the right kidney measuring 2.6 cm in size. The visualized loops of small bowel and colon are normal in caliber.  The   appendix appears to have been removed. Mild to moderate diverticulosis is seen   of the left colon. No free air is seen. Moderate ascites is seen which   demonstrates low attenuation suggesting simple ascites. Diffuse mesenteric edema   is seen.  Abnormal peritoneal nodularity and caking is suggested with additional   peritoneal thickening which at times appears nodular. This is highly concerning   for peritoneal metastatic process which appears worsened from the prior   examination.  No evolving adenopathy is seen.  The abdominal aorta demonstrate   moderate atherosclerotic calcification. No acute osseous abnormality is seen. Compression deformities are seen at T12, L1, and L4 although these have a   chronic appearance and are stable from the prior comparison study. CT PELVIS:   Small to moderate ascites extends into the pelvis. There is once again nodular   peritoneal thickening best appreciated on axial image 73 highly concerning for   evolving peritoneal metastatic disease. There appears to be serosal involvement   of the mid sigmoid colon seen on axial image 74. No abnormal dilation is seen to   suggest significant obstruction at this time. Palmira Petra evolving pelvic adenopathy is   seen.  The urinary bladder is unremarkable. No acute osseous abnormality is   seen. Impression:     1.  No clear acute injury from recent fall. Specifically, no acute osseous   abnormality is seen. 2. Grossly stable appearance of pancreatic tumor although this is suboptimally   assessed on this exam. However, there is clear evidence for evolving metastatic   disease with new hepatic masses, and multiple findings as described above which   are highly concerning for evolving peritoneal metastatic disease. Lastly,   irregular decreased attenuation is seen in the superior pole of the spleen which   does not appear clearly acute and does not demonstrate adjacent complex ascites. This could represent an additional metastasis although is not as well   characterized. This report was made using voice transcription.  Despite my best efforts to avoid   any, transcription errors may persist. If there is any question about the   accuracy of the report or need for clarification, then please call [phone], or text me through [email or web address] for clarification or correction.     MRI BRAIN W WO CONT [[id number]] Collected: 03/19/22 1649   Order Status: Completed Updated: 03/19/22 1657   Narrative:     EXAMINATION: BRAIN MRI 3/19/2022 4:47 PM     ACCESSION NUMBER: [id number]     INDICATION: 59-year-old male with fall, altered mental status and confusion. History of pancreatic cancer on chemotherapy with recent progression of   intra-abdominal disease, no prior imaging of brain. COMPARISON: CT head 3/19/2022. TECHNIQUE: Multiplanar multisequence MRI of the brain without and with   intravenous administration of 14 mL contrast agent. FINDINGS:     Previously described subcentimeter focus along the posterior limb of the right   internal capsule follows CSF signal intensity on all sequences and is favored to   represent a prominent perivascular space. There is a mild degree of scattered and confluent foci of T2/FLAIR   hyperintensity within the periventricular and deep white matter, nonspecific but   most commonly seen with chronic small vessel ischemic changes. Faint chronic   hemosiderin involving the left posterior putamen. No midline shift or mass lesion. There is no evidence of intracranial hemorrhage   or acute infarct. The ventricles are within normal limits in size and   configuration. There are no extra-axial fluid collections present.  No diffusion   weighted signal abnormality is identified. There is no abnormal enhancement. Impression:       No acute finding or evidence of intracranial metastatic disease. XR ELBOW LT MIN 3 V [[id number]] Collected: 03/19/22 1534   Order Status: Completed Updated: 03/19/22 1538   Narrative:     XR FOREARM LT AP/LAT, XR ELBOW LT MIN 3 V 3/19/2022 3:19 PM     HISTORY: Fall with left arm and left elbow pain. Impression:       Two views left forearm. No fracture or dislocation. No significant soft tissue   swelling.  Old healed distal ulnar fracture deformity is present. Three views left elbow. No fracture or dislocation. No significant soft tissue   swelling. No fat pad elevation. XR FOREARM LT AP/LAT [[id number]] Collected: 03/19/22 1534   Order Status: Completed Updated: 03/19/22 1538   Narrative:     XR FOREARM LT AP/LAT, XR ELBOW LT MIN 3 V 3/19/2022 3:19 PM     HISTORY: Fall with left arm and left elbow pain. Impression:       Two views left forearm. No fracture or dislocation. No significant soft tissue   swelling. Old healed distal ulnar fracture deformity is present. Three views left elbow. No fracture or dislocation. No significant soft tissue   swelling. No fat pad elevation. CT HEAD WO CONT [[id number]] Collected: 03/19/22 1254   Order Status: Completed Updated: 03/19/22 1258   Narrative:     CT BRAIN WITHOUT CONTRAST   3/19/2022 12:26 PM     INDICATION: Fall, altered mental status and confusion. COMPARISON: None available at this hospital PACS     Technique:  Multiple contiguous axial images are obtained encompassing the brain   from the skull base to the vertex.  For this CT scanner at least one of the   following techniques is utilized to decrease patient radiation dose: Automatic   exposure control, KVP and mA modulation based on patient weight, and iterative   reconstruction. FINDINGS: The ventricles are midline and of appropriate size and configuration. Mild hypoattenuating foci seen in the periventricular deep white matter. Inferiorly at the junction of the thalamus and posterior limb of the internal   capsule on the right there is a rounded mildly hypodense focus that measures 9   mm. The midline structures and posterior fossa are intact.  There is no finding of   an acute cortical stroke, mass lesion, or acute bleed.  No extra-axial fluid   collection. The skull is intact, no discreet abnormality. The paranasal sinuses are not   remarkable. The visualized orbits and mastoid air-cells are patent. Impression:     9 mm rounded hypoattenuating focus on the right at the inferior   junction of the thalamus and posterior limb of the internal capsule is noted. Suspicious for lacunar infarct and of uncertain chronicity-cannot exclude that   this is new. Elsewhere only mild chronic changes in the periventricular white matter   suggested. For further imaging evaluation of this as clinically indicated-recommend brain   MRI with diffusion imaging. XR CHEST Lisa Riding [[id number]] Collected: 03/15/22 1259   Order Status: Completed Updated: 03/15/22 1302   Narrative:     Chest X-ray     INDICATION: Hypotension. COMPARISON: Chest x-ray 2/10/2021. A portable AP view of the chest was obtained. FINDINGS: The lungs are clear. There are no infiltrates or effusions. Left chest   port is unchanged in position. No pneumothorax. The heart size is normal.  The   bony thorax is intact.      Impression:     No acute findings in the chest. Lungs remain clear. No interval   change.           Medications:  Current Facility-Administered Medications   Medication Dose Route Frequency    calcium carbonate (TUMS) chewable tablet 200 mg [elemental]  200 mg Oral TID PRN    alum-mag hydroxide-simeth (MYLANTA) oral suspension 30 mL  30 mL Oral Q4H PRN    TPN ADULT-CENTRAL - dextrose 14% amino acid 8%   IntraVENous QPM    TPN ADULT-CENTRAL - dextrose 14% amino acid 8%   IntraVENous QPM    HYDROmorphone (DILAUDID) injection 1 mg  1 mg IntraVENous Q4H PRN    HYDROcodone-acetaminophen (NORCO) 5-325 mg per tablet 1 Tablet  1 Tablet Oral Q4H PRN    cefepime (MAXIPIME) 2 g in 0.9% sodium chloride (MBP/ADV) 100 mL MBP  2 g IntraVENous Q8H    prochlorperazine (COMPAZINE) with saline injection 5 mg  5 mg IntraVENous Q6H PRN    acetaminophen (TYLENOL) tablet 650 mg  650 mg Oral Q6H PRN    diphenhydrAMINE (BENADRYL) capsule 25 mg  25 mg Oral Q6H PRN    amiodarone (CORDARONE) tablet 400 mg  400 mg Oral BID    fat emulsion 20% (LIPOSYN, INTRAlipid) infusion 250 mL  250 mL IntraVENous QPM    loperamide (IMODIUM) capsule 2 mg  2 mg Oral Q4H PRN    NUTRITIONAL SUPPORT ELECTROLYTE PRN ORDERS   Does Not Apply PRN    sodium chloride (NS) flush 5-10 mL  5-10 mL IntraVENous PRN    diphenoxylate-atropine (LOMOTIL) tablet 1 Tablet  1 Tablet Oral QID PRN    famotidine (PEPCID) tablet 20 mg  20 mg Oral BID    pantoprazole (PROTONIX) tablet 40 mg  40 mg Oral ACB    promethazine (PHENERGAN) tablet 25 mg  25 mg Oral Q6H PRN    tamsulosin (FLOMAX) capsule 0.4 mg  0.4 mg Oral DAILY    ondansetron (ZOFRAN) injection 4 mg  4 mg IntraVENous Q4H PRN         ASSESSMENT:    Problem List  Date Reviewed: 3/15/2022          Codes Class Noted    Chronic systolic congestive heart failure (HCC) ICD-10-CM: I50.22  ICD-9-CM: 428.22, 428.0  3/21/2022        Bradycardia ICD-10-CM: R00.1  ICD-9-CM: 427.89  3/19/2022        LBBB (left bundle branch block) ICD-10-CM: I44.7  ICD-9-CM: 426.3  3/19/2022        FTT (failure to thrive) in adult ICD-10-CM: R62.7  ICD-9-CM: 783.7  3/15/2022        * (Principal) Hypotension ICD-10-CM: I95.9  ICD-9-CM: 458.9  3/15/2022        Severe protein-calorie malnutrition ([facility] ICD-10-CM: E43  ICD-9-CM: 002  3/4/2022        P[facility] ([facility] ICD-10-CM: C25.9  ICD-9-CM: 157.9  3/2/2022        Ascites ICD-10-CM: R18.8  ICD-9-CM: 789.59  3/2/2022        Admission for antineoplastic chemotherapy ICD-10-CM: Z51.11  ICD-9-CM: V58.11  3/2/2022        Hypomagnesemia ICD-10-CM: E83.42  ICD-9-CM: 275.2  4/23/2021        Hypokalemia ICD-10-CM: E87.6  ICD-9-CM: 276.8  4/20/2021        CINV (chemotherapy-induced nausea and vomiting) ICD-10-CM: R11.2, T45.1X5A  ICD-9-CM: 787.01, E933.1  4/20/2021        Dehydration ICD-10-CM: E86.0  ICD-9-CM: 276.51  4/12/2021        Other neutropenia ([facility] ICD-10-CM: D70.8  ICD-9-CM: 288.09  3/9/2021        Malignant neoplasm of lower third of esophagus ([facility] ICD-10-CM: C15.5  ICD-9-CM: 150.5  1/5/2021 Malignant neoplasm of body of pancreas ([facility] [address] ICD-10-CM: C25.1  ICD-9-CM: 157.1  1/5/2021        Severe obesity ([facility] [address] ICD-10-CM: E66.01  ICD-9-CM: 278.01  12/10/2020        Elevated glucose ICD-10-CM: R73.09  ICD-9-CM: 790.29  7/22/2019        Anemia ICD-10-CM: D64.9  ICD-9-CM: 285.9  7/22/2019        Chronic left-sided low back pain ICD-10-CM: M54.50, G89.29  ICD-9-CM: 724.2, 338.29  7/22/2019              78 y.o. M pancreatic cancer and esophageal cancer with malignant ascites of previously controlled FOLFIRINOX but currently disease progressed and most recently admited on 3/2/2022 to place Pleurx and arrange gemcitabine/Abraxane, had extensive discussion with inpatient team and pharmacy, patient was discharged on 3/7/2022 without chemo appointment and return to office on 3/15/2022, extremely sick and blood pressure not measurable in the office, and not been having much oral intake since discharge, urgently establish IV access and called EMS to take to ER to stabilize and admit to hospital, apparently needed much supportive care and volume resuscitation, start TPN until oral intake can improve and give gemcitabine/Abraxane once clinically stable, discussed with the inpatient team.    PLAN:    Metastatic pancreatic cancer / distal esophageal cancer  - Metastatic disease involving peritoneum, malignant ascites  - s/p 24 cycles of FOLFIRINOX with SD and recent POD with increasing CEA/ and malignant ascites  - Plan for gemcitabine/abraxane when clinically stable - hopefully tomorrow. 3/17 [place]/abraxane today, tolerated well  3/24 D8 due today, deferring tx d/t cytopenias. Dr. Tamiko Buchanan discussed that if patient's condition does not improve, that he may want to consider Hospice services. Palliative care following. 3/25 Pt wants to con't to pursue treatment as he wants to change his relationship with his children.   He does not feel the burden of tx outweighs the benefits and wants to continue tx for as long as he can. D15 due 3/31.  3/29 TM checked, CEA up but  improved. Malignant ascites  - Has abdominal Pleurx, drain three times weekly  3/17 Pleurx drained 3/16 - 1100cc out. 3/18 Pleurx accidentally dislodged last night - 1800cc removed before completely removed. IR notified and will re-evaluate Monday. 3/20 Worsening abdominal pain possibly secondary to re-accumulation, some drainage reported at Pleurx site. IR evaluating tomorrow. 3/21 IR to re-evaluate tomorrow, was not NPO today. 3/22 To IR today. 3/23 Abd pleurx replaced yesterday  3/25 Pleurx drained yesterday, 1.1L. Con't to drain prn.  3/26 drained today as he was feeling uncomfortable. Monitor blood pressures. 3/27 continue to drain PRN, but be cautious with blood pressures as he is hypotensive at times. 3/29 BP much improved - drained 1100 cc yesterday due to patient request. Previously draining every other day but asking to be drained daily. Will continue to drain PRN as long as BP acceptable. Cancer related pain  - Continue home dilaudid  3/17 Utilizing IV morphine. Will decrease dosing and encourage use of home Norco.  3/18 Continue to wean IV meds - encourage PO.  3/20 Has not been receiving IV morphine due to hypotension. Continue with PRN oral medications. 3/22 Consult to [facility] regarding [address], symptom management. 3/23 PC following. Pt changed code status to DNR, wants to pursue further cancer-related treatment. 3/25 PC following  3/27 Dilaudid increased today. During rounds, he stated his pain was better controlled with increased dose. 3/29 Continues on IV dilaudid. Hypotension / poor PO intake / protein-calorie malnutrition  - Consult RD for TPN  - BC pending, started on Cefe/Vanc and may be able to de-escalate soon as hypotension likely secondary to dehydration. LA improved after IFV. 3/17 On TPN. Continues on Cefe/Vanc although infectious work-up unremarkable. Will stop antibiotics.   3/18 Hypotension improved and remains afebrile off antibiotics. Continue with nutritional support via TPN.  3/19 Ongoing hypotension, although improved yesterday AM, worse through evening. Had 6L of IVF yesterday and continues on TPN. May consider midodrine although MAP consistently ~65.  3/21 Hypotension improved but borderline, continues TPN.   3/22 Discussed with RD volume status of TPN given new finding of HFrEF and CT chest with evidence of fluid overload. CM following for home TPN. Holding IV morphine. 3/23 BPs improved  3/24 Episode of hypotension last PM, required small fluid bolus, BP improved  3/25 Continues on TPN, however pt declined to be attached to TPN yesterday AM and then disconnected himself from it last night. 3/27 per RD note, patient unaware about prior TPN disconnections. This AM during rounds, visitor noted that \"something was leaking. \" He had pulled his port needle out again and his TPN was leaking onto the floor. RD plans to discuss further goals regarding TPN with pt when she visits him today. 8/93 Cyclic TPN to start today per RD. Encouraged PO intake and he agrees. 3/29 Reports he drank an Ensure but is scared to eat because of diarrhea. Will manage diarrhea. Nausea  - Continue PRN antiemetics    Diarrhea  - Antidiarrheals PRN  3/24 c/o worsening diarrhea. Check Cdiff.   3/25 Cdiff neg. Con't using antidiarrheals prn.  3/28 Reports diarrhea - only 1 documented stool. Continue PRN antidiarrheals. 3/29 Reports he is afraid to eat because of diarrhea. Will schedule imodium TID. Bradycardia / HFrEF / LV dysfunction / LBBB / ectopy  - One documented episode of HR 43 - check EKG  - No hx of hypertension, monitor  3/17 EKG with bi-geminal PACs and known LBBB. Recheck EGK.  3/18 EKG with same as above. Palpated pulse on several occasions documented in the 40s. Tele applied overnight. Cardiology following. 3/19 Cardiology recommending K>4 (on TPN) and Mg replacements. Echo pending.    3/21 No bradycardia noted on telemetry. Does have ectopy which may have led to incorrect pulse rate palpation. Echo with EF 35-40%. CT chest completed due to echo findings rebeka noted hiatal hernia/mesenteric fat herniation/abdominal fluid displacing heart and small BL effusions, anasarca. Cardiology following.  3/22 Cardiology considering BB if BP improve for HF/LV dysfunction. Weight up 6# overnight and increased since admission. Clinically no evidence of overload but asking RD to adjust TPN volume. Reported run of NS-SVT - cardiology following. 3/25 Cards following. Hypotension limiting therapy for heart failure. 3/26 cards signed off and recommended cont Amiodarone     Pancytopenia secondary to chemotherapy  - Transfuse prn per [facility] SOPs  3/25 [address] down to 1000. Start G-CSF.  3/26 [address] improved to 2.6.   3/28 WBC up to 20 - stop G-CSF.   3/29 Plts up to 71k. Fall  3/19 Witnessed slip and fall on wet floor yesterday. Today with L arm bruising - check X-ray. Will check CT head and CT AP (hematoma/lipoma palpated on R buttock) and also c/o worsening abdominal pain. 3/20 X-ray of L elbow/forearm negative. CT head with ?lacunar infarct but MRI showed no acute findings and ?lacunar infarct appears to be prominent perivascular space. CT AP with progressing peritoneal/liver disease and ?new splenic lesion. Last imaging obtained 12/2021 for comparison. Elevated LFTs  3/21 Monitor, possibly secondary to TPN or recent chemo. 3/22 Appear to be improving  3/23 AST improved, ALP increased  3/28 Improving    Hypoxia / ?Pneumonia  3/24 On O2 @ 2L now. Check CXR.  3/25 CXR with low lung volumes with b/l infiltrates ?edema vs infx? Check PCT - elevated. Start Cef/Vanc.  3/26 continues on cef/vanc; on room air   3/28 D4 Cefepime/Vancomycin - afebrile. DC Vancomycin. 3/29 Continue Cefepime - likley DC soon. Continue home meds  [facility] SOPs  AC contraindicated d/t thrombocytopenia    Goals and plan of care reviewed with the patient.   All questions answered to the best of our ability. Disposition:  TBD pending clinical course. PT/OT following - HHPT vs STR. Encouraged physical activity/OOB. CM following for home TPN. He will need close follow-up with Dr Adriel Siddiqi upon DC. Cephas Gilford, [facility]  [id number] [address]  Office : [phone]  Fax : [phone]   I personally saw, exammed and counselled the patient, and discussed with NP, agree with above history/assessment/plan. 78 y.o.male pancreatic cancer and esophageal cancer with peritoneal carcinomatosis, status post first-line FOLFIRINOX, disease progression and admitted for failure to thrive/obstruction, received TPN and 1 cycle of gemcitabine/Abraxane, reported abdominal pain improved and started having bowel movement/gas, which is encouraging sign of peritoneal disease response, discussed the need to enhance nutrition and improve hypoalbuminemia, also found EF 35 to 40%, sinusitis as needed, week 2 chemo was on hold for thrombocytopenia, continue above care, check C. difficile, coordinate with office and research team for possibility of obtaining K-darian inhibitor.       Naz Owen M.D.   [address]  Office : [phone]  Fax : [phone]

## 2022-03-30 ENCOUNTER — APPOINTMENT (OUTPATIENT)
Dept: GENERAL RADIOLOGY | Age: 67
DRG: 435 | End: 2022-03-30
Attending: NURSE PRACTITIONER
Payer: MEDICARE

## 2022-03-30 LAB
ALBUMIN SERPL-MCNC: 1.1 G/DL (ref 3.2–4.6)
ALBUMIN/GLOB SERPL: 0.3 {RATIO} (ref 1.2–3.5)
ALP SERPL-CCNC: 347 U/L (ref 50–136)
ALT SERPL-CCNC: 29 U/L (ref 12–65)
ANION GAP SERPL CALC-SCNC: 6 MMOL/L (ref 7–16)
AST SERPL-CCNC: 25 U/L (ref 15–37)
BACTERIA SPEC CULT: NORMAL
BACTERIA SPEC CULT: NORMAL
BILIRUB SERPL-MCNC: 0.3 MG/DL (ref 0.2–1.1)
BLASTS NFR BLD MANUAL: 2 %
BUN SERPL-MCNC: 28 MG/DL (ref 8–23)
CALCIUM SERPL-MCNC: 8 MG/DL (ref 8.3–10.4)
CHLORIDE SERPL-SCNC: 106 MMOL/L (ref 98–107)
CO2 SERPL-SCNC: 25 MMOL/L (ref 21–32)
CREAT SERPL-MCNC: 0.5 MG/DL (ref 0.8–1.5)
DIFFERENTIAL METHOD BLD: ABNORMAL
ERYTHROCYTE [DISTWIDTH] IN BLOOD BY AUTOMATED COUNT: 15.7 % (ref 11.9–14.6)
GLOBULIN SER CALC-MCNC: 3.2 G/DL (ref 2.3–3.5)
GLUCOSE SERPL-MCNC: 106 MG/DL (ref 65–100)
GRAM STN SPEC: NORMAL
HCT VFR BLD AUTO: 31.2 % (ref 41.1–50.3)
HGB BLD-MCNC: 9.8 G/DL (ref 13.6–17.2)
LYMPHOCYTES # BLD: 0.7 K/UL (ref 0.5–4.6)
LYMPHOCYTES NFR BLD MANUAL: 1 % (ref 16–44)
LYMPHOCYTES NFR BLD: 3 %
MAGNESIUM SERPL-MCNC: 2.4 MG/DL (ref 1.8–2.4)
MCH RBC QN AUTO: 29.7 PG (ref 26.1–32.9)
MCHC RBC AUTO-ENTMCNC: 31.4 G/DL (ref 31.4–35)
MCV RBC AUTO: 94.5 FL (ref 79.6–97.8)
MONOCYTES # BLD: 2.1 K/UL (ref 0.1–1.3)
MONOCYTES NFR BLD: 11 %
NEUTS BAND NFR BLD MANUAL: 5 % (ref 0–10)
NEUTS SEG # BLD: 15.5 K/UL (ref 1.7–8.2)
NEUTS SEG NFR BLD MANUAL: 1 % (ref 47–75)
NEUTS SEG NFR BLD: 77 %
NRBC # BLD: 0.15 K/UL (ref 0–0.2)
PHOSPHATE SERPL-MCNC: 2.5 MG/DL (ref 2.3–3.7)
PLATELET # BLD AUTO: 89 K/UL (ref 150–450)
PLATELET COMMENTS,PCOM: ABNORMAL
PMV BLD AUTO: 12.1 FL (ref 9.4–12.3)
POTASSIUM SERPL-SCNC: 3.8 MMOL/L (ref 3.5–5.1)
PROT SERPL-MCNC: 4.3 G/DL (ref 6.3–8.2)
RBC # BLD AUTO: 3.3 M/UL (ref 4.23–5.6)
RBC MORPH BLD: ABNORMAL
SERVICE CMNT-IMP: NORMAL
SODIUM SERPL-SCNC: 137 MMOL/L (ref 138–145)
WBC # BLD AUTO: 18.7 K/UL (ref 4.3–11.1)
WBC MORPH BLD: ABNORMAL

## 2022-03-30 PROCEDURE — 84100 ASSAY OF PHOSPHORUS: CPT

## 2022-03-30 PROCEDURE — 74011000250 HC RX REV CODE- 250: Performed by: INTERNAL MEDICINE

## 2022-03-30 PROCEDURE — 74011250637 HC RX REV CODE- 250/637: Performed by: NURSE PRACTITIONER

## 2022-03-30 PROCEDURE — 83735 ASSAY OF MAGNESIUM: CPT

## 2022-03-30 PROCEDURE — 74011250637 HC RX REV CODE- 250/637: Performed by: INTERNAL MEDICINE

## 2022-03-30 PROCEDURE — 87070 CULTURE OTHR SPECIMN AEROBIC: CPT

## 2022-03-30 PROCEDURE — 3331090002 HH PPS REVENUE DEBIT

## 2022-03-30 PROCEDURE — P9045 ALBUMIN (HUMAN), 5%, 250 ML: HCPCS | Performed by: NURSE PRACTITIONER

## 2022-03-30 PROCEDURE — 3331090001 HH PPS REVENUE CREDIT

## 2022-03-30 PROCEDURE — 74011250636 HC RX REV CODE- 250/636: Performed by: NURSE PRACTITIONER

## 2022-03-30 PROCEDURE — 74011000258 HC RX REV CODE- 258: Performed by: INTERNAL MEDICINE

## 2022-03-30 PROCEDURE — 99356 PR PROLONGED SVC I/P OR OBS SETTING 1ST HOUR: CPT | Performed by: NURSE PRACTITIONER

## 2022-03-30 PROCEDURE — 97110 THERAPEUTIC EXERCISES: CPT

## 2022-03-30 PROCEDURE — 87106 FUNGI IDENTIFICATION YEAST: CPT

## 2022-03-30 PROCEDURE — 74011000258 HC RX REV CODE- 258: Performed by: NURSE PRACTITIONER

## 2022-03-30 PROCEDURE — 65270000029 HC RM PRIVATE

## 2022-03-30 PROCEDURE — APPSS60 APP SPLIT SHARED TIME 46-60 MINUTES: Performed by: NURSE PRACTITIONER

## 2022-03-30 PROCEDURE — 99233 SBSQ HOSP IP/OBS HIGH 50: CPT | Performed by: INTERNAL MEDICINE

## 2022-03-30 PROCEDURE — 99233 SBSQ HOSP IP/OBS HIGH 50: CPT | Performed by: NURSE PRACTITIONER

## 2022-03-30 PROCEDURE — 85025 COMPLETE CBC W/AUTO DIFF WBC: CPT

## 2022-03-30 PROCEDURE — 74011250636 HC RX REV CODE- 250/636: Performed by: INTERNAL MEDICINE

## 2022-03-30 PROCEDURE — 71045 X-RAY EXAM CHEST 1 VIEW: CPT

## 2022-03-30 PROCEDURE — 80053 COMPREHEN METABOLIC PANEL: CPT

## 2022-03-30 RX ORDER — HYDROCODONE BITARTRATE AND ACETAMINOPHEN 10; 325 MG/1; MG/1
1 TABLET ORAL
Status: DISCONTINUED | OUTPATIENT
Start: 2022-03-30 | End: 2022-04-06 | Stop reason: HOSPADM

## 2022-03-30 RX ORDER — ALBUMIN HUMAN 50 G/1000ML
25 SOLUTION INTRAVENOUS ONCE
Status: DISCONTINUED | OUTPATIENT
Start: 2022-03-30 | End: 2022-03-30

## 2022-03-30 RX ORDER — ALBUMIN HUMAN 50 G/1000ML
25 SOLUTION INTRAVENOUS EVERY 6 HOURS
Status: DISCONTINUED | OUTPATIENT
Start: 2022-03-30 | End: 2022-04-01

## 2022-03-30 RX ORDER — FUROSEMIDE 10 MG/ML
20 INJECTION INTRAMUSCULAR; INTRAVENOUS ONCE
Status: COMPLETED | OUTPATIENT
Start: 2022-03-30 | End: 2022-03-30

## 2022-03-30 RX ADMIN — CEFEPIME HYDROCHLORIDE 2 G: 2 INJECTION, POWDER, FOR SOLUTION INTRAVENOUS at 10:40

## 2022-03-30 RX ADMIN — LOPERAMIDE HYDROCHLORIDE 2 MG: 2 CAPSULE ORAL at 14:22

## 2022-03-30 RX ADMIN — PANTOPRAZOLE SODIUM 40 MG: 40 TABLET, DELAYED RELEASE ORAL at 08:17

## 2022-03-30 RX ADMIN — FAMOTIDINE 20 MG: 20 TABLET, FILM COATED ORAL at 18:09

## 2022-03-30 RX ADMIN — SODIUM ACETATE: 164 INJECTION, SOLUTION, CONCENTRATE INTRAVENOUS at 18:13

## 2022-03-30 RX ADMIN — HYDROMORPHONE HYDROCHLORIDE 1 MG: 1 INJECTION, SOLUTION INTRAMUSCULAR; INTRAVENOUS; SUBCUTANEOUS at 02:43

## 2022-03-30 RX ADMIN — FAMOTIDINE 20 MG: 20 TABLET, FILM COATED ORAL at 08:17

## 2022-03-30 RX ADMIN — AMIODARONE HYDROCHLORIDE 400 MG: 200 TABLET ORAL at 18:09

## 2022-03-30 RX ADMIN — HYDROCODONE BITARTRATE AND ACETAMINOPHEN 1 TABLET: 5; 325 TABLET ORAL at 15:03

## 2022-03-30 RX ADMIN — HYDROCODONE BITARTRATE AND ACETAMINOPHEN 1 TABLET: 5; 325 TABLET ORAL at 11:36

## 2022-03-30 RX ADMIN — SODIUM CHLORIDE 5 MG: 9 INJECTION INTRAMUSCULAR; INTRAVENOUS; SUBCUTANEOUS at 01:26

## 2022-03-30 RX ADMIN — ONDANSETRON 4 MG: 2 INJECTION INTRAMUSCULAR; INTRAVENOUS at 05:52

## 2022-03-30 RX ADMIN — HYDROCODONE BITARTRATE AND ACETAMINOPHEN 1 TABLET: 5; 325 TABLET ORAL at 01:26

## 2022-03-30 RX ADMIN — AMIODARONE HYDROCHLORIDE 400 MG: 200 TABLET ORAL at 08:17

## 2022-03-30 RX ADMIN — ONDANSETRON 4 MG: 2 INJECTION INTRAMUSCULAR; INTRAVENOUS at 10:46

## 2022-03-30 RX ADMIN — ALBUMIN (HUMAN) 25 G: 12.5 INJECTION, SOLUTION INTRAVENOUS at 18:53

## 2022-03-30 RX ADMIN — LOPERAMIDE HYDROCHLORIDE 2 MG: 2 CAPSULE ORAL at 05:16

## 2022-03-30 RX ADMIN — TAMSULOSIN HYDROCHLORIDE 0.4 MG: 0.4 CAPSULE ORAL at 08:17

## 2022-03-30 RX ADMIN — FUROSEMIDE 20 MG: 10 INJECTION, SOLUTION INTRAMUSCULAR; INTRAVENOUS at 08:12

## 2022-03-30 RX ADMIN — CEFEPIME HYDROCHLORIDE 2 G: 2 INJECTION, POWDER, FOR SOLUTION INTRAVENOUS at 18:11

## 2022-03-30 RX ADMIN — HYDROCODONE BITARTRATE AND ACETAMINOPHEN 1 TABLET: 10; 325 TABLET ORAL at 19:03

## 2022-03-30 RX ADMIN — SODIUM CHLORIDE 5 MG: 9 INJECTION INTRAMUSCULAR; INTRAVENOUS; SUBCUTANEOUS at 15:34

## 2022-03-30 RX ADMIN — CEFEPIME HYDROCHLORIDE 2 G: 2 INJECTION, POWDER, FOR SOLUTION INTRAVENOUS at 02:34

## 2022-03-30 RX ADMIN — ALBUMIN (HUMAN) 25 G: 12.5 INJECTION, SOLUTION INTRAVENOUS at 14:22

## 2022-03-30 RX ADMIN — I.V. FAT EMULSION 250 ML: 20 EMULSION INTRAVENOUS at 18:14

## 2022-03-30 RX ADMIN — LOPERAMIDE HYDROCHLORIDE 2 MG: 2 CAPSULE ORAL at 21:25

## 2022-03-30 NOTE — PROGRESS NOTES
Problem: Falls - Risk of  Goal: *Absence of Falls  Description: Document Gavin Thompson Fall Risk and appropriate interventions in the flowsheet.   Outcome: Progressing Towards Goal  Note: Fall Risk Interventions:  Mobility Interventions: Communicate number of staff needed for ambulation/transfer,Patient to call before getting OOB    Mentation Interventions: Adequate sleep, hydration, pain control,More frequent rounding,Room close to nurse's station    Medication Interventions: Evaluate medications/consider consulting pharmacy,Patient to call before getting OOB    Elimination Interventions: Call light in reach,Toileting schedule/hourly rounds    History of Falls Interventions: Bed/chair exit alarm,Investigate reason for fall,Evaluate medications/consider consulting pharmacy

## 2022-03-30 NOTE — PROGRESS NOTES
ACUTE PHYSICAL THERAPY GOALS:  (Developed with and agreed upon by patient and/or caregiver. )  LTG:  (1.)Mr. Gonzalez will move from supine to sit and sit to supine  in bed with MODIFIED INDEPENDENCE within 7 treatment day(s).    (2.)Mr. Gonzalez will transfer from bed to chair and chair to bed with MODIFIED INDEPENDENCE using the least restrictive device within 7 treatment day(s).    (3.)Mr. Gonzalez will ambulate with MODIFIED INDEPENDENCE for 200 feet with the least restrictive device within 7 treatment day(s). (4.)Mr. Gonzalez will perform standing static and dynamic balance activities x 23 minutes with SUPERVISION to improve safety within 7 treatment day(s). (5.)Mr. Gonzalez will ambulate and/or perform functional activities for  23 consecutive minutes with stable vital signs and no rests required to improve activity tolerance within 7 treatment days  (6.) Juan Pablo Gonzalez will ascend/descend about 6 steps with SUPERVISION using least restrictive device within 7 treatment days. PHYSICAL THERAPY: Daily Note and PM Treatment Day # 5    Jeronimo Clayton is a 78 y.o. male   PRIMARY DIAGNOSIS: Hypotension  FTT (failure to thrive) in adult [R62.7]  Hypotension [I95.9]         ASSESSMENT:     REHAB RECOMMENDATIONS: CURRENT LEVEL OF FUNCTION:  (Most Recently Demonstrated)   Recommendation to date pending progress:  Setting:   Short-term Rehab  Equipment:    3 in 1 Bedside Commode Bed Mobility:   Not tested  Sit to Stand:   Not tested  Transfers:   Not tested  Gait/Mobility:   Not tested     ASSESSMENT:  Mr. Dorian Loyd presents lying in the bed on his left side with 2L of O2 on and his sats at 96%. He states he had another difficult night and does not want to get up for therapy today. PT encouraged participation and he agreed to BLE AROM exercises while lying in the bed. He was very talkative today about his desire to go home and be there for his end of life.   He moves his BLEs well for functional activity with some edema noted. He was focused on his O2 sats being too low but her was reassured they were not low at  96%. He was encouraged to sit up on the EOB but he declined and appeared to only want to talk about going home. His family member was present in the room but did not have any input into the discussion. PT will continue to follow.       SUBJECTIVE:   Mr. Bailee Pearson states, \"I just want to go home and finish my time there\"    SOCIAL HISTORY/ LIVING ENVIRONMENT:   Home Environment: Private residence  One/Two Story Residence: One story  Living Alone: Yes  Support Systems: Child(betsy),Friend/Neighbor  OBJECTIVE:     PAIN: VITAL SIGNS: LINES/DRAINS:   Pre Treatment: Pain Screen  Pain Scale 1: Numeric (0 - 10)  Pain Intensity 1: 0  Post Treatment: 0/10     Visit Vitals  BP (!) 79/47   Pulse 81   Temp 98.9 °F (37.2 °C)   Resp 20   Ht 5' 6\" (1.676 m)   Wt 80.5 kg (177 lb 6.4 oz)   SpO2 96%   BMI 28.63 kg/m²      O2 Device: NC for O2      MOBILITY: I Mod I S SBA CGA Min Mod Max Total  NT x2 Comments:   Bed Mobility    Rolling [] [] [] [] [] [] [] [] [] [x] []    Supine to Sit [] [] [] [] [] [] [] [] [] [x] []    Scooting [] [] [] [] [] [] [] [] [] [x] []    Sit to Supine [] [] [] [] [] [] [] [] [] [x] []    Transfers    Sit to Stand [] [] [] [] [] [] [] [] [] [x] []    Bed to Chair [] [] [] [] [] [] [] [] [] [x] []    Stand to Sit [] [] [] [] [] [] [] [] [] [x] []    I=Independent, Mod I=Modified Independent, S=Supervision, SBA=Standby Assistance, CGA=Contact Guard Assistance,   Min=Minimal Assistance, Mod=Moderate Assistance, Max=Maximal Assistance, Total=Total Assistance, NT=Not Tested    BALANCE: Good Fair+ Fair Fair- Poor NT Comments   Sitting Static [] [] [] [] [] [x]    Sitting Dynamic [] [] [] [] [] [x]              Standing Static [] [] [] [] [] [x]    Standing Dynamic [] [] [] [] [] [x]      GAIT: I Mod I S SBA CGA Min Mod Max Total  NT x2 Comments:   Level of Assistance [] [] [] [] [] [] [] [] [] [x] [] Distance 0    DME N/A    Gait Quality N/A    Weightbearing  Status N/A     I=Independent, Mod I=Modified Independent, S=Supervision, SBA=Standby Assistance, CGA=Contact Guard Assistance,   Min=Minimal Assistance, Mod=Moderate Assistance, Max=Maximal Assistance, Total=Total Assistance, NT=Not Tested    PLAN:   FREQUENCY/DURATION: PT Plan of Care: 3 times/week for duration of hospital stay or until stated goals are met, whichever comes first.  TREATMENT:     TREATMENT:   ($$ Therapeutic Exercises: 8-22 mins    )  Therapeutic Exercise (19 Minutes): Therapeutic exercises noted below to improve functional AROM, strength and mobility.      TREATMENT GRID:   Date:  3/30/22 Date:   Date:     Activity/Exercise Parameters Parameters Parameters   Ankle pumps 2 x 10 B     Hip flex/extension 2 x 10 B     Knee flex/extension 2 x 10 B                               AFTER TREATMENT POSITION/PRECAUTIONS:  Bed, Needs within reach, RN notified and family in the room    INTERDISCIPLINARY COLLABORATION:  RN/PCT    TOTAL TREATMENT DURATION:  PT Patient Time In/Time Out  Time In: 1330  Time Out: [address] [facility],

## 2022-03-30 NOTE — PROGRESS NOTES
LOS 15d  IDR   Per Onc they will be starting Chemo treatment on 3/31. Unable to determine at this time pending out come of treatment received  Patient will go home with TPN with [facility] and home health when medically stable for discharge.   Will continue to follow for discharge planning needs  Please consult  if any new issues arise

## 2022-03-30 NOTE — PROGRESS NOTES
Occupational Therapy Note:     OT treatment attempted, pt adamantly refused. Pt stated he just wants to go home and that \"it's over\" and \"there's no use to getting up. \" Attempted to encourage patient and provide emotional support. Pt continued to refuse all mobility and ADLs. Will plan to check back another time/day as schedule permits and pt available to complete OT treatment.     Thank you,   Rufina Hood, OT

## 2022-03-30 NOTE — PROGRESS NOTES
Palliative Care Progress Note    Patient: Magno Poster MRN: [id number]  SSN: [id number]    YOB: 1955  Age: 78 y.o. Sex: male       Assessment/Plan:     Chief Complaint/Interval History: reports dyspnea, cough, and chest congestion        Principal Diagnosis:    · Dyspnea  R06.00    Additional Diagnoses:   · Ascites  R18.8  · Cough  R05  · Debility, Unspecified  R53.81  · Edema  R60.9  · Fatigue, Lethargy  R53.83  · Frailty  R54  · Nausea/Vomiting  R11.2  · Counseling, Encounter for Medical Advice  Z71.9  · Encounter for Palliative Care  Z51.5    Palliative Performance Scale (PPS)       Medical Decision Making:   Reviewed and summarized notes over last 24 hours   Discussed case with appropriate providers- Altamese Gottron, RN; Lucas Martin NP  Reviewed laboratory and x-ray data over last 24 hours     Pt resting in bed, appears uncomfortable. He has audible rhonchi and states he is congested and short of breath. Assisted pt to sitting position, which he states helps. Pt noted to have O2 sats in the upper 80s/low 90s on O2 via NC. Pt with pitting edema to his thighs, which he states is new. He has been receiving TPN daily, and according to his I&O he is almost 13 liters positive. He has received Lasix this morning, and has had over 2.5 liters of urine output. Counseled on orthopnea, volume overload, and use of opioids for dyspnea management. Pt's pleurx was drained yesterday and again today, and he reports less abdominal pain/distention, and nausea. I had a long discussion with pt's general poor condition, and general lack of improvement throughout his hospital stay. Pt agrees that he has not done well, and voiced some concern about getting further chemotherapy. We discussed the burden vs the benefit of further treatment, and whether further treatment could potentially expedite his death. Pt states if he is going to die, he wants to die at home.   I asked if he wanted to be with his family, and he stated the staff at the hospital was more like family than his own children. Pt states he will do whatever Dr Merissa Gilbert wants him to do. Encouraged him to discuss his concerns with him today. Discussed pt's condition with Raymon Aviles NP and Kyle W Juan Mullins RN. Will discuss findings with members of the interdisciplinary team.         In addition to complete visit, prolonged Time In: 1035     Time Out: 1055   Total time with pt: 55 minutes     Subjective:     Review of Systems:  A comprehensive review of systems was negative except for:   Constitutional: Positive for fatigue. Respiratory: positive for dyspnea, congestion, cough  Gastrointestinal: Positive for abdominal pain/distention, nausea      Objective:     Visit Vitals  /78 (BP 1 Location: Right upper arm, BP Patient Position: Semi fowlers)   Pulse 80   Temp 97.3 °F (36.3 °C)   Resp 20   Ht 5' 6\" (1.676 m)   Wt 184 lb 3.2 oz (83.6 kg)   SpO2 95%   BMI 29.73 kg/m²       Physical Exam:    General:  Appears uncomfortable. Eyes:  Conjunctivae/corneas clear    Nose: Nares normal. Septum midline. O2 via NC   Neck: Supple, symmetrical, trachea midline   Lungs:   Coarse bilaterally, mildly labored   Heart:  Regular rate and rhythm   Abdomen:   Soft, mildly tender, non-distended. Pleurx catheter   Extremities: Normal, atraumatic, no cyanosis.  BLE edema   Skin: Skin color, texture, turgor normal.    Neurologic: Nonfocal   Psych: Alert and oriented     Signed By: Micaela Flores NP     March 30, 2022

## 2022-03-30 NOTE — PROGRESS NOTES
[facility]        Inpatient Hematology / Oncology Progress Note      Admission Date: 3/15/2022 10:28 AM  Reason for Admission/Hospital Course: FTT (failure to thrive) in adult [R62.7]  Hypotension [I95.9]      24 Hour Events:  Afebrile, VSS  Hypotension improved yesterday  This morning on O2 and some overload  Otherwise feeling ok  Remains on cyclic TPN  G31 [place]/abraxane due 3/31      Transfusions: None  Replacements: None    ROS:  Constitutional: +weakness/fatigue. Negative for fever, chills. CV: Negative for chest pain, palpitations, edema. Respiratory: Negative for dyspnea, cough, wheezing. GI: +N/V (better), diarrhea. Negative for abdominal pain. 10 point review of systems is otherwise negative with the exception of the elements mentioned above in the HPI. No Known Allergies    OBJECTIVE:  Patient Vitals for the past 8 hrs:   BP Temp Pulse Resp SpO2 Weight   22 0740 116/78 97.3 °F (36.3 °C) 80 20 95 % --   22 0318 -- -- -- -- -- 184 lb 3.2 oz (83.6 kg)   22 0316 94/65 -- -- -- -- --   22 0241 101/71 97.4 °F (36.3 °C) 80 18 93 % --   22 0128 95/65 -- -- -- -- --     Temp (24hrs), Av.7 °F (36.5 °C), Min:97.3 °F (36.3 °C), Max:98.2 °F (36.8 °C)    No intake/output data recorded. Physical Exam:  Constitutional: Thin, cachectic elderly male in no acute distress, lying comfortably in the hospital bed. HEENT: Normocephalic and atraumatic. Oropharynx is clear, mucous membranes are moist. Extraocular muscles are intact. Sclerae anicteric. Neck supple without JVD. No thyromegaly present. Skin Warm and dry. No bruising and no rash noted. No erythema. No pallor. Respiratory +diffuse rales/choarse lung sounds, audible breath sounds. Normal air exchange without accessory muscle use. CVS Normal rate, regular rhythm and normal S1 and S2. No murmurs, gallops, or rubs. Abdomen Soft, nontender and distended, normoactive bowel sounds.   +Abd pleurx   Neuro Grossly nonfocal with no obvious sensory or motor deficits. MSK 2+ BLE edema. Normal range of motion in general.  No tenderness. Psych Appropriate mood and affect. Labs:      Recent Labs     03/30/22  0306 03/29/22 0315 03/28/22  0400   WBC 18.7* 19.4* 20.8*   RBC 3.30* 3.34 3.75*   HGB 9.8* 10.0* 11.1*   HCT 31.2* 31.6* 35.4*   MCV 94.5 94.6 94.4   MCH 29.7 29.9 29.6   MCHC 31.4 31.6 31.4   RDW 15.7* 15.8 15.6*   PLT 89* 71 53*   GRANS 77  1* 62 72   LYMPH 3  1* 11 3*   MONOS 11 8  3 12*   EOS  --  2 1   BASOS  --  1  --    DF MANUAL MANUAL MANUAL   ANEU 15.5* 14.4* 17.1*   ABL 0.7 2.1 0.6   ABM 2.1* 2.1* 2.5*   JONNY  --  0.4 0.2   ABB  --  0.2  --         Recent Labs     03/30/22 0306 03/29/22 0315 03/28/22  0400   * 138 137*   K 3.8 3.5 3.9    106 106   CO2 25 23 24   AGAP 6* 9 7   * 116* 114*   BUN 28* 24* 24*   CREA 0.50* 0.60* 0.60*   GFRAA >60 >60 >60   GFRNA >60 >60 >60   CA 8.0* 7.9* 8.2*   * 365* 399*   TP 4.3* 4.2* 4.8*   ALB 1.1* 1.1* 1.2*   GLOB 3.2 3.1 3.6*   AGRAT 0.3* 0.4* 0.3*   MG 2.4 2.0 2.1   PHOS 2.5  --  2.8         Imaging:  IR INSERT CATH PLEURAL INDWELL [[id number]] Collected: 03/22/22 1614   Order Status: Completed Updated: 03/22/22 1652   Narrative:     Title: Dominant PleurX drain placement. Indication: Pancreatic cancer. Recurrent abdominal ascites.  Tunneled abdominal   PleurX drain catheter requested. : Kerry Rosales PA-C     Supervising Physician: Mariela Garza M.D. Consent:  Informed written and oral consent was obtained from the patient after   explanation of benefits and risks (including, but not limited to: Infection,   hemorrhage, visceral injury and pneumothorax).  The patient's questions were   answered to their satisfaction. The patient stated understanding and requested   that we proceed. Procedure:  With the patient supine, the abdomen was prepped and draped in the   standard fashion.  Lidocaine was administered for local field block.  Ultrasound   evaluation was performed. Using real-time ultrasound guidance, with appropriate   image recording, a Yueh needle was advanced into the ascites in the abdomen. Using fluoroscopy, the needle was exchanged over a wire for a peel-away sheath. The PleurX drain was brought through a subcutaneous tunnel and passed down the   peel-away sheath positioning the drain across the midline, lower and the pelvis. The skin incision was closed with absorbable suture.  The catheter was secured   with nonabsorbable suture. A total of 2250 cc of thin yellow fluid was removed.  Bandages were applied. Complications: Large-volume ascites. Medications:  37 minutes based placed under moderate sedation was provided under   the direction and supervision of Sushant Perry M.D. using frontal and Versed. Continuous cardiopulmonary monitoring was provided by trained independent   observer present. Ancef was infused prior to the procedure. Contrast:  None. Radiation dose:   Fluoroscopy time: 18 seconds. Reference air kerma (mGy): 8   Kerma area product (cGy.cm2):  168   Fluoroscopic images: 1     Findings:  Large volume ascites. Plan: Bedrest for 1 hour. Recommend performing a drainage procedure daily or every-other-day for the next   two weeks in order to promote healing of the catheter site.        CT CHEST W CONT [[id number]] Collected: 03/20/22 1811   Order Status: Completed Updated: 03/20/22 1823   Narrative:     CT CHEST WITH CONTRAST DATED 3/20/2022. History: Echo with extra cardiac structure compressing the left atrium. Comparison: CT abdomen and pelvis with contrast 3/19/2022     Technique:   Multiple contiguous helical CT images reconstructed at 5 mm were   obtained from the neck base to the mid abdomen following the administration of   100 cc of Isovue 370 without acute complication.  All CT scans performed at this   facility use one or all of the following: Automated exposure control, adjustment   of the mA and/or kVp according to patient's size, iterative reconstruction. Findings: The base of the neck is unremarkable in appearance. A left-sided venous port is   present. No lymphadenopathy is seen.  The thoracic aorta is normal in caliber. The heart is not clearly enlarged on the CT. Moderate to advanced   atherosclerotic calcification is seen of the coronary arteries. No significant   pericardial effusion is seen. The only abnormal space-occupying process adjacent   to the heart is a moderate size hiatal hernia. In addition to the stomach, there   is additional herniation of mesenteric fat as well as abdominal fluid. These all   appear to anteriorly displace the heart. The esophagus proximal to the hernia   sac is fluid distended which could indicate reflux. Obstruction at the   gastroesophageal junction is felt to be less likely given the additional fluid   distention of the hernia sac. Evaluation with lung windows demonstrates a trace right, and small left   dependent pleural effusion. These do appear worsened from the prior examination. Nonspecific pulmonary infiltrates are otherwise seen. Lungs are expanded without   evidence for pneumothorax.  No acute osseous abnormality is seen. Mild anasarca   is seen of the chest wall. Limited evaluation of the upper abdomen demonstrate multiple findings which are   not significantly changed and better assessed on a dedicated contrasted CT scan   of the abdomen performed on 3/19/2022. Impression:     1.  The only abnormal space-occupying process adjacent to the heart is a   moderate size hiatal hernia. In addition to the stomach, there is additional   herniation of mesenteric fat as well as abdominal fluid. These all appear to   anteriorly displace the heart.       2. Worsening although trace to small bilateral dependent pleural effusions.    Additional mild anasarca seen of the chest wall. These findings suggest fluid   overload. 3. Nonspecific pulmonary infiltrates. These could represent pulmonary edema   although the distribution is not classic. Additional allergies such as pneumonia   are not excluded if suggested by clinical findings. This report was made using voice transcription. Despite my best efforts to avoid   any, transcription errors may persist. If there is any question about the   accuracy of the report or need for clarification, then please call [phone], or text me through [email or web address] for clarification or correction. CT ABD PELV W CONT [[id number]] Collected: 03/19/22 1300   Order Status: Completed Updated: 03/20/22 1526   Addenda: Addendum: Addendum: A request was made to assess for potential right   gluteal hematoma. There is appear to be edema in the right gluteal soft tissues   which is slightly more pronounced than on the left. No clearly demonstrated defined collection is seen to suggest significant hematoma. The most inferior   gluteal soft tissues have been excluded from the field of imaging. Signed: 03/20/22 1523 by Dez Cruz MD   Narrative:     CT ABDOMEN AND PELVIS WITH INTRAVENOUS CONTRAST DATED 3/19/2022. History: Fall hitting bottom and back. Comparison: CT abdomen and pelvis with contrast 12/27/2021     Technique:   Multiple contiguous helical CT images reconstructed at 5 mm   intervals were obtained from above the diaphragms through the ischial   tuberosities following oral and 100 cc Isovue-370 without acute complication. All CT scans performed at this facility use one or all of the following:   Automated exposure control, adjustment of the mA and/or kVp according to   patient's size, iterative reconstruction. Findings:   CT ABDOMEN:     Limited evaluation of the lung bases and base of the mediastinum demonstrates   nonspecific infiltrates in the bilateral lung bases, left greater than right.  A   small dependent left pleural effusion is seen. A small to moderate size hiatal   hernia is present. Ascites within the abdomen is also seen within the hernia   sac. The Liver is clearly cirrhotic in its appearance. Multiple hepatic masses are   seen the largest of which resides in the inferior right lobe measuring 3 cm in   size. The appearance is highly concerning for malignancy either representing   multifocal hepatomas, or hepatic metastases. Hepatic metastases are favored   given the appearance. Asymmetric intraperitoneal air ductal dilation is seen in   the left lobe of the liver which may be obstructed by a central hepatic mass   seen on axial image 23 measuring 1.1 cm in size. .  The spleen demonstrates an   irregular area of decreased attenuation in the superior pole seen on axial image   17 measuring 3.2 cm x 1.7 cm. This is not as well characterized. This does not   appear to represent an acute defect. This could result from flow artifact.  No   contour deforming or enhancing mass lesions are seen of the adrenal glands. The   pancreas is grossly unchanged in its appearance with the patient having a known   primary pancreas tumor described on the prior study. The gallbladder has been   removed.  The kidneys enhance symmetrically and no evidence of hydronephrosis is   seen. A stable benign cyst is seen in the posterior upper to midpole cortex of   the right kidney measuring 2.6 cm in size. The visualized loops of small bowel and colon are normal in caliber.  The   appendix appears to have been removed. Mild to moderate diverticulosis is seen   of the left colon. No free air is seen. Moderate ascites is seen which   demonstrates low attenuation suggesting simple ascites. Diffuse mesenteric edema   is seen. Abnormal peritoneal nodularity and caking is suggested with additional   peritoneal thickening which at times appears nodular.  This is highly concerning   for peritoneal metastatic process which appears worsened from the prior   examination.  No evolving adenopathy is seen.  The abdominal aorta demonstrate   moderate atherosclerotic calcification. No acute osseous abnormality is seen. Compression deformities are seen at T12, L1, and L4 although these have a   chronic appearance and are stable from the prior comparison study. CT PELVIS:   Small to moderate ascites extends into the pelvis. There is once again nodular   peritoneal thickening best appreciated on axial image 73 highly concerning for   evolving peritoneal metastatic disease. There appears to be serosal involvement   of the mid sigmoid colon seen on axial image 74. No abnormal dilation is seen to   suggest significant obstruction at this time. Ragena Scripture evolving pelvic adenopathy is   seen.  The urinary bladder is unremarkable. No acute osseous abnormality is   seen. Impression:     1.  No clear acute injury from recent fall. Specifically, no acute osseous   abnormality is seen. 2. Grossly stable appearance of pancreatic tumor although this is suboptimally   assessed on this exam. However, there is clear evidence for evolving metastatic   disease with new hepatic masses, and multiple findings as described above which   are highly concerning for evolving peritoneal metastatic disease. Lastly,   irregular decreased attenuation is seen in the superior pole of the spleen which   does not appear clearly acute and does not demonstrate adjacent complex ascites. This could represent an additional metastasis although is not as well   characterized. This report was made using voice transcription.  Despite my best efforts to avoid   any, transcription errors may persist. If there is any question about the   accuracy of the report or need for clarification, then please call [phone], or text me through [email or web address] for clarification or correction.     MRI BRAIN W WO CONT [[id number]] Collected: 03/19/22 4408   Order Status: Completed Updated: 03/19/22 1657   Narrative:     EXAMINATION: BRAIN MRI 3/19/2022 4:47 PM     ACCESSION NUMBER: [id number]     INDICATION: 79-year-old male with fall, altered mental status and confusion. History of pancreatic cancer on chemotherapy with recent progression of   intra-abdominal disease, no prior imaging of brain. COMPARISON: CT head 3/19/2022. TECHNIQUE: Multiplanar multisequence MRI of the brain without and with   intravenous administration of 14 mL contrast agent. FINDINGS:     Previously described subcentimeter focus along the posterior limb of the right   internal capsule follows CSF signal intensity on all sequences and is favored to   represent a prominent perivascular space. There is a mild degree of scattered and confluent foci of T2/FLAIR   hyperintensity within the periventricular and deep white matter, nonspecific but   most commonly seen with chronic small vessel ischemic changes. Faint chronic   hemosiderin involving the left posterior putamen. No midline shift or mass lesion. There is no evidence of intracranial hemorrhage   or acute infarct. The ventricles are within normal limits in size and   configuration. There are no extra-axial fluid collections present.  No diffusion   weighted signal abnormality is identified. There is no abnormal enhancement. Impression:       No acute finding or evidence of intracranial metastatic disease. XR ELBOW LT MIN 3 V [[id number]] Collected: 03/19/22 1534   Order Status: Completed Updated: 03/19/22 1538   Narrative:     XR FOREARM LT AP/LAT, XR ELBOW LT MIN 3 V 3/19/2022 3:19 PM     HISTORY: Fall with left arm and left elbow pain. Impression:       Two views left forearm. No fracture or dislocation. No significant soft tissue   swelling. Old healed distal ulnar fracture deformity is present. Three views left elbow. No fracture or dislocation. No significant soft tissue   swelling. No fat pad elevation.    XR FOREARM LT AP/LAT [[id number]] Collected: 03/19/22 1534   Order Status: Completed Updated: 03/19/22 1538   Narrative:     XR FOREARM LT AP/LAT, XR ELBOW LT MIN 3 V 3/19/2022 3:19 PM     HISTORY: Fall with left arm and left elbow pain. Impression:       Two views left forearm. No fracture or dislocation. No significant soft tissue   swelling. Old healed distal ulnar fracture deformity is present. Three views left elbow. No fracture or dislocation. No significant soft tissue   swelling. No fat pad elevation. CT HEAD WO CONT [[id number]] Collected: 03/19/22 1254   Order Status: Completed Updated: 03/19/22 1258   Narrative:     CT BRAIN WITHOUT CONTRAST   3/19/2022 12:26 PM     INDICATION: Fall, altered mental status and confusion. COMPARISON: None available at this hospital PACS     Technique:  Multiple contiguous axial images are obtained encompassing the brain   from the skull base to the vertex.  For this CT scanner at least one of the   following techniques is utilized to decrease patient radiation dose: Automatic   exposure control, KVP and mA modulation based on patient weight, and iterative   reconstruction. FINDINGS: The ventricles are midline and of appropriate size and configuration. Mild hypoattenuating foci seen in the periventricular deep white matter. Inferiorly at the junction of the thalamus and posterior limb of the internal   capsule on the right there is a rounded mildly hypodense focus that measures 9   mm. The midline structures and posterior fossa are intact.  There is no finding of   an acute cortical stroke, mass lesion, or acute bleed.  No extra-axial fluid   collection. The skull is intact, no discreet abnormality. The paranasal sinuses are not   remarkable. The visualized orbits and mastoid air-cells are patent. Impression:     9 mm rounded hypoattenuating focus on the right at the inferior   junction of the thalamus and posterior limb of the internal capsule is noted.    Suspicious for lacunar infarct and of uncertain chronicity-cannot exclude that   this is new. Elsewhere only mild chronic changes in the periventricular white matter   suggested. For further imaging evaluation of this as clinically indicated-recommend brain   MRI with diffusion imaging. XR CHEST Raheem Callahan [[id number]] Collected: 03/15/22 1259   Order Status: Completed Updated: 03/15/22 1302   Narrative:     Chest X-ray     INDICATION: Hypotension. COMPARISON: Chest x-ray 2/10/2021. A portable AP view of the chest was obtained. FINDINGS: The lungs are clear. There are no infiltrates or effusions. Left chest   port is unchanged in position. No pneumothorax. The heart size is normal.  The   bony thorax is intact.      Impression:     No acute findings in the chest. Lungs remain clear. No interval   change.           Medications:  Current Facility-Administered Medications   Medication Dose Route Frequency    calcium carbonate (TUMS) chewable tablet 200 mg [elemental]  200 mg Oral TID PRN    alum-mag hydroxide-simeth (MYLANTA) oral suspension 30 mL  30 mL Oral Q4H PRN    TPN ADULT-CENTRAL - dextrose 14% amino acid 8%   IntraVENous QPM    loperamide (IMODIUM) capsule 2 mg  2 mg Oral Q8H    HYDROmorphone (DILAUDID) injection 1 mg  1 mg IntraVENous Q4H PRN    HYDROcodone-acetaminophen (NORCO) 5-325 mg per tablet 1 Tablet  1 Tablet Oral Q4H PRN    cefepime (MAXIPIME) 2 g in 0.9% sodium chloride (MBP/ADV) 100 mL MBP  2 g IntraVENous Q8H    prochlorperazine (COMPAZINE) with saline injection 5 mg  5 mg IntraVENous Q6H PRN    acetaminophen (TYLENOL) tablet 650 mg  650 mg Oral Q6H PRN    diphenhydrAMINE (BENADRYL) capsule 25 mg  25 mg Oral Q6H PRN    amiodarone (CORDARONE) tablet 400 mg  400 mg Oral BID    fat emulsion 20% (LIPOSYN, INTRAlipid) infusion 250 mL  250 mL IntraVENous QPM    NUTRITIONAL SUPPORT ELECTROLYTE PRN ORDERS   Does Not Apply PRN    sodium chloride (NS) flush 5-10 mL  5-10 mL IntraVENous PRN    diphenoxylate-atropine (LOMOTIL) tablet 1 Tablet  1 Tablet Oral QID PRN    famotidine (PEPCID) tablet 20 mg  20 mg Oral BID    pantoprazole (PROTONIX) tablet 40 mg  40 mg Oral ACB    promethazine (PHENERGAN) tablet 25 mg  25 mg Oral Q6H PRN    tamsulosin (FLOMAX) capsule 0.4 mg  0.4 mg Oral DAILY    ondansetron (ZOFRAN) injection 4 mg  4 mg IntraVENous Q4H PRN         ASSESSMENT:    Problem List  Date Reviewed: 3/15/2022          Codes Class Noted    Chronic systolic congestive heart failure (HCC) ICD-10-CM: I50.22  ICD-9-CM: 428.22, 428.0  3/21/2022        Bradycardia ICD-10-CM: R00.1  ICD-9-CM: 427.89  3/19/2022        LBBB (left bundle branch block) ICD-10-CM: I44.7  ICD-9-CM: 426.3  3/19/2022        FTT (failure to thrive) in adult ICD-10-CM: R62.7  ICD-9-CM: 783.7  3/15/2022        * (Principal) Hypotension ICD-10-CM: I95.9  ICD-9-CM: 458.9  3/15/2022        Severe protein-calorie malnutrition ([facility] ICD-10-CM: E43  ICD-9-CM: 262  3/4/2022        Pancreatic cancer ([facility] ICD-10-CM: C25.9  ICD-9-CM: 157.9  3/2/2022        Ascites ICD-10-CM: R18.8  ICD-9-CM: 789.59  3/2/2022        Admission for antineoplastic chemotherapy ICD-10-CM: Z51.11  ICD-9-CM: V58.11  3/2/2022        Hypomagnesemia ICD-10-CM: E83.42  ICD-9-CM: 275.2  4/23/2021        Hypokalemia ICD-10-CM: E87.6  ICD-9-CM: 276.8  4/20/2021        CINV (chemotherapy-induced nausea and vomiting) ICD-10-CM: R11.2, T45.1X5A  ICD-9-CM: 787.01, E933.1  4/20/2021        Dehydration ICD-10-CM: E86.0  ICD-9-CM: 276.51  4/12/2021        Other neutropenia ([facility] ICD-10-CM: D70.8  ICD-9-CM: 288.09  3/9/2021        Malignant neoplasm of lower third of esophagus ([facility] ICD-10-CM: C15.5  ICD-9-CM: 150.5  1/5/2021        Malignant neoplasm of body of pancreas ([facility] ICD-10-CM: C25.1  ICD-9-CM: 157.1  1/5/2021        Severe obesity ([facility] ICD-10-CM: E66.01  ICD-9-CM: 278.01  12/10/2020        Elevated glucose ICD-10-CM: R73.09  ICD-9-CM: 790.29  7/22/2019 Anemia ICD-10-CM: D64.9  ICD-9-CM: 285.9  7/22/2019        Chronic left-sided low back pain ICD-10-CM: M54.50, G89.29  ICD-9-CM: 724.2, 338.29  7/22/2019              78 y.o. M pancreatic cancer and esophageal cancer with malignant ascites of previously controlled FOLFIRINOX but currently disease progressed and most recently admited on 3/2/2022 to place Pleurx and arrange gemcitabine/Abraxane, had extensive discussion with inpatient team and pharmacy, patient was discharged on 3/7/2022 without chemo appointment and return to office on 3/15/2022, extremely sick and blood pressure not measurable in the office, and not been having much oral intake since discharge, urgently establish IV access and called EMS to take to ER to stabilize and admit to hospital, apparently needed much supportive care and volume resuscitation, start TPN until oral intake can improve and give gemcitabine/Abraxane once clinically stable, discussed with the inpatient team.    PLAN:    Metastatic pancreatic cancer / distal esophageal cancer  - Metastatic disease involving peritoneum, malignant ascites  - s/p 24 cycles of FOLFIRINOX with SD and recent POD with increasing CEA/ and malignant ascites  - Plan for gemcitabine/abraxane when clinically stable - hopefully tomorrow. 3/17 [place]/abraxane today, tolerated well  3/24 D8 due today, deferring tx d/t cytopenias. Dr. Aylin Arroyo discussed that if patient's condition does not improve, that he may want to consider Hospice services. Palliative care following. 3/25 Pt wants to con't to pursue treatment as he wants to change his relationship with his children. He does not feel the burden of tx outweighs the benefits and wants to continue tx for as long as he can. D15 due 3/31.  3/29 TM checked, CEA up but  improved. 3/30 Office working to obtain sotorasib. D15 gem/abraxane due tomorrow.     Malignant ascites  - Has abdominal Pleurx, drain three times weekly  3/17 Pleurx drained 3/16 - 1100cc out.  3/18 Pleurx accidentally dislodged last night - 1800cc removed before completely removed. IR notified and will re-evaluate Monday. 3/20 Worsening abdominal pain possibly secondary to re-accumulation, some drainage reported at Pleurx site. IR evaluating tomorrow. 3/21 IR to re-evaluate tomorrow, was not NPO today. 3/22 To IR today. 3/23 Abd pleurx replaced yesterday  3/25 Pleurx drained yesterday, 1.1L. Con't to drain prn.  3/26 drained today as he was feeling uncomfortable. Monitor blood pressures. 3/27 continue to drain PRN, but be cautious with blood pressures as he is hypotensive at times. 3/29 BP much improved - drained 1100 cc yesterday due to patient request. Previously draining every other day but asking to be drained daily. Will continue to drain PRN as long as BP acceptable. 3/30 Abdominal discomfort - requesting to be drained again today. BP stable. Cancer related pain  - Continue home dilaudid  3/17 Utilizing IV morphine. Will decrease dosing and encourage use of home Norco.  3/18 Continue to wean IV meds - encourage PO.  3/20 Has not been receiving IV morphine due to hypotension. Continue with PRN oral medications. 3/22 [facility] to [facility] regarding [address], symptom management. 3/23 PC following. Pt changed code status to DNR, wants to pursue further cancer-related treatment. 3/25 PC following  3/27 Dilaudid increased today. During rounds, he stated his pain was better controlled with increased dose. 3/29 Continues on IV dilaudid. Hypotension / poor PO intake / protein-calorie malnutrition  - Consult RD for TPN  - BC pending, started on Cefe/Vanc and may be able to de-escalate soon as hypotension likely secondary to dehydration. LA improved after IFV. 3/17 On TPN. Continues on Cefe/Vanc although infectious work-up unremarkable. Will stop antibiotics. 3/18 Hypotension improved and remains afebrile off antibiotics.  Continue with nutritional support via TPN.  3/19 Ongoing hypotension, although improved yesterday AM, worse through evening. Had 6L of IVF yesterday and continues on TPN. May consider midodrine although MAP consistently ~65.  3/21 Hypotension improved but borderline, continues TPN.   3/22 Discussed with RD volume status of TPN given new finding of HFrEF and CT chest with evidence of fluid overload. CM following for home TPN. Holding IV morphine. 3/23 BPs improved  3/24 Episode of hypotension last PM, required small fluid bolus, BP improved  3/25 Continues on TPN, however pt declined to be attached to TPN yesterday AM and then disconnected himself from it last night. 3/27 per RD note, patient unaware about prior TPN disconnections. This AM during rounds, visitor noted that \"something was leaking. \" He had pulled his port needle out again and his TPN was leaking onto the floor. RD plans to discuss further goals regarding TPN with pt when she visits him today. 2/97 Cyclic TPN to start today per RD. Encouraged PO intake and he agrees. 3/29 Reports he drank an Ensure but is scared to eat because of diarrhea. Will manage diarrhea. Nausea  - Continue PRN antiemetics    Diarrhea  - Antidiarrheals PRN  3/24 c/o worsening diarrhea. Check Cdiff.   3/25 Cdiff neg. Con't using antidiarrheals prn.  3/28 Reports diarrhea - only 1 documented stool. Continue PRN antidiarrheals. 3/29 Reports he is afraid to eat because of diarrhea. Will schedule imodium TID. Bradycardia / HFrEF / LV dysfunction / LBBB / ectopy  - One documented episode of HR 43 - check EKG  - No hx of hypertension, monitor  3/17 EKG with bi-geminal PACs and known LBBB. Recheck EGK.  3/18 EKG with same as above. Palpated pulse on several occasions documented in the 40s. Tele applied overnight. Cardiology following. 3/19 Cardiology recommending K>4 (on TPN) and Mg replacements. Echo pending. 3/21 No bradycardia noted on telemetry. Does have ectopy which may have led to incorrect pulse rate palpation. Echo with EF 35-40%. CT chest completed due to echo findings rebeka noted hiatal hernia/mesenteric fat herniation/abdominal fluid displacing heart and small BL effusions, anasarca. Cardiology following.  3/22 Cardiology considering BB if BP improve for HF/LV dysfunction. Weight up 6# overnight and increased since admission. Clinically no evidence of overload but asking RD to adjust TPN volume. Reported run of NS-SVT - cardiology following. 3/25 Cards following. Hypotension limiting therapy for heart failure. 3/26 cards signed off and recommended cont Amiodarone   3/30 BP improved, CXR shows pulmonary edema and possible effusion. 20mg Lasix given this morning. Will add albumin and diurese as able pending blood pressure tolerance. Pancytopenia secondary to chemotherapy  - Transfuse prn per [facility] SOPs  3/25 ANC down to 1000. Start G-CSF.  3/26 [address] improved to 2.6.   3/28 WBC up to 20 - stop G-CSF.   3/29 Plts up to 71k. Fall  3/19 Witnessed slip and fall on wet floor yesterday. Today with L arm bruising - check X-ray. Will check CT head and CT AP (hematoma/lipoma palpated on R buttock) and also c/o worsening abdominal pain. 3/20 X-ray of L elbow/forearm negative. CT head with ?lacunar infarct but MRI showed no acute findings and ?lacunar infarct appears to be prominent perivascular space. CT AP with progressing peritoneal/liver disease and ?new splenic lesion. Last imaging obtained 12/2021 for comparison. Elevated LFTs  3/21 Monitor, possibly secondary to TPN or recent chemo. 3/22 Appear to be improving  3/23 AST improved, ALP increased  3/28 Improving    Hypoxia / ?Pneumonia  3/24 On O2 @ 2L now. Check CXR.  3/25 CXR with low lung volumes with b/l infiltrates ?edema vs infx? Check PCT - elevated. Start Cef/Vanc.  3/26 continues on cef/vanc; on room air   3/28 D4 Cefepime/Vancomycin - afebrile. DC Vancomycin. 3/29 Continue Cefepime - likley DC soon.       Continue home meds  [facility] SOPs  AC contraindicated d/t thrombocytopenia    Goals and plan of care reviewed with the patient. All questions answered to the best of our ability. Disposition:  TBD pending clinical course. PT/OT following - HHPT vs STR. Encouraged physical activity/OOB. CM following for home TPN. He will need close follow-up with Dr Vickie Myers upon DC. Jacob Curran, [address] Hematology & Oncology  [address] [address]  Office : [phone]  Fax : [phone]   I personally saw, exammed and counselled the patient, and discussed with NP, agree with above history/assessment/plan. 66 y.o.male pancreatic cancer and esophageal cancer with peritoneal carcinomatosis, status post first-line FOLFIRINOX, disease progression and admitted for failure to thrive/obstruction, received TPN and 1 cycle of gemcitabine/Abraxane, reported abdominal pain improved and started having bowel movement/gas, which is encouraging sign of peritoneal disease response, discussed the need to enhance nutrition and improve hypoalbuminemia, also found EF 35 to 40%, sinusitis as needed, week 2 chemo was on hold for thrombocytopenia, hypotensive and received fluids and developed respiratory distress, blood pressure does not tolerate diuresis, difficult situation of anasarca but intravascularly dry, give albumin and blood pressure better, still working on obtaining calyceal hematuria, continue bowel care.       Jl Gonzales M.D.   [address]  Office : [phone]  Fax : [phone]

## 2022-03-30 NOTE — PROGRESS NOTES
END OF SHIFT NOTE:    Intake/Output  No intake/output data recorded. Voiding: YES  Catheter: NO  Drain:   Pleural Catheter/Drain 03/22/22 15.5 fr x 66 cm Right (Active)   Drainage Description Yellow 03/29/22 1415   Site Assessment Clean, dry, & intact 03/30/22 0750   Dressing Status Clean, dry, & intact 03/30/22 0151   Dressing Type Transparent;Gauze 03/30/22 0151   Output (ml) 1650 ml 03/30/22 0930               Stool:  1 occurrences. Stool Assessment  Stool Color: Brown;Tan (Comment) (03/27/22 1014)  Stool Appearance: Soft (03/30/22 0750)  Stool Amount: Small (03/27/22 1014)  Stool Source/Status: Rectum (03/27/22 1014)    Emesis:  0 occurrences. Emesis Assessment  Appearance: Brown (03/29/22 1243)  Emesis Amount: Large (03/29/22 1243)    VITAL SIGNS  Patient Vitals for the past 12 hrs:   Temp Pulse Resp BP SpO2   03/30/22 1644 97.5 °F (36.4 °C) 76 18 93/62 95 %   03/30/22 1533 -- -- -- (!) 100/55 --   03/30/22 1305 -- -- -- (!) 79/47 --   03/30/22 1235 98.9 °F (37.2 °C) 81 20 (!) 63/39 96 %   03/30/22 1127 -- -- -- (!) 84/47 --       Pain Assessment  Pain 1  Pain Scale 1: Numeric (0 - 10) (03/30/22 1903)  Pain Intensity 1: 8 (03/30/22 1903)  Patient Stated Pain Goal: 0 (03/30/22 0830)  Pain Reassessment 1: Patient resting w/respiratory rate greater than 10 (03/30/22 0930)  Pain Onset 1: pta (03/29/22 1929)  Pain Location 1: Abdomen (03/30/22 1903)  Pain Orientation 1: Medial (03/30/22 0243)  Pain Description 1: Aching (03/30/22 1903)  Pain Intervention(s) 1: Medication (see MAR) (03/30/22 1903)    Ambulating  Yes with assist     Additional Information: Patient was SOB this AM at shift change lasix was ordered and given. BP still is low and discussed with Stefani Sequeira., NP  Will be holding IV pain medication due to the low BP. Patient stated that he is going home tomorrow. I explained that we want him to be safe going home and would discuss with the doctor tomorrow.   Patient has been sad after talking with palliative care today. Family has been in and out of room during the day     Shift report given to oncoming nurse at the bedside.     Osorio Russo RN

## 2022-03-30 NOTE — PROGRESS NOTES
's follow-up visit requested by nurse. Nurses were at bedside during the visit. Patient seemed clear on his goals and he stated he was going to die. I offered support to patient and his niece Charmayne Loge during this end of life discussion. Patient states the physician has been talking about hospice with him.   Tate Aleg[address] 67  Board Certified

## 2022-03-30 NOTE — PROGRESS NOTES
END OF SHIFT    Patient Vitals for the past 12 hrs:   Temp Pulse Resp BP SpO2   03/30/22 0316 -- -- -- 94/65 --   03/30/22 0241 97.4 °F (36.3 °C) 80 18 101/71 93 %   03/30/22 0128 -- -- -- 95/65 --   03/29/22 2317 98.2 °F (36.8 °C) 82 18 (!) 87/54 91 %   03/29/22 1959 98.2 °F (36.8 °C) 81 19 112/80 96 %     -pt given dilaudid 2x for pain  -pt given norco 2x  -pt given zofran 2x  -compazine 1x  -TPN and lipids running  -port needle pulled out  -needle and drsg changed  -pt resting in bed  -no needs at this time

## 2022-03-31 ENCOUNTER — APPOINTMENT (OUTPATIENT)
Dept: GENERAL RADIOLOGY | Age: 67
DRG: 435 | End: 2022-03-31
Attending: NURSE PRACTITIONER
Payer: MEDICARE

## 2022-03-31 LAB
ALBUMIN SERPL-MCNC: 2.1 G/DL (ref 3.2–4.6)
ALBUMIN/GLOB SERPL: 0.8 {RATIO} (ref 1.2–3.5)
ALP SERPL-CCNC: 238 U/L (ref 50–136)
ALT SERPL-CCNC: 18 U/L (ref 12–65)
ANION GAP SERPL CALC-SCNC: 4 MMOL/L (ref 7–16)
AST SERPL-CCNC: 17 U/L (ref 15–37)
BASOPHILS # BLD: 0 K/UL (ref 0–0.2)
BASOPHILS NFR BLD: 0 % (ref 0–2)
BILIRUB SERPL-MCNC: 0.5 MG/DL (ref 0.2–1.1)
BUN SERPL-MCNC: 27 MG/DL (ref 8–23)
CALCIUM SERPL-MCNC: 8 MG/DL (ref 8.3–10.4)
CHLORIDE SERPL-SCNC: 108 MMOL/L (ref 98–107)
CO2 SERPL-SCNC: 27 MMOL/L (ref 21–32)
CREAT SERPL-MCNC: 0.5 MG/DL (ref 0.8–1.5)
DIFFERENTIAL METHOD BLD: ABNORMAL
EOSINOPHIL # BLD: 0.2 K/UL (ref 0–0.8)
EOSINOPHIL NFR BLD: 1 % (ref 0.5–7.8)
ERYTHROCYTE [DISTWIDTH] IN BLOOD BY AUTOMATED COUNT: 15.8 % (ref 11.9–14.6)
GLOBULIN SER CALC-MCNC: 2.5 G/DL (ref 2.3–3.5)
GLUCOSE SERPL-MCNC: 93 MG/DL (ref 65–100)
HCT VFR BLD AUTO: 25.4 % (ref 41.1–50.3)
HGB BLD-MCNC: 7.8 G/DL (ref 13.6–17.2)
IMM GRANULOCYTES # BLD AUTO: 1.7 K/UL (ref 0–0.5)
IMM GRANULOCYTES NFR BLD AUTO: 11 % (ref 0–5)
LYMPHOCYTES # BLD: 0.6 K/UL (ref 0.5–4.6)
LYMPHOCYTES NFR BLD: 4 % (ref 13–44)
MAGNESIUM SERPL-MCNC: 2.2 MG/DL (ref 1.8–2.4)
MCH RBC QN AUTO: 28.7 PG (ref 26.1–32.9)
MCHC RBC AUTO-ENTMCNC: 30.7 G/DL (ref 31.4–35)
MCV RBC AUTO: 93.4 FL (ref 79.6–97.8)
MONOCYTES # BLD: 2.2 K/UL (ref 0.1–1.3)
MONOCYTES NFR BLD: 14 % (ref 4–12)
NEUTS SEG # BLD: 11 K/UL (ref 1.7–8.2)
NEUTS SEG NFR BLD: 70 % (ref 43–78)
NRBC # BLD: 0.11 K/UL (ref 0–0.2)
PLATELET # BLD AUTO: 84 K/UL (ref 150–450)
PLATELET COMMENTS,PCOM: ABNORMAL
PMV BLD AUTO: 12.2 FL (ref 9.4–12.3)
POTASSIUM SERPL-SCNC: 3.7 MMOL/L (ref 3.5–5.1)
PROT SERPL-MCNC: 4.6 G/DL (ref 6.3–8.2)
RBC # BLD AUTO: 2.72 M/UL (ref 4.23–5.6)
RBC MORPH BLD: ABNORMAL
SODIUM SERPL-SCNC: 139 MMOL/L (ref 138–145)
WBC # BLD AUTO: 15.7 K/UL (ref 4.3–11.1)
WBC MORPH BLD: ABNORMAL

## 2022-03-31 PROCEDURE — 74011250637 HC RX REV CODE- 250/637: Performed by: NURSE PRACTITIONER

## 2022-03-31 PROCEDURE — 3331090002 HH PPS REVENUE DEBIT

## 2022-03-31 PROCEDURE — 65270000029 HC RM PRIVATE

## 2022-03-31 PROCEDURE — 99233 SBSQ HOSP IP/OBS HIGH 50: CPT | Performed by: INTERNAL MEDICINE

## 2022-03-31 PROCEDURE — 74011000250 HC RX REV CODE- 250: Performed by: INTERNAL MEDICINE

## 2022-03-31 PROCEDURE — APPSS45 APP SPLIT SHARED TIME 31-45 MINUTES: Performed by: NURSE PRACTITIONER

## 2022-03-31 PROCEDURE — 74011250637 HC RX REV CODE- 250/637: Performed by: INTERNAL MEDICINE

## 2022-03-31 PROCEDURE — 74011250636 HC RX REV CODE- 250/636: Performed by: INTERNAL MEDICINE

## 2022-03-31 PROCEDURE — 83735 ASSAY OF MAGNESIUM: CPT

## 2022-03-31 PROCEDURE — 36591 DRAW BLOOD OFF VENOUS DEVICE: CPT

## 2022-03-31 PROCEDURE — 80053 COMPREHEN METABOLIC PANEL: CPT

## 2022-03-31 PROCEDURE — 74011250636 HC RX REV CODE- 250/636: Performed by: NURSE PRACTITIONER

## 2022-03-31 PROCEDURE — 74011000258 HC RX REV CODE- 258: Performed by: INTERNAL MEDICINE

## 2022-03-31 PROCEDURE — 85025 COMPLETE CBC W/AUTO DIFF WBC: CPT

## 2022-03-31 PROCEDURE — 71045 X-RAY EXAM CHEST 1 VIEW: CPT

## 2022-03-31 PROCEDURE — 77010033711 HC HIGH FLOW OXYGEN

## 2022-03-31 PROCEDURE — P9045 ALBUMIN (HUMAN), 5%, 250 ML: HCPCS | Performed by: NURSE PRACTITIONER

## 2022-03-31 PROCEDURE — 3331090001 HH PPS REVENUE CREDIT

## 2022-03-31 PROCEDURE — 74011000258 HC RX REV CODE- 258: Performed by: NURSE PRACTITIONER

## 2022-03-31 PROCEDURE — 99233 SBSQ HOSP IP/OBS HIGH 50: CPT | Performed by: NURSE PRACTITIONER

## 2022-03-31 PROCEDURE — 94760 N-INVAS EAR/PLS OXIMETRY 1: CPT

## 2022-03-31 PROCEDURE — 77010033678 HC OXYGEN DAILY

## 2022-03-31 RX ORDER — FUROSEMIDE 10 MG/ML
20 INJECTION INTRAMUSCULAR; INTRAVENOUS ONCE
Status: COMPLETED | OUTPATIENT
Start: 2022-03-31 | End: 2022-03-31

## 2022-03-31 RX ORDER — MIDODRINE HYDROCHLORIDE 5 MG/1
5 TABLET ORAL
Status: DISCONTINUED | OUTPATIENT
Start: 2022-03-31 | End: 2022-04-02

## 2022-03-31 RX ORDER — LOPERAMIDE HYDROCHLORIDE 2 MG/1
2 CAPSULE ORAL EVERY 12 HOURS
Status: DISCONTINUED | OUTPATIENT
Start: 2022-03-31 | End: 2022-04-06 | Stop reason: HOSPADM

## 2022-03-31 RX ADMIN — CEFEPIME HYDROCHLORIDE 2 G: 2 INJECTION, POWDER, FOR SOLUTION INTRAVENOUS at 02:46

## 2022-03-31 RX ADMIN — FUROSEMIDE 20 MG: 20 INJECTION, SOLUTION INTRAMUSCULAR; INTRAVENOUS at 10:23

## 2022-03-31 RX ADMIN — ONDANSETRON 4 MG: 2 INJECTION INTRAMUSCULAR; INTRAVENOUS at 01:24

## 2022-03-31 RX ADMIN — SODIUM CHLORIDE 5 MG: 9 INJECTION INTRAMUSCULAR; INTRAVENOUS; SUBCUTANEOUS at 13:34

## 2022-03-31 RX ADMIN — MIDODRINE HYDROCHLORIDE 5 MG: 5 TABLET ORAL at 10:21

## 2022-03-31 RX ADMIN — DIPHENHYDRAMINE HYDROCHLORIDE 25 MG: 25 CAPSULE ORAL at 02:59

## 2022-03-31 RX ADMIN — FAMOTIDINE 20 MG: 20 TABLET, FILM COATED ORAL at 08:33

## 2022-03-31 RX ADMIN — MIDODRINE HYDROCHLORIDE 5 MG: 5 TABLET ORAL at 17:57

## 2022-03-31 RX ADMIN — I.V. FAT EMULSION 250 ML: 20 EMULSION INTRAVENOUS at 18:02

## 2022-03-31 RX ADMIN — AMIODARONE HYDROCHLORIDE 400 MG: 200 TABLET ORAL at 17:57

## 2022-03-31 RX ADMIN — PANTOPRAZOLE SODIUM 40 MG: 40 TABLET, DELAYED RELEASE ORAL at 06:19

## 2022-03-31 RX ADMIN — ALBUMIN (HUMAN) 25 G: 12.5 INJECTION, SOLUTION INTRAVENOUS at 18:18

## 2022-03-31 RX ADMIN — LOPERAMIDE HYDROCHLORIDE 2 MG: 2 CAPSULE ORAL at 06:18

## 2022-03-31 RX ADMIN — ALBUMIN (HUMAN) 25 G: 12.5 INJECTION, SOLUTION INTRAVENOUS at 06:19

## 2022-03-31 RX ADMIN — FAMOTIDINE 20 MG: 20 TABLET, FILM COATED ORAL at 17:57

## 2022-03-31 RX ADMIN — AMIODARONE HYDROCHLORIDE 400 MG: 200 TABLET ORAL at 08:33

## 2022-03-31 RX ADMIN — LOPERAMIDE HYDROCHLORIDE 2 MG: 2 CAPSULE ORAL at 21:07

## 2022-03-31 RX ADMIN — HYDROCODONE BITARTRATE AND ACETAMINOPHEN 1 TABLET: 10; 325 TABLET ORAL at 06:17

## 2022-03-31 RX ADMIN — SODIUM ACETATE: 164 INJECTION, SOLUTION, CONCENTRATE INTRAVENOUS at 17:57

## 2022-03-31 RX ADMIN — ALBUMIN (HUMAN) 25 G: 12.5 INJECTION, SOLUTION INTRAVENOUS at 01:24

## 2022-03-31 RX ADMIN — TAMSULOSIN HYDROCHLORIDE 0.4 MG: 0.4 CAPSULE ORAL at 08:33

## 2022-03-31 RX ADMIN — HYDROCODONE BITARTRATE AND ACETAMINOPHEN 1 TABLET: 10; 325 TABLET ORAL at 13:28

## 2022-03-31 RX ADMIN — HYDROCODONE BITARTRATE AND ACETAMINOPHEN 1 TABLET: 10; 325 TABLET ORAL at 01:24

## 2022-03-31 RX ADMIN — ALBUMIN (HUMAN) 25 G: 12.5 INJECTION, SOLUTION INTRAVENOUS at 12:19

## 2022-03-31 NOTE — PROGRESS NOTES
Occupational Therapy Note:     OT treatment attempted, pt adamantly refused again today. Pt stated he was having breathing problems and refused all types of mobility: sitting EOB, walking, exercise. Stressed importance of therapy to pt, however pt continued to refuse. Will plan to check back another time/day as patient is available and willing to participate.      Thank you,   Linda Cross, OT

## 2022-03-31 NOTE — PROGRESS NOTES
Palliative Care Progress Note    Patient: Derrick Barlow MRN: [id number]  SSN: [id number]    YOB: 1955  Age: 78 y.o. Sex: male       Assessment/Plan:     Chief Complaint/Interval History: ongoing dyspnea and abdominal pain/distention        Principal Diagnosis:    · Dyspnea  R06.00    Additional Diagnoses:   · Ascites  R18.8  · Cough  R05  · Debility, Unspecified  R53.81  · Edema  R60.9  · Fatigue, Lethargy  R53.83  · Frailty  R54  · Nausea/Vomiting  R11.2  · Pain, abdomen  R10.9  · Counseling, Encounter for Medical Advice  Z71.9  · Encounter for Palliative Care  Z51.5    Palliative Performance Scale (PPS)       Medical Decision Making:   Reviewed and summarized notes over last 24 hours   Discussed case with appropriate providers- Satish Aranda RN; Liz Dykes NP  Reviewed laboratory and x-ray data over last 24 hours     Pt sitting in recliner, appears more comfortable today. Friend at bedside. Pt reports is breathing has improved some, but he still feels congested. He continues to require O2 via NC. Pt also reports increasing abdominal distention and pain, and requests the Pleurx be drained. Pt's Norco was increased to 10 mg yesterday, as his blood pressure was too low to administer Dilaudid IV. He reports his pain is fairly well controlled at present. His appetite continues to be poor, and he remains on TPN. Encouraged small, frequent snacks throughout the day. We also discussed our visit yesterday. Pt states he was initially very upset to hear that he is going to die from his cancer at some point. He had expressed that he wanted to go home if he was going to die. He has since discussed with Dr Sree Barajas, and is agreeable to further treatment. Counseled that we are supportive of whatever decision he makes. I also cautioned that if his desire is to be at home when he dies, then having additional chemotherapy could jeopardize this- he voiced understanding.   Provided support, and assured him of our ongoing care. Will continue to follow. Will discuss findings with members of the interdisciplinary team.         More than 50% of this 35 minute visit was spent counseling and coordination of care as outlined above. Subjective:     Review of Systems:  A comprehensive review of systems was negative except for:   Constitutional: Positive for fatigue. Respiratory: positive for dyspnea   Gastrointestinal: Positive for abdominal pain/distention, nausea      Objective:     Visit Vitals  BP (!) 95/57 (BP 1 Location: Left upper arm, BP Patient Position: Lying)   Pulse 88   Temp 97.9 °F (36.6 °C)   Resp 25   Ht 5' 6\" (1.676 m)   Wt 184 lb 11.2 oz (83.8 kg)   SpO2 93%   BMI 29.81 kg/m²       Physical Exam:    General:  Cooperative. Debilitated. Eyes:  Conjunctivae/corneas clear    Nose: Nares normal. Septum midline. Neck: Supple, symmetrical, trachea midline   Lungs:   Coarse bilaterally, unlabored   Heart:  Regular rate and rhythm   Abdomen:   Soft, mildly tender, non-distended. Pleurx catheter   Extremities: Normal, atraumatic, no cyanosis.  BLE edema   Skin: Skin color, texture, turgor normal.    Neurologic: Nonfocal   Psych: Alert and oriented     Signed By: Jessica Montalvo NP     March 31, 2022

## 2022-03-31 NOTE — PROGRESS NOTES
[facility] Hematology & Oncology        Inpatient Hematology / Oncology Progress Note      Admission Date: 3/15/2022 10:28 AM  Reason for Admission/Hospital Course: FTT (failure to thrive) in adult [R62.7]  Hypotension [I95.9]      24 Hour Events:  Afebrile, VSS  Hypotension somewhat improved  On 7 L NC - continues on Albumin for overload  D15 [place]/abraxane due today - held  Requesting to be [facility] but agrees to stay now      Transfusions: None  Replacements: None    ROS:  Constitutional: +weakness/fatigue. Negative for fever, chills. CV: Negative for chest pain, palpitations, edema. Respiratory: Negative for dyspnea, cough, wheezing. GI: +N/V (better), diarrhea. Negative for abdominal pain. 10 point review of systems is otherwise negative with the exception of the elements mentioned above in the HPI. No Known Allergies    OBJECTIVE:  Patient Vitals for the past 8 hrs:   BP Temp Pulse Resp SpO2 Weight   22 0736 99/65 98.3 °F (36.8 °C) 85 23 95 % --   22 0735 (!) 84/50 -- -- -- -- --   22 0557 (!) 95/58 -- -- -- 94 % --   22 0400 -- -- -- -- -- 184 lb 11.2 oz (83.8 kg)   22 0252 101/65 -- -- -- 91 % --     Temp (24hrs), Av.9 °F (36.6 °C), Min:97.4 °F (36.3 °C), Max:98.9 °F (37.2 °C)    No intake/output data recorded. Physical Exam:  Constitutional: Thin, cachectic elderly male in no acute distress, lying comfortably in the hospital bed. HEENT: Normocephalic and atraumatic. Oropharynx is clear, mucous membranes are moist. Extraocular muscles are intact. Sclerae anicteric. Neck supple without JVD. No thyromegaly present. Skin Warm and dry. No bruising and no rash noted. No erythema. No pallor. Respiratory +scattered crackles. Normal air exchange without accessory muscle use. CVS Normal rate, regular rhythm and normal S1 and S2. No murmurs, gallops, or rubs. Abdomen Soft, nontender and distended, normoactive bowel sounds.   +Abd pleurx   Neuro Grossly nonfocal with no obvious sensory or motor deficits. MSK 2+ BLE edema. Normal range of motion in general.  No tenderness. Psych Appropriate mood and affect. Labs:      Recent Labs     03/31/22 0247 03/30/22 0306 03/29/22 0315   WBC 15.7* 18.7* 19.4*   RBC 2.72* 3.30* 3.34   HGB 7.8* 9.8* 10.0*   HCT 25.4* 31.2* 31.6*   MCV 93.4 94.5 94.6   MCH 28.7 29.7 29.9   MCHC 30.7* 31.4 31.6   RDW 15.8* 15.7* 15.8   PLT 84* 89* 71   GRANS 70 77  1* 62   LYMPH 4* 3  1* 11   MONOS 14* 11 8  3   EOS 1  --  2   BASOS 0  --  1   IG 11*  --   --    DF AUTOMATED MANUAL MANUAL   ANEU 11.0* 15.5* 14.4*   ABL 0.6 0.7 2.1   ABM 2.2* 2.1* 2.1*   JONNY 0.2  --  0.4   ABB 0.0  --  0.2   AIG 1.7*  --   --         Recent Labs     03/31/22 0247 03/30/22 0306 03/29/22 0315    137* 138   K 3.7 3.8 3.5   * 106 106   CO2 27 25 23   AGAP 4* 6* 9   GLU 93 106* 116*   BUN 27* 28* 24*   CREA 0.50* 0.50* 0.60*   GFRAA >60 >60 >60   GFRNA >60 >60 >60   CA 8.0* 8.0* 7.9*   * 347* 365*   TP 4.6* 4.3* 4.2*   ALB 2.1* 1.1* 1.1*   GLOB 2.5 3.2 3.1   AGRAT 0.8* 0.3* 0.4*   MG 2.2 2.4 2.0   PHOS  --  2.5  --          Imaging:  IR INSERT CATH PLEURAL INDWELL [[id number]] Collected: 03/22/22 1614   Order Status: Completed Updated: 03/22/22 1652   Narrative:     Title: Dominant PleurX drain placement. Indication: Pancreatic cancer. Recurrent abdominal ascites.  Tunneled abdominal   PleurX drain catheter requested. : Aneta Locke PA-C     Supervising Physician: Carmen Schroeder M.D. Consent:  Informed written and oral consent was obtained from the patient after   explanation of benefits and risks (including, but not limited to: Infection,   hemorrhage, visceral injury and pneumothorax).  The patient's questions were   answered to their satisfaction. The patient stated understanding and requested   that we proceed.      Procedure:  With the patient supine, the abdomen was prepped and draped in the standard fashion. Bennye Shari was administered for local field block.  Ultrasound   evaluation was performed. Using real-time ultrasound guidance, with appropriate   image recording, a Yueh needle was advanced into the ascites in the abdomen. Using fluoroscopy, the needle was exchanged over a wire for a peel-away sheath. The PleurX drain was brought through a subcutaneous tunnel and passed down the   peel-away sheath positioning the drain across the midline, lower and the pelvis. The skin incision was closed with absorbable suture.  The catheter was secured   with nonabsorbable suture. A total of 2250 cc of thin yellow fluid was removed.  Bandages were applied. Complications: Large-volume ascites. Medications:  37 minutes based placed under moderate sedation was provided under   the direction and supervision of Aftab Tierney M.D. using frontal and Versed. Continuous cardiopulmonary monitoring was provided by trained independent   observer present. Ancef was infused prior to the procedure. Contrast:  None. Radiation dose:   Fluoroscopy time: 18 seconds. Reference air kerma (mGy): 8   Kerma area product (cGy.cm2):  219   Fluoroscopic images: 1     Findings:  Large volume ascites. Plan: Bedrest for 1 hour. Recommend performing a drainage procedure daily or every-other-day for the next   two weeks in order to promote healing of the catheter site.        CT CHEST W CONT [[id number]] Collected: 03/20/22 1811   Order Status: Completed Updated: 03/20/22 1823   Narrative:     CT CHEST WITH CONTRAST DATED 3/20/2022. History: Echo with extra cardiac structure compressing the left atrium. Comparison: CT abdomen and pelvis with contrast 3/19/2022     Technique:   Multiple contiguous helical CT images reconstructed at 5 mm were   obtained from the neck base to the mid abdomen following the administration of   100 cc of Isovue 370 without acute complication.  All CT scans performed at this   facility use one or all of the following: Automated exposure control, adjustment   of the mA and/or kVp according to patient's size, iterative reconstruction. Findings: The base of the neck is unremarkable in appearance. A left-sided venous port is   present. No lymphadenopathy is seen.  The thoracic aorta is normal in caliber. The heart is not clearly enlarged on the CT. Moderate to advanced   atherosclerotic calcification is seen of the coronary arteries. No significant   pericardial effusion is seen. The only abnormal space-occupying process adjacent   to the heart is a moderate size hiatal hernia. In addition to the stomach, there   is additional herniation of mesenteric fat as well as abdominal fluid. These all   appear to anteriorly displace the heart. The esophagus proximal to the hernia   sac is fluid distended which could indicate reflux. Obstruction at the   gastroesophageal junction is felt to be less likely given the additional fluid   distention of the hernia sac. Evaluation with lung windows demonstrates a trace right, and small left   dependent pleural effusion. These do appear worsened from the prior examination. Nonspecific pulmonary infiltrates are otherwise seen. Lungs are expanded without   evidence for pneumothorax.  No acute osseous abnormality is seen. Mild anasarca   is seen of the chest wall. Limited evaluation of the upper abdomen demonstrate multiple findings which are   not significantly changed and better assessed on a dedicated contrasted CT scan   of the abdomen performed on 3/19/2022. Impression:     1.  The only abnormal space-occupying process adjacent to the heart is a   moderate size hiatal hernia. In addition to the stomach, there is additional   herniation of mesenteric fat as well as abdominal fluid. These all appear to   anteriorly displace the heart.       2. Worsening although trace to small bilateral dependent pleural effusions.    Additional mild anasarca seen of the chest wall. These findings suggest fluid   overload. 3. Nonspecific pulmonary infiltrates. These could represent pulmonary edema   although the distribution is not classic. Additional allergies such as pneumonia   are not excluded if suggested by clinical findings. This report was made using voice transcription. Despite my best efforts to avoid   any, transcription errors may persist. If there is any question about the   accuracy of the report or need for clarification, then please call [phone], or text me through [email or web address] for clarification or correction. CT ABD PELV W CONT [[id number]] Collected: 03/19/22 1300   Order Status: Completed Updated: 03/20/22 1526   Addenda: Addendum: Addendum: A request was made to assess for potential right   gluteal hematoma. There is appear to be edema in the right gluteal soft tissues   which is slightly more pronounced than on the left. No clearly demonstrated defined collection is seen to suggest significant hematoma. The most inferior   gluteal soft tissues have been excluded from the field of imaging. Signed: 03/20/22 1523 by Miki Slater MD   Narrative:     CT ABDOMEN AND PELVIS WITH INTRAVENOUS CONTRAST DATED 3/19/2022. History: Fall hitting bottom and back. Comparison: CT abdomen and pelvis with contrast 12/27/2021     Technique:   Multiple contiguous helical CT images reconstructed at 5 mm   intervals were obtained from above the diaphragms through the ischial   tuberosities following oral and 100 cc Isovue-370 without acute complication. All CT scans performed at this facility use one or all of the following:   Automated exposure control, adjustment of the mA and/or kVp according to   patient's size, iterative reconstruction.      Findings:   CT ABDOMEN:     Limited evaluation of the lung bases and base of the mediastinum demonstrates   nonspecific infiltrates in the bilateral lung bases, left greater than right. A   small dependent left pleural effusion is seen. A small to moderate size hiatal   hernia is present. Ascites within the abdomen is also seen within the hernia   sac. The Liver is clearly cirrhotic in its appearance. Multiple hepatic masses are   seen the largest of which resides in the inferior right lobe measuring 3 cm in   size. The appearance is highly concerning for malignancy either representing   multifocal hepatomas, or hepatic metastases. Hepatic metastases are favored   given the appearance. Asymmetric intraperitoneal air ductal dilation is seen in   the left lobe of the liver which may be obstructed by a central hepatic mass   seen on axial image 23 measuring 1.1 cm in size. .  The spleen demonstrates an   irregular area of decreased attenuation in the superior pole seen on axial image   17 measuring 3.2 cm x 1.7 cm. This is not as well characterized. This does not   appear to represent an acute defect. This could result from flow artifact.  No   contour deforming or enhancing mass lesions are seen of the adrenal glands. The   pancreas is grossly unchanged in its appearance with the patient having a known   primary pancreas tumor described on the prior study. The gallbladder has been   removed.  The kidneys enhance symmetrically and no evidence of hydronephrosis is   seen. A stable benign cyst is seen in the posterior upper to midpole cortex of   the right kidney measuring 2.6 cm in size. The visualized loops of small bowel and colon are normal in caliber.  The   appendix appears to have been removed. Mild to moderate diverticulosis is seen   of the left colon. No free air is seen. Moderate ascites is seen which   demonstrates low attenuation suggesting simple ascites. Diffuse mesenteric edema   is seen. Abnormal peritoneal nodularity and caking is suggested with additional   peritoneal thickening which at times appears nodular.  This is highly concerning   for peritoneal metastatic process which appears worsened from the prior   examination.  No evolving adenopathy is seen.  The abdominal aorta demonstrate   moderate atherosclerotic calcification. No acute osseous abnormality is seen. Compression deformities are seen at T12, L1, and L4 although these have a   chronic appearance and are stable from the prior comparison study. CT PELVIS:   Small to moderate ascites extends into the pelvis. There is once again nodular   peritoneal thickening best appreciated on axial image 73 highly concerning for   evolving peritoneal metastatic disease. There appears to be serosal involvement   of the mid sigmoid colon seen on axial image 74. No abnormal dilation is seen to   suggest significant obstruction at this time. Palmira Petra evolving pelvic adenopathy is   seen.  The urinary bladder is unremarkable. No acute osseous abnormality is   seen. Impression:     1.  No clear acute injury from recent fall. Specifically, no acute osseous   abnormality is seen. 2. Grossly stable appearance of pancreatic tumor although this is suboptimally   assessed on this exam. However, there is clear evidence for evolving metastatic   disease with new hepatic masses, and multiple findings as described above which   are highly concerning for evolving peritoneal metastatic disease. Lastly,   irregular decreased attenuation is seen in the superior pole of the spleen which   does not appear clearly acute and does not demonstrate adjacent complex ascites. This could represent an additional metastasis although is not as well   characterized. This report was made using voice transcription.  Despite my best efforts to avoid   any, transcription errors may persist. If there is any question about the   accuracy of the report or need for clarification, then please call [phone], or text me through [email or web address] for clarification or correction.     MRI BRAIN W WO CONT [[id number]] Collected: 03/19/22 5945   Order Status: Completed Updated: 03/19/22 1657   Narrative:     EXAMINATION: BRAIN MRI 3/19/2022 4:47 PM     ACCESSION NUMBER: [id number]     INDICATION: 68-year-old male with fall, altered mental status and confusion. History of pancreatic cancer on chemotherapy with recent progression of   intra-abdominal disease, no prior imaging of brain. COMPARISON: CT head 3/19/2022. TECHNIQUE: Multiplanar multisequence MRI of the brain without and with   intravenous administration of 14 mL contrast agent. FINDINGS:     Previously described subcentimeter focus along the posterior limb of the right   internal capsule follows CSF signal intensity on all sequences and is favored to   represent a prominent perivascular space. There is a mild degree of scattered and confluent foci of T2/FLAIR   hyperintensity within the periventricular and deep white matter, nonspecific but   most commonly seen with chronic small vessel ischemic changes. Faint chronic   hemosiderin involving the left posterior putamen. No midline shift or mass lesion. There is no evidence of intracranial hemorrhage   or acute infarct. The ventricles are within normal limits in size and   configuration. There are no extra-axial fluid collections present.  No diffusion   weighted signal abnormality is identified. There is no abnormal enhancement. Impression:       No acute finding or evidence of intracranial metastatic disease. XR ELBOW LT MIN 3 V [[id number]] Collected: 03/19/22 1534   Order Status: Completed Updated: 03/19/22 1538   Narrative:     XR FOREARM LT AP/LAT, XR ELBOW LT MIN 3 V 3/19/2022 3:19 PM     HISTORY: Fall with left arm and left elbow pain. Impression:       Two views left forearm. No fracture or dislocation. No significant soft tissue   swelling. Old healed distal ulnar fracture deformity is present. Three views left elbow. No fracture or dislocation. No significant soft tissue   swelling. No fat pad elevation.    XR FOREARM LT AP/LAT [[id number]] Collected: 03/19/22 1534   Order Status: Completed Updated: 03/19/22 1538   Narrative:     XR FOREARM LT AP/LAT, XR ELBOW LT MIN 3 V 3/19/2022 3:19 PM     HISTORY: Fall with left arm and left elbow pain. Impression:       Two views left forearm. No fracture or dislocation. No significant soft tissue   swelling. Old healed distal ulnar fracture deformity is present. Three views left elbow. No fracture or dislocation. No significant soft tissue   swelling. No fat pad elevation. CT HEAD WO CONT [[id number]] Collected: 03/19/22 1254   Order Status: Completed Updated: 03/19/22 1258   Narrative:     CT BRAIN WITHOUT CONTRAST   3/19/2022 12:26 PM     INDICATION: Fall, altered mental status and confusion. COMPARISON: None available at this hospital PACS     Technique:  Multiple contiguous axial images are obtained encompassing the brain   from the skull base to the vertex.  For this CT scanner at least one of the   following techniques is utilized to decrease patient radiation dose: Automatic   exposure control, KVP and mA modulation based on patient weight, and iterative   reconstruction. FINDINGS: The ventricles are midline and of appropriate size and configuration. Mild hypoattenuating foci seen in the periventricular deep white matter. Inferiorly at the junction of the thalamus and posterior limb of the internal   capsule on the right there is a rounded mildly hypodense focus that measures 9   mm. The midline structures and posterior fossa are intact.  There is no finding of   an acute cortical stroke, mass lesion, or acute bleed.  No extra-axial fluid   collection. The skull is intact, no discreet abnormality. The paranasal sinuses are not   remarkable. The visualized orbits and mastoid air-cells are patent.     Impression:     9 mm rounded hypoattenuating focus on the right at the inferior   junction of the thalamus and posterior limb of the internal capsule is noted.   Suspicious for lacunar infarct and of uncertain chronicity-cannot exclude that   this is new. Elsewhere only mild chronic changes in the periventricular white matter   suggested. For further imaging evaluation of this as clinically indicated-recommend brain   MRI with diffusion imaging. XR CHEST Raheem Callahan [[id number]] Collected: 03/15/22 1252   Order Status: Completed Updated: 03/15/22 1302   Narrative:     Chest X-ray     INDICATION: Hypotension. COMPARISON: Chest x-ray 2/10/2021. A portable AP view of the chest was obtained. FINDINGS: The lungs are clear. There are no infiltrates or effusions. Left chest   port is unchanged in position. No pneumothorax. The heart size is normal.  The   bony thorax is intact.      Impression:     No acute findings in the chest. Lungs remain clear. No interval   change.           Medications:  Current Facility-Administered Medications   Medication Dose Route Frequency    TPN ADULT-CENTRAL - dextrose 14% amino acid 8%   IntraVENous QPM    albumin human 5% (BUMINATE) solution 25 g  25 g IntraVENous Q6H    HYDROcodone-acetaminophen (NORCO)  mg tablet 1 Tablet  1 Tablet Oral Q4H PRN    calcium carbonate (TUMS) chewable tablet 200 mg [elemental]  200 mg Oral TID PRN    alum-mag hydroxide-simeth (MYLANTA) oral suspension 30 mL  30 mL Oral Q4H PRN    loperamide (IMODIUM) capsule 2 mg  2 mg Oral Q8H    HYDROmorphone (DILAUDID) injection 1 mg  1 mg IntraVENous Q4H PRN    cefepime (MAXIPIME) 2 g in 0.9% sodium chloride (MBP/ADV) 100 mL MBP  2 g IntraVENous Q8H    prochlorperazine (COMPAZINE) with saline injection 5 mg  5 mg IntraVENous Q6H PRN    acetaminophen (TYLENOL) tablet 650 mg  650 mg Oral Q6H PRN    diphenhydrAMINE (BENADRYL) capsule 25 mg  25 mg Oral Q6H PRN    amiodarone (CORDARONE) tablet 400 mg  400 mg Oral BID    fat emulsion 20% (LIPOSYN, INTRAlipid) infusion 250 mL  250 mL IntraVENous QPM    NUTRITIONAL SUPPORT ELECTROLYTE PRN ORDERS   Does Not Apply PRN    sodium chloride (NS) flush 5-10 mL  5-10 mL IntraVENous PRN    diphenoxylate-atropine (LOMOTIL) tablet 1 Tablet  1 Tablet Oral QID PRN    famotidine (PEPCID) tablet 20 mg  20 mg Oral BID    pantoprazole (PROTONIX) tablet 40 mg  40 mg Oral ACB    promethazine (PHENERGAN) tablet 25 mg  25 mg Oral Q6H PRN    tamsulosin (FLOMAX) capsule 0.4 mg  0.4 mg Oral DAILY    ondansetron (ZOFRAN) injection 4 mg  4 mg IntraVENous Q4H PRN         ASSESSMENT:    Problem List  Date Reviewed: 3/15/2022          Codes Class Noted    Chronic systolic congestive heart failure (HCC) ICD-10-CM: I50.22  ICD-9-CM: 428.22, 428.0  3/21/2022        Bradycardia ICD-10-CM: R00.1  ICD-9-CM: 427.89  3/19/2022        LBBB (left bundle branch block) ICD-10-CM: I44.7  ICD-9-CM: 426.3  3/19/2022        FTT (failure to thrive) in adult ICD-10-CM: R62.7  ICD-9-CM: 783.7  3/15/2022        * (Principal) Hypotension ICD-10-CM: I95.9  ICD-9-CM: 458.9  3/15/2022        Severe protein-calorie malnutrition ([facility] ICD-10-CM: E43  ICD-9-CM: 239  3/4/2022        Pancreatic cancer ([facility] ICD-10-CM: C25.9  ICD-9-CM: 157.9  3/2/2022        Ascites ICD-10-CM: R18.8  ICD-9-CM: 789.59  3/2/2022        Admission for antineoplastic chemotherapy ICD-10-CM: Z51.11  ICD-9-CM: V58.11  3/2/2022        Hypomagnesemia ICD-10-CM: E83.42  ICD-9-CM: 275.2  4/23/2021        Hypokalemia ICD-10-CM: E87.6  ICD-9-CM: 276.8  4/20/2021        CINV (chemotherapy-induced nausea and vomiting) ICD-10-CM: R11.2, T45.1X5A  ICD-9-CM: 787.01, E933.1  4/20/2021        Dehydration ICD-10-CM: E86.0  ICD-9-CM: 276.51  4/12/2021        Other neutropenia ([facility] ICD-10-CM: D70.8  ICD-9-CM: 288.09  3/9/2021        Malignant neoplasm of lower third of esophagus (HCC) ICD-10-CM: C15.5  ICD-9-CM: 150.5  1/5/2021        Malignant neoplasm of body of pancreas (HCC) ICD-10-CM: C25.1  ICD-9-CM: 157.1  1/5/2021        Severe obesity (HCC) ICD-10-CM: E66.01  ICD-9-CM: 278.01 12/10/2020        Elevated glucose ICD-10-CM: R73.09  ICD-9-CM: 790.29  7/22/2019        Anemia ICD-10-CM: D64.9  ICD-9-CM: 285.9  7/22/2019        Chronic left-sided low back pain ICD-10-CM: M54.50, G89.29  ICD-9-CM: 724.2, 338.29  7/22/2019              66 y.o. M pancreatic cancer and esophageal cancer with malignant ascites of previously controlled FOLFIRINOX but currently disease progressed and most recently admited on 3/2/2022 to place Pleurx and arrange gemcitabine/Abraxane, had extensive discussion with inpatient team and pharmacy, patient was discharged on 3/7/2022 without chemo appointment and return to office on 3/15/2022, extremely sick and blood pressure not measurable in the office, and not been having much oral intake since discharge, urgently establish IV access and called EMS to take to ER to stabilize and admit to hospital, apparently needed much supportive care and volume resuscitation, start TPN until oral intake can improve and give gemcitabine/Abraxane once clinically stable, discussed with the inpatient team.    PLAN:    Metastatic pancreatic cancer / distal esophageal cancer  - Metastatic disease involving peritoneum, malignant ascites  - s/p 24 cycles of FOLFIRINOX with SD and recent POD with increasing CEA/ and malignant ascites  - Plan for gemcitabine/abraxane when clinically stable - hopefully tomorrow. 3/17 [place]/abraxane today, tolerated well  3/24 D8 due today, deferring tx d/t cytopenias. Dr. Elaine Ahr discussed that if patient's condition does not improve, that he may want to consider Hospice services. Palliative care following. 3/25 Pt wants to con't to pursue treatment as he wants to change his relationship with his children. He does not feel the burden of tx outweighs the benefits and wants to continue tx for as long as he can. D15 due 3/31.  3/29 TM checked, CEA up but  improved. 3/30 Office working to obtain sotorasib. D15 [place]/abraxane due tomorrow.   3/31 Notes from office indicate sotorasib approved. [place]/abraxane due today - plts <100k. Hold today and re-eval tomorrow. Malignant ascites  - Has abdominal Pleurx, drain three times weekly  3/17 Pleurx drained 3/16 - 1100cc out. 3/18 Pleurx accidentally dislodged last night - 1800cc removed before completely removed. IR notified and will re-evaluate Monday. 3/20 Worsening abdominal pain possibly secondary to re-accumulation, some drainage reported at Pleurx site. IR evaluating tomorrow. 3/21 IR to re-evaluate tomorrow, was not NPO today. 3/22 To IR today. 3/23 Abd pleurx replaced yesterday  3/25 Pleurx drained yesterday, 1.1L. Con't to drain prn.  3/26 drained today as he was feeling uncomfortable. Monitor blood pressures. 3/27 continue to drain PRN, but be cautious with blood pressures as he is hypotensive at times. 3/29 BP much improved - drained 1100 cc yesterday due to patient request. Previously draining every other day but asking to be drained daily. Will continue to drain PRN as long as BP acceptable. 3/30 Abdominal discomfort - requesting to be drained again today. BP stable. 3/31 Pleurx drained again today per pt request.    Cancer related pain  - Continue home dilaudid  3/17 Utilizing IV morphine. Will decrease dosing and encourage use of home Norco.  3/18 Continue to wean IV meds - encourage PO.  3/20 Has not been receiving IV morphine due to hypotension. Continue with PRN oral medications. 3/22 Consult to [facility] regarding [address], symptom management. 3/23 PC following. Pt changed code status to DNR, wants to pursue further cancer-related treatment. 3/25 PC following  3/27 Dilaudid increased today. During rounds, he stated his pain was better controlled with increased dose. 3/29 Continues on IV dilaudid. 3/31 IV dilaudid use limited by BP. Increased Norco to 10mg yesterday. PC following.     Hypotension / poor PO intake / protein-calorie malnutrition  - Consult RD for TPN  - BC pending, started on Cefe/Vanc and may be able to de-escalate soon as hypotension likely secondary to dehydration. LA improved after IFV. 3/17 On TPN. Continues on Cefe/Vanc although infectious work-up unremarkable. Will stop antibiotics. 3/18 Hypotension improved and remains afebrile off antibiotics. Continue with nutritional support via TPN.  3/19 Ongoing hypotension, although improved yesterday AM, worse through evening. Had 6L of IVF yesterday and continues on TPN. May consider midodrine although MAP consistently ~65.  3/21 Hypotension improved but borderline, continues TPN.   3/22 Discussed with RD volume status of TPN given new finding of HFrEF and CT chest with evidence of fluid overload. CM following for home TPN. Holding IV morphine. 3/23 BPs improved  3/24 Episode of hypotension last PM, required small fluid bolus, BP improved  3/25 Continues on TPN, however pt declined to be attached to TPN yesterday AM and then disconnected himself from it last night. 3/27 per RD note, patient unaware about prior TPN disconnections. This AM during rounds, visitor noted that \"something was leaking. \" He had pulled his port needle out again and his TPN was leaking onto the floor. RD plans to discuss further goals regarding TPN with pt when she visits him today. 8/80 Cyclic TPN to start today per RD. Encouraged PO intake and he agrees. 3/29 Reports he drank an Ensure but is scared to eat because of diarrhea. Will manage diarrhea. 3/31 Continues on TPN - PO intake remains minimal due to respiratory issues. Nausea  - Continue PRN antiemetics    Diarrhea  - Antidiarrheals PRN  3/24 c/o worsening diarrhea. Check Cdiff.   3/25 Cdiff neg. Con't using antidiarrheals prn.  3/28 Reports diarrhea - only 1 documented stool. Continue PRN antidiarrheals. 3/29 Reports he is afraid to eat because of diarrhea. Will schedule imodium TID.   3/31 Change to BID.     Bradycardia / HFrEF / LV dysfunction / LBBB / ectopy  - One documented episode of HR 43 - check EKG  - No hx of hypertension, monitor  3/17 EKG with bi-geminal PACs and known LBBB. Recheck EGK.  3/18 EKG with same as above. Palpated pulse on several occasions documented in the 40s. Tele applied overnight. Cardiology following. 3/19 Cardiology recommending K>4 (on TPN) and Mg replacements. Echo pending. 3/21 No bradycardia noted on telemetry. Does have ectopy which may have led to incorrect pulse rate palpation. Echo with EF 35-40%. CT chest completed due to echo findings rebeka noted hiatal hernia/mesenteric fat herniation/abdominal fluid displacing heart and small BL effusions, anasarca. Cardiology following.  3/22 Cardiology considering BB if BP improve for HF/LV dysfunction. Weight up 6# overnight and increased since admission. Clinically no evidence of overload but asking RD to adjust TPN volume. Reported run of NS-SVT - cardiology following. 3/25 Cards following. Hypotension limiting therapy for heart failure. 3/26 cards signed off and recommended cont Amiodarone   3/30 BP improved, CXR shows pulmonary edema and possible effusion. 20mg Lasix given this morning. Will add albumin and diurese as able pending blood pressure tolerance. 3/31 BP dropped yesterday before albumin but improved throughout night. Up to 7L NC. Will continue albumin and give lasix today. Start midodrine. Pancytopenia secondary to chemotherapy  - Transfuse prn per [facility] SOPs  3/25 ANC down to 1000. Start G-CSF.  3/26 [address] improved to 2.6.   3/28 WBC up to 20 - stop G-CSF.   3/29 Plts up to 71k. Fall  3/19 Witnessed slip and fall on wet floor yesterday. Today with L arm bruising - check X-ray. Will check CT head and CT AP (hematoma/lipoma palpated on R buttock) and also c/o worsening abdominal pain. 3/20 X-ray of L elbow/forearm negative. CT head with ?lacunar infarct but MRI showed no acute findings and ?lacunar infarct appears to be prominent perivascular space.  CT AP with progressing peritoneal/liver disease and ?new splenic lesion. Last imaging obtained 12/2021 for comparison. Elevated LFTs  3/21 Monitor, possibly secondary to TPN or recent chemo. 3/22 Appear to be improving  3/23 AST improved, ALP increased  3/28 Improving  RESOLVED    Hypoxia / ?Pneumonia  3/24 On O2 @ 2L now. Check CXR.  3/25 CXR with low lung volumes with b/l infiltrates ?edema vs infx? Check PCT - elevated. Start Cef/Vanc.  3/26 continues on cef/vanc; on room air   3/28 D4 Cefepime/Vancomycin - afebrile. DC Vancomycin. 3/29 Continue Cefepime - likley DC soon. 3/31 DC. No clinical evidence of pneumonia, afebrile. CXR findings likely pulmonary edema. Continue diuresis/albumin. Continue home meds  [facility] SOPs  AC contraindicated d/t thrombocytopenia    Goals and plan of care reviewed with the patient. All questions answered to the best of our ability. Disposition:  TBD pending clinical course. PT/OT following - HHPT vs STR. Encouraged physical activity/OOB. CM following for home TPN. He will need close follow-up with Dr Fito Ron upon DC.               [facility]  [address] [place] [address]  Office : [phone]  Fax : [phone]   I personally saw, exammed and counselled the patient, and discussed with NP, agree with above history/assessment/plan. 66 y.o.male pancreatic cancer and esophageal cancer with peritoneal carcinomatosis, status post first-line FOLFIRINOX, disease progression and admitted for failure to thrive/obstruction, received TPN and 1 cycle of gemcitabine/Abraxane, reported abdominal pain improved and started having bowel movement/gas, which is encouraging sign of peritoneal disease response, discussed the need to enhance nutrition and improve hypoalbuminemia, also found EF 35 to 40%, sinusitis as needed, week 2 chemo was on hold for thrombocytopenia, hypotensive and received fluids and developed respiratory distress, blood pressure does not tolerate diuresis, difficult situation of anasarca but intravascularly dry, give albumin and blood pressure better, hold chemo today, add midodrine for hypotension, still working on obtaining sotorasib, patient wanted to go home to rest and we clarified he would die soon if going home and current status, continue above care.       Nubia Courtney M.D.   [address]  Office : [phone]  Fax : [phone]

## 2022-03-31 NOTE — PROGRESS NOTES
Comprehensive Nutrition Assessment    Type and Reason for Visit: Reassess  TPN Management (oncology)    Nutrition Recommendations/Plan:   Parenteral Nutrition:  Total parenteral nutrition  to begin at 1800  Continue: Dex 14%, 8% AA 1.56 L (65ml/hr)   Continue 16 hr cyclic infusion. Infuse at 60 ml/hr from 1993-2356, then increase to 103 ml/hr and infuse from 1024-9388, then decrease to 60 ml/hr from 7423-0171, then discontinue  Continue 250 ml 20% lipids daily  Lytes/L:  No changes warranted today  Sodium 150 meq (50 meq NaCl, 100 meq NaAcetate), Potassium 35 meq (35 meq KPO4), 5 meq Mg, 4.5 meq Calcium  Other additives: MTE, MVI MWF due to national shortages  Formula remains ~1:2 Cl:Acetate  Nutritional Supplement Therapy:   Active electrolyte replacement per nutrition support protocols  Replacement indicated:  None  Labs:   CMP daily per provider  Mg daily per provider  Phos MWF    POC Glucoses/SSI Not indicated     Malnutrition Assessment:  Malnutrition Status: Severe malnutrition  Context: Chronic illness  Findings of clinical characteristics of malnutrition:   Energy Intake:  7 - 75% or less est energy requirements for 1 month or longer  Weight Loss:  7.0 - Greater than 7.5% over 3 months (34# (18.4%) )     Body Fat Loss:  1 - Mild body fat loss, Buccal region,Orbital,Triceps   Muscle Mass Loss:  1 - Mild muscle mass loss, Calf (gastrocnemius),Hand (interosseous),Scapula (trapezius),Temples (temporalis)  Fluid Accumulation:  No significant fluid accumulation,     Strength:  Not performed     Nutrition Assessment:   Nutrition History: Per RD assessment patient was able to maintain PO intake and UBW throughout most of chemo. During admmission early March patient had \"stopped eating\" due to nausea. Upon assessment this admission patient reports no PO of foods for ~4 weeks. States that he has been able to tolerate some fluids. He reports continued nausea and vomiting as primary barrier.        Nutrition Background: Patient with pancreatic and esophageal cancer, Amos's esophagus, obesity, HTN, GERD, gastritis, ascites and peritoneal carcinomatosis s/p Plurex drain. He presented to oncology office extremely sick, BP was unable to be measured. He was admitted directly to [facility]. Nutrition Interval:  PO intake continues to limited due to early satiety and persistent nausea and likely secondary to peritoneal carcinomatosis, recurrent ascites with Plurex drain. Patient has demonstrated inability to meet needs orally with severe weight loss and malnutrition as above. Intake trends since admission reveal daily PO tolerance of declining now less than 1 meal per day which is not sufficient to sustain weight, functional status, or life. Abdominal pleurex replaced 3/22: 2250 ml removed. Remains TPN dependent d/t peritoneal carcinomatosis, severe malnutrition. Patient seen up to recliner with 2 visitors today. He appears more comfortable today. He states no fluid taken off this am but noted that he has requested. He has been having daily Plurex drainage for ~3 days with ~1L removed each time. He states he did not eat breakfast again this am due to abdominal bloating. He denies current nausea. Observed TPN infusing at 103 ml/hr. Discussed with RNSasha, and TPN held x1 hr for albumin infusion. Plan to decrease to 60 ml/hr now and stop in 1 hr. Discussed with Chele Louis NP, and plan to hold chemo today and reassess tomorrow. Abdominal Status (last documented): Distended abdomen with Active  bowel sounds. Last BM 03/30/22.   Pertinent Medications: maxipime, pepcid, protonix, Zofran being utilized multiple times daily, Compazine daily since 3/27, Phenergan PRN, Mylanta, Tums, Lomotil PRN, Imodium utilized x 2 yesterday  Pertinent Labs:   Lab Results   Component Value Date/Time    Sodium 139 03/31/2022 02:47 AM    Potassium 3.7 03/31/2022 02:47 AM    Chloride 108 (H) 03/31/2022 02:47 AM    CO2 27 03/31/2022 02:47 AM    Anion gap 4 (L) 03/31/2022 02:47 AM    Glucose 93 03/31/2022 02:47 AM    BUN 27 (H) 03/31/2022 02:47 AM    Creatinine 0.50 (L) 03/31/2022 02:47 AM    Calcium 8.0 (L) 03/31/2022 02:47 AM    Albumin 2.1 (L) 03/31/2022 02:47 AM    Magnesium 2.2 03/31/2022 02:47 AM    Phosphorus 2.5 03/30/2022 03:06 AM   Labs remarkable for: Na stable, K stable, CO2 stable     Nutrition Related Findings:   Port in place, also with 1 PIV. TPN intiated 3/16. TPN increased in volume 3/19. TPN to be concentrated and volume decreased 3/22 per NP request due to new CHF. TPN changed to 18 hr cyclic infusion 8/66. TPN condensed to 16 hr infusion 3/29.       Current Nutrition Therapies:  ADULT DIET Regular  Current Parenteral Nutrition Orders:  · Type and Formula: Dex 14%, 8% AA    · Lipids: 250ml,Daily  · Duration: Cyclic (16 hr infusion)  · Rate/Volume: 1.56 L (65ml/hr)  · Current PN Order Provides: infusing per current order  · Goal PN Orders Provides: 1742 kcal/d (100% of needs), 125 grams of protein/d (100% of needs), 218 grams of CHO/d and 1810 ml of total volume/d    Current Intake:   Average Meal Intake: 1-25% Average Supplement Intake: None ordered      Anthropometric Measures:  Height: 5' 6\" (167.6 cm)  Current Body Wt: 83.8 kg (184 lb 11.9 oz) (3/31), Weight source: Bed scale  BMI: 29.8,  (current BMI skewed by fluid)  Admission Body Weight: 148 lb 9.4 oz  Ideal Body Weight (lbs) (Calculated): 142 lbs (65 kg), 104.6 %  Usual Body Wt: 82.6 kg (182 lb) (12/14/21 office wt and per previous history), Percent weight change: -18.4          Edema: LLE: 2+ (3/30/2022  7:50 PM)  RLE: 2+ (3/30/2022  7:50 PM)     Estimated Daily Nutrient Needs:  Energy (kcal/day): 5655-9137 (Kcal/kg (25-30), Weight Used: Current (67.4 kg (3/15))  Protein (g/day):  (1.3-1.5 g/kg) Weight Used: (Admission (67.3 kg))  Fluid (ml/day):   (1 ml/kcal)    Nutrition Diagnosis:   · Inadequate oral intake related to altered GI function,early satiety (peritoneal carcinomatosis ) as evidenced by  (poor oral tolerance, wt loss, requires TPN for primary needs)    · Severe malnutrition related to catabolic illness as evidenced by  (malnutrition criteria as above)    Nutrition Interventions:   Food and/or Nutrient Delivery: Continue current diet,Modify parenteral nutrition     Coordination of Nutrition Care: Continue to monitor while inpatient    Goals:   Previous Goal Met: Goal(s) achieved  Active Goal: Continue to tolerate cyclic TPN    Nutrition Monitoring and Evaluation:      Food/Nutrient Intake Outcomes: Food and nutrient intake,Parenteral nutrition intake/tolerance  Physical Signs/Symptoms Outcomes: Biochemical data,GI status,Nausea/vomiting,Fluid status or edema,Hemodynamic status,Meal time behavior,Weight    Discharge Planning:    Continue current diet,Parenteral nutrition    [address] [facility], [address], LD on 3/31/2022 at 11:17 AM  Contact: [phone]

## 2022-03-31 NOTE — PROGRESS NOTES
[facility] CRITICAL CARE OUTREACH NURSE PROGRESS REPORT      SUBJECTIVE: Called to assess patient secondary to nurse concern with increased O2 demands and hypotension. MEWS Score: 2 (03/30/22 1903)  Vitals:    03/31/22 0400 03/31/22 0557 03/31/22 0735 03/31/22 0736   BP:  (!) 95/58 (!) 84/50 99/65   Pulse:    85   Resp:    23   Temp:    98.3 °F (36.8 °C)   SpO2:  94%  95%   Weight: 83.8 kg (184 lb 11.2 oz)      Height:            LAB DATA:    Recent Labs     03/31/22  0247 03/30/22  0306 03/29/22  0315    137* 138   K 3.7 3.8 3.5   * 106 106   CO2 27 25 23   AGAP 4* 6* 9   GLU 93 106* 116*   BUN 27* 28* 24*   CREA 0.50* 0.50* 0.60*   GFRAA >60 >60 >60   GFRNA >60 >60 >60   CA 8.0* 8.0* 7.9*   MG 2.2 2.4 2.0   PHOS  --  2.5  --    ALB 2.1* 1.1* 1.1*   TP 4.6* 4.3* 4.2*   GLOB 2.5 3.2 3.1   AGRAT 0.8* 0.3* 0.4*   ALT 18 29 32        Recent Labs     03/31/22  0247 03/30/22  0306 03/29/22  0315   WBC 15.7* 18.7* 19.4*   HGB 7.8* 9.8* 10.0*   HCT 25.4* 31.2* 31.6*   PLT 84* 89* 71          OBJECTIVE: On arrival to room, I found patient to be in recliner speaking with palliative NP.    ASSESSMENT:  Patient is alert and oriented. Denies pain at this time. PT complains of some SOB, but states that he has progressively felt better throughout the day in regards to SOB. O2 sat of 94% on 7L NC. Bilateral lung sounds coarse and diminished. PT receiving albumin and lasix. PT c/o some abdominal distention and feelings of tightness, 550ml off pleural drain this AM. PT started on midodrine 5mg today for hypotension with last BP 99/65(76). PT and family deny concerns at this time. PLAN:  VS, labs, and progress notes reviewed. Concerns addressed with primary RN. Primary RN to call with concerns. Will continue to follow per outreach protocol.

## 2022-03-31 NOTE — PROGRESS NOTES
Problem: Falls - Risk of  Goal: *Absence of Falls  Description: Document Morene Hole Fall Risk and appropriate interventions in the flowsheet.   Outcome: Progressing Towards Goal  Note: Fall Risk Interventions:  Mobility Interventions: Bed/chair exit alarm,Communicate number of staff needed for ambulation/transfer,Patient to call before getting OOB    Mentation Interventions: Adequate sleep, hydration, pain control,Bed/chair exit alarm,Increase mobility,More frequent rounding,Reorient patient    Medication Interventions: Bed/chair exit alarm,Evaluate medications/consider consulting pharmacy,Patient to call before getting OOB    Elimination Interventions: Bed/chair exit alarm,Call light in reach,Toileting schedule/hourly rounds    History of Falls Interventions: Bed/chair exit alarm,Consult care management for discharge planning,Room close to nurse's station

## 2022-03-31 NOTE — PROGRESS NOTES
END OF SHIFT NOTE:    Intake/Output  No intake/output data recorded. Voiding: YES  Catheter: NO  Drain:   Pleural Catheter/Drain 03/22/22 15.5 fr x 66 cm Right (Active)   Drainage Description Yellow 03/31/22 0554   Site Assessment Clean, dry, & intact 03/31/22 0554   Dressing Status Clean, dry, & intact 03/31/22 0830   Dressing Type Transparent;Gauze 03/31/22 0554   Output (ml) 1100 ml 03/31/22 1819               Stool:  1 occurrences. Stool Assessment  Stool Color: Brown (03/31/22 1651)  Stool Appearance: Loose (03/31/22 1651)  Stool Amount: Medium (03/31/22 1651)  Stool Source/Status: Incontinence (03/31/22 1651)    Emesis:  0 occurrences. Emesis Assessment  Appearance: Brown (03/29/22 1243)  Emesis Amount: Large (03/29/22 1243)    VITAL SIGNS  Patient Vitals for the past 12 hrs:   Temp Pulse Resp BP SpO2   03/31/22 1541 98.4 °F (36.9 °C) 83 20 (!) 97/53 91 %   03/31/22 1114 97.9 °F (36.6 °C) 88 25 (!) 95/57 93 %   03/31/22 0736 98.3 °F (36.8 °C) 85 23 99/65 95 %   03/31/22 0735 -- -- -- (!) 84/50 --       Pain Assessment  Pain 1  Pain Scale 1: Numeric (0 - 10) (03/31/22 1328)  Pain Intensity 1: 10 (03/31/22 1328)  Patient Stated Pain Goal: 0 (03/31/22 0830)  Pain Reassessment 1: Patient resting w/respiratory rate greater than 10 (03/31/22 0830)  Pain Onset 1: pta (03/29/22 1929)  Pain Location 1: Abdomen (03/31/22 1328)  Pain Orientation 1: Left;Medial;Right (03/31/22 0124)  Pain Description 1: Aching (03/31/22 1328)  Pain Intervention(s) 1: Medication (see MAR) (03/31/22 1328)    Ambulating  Yes    Additional Information: drain plurex drain today 1100ml,     Shift report given to oncoming nurse at the bedside.     Landen Bryant RN

## 2022-03-31 NOTE — PROGRESS NOTES
END OF SHIFT NOTE:    Patient Vitals for the past 12 hrs:   Temp Pulse Resp BP SpO2   03/31/22 0557 -- -- -- (!) 95/58 94 %   03/31/22 0252 -- -- -- 101/65 91 %   03/30/22 2210 97.6 °F (36.4 °C) 81 -- 114/61 90 %   03/30/22 1903 97.4 °F (36.3 °C) 77 28 111/68 95 %     -held IV dilaudid due to low Bps  -pt given norco 2x  -pt given zofran 1x  -pt states that he is \"going home today\"  -educated pt on we want him to be safe and that we would discuss with the dr in the am  -pt resting in bed

## 2022-03-31 NOTE — PROGRESS NOTES
Date of Outreach Update:  Emely Zamarripa was seen and assessed. MEWS Score: 3 (03/31/22 1114)  Vitals:    03/31/22 0557 03/31/22 0735 03/31/22 0736 03/31/22 1114   BP: (!) 95/58 (!) 84/50 99/65 (!) 95/57   Pulse:   85 88   Resp:   23 25   Temp:   98.3 °F (36.8 °C) 97.9 °F (36.6 °C)   SpO2: 94%  95% 93%   Weight:       Height:             Pain Assessment  Pain Intensity 1: 10 (03/31/22 1328)  Pain Location 1: Abdomen  Pain Intervention(s) 1: Medication (see MAR)  Patient Stated Pain Goal: 0      Previous Outreach assessment has been reviewed. There have been no significant clinical changes since the completion of the last dated Outreach assessment. BP stable at 97/53 (69). Denies any SOB with Sat of 91% on 7L. Will continue to follow up per outreach protocol.     Signed By:   Nikik Doshi    March 31, 2022 3:33 PM

## 2022-04-01 PROBLEM — M54.50 CHRONIC LEFT-SIDED LOW BACK PAIN: Chronic | Status: ACTIVE | Noted: 2019-07-22

## 2022-04-01 PROBLEM — R91.8 BILATERAL PULMONARY INFILTRATES ON CHEST X-RAY: Status: ACTIVE | Noted: 2022-04-01

## 2022-04-01 PROBLEM — I50.22 CHRONIC SYSTOLIC CONGESTIVE HEART FAILURE (HCC): Chronic | Status: ACTIVE | Noted: 2022-03-21

## 2022-04-01 PROBLEM — E66.01 SEVERE OBESITY (HCC): Chronic | Status: ACTIVE | Noted: 2020-12-10

## 2022-04-01 PROBLEM — J96.01 ACUTE RESPIRATORY FAILURE WITH HYPOXIA (HCC): Status: ACTIVE | Noted: 2022-04-01

## 2022-04-01 PROBLEM — G89.29 CHRONIC LEFT-SIDED LOW BACK PAIN: Chronic | Status: ACTIVE | Noted: 2019-07-22

## 2022-04-01 LAB
ALBUMIN SERPL-MCNC: 2.8 G/DL (ref 3.2–4.6)
ALBUMIN/GLOB SERPL: 1.2 {RATIO} (ref 1.2–3.5)
ALP SERPL-CCNC: 225 U/L (ref 50–136)
ALT SERPL-CCNC: 15 U/L (ref 12–65)
ANION GAP SERPL CALC-SCNC: 4 MMOL/L (ref 7–16)
AST SERPL-CCNC: 18 U/L (ref 15–37)
BILIRUB SERPL-MCNC: 0.6 MG/DL (ref 0.2–1.1)
BLASTS NFR BLD MANUAL: 2 %
BNP SERPL-MCNC: 4958 PG/ML (ref 5–125)
BUN SERPL-MCNC: 20 MG/DL (ref 8–23)
CALCIUM SERPL-MCNC: 8.1 MG/DL (ref 8.3–10.4)
CHLORIDE SERPL-SCNC: 107 MMOL/L (ref 98–107)
CO2 SERPL-SCNC: 30 MMOL/L (ref 21–32)
CREAT SERPL-MCNC: 0.5 MG/DL (ref 0.8–1.5)
DIFFERENTIAL METHOD BLD: ABNORMAL
ERYTHROCYTE [DISTWIDTH] IN BLOOD BY AUTOMATED COUNT: 15.9 % (ref 11.9–14.6)
GLOBULIN SER CALC-MCNC: 2.3 G/DL (ref 2.3–3.5)
GLUCOSE SERPL-MCNC: 105 MG/DL (ref 65–100)
HCT VFR BLD AUTO: 25.9 % (ref 41.1–50.3)
HGB BLD-MCNC: 8.1 G/DL (ref 13.6–17.2)
LYMPHOCYTES # BLD: 1.1 K/UL (ref 0.5–4.6)
LYMPHOCYTES NFR BLD: 8 %
MAGNESIUM SERPL-MCNC: 2.1 MG/DL (ref 1.8–2.4)
MCH RBC QN AUTO: 29.6 PG (ref 26.1–32.9)
MCHC RBC AUTO-ENTMCNC: 31.3 G/DL (ref 31.4–35)
MCV RBC AUTO: 94.5 FL (ref 79.6–97.8)
MONOCYTES # BLD: 1.4 K/UL (ref 0.1–1.3)
MONOCYTES NFR BLD: 10 %
MYELOCYTES NFR BLD MANUAL: 1 %
NEUTS BAND NFR BLD MANUAL: 6 % (ref 0–10)
NEUTS SEG # BLD: 11.4 K/UL (ref 1.7–8.2)
NEUTS SEG NFR BLD MANUAL: 1 % (ref 47–75)
NEUTS SEG NFR BLD: 71 %
NRBC # BLD: 0.06 K/UL (ref 0–0.2)
PHOSPHATE SERPL-MCNC: 1.9 MG/DL (ref 2.3–3.7)
PLATELET # BLD AUTO: 88 K/UL (ref 150–450)
PLATELET COMMENTS,PCOM: ABNORMAL
PMV BLD AUTO: 11.9 FL (ref 9.4–12.3)
POTASSIUM SERPL-SCNC: 2.9 MMOL/L (ref 3.5–5.1)
PROCALCITONIN SERPL-MCNC: 1.08 NG/ML (ref 0–0.49)
PROMYELOCYTES NFR BLD MANUAL: 1 %
PROT SERPL-MCNC: 5.1 G/DL (ref 6.3–8.2)
RBC # BLD AUTO: 2.74 M/UL (ref 4.23–5.6)
RBC MORPH BLD: ABNORMAL
RBC MORPH BLD: ABNORMAL
SODIUM SERPL-SCNC: 141 MMOL/L (ref 138–145)
WBC # BLD AUTO: 14.3 K/UL (ref 4.3–11.1)
WBC MORPH BLD: ABNORMAL

## 2022-04-01 PROCEDURE — 85025 COMPLETE CBC W/AUTO DIFF WBC: CPT

## 2022-04-01 PROCEDURE — 74011250637 HC RX REV CODE- 250/637: Performed by: NURSE PRACTITIONER

## 2022-04-01 PROCEDURE — 83735 ASSAY OF MAGNESIUM: CPT

## 2022-04-01 PROCEDURE — 84145 PROCALCITONIN (PCT): CPT

## 2022-04-01 PROCEDURE — 80053 COMPREHEN METABOLIC PANEL: CPT

## 2022-04-01 PROCEDURE — 74011250636 HC RX REV CODE- 250/636: Performed by: NURSE PRACTITIONER

## 2022-04-01 PROCEDURE — 65610000001 HC ROOM ICU GENERAL

## 2022-04-01 PROCEDURE — 36591 DRAW BLOOD OFF VENOUS DEVICE: CPT

## 2022-04-01 PROCEDURE — APPSS60 APP SPLIT SHARED TIME 46-60 MINUTES: Performed by: NURSE PRACTITIONER

## 2022-04-01 PROCEDURE — 94660 CPAP INITIATION&MGMT: CPT

## 2022-04-01 PROCEDURE — 2709999900 HC NON-CHARGEABLE SUPPLY

## 2022-04-01 PROCEDURE — 77010033711 HC HIGH FLOW OXYGEN

## 2022-04-01 PROCEDURE — 0T9B70Z DRAINAGE OF BLADDER WITH DRAINAGE DEVICE, VIA NATURAL OR ARTIFICIAL OPENING: ICD-10-PCS | Performed by: INTERNAL MEDICINE

## 2022-04-01 PROCEDURE — 74011250636 HC RX REV CODE- 250/636: Performed by: INTERNAL MEDICINE

## 2022-04-01 PROCEDURE — 83880 ASSAY OF NATRIURETIC PEPTIDE: CPT

## 2022-04-01 PROCEDURE — 5A09457 ASSISTANCE WITH RESPIRATORY VENTILATION, 24-96 CONSECUTIVE HOURS, CONTINUOUS POSITIVE AIRWAY PRESSURE: ICD-10-PCS | Performed by: INTERNAL MEDICINE

## 2022-04-01 PROCEDURE — P9045 ALBUMIN (HUMAN), 5%, 250 ML: HCPCS | Performed by: NURSE PRACTITIONER

## 2022-04-01 PROCEDURE — 99223 1ST HOSP IP/OBS HIGH 75: CPT | Performed by: INTERNAL MEDICINE

## 2022-04-01 PROCEDURE — 99233 SBSQ HOSP IP/OBS HIGH 50: CPT | Performed by: INTERNAL MEDICINE

## 2022-04-01 PROCEDURE — 84100 ASSAY OF PHOSPHORUS: CPT

## 2022-04-01 PROCEDURE — 74011250637 HC RX REV CODE- 250/637: Performed by: INTERNAL MEDICINE

## 2022-04-01 PROCEDURE — 3331090001 HH PPS REVENUE CREDIT

## 2022-04-01 PROCEDURE — 74011000250 HC RX REV CODE- 250: Performed by: INTERNAL MEDICINE

## 2022-04-01 PROCEDURE — 3331090002 HH PPS REVENUE DEBIT

## 2022-04-01 PROCEDURE — P9047 ALBUMIN (HUMAN), 25%, 50ML: HCPCS | Performed by: NURSE PRACTITIONER

## 2022-04-01 PROCEDURE — 74011000258 HC RX REV CODE- 258: Performed by: NURSE PRACTITIONER

## 2022-04-01 PROCEDURE — 99233 SBSQ HOSP IP/OBS HIGH 50: CPT | Performed by: NURSE PRACTITIONER

## 2022-04-01 RX ORDER — FUROSEMIDE 10 MG/ML
20 INJECTION INTRAMUSCULAR; INTRAVENOUS ONCE
Status: COMPLETED | OUTPATIENT
Start: 2022-04-01 | End: 2022-04-01

## 2022-04-01 RX ORDER — ENOXAPARIN SODIUM 100 MG/ML
40 INJECTION SUBCUTANEOUS EVERY 24 HOURS
Status: DISCONTINUED | OUTPATIENT
Start: 2022-04-01 | End: 2022-04-02

## 2022-04-01 RX ORDER — HYDROMORPHONE HYDROCHLORIDE 1 MG/ML
1 INJECTION, SOLUTION INTRAMUSCULAR; INTRAVENOUS; SUBCUTANEOUS
Status: DISCONTINUED | OUTPATIENT
Start: 2022-04-01 | End: 2022-04-06 | Stop reason: SDUPTHER

## 2022-04-01 RX ORDER — VANCOMYCIN HYDROCHLORIDE 1 G/20ML
INJECTION, POWDER, LYOPHILIZED, FOR SOLUTION INTRAVENOUS ONCE
Status: DISCONTINUED | OUTPATIENT
Start: 2022-04-01 | End: 2022-04-01

## 2022-04-01 RX ORDER — POTASSIUM CHLORIDE 20 MEQ/1
20 TABLET, EXTENDED RELEASE ORAL DAILY
Status: DISCONTINUED | OUTPATIENT
Start: 2022-04-02 | End: 2022-04-06 | Stop reason: HOSPADM

## 2022-04-01 RX ORDER — NALOXONE HYDROCHLORIDE 0.4 MG/ML
0.04 INJECTION, SOLUTION INTRAMUSCULAR; INTRAVENOUS; SUBCUTANEOUS AS NEEDED
Status: DISCONTINUED | OUTPATIENT
Start: 2022-04-01 | End: 2022-04-06 | Stop reason: HOSPADM

## 2022-04-01 RX ORDER — FUROSEMIDE 10 MG/ML
20 INJECTION INTRAMUSCULAR; INTRAVENOUS EVERY 8 HOURS
Status: DISCONTINUED | OUTPATIENT
Start: 2022-04-01 | End: 2022-04-05

## 2022-04-01 RX ORDER — ALBUMIN HUMAN 250 G/1000ML
25 SOLUTION INTRAVENOUS EVERY 8 HOURS
Status: COMPLETED | OUTPATIENT
Start: 2022-04-01 | End: 2022-04-02

## 2022-04-01 RX ORDER — LORAZEPAM 2 MG/ML
1 INJECTION INTRAMUSCULAR
Status: DISCONTINUED | OUTPATIENT
Start: 2022-04-01 | End: 2022-04-05

## 2022-04-01 RX ORDER — HYDROMORPHONE HYDROCHLORIDE 1 MG/ML
0.5 INJECTION, SOLUTION INTRAMUSCULAR; INTRAVENOUS; SUBCUTANEOUS
Status: DISCONTINUED | OUTPATIENT
Start: 2022-04-01 | End: 2022-04-06 | Stop reason: HOSPADM

## 2022-04-01 RX ORDER — VANCOMYCIN HYDROCHLORIDE
1250 EVERY 12 HOURS
Status: DISCONTINUED | OUTPATIENT
Start: 2022-04-01 | End: 2022-04-02

## 2022-04-01 RX ORDER — VANCOMYCIN 2 GRAM/500 ML IN 0.9 % SODIUM CHLORIDE INTRAVENOUS
2000 ONCE
Status: COMPLETED | OUTPATIENT
Start: 2022-04-01 | End: 2022-04-02

## 2022-04-01 RX ADMIN — ALBUMIN (HUMAN) 25 G: 12.5 INJECTION, SOLUTION INTRAVENOUS at 00:41

## 2022-04-01 RX ADMIN — VANCOMYCIN HYDROCHLORIDE 1250 MG: 100 INJECTION, POWDER, LYOPHILIZED, FOR SOLUTION INTRAVENOUS at 23:00

## 2022-04-01 RX ADMIN — PANTOPRAZOLE SODIUM 40 MG: 40 TABLET, DELAYED RELEASE ORAL at 09:28

## 2022-04-01 RX ADMIN — MIDODRINE HYDROCHLORIDE 5 MG: 5 TABLET ORAL at 16:38

## 2022-04-01 RX ADMIN — PIPERACILLIN SODIUM,TAZOBACTAM SODIUM 4.5 G: 4; .5 INJECTION, POWDER, FOR SOLUTION INTRAVENOUS at 09:29

## 2022-04-01 RX ADMIN — FAMOTIDINE 20 MG: 20 TABLET, FILM COATED ORAL at 17:34

## 2022-04-01 RX ADMIN — AMIODARONE HYDROCHLORIDE 400 MG: 200 TABLET ORAL at 09:28

## 2022-04-01 RX ADMIN — LORAZEPAM 1 MG: 2 INJECTION INTRAMUSCULAR at 17:40

## 2022-04-01 RX ADMIN — METHYLPREDNISOLONE SODIUM SUCCINATE 40 MG: 40 INJECTION, POWDER, FOR SOLUTION INTRAMUSCULAR; INTRAVENOUS at 20:54

## 2022-04-01 RX ADMIN — HYDROMORPHONE HYDROCHLORIDE 1 MG: 1 INJECTION, SOLUTION INTRAMUSCULAR; INTRAVENOUS; SUBCUTANEOUS at 16:30

## 2022-04-01 RX ADMIN — TAMSULOSIN HYDROCHLORIDE 0.4 MG: 0.4 CAPSULE ORAL at 09:28

## 2022-04-01 RX ADMIN — POTASSIUM PHOSPHATE, MONOBASIC AND POTASSIUM PHOSPHATE, DIBASIC: 224; 236 INJECTION, SOLUTION, CONCENTRATE INTRAVENOUS at 10:39

## 2022-04-01 RX ADMIN — AMIODARONE HYDROCHLORIDE 400 MG: 200 TABLET ORAL at 17:34

## 2022-04-01 RX ADMIN — ALBUMIN (HUMAN) 25 G: 0.25 INJECTION, SOLUTION INTRAVENOUS at 14:18

## 2022-04-01 RX ADMIN — PIPERACILLIN SODIUM,TAZOBACTAM SODIUM 4.5 G: 4; .5 INJECTION, POWDER, FOR SOLUTION INTRAVENOUS at 17:34

## 2022-04-01 RX ADMIN — ALBUMIN (HUMAN) 25 G: 0.25 INJECTION, SOLUTION INTRAVENOUS at 09:27

## 2022-04-01 RX ADMIN — ENOXAPARIN SODIUM 40 MG: 40 INJECTION SUBCUTANEOUS at 10:53

## 2022-04-01 RX ADMIN — FUROSEMIDE 20 MG: 10 INJECTION, SOLUTION INTRAMUSCULAR; INTRAVENOUS at 21:40

## 2022-04-01 RX ADMIN — VANCOMYCIN HYDROCHLORIDE 2000 MG: 100 INJECTION, POWDER, LYOPHILIZED, FOR SOLUTION INTRAVENOUS at 11:02

## 2022-04-01 RX ADMIN — METHYLPREDNISOLONE SODIUM SUCCINATE 40 MG: 40 INJECTION, POWDER, FOR SOLUTION INTRAMUSCULAR; INTRAVENOUS at 10:53

## 2022-04-01 RX ADMIN — FUROSEMIDE 20 MG: 10 INJECTION, SOLUTION INTRAMUSCULAR; INTRAVENOUS at 14:18

## 2022-04-01 RX ADMIN — DIPHENHYDRAMINE HYDROCHLORIDE 25 MG: 25 CAPSULE ORAL at 00:41

## 2022-04-01 RX ADMIN — LORAZEPAM 1 MG: 2 INJECTION INTRAMUSCULAR at 20:43

## 2022-04-01 RX ADMIN — FUROSEMIDE 20 MG: 10 INJECTION, SOLUTION INTRAMUSCULAR; INTRAVENOUS at 01:08

## 2022-04-01 RX ADMIN — HYDROMORPHONE HYDROCHLORIDE 1 MG: 1 INJECTION, SOLUTION INTRAMUSCULAR; INTRAVENOUS; SUBCUTANEOUS at 12:35

## 2022-04-01 RX ADMIN — MIDODRINE HYDROCHLORIDE 5 MG: 5 TABLET ORAL at 12:00

## 2022-04-01 RX ADMIN — FUROSEMIDE 20 MG: 20 INJECTION, SOLUTION INTRAMUSCULAR; INTRAVENOUS at 08:14

## 2022-04-01 RX ADMIN — MIDODRINE HYDROCHLORIDE 5 MG: 5 TABLET ORAL at 09:28

## 2022-04-01 RX ADMIN — FAMOTIDINE 20 MG: 20 TABLET, FILM COATED ORAL at 09:28

## 2022-04-01 NOTE — PROGRESS NOTES
Physical Therapy Note:      Pt has demonstrated a decline in function with transfer to ICU. Will d/c from caseload.  Please re-order when appropriate.     Thank you,  Deborah Tijerina, PT, DPT

## 2022-04-01 NOTE — PROGRESS NOTES
Skin: Patient sacrum and heels intact. Allyven placed. Small abrasion L elbow. Belongings: patient has one gold necklace on, one stud earring in L ear.

## 2022-04-01 NOTE — CONSULTS
[facility] Critical Care Note[de-identified] 4/1/2022  Emily Yair  Admission Date: 3/15/2022     Length of Stay: 17 days    Background: 78 y.o. 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 He has an abdominal pleurex catheter in place that is being drained usually every other day. He was admitted with hypotension/volume depletion and failure to thrive. He has been on TPN for nutritional support. He has been treated with Folfirinox previously but now has disease progression so is now being treated with Gemcitabine/Abraxane which was started around 3/17. He had a chest CT done on 3/20 that showed some infiltrates with small effusions. His CXR has since worsened and his oxygen needs have increased. He has edema and is getting lasix but his blood pressure has limited use. He was recently on Vanc and Maxipeme for ? Infection but has been off antibiotics for several days. He is afebrile and WBC is 18. Echo this admission with EF of 35 to 40%. Notable PMH:  has a past medical history of Back pain, Chronic pain, CINV (chemotherapy-induced nausea and vomiting) (4/20/2021), Erectile dysfunction, Gastritis, GERD (gastroesophageal reflux disease), Hiatal hernia, History of anemia, Hypertension, Left bundle branch block (LBBB) on electrocardiogram (02/05/2021), Malignant neoplasm of body of pancreas ([address] Malignant neoplasm of esophagus ([address] (12/07/2020), Morbid obesity ([address] and Nausea. 24 Hour events: Increased oxygen needs so moved to ICU for bipap. CXR with increased infiltrates. He was a DNR but now states that he wants to be a full code. ROS:   Constitutional: negative for fever, chills, sweats  Cardiovascular: negative for chest pain, palpitations, syncope, + leg edema  Gastrointestinal:  negative for dysphagia, reflux, vomiting, diarrhea, abdominal pain, or melena.  Poor po intake but thirsty  Neurologic:  negative for focal weakness, numbness, headache    Lines: (insertion date)   [place]: (4/1)  Pleurex (3/22)    Drips: current dose (range)      Pertinent Exam:         Blood pressure 95/65, pulse (!) 106, temperature 97.7 °F (36.5 °C), resp. rate 22, height 5' 6\" (1.676 m), weight 184 lb 11.2 oz (83.8 kg), SpO2 (!) 85 %. Intake/Output Summary (Last 24 hours) at 4/1/2022 0919  Last data filed at 4/1/2022 0813  Gross per 24 hour   Intake 0 ml   Output 4300 ml   Net -4300 ml     Constitutional:  awake, alert and able to communicate. Asking for something to drink  EENMT:  Sclera clear, pupils equal, wearing bipap  Respiratory: crackles from posterior. Wearing bipap with FIO2 of 100% and sat of 93%  Cardiovascular:  RRR - sinus per telemetry  Gastrointestinal:  slightly rounded and firm but with no tenderness and not distended; positive bowel sounds present. + pleurex  Musculoskeletal:  warm with no cyanosis, 2+ lower extremity edema  Skin:  no jaundice or ecchymosis  Neurologic: awake, alert and oriented  Psychiatric: calm    CXR:  3/31                                                                                 3/30        CT scan 3/20      Recent Labs     04/01/22 0319 03/31/22  0247 03/30/22  0306   WBC 14.3* 15.7* 18.7*   HGB 8.1* 7.8* 9.8*   HCT 25.9* 25.4* 31.2*   PLT 88* 84* 89*     Recent Labs     04/01/22 0319 03/31/22  0247 03/30/22  0306    139 137*   K 2.9* 3.7 3.8    108* 106   CO2 30 27 25   * 93 106*   BUN 20 27* 28*   CREA 0.50* 0.50* 0.50*   MG 2.1 2.2 2.4   CA 8.1* 8.0* 8.0*   PHOS 1.9*  --  2.5   ALB 2.8* 2.1* 1.1*   AST 18 17 25   ALT 15 18 29   * 238* 347*     No results for input(s): LAC, TROPHS, BNPNT, CRP in the last 72 hours. No lab exists for component: ESR  No results for input(s): GLUCPOC in the last 72 hours.     No lab exists for component: A1C  ECHO: Results from Hospital Encounter encounter on 03/15/22    ECHO ADULT COMPLETE    Interpretation Summary    Left Ventricle: Left ventricle size is normal. Mildly increased wall thickness. Findings consistent with mild concentric hypertrophy. Severe apical hypokinesis noted extending to the mid anteroseptal walls. Moderately reduced left ventricular systolic function with a visually estimated EF of 35 - 40%. Grade I diastolic dysfunction with normal LAP.   Mitral Valve: Mild annular calcification of the mitral valve. Mild transvalvular regurgitation.   Left Atrium: Left atrium is mildly dilated.   Left pleural effusion.   Technical qualifiers: Echo study was technically difficult with poor endocardial visualization.   Contrast used: Definity. No Apical thrombus noted. Results     Procedure Component Value Units Date/Time    MSSA/MRSA SC BY PCR, NASAL SWAB [[id number]]     Order Status: Sent Specimen: Nasal swab     CULTURE, RESPIRATORY/SPUTUM/BRONCH Consuello Revering STAIN [[id number]]  (Abnormal) Collected: 03/30/22 1742    Order Status: Completed Specimen: Sputum Updated: 04/01/22 0855     Special Requests: NO SPECIAL REQUESTS        GRAM STAIN       WBCS SEEN TOO NUMEROUS TO COUNT            NO EPITHELIAL CELLS SEEN         FEW GRAM NEGATIVE RODS         MODERATE YEAST         4+ MUCUS PRESENT        Culture result:       MODERATE YEAST SUBCULTURE IN PROGRESS                  LIGHT NORMAL RESPIRATORY PANTERA          CULTURE, RESPIRATORY/SPUTUM/BRONCH W Evelia Mcgowan [[id number]] Collected: 03/30/22 0827    Order Status: Completed Specimen: Sputum Updated: 03/30/22 1417     Special Requests: NO SPECIAL REQUESTS        GRAM STAIN       GRAM STAIN EXAMINATION OF THIS SPECIMEN INDICATED OROPHARYNGEAL CONTAMINATION. PLEASE SUBMIT A NEW SPECIMEN OR NOTIFY THE MICRO DEPT WITHIN 48HRS IF FURTHER WORKUP IS DESIRED. Culture result:       Please reorder and recollect.                   RESULTS VERIFIED, PHONED TO AND READ BACK BY  BEN GERBER RN ON 03/30/22 @1412, ADS      MSSA/MRSA SC BY PCR, NASAL SWAB [[id number]] Collected: 03/25/22 1115    Order Status: Canceled Specimen: Nasal swab     C. DIFFICILE AG & TOXIN A/B [[id number]] Collected: 03/25/22 0041    Order Status: Completed Specimen: Stool Updated: 03/25/22 1045     [address] ANTIGEN       C. DIFFICILE [facility] ANTIGEN-NEGATIVE           C. difficile toxin       C. DIFFICILE TOXIN-NEGATIVE           PCR Reflex NOT APPLICABLE        INTERPRETATION       NEGATIVE FOR TOXIGENIC C. DIFFICILE           Clinical Consideration       NEGATIVE FOR TOXIGENIC C. DIFFICILE              Inpat Anti-Infectives (From admission, onward)     Start     Ordered Stop    04/01/22 2300  vancomycin (VANCOCIN) 1250 mg in  ml infusion  1,250 mg,   IntraVENous,   EVERY 12 HOURS         04/01/22 0909 --    04/01/22 1000  vancomycin (VANCOCIN) 2000 mg in  ml infusion  2,000 mg,   IntraVENous,   ONCE         04/01/22 0908 04/01/22 2159    04/01/22 0900  piperacillin-tazobactam (ZOSYN) 4.5 g in 0.9% sodium chloride (MBP/ADV) 100 mL MBP  4.5 g,   IntraVENous,   EVERY 8 HOURS         04/01/22 0850 04/08/22 0959              ABG:No results for input(s): PH, PCO2, PO2, HCO3, PHI, PCO2I, PO2I, HCO3I in the last 72 hours. Assessment and Plan:  (Medical Decision Making)   Impression: 78 y.o. 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 He has an abdominal pleurex catheter in place that is being drained usually every other day. He was admitted with hypotension/volume depletion and failure to thrive. He has been treated with Folfirinox previously but now has disease progression so is now being treated with Gemcitabine/Abraxane which was started around 3/17. He had a chest CT done on 3/20 that showed some infiltrates with small effusions. His CXR has since worsened and his oxygen needs have increased. He has edema and is getting lasix but his blood pressure has limited use. He was recently on Vanc and Maxipeme for ? Infection but has been off antibiotics for several days. He is afebrile and WBC is 18. Echo this admission with EF of 35 to 40%. NEURO:  Currently awake and able to make his own decisions. Analgesia: increased Norco yesterday per palliative care on 3/31. CV:   Volume Status: + 12 liters overall but negative 3.9 liters over past 24 hours. + edema. Will check BNP. EF 35 to 40% per echo. Blood pressure has been low which has limited diuresis but will continue low dose of lasix as tolerated. On Midodrine  LV dysfunction - as above. Cardiology has seen. Medical therapy limited but hypotension. Hypotension: continue Midodrine  PULM:   Acute hypoxemic/hypercapneic respiratory failure:  Now on bipap with FIO2 100% and sat of 93%. TV in the low 700 ml range. CXR has worsening infiltrates. Have ordered a BNP in case of edema but also concerned about possible pneumonitis secondary to Gemcitabine. Will start on steroids. Restarting abx as well though he has only been off of for a few days. Will check PCT and nasal swab. Bilateral pulmonary infiltrates: as above  RENAL:  MICHAEL: NA  Electrolytes: supplementing K+ and phos  GI:   Nutrition: on TPN due to severe malnutition, poor po intake, failure to thrive  Malignant ascites:  Drain pleurex every other day and prn. Drained 3/31  HEME:   Metastatic esophageal and pancreatic cancers - chemo on hold for now. + disease progression per oncology. Anemia: monitoring  Thrombocytopenia: plts 88,000. Chemo on hold for now due to counts and ? Lung toxicity  Anticoagulation: add low dose lovenox  ID:   Have restarted abx due to worsening hypoxia/increased infiltrates on CXR. Check PCT and nasal swab. ENDO:   DM: NA  Skin: no decub, turns, preventive care  Prophy: add low dose Lovenox/Protonix    Discussed category status with patient and he now wants to be a full code. He is deferring decision making to his friend -   Chary Royal [phone]   should patient be unable to make on his own.   I have left a voice mail for to inform  [place] of patient's wishes. DNR      Milka Elliott NP   I have spoken with and examined the patient. I agree with the above assessment and plan as documented. Gen: alert  Lungs:  Crackles bilateral  Heart:  RRR with no Murmur/Rubs/Gallops  Abd:soft  Ext: no edema    Currently on bipap cxr with bilateral infiltrates- R side looks worse, pt now saying he wants intubation .    Has  possible pneumonitis secondary to Gemcitabine- other posibilities include pneumonia/ volume overload- check procal and bnp- have restarted antibx and will start steroids- chemotherapy on hold pending discussion with Dr. Alex Banks MD

## 2022-04-01 NOTE — PROGRESS NOTES
Problem: Falls - Risk of  Goal: *Absence of Falls  Description: Document Morene Hole Fall Risk and appropriate interventions in the flowsheet. Outcome: Progressing Towards Goal  Note: Fall Risk Interventions:  Mobility Interventions: Communicate number of staff needed for ambulation/transfer    Mentation Interventions: Adequate sleep, hydration, pain control,Bed/chair exit alarm    Medication Interventions: Teach patient to arise slowly    Elimination Interventions: Call light in reach,Bed/chair exit alarm    History of Falls Interventions: Bed/chair exit alarm         Problem: Pain  Goal: *Control of Pain  Outcome: Progressing Towards Goal  Goal: *PALLIATIVE CARE:  Alleviation of Pain  Outcome: Progressing Towards Goal     Problem: Patient Education: Go to Patient Education Activity  Goal: Patient/Family Education  Outcome: Progressing Towards Goal     Problem: Nausea/Vomiting (Adult)  Goal: *Absence of nausea/vomiting  Outcome: Progressing Towards Goal     Problem: Patient Education: Go to Patient Education Activity  Goal: Patient/Family Education  Outcome: Progressing Towards Goal     Problem: Nutrition Deficit  Goal: *Optimize nutritional status  Outcome: Progressing Towards Goal     Problem: Patient Education: Go to Patient Education Activity  Goal: Patient/Family Education  Outcome: Progressing Towards Goal     Problem: Pressure Injury - Risk of  Goal: *Prevention of pressure injury  Description: Document Carlos Scale and appropriate interventions in the flowsheet.   Outcome: Progressing Towards Goal  Note: Pressure Injury Interventions:  Sensory Interventions: Assess changes in LOC    Moisture Interventions: Minimize layers    Activity Interventions: Pressure redistribution bed/mattress(bed type)    Mobility Interventions: Pressure redistribution bed/mattress (bed type)    Nutrition Interventions: Document food/fluid/supplement intake    Friction and Shear Interventions: Apply protective barrier, creams and emollients,Feet elevated on foot rest,Foam dressings/transparent film/skin sealants                Problem: Patient Education: Go to Patient Education Activity  Goal: Patient/Family Education  Outcome: Progressing Towards Goal     Problem: Risk for Spread of Infection  Goal: Prevent transmission of infectious organism to others  Description: Prevent the transmission of infectious organisms to other patients, staff members, and visitors.   Outcome: Progressing Towards Goal     Problem: Patient Education:  Go to Education Activity  Goal: Patient/Family Education  Outcome: Progressing Towards Goal patient

## 2022-04-01 NOTE — PROGRESS NOTES
Occupational Therapy Note:    Pt has demonstrated a decline in function with transfer to ICU. Will d/c from caseload. Please re-order when appropriate.     Thank you,   Evonne Edwards, OT

## 2022-04-01 NOTE — PROGRESS NOTES
Palliative Care Progress Note    Patient: Nghia Oakley MRN: [id number]  SSN: [id number]    YOB: 1955  Age: 78 y.o. Sex: male       Assessment/Plan:     Chief Complaint/Interval History: worsening hypoxia, now on BIPAP        Principal Diagnosis:    · Dyspnea  R06.00    Additional Diagnoses:   · Ascites  R18.8  · Cough  R05  · Debility, Unspecified  R53.81  · Edema  R60.9  · Fatigue, Lethargy  R53.83  · Frailty  R54  · Nausea/Vomiting  R11.2  · Pain, abdomen  R10.9  · Counseling, Encounter for Medical Advice  Z71.9  · Encounter for Palliative Care  Z51.5    Palliative Performance Scale (PPS)       Medical Decision Making:   Reviewed and summarized notes over last 24 hours   Discussed case with appropriate providersFabiano Carballo RN  Reviewed laboratory and x-ray data over last 24 hours     Pt resting in bed, no acute distress noted. Family at bedside. Pt with worsening hypoxia overnight, and was transferred to the ICU this morning for BIPAP. He changed his code status to Full Code after arriving in the ICU. Pt admits that he is scared, and doesn't want to die. I validated his fears, and supported his decision. Counseled that if he does require intubation, we need to discuss his wishes regarding long term life support if he fails to wean. He states he is okay with 2 weeks on the ventilator, but does not want long term life support. Family in agreement. He reports his abdominal pain/distention is okay right now. Counseled that pain medications can also be used for dyspnea management. Assured him of our ongoing care. Discussed with Chacha Rios RN. Will continue to follow. Will discuss findings with members of the interdisciplinary team.         More than 50% of this 35 minute visit was spent counseling and coordination of care as outlined above.        Subjective:     Review of Systems:  A comprehensive review of systems was negative except for:   Constitutional: Positive for fatigue. Respiratory: positive for dyspnea   Gastrointestinal: Positive for abdominal pain/distention, nausea      Objective:     Visit Vitals  /67   Pulse (!) 101   Temp 98.6 °F (37 °C)   Resp 26   Ht 5' 6\" (1.676 m)   Wt 184 lb 11.2 oz (83.8 kg)   SpO2 96%   BMI 29.81 kg/m²       Physical Exam:    General:  Cooperative. Debilitated. Eyes:  Conjunctivae/corneas clear    Nose: Nares normal. Septum midline. BIPAP   Neck: Supple, symmetrical, trachea midline   Lungs:   Coarse bilaterally, unlabored   Heart:  Regular rate and rhythm   Abdomen:   Soft, mildly tender, non-distended. Pleurx catheter   Extremities: Normal, atraumatic, no cyanosis.  BLE edema   Skin: Skin color, texture, turgor normal.    Neurologic: Nonfocal   Psych: Alert and oriented     Signed By: Jalen Viera NP     April 1, 2022

## 2022-04-01 NOTE — PROGRESS NOTES
Pt in bed resting on the right side, pt's family member accompanies him in bed as well, pt's oxygen sat is 84% at the present time. Pt states He is to weak stand for weight. Will attempt a bed wt once family visit is complete. Pt's oxygen has return to 96%. Will continue to monitor. 2035- Spoke with the NP Tila, informed Tila of change in pt's oxygen requirements. Oxygen decreased to 87% while RT was at the bedside. The RT increase pt to 9 from 7 liters high flow. 2100- rover at the bedside, pt sats are 79% on 9 liters, RT called and pt placed on Airvo 66/65%, CXR ordered will continue to monitor. 2315- pt assisted to the bedside, pt removes airvo stating \" its to much\" pt desats to the 70's RT now at the bedside decreases aide to 40 and increase oxygen to 95% to keep sats above 90%. 65- pt calls RN in to room to discuss concerns, which are \" why is my breathing so bad?' \" why cant I move without being completely out of breath\" reassured the pt that his concerns will be relayed to the NP/MD     0130- pt sats decreased to 86% the Rt was called and is at the bedside, pt now has airvo and the non rebreather at the bedside for extra flow during movement. Place condom cath to minimize movement and to record out put.

## 2022-04-01 NOTE — PROGRESS NOTES
VANCO DAILY FOLLOW UP NOTE  [address] [facility] Pharmacokinetic Monitoring Service - Vancomycin    Consulting Provider: Clemente Ribeiro   Indication: HAP  Target Concentration: Goal AUC/LETY 400-600 mg*hr/L  Day of Therapy: 1  Additional Antimicrobials: zosyn    Pertinent Laboratory Values: Wt Readings from Last 1 Encounters:   03/31/22 83.8 kg (184 lb 11.2 oz)     Temp Readings from Last 1 Encounters:   04/01/22 97.7 °F (36.5 °C)     No components found for: PROCAL  Recent Labs     04/01/22  0319 03/31/22  0247 03/30/22  0306   BUN 20 27* 28*   CREA 0.50* 0.50* 0.50*   WBC 14.3* 15.7* 18.7*     Estimated Creatinine Clearance: 104 mL/min (A) (by C-G formula based on SCr of 0.5 mg/dL (L)). Lab Results   Component Value Date/Time    Vancomycin, random 26.6 03/28/2022 04:00 AM       MRSA Nasal Swab: not ordered. Order placed by pharmacy. .      Assessment:  Date/Time Dose Concentration AUC         Note: Serum concentrations collected for AUC dosing may appear elevated if collected in close proximity to the dose administered, this is not necessarily an indication of toxicity    Plan:  Dosing recommendations based on Bayesian software  Start vancomycin 1250 mg q 12 hours  Anticipated AUC of 459 and trough concentration of 13.3 at steady state  Renal labs as indicated   Vancomycin concentration ordered for 4/2 @ 0400    Pharmacy will continue to monitor patient and adjust therapy as indicated    Thank you for the consult,  Sulema Landry PHARMD

## 2022-04-01 NOTE — PROGRESS NOTES
Date of Outreach Update:  Kuldip Black was seen and assessed. Received updates from the primary nurse. Patient on airvo and NRB mask, patient o2 sats 99%. Patient asleep at this time. Patient received dose of lasix due to result from recent chest x-ray. MEWS Score: 2 (04/01/22 0253)  Vitals:    03/31/22 2040 03/31/22 2108 03/31/22 2335 04/01/22 0253   BP: (!) 94/52  (!) 112/59 103/61   Pulse: 87  87 90   Resp: 21 22   Temp: 98.2 °F (36.8 °C)  98.1 °F (36.7 °C) 97.7 °F (36.5 °C)   SpO2: (!) 87% 93% 92% 98%   Weight:       Height:            Previous Outreach assessment has been reviewed. There have been no significant clinical changes since the completion of the last dated Outreach assessment. Will continue to follow up per outreach protocol.     Signed By:   Melisa Cam RN    April 1, 2022 3:59 AM

## 2022-04-01 NOTE — PROGRESS NOTES
Bedside and verbal shift change report received from  Pro   (offgoing nurse). Report included the following information SBAR and ED Summary.

## 2022-04-01 NOTE — PROGRESS NOTES
Patient was stating at 85% with Airvo. Called respiratory and placed non rebreather on patient as well. [place] called. Lasix given garnica placed and patient will be transferred to unit.

## 2022-04-01 NOTE — PROGRESS NOTES
Date of Outreach Update:  Gigi Reese was seen and assessed. Patient was seen at bedside along side of Primary RN. Addressed concerns with NP for heme/onc of patient desating events as well as lower/borderline BP. Patient receiving midodrine for his pressures. Patient high risk for decompensation requiring possible BIPAP and/or BP pressure support in the ICU. NP ordered chest x-ray. Awaiting results from that. Patient alert/oriented, family at bedside. Increased o2 demands from 7L to now on Airvo. Map recently 66-65. MEWS Score: 3 (03/31/22 2040)  Vitals:    03/31/22 1541 03/31/22 2035 03/31/22 2040 03/31/22 2108   BP: (!) 97/53  (!) 94/52    Pulse: 83  87    Resp: 20  21    Temp: 98.4 °F (36.9 °C)  98.2 °F (36.8 °C)    SpO2: 91% 92% (!) 87% 93%   Weight:       Height:            Previous Outreach assessment has been reviewed. There have been no significant clinical changes since the completion of the last dated Outreach assessment. Will continue to follow up per outreach protocol.     Signed By:   Arnoldo Ordoñez RN    March 31, 2022 9:38 PM

## 2022-04-01 NOTE — PROGRESS NOTES
Chart reviewed s/p tx to ICU for BIPAP and change to full code. Currently 75% fio2. No cont gtts. Palliative care has seen pt previously. D/C POC for home with home health and TPN with [facility]. Order/referral already sent through [address] [place] [address] link per previous  Notes and CC link. CM will need to follow for any assist and change in d/c needs/POC. LOS 17 days.

## 2022-04-01 NOTE — PROGRESS NOTES
TRANSFER - OUT REPORT:    Verbal report given to Frieda(name) on [facility]  being transferred to ICU(unit) for routine post - op       Report consisted of patients Situation, Background, Assessment and   Recommendations(SBAR). Information from the following report(s) SBAR was reviewed with the receiving nurse. Opportunity for questions and clarification was provided.       Patient transported with:   Ana Maria Zacarias

## 2022-04-01 NOTE — PROGRESS NOTES
Date of Outreach Update:  Called to assess patient due to worsening SOB and hypoxia. MEWS Score: 2 (04/01/22 0253)  Vitals:    03/31/22 2108 03/31/22 2335 04/01/22 0253 04/01/22 0813   BP:  (!) 112/59 103/61 95/65   Pulse:  87 90 (!) 106   Resp:   22 22   Temp:  98.1 °F (36.7 °C) 97.7 °F (36.5 °C) 97.7 °F (36.5 °C)   SpO2: 93% 92% 98% (!) 85%   Weight:       Height:             Pain Assessment  Pain Intensity 1: 0 (03/31/22 2348)  Pain Location 1: Abdomen  Pain Intervention(s) 1: Medication (see MAR)  Patient Stated Pain Goal: 0      Previous Outreach assessment has been reviewed. Patient with dyspnea at rest on 100% airvo with 15L NRB on top. Lungs coarse bilaterally. +3 pitting edema present in bilateral lower extremities. Patient has received 20 mg IV furosemide at both ~0110 and ~0815 today. NP at bedside. Patient to transfer to ICU for BiPAP. Will assist with transfer.      Signed By:   Tran Francis RN

## 2022-04-01 NOTE — PROGRESS NOTES
Comprehensive Nutrition Assessment    Type and Reason for Visit: Reassess  TPN Management (oncology)    Nutrition Recommendations/Plan:   Parenteral Nutrition:  Hold TPN this evening d/t volume status  Nutritional Supplement Therapy:   Active electrolyte replacement per nutrition support protocols  Replacement indicated:  Completed  Labs:   CMP daily per provider  Mg daily per provider  Phos tomorrow and MWF   POC Glucoses/SSI Not indicated     Malnutrition Assessment:  Malnutrition Status: Severe malnutrition  Context: Chronic illness  Findings of clinical characteristics of malnutrition:   Energy Intake:  7 - 75% or less est energy requirements for 1 month or longer  Weight Loss:  7.0 - Greater than 7.5% over 3 months (34# (18.4%) )     Body Fat Loss:  1 - Mild body fat loss, Buccal region,Orbital,Triceps   Muscle Mass Loss:  1 - Mild muscle mass loss, Calf (gastrocnemius),Hand (interosseous),Scapula (trapezius),Temples (temporalis)  Fluid Accumulation:  No significant fluid accumulation,     Strength:  Not performed     Nutrition Assessment:   Nutrition History: Per RD assessment patient was able to maintain PO intake and UBW throughout most of chemo. During admmission early March patient had \"stopped eating\" due to nausea. Upon assessment this admission patient reports no PO of foods for ~4 weeks. States that he has been able to tolerate some fluids. He reports continued nausea and vomiting as primary barrier. Nutrition Background: Patient with pancreatic and esophageal cancer, Amos's esophagus, obesity, HTN, GERD, gastritis, ascites and peritoneal carcinomatosis s/p Plurex drain. He presented to oncology office extremely sick, BP was unable to be measured. He was admitted directly to [facility]. Nutrition Interval:  PO intake continues to limited due to early satiety and persistent nausea and likely secondary to peritoneal carcinomatosis, recurrent ascites with Plurex drain.  Patient has demonstrated inability to meet needs orally with severe weight loss and malnutrition as above. Intake trends since admission reveal daily PO tolerance of declining now less than 1 meal per day which is not sufficient to sustain weight, functional status, or life. Abdominal pleurex replaced 3/22: 1100 ml removed. Pt transferred to ICU this morning d/t need for BiPAP. TPN not in room at time of RD rounding. Noted need for additional lasix. Discussed pt with Ly Gomez, Dr. Merissa Gilbert, and Davina Martinez NP, holding TPN this evening. Abdominal Status (last documented): Intact abdomen with Active  bowel sounds. Last BM 03/31/22. Pertinent Medications: albumin, Imodium Q12H (held today), Solumedrol 40mg IV Q12H, protonix, Zosyn Q8H, KPhos 20mmol, Zofran being utilized multiple times daily, Compazine, lasix 20mg x2, vancomycin  Lab Results   Component Value Date/Time    Sodium 141 04/01/2022 03:19 AM    Potassium 2.9 (L) 04/01/2022 03:19 AM    Chloride 107 04/01/2022 03:19 AM    CO2 30 04/01/2022 03:19 AM    Anion gap 4 (L) 04/01/2022 03:19 AM    Glucose 105 (H) 04/01/2022 03:19 AM    BUN 20 04/01/2022 03:19 AM    Creatinine 0.50 (L) 04/01/2022 03:19 AM    Calcium 8.1 (L) 04/01/2022 03:19 AM    Albumin 2.8 (L) 04/01/2022 03:19 AM    Magnesium 2.1 04/01/2022 03:19 AM    Phosphorus 1.9 (L) 04/01/2022 03:19 AM   Labs remarkable for: Na stable, hyponatremia with additional lasix provided early this AM, phos low as well. Nutrition Related Findings:   Port in place, also with 1 PIV. TPN intiated 3/16. TPN increased in volume 3/19. TPN to be concentrated and volume decreased 3/22 per NP request due to new CHF. TPN changed to 18 hr cyclic infusion 7/70. TPN condensed to 16 hr infusion 3/29.       Current Nutrition Therapies:  ADULT DIET Regular  Current Parenteral Nutrition Orders:  · Type and Formula: Dex 14%, 8% AA    · Lipids: 250ml,Daily  · Duration: Cyclic (16 hr infusion)  · Rate/Volume: 1.56 L (65ml/hr)  · Current PN Order Provides: held  · Goal PN Orders Provides: 1742 kcal/d (100% of needs), 125 grams of protein/d (100% of needs), 218 grams of CHO/d and 1810 ml of total volume/d    Current Intake:   Average Meal Intake: 0% Average Supplement Intake: None ordered      Anthropometric Measures:  Height: 5' 6\" (167.6 cm)  Current Body Wt: 83.8 kg (184 lb 11.9 oz) (3/31), Weight source: Bed scale  BMI: 29.8,  (current BMI skewed by fluid)  Admission Body Weight: 148 lb 9.4 oz  Ideal Body Weight (lbs) (Calculated): 142 lbs (65 kg), 104.6 %  Usual Body Wt: 82.6 kg (182 lb) (12/14/21 office wt and per previous history), Percent weight change: -18.4          Edema: LLE: 2+ (4/1/2022  9:33 AM)  RLE: 2+ (4/1/2022  9:33 AM)     Estimated Daily Nutrient Needs:  Energy (kcal/day): 9606-3442 (Kcal/kg (25-30), Weight Used: Current (67.4 kg (3/15))  Protein (g/day):  (1.3-1.5 g/kg) Weight Used: (Admission (67.3 kg))  Fluid (ml/day):   (1 ml/kcal)    Nutrition Diagnosis:   · Inadequate oral intake related to altered GI function,early satiety (peritoneal carcinomatosis ) as evidenced by  (poor oral tolerance, wt loss, requires TPN for primary needs)    · Severe malnutrition related to catabolic illness as evidenced by  (malnutrition criteria as above)    Nutrition Interventions:   Food and/or Nutrient Delivery: Continue current diet (Hold TPN)     Coordination of Nutrition Care: Continue to monitor while inpatient    Goals:   Previous Goal Met: Progressing toward goal(s)  Active Goal: Continue to tolerate cyclic TPN    Nutrition Monitoring and Evaluation:      Food/Nutrient Intake Outcomes: Food and nutrient intake  Physical Signs/Symptoms Outcomes: Biochemical data,GI status,Fluid status or edema,Weight    Discharge Planning:     Too soon to determine    Electronically signed by Pedro Tan MS, ANIKA, LD 4/1/2022 at 1:39 PM  Contact: [phone]

## 2022-04-01 NOTE — PROGRESS NOTES
[address] [place] [address] Hematology & Oncology        Inpatient Hematology / Oncology Progress Note      Admission Date: 3/15/2022 10:28 AM  Reason for Admission/Hospital Course: FTT (failure to thrive) in adult [R62.7]  Hypotension [I95.9]      24 Hour Events: Worsening respiratory distress overnight  Progressed to [employer] to ICU for BiPAP this morning  Ongoing hypotension limiting use of diuretics  Family at bedside      Transfusions: None  Replacements: None    ROS:  Constitutional: +weakness/fatigue. Negative for fever, chills. CV: Negative for chest pain, palpitations, edema. Respiratory: Negative for dyspnea, cough, wheezing. GI: +N/V (better), diarrhea. Negative for abdominal pain. 10 point review of systems is otherwise negative with the exception of the elements mentioned above in the HPI. No Known Allergies    OBJECTIVE:  Patient Vitals for the past 8 hrs:   BP Temp Pulse Resp SpO2   22 1237 100/66 99 °F (37.2 °C) 97 (!) 31 92 %   22 1230 100/66 -- 97 (!) 34 91 %   22 1200 98/63 -- 96 (!) 32 92 %   22 1135 -- -- -- -- 93 %   22 1130 98/63 -- 97 30 92 %   22 1100 104/62 -- 100 30 96 %   22 1030 101/65 -- 98 27 99 %   22 1000 101/66 -- (!) 103 (!) 32 96 %   22 0948 102/67 -- (!) 101 26 96 %   22 0933 103/63 98.6 °F (37 °C) (!) 102 (!) 38 (!) 62 %   22 0813 95/65 97.7 °F (36.5 °C) (!) 106 22 (!) 85 %     Temp (24hrs), Av.2 °F (36.8 °C), Min:97.7 °F (36.5 °C), Max:99 °F (37.2 °C)     07 -  1900  In: -   Out: 910 [Urine:910]    Physical Exam:  Constitutional: Thin, cachectic elderly male in no acute distress, lying comfortably in the hospital bed. HEENT: Normocephalic and atraumatic. Oropharynx is clear, mucous membranes are moist. Extraocular muscles are intact. Sclerae anicteric. Neck supple without JVD. No thyromegaly present. Skin Warm and dry. No bruising and no rash noted. No erythema. No pallor. Respiratory +scattered crackles/coarse BS anteriorly. Normal air exchange without accessory muscle use. CVS Normal rate, regular rhythm and normal S1 and S2. No murmurs, gallops, or rubs. Abdomen Soft, nontender and distended, normoactive bowel sounds. +Abd pleurx   Neuro Grossly nonfocal with no obvious sensory or motor deficits. MSK 2+ BLE edema. Normal range of motion in general.  No tenderness. Psych Appropriate mood and affect. Labs:      Recent Labs     04/01/22 0319 03/31/22 0247 03/30/22 0306   WBC 14.3* 15.7* 18.7*   RBC 2.74* 2.72* 3.30*   HGB 8.1* 7.8* 9.8*   HCT 25.9* 25.4* 31.2*   MCV 94.5 93.4 94.5   MCH 29.6 28.7 29.7   MCHC 31.3* 30.7* 31.4   RDW 15.9* 15.8* 15.7*   PLT 88* 84* 89*   GRANS 71  1* 70 77  1*   LYMPH 8 4* 3  1*   MONOS 10 14* 11   EOS  --  1  --    BASOS  --  0  --    IG  --  11*  --    DF MANUAL AUTOMATED MANUAL   ANEU 11.4* 11.0* 15.5*   ABL 1.1 0.6 0.7   ABM 1.4* 2.2* 2.1*   JONNY  --  0.2  --    ABB  --  0.0  --    AIG  --  1.7*  --         Recent Labs     04/01/22 0319 03/31/22 0247 03/30/22  0306    139 137*   K 2.9* 3.7 3.8    108* 106   CO2 30 27 25   AGAP 4* 4* 6*   * 93 106*   BUN 20 27* 28*   CREA 0.50* 0.50* 0.50*   GFRAA >60 >60 >60   GFRNA >60 >60 >60   CA 8.1* 8.0* 8.0*   * 238* 347*   TP 5.1* 4.6* 4.3*   ALB 2.8* 2.1* 1.1*   GLOB 2.3 2.5 3.2   AGRAT 1.2 0.8* 0.3*   MG 2.1 2.2 2.4   PHOS 1.9*  --  2.5         Imaging:  IR INSERT CATH PLEURAL INDWELL [[id number]] Collected: 03/22/22 1614   Order Status: Completed Updated: 03/22/22 1652   Narrative:     Title: Dominant PleurX drain placement. Indication: Pancreatic cancer. Recurrent abdominal ascites.  Tunneled abdominal   PleurX drain catheter requested. : Dylon Isaacs PA-C     Supervising Physician: Billie Jorgensen M.D.      Consent:  Informed written and oral consent was obtained from the patient after   explanation of benefits and risks (including, but not limited to: Infection,   hemorrhage, visceral injury and pneumothorax).  The patient's questions were   answered to their satisfaction. The patient stated understanding and requested   that we proceed. Procedure:  With the patient supine, the abdomen was prepped and draped in the   standard fashion.  Lidocaine was administered for local field block.  Ultrasound   evaluation was performed. Using real-time ultrasound guidance, with appropriate   image recording, a Yueh needle was advanced into the ascites in the abdomen. Using fluoroscopy, the needle was exchanged over a wire for a peel-away sheath. The PleurX drain was brought through a subcutaneous tunnel and passed down the   peel-away sheath positioning the drain across the midline, lower and the pelvis. The skin incision was closed with absorbable suture.  The catheter was secured   with nonabsorbable suture. A total of 2250 cc of thin yellow fluid was removed.  Bandages were applied. Complications: Large-volume ascites. Medications:  37 minutes based placed under moderate sedation was provided under   the direction and supervision of Sudarshan Brown M.D. using frontal and Versed. Continuous cardiopulmonary monitoring was provided by trained independent   observer present. Ancef was infused prior to the procedure. Contrast:  None. Radiation dose:   Fluoroscopy time: 18 seconds. Reference air kerma (mGy): 8   Kerma area product (cGy.cm2):  867   Fluoroscopic images: 1     Findings:  Large volume ascites. Plan: Bedrest for 1 hour. Recommend performing a drainage procedure daily or every-other-day for the next   two weeks in order to promote healing of the catheter site.        CT CHEST W CONT [[id number]] Collected: 03/20/22 1811   Order Status: Completed Updated: 03/20/22 1823   Narrative:     CT CHEST WITH CONTRAST DATED 3/20/2022.      History: Echo with extra cardiac structure compressing the left atrium. Comparison: CT abdomen and pelvis with contrast 3/19/2022     Technique:   Multiple contiguous helical CT images reconstructed at 5 mm were   obtained from the neck base to the mid abdomen following the administration of   100 cc of Isovue 370 without acute complication. All CT scans performed at this   facility use one or all of the following: Automated exposure control, adjustment   of the mA and/or kVp according to patient's size, iterative reconstruction. Findings: The base of the neck is unremarkable in appearance. A left-sided venous port is   present. No lymphadenopathy is seen.  The thoracic aorta is normal in caliber. The heart is not clearly enlarged on the CT. Moderate to advanced   atherosclerotic calcification is seen of the coronary arteries. No significant   pericardial effusion is seen. The only abnormal space-occupying process adjacent   to the heart is a moderate size hiatal hernia. In addition to the stomach, there   is additional herniation of mesenteric fat as well as abdominal fluid. These all   appear to anteriorly displace the heart. The esophagus proximal to the hernia   sac is fluid distended which could indicate reflux. Obstruction at the   gastroesophageal junction is felt to be less likely given the additional fluid   distention of the hernia sac. Evaluation with lung windows demonstrates a trace right, and small left   dependent pleural effusion. These do appear worsened from the prior examination. Nonspecific pulmonary infiltrates are otherwise seen. Lungs are expanded without   evidence for pneumothorax.  No acute osseous abnormality is seen. Mild anasarca   is seen of the chest wall. Limited evaluation of the upper abdomen demonstrate multiple findings which are   not significantly changed and better assessed on a dedicated contrasted CT scan   of the abdomen performed on 3/19/2022.     Impression:     1.  The only abnormal space-occupying process adjacent to the heart is a   moderate size hiatal hernia. In addition to the stomach, there is additional   herniation of mesenteric fat as well as abdominal fluid. These all appear to   anteriorly displace the heart.       2. Worsening although trace to small bilateral dependent pleural effusions. Additional mild anasarca seen of the chest wall. These findings suggest fluid   overload. 3. Nonspecific pulmonary infiltrates. These could represent pulmonary edema   although the distribution is not classic. Additional allergies such as pneumonia   are not excluded if suggested by clinical findings. This report was made using voice transcription. Despite my best efforts to avoid   any, transcription errors may persist. If there is any question about the   accuracy of the report or need for clarification, then please call [phone], or text me through [email or web address] for clarification or correction. CT ABD PELV W CONT [[id number]] Collected: 03/19/22 1300   Order Status: Completed Updated: 03/20/22 1526   Addenda: Addendum: Addendum: A request was made to assess for potential right   gluteal hematoma. There is appear to be edema in the right gluteal soft tissues   which is slightly more pronounced than on the left. No clearly demonstrated defined collection is seen to suggest significant hematoma. The most inferior   gluteal soft tissues have been excluded from the field of imaging. Signed: 03/20/22 1523 by Lydia Cruz MD   Narrative:     CT ABDOMEN AND PELVIS WITH INTRAVENOUS CONTRAST DATED 3/19/2022. History: Fall hitting bottom and back. Comparison: CT abdomen and pelvis with contrast 12/27/2021     Technique:   Multiple contiguous helical CT images reconstructed at 5 mm   intervals were obtained from above the diaphragms through the ischial   tuberosities following oral and 100 cc Isovue-370 without acute complication.    All CT scans performed at this facility use one or all of the following:   Automated exposure control, adjustment of the mA and/or kVp according to   patient's size, iterative reconstruction. Findings:   CT ABDOMEN:     Limited evaluation of the lung bases and base of the mediastinum demonstrates   nonspecific infiltrates in the bilateral lung bases, left greater than right. A   small dependent left pleural effusion is seen. A small to moderate size hiatal   hernia is present. Ascites within the abdomen is also seen within the hernia   sac. The Liver is clearly cirrhotic in its appearance. Multiple hepatic masses are   seen the largest of which resides in the inferior right lobe measuring 3 cm in   size. The appearance is highly concerning for malignancy either representing   multifocal hepatomas, or hepatic metastases. Hepatic metastases are favored   given the appearance. Asymmetric intraperitoneal air ductal dilation is seen in   the left lobe of the liver which may be obstructed by a central hepatic mass   seen on axial image 23 measuring 1.1 cm in size. .  The spleen demonstrates an   irregular area of decreased attenuation in the superior pole seen on axial image   17 measuring 3.2 cm x 1.7 cm. This is not as well characterized. This does not   appear to represent an acute defect. This could result from flow artifact.  No   contour deforming or enhancing mass lesions are seen of the adrenal glands. The   pancreas is grossly unchanged in its appearance with the patient having a known   primary pancreas tumor described on the prior study. The gallbladder has been   removed.  The kidneys enhance symmetrically and no evidence of hydronephrosis is   seen. A stable benign cyst is seen in the posterior upper to midpole cortex of   the right kidney measuring 2.6 cm in size. The visualized loops of small bowel and colon are normal in caliber.  The   appendix appears to have been removed. Mild to moderate diverticulosis is seen   of the left colon.  No free air is seen. Moderate ascites is seen which   demonstrates low attenuation suggesting simple ascites. Diffuse mesenteric edema   is seen. Abnormal peritoneal nodularity and caking is suggested with additional   peritoneal thickening which at times appears nodular. This is highly concerning   for peritoneal metastatic process which appears worsened from the prior   examination.  No evolving adenopathy is seen.  The abdominal aorta demonstrate   moderate atherosclerotic calcification. No acute osseous abnormality is seen. Compression deformities are seen at T12, L1, and L4 although these have a   chronic appearance and are stable from the prior comparison study. CT PELVIS:   Small to moderate ascites extends into the pelvis. There is once again nodular   peritoneal thickening best appreciated on axial image 73 highly concerning for   evolving peritoneal metastatic disease. There appears to be serosal involvement   of the mid sigmoid colon seen on axial image 74. No abnormal dilation is seen to   suggest significant obstruction at this time. Phyllis Garcia evolving pelvic adenopathy is   seen.  The urinary bladder is unremarkable. No acute osseous abnormality is   seen. Impression:     1.  No clear acute injury from recent fall. Specifically, no acute osseous   abnormality is seen. 2. Grossly stable appearance of pancreatic tumor although this is suboptimally   assessed on this exam. However, there is clear evidence for evolving metastatic   disease with new hepatic masses, and multiple findings as described above which   are highly concerning for evolving peritoneal metastatic disease. Lastly,   irregular decreased attenuation is seen in the superior pole of the spleen which   does not appear clearly acute and does not demonstrate adjacent complex ascites. This could represent an additional metastasis although is not as well   characterized. This report was made using voice transcription.  Despite my best efforts to avoid   any, transcription errors may persist. If there is any question about the   accuracy of the report or need for clarification, then please call [phone], or text me through [email or web address] for clarification or correction.     MRI BRAIN W WO CONT [[id number]] Collected: 03/19/22 1649   Order Status: Completed Updated: 03/19/22 1657   Narrative:     EXAMINATION: BRAIN MRI 3/19/2022 4:47 PM     ACCESSION NUMBER: [id number]     INDICATION: 39-year-old male with fall, altered mental status and confusion. History of pancreatic cancer on chemotherapy with recent progression of   intra-abdominal disease, no prior imaging of brain. COMPARISON: CT head 3/19/2022. TECHNIQUE: Multiplanar multisequence MRI of the brain without and with   intravenous administration of 14 mL contrast agent. FINDINGS:     Previously described subcentimeter focus along the posterior limb of the right   internal capsule follows CSF signal intensity on all sequences and is favored to   represent a prominent perivascular space. There is a mild degree of scattered and confluent foci of T2/FLAIR   hyperintensity within the periventricular and deep white matter, nonspecific but   most commonly seen with chronic small vessel ischemic changes. Faint chronic   hemosiderin involving the left posterior putamen. No midline shift or mass lesion. There is no evidence of intracranial hemorrhage   or acute infarct. The ventricles are within normal limits in size and   configuration. There are no extra-axial fluid collections present.  No diffusion   weighted signal abnormality is identified. There is no abnormal enhancement. Impression:       No acute finding or evidence of intracranial metastatic disease.       XR ELBOW LT MIN 3 V [[id number]] Collected: 03/19/22 1534   Order Status: Completed Updated: 03/19/22 1538   Narrative:     XR FOREARM LT AP/LAT, XR ELBOW LT MIN 3 V 3/19/2022 3:19 PM     HISTORY: Fall with left arm and left elbow pain. Impression:       Two views left forearm. No fracture or dislocation. No significant soft tissue   swelling. Old healed distal ulnar fracture deformity is present. Three views left elbow. No fracture or dislocation. No significant soft tissue   swelling. No fat pad elevation. XR FOREARM LT AP/LAT [[id number]] Collected: 03/19/22 1534   Order Status: Completed Updated: 03/19/22 1538   Narrative:     XR FOREARM LT AP/LAT, XR ELBOW LT MIN 3 V 3/19/2022 3:19 PM     HISTORY: Fall with left arm and left elbow pain. Impression:       Two views left forearm. No fracture or dislocation. No significant soft tissue   swelling. Old healed distal ulnar fracture deformity is present. Three views left elbow. No fracture or dislocation. No significant soft tissue   swelling. No fat pad elevation. CT HEAD WO CONT [[id number]] Collected: 03/19/22 1254   Order Status: Completed Updated: 03/19/22 1258   Narrative:     CT BRAIN WITHOUT CONTRAST   3/19/2022 12:26 PM     INDICATION: Fall, altered mental status and confusion. COMPARISON: None available at this hospital PACS     Technique:  Multiple contiguous axial images are obtained encompassing the brain   from the skull base to the vertex.  For this CT scanner at least one of the   following techniques is utilized to decrease patient radiation dose: Automatic   exposure control, KVP and mA modulation based on patient weight, and iterative   reconstruction. FINDINGS: The ventricles are midline and of appropriate size and configuration. Mild hypoattenuating foci seen in the periventricular deep white matter. Inferiorly at the junction of the thalamus and posterior limb of the internal   capsule on the right there is a rounded mildly hypodense focus that measures 9   mm. The midline structures and posterior fossa are intact.  There is no finding of   an acute cortical stroke, mass lesion, or acute bleed.  No extra-axial fluid   collection. The skull is intact, no discreet abnormality. The paranasal sinuses are not   remarkable. The visualized orbits and mastoid air-cells are patent. Impression:     9 mm rounded hypoattenuating focus on the right at the inferior   junction of the thalamus and posterior limb of the internal capsule is noted. Suspicious for lacunar infarct and of uncertain chronicity-cannot exclude that   this is new. Elsewhere only mild chronic changes in the periventricular white matter   suggested. For further imaging evaluation of this as clinically indicated-recommend brain   MRI with diffusion imaging. XR CHEST Cate Geiger [[id number]] Collected: 03/15/22 1259   Order Status: Completed Updated: 03/15/22 1302   Narrative:     Chest X-ray     INDICATION: Hypotension. COMPARISON: Chest x-ray 2/10/2021. A portable AP view of the chest was obtained. FINDINGS: The lungs are clear. There are no infiltrates or effusions. Left chest   port is unchanged in position. No pneumothorax. The heart size is normal.  The   bony thorax is intact.      Impression:     No acute findings in the chest. Lungs remain clear. No interval   change.           Medications:  Current Facility-Administered Medications   Medication Dose Route Frequency    piperacillin-tazobactam (ZOSYN) 4.5 g in 0.9% sodium chloride (MBP/ADV) 100 mL MBP  4.5 g IntraVENous Q8H    vancomycin (VANCOCIN) 1250 mg in  ml infusion  1,250 mg IntraVENous Q12H    methylPREDNISolone (PF) (SOLU-MEDROL) injection 40 mg  40 mg IntraVENous Q12H    enoxaparin (LOVENOX) injection 40 mg  40 mg SubCUTAneous Q24H    HYDROmorphone (DILAUDID) injection 1 mg  1 mg IntraVENous Q4H PRN    HYDROmorphone (DILAUDID) injection 0.5 mg  0.5 mg IntraVENous Q4H PRN    LORazepam (ATIVAN) injection 1 mg  1 mg IntraVENous Q4H PRN    naloxone (NARCAN) injection 0.04 mg  0.04 mg IntraVENous PRN    furosemide (LASIX) injection 20 mg  20 mg IntraVENous Q8H    [START ON 4/2/2022] potassium chloride (K-DUR, KLOR-CON M20) SR tablet 20 mEq  20 mEq Oral DAILY    loperamide (IMODIUM) capsule 2 mg  2 mg Oral Q12H    midodrine (PROAMATINE) tablet 5 mg  5 mg Oral TID WITH MEALS    TPN ADULT-CENTRAL - dextrose 14% amino acid 8%   IntraVENous QPM    HYDROcodone-acetaminophen (NORCO)  mg tablet 1 Tablet  1 Tablet Oral Q4H PRN    calcium carbonate (TUMS) chewable tablet 200 mg [elemental]  200 mg Oral TID PRN    alum-mag hydroxide-simeth (MYLANTA) oral suspension 30 mL  30 mL Oral Q4H PRN    prochlorperazine (COMPAZINE) with saline injection 5 mg  5 mg IntraVENous Q6H PRN    acetaminophen (TYLENOL) tablet 650 mg  650 mg Oral Q6H PRN    diphenhydrAMINE (BENADRYL) capsule 25 mg  25 mg Oral Q6H PRN    amiodarone (CORDARONE) tablet 400 mg  400 mg Oral BID    [Held by provider] fat emulsion 20% (LIPOSYN, INTRAlipid) infusion 250 mL  250 mL IntraVENous QPM    NUTRITIONAL SUPPORT ELECTROLYTE PRN ORDERS   Does Not Apply PRN    sodium chloride (NS) flush 5-10 mL  5-10 mL IntraVENous PRN    diphenoxylate-atropine (LOMOTIL) tablet 1 Tablet  1 Tablet Oral QID PRN    famotidine (PEPCID) tablet 20 mg  20 mg Oral BID    pantoprazole (PROTONIX) tablet 40 mg  40 mg Oral ACB    promethazine (PHENERGAN) tablet 25 mg  25 mg Oral Q6H PRN    tamsulosin (FLOMAX) capsule 0.4 mg  0.4 mg Oral DAILY    ondansetron (ZOFRAN) injection 4 mg  4 mg IntraVENous Q4H PRN         ASSESSMENT:    Problem List  Date Reviewed: 3/15/2022          Codes Class Noted    Acute respiratory failure with hypoxia [facility]) ICD-10-CM: J96.01  ICD-9-CM: 518.81  4/1/2022        Bilateral pulmonary infiltrates on chest x-ray ICD-10-CM: R91.8  ICD-9-CM: 793.19  4/1/2022        Chronic systolic congestive heart failure (HCC) (Chronic) ICD-10-CM: I50.22  ICD-9-CM: 428.22, 428.0  3/21/2022        Bradycardia ICD-10-CM: R00.1  ICD-9-CM: 427.89  3/19/2022        LBBB (left bundle branch block) ICD-10-CM: I44.7  ICD-9-CM: 426.3  3/19/2022 FTT (failure to thrive) in adult ICD-10-CM: R62.7  ICD-9-CM: 783.7  3/15/2022        * (Principal) Hypotension ICD-10-CM: I95.9  ICD-9-CM: 458.9  3/15/2022        Severe protein-calorie malnutrition ([facility] ICD-10-CM: E43  ICD-9-CM: 332  3/4/2022        Pancreatic cancer (HCC) ICD-10-CM: C25.9  ICD-9-CM: 157.9  3/2/2022        Ascites ICD-10-CM: R18.8  ICD-9-CM: 789.59  3/2/2022        Admission for antineoplastic chemotherapy ICD-10-CM: Z51.11  ICD-9-CM: V58.11  3/2/2022        Hypomagnesemia ICD-10-CM: E83.42  ICD-9-CM: 275.2  4/23/2021        Hypokalemia ICD-10-CM: E87.6  ICD-9-CM: 276.8  4/20/2021        CINV (chemotherapy-induced nausea and vomiting) ICD-10-CM: R11.2, T45.1X5A  ICD-9-CM: 787.01, E933.1  4/20/2021        Dehydration ICD-10-CM: E86.0  ICD-9-CM: 276.51  4/12/2021        Other neutropenia ([facility] ICD-10-CM: D70.8  ICD-9-CM: 288.09  3/9/2021        Malignant neoplasm of lower third of esophagus (HCC) ICD-10-CM: C15.5  ICD-9-CM: 150.5  1/5/2021        Malignant neoplasm of body of pancreas ([facility] ICD-10-CM: C25.1  ICD-9-CM: 157.1  1/5/2021        Severe obesity (HCC) (Chronic) ICD-10-CM: E66.01  ICD-9-CM: 278.01  12/10/2020        Elevated glucose ICD-10-CM: R73.09  ICD-9-CM: 790.29  7/22/2019        Anemia ICD-10-CM: D64.9  ICD-9-CM: 285.9  7/22/2019        Chronic left-sided low back pain (Chronic) ICD-10-CM: M54.50, G89.29  ICD-9-CM: 724.2, 338.29  7/22/2019              78 y.o.  M pancreatic cancer and esophageal cancer with malignant ascites of previously controlled FOLFIRINOX but currently disease progressed and most recently admited on 3/2/2022 to place Pleurx and arrange gemcitabine/Abraxane, had extensive discussion with inpatient team and pharmacy, patient was discharged on 3/7/2022 without chemo appointment and return to office on 3/15/2022, extremely sick and blood pressure not measurable in the office, and not been having much oral intake since discharge, urgently establish IV access and called EMS to take to ER to stabilize and admit to hospital, apparently needed much supportive care and volume resuscitation, start TPN until oral intake can improve and give gemcitabine/Abraxane once clinically stable, discussed with the inpatient team.    PLAN:    Metastatic pancreatic cancer / distal esophageal cancer  - Metastatic disease involving peritoneum, malignant ascites  - s/p 24 cycles of FOLFIRINOX with SD and recent POD with increasing CEA/ and malignant ascites  - Plan for gemcitabine/abraxane when clinically stable - hopefully tomorrow. 3/17 [place]/abraxane today, tolerated well  3/24 D8 due today, deferring tx d/t cytopenias. Dr. Aylin Arroyo discussed that if patient's condition does not improve, that he may want to consider Hospice services. Palliative care following. 3/25 Pt wants to con't to pursue treatment as he wants to change his relationship with his children. He does not feel the burden of tx outweighs the benefits and wants to continue tx for as long as he can. D15 due 3/31.  3/29 TM checked, CEA up but  improved. 3/30 Office working to obtain sotorasib. D15 gem/abraxane due tomorrow. 3/31 Notes from office indicate sotorasib approved. [place]/abraxane due today - plts <100k. Hold today and re-eval tomorrow. 4/1 Holding chemo for acute issues - office working on sotorasib    Malignant ascites  - Has abdominal Pleurx, drain three times weekly  3/17 Pleurx drained 3/16 - 1100cc out. 3/18 Pleurx accidentally dislodged last night - 1800cc removed before completely removed. IR notified and will re-evaluate Monday. 3/20 Worsening abdominal pain possibly secondary to re-accumulation, some drainage reported at Pleurx site. IR evaluating tomorrow. 3/21 IR to re-evaluate tomorrow, was not NPO today. 3/22 To IR today. 3/23 Abd pleurx replaced yesterday  3/25 Pleurx drained yesterday, 1.1L. Con't to drain prn.  3/26 drained today as he was feeling uncomfortable. Monitor blood pressures. 3/27 continue to drain PRN, but be cautious with blood pressures as he is hypotensive at times. 3/29 BP much improved - drained 1100 cc yesterday due to patient request. Previously draining every other day but asking to be drained daily. Will continue to drain PRN as long as BP acceptable. 3/30 Abdominal discomfort - requesting to be drained again today. BP stable. 3/31 Pleurx drained again today per pt request.  4/1 Holding on further draining of Pleurx given hypotension - drain every other day (or PRN). Cancer related pain  - Continue home dilaudid  3/17 Utilizing IV morphine. Will decrease dosing and encourage use of home Norco.  3/18 Continue to wean IV meds - encourage PO.  3/20 Has not been receiving IV morphine due to hypotension. Continue with PRN oral medications. 3/22 Consult to [facility] regarding [address], symptom management. 3/23 PC following. Pt changed code status to DNR, wants to pursue further cancer-related treatment. 3/25 PC following  3/27 Dilaudid increased today. During rounds, he stated his pain was better controlled with increased dose. 3/29 Continues on IV dilaudid. 3/31 IV dilaudid use limited by BP. Increased Norco to 10mg yesterday. PC following. Hypotension / poor PO intake / protein-calorie malnutrition  - Consult RD for TPN  - BC pending, started on Cefe/Vanc and may be able to de-escalate soon as hypotension likely secondary to dehydration. LA improved after IFV. 3/17 On TPN. Continues on Cefe/Vanc although infectious work-up unremarkable. Will stop antibiotics. 3/18 Hypotension improved and remains afebrile off antibiotics. Continue with nutritional support via TPN.  3/19 Ongoing hypotension, although improved yesterday AM, worse through evening. Had 6L of IVF yesterday and continues on TPN.  May consider midodrine although MAP consistently ~65.  3/21 Hypotension improved but borderline, continues TPN.   3/22 Discussed with RD volume status of TPN given new finding of HFrEF and CT chest with evidence of fluid overload. CM following for home TPN. Holding IV morphine. 3/23 BPs improved  3/24 Episode of hypotension last PM, required small fluid bolus, BP improved  3/25 Continues on TPN, however pt declined to be attached to TPN yesterday AM and then disconnected himself from it last night. 3/27 per RD note, patient unaware about prior TPN disconnections. This AM during rounds, visitor noted that \"something was leaking. \" He had pulled his port needle out again and his TPN was leaking onto the floor. RD plans to discuss further goals regarding TPN with pt when she visits him today. 1/65 Cyclic TPN to start today per RD. Encouraged PO intake and he agrees. 3/29 Reports he drank an Ensure but is scared to eat because of diarrhea. Will manage diarrhea. 3/31 Continues on TPN - PO intake remains minimal due to respiratory issues. 4/1 Hold TPN given volume overload    Nausea  - Continue PRN antiemetics    Diarrhea  - Antidiarrheals PRN  3/24 c/o worsening diarrhea. Check Cdiff.   3/25 Cdiff neg. Con't using antidiarrheals prn.  3/28 Reports diarrhea - only 1 documented stool. Continue PRN antidiarrheals. 3/29 Reports he is afraid to eat because of diarrhea. Will schedule imodium TID.   3/31 Change to BID. Bradycardia / HFrEF / LV dysfunction / LBBB / ectopy  - One documented episode of HR 43 - check EKG  - No hx of hypertension, monitor  3/17 EKG with bi-geminal PACs and known LBBB. Recheck EGK.  3/18 EKG with same as above. Palpated pulse on several occasions documented in the 40s. Tele applied overnight. Cardiology following. 3/19 Cardiology recommending K>4 (on TPN) and Mg replacements. Echo pending. 3/21 No bradycardia noted on telemetry. Does have ectopy which may have led to incorrect pulse rate palpation. Echo with EF 35-40%.  CT chest completed due to echo findings rebeka noted hiatal hernia/mesenteric fat herniation/abdominal fluid displacing heart and small BL effusions, anasarca. Cardiology following.  3/22 Cardiology considering BB if BP improve for HF/LV dysfunction. Weight up 6# overnight and increased since admission. Clinically no evidence of overload but asking RD to adjust TPN volume. Reported run of NS-SVT - cardiology following. 3/25 Cards following. Hypotension limiting therapy for heart failure. 3/26 cards signed off and recommended cont Amiodarone   3/30 BP improved, CXR shows pulmonary edema and possible effusion. 20mg Lasix given this morning. Will add albumin and diurese as able pending blood pressure tolerance. 3/31 BP dropped yesterday before albumin but improved throughout night. Up to 7L NC. Will continue albumin and give lasix today. Start midodrine. 4/1 Received 40mg lasix yesterday/PM. Ongoing albumin - D2. Continues on midodrine but borderline BP making diuresis difficult. Developed worsening respiratory distress overnight. Pancytopenia secondary to chemotherapy  - Transfuse prn per [facility] SOPs  3/25 ANC down to 1000. Start G-CSF.  3/26 [address] improved to 2.6.   3/28 WBC up to 20 - stop G-CSF.   3/29 Plts up to 71k. Fall  3/19 Witnessed slip and fall on wet floor yesterday. Today with L arm bruising - check X-ray. Will check CT head and CT AP (hematoma/lipoma palpated on R buttock) and also c/o worsening abdominal pain. 3/20 X-ray of L elbow/forearm negative. CT head with ?lacunar infarct but MRI showed no acute findings and ?lacunar infarct appears to be prominent perivascular space. CT AP with progressing peritoneal/liver disease and ?new splenic lesion. Last imaging obtained 12/2021 for comparison. Elevated LFTs  3/21 Monitor, possibly secondary to TPN or recent chemo. 3/22 Appear to be improving  3/23 AST improved, ALP increased  3/28 Improving  RESOLVED    Hypoxic respiratory failure  / ?Pneumonia / CHF exacerbation / fluid overload  3/24 On O2 @ 2L now. Check CXR.  3/25 CXR with low lung volumes with b/l infiltrates ?edema vs infx? Check PCT - elevated. Start Cef/Vanc.  3/26 continues on cef/vanc; on room air   3/28 D4 Cefepime/Vancomycin - afebrile. DC Vancomycin. 3/29 Continue Cefepime - likley DC soon. 3/31 DC Cefe. No clinical evidence of pneumonia, afebrile. CXR findings likely pulmonary edema. Continue diuresis/albumin. 4/1 Moved to ICU this AM after increasing O2 needs/respriatory effort. On BiPAP. Minimal diuresis due to low BP. Repeat lasix this AM and continue diuresis/albumin as BP tolerates. Pulmonology following - restarted Cefe and added Vancomycin after CXR showed worsening infiltrates and adding steroids for possible pneumonitis given recent chemo. Patient changed code status back to full code. Continue home meds  [facility] SOPs  Lovenox (hold for plts <50k)    Goals and plan of care reviewed with the patient. All questions answered to the best of our ability. Disposition:  TBD pending clinical course.               Cuba Hawkins MD  [facility]  [address] [place] [address]  Office : [phone]  Fax : [phone]   I personally saw, exammed and counselled the patient, and discussed with NP, agree with above history/assessment/plan. 66 y.o.male pancreatic cancer and esophageal cancer with peritoneal carcinomatosis, status post first-line FOLFIRINOX, disease progression and admitted for failure to thrive/obstruction, received TPN and 1 cycle of gemcitabine/Abraxane, reported abdominal pain improved and started having bowel movement/gas, which is encouraging sign of peritoneal disease response, discussed the need to enhance nutrition and improve hypoalbuminemia, also found EF 35 to 40%, sinusitis as needed, week 2 chemo was on hold for thrombocytopenia, hypotensive and received fluids and developed respiratory distress, blood pressure does not tolerate diuresis, difficult situation of anasarca but intravascularly dry, give albumin and blood pressure better, hold chemo today, add midodrine for hypotension,  transferred to ICU for respiratory distress and responded well to BiPAP responding well to albumin supported diuresis, continue to manage volume carefully and diuretics gently as long as the blood pressure tolerates. We worked with financial counselor/insurance/specialty pharmacy and approved a special case delivery of sotorasib for tomorrow, discussed with daughter and set up deliver address to friend [place], will start once arrived. Continue above care. Julia Goins M.D.   [address], [address] [place] [address]  Fax : [phone]

## 2022-04-02 ENCOUNTER — APPOINTMENT (OUTPATIENT)
Dept: GENERAL RADIOLOGY | Age: 67
DRG: 435 | End: 2022-04-02
Attending: INTERNAL MEDICINE
Payer: MEDICARE

## 2022-04-02 ENCOUNTER — APPOINTMENT (OUTPATIENT)
Dept: NON INVASIVE DIAGNOSTICS | Age: 67
DRG: 435 | End: 2022-04-02
Attending: NURSE PRACTITIONER
Payer: MEDICARE

## 2022-04-02 LAB
ALBUMIN SERPL-MCNC: 2.7 G/DL (ref 3.2–4.6)
ALBUMIN/GLOB SERPL: 1 {RATIO} (ref 1.2–3.5)
ALP SERPL-CCNC: 285 U/L (ref 50–136)
ALT SERPL-CCNC: 16 U/L (ref 12–65)
ANION GAP SERPL CALC-SCNC: 8 MMOL/L (ref 7–16)
ARTERIAL PATENCY WRIST A: ABNORMAL
AST SERPL-CCNC: 25 U/L (ref 15–37)
BASE EXCESS BLD CALC-SCNC: 3.5 MMOL/L
BASOPHILS # BLD: 0.1 K/UL (ref 0–0.2)
BASOPHILS NFR BLD: 1 % (ref 0–2)
BDY SITE: ABNORMAL
BILIRUB SERPL-MCNC: 0.8 MG/DL (ref 0.2–1.1)
BUN SERPL-MCNC: 22 MG/DL (ref 8–23)
CALCIUM SERPL-MCNC: 8.4 MG/DL (ref 8.3–10.4)
CHLORIDE SERPL-SCNC: 109 MMOL/L (ref 98–107)
CO2 SERPL-SCNC: 29 MMOL/L (ref 21–32)
CREAT SERPL-MCNC: 0.6 MG/DL (ref 0.8–1.5)
DIFFERENTIAL METHOD BLD: ABNORMAL
EOSINOPHIL # BLD: 0 K/UL (ref 0–0.8)
EOSINOPHIL NFR BLD: 0 % (ref 0.5–7.8)
ERYTHROCYTE [DISTWIDTH] IN BLOOD BY AUTOMATED COUNT: 16.3 % (ref 11.9–14.6)
GAS FLOW.O2 O2 DELIVERY SYS: ABNORMAL L/MIN
GLOBULIN SER CALC-MCNC: 2.7 G/DL (ref 2.3–3.5)
GLUCOSE SERPL-MCNC: 147 MG/DL (ref 65–100)
HCO3 BLD-SCNC: 27.5 MMOL/L (ref 22–26)
HCT VFR BLD AUTO: 29.3 % (ref 41.1–50.3)
HGB BLD-MCNC: 9 G/DL (ref 13.6–17.2)
IMM GRANULOCYTES # BLD AUTO: 1.4 K/UL (ref 0–0.5)
IMM GRANULOCYTES NFR BLD AUTO: 11 % (ref 0–5)
INSPIRATION.DURATION SETTING TIME VENT: 1 SEC
LYMPHOCYTES # BLD: 0.5 K/UL (ref 0.5–4.6)
LYMPHOCYTES NFR BLD: 4 % (ref 13–44)
MAGNESIUM SERPL-MCNC: 2.1 MG/DL (ref 1.8–2.4)
MCH RBC QN AUTO: 29.2 PG (ref 26.1–32.9)
MCHC RBC AUTO-ENTMCNC: 30.7 G/DL (ref 31.4–35)
MCV RBC AUTO: 95.1 FL (ref 79.6–97.8)
MONOCYTES # BLD: 0.9 K/UL (ref 0.1–1.3)
MONOCYTES NFR BLD: 7 % (ref 4–12)
NEUTS SEG # BLD: 9.6 K/UL (ref 1.7–8.2)
NEUTS SEG NFR BLD: 77 % (ref 43–78)
NRBC # BLD: 0.04 K/UL (ref 0–0.2)
O2/TOTAL GAS SETTING VFR VENT: 85 %
PCO2 BLD: 38.5 MMHG (ref 35–45)
PEEP RESPIRATORY: 8 CMH2O
PH BLD: 7.46 [PH] (ref 7.35–7.45)
PIP ISTAT,IPIP: 19
PLATELET # BLD AUTO: 110 K/UL (ref 150–450)
PLATELET COMMENTS,PCOM: ABNORMAL
PMV BLD AUTO: 11.8 FL (ref 9.4–12.3)
PO2 BLD: 47 MMHG (ref 75–100)
POTASSIUM SERPL-SCNC: 3.3 MMOL/L (ref 3.5–5.1)
PRESSURE SUPPORT SETTING VENT: 12 CMH2O
PROT SERPL-MCNC: 5.4 G/DL (ref 6.3–8.2)
RBC # BLD AUTO: 3.08 M/UL (ref 4.23–5.6)
RBC MORPH BLD: ABNORMAL
SAO2 % BLD: 84.9 % (ref 95–98)
SERVICE CMNT-IMP: ABNORMAL
SERVICE CMNT-IMP: ABNORMAL
SODIUM SERPL-SCNC: 146 MMOL/L (ref 138–145)
SPECIMEN TYPE: ABNORMAL
VANCOMYCIN SERPL-MCNC: 30.1 UG/ML
VT SETTING VENT: 814 ML
WBC # BLD AUTO: 12.5 K/UL (ref 4.3–11.1)
WBC MORPH BLD: ABNORMAL

## 2022-04-02 PROCEDURE — 77030004950 HC CATH ENTRL NG COVD -A

## 2022-04-02 PROCEDURE — C9113 INJ PANTOPRAZOLE SODIUM, VIA: HCPCS | Performed by: INTERNAL MEDICINE

## 2022-04-02 PROCEDURE — 74011250636 HC RX REV CODE- 250/636: Performed by: NURSE PRACTITIONER

## 2022-04-02 PROCEDURE — 74011250636 HC RX REV CODE- 250/636: Performed by: INTERNAL MEDICINE

## 2022-04-02 PROCEDURE — 3331090002 HH PPS REVENUE DEBIT

## 2022-04-02 PROCEDURE — 36600 WITHDRAWAL OF ARTERIAL BLOOD: CPT

## 2022-04-02 PROCEDURE — 71045 X-RAY EXAM CHEST 1 VIEW: CPT

## 2022-04-02 PROCEDURE — 74011000250 HC RX REV CODE- 250: Performed by: INTERNAL MEDICINE

## 2022-04-02 PROCEDURE — 0DH673Z INSERTION OF INFUSION DEVICE INTO STOMACH, VIA NATURAL OR ARTIFICIAL OPENING: ICD-10-PCS | Performed by: INTERNAL MEDICINE

## 2022-04-02 PROCEDURE — 99291 CRITICAL CARE FIRST HOUR: CPT | Performed by: INTERNAL MEDICINE

## 2022-04-02 PROCEDURE — 74011250637 HC RX REV CODE- 250/637: Performed by: INTERNAL MEDICINE

## 2022-04-02 PROCEDURE — 74011000258 HC RX REV CODE- 258: Performed by: INTERNAL MEDICINE

## 2022-04-02 PROCEDURE — 80202 ASSAY OF VANCOMYCIN: CPT

## 2022-04-02 PROCEDURE — 83735 ASSAY OF MAGNESIUM: CPT

## 2022-04-02 PROCEDURE — 74018 RADEX ABDOMEN 1 VIEW: CPT

## 2022-04-02 PROCEDURE — 82803 BLOOD GASES ANY COMBINATION: CPT

## 2022-04-02 PROCEDURE — 85025 COMPLETE CBC W/AUTO DIFF WBC: CPT

## 2022-04-02 PROCEDURE — 74011250637 HC RX REV CODE- 250/637: Performed by: NURSE PRACTITIONER

## 2022-04-02 PROCEDURE — 99233 SBSQ HOSP IP/OBS HIGH 50: CPT | Performed by: INTERNAL MEDICINE

## 2022-04-02 PROCEDURE — 94660 CPAP INITIATION&MGMT: CPT

## 2022-04-02 PROCEDURE — C8923 2D TTE W OR W/O FOL W/CON,CO: HCPCS

## 2022-04-02 PROCEDURE — 65610000001 HC ROOM ICU GENERAL

## 2022-04-02 PROCEDURE — 80053 COMPREHEN METABOLIC PANEL: CPT

## 2022-04-02 PROCEDURE — 2709999900 HC NON-CHARGEABLE SUPPLY

## 2022-04-02 PROCEDURE — 74011000258 HC RX REV CODE- 258: Performed by: NURSE PRACTITIONER

## 2022-04-02 PROCEDURE — 3331090001 HH PPS REVENUE CREDIT

## 2022-04-02 RX ORDER — POTASSIUM CHLORIDE 14.9 MG/ML
20 INJECTION INTRAVENOUS
Status: COMPLETED | OUTPATIENT
Start: 2022-04-02 | End: 2022-04-02

## 2022-04-02 RX ORDER — ENOXAPARIN SODIUM 100 MG/ML
40 INJECTION SUBCUTANEOUS ONCE
Status: COMPLETED | OUTPATIENT
Start: 2022-04-02 | End: 2022-04-02

## 2022-04-02 RX ORDER — MIDODRINE HYDROCHLORIDE 5 MG/1
5 TABLET ORAL 3 TIMES DAILY
Status: DISCONTINUED | OUTPATIENT
Start: 2022-04-02 | End: 2022-04-06 | Stop reason: HOSPADM

## 2022-04-02 RX ORDER — DEXMEDETOMIDINE HYDROCHLORIDE 4 UG/ML
.1-1.5 INJECTION, SOLUTION INTRAVENOUS
Status: DISCONTINUED | OUTPATIENT
Start: 2022-04-02 | End: 2022-04-06 | Stop reason: HOSPADM

## 2022-04-02 RX ORDER — ENOXAPARIN SODIUM 100 MG/ML
1 INJECTION SUBCUTANEOUS EVERY 12 HOURS
Status: DISCONTINUED | OUTPATIENT
Start: 2022-04-02 | End: 2022-04-04

## 2022-04-02 RX ADMIN — METHYLPREDNISOLONE SODIUM SUCCINATE 40 MG: 40 INJECTION, POWDER, FOR SOLUTION INTRAMUSCULAR; INTRAVENOUS at 09:26

## 2022-04-02 RX ADMIN — LORAZEPAM 1 MG: 2 INJECTION INTRAMUSCULAR at 03:19

## 2022-04-02 RX ADMIN — ENOXAPARIN SODIUM 40 MG: 40 INJECTION SUBCUTANEOUS at 10:38

## 2022-04-02 RX ADMIN — ENOXAPARIN SODIUM 80 MG: 100 INJECTION SUBCUTANEOUS at 22:16

## 2022-04-02 RX ADMIN — ENOXAPARIN SODIUM 40 MG: 40 INJECTION SUBCUTANEOUS at 14:48

## 2022-04-02 RX ADMIN — VANCOMYCIN HYDROCHLORIDE 1000 MG: 1 INJECTION, POWDER, LYOPHILIZED, FOR SOLUTION INTRAVENOUS at 22:13

## 2022-04-02 RX ADMIN — LORAZEPAM 1 MG: 2 INJECTION INTRAMUSCULAR at 07:43

## 2022-04-02 RX ADMIN — FUROSEMIDE 20 MG: 10 INJECTION, SOLUTION INTRAMUSCULAR; INTRAVENOUS at 13:35

## 2022-04-02 RX ADMIN — POTASSIUM CHLORIDE 20 MEQ: 14.9 INJECTION, SOLUTION INTRAVENOUS at 05:25

## 2022-04-02 RX ADMIN — HYDROMORPHONE HYDROCHLORIDE 1 MG: 1 INJECTION, SOLUTION INTRAMUSCULAR; INTRAVENOUS; SUBCUTANEOUS at 01:55

## 2022-04-02 RX ADMIN — DEXMEDETOMIDINE HYDROCHLORIDE 0.4 MCG/KG/HR: 100 INJECTION, SOLUTION INTRAVENOUS at 09:26

## 2022-04-02 RX ADMIN — METHYLPREDNISOLONE SODIUM SUCCINATE 40 MG: 40 INJECTION, POWDER, FOR SOLUTION INTRAMUSCULAR; INTRAVENOUS at 22:21

## 2022-04-02 RX ADMIN — HYDROMORPHONE HYDROCHLORIDE 1 MG: 1 INJECTION, SOLUTION INTRAMUSCULAR; INTRAVENOUS; SUBCUTANEOUS at 05:35

## 2022-04-02 RX ADMIN — PERFLUTREN 1 ML: 6.52 INJECTION, SUSPENSION INTRAVENOUS at 18:15

## 2022-04-02 RX ADMIN — PIPERACILLIN SODIUM,TAZOBACTAM SODIUM 4.5 G: 4; .5 INJECTION, POWDER, FOR SOLUTION INTRAVENOUS at 01:59

## 2022-04-02 RX ADMIN — POTASSIUM CHLORIDE 20 MEQ: 14.9 INJECTION, SOLUTION INTRAVENOUS at 07:35

## 2022-04-02 RX ADMIN — AMIODARONE HYDROCHLORIDE 400 MG: 200 TABLET ORAL at 20:24

## 2022-04-02 RX ADMIN — HYDROMORPHONE HYDROCHLORIDE 1 MG: 1 INJECTION, SOLUTION INTRAMUSCULAR; INTRAVENOUS; SUBCUTANEOUS at 15:50

## 2022-04-02 RX ADMIN — FUROSEMIDE 20 MG: 10 INJECTION, SOLUTION INTRAMUSCULAR; INTRAVENOUS at 05:29

## 2022-04-02 RX ADMIN — SODIUM CHLORIDE, PRESERVATIVE FREE 20 MG: 5 INJECTION INTRAVENOUS at 09:30

## 2022-04-02 RX ADMIN — LOPERAMIDE HYDROCHLORIDE 2 MG: 2 CAPSULE ORAL at 22:11

## 2022-04-02 RX ADMIN — SODIUM CHLORIDE 40 MG: 9 INJECTION INTRAMUSCULAR; INTRAVENOUS; SUBCUTANEOUS at 09:30

## 2022-04-02 RX ADMIN — PIPERACILLIN SODIUM,TAZOBACTAM SODIUM 4.5 G: 4; .5 INJECTION, POWDER, FOR SOLUTION INTRAVENOUS at 17:02

## 2022-04-02 RX ADMIN — MIDODRINE HYDROCHLORIDE 5 MG: 5 TABLET ORAL at 22:11

## 2022-04-02 RX ADMIN — VANCOMYCIN HYDROCHLORIDE 1250 MG: 100 INJECTION, POWDER, LYOPHILIZED, FOR SOLUTION INTRAVENOUS at 10:23

## 2022-04-02 RX ADMIN — PIPERACILLIN SODIUM,TAZOBACTAM SODIUM 4.5 G: 4; .5 INJECTION, POWDER, FOR SOLUTION INTRAVENOUS at 09:30

## 2022-04-02 RX ADMIN — SODIUM CHLORIDE, PRESERVATIVE FREE 20 MG: 5 INJECTION INTRAVENOUS at 22:05

## 2022-04-02 NOTE — PROGRESS NOTES
[facility]        Inpatient Hematology / Oncology Progress Note      Admission Date: 3/15/2022 10:28 AM  Reason for Admission/Hospital Course: FTT (failure to thrive) in adult [R62.7]  Hypotension [I95.9]      24 Hour Events:  Remains in ICU on BiPAP  Precedex drip started   Hypotension a bit better  Lasix every 8 hours    Transfusions: None  Replacements: None    ROS:   Unable to be obtained d/t patient status     10 point review of systems is otherwise negative with the exception of the elements mentioned above in the HPI. No Known Allergies    OBJECTIVE:  Patient Vitals for the past 8 hrs:   BP Temp Pulse Resp SpO2   22 0930 126/73 -- (!) 101 (!) 33 91 %   22 0900 (!) 87/52 -- 93 20 95 %   22 0830 (!) 104/53 -- 92 23 95 %   22 0800 (!) 100/50 -- 91 21 96 %   22 0730 111/79 -- 98 (!) 32 95 %   22 0710 -- -- -- -- 98 %   22 0700 127/79 98.4 °F (36.9 °C) 86 (!) 34 96 %   22 0557 99/65 -- 85 19 98 %   22 0542 112/69 -- 87 25 97 %   22 0529 (!) 146/72 -- 93 -- --   22 0527 128/71 -- 91 28 96 %   22 0512 (!) 146/72 -- 85 19 98 %   22 0457 105/69 -- 86 19 98 %   22 0442 117/71 -- 86 18 98 %   22 0427 98/67 -- 87 19 99 %   22 0412 97/66 -- 87 21 99 %   22 0357 100/69 -- 89 20 100 %   22 0342 100/67 -- 88 20 99 %   22 0327 100/64 -- 92 23 98 %   22 0321 133/76 98 °F (36.7 °C) 98 30 95 %   22 0312 133/76 -- 98 (!) 39 95 %   22 0257 139/75 -- 90 22 94 %   22 0255 -- -- -- -- 94 %   22 0215 103/68 -- 90 22 93 %   22 0200 111/75 -- 99 29 (!) 88 %     Temp (24hrs), Av.5 °F (36.9 °C), Min:97.9 °F (36.6 °C), Max:99.1 °F (37.3 °C)    No intake/output data recorded. Physical Exam:  Constitutional: Thin, cachectic elderly male in no acute distress, lying comfortably in the hospital bed. HEENT: Normocephalic and atraumatic.  Oropharynx is clear, mucous membranes are moist. Extraocular muscles are intact. Sclerae anicteric. Neck supple without JVD. No thyromegaly present. Skin Warm and dry. No bruising and no rash noted. No erythema. No pallor. Respiratory +slightly coarse. Normal air exchange without accessory muscle use. CVS Normal rate, regular rhythm and normal S1 and S2. No murmurs, gallops, or rubs. Abdomen Soft, nontender and distended, normoactive bowel sounds. +Abd pleurx   Neuro Grossly nonfocal with no obvious sensory or motor deficits. MSK 1+ BLE edema. Normal range of motion in general.  No tenderness. Psych Appropriate mood and affect. Labs:      Recent Labs     04/02/22 0326 04/01/22 0319 03/31/22 0247   WBC 12.5* 14.3* 15.7*   RBC 3.08* 2.74* 2.72*   HGB 9.0* 8.1* 7.8*   HCT 29.3* 25.9* 25.4*   MCV 95.1 94.5 93.4   MCH 29.2 29.6 28.7   MCHC 30.7* 31.3* 30.7*   RDW 16.3* 15.9* 15.8*   * 88* 84*   GRANS 77 71  1* 70   LYMPH 4* 8 4*   MONOS 7 10 14*   EOS 0*  --  1   BASOS 1  --  0   IG 11*  --  11*   DF AUTOMATED MANUAL AUTOMATED   ANEU 9.6* 11.4* 11.0*   ABL 0.5 1.1 0.6   ABM 0.9 1.4* 2.2*   JONNY 0.0  --  0.2   ABB 0.1  --  0.0   AIG 1.4*  --  1.7*        Recent Labs     04/02/22 0326 04/01/22 0319 03/31/22  0247   * 141 139   K 3.3* 2.9* 3.7   * 107 108*   CO2 29 30 27   AGAP 8 4* 4*   * 105* 93   BUN 22 20 27*   CREA 0.60* 0.50* 0.50*   GFRAA >60 >60 >60   GFRNA >60 >60 >60   CA 8.4 8.1* 8.0*   * 225* 238*   TP 5.4* 5.1* 4.6*   ALB 2.7* 2.8* 2.1*   GLOB 2.7 2.3 2.5   AGRAT 1.0* 1.2 0.8*   MG 2.1 2.1 2.2   PHOS  --  1.9*  --          Imaging:  XR CHEST SNGL V [[id number]] Collected: 04/02/22 0412   Order Status: Completed Updated: 04/02/22 0433   Narrative:     EXAM: Chest x-ray. INDICATION: Dyspnea. COMPARISON: March 31, 2022. TECHNIQUE: Frontal view chest x-ray. FINDINGS: Diffuse bilateral lung infiltrates are mildly improved.  No   pneumothorax or pleural effusion is seen. The heart size is stable. Again noted   is a left chest wall infusion port catheter. Impression:     Mildly improved bilateral lung infiltrates. XR CHEST Bestyn Rigiveth [[id number]] Collected: 03/31/22 2133   Order Status: Completed Updated: 03/31/22 2136   Narrative:     EXAMINATION: One view chest     HISTORY: increase in shortness of breath     TECHNIQUE: Frontal chest.     COMPARISON: 3/30/2022     FINDINGS:     Stable left-sided MediPort. Persistent bilateral lung infiltrates. These have increased bilaterally   particularly on the right. There is no significant pneumothorax. There is no significant pleural effusion. The heart is unchanged. No other significant interval changes. Impression:       1. Findings as described above. Increased bilateral lung infiltrates. XR CHEST Carloyn Rigiveth [[id number]] Collected: 03/30/22 0820   Order Status: Completed Updated: 03/30/22 0826   Narrative:     Exam: XR CHEST SNGL V on 3/30/2022 8:20 AM     Clinical History: The Male patient is 78years old  presenting for sob. Comparison:  Chest x-ray 3/24/2022     Findings:  Frontal view of the chest was obtained. Continued shallow inspiration with mild pulmonary edema and suggested left   basilar infiltrate and/or effusion. Left subclavian chest port in place.  The   cardiomediastinal silhouette is within normal limits.  There are no acute   osseous abnormalities. Impression:       1. Continued shallow inspiration with suspected left basilar infiltrate and/or   effusion as well as diffuse mild pulmonary edema. CPT code(s) 67237          XR CHEST SNGL V [[id number]] Collected: 03/24/22 1158   Order Status: Completed Updated: 03/24/22 1201   Narrative:     PORTABLE CHEST 1 VIEW     HISTORY: Increasing oxygen needs. COMPARISON: 3/15/2022     FINDINGS: A chest port is present with catheter tip in the SVC. Lung volumes are diminished.  Edema like interstitial densities are present in   the middle and lower lung zones. EKG leads are present. Impression:     Low lung volumes with bilateral infiltrates that may be caused by   edema or infection. [facility] PLEURAL INDWELL [[id number]] Collected: 03/22/22 1614   Order Status: Completed Updated: 03/22/22 1652   Narrative:     Title: Dominant PleurX drain placement. Indication: Pancreatic cancer. Recurrent abdominal ascites.  Tunneled abdominal   PleurX drain catheter requested. : Aaron Smith PA-C     Supervising Physician: Phyllis Councilman, M.D. Consent:  Informed written and oral consent was obtained from the patient after   explanation of benefits and risks (including, but not limited to: Infection,   hemorrhage, visceral injury and pneumothorax).  The patient's questions were   answered to their satisfaction. The patient stated understanding and requested   that we proceed. Procedure:  With the patient supine, the abdomen was prepped and draped in the   standard fashion.  Lidocaine was administered for local field block.  Ultrasound   evaluation was performed. Using real-time ultrasound guidance, with appropriate   image recording, a Yueh needle was advanced into the ascites in the abdomen. Using fluoroscopy, the needle was exchanged over a wire for a peel-away sheath. The PleurX drain was brought through a subcutaneous tunnel and passed down the   peel-away sheath positioning the drain across the midline, lower and the pelvis. The skin incision was closed with absorbable suture.  The catheter was secured   with nonabsorbable suture. A total of 2250 cc of thin yellow fluid was removed.  Bandages were applied. Complications: Large-volume ascites. Medications:  37 minutes based placed under moderate sedation was provided under   the direction and supervision of Phyllis Councilman, M.D. using frontal and Versed.    Continuous cardiopulmonary monitoring was provided by trained independent observer present. Ancef was infused prior to the procedure. Contrast:  None. Radiation dose:   Fluoroscopy time: 18 seconds. Reference air kerma (mGy): 8   Kerma area product (cGy.cm2):  724   Fluoroscopic images: 1     Findings:  Large volume ascites. Plan: Bedrest for 1 hour. Recommend performing a drainage procedure daily or every-other-day for the next   two weeks in order to promote healing of the catheter site.        CT CHEST W CONT [[id number]] Collected: 03/20/22 1811   Order Status: Completed Updated: 03/20/22 1823   Narrative:     CT CHEST WITH CONTRAST DATED 3/20/2022. History: Echo with extra cardiac structure compressing the left atrium. Comparison: CT abdomen and pelvis with contrast 3/19/2022     Technique:   Multiple contiguous helical CT images reconstructed at 5 mm were   obtained from the neck base to the mid abdomen following the administration of   100 cc of Isovue 370 without acute complication. All CT scans performed at this   facility use one or all of the following: Automated exposure control, adjustment   of the mA and/or kVp according to patient's size, iterative reconstruction. Findings: The base of the neck is unremarkable in appearance. A left-sided venous port is   present. No lymphadenopathy is seen.  The thoracic aorta is normal in caliber. The heart is not clearly enlarged on the CT. Moderate to advanced   atherosclerotic calcification is seen of the coronary arteries. No significant   pericardial effusion is seen. The only abnormal space-occupying process adjacent   to the heart is a moderate size hiatal hernia. In addition to the stomach, there   is additional herniation of mesenteric fat as well as abdominal fluid. These all   appear to anteriorly displace the heart. The esophagus proximal to the hernia   sac is fluid distended which could indicate reflux.  Obstruction at the   gastroesophageal junction is felt to be less likely given the additional fluid   distention of the hernia sac. Evaluation with lung windows demonstrates a trace right, and small left   dependent pleural effusion. These do appear worsened from the prior examination. Nonspecific pulmonary infiltrates are otherwise seen. Lungs are expanded without   evidence for pneumothorax.  No acute osseous abnormality is seen. Mild anasarca   is seen of the chest wall. Limited evaluation of the upper abdomen demonstrate multiple findings which are   not significantly changed and better assessed on a dedicated contrasted CT scan   of the abdomen performed on 3/19/2022. Impression:     1.  The only abnormal space-occupying process adjacent to the heart is a   moderate size hiatal hernia. In addition to the stomach, there is additional   herniation of mesenteric fat as well as abdominal fluid. These all appear to   anteriorly displace the heart.       2. Worsening although trace to small bilateral dependent pleural effusions. Additional mild anasarca seen of the chest wall. These findings suggest fluid   overload. 3. Nonspecific pulmonary infiltrates. These could represent pulmonary edema   although the distribution is not classic. Additional allergies such as pneumonia   are not excluded if suggested by clinical findings. This report was made using voice transcription. Despite my best efforts to avoid   any, transcription errors may persist. If there is any question about the   accuracy of the report or need for clarification, then please call [phone], or text me through [email or web address] for clarification or correction. CT ABD PELV W CONT [[id number]] Collected: 03/19/22 1300   Order Status: Completed Updated: 03/20/22 1526   Addenda: Addendum: Addendum: A request was made to assess for potential right   gluteal hematoma. There is appear to be edema in the right gluteal soft tissues   which is slightly more pronounced than on the left.  No clearly demonstrated defined collection is seen to suggest significant hematoma. The most inferior   gluteal soft tissues have been excluded from the field of imaging. Signed: 03/20/22 1523 by Karlos Mead MD   Narrative:     CT ABDOMEN AND PELVIS WITH INTRAVENOUS CONTRAST DATED 3/19/2022. History: Fall hitting bottom and back. Comparison: CT abdomen and pelvis with contrast 12/27/2021     Technique:   Multiple contiguous helical CT images reconstructed at 5 mm   intervals were obtained from above the diaphragms through the ischial   tuberosities following oral and 100 cc Isovue-370 without acute complication. All CT scans performed at this facility use one or all of the following:   Automated exposure control, adjustment of the mA and/or kVp according to   patient's size, iterative reconstruction. Findings:   CT ABDOMEN:     Limited evaluation of the lung bases and base of the mediastinum demonstrates   nonspecific infiltrates in the bilateral lung bases, left greater than right. A   small dependent left pleural effusion is seen. A small to moderate size hiatal   hernia is present. Ascites within the abdomen is also seen within the hernia   sac. The Liver is clearly cirrhotic in its appearance. Multiple hepatic masses are   seen the largest of which resides in the inferior right lobe measuring 3 cm in   size. The appearance is highly concerning for malignancy either representing   multifocal hepatomas, or hepatic metastases. Hepatic metastases are favored   given the appearance. Asymmetric intraperitoneal air ductal dilation is seen in   the left lobe of the liver which may be obstructed by a central hepatic mass   seen on axial image 23 measuring 1.1 cm in size. .  The spleen demonstrates an   irregular area of decreased attenuation in the superior pole seen on axial image   17 measuring 3.2 cm x 1.7 cm. This is not as well characterized. This does not   appear to represent an acute defect.  This could result from flow artifact.  No   contour deforming or enhancing mass lesions are seen of the adrenal glands. The   pancreas is grossly unchanged in its appearance with the patient having a known   primary pancreas tumor described on the prior study. The gallbladder has been   removed.  The kidneys enhance symmetrically and no evidence of hydronephrosis is   seen. A stable benign cyst is seen in the posterior upper to midpole cortex of   the right kidney measuring 2.6 cm in size. The visualized loops of small bowel and colon are normal in caliber.  The   appendix appears to have been removed. Mild to moderate diverticulosis is seen   of the left colon. No free air is seen. Moderate ascites is seen which   demonstrates low attenuation suggesting simple ascites. Diffuse mesenteric edema   is seen. Abnormal peritoneal nodularity and caking is suggested with additional   peritoneal thickening which at times appears nodular. This is highly concerning   for peritoneal metastatic process which appears worsened from the prior   examination.  No evolving adenopathy is seen.  The abdominal aorta demonstrate   moderate atherosclerotic calcification. No acute osseous abnormality is seen. Compression deformities are seen at T12, L1, and L4 although these have a   chronic appearance and are stable from the prior comparison study. CT PELVIS:   Small to moderate ascites extends into the pelvis. There is once again nodular   peritoneal thickening best appreciated on axial image 73 highly concerning for   evolving peritoneal metastatic disease. There appears to be serosal involvement   of the mid sigmoid colon seen on axial image 74. No abnormal dilation is seen to   suggest significant obstruction at this time. Donnell Pica evolving pelvic adenopathy is   seen.  The urinary bladder is unremarkable. No acute osseous abnormality is   seen. Impression:     1.  No clear acute injury from recent fall.  Specifically, no acute osseous   abnormality is seen. 2. Grossly stable appearance of pancreatic tumor although this is suboptimally   assessed on this exam. However, there is clear evidence for evolving metastatic   disease with new hepatic masses, and multiple findings as described above which   are highly concerning for evolving peritoneal metastatic disease. Lastly,   irregular decreased attenuation is seen in the superior pole of the spleen which   does not appear clearly acute and does not demonstrate adjacent complex ascites. This could represent an additional metastasis although is not as well   characterized. This report was made using voice transcription. Despite my best efforts to avoid   any, transcription errors may persist. If there is any question about the   accuracy of the report or need for clarification, then please call [phone], or text me through [email or web address] for clarification or correction.     MRI BRAIN W WO CONT [[id number]] Collected: 03/19/22 1649   Order Status: Completed Updated: 03/19/22 1657   Narrative:     EXAMINATION: BRAIN MRI 3/19/2022 4:47 PM     ACCESSION NUMBER: [id number]     INDICATION: 48-year-old male with fall, altered mental status and confusion. History of pancreatic cancer on chemotherapy with recent progression of   intra-abdominal disease, no prior imaging of brain. COMPARISON: CT head 3/19/2022. TECHNIQUE: Multiplanar multisequence MRI of the brain without and with   intravenous administration of 14 mL contrast agent. FINDINGS:     Previously described subcentimeter focus along the posterior limb of the right   internal capsule follows CSF signal intensity on all sequences and is favored to   represent a prominent perivascular space. There is a mild degree of scattered and confluent foci of T2/FLAIR   hyperintensity within the periventricular and deep white matter, nonspecific but   most commonly seen with chronic small vessel ischemic changes.  Faint chronic   hemosiderin involving the left posterior putamen. No midline shift or mass lesion. There is no evidence of intracranial hemorrhage   or acute infarct. The ventricles are within normal limits in size and   configuration. There are no extra-axial fluid collections present.  No diffusion   weighted signal abnormality is identified. There is no abnormal enhancement. Impression:       No acute finding or evidence of intracranial metastatic disease. XR ELBOW LT MIN 3 V [[id number]] Collected: 03/19/22 1534   Order Status: Completed Updated: 03/19/22 1538   Narrative:     XR FOREARM LT AP/LAT, XR ELBOW LT MIN 3 V 3/19/2022 3:19 PM     HISTORY: Fall with left arm and left elbow pain. Impression:       Two views left forearm. No fracture or dislocation. No significant soft tissue   swelling. Old healed distal ulnar fracture deformity is present. Three views left elbow. No fracture or dislocation. No significant soft tissue   swelling. No fat pad elevation. XR FOREARM LT AP/LAT [[id number]] Collected: 03/19/22 1534   Order Status: Completed Updated: 03/19/22 1538   Narrative:     XR FOREARM LT AP/LAT, XR ELBOW LT MIN 3 V 3/19/2022 3:19 PM     HISTORY: Fall with left arm and left elbow pain. Impression:       Two views left forearm. No fracture or dislocation. No significant soft tissue   swelling. Old healed distal ulnar fracture deformity is present. Three views left elbow. No fracture or dislocation. No significant soft tissue   swelling. No fat pad elevation. CT HEAD WO CONT [[id number]] Collected: 03/19/22 1254   Order Status: Completed Updated: 03/19/22 1258   Narrative:     CT BRAIN WITHOUT CONTRAST   3/19/2022 12:26 PM     INDICATION: Fall, altered mental status and confusion.      COMPARISON: None available at this hospital PACS     Technique:  Multiple contiguous axial images are obtained encompassing the brain   from the skull base to the vertex.  For this CT scanner at least one of the   following techniques is utilized to decrease patient radiation dose: Automatic   exposure control, KVP and mA modulation based on patient weight, and iterative   reconstruction. FINDINGS: The ventricles are midline and of appropriate size and configuration. Mild hypoattenuating foci seen in the periventricular deep white matter. Inferiorly at the junction of the thalamus and posterior limb of the internal   capsule on the right there is a rounded mildly hypodense focus that measures 9   mm. The midline structures and posterior fossa are intact.  There is no finding of   an acute cortical stroke, mass lesion, or acute bleed.  No extra-axial fluid   collection. The skull is intact, no discreet abnormality. The paranasal sinuses are not   remarkable. The visualized orbits and mastoid air-cells are patent. Impression:     9 mm rounded hypoattenuating focus on the right at the inferior   junction of the thalamus and posterior limb of the internal capsule is noted. Suspicious for lacunar infarct and of uncertain chronicity-cannot exclude that   this is new. Elsewhere only mild chronic changes in the periventricular white matter   suggested. For further imaging evaluation of this as clinically indicated-recommend brain   MRI with diffusion imaging. XR CHEST Cate Geiger [[id number]] Collected: 03/15/22 1259   Order Status: Completed Updated: 03/15/22 1302   Narrative:     Chest X-ray     INDICATION: Hypotension. COMPARISON: Chest x-ray 2/10/2021. A portable AP view of the chest was obtained. FINDINGS: The lungs are clear. There are no infiltrates or effusions. Left chest   port is unchanged in position. No pneumothorax. The heart size is normal.  The   bony thorax is intact.      Impression:     No acute findings in the chest. Lungs remain clear. No interval   change.         Medications:  Current Facility-Administered Medications   Medication Dose Route Frequency    NUTRITIONAL SUPPORT ELECTROLYTE PRN ORDERS   Does Not Apply PRN    famotidine (PF) (PEPCID) 20 mg in 0.9% sodium chloride 10 mL injection  20 mg IntraVENous Q12H    pantoprazole (PROTONIX) 40 mg in 0.9% sodium chloride 10 mL injection  40 mg IntraVENous DAILY    dexmedeTOMidine in 0.9 % NaCl (PRECEDEX) 400 mcg/100 mL (4 mcg/mL) infusion soln  0.1-1.5 mcg/kg/hr IntraVENous TITRATE    piperacillin-tazobactam (ZOSYN) 4.5 g in 0.9% sodium chloride (MBP/ADV) 100 mL MBP  4.5 g IntraVENous Q8H    vancomycin (VANCOCIN) 1250 mg in  ml infusion  1,250 mg IntraVENous Q12H    methylPREDNISolone (PF) (SOLU-MEDROL) injection 40 mg  40 mg IntraVENous Q12H    enoxaparin (LOVENOX) injection 40 mg  40 mg SubCUTAneous Q24H    HYDROmorphone (DILAUDID) injection 1 mg  1 mg IntraVENous Q4H PRN    HYDROmorphone (DILAUDID) injection 0.5 mg  0.5 mg IntraVENous Q4H PRN    LORazepam (ATIVAN) injection 1 mg  1 mg IntraVENous Q4H PRN    naloxone (NARCAN) injection 0.04 mg  0.04 mg IntraVENous PRN    furosemide (LASIX) injection 20 mg  20 mg IntraVENous Q8H    potassium chloride (K-DUR, KLOR-CON M20) SR tablet 20 mEq  20 mEq Oral DAILY    loperamide (IMODIUM) capsule 2 mg  2 mg Oral Q12H    midodrine (PROAMATINE) tablet 5 mg  5 mg Oral TID WITH MEALS    HYDROcodone-acetaminophen (NORCO)  mg tablet 1 Tablet  1 Tablet Oral Q4H PRN    calcium carbonate (TUMS) chewable tablet 200 mg [elemental]  200 mg Oral TID PRN    alum-mag hydroxide-simeth (MYLANTA) oral suspension 30 mL  30 mL Oral Q4H PRN    prochlorperazine (COMPAZINE) with saline injection 5 mg  5 mg IntraVENous Q6H PRN    acetaminophen (TYLENOL) tablet 650 mg  650 mg Oral Q6H PRN    diphenhydrAMINE (BENADRYL) capsule 25 mg  25 mg Oral Q6H PRN    amiodarone (CORDARONE) tablet 400 mg  400 mg Oral BID    [Held by provider] fat emulsion 20% (LIPOSYN, INTRAlipid) infusion 250 mL  250 mL IntraVENous QPM    NUTRITIONAL SUPPORT ELECTROLYTE PRN ORDERS   Does Not Apply PRN    sodium chloride (NS) flush 5-10 mL  5-10 mL IntraVENous PRN    diphenoxylate-atropine (LOMOTIL) tablet 1 Tablet  1 Tablet Oral QID PRN    promethazine (PHENERGAN) tablet 25 mg  25 mg Oral Q6H PRN    tamsulosin (FLOMAX) capsule 0.4 mg  0.4 mg Oral DAILY    ondansetron (ZOFRAN) injection 4 mg  4 mg IntraVENous Q4H PRN         ASSESSMENT:    Problem List  Date Reviewed: 3/15/2022          Codes Class Noted    Acute respiratory failure with hypoxia [facility]) ICD-10-CM: J96.01  ICD-9-CM: 518.81  4/1/2022        Bilateral pulmonary infiltrates on chest x-ray ICD-10-CM: R91.8  ICD-9-CM: 793.19  4/1/2022        Chronic systolic congestive heart failure (HCC) (Chronic) ICD-10-CM: I50.22  ICD-9-CM: 428.22, 428.0  3/21/2022        Bradycardia ICD-10-CM: R00.1  ICD-9-CM: 427.89  3/19/2022        LBBB (left bundle branch block) ICD-10-CM: I44.7  ICD-9-CM: 426.3  3/19/2022        FTT (failure to thrive) in adult ICD-10-CM: R62.7  ICD-9-CM: 783.7  3/15/2022        * (Principal) Hypotension ICD-10-CM: I95.9  ICD-9-CM: 458.9  3/15/2022        Severe protein-calorie malnutrition ([facility] [address] ICD-10-CM: E43  ICD-9-CM: 930  3/4/2022        Pancreatic cancer ([facility] [address] ICD-10-CM: C25.9  ICD-9-CM: 157.9  3/2/2022        Ascites ICD-10-CM: R18.8  ICD-9-CM: 789.59  3/2/2022        Admission for antineoplastic chemotherapy ICD-10-CM: Z51.11  ICD-9-CM: V58.11  3/2/2022        Hypomagnesemia ICD-10-CM: E83.42  ICD-9-CM: 275.2  4/23/2021        Hypokalemia ICD-10-CM: E87.6  ICD-9-CM: 276.8  4/20/2021        CINV (chemotherapy-induced nausea and vomiting) ICD-10-CM: R11.2, T45.1X5A  ICD-9-CM: 787.01, E933.1  4/20/2021        Dehydration ICD-10-CM: E86.0  ICD-9-CM: 276.51  4/12/2021        Other neutropenia ([facility] [address] ICD-10-CM: D70.8  ICD-9-CM: 288.09  3/9/2021        Malignant neoplasm of lower third of esophagus ([facility] [address] ICD-10-CM: C15.5  ICD-9-CM: 150.5  1/5/2021        Malignant ne[facility] of body of pancreas ([facility] ICD-10-CM: C25.1  ICD-9-CM: 157.1 1/5/2021        Severe obesity (HCC) (Chronic) ICD-10-CM: E66.01  ICD-9-CM: 278.01  12/10/2020        Elevated glucose ICD-10-CM: R73.09  ICD-9-CM: 790.29  7/22/2019        Anemia ICD-10-CM: D64.9  ICD-9-CM: 285.9  7/22/2019        Chronic left-sided low back pain (Chronic) ICD-10-CM: M54.50, G89.29  ICD-9-CM: 724.2, 338.29  7/22/2019              78 y.o. M pancreatic cancer and esophageal cancer with malignant ascites of previously controlled FOLFIRINOX but currently disease progressed and most recently admited on 3/2/2022 to place Pleurx and arrange gemcitabine/Abraxane, had extensive discussion with inpatient team and pharmacy, patient was discharged on 3/7/2022 without chemo appointment and return to office on 3/15/2022, extremely sick and blood pressure not measurable in the office, and not been having much oral intake since discharge, urgently establish IV access and called EMS to take to ER to stabilize and admit to hospital, apparently needed much supportive care and volume resuscitation, start TPN until oral intake can improve and give gemcitabine/Abraxane once clinically stable, discussed with the inpatient team.    PLAN:    Metastatic pancreatic cancer / distal esophageal cancer  - Metastatic disease involving peritoneum, malignant ascites  - s/p 24 cycles of FOLFIRINOX with SD and recent POD with increasing CEA/ and malignant ascites  - Plan for gemcitabine/abraxane when clinically stable - hopefully tomorrow. 3/17 [place]/abraxane today, tolerated well  3/24 D8 due today, deferring tx d/t cytopenias. Dr. Norma Goodman discussed that if patient's condition does not improve, that he may want to consider Hospice services. Palliative care following. 3/25 Pt wants to con't to pursue treatment as he wants to change his relationship with his children. He does not feel the burden of tx outweighs the benefits and wants to continue tx for as long as he can.   D15 due 3/31.  3/29 TM checked, CEA up but  improved. 3/30 Office working to obtain sotorasib. D15 gem/abraxane due tomorrow. 3/31 Notes from office indicate sotorasib approved. [place]/abraxane due today - plts <100k. Hold today and re-eval tomorrow. 4/1 Holding chemo for acute issues - office working on sotorasib  4/2 Sotorasib planned to be delivered today     Malignant ascites  - Has abdominal Pleurx, drain three times weekly  3/17 Pleurx drained 3/16 - 1100cc out. 3/18 Pleurx accidentally dislodged last night - 1800cc removed before completely removed. IR notified and will re-evaluate Monday. 3/20 Worsening abdominal pain possibly secondary to re-accumulation, some drainage reported at Pleurx site. IR evaluating tomorrow. 3/21 IR to re-evaluate tomorrow, was not NPO today. 3/22 To IR today. 3/23 Abd pleurx replaced yesterday  3/25 Pleurx drained yesterday, 1.1L. Con't to drain prn.  3/26 drained today as he was feeling uncomfortable. Monitor blood pressures. 3/27 continue to drain PRN, but be cautious with blood pressures as he is hypotensive at times. 3/29 BP much improved - drained 1100 cc yesterday due to patient request. Previously draining every other day but asking to be drained daily. Will continue to drain PRN as long as BP acceptable. 3/30 Abdominal discomfort - requesting to be drained again today. BP stable. 3/31 Pleurx drained again today per pt request.  4/1 Holding on further draining of Pleurx given hypotension - drain every other day (or PRN). Cancer related pain  - Continue home dilaudid  3/17 Utilizing IV morphine. Will decrease dosing and encourage use of home Norco.  3/18 Continue to wean IV meds - encourage PO.  3/20 Has not been receiving IV morphine due to hypotension. Continue with PRN oral medications. 3/22 Consult to [facility] regarding [address], symptom management. 3/23 PC following. Pt changed code status to DNR, wants to pursue further cancer-related treatment. 3/25 PC following  3/27 Dilaudid increased today.  During rounds, he stated his pain was better controlled with increased dose. 3/29 Continues on IV dilaudid. 3/31 IV dilaudid use limited by BP. Increased Norco to 10mg yesterday. PC following. Hypotension / poor PO intake / protein-calorie malnutrition  - Consult RD for TPN  - BC pending, started on Cefe/Vanc and may be able to de-escalate soon as hypotension likely secondary to dehydration. LA improved after IFV. 3/17 On TPN. Continues on Cefe/Vanc although infectious work-up unremarkable. Will stop antibiotics. 3/18 Hypotension improved and remains afebrile off antibiotics. Continue with nutritional support via TPN.  3/19 Ongoing hypotension, although improved yesterday AM, worse through evening. Had 6L of IVF yesterday and continues on TPN. May consider midodrine although MAP consistently ~65.  3/21 Hypotension improved but borderline, continues TPN.   3/22 Discussed with RD volume status of TPN given new finding of HFrEF and CT chest with evidence of fluid overload. CM following for home TPN. Holding IV morphine. 3/23 BPs improved  3/24 Episode of hypotension last PM, required small fluid bolus, BP improved  3/25 Continues on TPN, however pt declined to be attached to TPN yesterday AM and then disconnected himself from it last night. 3/27 per RD note, patient unaware about prior TPN disconnections. This AM during rounds, visitor noted that \"something was leaking. \" He had pulled his port needle out again and his TPN was leaking onto the floor. RD plans to discuss further goals regarding TPN with pt when she visits him today. 4/00 Cyclic TPN to start today per RD. Encouraged PO intake and he agrees. 3/29 Reports he drank an Ensure but is scared to eat because of diarrhea. Will manage diarrhea. 3/31 Continues on TPN - PO intake remains minimal due to respiratory issues.   4/1 Hold TPN given volume overload  4/2 continue to hold TPN through the weekend     Nausea  - Continue PRN antiemetics    Diarrhea  - Antidiarrheals PRN  3/24 c/o worsening diarrhea. Check Cdiff.   3/25 Cdiff neg. Con't using antidiarrheals prn.  3/28 Reports diarrhea - only 1 documented stool. Continue PRN antidiarrheals. 3/29 Reports he is afraid to eat because of diarrhea. Will schedule imodium TID.   3/31 Change to BID. Bradycardia / HFrEF / LV dysfunction / LBBB / ectopy  - One documented episode of HR 43 - check EKG  - No hx of hypertension, monitor  3/17 EKG with bi-geminal PACs and known LBBB. Recheck EGK.  3/18 EKG with same as above. Palpated pulse on several occasions documented in the 40s. Tele applied overnight. Cardiology following. 3/19 Cardiology recommending K>4 (on TPN) and Mg replacements. Echo pending. 3/21 No bradycardia noted on telemetry. Does have ectopy which may have led to incorrect pulse rate palpation. Echo with EF 35-40%. CT chest completed due to echo findings rebeka noted hiatal hernia/mesenteric fat herniation/abdominal fluid displacing heart and small BL effusions, anasarca. Cardiology following.  3/22 Cardiology considering BB if BP improve for HF/LV dysfunction. Weight up 6# overnight and increased since admission. Clinically no evidence of overload but asking RD to adjust TPN volume. Reported run of NS-SVT - cardiology following. 3/25 Cards following. Hypotension limiting therapy for heart failure. 3/26 cards signed off and recommended cont Amiodarone   3/30 BP improved, CXR shows pulmonary edema and possible effusion. 20mg Lasix given this morning. Will add albumin and diurese as able pending blood pressure tolerance. 3/31 BP dropped yesterday before albumin but improved throughout night. Up to 7L NC. Will continue albumin and give lasix today. Start midodrine. 4/1 Received 40mg lasix yesterday/PM. Ongoing albumin - D2. Continues on midodrine but borderline BP making diuresis difficult. Developed worsening respiratory distress overnight. 4/2 Continues on Midodrine. BP fluctuating, a bit better. Repeat echo. Pancytopenia secondary to chemotherapy  - Transfuse prn per [facility] SOPs  3/25 ANC down to 1000. Start G-CSF.  3/26 [address] improved to 2.6.   3/28 WBC up to 20 - stop G-CSF.   3/29 Plts up to 71k. 4/2 Plts stable at 110k    Fall  3/19 Witnessed slip and fall on wet floor yesterday. Today with L arm bruising - check X-ray. Will check CT head and CT AP (hematoma/lipoma palpated on R buttock) and also c/o worsening abdominal pain. 3/20 X-ray of L elbow/forearm negative. CT head with ?lacunar infarct but MRI showed no acute findings and ?lacunar infarct appears to be prominent perivascular space. CT AP with progressing peritoneal/liver disease and ?new splenic lesion. Last imaging obtained 12/2021 for comparison. Elevated LFTs  3/21 Monitor, possibly secondary to TPN or recent chemo. 3/22 Appear to be improving  3/23 AST improved, ALP increased  3/28 Improving  RESOLVED    Hypoxic respiratory failure  / ?Pneumonia / CHF exacerbation / fluid overload  3/24 On O2 @ 2L now. Check CXR.  3/25 CXR with low lung volumes with b/l infiltrates ?edema vs infx? Check PCT - elevated. Start Cef/Vanc.  3/26 continues on cef/vanc; on room air   3/28 D4 Cefepime/Vancomycin - afebrile. DC Vancomycin. 3/29 Continue Cefepime - likley DC soon. 3/31 DC Cefe. No clinical evidence of pneumonia, afebrile. CXR findings likely pulmonary edema. Continue diuresis/albumin. 4/1 Moved to ICU this AM after increasing O2 needs/respriatory effort. On BiPAP. Minimal diuresis due to low BP. Repeat lasix this AM and continue diuresis/albumin as BP tolerates. Pulmonology following - restarted Cefe and added Vancomycin after CXR showed worsening infiltrates and adding steroids for possible pneumonitis given recent chemo. Patient changed code status back to full code. 4/2 Remains in ICU on BIPAP. On Vanc/Zosyn. Pulm added solu-medrol for ? Pneumonitis. Receiving Lasix every 8 hours.   Defer further albumin if needed to critical care team, albumin level improved from previous. Start therapeutic dose Lovenox as previously unable to rule out PE. Agitation/Delirium  4/2 On Precedex drip     Continue home meds  [facility] SOPs  Lovenox (hold for plts <50k)    Goals and plan of care reviewed with the patient. All questions answered to the best of our ability. Disposition:  TBD pending clinical course. Ashu Hercules NP  [address] [facility]  [address] [place] [address]  Office : [phone]  Fax : [phone]   I personally saw, exammed and counselled the patient, and discussed with NP, agree with above history/assessment/plan. 66 y.o.male pancreatic cancer and esophageal cancer with peritoneal carcinomatosis, status post first-line FOLFIRINOX, disease progression and admitted for failure to thrive/obstruction, received TPN and 1 cycle of gemcitabine/Abraxane, reported abdominal pain improved and started having bowel movement/gas, which is encouraging sign of peritoneal disease response, discussed the need to enhance nutrition and improve hypoalbuminemia, also found EF 35 to 40%, sinusitis as needed, week 2 chemo was on hold for thrombocytopenia, hypotensive and received fluids and developed respiratory distress, blood pressure does not tolerate diuresis, difficult situation of anasarca but intravascularly dry, give albumin and blood pressure better, hold chemo today, add midodrine for hypotension,  transferred to ICU for respiratory distress and responded well to BiPAP responding well to albumin supported diuresis, continue to manage volume carefully and diuretics gently as long as the blood pressure tolerates.   We worked with financial counselor/insurance/specialty pharmacy and approved a special case delivery of sotorasib for today, may well his oxygen requirement appeared higher despite of better chest x-ray, more confused and need protection for the BiPAP, empirically start therapeutic dose Lovenox for now, friend will check the sotorasib delivery and bring over to start today, hold TPN, repeat echocardiogram, appreciate ICU care. Brett East M.D.   [address], [address] [place] [address]  Fax : [phone]

## 2022-04-02 NOTE — PROGRESS NOTES
Per off going RN evening amio and phenergan dose that was scanned at 1830 was not given to Pt. Due NGT tube not working correctly.

## 2022-04-02 NOTE — PROGRESS NOTES
[facility] Critical Care Note[de-identified] 4/2/2022  David Montoya  Admission Date: 3/15/2022     Length of Stay: 18 days    Background: .78 y.o. 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 He has an abdominal pleurex catheter in place that is being drained usually every other day. He was admitted with hypotension/volume depletion and failure to thrive. He has been on TPN for nutritional support. He has been treated with Folfirinox previously but now has disease progression so is now being treated with Gemcitabine/Abraxane which was started around 3/17. He had a chest CT done on 3/20 that showed some infiltrates with small effusions. His CXR has since worsened and his oxygen needs have increased. He has edema and is getting lasix but his blood pressure has limited use. He was recently on Vanc and Maxipeme for ? Infection but has been off antibiotics for several days. He is afebrile and WBC is 18. Echo this admission with EF of 35 to 40%    Notable PMH:  has a past medical history of Back pain, Chronic pain, CINV (chemotherapy-induced nausea and vomiting) (4/20/2021), Erectile dysfunction, Gastritis, GERD (gastroesophageal reflux disease), Hiatal hernia, History of anemia, Hypertension, Left bundle branch block (LBBB) on electrocardiogram (02/05/2021), Malignant neoplasm of body of pancreas ([facility] [address] Malignant neoplasm of esophagus ([facility] [address] (12/07/2020), Morbid obesity ([address] and Nausea. 24 Hour events:  Confused and agitated at times    ROS: unable to obtain/negative except as listed elsewhere. Lines: (insertion date)     [place]: (4/1)  Pleurex (3/22)  Drips: current dose (range)        Pertinent Exam:         Blood pressure 99/65, pulse 85, temperature 98 °F (36.7 °C), resp. rate 19, height 5' 6\" (1.676 m), weight 184 lb 11.2 oz (83.8 kg), SpO2 98 %.      Intake/Output Summary (Last 24 hours) at 4/2/2022 0916  Last data filed at 4/2/2022 0321  Gross per 24 hour   Intake 375 ml   Output 2360 ml   Net -1985 ml     Constitutional:   nad on bipap  EENMT:  Sclera clear, pupils equal, oral mucosa moist  Respiratory: crackles bilateral  Cardiovascular:  rrr  Gastrointestinal:  soft with no tenderness; positive bowel sounds present  Musculoskeletal:  warm with no cyanosis, no lower extremity edema  Skin:  no jaundice or ecchymosis  Neurologic: lethargic but opens eyes to stimulation  Psychiatric: sedated and paralyzed    CXR:       Recent Labs     04/02/22 0326 04/01/22  0905 04/01/22 0319 03/31/22  0247   WBC 12.5*  --  14.3* 15.7*   HGB 9.0*  --  8.1* 7.8*   HCT 29.3*  --  25.9* 25.4*   *  --  88* 84*   PCT  --  1.08*  --   --      Recent Labs     04/02/22 0326 04/01/22 0319 03/31/22  0247   * 141 139   K 3.3* 2.9* 3.7   * 107 108*   CO2 29 30 27   * 105* 93   BUN 22 20 27*   CREA 0.60* 0.50* 0.50*   MG 2.1 2.1 2.2   CA 8.4 8.1* 8.0*   PHOS  --  1.9*  --    ALB 2.7* 2.8* 2.1*   AST 25 18 17   ALT 16 15 18   * 225* 238*     Recent Labs     04/01/22  0905   BNPNT 4,958*     No results for input(s): GLUCPOC in the last 72 hours. No lab exists for component: A1C  ECHO: Results from Hospital Encounter encounter on 03/15/22    ECHO ADULT COMPLETE    Interpretation Summary    Left Ventricle: Left ventricle size is normal. Mildly increased wall thickness. Findings consistent with mild concentric hypertrophy. Severe apical hypokinesis noted extending to the mid anteroseptal walls. Moderately reduced left ventricular systolic function with a visually estimated EF of 35 - 40%. Grade I diastolic dysfunction with normal LAP.   Mitral Valve: Mild annular calcification of the mitral valve. Mild transvalvular regurgitation.   Left Atrium: Left atrium is mildly dilated.   Left pleural effusion.   Technical qualifiers: Echo study was technically difficult with poor endocardial visualization.   Contrast used: Definity.  No Apical thrombus noted. Results     Procedure Component Value Units Date/Time    MSSA/MRSA SC BY PCR, NASAL SWAB [[id number]]     Order Status: Sent Specimen: Nasal swab     CULTURE, RESPIRATORY/SPUTUM/BRONCH [place] STAIN [[id number]]  (Abnormal) Collected: 03/30/22 1742    Order Status: Completed Specimen: Sputum Updated: 04/02/22 0906     Special Requests: NO SPECIAL REQUESTS        GRAM STAIN       WBCS SEEN TOO NUMEROUS TO COUNT            NO EPITHELIAL CELLS SEEN         FEW GRAM NEGATIVE RODS         MODERATE YEAST         4+ MUCUS PRESENT        Culture result:       MODERATE YEAST IDENTIFICATION TO FOLLOW                  LIGHT NORMAL RESPIRATORY PANTERA          CULTURE, RESPIRATORY/SPUTUM/BRONCH [place] [[id number]] Collected: 03/30/22 0827    Order Status: Completed Specimen: Sputum Updated: 03/30/22 1417     Special Requests: NO SPECIAL REQUESTS        GRAM STAIN       GRAM STAIN EXAMINATION OF THIS SPECIMEN INDICATED OROPHARYNGEAL CONTAMINATION. PLEASE SUBMIT A NEW SPECIMEN OR NOTIFY THE MICRO DEPT WITHIN 48HRS IF FURTHER WORKUP IS DESIRED. Culture result:       Please reorder and recollect. RESULTS VERIFIED, PHONED TO AND READ BACK BY  BEN GERBER RN ON 03/30/22 @1412, ADS      MSSA/MRSA SC BY PCR, NASAL SWAB [[id number]] Collected: 03/25/22 1115    Order Status: Canceled Specimen: Nasal swab     C.  DIFFICILE AG & TOXIN A/B [[id number]] Collected: 03/25/22 0041    Order Status: Completed Specimen: Stool Updated: 03/25/22 1045     [address] ANTIGEN       C. DIFFICILE GDH ANTIGEN-NEGATIVE           C. difficile toxin       C. DIFFICILE TOXIN-NEGATIVE           PCR Reflex NOT APPLICABLE        INTERPRETATION       NEGATIVE FOR TOXIGENIC C. DIFFICILE           Clinical Consideration       NEGATIVE FOR TOXIGENIC C. DIFFICILE              Inpat Anti-Infectives (From admission, onward)     Start     Ordered Stop    04/01/22 2300  vancomycin (VANCOCIN) 1250 mg in  ml infusion  1,250 mg, IntraVENous,   EVERY 12 HOURS         04/01/22 0909 --    04/01/22 0900  piperacillin-tazobactam (ZOSYN) 4.5 g in 0.9% sodium chloride (MBP/ADV) 100 mL MBP  4.5 g,   IntraVENous,   EVERY 8 HOURS         04/01/22 0850 04/08/22 0959              Ventilator Settings:  Ideal body weight: 63.8 kg (140 lb 10.5 oz)   Mode FIO2 Rate Tidal Volume Pressure PEEP      90 % (fio2 decreased)               Peak airway pressure:         Minute ventilation: 15.6 l/min  ABG:  Recent Labs     04/02/22  0304   PHI 7.46*   PCO2I 38.5   PO2I 47*   HCO3I 27.5*     Assessment and Plan:  (Medical Decision Making)     Impression: 78 y.o. 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 He has an abdominal pleurex catheter in place that is being drained usually every other day. He was admitted with hypotension/volume depletion and failure to thrive. He has been treated with Folfirinox previously but now has disease progression so is now being treated with Gemcitabine/Abraxane which was started around 3/17. He had a chest CT done on 3/20 that showed some infiltrates with small effusions. His CXR has since worsened and his oxygen needs have increased. He has edema and is getting lasix but his blood pressure has limited use. He was recently on Vanc and Maxipeme for ? Infection but has been off antibiotics for several days. He is afebrile and WBC is 18. Echo this admission with EF of 35 to 40%.     NEURO:  more agitated and confused, given ativan now calm. Analgesia: increased Norco yesterday per palliative care on 3/31. CV:   Volume Status:-2335 yesterday, + 3892 since admit  LV dysfunction - as above. Cardiology has seen. Medical therapy limited but hypotension. Hypotension: continue Midodrine  PULM:   Acute hypoxemic/hypercapneic respiratory failure:  Now on bipap with FIO2 90% and sat of98%.    -but resp distress when not sedated- adding precedex- high risk for intubation soon  Bilateral pulmonary infiltrates: as above some better today bnp 4958 -lasix q 8 with good diuresis  RENAL:  MICHAEL: NA  Electrolytes: supplementing K+ and phos  GI:   Nutrition: on TPN due to severe malnutition, poor po intake, failure to thrive  Malignant ascites:  Drain pleurex every other day and prn. Drained 3/31  HEME:   Metastatic esophageal and pancreatic cancers - chemo on hold for now. + disease progression per oncology. Anemia: monitoring  Thrombocytopenia: plts 88,000. Chemo on hold for now due to counts and ? Lung toxicity  Anticoagulation: add low dose lovenox  ID:   Have restarted abx due to worsening hypoxia/increased infiltrates on CXR. pct 1.08    ENDO:   DM: NA  Skin: no decub, turns, preventive care  Prophy: add low dose Lovenox/Protonix    Full Code    The patient is critically ill with respiratory failure, circulatory failure and requires high complexity decision making for assessment and support including frequent ventilator adjustment , frequent evaluation and titration of therapies , application of advanced monitoring technologies and extensive interpretation of multiple databases    Cumulative time devoted to patient care services by me for day of service is 38 mins.     Jeff Madrid MD

## 2022-04-02 NOTE — PROGRESS NOTES
IPAP Increased from 12 to 14, EPAP increased from 8 to 10 and fio2 increased from 90% to 100% at this time, Due to drop in saturation of 89%. . No complications noted.      Yolanda Gobble, RRT

## 2022-04-02 NOTE — PROGRESS NOTES
Bedside and verbal shift change report received from  [address] [place] [address] (offgoing nurse). Report included the following information SBAR, Intake/Output, MAR, Recent Results, Med Rec Status and Cardiac Rhythm NSR.      Dual skin assessment completed at bedside:  (list pertinent skin assessment findings)    Dual verification of gtts completed (name of gtts verified):

## 2022-04-02 NOTE — PROGRESS NOTES
VANCO DAILY FOLLOW UP NOTE  [facility] Pharmacokinetic Monitoring Service - Vancomycin    Consulting Provider: Lisa Najera   Indication: HAP  Target Concentration: Goal AUC/LETY 400-600 mg*hr/L  Day of Therapy: 2  Additional Antimicrobials: zosyn    Pertinent Laboratory Values: Wt Readings from Last 1 Encounters:   03/31/22 83.8 kg (184 lb 11.2 oz)     Temp Readings from Last 1 Encounters:   04/02/22 98.4 °F (36.9 °C)     No components found for: PROCAL  Recent Labs     04/02/22  0326 04/01/22  0905 04/01/22  0319 03/31/22  0247   BUN 22  --  20 27*   CREA 0.60*  --  0.50* 0.50*   WBC 12.5*  --  14.3* 15.7*   PCT  --  1.08*  --   --      Estimated Creatinine Clearance: 104 mL/min (A) (by C-G formula based on SCr of 0.6 mg/dL (L)). Lab Results   Component Value Date/Time    Vancomycin, random 30.1 04/02/2022 03:26 AM       MRSA Nasal Swab: not ordered. Order placed by pharmacy. .      Assessment:  Date/Time Dose Concentration AUC   4/2 0326 1250 mg q12h 18.8 650   Note: Serum concentrations collected for AUC dosing may appear elevated if collected in close proximity to the dose administered, this is not necessarily an indication of toxicity    Plan:  Dosing recommendations based on Bayesian software  Current vancomycin regimen is supratherapeutic  Will reduce the dose to 1000 mg every 12 hours, anticipated AUC/Tr of 521/15.1  Repeat vancomycin concentration will be ordered when clinically indicated  Pharmacy will continue to monitor patient and adjust therapy as indicated    Thank you for the consult,  Marcial Winn, YokoD

## 2022-04-02 NOTE — PROGRESS NOTES
Problem: Falls - Risk of  Goal: *Absence of Falls  Description: Document Carmelo Cooper Fall Risk and appropriate interventions in the flowsheet. Outcome: Progressing Towards Goal  Note: Fall Risk Interventions:  Mobility Interventions: Bed/chair exit alarm,Communicate number of staff needed for ambulation/transfer    Mentation Interventions: Bed/chair exit alarm,Door open when patient unattended,Evaluate medications/consider consulting pharmacy,More frequent rounding,Reorient patient,Room close to nurse's station    Medication Interventions: Bed/chair exit alarm,Evaluate medications/consider consulting pharmacy    Elimination Interventions: Bed/chair exit alarm,Call light in reach,Patient to call for help with toileting needs    History of Falls Interventions: Bed/chair exit alarm,Door open when patient unattended,Evaluate medications/consider consulting pharmacy,Investigate reason for fall         Problem: Pain  Goal: *Control of Pain  Outcome: Progressing Towards Goal     Problem: Nausea/Vomiting (Adult)  Goal: *Absence of nausea/vomiting  Outcome: Progressing Towards Goal     Problem: Nutrition Deficit  Goal: *Optimize nutritional status  Outcome: Progressing Towards Goal     Problem: Pressure Injury - Risk of  Goal: *Prevention of pressure injury  Description: Document Carlos Scale and appropriate interventions in the flowsheet. Outcome: Progressing Towards Goal  Note: Pressure Injury Interventions:  Sensory Interventions: Assess changes in LOC,Assess need for specialty bed,Avoid rigorous massage over bony prominences,Maintain/enhance activity level,Minimize linen layers,Monitor skin under medical devices,Turn and reposition approx.  every two hours (pillows and wedges if needed)    Moisture Interventions: Absorbent underpads,Apply protective barrier, creams and emollients,Check for incontinence Q2 hours and as needed,Internal/External urinary devices,Maintain skin hydration (lotion/cream)    Activity Interventions: Increase time out of bed,Pressure redistribution bed/mattress(bed type)    Mobility Interventions: Assess need for specialty bed,HOB 30 degrees or less,Pressure redistribution bed/mattress (bed type),Turn and reposition approx.  every two hours(pillow and wedges)    Nutrition Interventions: Discuss nutritional consult with provider,Document food/fluid/supplement intake    Friction and Shear Interventions: Apply protective barrier, creams and emollients,Foam dressings/transparent film/skin sealants,Transfer aides:transfer board/Mac lift/ceiling lift,Transferring/repositioning devices

## 2022-04-02 NOTE — PROGRESS NOTES
Comprehensive Nutrition Assessment    Type and Reason for Visit: Reassess  TPN Management (oncology)    Nutrition Recommendations/Plan:   Parenteral Nutrition:  Continue to hold TPN this evening d/t volume status  Nutritional Supplement Therapy:   Active electrolyte replacement per nutrition support protocols  Replacement indicated:  Completed  Labs:   CMP daily per provider  Mg daily per provider  Phos MWF   POC Glucoses/SSI Not indicated     · Per RN, pt is unable to take po medications. Changing regular diet to NPO. Malnutrition Assessment:  Malnutrition Status: Severe malnutrition  Context: Chronic illness  Findings of clinical characteristics of malnutrition:   Energy Intake:  7 - 75% or less est energy requirements for 1 month or longer  Weight Loss:  7.0 - Greater than 7.5% over 3 months (34# (18.4%) )     Body Fat Loss:  1 - Mild body fat loss, Buccal region,Orbital,Triceps   Muscle Mass Loss:  1 - Mild muscle mass loss, Calf (gastrocnemius),Hand (interosseous),Scapula (trapezius),Temples (temporalis)  Fluid Accumulation:  No significant fluid accumulation,     Strength:  Not performed     Nutrition Assessment:   Nutrition History: Per RD assessment patient was able to maintain PO intake and UBW throughout most of chemo. During admmission early March patient had \"stopped eating\" due to nausea. Upon assessment this admission patient reports no PO of foods for ~4 weeks. States that he has been able to tolerate some fluids. He reports continued nausea and vomiting as primary barrier. Nutrition Background: Patient with pancreatic and esophageal cancer, Amos's esophagus, obesity, HTN, GERD, gastritis, ascites and peritoneal carcinomatosis s/p Plurex drain. He presented to oncology office extremely sick, BP was unable to be measured. He was admitted directly to [facility].   Nutrition Interval:  PO intake continues to limited due to early satiety and persistent nausea and likely secondary to peritoneal carcinomatosis, recurrent ascites with Plurex drain. Patient has demonstrated inability to meet needs orally with severe weight loss and malnutrition as above. Intake trends since admission reveal daily PO tolerance of declining now less than 1 meal per day which is not sufficient to sustain weight, functional status, or life. Abdominal pleurex replaced 3/22:   Pt seen on BiPAP with mitts on at time of visit. Discussed pt with RN, Dr. Mayur Gaines, and Iqra Gale, NP. Holding TPN for today and tomorrow. RN reports unable to provide po meds this morning. Chest XR states mildly improved bilateral lung infiltrates. Abdominal Status (last documented): Intact abdomen with Active  bowel sounds. Last BM 04/02/22. Pertinent Medications: Imodium Q12H (held since 4/1), Solumedrol 40mg IV Q12H, protonix, Zosyn Q8H, Zofran PRN, Compazine, lasix 20mg Q8H, vancomycin, KCl 20meq x2  Continuous: Precedex  Lab Results   Component Value Date/Time    Sodium 146 (H) 04/02/2022 03:26 AM    Potassium 3.3 (L) 04/02/2022 03:26 AM    Chloride 109 (H) 04/02/2022 03:26 AM    CO2 29 04/02/2022 03:26 AM    Anion gap 8 04/02/2022 03:26 AM    Glucose 147 (H) 04/02/2022 03:26 AM    BUN 22 04/02/2022 03:26 AM    Creatinine 0.60 (L) 04/02/2022 03:26 AM    Calcium 8.4 04/02/2022 03:26 AM    Albumin 2.7 (L) 04/02/2022 03:26 AM    Magnesium 2.1 04/02/2022 03:26 AM    Phosphorus 1.9 (L) 04/01/2022 03:19 AM     Nutrition Related Findings:   Port in place, also with 1 PIV. TPN intiated 3/16. TPN increased in volume 3/19. TPN to be concentrated and volume decreased 3/22 per NP request due to new CHF. TPN changed to 18 hr cyclic infusion 0/65. TPN condensed to 16 hr infusion 3/29.       Current Nutrition Therapies:  ADULT DIET Regular    Current Intake:   Average Meal Intake: 0% Average Supplement Intake: None ordered      Anthropometric Measures:  Height: 5' 6\" (167.6 cm)  Current Body Wt: 83.8 kg (184 lb 11.9 oz) (3/31), Weight source: Bed scale  BMI: 29.8, (current BMI skewed by fluid)  Admission Body Weight: 148 lb 9.4 oz  Ideal Body Weight (lbs) (Calculated): 142 lbs (65 kg), 104.6 %  Usual Body Wt: 82.6 kg (182 lb) (12/14/21 office wt and per previous history), Percent weight change: -18.4          Edema: LLE: 2+; Pitting (4/2/2022  7:00 AM)  LUE: 1+; Pitting (4/1/2022  8:30 PM)  RLE: 2+; Pitting (4/2/2022  7:00 AM)     Estimated Daily Nutrient Needs:  Energy (kcal/day): 1019-2295 (Kcal/kg (25-30), Weight Used: Current (67.4 kg (3/15))  Protein (g/day):  (1.3-1.5 g/kg) Weight Used: (Admission (67.3 kg))  Fluid (ml/day):   (1 ml/kcal)    Nutrition Diagnosis:   · Inadequate oral intake related to altered GI function,early satiety (peritoneal carcinomatosis ) as evidenced by  (poor oral tolerance, wt loss, requires TPN for primary needs)    · Severe malnutrition related to catabolic illness as evidenced by  (malnutrition criteria as above)    Nutrition Interventions:   Food and/or Nutrient Delivery: Continue NPO     Coordination of Nutrition Care: Continue to monitor while inpatient    Goals:   Previous Goal Met: Progress towards goal(s) declining  Active Goal: Ability to restart nutrition support by RD follow up. Nutrition Monitoring and Evaluation:      Food/Nutrient Intake Outcomes:  (Initiate nutrition support)  Physical Signs/Symptoms Outcomes: Biochemical data,GI status,Fluid status or edema,Weight    Discharge Planning:     Too soon to determine    Electronically signed by Erica Daniels MS, RDN, LD 4/2/2022 at 1:39 PM  Contact: [phone]

## 2022-04-03 ENCOUNTER — APPOINTMENT (OUTPATIENT)
Dept: GENERAL RADIOLOGY | Age: 67
DRG: 435 | End: 2022-04-03
Attending: INTERNAL MEDICINE
Payer: MEDICARE

## 2022-04-03 LAB
ALBUMIN SERPL-MCNC: 2.3 G/DL (ref 3.2–4.6)
ALBUMIN/GLOB SERPL: 0.8 {RATIO} (ref 1.2–3.5)
ALP SERPL-CCNC: 339 U/L (ref 50–136)
ALT SERPL-CCNC: 20 U/L (ref 12–65)
ANION GAP SERPL CALC-SCNC: 8 MMOL/L (ref 7–16)
ARTERIAL PATENCY WRIST A: POSITIVE
AST SERPL-CCNC: 31 U/L (ref 15–37)
BACTERIA SPEC CULT: ABNORMAL
BACTERIA SPEC CULT: ABNORMAL
BASE EXCESS BLD CALC-SCNC: 2.9 MMOL/L
BASOPHILS # BLD: 0 K/UL (ref 0–0.2)
BASOPHILS NFR BLD: 0 % (ref 0–2)
BDY SITE: ABNORMAL
BILIRUB SERPL-MCNC: 0.8 MG/DL (ref 0.2–1.1)
BUN SERPL-MCNC: 32 MG/DL (ref 8–23)
CALCIUM SERPL-MCNC: 8.4 MG/DL (ref 8.3–10.4)
CHLORIDE SERPL-SCNC: 111 MMOL/L (ref 98–107)
CO2 SERPL-SCNC: 29 MMOL/L (ref 21–32)
CREAT SERPL-MCNC: 0.7 MG/DL (ref 0.8–1.5)
DIFFERENTIAL METHOD BLD: ABNORMAL
ECHO EST RA PRESSURE: 5 MMHG
ECHO LA AREA 2C: 14.1 CM2
ECHO LA AREA 4C: 22.4 CM2
ECHO LA MAJOR AXIS: 6.5 CM
ECHO LA MINOR AXIS: 6.4 CM
ECHO LA VOL 4C: 60 ML (ref 18–58)
ECHO LA VOL BP: 39 ML (ref 18–58)
ECHO LA VOL/BSA BIPLANE: 20 ML/M2 (ref 16–34)
ECHO LA VOLUME INDEX A4C: 31 ML/M2 (ref 16–34)
ECHO LV E' LATERAL VELOCITY: 4 CM/S
ECHO LV E' SEPTAL VELOCITY: 5 CM/S
ECHO LV EDV A2C: 67 ML
ECHO LV EDV A4C: 97 ML
ECHO LV EDV INDEX A4C: 50 ML/M2
ECHO LV EDV NDEX A2C: 35 ML/M2
ECHO LV EJECTION FRACTION A2C: 40 %
ECHO LV EJECTION FRACTION A4C: 43 %
ECHO LV EJECTION FRACTION BIPLANE: 41 % (ref 55–100)
ECHO LV ESV A2C: 41 ML
ECHO LV ESV A4C: 55 ML
ECHO LV ESV INDEX A2C: 21 ML/M2
ECHO LV ESV INDEX A4C: 28 ML/M2
ECHO LV FRACTIONAL SHORTENING: 17 % (ref 28–44)
ECHO LV INTERNAL DIMENSION DIASTOLE INDEX: 2.12 CM/M2
ECHO LV INTERNAL DIMENSION DIASTOLIC: 4.1 CM (ref 4.2–5.9)
ECHO LV INTERNAL DIMENSION SYSTOLIC INDEX: 1.76 CM/M2
ECHO LV INTERNAL DIMENSION SYSTOLIC: 3.4 CM
ECHO LV IVSD: 1.2 CM (ref 0.6–1)
ECHO LV MASS 2D: 151.3 G (ref 88–224)
ECHO LV MASS INDEX 2D: 78.4 G/M2 (ref 49–115)
ECHO LV POSTERIOR WALL DIASTOLIC: 1 CM (ref 0.6–1)
ECHO LV RELATIVE WALL THICKNESS RATIO: 0.49
ECHO MV A VELOCITY: 0.99 M/S
ECHO MV E DECELERATION TIME (DT): 95 MS
ECHO MV E VELOCITY: 0.52 M/S
ECHO MV E/A RATIO: 0.53
ECHO MV E/E' LATERAL: 13
ECHO MV E/E' RATIO (AVERAGED): 11.7
ECHO MV E/E' SEPTAL: 10.4
ECHO RIGHT VENTRICULAR SYSTOLIC PRESSURE (RVSP): 39 MMHG
ECHO RV INTERNAL DIMENSION: 2.8 CM
ECHO TV REGURGITANT MAX VELOCITY: 2.92 M/S
ECHO TV REGURGITANT PEAK GRADIENT: 34 MMHG
EOSINOPHIL # BLD: 0 K/UL (ref 0–0.8)
EOSINOPHIL NFR BLD: 0 % (ref 0.5–7.8)
ERYTHROCYTE [DISTWIDTH] IN BLOOD BY AUTOMATED COUNT: 16.3 % (ref 11.9–14.6)
GAS FLOW.O2 O2 DELIVERY SYS: ABNORMAL L/MIN
GLOBULIN SER CALC-MCNC: 2.8 G/DL (ref 2.3–3.5)
GLUCOSE SERPL-MCNC: 124 MG/DL (ref 65–100)
GRAM STN SPEC: ABNORMAL
HCO3 BLD-SCNC: 27.7 MMOL/L (ref 22–26)
HCT VFR BLD AUTO: 28.6 % (ref 41.1–50.3)
HGB BLD-MCNC: 8.7 G/DL (ref 13.6–17.2)
IMM GRANULOCYTES # BLD AUTO: 0.7 K/UL (ref 0–0.5)
IMM GRANULOCYTES NFR BLD AUTO: 7 % (ref 0–5)
LYMPHOCYTES # BLD: 0.4 K/UL (ref 0.5–4.6)
LYMPHOCYTES NFR BLD: 4 % (ref 13–44)
MAGNESIUM SERPL-MCNC: 2.2 MG/DL (ref 1.8–2.4)
MCH RBC QN AUTO: 29.3 PG (ref 26.1–32.9)
MCHC RBC AUTO-ENTMCNC: 30.4 G/DL (ref 31.4–35)
MCV RBC AUTO: 96.3 FL (ref 79.6–97.8)
MONOCYTES # BLD: 0.5 K/UL (ref 0.1–1.3)
MONOCYTES NFR BLD: 5 % (ref 4–12)
NEUTS SEG # BLD: 8.4 K/UL (ref 1.7–8.2)
NEUTS SEG NFR BLD: 84 % (ref 43–78)
NRBC # BLD: 0.04 K/UL (ref 0–0.2)
O2/TOTAL GAS SETTING VFR VENT: 80 %
PCO2 BLD: 42.5 MMHG (ref 35–45)
PH BLD: 7.42 [PH] (ref 7.35–7.45)
PIP ISTAT,IPIP: 15
PLATELET # BLD AUTO: 121 K/UL (ref 150–450)
PLATELET COMMENTS,PCOM: ADEQUATE
PMV BLD AUTO: 11.5 FL (ref 9.4–12.3)
PO2 BLD: 78 MMHG (ref 75–100)
POTASSIUM SERPL-SCNC: 3.8 MMOL/L (ref 3.5–5.1)
PROT SERPL-MCNC: 5.1 G/DL (ref 6.3–8.2)
RBC # BLD AUTO: 2.97 M/UL (ref 4.23–5.6)
RBC MORPH BLD: ABNORMAL
SAO2 % BLD: 95.6 % (ref 95–98)
SERVICE CMNT-IMP: ABNORMAL
SODIUM SERPL-SCNC: 148 MMOL/L (ref 138–145)
SPECIMEN TYPE: ABNORMAL
VENTILATION MODE VENT: ABNORMAL
WBC # BLD AUTO: 10 K/UL (ref 4.3–11.1)
WBC MORPH BLD: ABNORMAL

## 2022-04-03 PROCEDURE — 80053 COMPREHEN METABOLIC PANEL: CPT

## 2022-04-03 PROCEDURE — 71045 X-RAY EXAM CHEST 1 VIEW: CPT

## 2022-04-03 PROCEDURE — 74011000258 HC RX REV CODE- 258: Performed by: NURSE PRACTITIONER

## 2022-04-03 PROCEDURE — P9047 ALBUMIN (HUMAN), 25%, 50ML: HCPCS | Performed by: INTERNAL MEDICINE

## 2022-04-03 PROCEDURE — 74011250637 HC RX REV CODE- 250/637: Performed by: INTERNAL MEDICINE

## 2022-04-03 PROCEDURE — 74011250636 HC RX REV CODE- 250/636: Performed by: INTERNAL MEDICINE

## 2022-04-03 PROCEDURE — 77030018798 HC PMP KT ENTRL FED COVD -A

## 2022-04-03 PROCEDURE — 36600 WITHDRAWAL OF ARTERIAL BLOOD: CPT

## 2022-04-03 PROCEDURE — 83735 ASSAY OF MAGNESIUM: CPT

## 2022-04-03 PROCEDURE — 3331090001 HH PPS REVENUE CREDIT

## 2022-04-03 PROCEDURE — 94660 CPAP INITIATION&MGMT: CPT

## 2022-04-03 PROCEDURE — C9113 INJ PANTOPRAZOLE SODIUM, VIA: HCPCS | Performed by: INTERNAL MEDICINE

## 2022-04-03 PROCEDURE — 74011000250 HC RX REV CODE- 250: Performed by: INTERNAL MEDICINE

## 2022-04-03 PROCEDURE — 99233 SBSQ HOSP IP/OBS HIGH 50: CPT | Performed by: INTERNAL MEDICINE

## 2022-04-03 PROCEDURE — 74011250637 HC RX REV CODE- 250/637: Performed by: NURSE PRACTITIONER

## 2022-04-03 PROCEDURE — 85025 COMPLETE CBC W/AUTO DIFF WBC: CPT

## 2022-04-03 PROCEDURE — 74011000258 HC RX REV CODE- 258: Performed by: INTERNAL MEDICINE

## 2022-04-03 PROCEDURE — 82803 BLOOD GASES ANY COMBINATION: CPT

## 2022-04-03 PROCEDURE — 65610000001 HC ROOM ICU GENERAL

## 2022-04-03 PROCEDURE — 74011250636 HC RX REV CODE- 250/636: Performed by: NURSE PRACTITIONER

## 2022-04-03 PROCEDURE — 77010033711 HC HIGH FLOW OXYGEN

## 2022-04-03 PROCEDURE — 99291 CRITICAL CARE FIRST HOUR: CPT | Performed by: INTERNAL MEDICINE

## 2022-04-03 PROCEDURE — 3331090002 HH PPS REVENUE DEBIT

## 2022-04-03 PROCEDURE — 2709999900 HC NON-CHARGEABLE SUPPLY

## 2022-04-03 RX ORDER — ALBUMIN HUMAN 250 G/1000ML
50 SOLUTION INTRAVENOUS ONCE
Status: COMPLETED | OUTPATIENT
Start: 2022-04-03 | End: 2022-04-03

## 2022-04-03 RX ADMIN — ENOXAPARIN SODIUM 80 MG: 100 INJECTION SUBCUTANEOUS at 10:59

## 2022-04-03 RX ADMIN — LOPERAMIDE HYDROCHLORIDE 2 MG: 2 CAPSULE ORAL at 08:17

## 2022-04-03 RX ADMIN — ENOXAPARIN SODIUM 80 MG: 100 INJECTION SUBCUTANEOUS at 22:02

## 2022-04-03 RX ADMIN — VANCOMYCIN HYDROCHLORIDE 1000 MG: 1 INJECTION, POWDER, LYOPHILIZED, FOR SOLUTION INTRAVENOUS at 10:59

## 2022-04-03 RX ADMIN — MIDODRINE HYDROCHLORIDE 5 MG: 5 TABLET ORAL at 08:19

## 2022-04-03 RX ADMIN — MIDODRINE HYDROCHLORIDE 5 MG: 5 TABLET ORAL at 15:12

## 2022-04-03 RX ADMIN — SODIUM CHLORIDE, PRESERVATIVE FREE 20 MG: 5 INJECTION INTRAVENOUS at 08:19

## 2022-04-03 RX ADMIN — METHYLPREDNISOLONE SODIUM SUCCINATE 40 MG: 40 INJECTION, POWDER, FOR SOLUTION INTRAMUSCULAR; INTRAVENOUS at 08:19

## 2022-04-03 RX ADMIN — MIDODRINE HYDROCHLORIDE 5 MG: 5 TABLET ORAL at 21:58

## 2022-04-03 RX ADMIN — PIPERACILLIN SODIUM,TAZOBACTAM SODIUM 4.5 G: 4; .5 INJECTION, POWDER, FOR SOLUTION INTRAVENOUS at 09:16

## 2022-04-03 RX ADMIN — ALBUMIN (HUMAN) 50 G: 0.25 INJECTION, SOLUTION INTRAVENOUS at 22:12

## 2022-04-03 RX ADMIN — PIPERACILLIN SODIUM,TAZOBACTAM SODIUM 4.5 G: 4; .5 INJECTION, POWDER, FOR SOLUTION INTRAVENOUS at 01:23

## 2022-04-03 RX ADMIN — POTASSIUM CHLORIDE 20 MEQ: 20 TABLET, EXTENDED RELEASE ORAL at 08:16

## 2022-04-03 RX ADMIN — SODIUM CHLORIDE, PRESERVATIVE FREE 20 MG: 5 INJECTION INTRAVENOUS at 21:54

## 2022-04-03 RX ADMIN — PIPERACILLIN SODIUM,TAZOBACTAM SODIUM 4.5 G: 4; .5 INJECTION, POWDER, FOR SOLUTION INTRAVENOUS at 17:28

## 2022-04-03 RX ADMIN — SODIUM CHLORIDE 40 MG: 9 INJECTION INTRAMUSCULAR; INTRAVENOUS; SUBCUTANEOUS at 08:19

## 2022-04-03 RX ADMIN — HYDROCODONE BITARTRATE AND ACETAMINOPHEN 1 TABLET: 10; 325 TABLET ORAL at 08:18

## 2022-04-03 RX ADMIN — AMIODARONE HYDROCHLORIDE 400 MG: 200 TABLET ORAL at 08:17

## 2022-04-03 RX ADMIN — FUROSEMIDE 20 MG: 10 INJECTION, SOLUTION INTRAMUSCULAR; INTRAVENOUS at 14:02

## 2022-04-03 RX ADMIN — AMIODARONE HYDROCHLORIDE 400 MG: 200 TABLET ORAL at 17:28

## 2022-04-03 RX ADMIN — PROMETHAZINE HYDROCHLORIDE 25 MG: 25 TABLET ORAL at 14:02

## 2022-04-03 RX ADMIN — LOPERAMIDE HYDROCHLORIDE 2 MG: 2 CAPSULE ORAL at 21:58

## 2022-04-03 RX ADMIN — METHYLPREDNISOLONE SODIUM SUCCINATE 40 MG: 40 INJECTION, POWDER, FOR SOLUTION INTRAMUSCULAR; INTRAVENOUS at 22:01

## 2022-04-03 RX ADMIN — DEXMEDETOMIDINE HYDROCHLORIDE 0.3 MCG/KG/HR: 100 INJECTION, SOLUTION INTRAVENOUS at 03:25

## 2022-04-03 NOTE — PROGRESS NOTES
[facility] Critical Care Note[de-identified] 4/3/2022  Forrest Gonzalez  Admission Date: 3/15/2022     Length of Stay: 19 days    Background: .66 y.o. 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 He has an abdominal pleurex catheter in place that is being drained usually every other day. He was admitted with hypotension/volume depletion and failure to thrive. [facility] has been on TPN for nutritional support. He has been treated with Folfirinox previously but now has disease progression so is now being treated with Gemcitabine/Abraxane which was started around 3/17. He had a chest CT done on 3/20 that showed some infiltrates with small effusions. His CXR has since worsened and his oxygen needs have increased.  He has edema and is getting lasix but his blood pressure has limited use.  He was recently on Vanc and Maxipeme for ? Infection but has been off antibiotics for several days.  He is afebrile and WBC is 18.  Echo this admission with EF of 35 to 40%    Notable PMH:  has a past medical history of Back pain, Chronic pain, CINV (chemotherapy-induced nausea and vomiting) (4/20/2021), Erectile dysfunction, Gastritis, GERD (gastroesophageal reflux disease), Hiatal hernia, History of anemia, Hypertension, Left bundle branch block (LBBB) on electrocardiogram (02/05/2021), Malignant neoplasm of body of pancreas ([facility] [address] Malignant neoplasm of esophagus ([address] (12/07/2020), Morbid obesity ([address] and Nausea. 24 Hour events:  Now off bipap on 80% airvo and nrb,    ROS: unable to obtain/negative except as listed elsewhere. Lines: (insertion date)   Port  [place]: (4/1)  Pleurex (3/22    Drips: current dose (range)  Precedex Dose (mcg/kg/hr): 0.3 mcg/kg/hr     Pertinent Exam:         Blood pressure 91/60, pulse 72, temperature 97.9 °F (36.6 °C), resp. rate 19, height 5' 6\" (1.676 m), weight 184 lb (83.5 kg), SpO2 96 %.      Intake/Output Summary (Last 24 hours) at 4/3/2022 1003  Last data filed at 4/3/2022 0700  Gross per 24 hour   Intake 982.29 ml   Output 625 ml   Net 357.29 ml     Constitutional:  nad on airvo and nrb  EENMT:  Sclera clear, pupils equal, oral mucosa moist  Respiratory: crackles   Cardiovascular:  RRR  Gastrointestinal:  soft with no tenderness; positive bowel sounds present  Musculoskeletal:  warm with no cyanosis, 1+ lower extremity edema  Skin:  no jaundice or ecchymosis  Neurologic: lethargic but wakes up  Psychiatric: calm    CXR:  improved      Recent Labs     04/03/22 0325 04/02/22 0326 04/01/22  0905 04/01/22  0319   WBC 10.0 12.5*  --  14.3*   HGB 8.7* 9.0*  --  8.1*   HCT 28.6* 29.3*  --  25.9*   * 110*  --  88*   PCT  --   --  1.08*  --      Recent Labs     04/03/22 0325 04/02/22 0326 04/01/22 0319   * 146* 141   K 3.8 3.3* 2.9*   * 109* 107   CO2 29 29 30   * 147* 105*   BUN 32* 22 20   CREA 0.70* 0.60* 0.50*   MG 2.2 2.1 2.1   CA 8.4 8.4 8.1*   PHOS  --   --  1.9*   ALB 2.3* 2.7* 2.8*   AST 31 25 18   ALT 20 16 15   * 285* 225*     Recent Labs     04/01/22  0905   BNPNT 4,958*     No results for input(s): GLUCPOC in the last 72 hours. No lab exists for component: A1C  ECHO: Results from Hospital Encounter encounter on 03/15/22    ECHO ADULT FOLLOW-UP OR LIMITED    Interpretation Summary    Left Ventricle: Left ventricle is mildly dilated. Mildly increased wall thickness. Moderate global hypokinesis present. Moderately reduced left ventricular systolic function with a visually estimated EF of 30 - 35%. Abnormal diastolic function.   Mitral Valve: Mild transvalvular regurgitation.   Tricuspid Valve: Mildly elevated RVSP.   Left Atrium: Left atrium is mildly dilated.   Right Atrium: Right atrium is mildly dilated.   Pericardium: Left pleural effusion.   Contrast used: Definity.      Results     Procedure Component Value Units Date/Time    MSSA/MRSA SC BY PCR, NASAL SWAB [[id number]]     Order Status: Sent Specimen: Nasal swab     CULTURE, RESPIRATORY/SPUTUM/BRONCH Royce Shaker STAIN [[id number]]  (Abnormal) Collected: 03/30/22 1742    Order Status: Completed Specimen: Sputum Updated: 04/03/22 0757     Special Requests: NO SPECIAL REQUESTS        GRAM STAIN       WBCS SEEN TOO NUMEROUS TO COUNT            NO EPITHELIAL CELLS SEEN         FEW GRAM NEGATIVE RODS         MODERATE YEAST         4+ MUCUS PRESENT        Culture result: HEAVY CANDIDA TROPICALIS               LIGHT NORMAL RESPIRATORY PANTERA          CULTURE, RESPIRATORY/SPUTUM/BRONCH W Jeaneth Rumpam [[id number]] Collected: 03/30/22 0827    Order Status: Completed Specimen: Sputum Updated: 03/30/22 1417     Special Requests: NO SPECIAL REQUESTS        GRAM STAIN       GRAM STAIN EXAMINATION OF THIS SPECIMEN INDICATED OROPHARYNGEAL CONTAMINATION. PLEASE SUBMIT A NEW SPECIMEN OR NOTIFY THE MICRO DEPT WITHIN 48HRS IF FURTHER WORKUP IS DESIRED. Culture result:       Please reorder and recollect. RESULTS VERIFIED, PHONED TO AND READ BACK BY  BEN GERBER RN ON 03/30/22 @1412, ADS      MSSA/MRSA SC BY PCR, NASAL SWAB [[id number]] Collected: 03/25/22 1115    Order Status: Canceled Specimen: Nasal swab     C.  DIFFICILE AG & TOXIN A/B [[id number]] Collected: 03/25/22 0041    Order Status: Completed Specimen: Stool Updated: 03/25/22 1047     [address] ANTIGEN       C. DIFFICILE GDH ANTIGEN-NEGATIVE           C. difficile toxin       C. DIFFICILE TOXIN-NEGATIVE           PCR Reflex NOT APPLICABLE        INTERPRETATION       NEGATIVE FOR TOXIGENIC C. DIFFICILE           Clinical Consideration       NEGATIVE FOR TOXIGENIC C. DIFFICILE              Inpat Anti-Infectives (From admission, onward)     Start     Ordered Stop    04/02/22 2300  vancomycin (VANCOCIN) 1,000 mg in 0.9% sodium chloride 250 mL ([id number])  1,000 mg,   IntraVENous,   EVERY 12 HOURS         04/02/22 1114 --    04/01/22 0900  piperacillin-tazobactam (ZOSYN) 4.5 g in 0.9% sodium chloride (MBP/ADV) 100 mL MBP  4.5 g,   IntraVENous,   EVERY 8 HOURS         04/01/22 0850 04/08/22 0959              Ventilator Settings:  Ideal body weight: 63.8 kg (140 lb 10.5 oz)   Mode FIO2 Rate Tidal Volume Pressure PEEP      80 %               Peak airway pressure:         Minute ventilation: 15.4 l/min  ABG:  Recent Labs     04/03/22  0327 04/02/22  0304   PHI 7.42 7.46*   PCO2I 42.5 38.5   PO2I 78 47*   HCO3I 27.7* 27.5*     Assessment and Plan:  (Medical Decision Making)       Impression: 66 y.o. 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 He has an abdominal pleurex catheter in place that is being drained usually every other day. He was admitted with hypotension/volume depletion and failure to thrive. He has been treated with Folfirinox previously but now has disease progression so is now being treated with Gemcitabine/Abraxane which was started around 3/17. He had a chest CT done on 3/20 that showed some infiltrates with small effusions. His CXR has since worsened and his oxygen needs have increased.  He has edema and is getting lasix but his blood pressure has limited use.  He was recently on Vanc and Maxipeme for ? Infection but has been off antibiotics for several days.  He is afebrile and WBC is 18.  Echo this admission with EF of 35 to 40%.     NEURO:    now calm.  -on precedex  Analgesia: increased Norco yesterday per palliative care on 3/31.   CV:   Volume Status:-+ 4174 cc since admit  LV dysfunction - as above. Cardiology has seen.  Medical therapy limited but hypotension.   Hypotension: continue Midodrine  PULM:   Acute hypoxemic/hypercapneic respiratory failure:  off bipap for now on airvo and nrb  Bilateral pulmonary infiltrates: lasix on hold do to bp -will resume- ? pna vs volume overload vs lung toxicity from chemo- steroids, vanc zosyn and  lasix  RENAL:  MICHAEL: NA  Electrolytes: supplementing K+ and phos  GI:   Nutrition: off tpn -has ng tube- can start some tube feeds  Malignant ascites:  Drain pleurex every other day and prn. Drained 3/31  HEME:   Metastatic esophageal and pancreatic cancers - chemo started back now. + disease progression per oncology. Anemia: monitoring  Thrombocytopenia: plts 121. Chemo resumed  Anticoagulation: add low dose lovenox  ID:   Have restarted abx due to worsening hypoxia/increased infiltrates on CXR. pct 1.08    ENDO:   DM: NA  Skin: no decub, turns, preventive care  Prophy: add low dose Lovenox/Protonix     Full Code    Full Code    The patient is critically ill with respiratory failure, circulatory failure and requires high complexity decision making for assessment and support including frequent ventilator adjustment , frequent evaluation and titration of therapies , application of advanced monitoring technologies and extensive interpretation of multiple databases    Cumulative time devoted to patient care services by me for day of service is 32 mins.     Remington Gallego MD

## 2022-04-03 NOTE — PROGRESS NOTES
[facility]        Inpatient Hematology / Oncology Progress Note      Admission Date: 3/15/2022 10:28 AM  Reason for Admission/Hospital Course: FTT (failure to thrive) in adult [R62.7]  Hypotension [I95.9]      24 Hour Events:  Remains in ICU   Precedex drip   Hypotension a bit better  Now on HFNC  Lasix every 8 hours  Did not start Sotorasib yet - discussed with RN and will start today ASAP    Transfusions: None  Replacements: None    ROS:   Unable to be obtained d/t patient status     10 point review of systems is otherwise negative with the exception of the elements mentioned above in the HPI.      No Known Allergies    OBJECTIVE:  Patient Vitals for the past 8 hrs:   BP Temp Pulse Resp SpO2   04/03/22 0945 91/60 -- 72 19 96 %   04/03/22 0915 98/63 -- 74 28 97 %   04/03/22 0900 99/62 -- 73 30 98 %   04/03/22 0830 (!) 91/58 -- 76 26 94 %   04/03/22 0817 (!) 98/53 -- 75 21 95 %   04/03/22 0745 94/61 -- 68 23 93 %   04/03/22 0740 -- -- -- -- 92 %   04/03/22 0730 90/64 -- 71 (!) 31 94 %   04/03/22 0728 -- -- -- -- 93 %   04/03/22 0715 95/62 -- 69 22 95 %   04/03/22 0700 96/63 97.9 °F (36.6 °C) 72 27 95 %   04/03/22 0630 102/64 -- 74 17 95 %   04/03/22 0615 100/62 -- 65 18 99 %   04/03/22 0600 (!) 86/57 -- 64 19 99 %   04/03/22 0545 (!) 83/56 -- 64 22 99 %   04/03/22 0530 (!) 82/55 -- 63 20 99 %   04/03/22 0515 (!) 88/59 -- 65 21 97 %   04/03/22 0500 94/60 -- 73 (!) 34 95 %   04/03/22 0445 92/62 -- 65 22 98 %   04/03/22 0430 (!) 87/59 -- 69 25 96 %   04/03/22 0415 (!) 91/58 -- 70 (!) 32 96 %   04/03/22 0400 102/60 -- 70 27 99 %   04/03/22 0345 (!) 88/60 -- 65 19 98 %   04/03/22 0337 -- -- -- -- 98 %   04/03/22 0330 93/64 -- 70 26 98 %   04/03/22 0327 93/64 97.9 °F (36.6 °C) 70 26 99 %   04/03/22 0315 102/64 -- 71 30 98 %   04/03/22 0300 (!) 89/61 -- 66 21 99 %   04/03/22 0245 (!) 89/61 -- 67 21 99 %   04/03/22 0230 (!) 88/61 -- 68 20 99 %   04/03/22 0215 (!) 87/61 -- 69 19 99 %   04/03/22 0200 (!) 86/60 -- 70 20 99 %     Temp (24hrs), Av.2 °F (36.8 °C), Min:97.9 °F (36.6 °C), Max:98.5 °F (36.9 °C)    No intake/output data recorded. Physical Exam:  Constitutional: Thin, cachectic elderly male in no acute distress, lying comfortably in the hospital bed. HEENT: Normocephalic and atraumatic. Oropharynx is clear, mucous membranes are moist. Extraocular muscles are intact. Sclerae anicteric. Neck supple without JVD. No thyromegaly present. Skin Warm and dry. No bruising and no rash noted. No erythema. No pallor. Respiratory +slightly coarse. Normal air exchange without accessory muscle use. CVS Normal rate, regular rhythm and normal S1 and S2. No murmurs, gallops, or rubs. Abdomen Soft, nontender and distended, normoactive bowel sounds. +Abd pleurx   Neuro Grossly nonfocal with no obvious sensory or motor deficits. MSK 2+ BLE edema. Normal range of motion in general.  No tenderness. Psych Appropriate mood and affect.         Labs:      Recent Labs     22   WBC 10.0 12.5* 14.3*   RBC 2.97* 3.08* 2.74*   HGB 8.7* 9.0* 8.1*   HCT 28.6* 29.3* 25.9*   MCV 96.3 95.1 94.5   MCH 29.3 29.2 29.6   MCHC 30.4* 30.7* 31.3*   RDW 16.3* 16.3* 15.9*   * 110* 88*   GRANS 84* 77 71  1*   LYMPH 4* 4* 8   MONOS 5 7 10   EOS 0* 0*  --    BASOS 0 1  --    IG 7* 11*  --    DF AUTOMATED AUTOMATED MANUAL   ANEU 8.4* 9.6* 11.4*   ABL 0.4* 0.5 1.1   ABM 0.5 0.9 1.4*   JONNY 0.0 0.0  --    ABB 0.0 0.1  --    AIG 0.7* 1.4*  --         Recent Labs     22 22  0319   * 146* 141   K 3.8 3.3* 2.9*   * 109* 107   CO2 29 29 30   AGAP 8 8 4*   * 147* 105*   BUN 32* 22 20   CREA 0.70* 0.60* 0.50*   GFRAA >60 >60 >60   GFRNA >60 >60 >60   CA 8.4 8.4 8.1*   * 285* 225*   TP 5.1* 5.4* 5.1*   ALB 2.3* 2.7* 2.8*   GLOB 2.8 2.7 2.3   AGRAT 0.8* 1.0* 1.2   MG 2.2 2.1 2.1   PHOS  --   --  1.9*         Imaging:  XR CHEST Clara Armstrong [[id number]] Collected: 04/02/22 0412   Order Status: Completed Updated: 04/02/22 0433   Narrative:     EXAM: Chest x-ray. INDICATION: Dyspnea. COMPARISON: March 31, 2022. TECHNIQUE: Frontal view chest x-ray. FINDINGS: Diffuse bilateral lung infiltrates are mildly improved. No   pneumothorax or pleural effusion is seen. The heart size is stable. Again noted   is a left chest wall infusion port catheter. Impression:     Mildly improved bilateral lung infiltrates. XR CHEST Clara Armstrong [[id number]] Collected: 03/31/22 2133   Order Status: Completed Updated: 03/31/22 2136   Narrative:     EXAMINATION: One view chest     HISTORY: increase in shortness of breath     TECHNIQUE: Frontal chest.     COMPARISON: 3/30/2022     FINDINGS:     Stable left-sided MediPort. Persistent bilateral lung infiltrates. These have increased bilaterally   particularly on the right. There is no significant pneumothorax. There is no significant pleural effusion. The heart is unchanged. No other significant interval changes. Impression:       1. Findings as described above. Increased bilateral lung infiltrates. XR CHEST Clara Armstrong [[id number]] Collected: 03/30/22 0820   Order Status: Completed Updated: 03/30/22 0826   Narrative:     Exam: XR CHEST SNGL V on 3/30/2022 8:20 AM     Clinical History: The Male patient is 78years old  presenting for sob. Comparison:  Chest x-ray 3/24/2022     Findings:  Frontal view of the chest was obtained. Continued shallow inspiration with mild pulmonary edema and suggested left   basilar infiltrate and/or effusion. Left subclavian chest port in place.  The   cardiomediastinal silhouette is within normal limits.  There are no acute   osseous abnormalities. Impression:       1. Continued shallow inspiration with suspected left basilar infiltrate and/or   effusion as well as diffuse mild pulmonary edema.      CPT code(s) 61258          XR CHEST SNGL V [[id number]] Collected: 03/24/22 1158   Order Status: Completed Updated: 03/24/22 1201   Narrative:     PORTABLE CHEST 1 VIEW     HISTORY: Increasing oxygen needs. COMPARISON: 3/15/2022     FINDINGS: A chest port is present with catheter tip in the SVC. Lung volumes are diminished. Edema like interstitial densities are present in   the middle and lower lung zones. EKG leads are present. Impression:     Low lung volumes with bilateral infiltrates that may be caused by   edema or infection. Nataliya Jewel CATH PLEURAL INDWELL [[id number]] Collected: 03/22/22 1614   Order Status: Completed Updated: 03/22/22 1652   Narrative:     Title: Dominant PleurX drain placement. Indication: Pancreatic cancer. Recurrent abdominal ascites.  Tunneled abdominal   PleurX drain catheter requested. : Leydi Arauz PA-C     Supervising Physician: Clifford Jaramillo M.D. Consent:  Informed written and oral consent was obtained from the patient after   explanation of benefits and risks (including, but not limited to: Infection,   hemorrhage, visceral injury and pneumothorax).  The patient's questions were   answered to their satisfaction. The patient stated understanding and requested   that we proceed. Procedure:  With the patient supine, the abdomen was prepped and draped in the   standard fashion.  Lidocaine was administered for local field block.  Ultrasound   evaluation was performed. Using real-time ultrasound guidance, with appropriate   image recording, a Yueh needle was advanced into the ascites in the abdomen. Using fluoroscopy, the needle was exchanged over a wire for a peel-away sheath. The PleurX drain was brought through a subcutaneous tunnel and passed down the   peel-away sheath positioning the drain across the midline, lower and the pelvis. The skin incision was closed with absorbable suture.  The catheter was secured   with nonabsorbable suture.      A total of 2250 cc of thin yellow fluid was removed.  Bandages were applied. Complications: Large-volume ascites. Medications:  37 minutes based placed under moderate sedation was provided under   the direction and supervision of Guilherme Reyez M.D. using frontal and Versed. Continuous cardiopulmonary monitoring was provided by trained independent   observer present. Ancef was infused prior to the procedure. Contrast:  None. Radiation dose:   Fluoroscopy time: 18 seconds. Reference air kerma (mGy): 8   Kerma area product (cGy.cm2):  410   Fluoroscopic images: 1     Findings:  Large volume ascites. Plan: Bedrest for 1 hour. Recommend performing a drainage procedure daily or every-other-day for the next   two weeks in order to promote healing of the catheter site.        CT CHEST W CONT [[id number]] Collected: 03/20/22 1811   Order Status: Completed Updated: 03/20/22 1823   Narrative:     CT CHEST WITH CONTRAST DATED 3/20/2022. History: Echo with extra cardiac structure compressing the left atrium. Comparison: CT abdomen and pelvis with contrast 3/19/2022     Technique:   Multiple contiguous helical CT images reconstructed at 5 mm were   obtained from the neck base to the mid abdomen following the administration of   100 cc of Isovue 370 without acute complication. All CT scans performed at this   facility use one or all of the following: Automated exposure control, adjustment   of the mA and/or kVp according to patient's size, iterative reconstruction. Findings: The base of the neck is unremarkable in appearance. A left-sided venous port is   present. No lymphadenopathy is seen.  The thoracic aorta is normal in caliber. The heart is not clearly enlarged on the CT. Moderate to advanced   atherosclerotic calcification is seen of the coronary arteries. No significant   pericardial effusion is seen. The only abnormal space-occupying process adjacent   to the heart is a moderate size hiatal hernia.  In addition to the stomach, there   is additional herniation of mesenteric fat as well as abdominal fluid. These all   appear to anteriorly displace the heart. The esophagus proximal to the hernia   sac is fluid distended which could indicate reflux. Obstruction at the   gastroesophageal junction is felt to be less likely given the additional fluid   distention of the hernia sac. Evaluation with lung windows demonstrates a trace right, and small left   dependent pleural effusion. These do appear worsened from the prior examination. Nonspecific pulmonary infiltrates are otherwise seen. Lungs are expanded without   evidence for pneumothorax.  No acute osseous abnormality is seen. Mild anasarca   is seen of the chest wall. Limited evaluation of the upper abdomen demonstrate multiple findings which are   not significantly changed and better assessed on a dedicated contrasted CT scan   of the abdomen performed on 3/19/2022. Impression:     1.  The only abnormal space-occupying process adjacent to the heart is a   moderate size hiatal hernia. In addition to the stomach, there is additional   herniation of mesenteric fat as well as abdominal fluid. These all appear to   anteriorly displace the heart.       2. Worsening although trace to small bilateral dependent pleural effusions. Additional mild anasarca seen of the chest wall. These findings suggest fluid   overload. 3. Nonspecific pulmonary infiltrates. These could represent pulmonary edema   although the distribution is not classic. Additional allergies such as pneumonia   are not excluded if suggested by clinical findings. This report was made using voice transcription. Despite my best efforts to avoid   any, transcription errors may persist. If there is any question about the   accuracy of the report or need for clarification, then please call [phone], or text me through [email or web address] for clarification or correction.     CT ABD PELV W CONT [[id number]] Collected: 03/19/22 1300   Order Status: Completed Updated: 03/20/22 1526   Addenda: Addendum: Addendum: A request was made to assess for potential right   gluteal hematoma. There is appear to be edema in the right gluteal soft tissues   which is slightly more pronounced than on the left. No clearly demonstrated defined collection is seen to suggest significant hematoma. The most inferior   gluteal soft tissues have been excluded from the field of imaging. Signed: 03/20/22 1523 by Vale Vuong MD   Narrative:     CT ABDOMEN AND PELVIS WITH INTRAVENOUS CONTRAST DATED 3/19/2022. History: Fall hitting bottom and back. Comparison: CT abdomen and pelvis with contrast 12/27/2021     Technique:   Multiple contiguous helical CT images reconstructed at 5 mm   intervals were obtained from above the diaphragms through the ischial   tuberosities following oral and 100 cc Isovue-370 without acute complication. All CT scans performed at this facility use one or all of the following:   Automated exposure control, adjustment of the mA and/or kVp according to   patient's size, iterative reconstruction. Findings:   CT ABDOMEN:     Limited evaluation of the lung bases and base of the mediastinum demonstrates   nonspecific infiltrates in the bilateral lung bases, left greater than right. A   small dependent left pleural effusion is seen. A small to moderate size hiatal   hernia is present. Ascites within the abdomen is also seen within the hernia   sac. The Liver is clearly cirrhotic in its appearance. Multiple hepatic masses are   seen the largest of which resides in the inferior right lobe measuring 3 cm in   size. The appearance is highly concerning for malignancy either representing   multifocal hepatomas, or hepatic metastases. Hepatic metastases are favored   given the appearance.  Asymmetric intraperitoneal air ductal dilation is seen in   the left lobe of the liver which may be obstructed by a central hepatic mass   seen on axial image 23 measuring 1.1 cm in size. .  The spleen demonstrates an   irregular area of decreased attenuation in the superior pole seen on axial image   17 measuring 3.2 cm x 1.7 cm. This is not as well characterized. This does not   appear to represent an acute defect. This could result from flow artifact.  No   contour deforming or enhancing mass lesions are seen of the adrenal glands. The   pancreas is grossly unchanged in its appearance with the patient having a known   primary pancreas tumor described on the prior study. The gallbladder has been   removed.  The kidneys enhance symmetrically and no evidence of hydronephrosis is   seen. A stable benign cyst is seen in the posterior upper to midpole cortex of   the right kidney measuring 2.6 cm in size. The visualized loops of small bowel and colon are normal in caliber.  The   appendix appears to have been removed. Mild to moderate diverticulosis is seen   of the left colon. No free air is seen. Moderate ascites is seen which   demonstrates low attenuation suggesting simple ascites. Diffuse mesenteric edema   is seen. Abnormal peritoneal nodularity and caking is suggested with additional   peritoneal thickening which at times appears nodular. This is highly concerning   for peritoneal metastatic process which appears worsened from the prior   examination.  No evolving adenopathy is seen.  The abdominal aorta demonstrate   moderate atherosclerotic calcification. No acute osseous abnormality is seen. Compression deformities are seen at T12, L1, and L4 although these have a   chronic appearance and are stable from the prior comparison study. CT PELVIS:   Small to moderate ascites extends into the pelvis. There is once again nodular   peritoneal thickening best appreciated on axial image 73 highly concerning for   evolving peritoneal metastatic disease.  There appears to be serosal involvement   of the mid sigmoid colon seen on axial image 74. No abnormal dilation is seen to   suggest significant obstruction at this time. Gideon Primas evolving pelvic adenopathy is   seen.  The urinary bladder is unremarkable. No acute osseous abnormality is   seen. Impression:     1.  No clear acute injury from recent fall. Specifically, no acute osseous   abnormality is seen. 2. Grossly stable appearance of pancreatic tumor although this is suboptimally   assessed on this exam. However, there is clear evidence for evolving metastatic   disease with new hepatic masses, and multiple findings as described above which   are highly concerning for evolving peritoneal metastatic disease. Lastly,   irregular decreased attenuation is seen in the superior pole of the spleen which   does not appear clearly acute and does not demonstrate adjacent complex ascites. This could represent an additional metastasis although is not as well   characterized. This report was made using voice transcription. Despite my best efforts to avoid   any, transcription errors may persist. If there is any question about the   accuracy of the report or need for clarification, then please call [phone], or text me through [email or web address] for clarification or correction.     MRI BRAIN W WO CONT [[id number]] Collected: 03/19/22 1649   Order Status: Completed Updated: 03/19/22 1657   Narrative:     EXAMINATION: BRAIN MRI 3/19/2022 4:47 PM     ACCESSION NUMBER: [id number]     INDICATION: 62-year-old male with fall, altered mental status and confusion. History of pancreatic cancer on chemotherapy with recent progression of   intra-abdominal disease, no prior imaging of brain. COMPARISON: CT head 3/19/2022. TECHNIQUE: Multiplanar multisequence MRI of the brain without and with   intravenous administration of 14 mL contrast agent.      FINDINGS:     Previously described subcentimeter focus along the posterior limb of the right   internal capsule follows CSF signal intensity on all sequences and is favored to   represent a prominent perivascular space. There is a mild degree of scattered and confluent foci of T2/FLAIR   hyperintensity within the periventricular and deep white matter, nonspecific but   most commonly seen with chronic small vessel ischemic changes. Faint chronic   hemosiderin involving the left posterior putamen. No midline shift or mass lesion. There is no evidence of intracranial hemorrhage   or acute infarct. The ventricles are within normal limits in size and   configuration. There are no extra-axial fluid collections present.  No diffusion   weighted signal abnormality is identified. There is no abnormal enhancement. Impression:       No acute finding or evidence of intracranial metastatic disease. XR ELBOW LT MIN 3 V [[id number]] Collected: 03/19/22 1534   Order Status: Completed Updated: 03/19/22 1538   Narrative:     XR FOREARM LT AP/LAT, XR ELBOW LT MIN 3 V 3/19/2022 3:19 PM     HISTORY: Fall with left arm and left elbow pain. Impression:       Two views left forearm. No fracture or dislocation. No significant soft tissue   swelling. Old healed distal ulnar fracture deformity is present. Three views left elbow. No fracture or dislocation. No significant soft tissue   swelling. No fat pad elevation. XR FOREARM LT AP/LAT [[id number]] Collected: 03/19/22 1534   Order Status: Completed Updated: 03/19/22 1538   Narrative:     XR FOREARM LT AP/LAT, XR ELBOW LT MIN 3 V 3/19/2022 3:19 PM     HISTORY: Fall with left arm and left elbow pain. Impression:       Two views left forearm. No fracture or dislocation. No significant soft tissue   swelling. Old healed distal ulnar fracture deformity is present. Three views left elbow. No fracture or dislocation. No significant soft tissue   swelling. No fat pad elevation.    CT HEAD WO CONT [[id number]] Collected: 03/19/22 1254   Order Status: Completed Updated: 03/19/22 1258 Narrative:     CT BRAIN WITHOUT CONTRAST   3/19/2022 12:26 PM     INDICATION: Fall, altered mental status and confusion. COMPARISON: None available at this hospital PACS     Technique:  Multiple contiguous axial images are obtained encompassing the brain   from the skull base to the vertex.  For this CT scanner at least one of the   following techniques is utilized to decrease patient radiation dose: Automatic   exposure control, KVP and mA modulation based on patient weight, and iterative   reconstruction. FINDINGS: The ventricles are midline and of appropriate size and configuration. Mild hypoattenuating foci seen in the periventricular deep white matter. Inferiorly at the junction of the thalamus and posterior limb of the internal   capsule on the right there is a rounded mildly hypodense focus that measures 9   mm. The midline structures and posterior fossa are intact.  There is no finding of   an acute cortical stroke, mass lesion, or acute bleed.  No extra-axial fluid   collection. The skull is intact, no discreet abnormality. The paranasal sinuses are not   remarkable. The visualized orbits and mastoid air-cells are patent. Impression:     9 mm rounded hypoattenuating focus on the right at the inferior   junction of the thalamus and posterior limb of the internal capsule is noted. Suspicious for lacunar infarct and of uncertain chronicity-cannot exclude that   this is new. Elsewhere only mild chronic changes in the periventricular white matter   suggested. For further imaging evaluation of this as clinically indicated-recommend brain   MRI with diffusion imaging. XR CHEST Clara Armstrong [[id number]] Collected: 03/15/22 1259   Order Status: Completed Updated: 03/15/22 1302   Narrative:     Chest X-ray     INDICATION: Hypotension. COMPARISON: Chest x-ray 2/10/2021. A portable AP view of the chest was obtained. FINDINGS: The lungs are clear.  There are no infiltrates or effusions. Left chest   port is unchanged in position. No pneumothorax. The heart size is normal.  The   bony thorax is intact.      Impression:     No acute findings in the chest. Lungs remain clear. No interval   change.         Medications:  Current Facility-Administered Medications   Medication Dose Route Frequency    NUTRITIONAL SUPPORT ELECTROLYTE PRN ORDERS   Does Not Apply PRN    famotidine (PF) (PEPCID) 20 mg in 0.9% sodium chloride 10 mL injection  20 mg IntraVENous Q12H    pantoprazole (PROTONIX) 40 mg in 0.9% sodium chloride 10 mL injection  40 mg IntraVENous DAILY    dexmedeTOMidine in 0.9 % NaCl (PRECEDEX) 400 mcg/100 mL (4 mcg/mL) infusion soln  0.1-1.5 mcg/kg/hr IntraVENous TITRATE    vancomycin (VANCOCIN) 1,000 mg in 0.9% sodium chloride 250 mL ([id number])  1,000 mg IntraVENous Q12H    enoxaparin (LOVENOX) injection 80 mg  1 mg/kg SubCUTAneous Q12H    sotorasib (Lumakras) tab 960 mg (Patient Supplied)  960 mg Per NG tube DAILY    midodrine (PROAMATINE) tablet 5 mg  5 mg Oral TID    piperacillin-tazobactam (ZOSYN) 4.5 g in 0.9% sodium chloride (MBP/ADV) 100 mL MBP  4.5 g IntraVENous Q8H    methylPREDNISolone (PF) (SOLU-MEDROL) injection 40 mg  40 mg IntraVENous Q12H    HYDROmorphone (DILAUDID) injection 1 mg  1 mg IntraVENous Q4H PRN    HYDROmorphone (DILAUDID) injection 0.5 mg  0.5 mg IntraVENous Q4H PRN    LORazepam (ATIVAN) injection 1 mg  1 mg IntraVENous Q4H PRN    naloxone (NARCAN) injection 0.04 mg  0.04 mg IntraVENous PRN    furosemide (LASIX) injection 20 mg  20 mg IntraVENous Q8H    potassium chloride (K-DUR, KLOR-CON M20) SR tablet 20 mEq  20 mEq Oral DAILY    loperamide (IMODIUM) capsule 2 mg  2 mg Oral Q12H    HYDROcodone-acetaminophen (NORCO)  mg tablet 1 Tablet  1 Tablet Oral Q4H PRN    calcium carbonate (TUMS) chewable tablet 200 mg [elemental]  200 mg Oral TID PRN    alum-mag hydroxide-simeth (MYLANTA) oral suspension 30 mL  30 mL Oral Q4H PRN    prochlorperazine (COMPAZINE) with saline injection 5 mg  5 mg IntraVENous Q6H PRN    acetaminophen (TYLENOL) tablet 650 mg  650 mg Oral Q6H PRN    diphenhydrAMINE (BENADRYL) capsule 25 mg  25 mg Oral Q6H PRN    amiodarone (CORDARONE) tablet 400 mg  400 mg Oral BID    [Held by provider] fat emulsion 20% (LIPOSYN, INTRAlipid) infusion 250 mL  250 mL IntraVENous QPM    NUTRITIONAL SUPPORT ELECTROLYTE PRN ORDERS   Does Not Apply PRN    sodium chloride (NS) flush 5-10 mL  5-10 mL IntraVENous PRN    diphenoxylate-atropine (LOMOTIL) tablet 1 Tablet  1 Tablet Oral QID PRN    promethazine (PHENERGAN) tablet 25 mg  25 mg Oral Q6H PRN    tamsulosin (FLOMAX) capsule 0.4 mg  0.4 mg Oral DAILY    ondansetron (ZOFRAN) injection 4 mg  4 mg IntraVENous Q4H PRN         ASSESSMENT:    Problem List  Date Reviewed: 3/15/2022          Codes Class Noted    Acute respiratory failure with hypoxia [facility]) ICD-10-CM: J96.01  ICD-9-CM: 518.81  4/1/2022        Bilateral pulmonary infiltrates on chest x-ray ICD-10-CM: R91.8  ICD-9-CM: 793.19  4/1/2022        Chronic systolic congestive heart failure (HCC) (Chronic) ICD-10-CM: I50.22  ICD-9-CM: 428.22, 428.0  3/21/2022        Bradycardia ICD-10-CM: R00.1  ICD-9-CM: 427.89  3/19/2022        LBBB (left bundle branch block) ICD-10-CM: I44.7  ICD-9-CM: 426.3  3/19/2022        FTT (failure to thrive) in adult ICD-10-CM: R62.7  ICD-9-CM: 783.7  3/15/2022        * (Principal) Hypotension ICD-10-CM: I95.9  ICD-9-CM: 458.9  3/15/2022        Severe protein-calorie malnutrition ([facility] ICD-10-CM: G22  ICD-9-CM: 262  3/4/2022        P[facility] ([facility] ICD-10-CM: C25.9  ICD-9-CM: 157.9  3/2/2022        Ascites ICD-10-CM: R18.8  ICD-9-CM: 789.59  3/2/2022        Admission for antineoplastic chemotherapy ICD-10-CM: Z51.11  ICD-9-CM: V58.11  3/2/2022        Hypomagnesemia ICD-10-CM: E83.42  ICD-9-CM: 275.2  4/23/2021        Hypokalemia ICD-10-CM: E87.6  ICD-9-CM: 276.8  4/20/2021        CINV (chemotherapy-induced nausea and vomiting) ICD-10-CM: R11.2, T45.1X5A  ICD-9-CM: 787.01, E933.1  4/20/2021        Dehydration ICD-10-CM: E86.0  ICD-9-CM: 276.51  4/12/2021        Other neutropenia ([facility] ICD-10-CM: D70.8  ICD-9-CM: 288.09  3/9/2021        Malignant neoplasm of lower third of esophagus (HCC) ICD-10-CM: C15.5  ICD-9-CM: 150.5  1/5/2021        Malignant [facility] of body of pancreas [facility] ICD-10-CM: C25.1  ICD-9-CM: 157.1  1/5/2021        Severe obesity (HCC) (Chronic) ICD-10-CM: E66.01  ICD-9-CM: 278.01  12/10/2020        Elevated glucose ICD-10-CM: R73.09  ICD-9-CM: 790.29  7/22/2019        Anemia ICD-10-CM: D64.9  ICD-9-CM: 285.9  7/22/2019        Chronic left-sided low back pain (Chronic) ICD-10-CM: M54.50, G89.29  ICD-9-CM: 724.2, 338.29  7/22/2019              78 y.o. M pancreatic cancer and esophageal cancer with malignant ascites of previously controlled FOLFIRINOX but currently disease progressed and most recently admited on 3/2/2022 to place Pleurx and arrange gemcitabine/Abraxane, had extensive discussion with inpatient team and pharmacy, patient was discharged on 3/7/2022 without chemo appointment and return to office on 3/15/2022, extremely sick and blood pressure not measurable in the office, and not been having much oral intake since discharge, urgently establish IV access and called EMS to take to ER to stabilize and admit to hospital, apparently needed much supportive care and volume resuscitation, start TPN until oral intake can improve and give gemcitabine/Abraxane once clinically stable, discussed with the inpatient team.    PLAN:    Metastatic pancreatic cancer / distal esophageal cancer  - Metastatic disease involving peritoneum, malignant ascites  - s/p 24 cycles of FOLFIRINOX with SD and recent POD with increasing CEA/ and malignant ascites  - Plan for gemcitabine/abraxane when clinically stable - hopefully tomorrow.   3/17 [place]/abraxane today, tolerated well  3/24 D8 due today, deferring tx d/t cytopenias. Dr. Lesley Armando discussed that if patient's condition does not improve, that he may want to consider Hospice services. Palliative care following. 3/25 Pt wants to con't to pursue treatment as he wants to change his relationship with his children. He does not feel the burden of tx outweighs the benefits and wants to continue tx for as long as he can. D15 due 3/31.  3/29 TM checked, CEA up but  improved. 3/30 Office working to obtain sotorasib. D15 gem/abraxane due tomorrow. 3/31 Notes from office indicate sotorasib approved. [place]/abraxane due today - plts <100k. Hold today and re-eval tomorrow. 4/1 Holding chemo for acute issues - office working on sotorasib  4/2 Sotorasib planned to be delivered today   4/3 Pt had NGT placed and discussed with pharmacy and Sotorasib has instructions to make into a liquid, however pt did not receive yesterday. Discussed extensively with ICU RN and pt to receive this ASAP today. Malignant ascites  - Has abdominal Pleurx, drain three times weekly  3/17 Pleurx drained 3/16 - 1100cc out. 3/18 Pleurx accidentally dislodged last night - 1800cc removed before completely removed. IR notified and will re-evaluate Monday. 3/20 Worsening abdominal pain possibly secondary to re-accumulation, some drainage reported at Pleurx site. IR evaluating tomorrow. 3/21 IR to re-evaluate tomorrow, was not NPO today. 3/22 To IR today. 3/23 Abd pleurx replaced yesterday  3/25 Pleurx drained yesterday, 1.1L. Con't to drain prn.  3/26 drained today as he was feeling uncomfortable. Monitor blood pressures. 3/27 continue to drain PRN, but be cautious with blood pressures as he is hypotensive at times. 3/29 BP much improved - drained 1100 cc yesterday due to patient request. Previously draining every other day but asking to be drained daily. Will continue to drain PRN as long as BP acceptable.   3/30 Abdominal discomfort - requesting to be drained again today. BP stable. 3/31 Pleurx drained again today per pt request.  4/1 Holding on further draining of Pleurx given hypotension - drain every other day (or PRN). 4/3 Draining pleur-x today. Cancer related pain  - Continue home dilaudid  3/17 Utilizing IV morphine. Will decrease dosing and encourage use of home Norco.  3/18 Continue to wean IV meds - encourage PO.  3/20 Has not been receiving IV morphine due to hypotension. Continue with PRN oral medications. 3/22 Consult to [facility] regarding [address], symptom management. 3/23 PC following. Pt changed code status to DNR, wants to pursue further cancer-related treatment. 3/25 PC following  3/27 Dilaudid increased today. During rounds, he stated his pain was better controlled with increased dose. 3/29 Continues on IV dilaudid. 3/31 IV dilaudid use limited by BP. Increased Norco to 10mg yesterday. PC following. Hypotension / poor PO intake / protein-calorie malnutrition  - Consult RD for TPN  - BC pending, started on Cefe/Vanc and may be able to de-escalate soon as hypotension likely secondary to dehydration. LA improved after IFV. 3/17 On TPN. Continues on Cefe/Vanc although infectious work-up unremarkable. Will stop antibiotics. 3/18 Hypotension improved and remains afebrile off antibiotics. Continue with nutritional support via TPN.  3/19 Ongoing hypotension, although improved yesterday AM, worse through evening. Had 6L of IVF yesterday and continues on TPN. May consider midodrine although MAP consistently ~65.  3/21 Hypotension improved but borderline, continues TPN.   3/22 Discussed with RD volume status of TPN given new finding of HFrEF and CT chest with evidence of fluid overload. CM following for home TPN. Holding IV morphine. 3/23 BPs improved  3/24 Episode of hypotension last PM, required small fluid bolus, BP improved  3/25 Continues on TPN, however pt declined to be attached to TPN yesterday AM and then disconnected himself from it last night.   3/27 per RD note, patient unaware about prior TPN disconnections. This AM during rounds, visitor noted that \"something was leaking. \" He had pulled his port needle out again and his TPN was leaking onto the floor. RD plans to discuss further goals regarding TPN with pt when she visits him today. 7/54 Cyclic TPN to start today per RD. Encouraged PO intake and he agrees. 3/29 Reports he drank an Ensure but is scared to eat because of diarrhea. Will manage diarrhea. 3/31 Continues on TPN - PO intake remains minimal due to respiratory issues. 4/1 Hold TPN given volume overload  4/2 continue to hold TPN through the weekend     Nausea  - Continue PRN antiemetics    Diarrhea  - Antidiarrheals PRN  3/24 c/o worsening diarrhea. Check Cdiff.   3/25 Cdiff neg. Con't using antidiarrheals prn.  3/28 Reports diarrhea - only 1 documented stool. Continue PRN antidiarrheals. 3/29 Reports he is afraid to eat because of diarrhea. Will schedule imodium TID.   3/31 Change to BID. Bradycardia / HFrEF / LV dysfunction / LBBB / ectopy  - One documented episode of HR 43 - check EKG  - No hx of hypertension, monitor  3/17 EKG with bi-geminal PACs and known LBBB. Recheck EGK.  3/18 EKG with same as above. Palpated pulse on several occasions documented in the 40s. Tele applied overnight. Cardiology following. 3/19 Cardiology recommending K>4 (on TPN) and Mg replacements. Echo pending. 3/21 No bradycardia noted on telemetry. Does have ectopy which may have led to incorrect pulse rate palpation. Echo with EF 35-40%. CT chest completed due to echo findings rebeka noted hiatal hernia/mesenteric fat herniation/abdominal fluid displacing heart and small BL effusions, anasarca. Cardiology following.  3/22 Cardiology considering BB if BP improve for HF/LV dysfunction. Weight up 6# overnight and increased since admission. Clinically no evidence of overload but asking RD to adjust TPN volume. Reported run of NS-SVT - cardiology following.   3/25 Cards following. Hypotension limiting therapy for heart failure. 3/26 cards signed off and recommended cont Amiodarone   3/30 BP improved, CXR shows pulmonary edema and possible effusion. 20mg Lasix given this morning. Will add albumin and diurese as able pending blood pressure tolerance. 3/31 BP dropped yesterday before albumin but improved throughout night. Up to 7L NC. Will continue albumin and give lasix today. Start midodrine. 4/1 Received 40mg lasix yesterday/PM. Ongoing albumin - D2. Continues on midodrine but borderline BP making diuresis difficult. Developed worsening respiratory distress overnight. 4/2 Continues on Midodrine. BP fluctuating, a bit better. Repeat echo. 4/3 Repeat echo with EF 30-35%, was 35-40% on 3/20/22. Pancytopenia secondary to chemotherapy  - Transfuse prn per [facility] SOPs  3/25 ANC down to 1000. Start G-CSF.  3/26 [address] improved to 2.6.   3/28 WBC up to 20 - stop G-CSF.   3/29 Plts up to 71k. 4/2 Plts stable at 110k    Fall  3/19 Witnessed slip and fall on wet floor yesterday. Today with L arm bruising - check X-ray. Will check CT head and CT AP (hematoma/lipoma palpated on R buttock) and also c/o worsening abdominal pain. 3/20 X-ray of L elbow/forearm negative. CT head with ?lacunar infarct but MRI showed no acute findings and ?lacunar infarct appears to be prominent perivascular space. CT AP with progressing peritoneal/liver disease and ?new splenic lesion. Last imaging obtained 12/2021 for comparison. Elevated LFTs  3/21 Monitor, possibly secondary to TPN or recent chemo. 3/22 Appear to be improving  3/23 AST improved, ALP increased  3/28 Improving  RESOLVED    Hypoxic respiratory failure  / ?Pneumonia / CHF exacerbation / fluid overload  3/24 On O2 @ 2L now. Check CXR.  3/25 CXR with low lung volumes with b/l infiltrates ?edema vs infx? Check PCT - elevated. Start Cef/Vanc.  3/26 continues on cef/vanc; on room air   3/28 D4 Cefepime/Vancomycin - afebrile. DC Vancomycin. 3/29 Continue Cefepime - mylesley DC soon. 3/31 DC Cefe. No clinical evidence of pneumonia, afebrile. CXR findings likely pulmonary edema. Continue diuresis/albumin. 4/1 Moved to ICU this AM after increasing O2 needs/respriatory effort. On BiPAP. Minimal diuresis due to low BP. Repeat lasix this AM and continue diuresis/albumin as BP tolerates. Pulmonology following - restarted Cefe and added Vancomycin after CXR showed worsening infiltrates and adding steroids for possible pneumonitis given recent chemo. Patient changed code status back to full code. 4/2 Remains in ICU on BIPAP. On Vanc/Zosyn. Pulm added solu-medrol for ? Pneumonitis. Receiving Lasix every 8 hours. Defer further albumin if needed to critical care team, albumin level improved from previous. Start therapeutic dose Lovenox as previously unable to rule out PE.      4/3 Now on HFNC. Lasix continues TID. On Vanc/Zosyn/Solumedrol. Agitation/Delirium  4/2 On Precedex drip   4/3 Agitation better off of BiPAP, now on HF NC    Continue home meds  [facility] SOPs  Lovenox (hold for plts <50k)    Goals and plan of care reviewed with the patient. All questions answered to the best of our ability. Disposition:  TBD pending clinical course.               Maxim Hernandez NP  [facility]  [address] [address]  Office : [phone]  Fax : [phone]   I personally saw, exammed and counselled the patient, and discussed with NP, agree with above history/assessment/plan. 66 y.o.male pancreatic cancer and esophageal cancer with peritoneal carcinomatosis, status post first-line FOLFIRINOX, disease progression and admitted for failure to thrive/obstruction, received TPN and 1 cycle of gemcitabine/Abraxane, reported abdominal pain improved and started having bowel movement/gas, which is encouraging sign of peritoneal disease response, discussed the need to enhance nutrition and improve hypoalbuminemia, also found EF 35 to 40%, sinusitis as needed, week 2 chemo was on hold for thrombocytopenia, hypotensive and received fluids and developed respiratory distress, blood pressure does not tolerate diuresis, difficult situation of anasarca but intravascularly dry, give albumin and blood pressure better, hold chemo today, add midodrine for hypotension,  transferred to ICU for respiratory distress and responded well to BiPAP responding well to albumin supported diuresis, continue to manage volume carefully and diuretics gently as long as the blood pressure tolerates.  We worked with financial counselor/insurance/specialty pharmacy and approved a special case delivery of sotorasib for today, may well his oxygen requirement appeared higher despite of better chest x-ray, more confused and need protection for the BiPAP, empirically start therapeutic dose Lovenox, off BiPAP today, sotorasib arrived 4/2 and still has not administered, discussed with ICU and pharmacy to mix solution administered through NG tube as soon as possible, repeat echocardiogram showed EF down to 30 to 35%, addressed patient and family, pressured ICU care. Naz Owen M.D.   [address], [address] [place] [address]  Fax : [phone]

## 2022-04-03 NOTE — PROGRESS NOTES
Problem: Falls - Risk of  Goal: *Absence of Falls  Description: Document Herber Pemberton Fall Risk and appropriate interventions in the flowsheet. Outcome: Progressing Towards Goal  Note: Fall Risk Interventions:  Mobility Interventions: Bed/chair exit alarm,Communicate number of staff needed for ambulation/transfer,Patient to call before getting OOB    Mentation Interventions: Adequate sleep, hydration, pain control,Bed/chair exit alarm,Door open when patient unattended,Evaluate medications/consider consulting pharmacy,Increase mobility,More frequent rounding,Reorient patient,Room close to nurse's station    Medication Interventions: Bed/chair exit alarm,Evaluate medications/consider consulting pharmacy    Elimination Interventions: Bed/chair exit alarm,Call light in reach,Patient to call for help with toileting needs,Toileting schedule/hourly rounds    History of Falls Interventions: Bed/chair exit alarm,Consult care management for discharge planning,Door open when patient unattended,Evaluate medications/consider consulting pharmacy,Investigate reason for fall         Problem: Pain  Goal: *Control of Pain  Outcome: Progressing Towards Goal     Problem: Nausea/Vomiting (Adult)  Goal: *Absence of nausea/vomiting  Outcome: Progressing Towards Goal     Problem: Nutrition Deficit  Goal: *Optimize nutritional status  Outcome: Progressing Towards Goal     Problem: Pressure Injury - Risk of  Goal: *Prevention of pressure injury  Description: Document Carlos Scale and appropriate interventions in the flowsheet.   Outcome: Progressing Towards Goal  Note: Pressure Injury Interventions:  Sensory Interventions: Assess changes in LOC,Assess need for specialty bed,Avoid rigorous massage over bony prominences,Check visual cues for pain,Maintain/enhance activity level,Minimize linen layers,Monitor skin under medical devices,Pressure redistribution bed/mattress (bed type),Sit a 90-degree angle/use footstool if needed,Turn and reposition approx. every two hours (pillows and wedges if needed)    Moisture Interventions: Absorbent underpads,Apply protective barrier, creams and emollients,Assess need for specialty bed,Check for incontinence Q2 hours and as needed,Internal/External urinary devices,Maintain skin hydration (lotion/cream),Minimize layers    Activity Interventions: Increase time out of bed,Pressure redistribution bed/mattress(bed type)    Mobility Interventions: HOB 30 degrees or less,Pressure redistribution bed/mattress (bed type),Turn and reposition approx. every two hours(pillow and wedges)    Nutrition Interventions: Document food/fluid/supplement intake,Discuss nutritional consult with provider    Friction and Shear Interventions: Foam dressings/transparent film/skin sealants,Transfer aides:transfer board/Mac lift/ceiling lift,Transferring/repositioning devices                Problem: Risk for Spread of Infection  Goal: Prevent transmission of infectious organism to others  Description: Prevent the transmission of infectious organisms to other patients, staff members, and visitors.   Outcome: Progressing Towards Goal

## 2022-04-03 NOTE — PROGRESS NOTES
VANCO DAILY FOLLOW UP RENAL INSUFFICIENCY PATIENT   [address] [facility] Pharmacokinetic Monitoring Service - Vancomycin    Consulting Provider: Cj Burgess   Indication: HAP  Target Concentration: Random level ? 15 mg/L  Day of Therapy: 3  Additional Antimicrobials: Pip/Tazo    Pertinent Laboratory Values: Wt Readings from Last 1 Encounters:   04/02/22 83.5 kg (184 lb)     Temp Readings from Last 1 Encounters:   04/03/22 97.8 °F (36.6 °C)     Recent Labs     04/03/22  0325 04/02/22  0326 04/01/22  0905 04/01/22  0319   BUN 32* 22  --  20   CREA 0.70* 0.60*  --  0.50*   WBC 10.0 12.5*  --  14.3*   PCT  --   --  1.08*  --        Lab Results   Component Value Date/Time    Vancomycin, random 30.1 04/02/2022 03:26 AM       MRSA Nasal Swab: ordered by pharmacy, not collected. Assessment:  Date:  Dose/Freq Admin Times Level/Time:   4/3 1000 mg 1059                              Plan:  Concentration-guided dosing due to renal impairment  Patient with decreasing urine output over the past 48 hours, slight uptrend in SCR. Cautiously convert to intermittent dosing for now.   Repeat vancomycin concentration ordered for 4/4 @ 0400    Pharmacy will continue to monitor patient and adjust therapy as indicated    Thank you for the consult,  Sulema Barry, YokoD

## 2022-04-03 NOTE — PROGRESS NOTES
Comprehensive Nutrition Assessment    Type and Reason for Visit: Reassess,Consult  TPN Management (oncology)   Tube Feeding Management (Pulmonary)    Nutrition Recommendations/Plan:   Enteral Nutrition:  Trophic Enteral Feeds:  Initiate Peptide Based (Vital AF 1.2 Porter) via Nasogastric at 10ml/hour. Do not advance. Water flush 30ml/4hr. Trophic enteral infusions may provide up to 500 kcal/day, not intended to meet estimated needs. Nutritional Supplement Therapy:   Active electrolyte replacement per nutrition support protocols  Replacement indicated: Active  Labs:   EN labs: BMP daily, Mg daily per provider and Phos MWF. Meals and Snacks:  Continue NPO  · Parenteral Nutrition:  · TPN held 4/1-4/2 per discussion with oncology d/t volume status. Per oncology note 4/2 holding TPN over the weekend. No PN access currently with port being used for Precedex and PIV being use for Zosyn. Malnutrition Assessment:  Malnutrition Status: Severe malnutrition  Context: Chronic illness  Findings of clinical characteristics of malnutrition:   Energy Intake:  7 - 75% or less est energy requirements for 1 month or longer  Weight Loss:  7.0 - Greater than 7.5% over 3 months (34# (18.4%) )     Body Fat Loss:  1 - Mild body fat loss, Buccal region,Orbital,Triceps   Muscle Mass Loss:  1 - Mild muscle mass loss, Calf (gastrocnemius),Hand (interosseous),Scapula (trapezius),Temples (temporalis)  Fluid Accumulation:  No significant fluid accumulation,     Strength:  Not performed     Nutrition Assessment:   Nutrition History: Per RD assessment patient was able to maintain PO intake and UBW throughout most of chemo. During admmission early March patient had \"stopped eating\" due to nausea. Upon assessment this admission patient reports no PO of foods for ~4 weeks. States that he has been able to tolerate some fluids. He reports continued nausea and vomiting as primary barrier.        Nutrition Background: Patient with pancreatic and esophageal cancer, Amos's esophagus, obesity, HTN, GERD, gastritis, ascites and peritoneal carcinomatosis s/p Plurex drain. He presented to oncology office extremely sick, BP was unable to be measured. He was admitted directly to [facility]. Nutrition Interval:  PO intake continues to limited due to early satiety and persistent nausea and likely secondary to peritoneal carcinomatosis, recurrent ascites with Plurex drain. Patient has demonstrated inability to meet needs orally with severe weight loss and malnutrition as above. Intake trends since admission reveal daily PO tolerance of declining now less than 1 meal per day which is not sufficient to sustain weight, functional status, or life. Abdominal pleurex replaced 3/22: 1650ml removed 4/3. Pt now on Airvo with BiPAP at night. Continuing to hold TPN as stated above. Discussed with pulmonary MD previously TPN dependent d/t peritoneal carcinomatosis. Attempting trophic feeds today per MD request via [employer]. Plan discussed with RN, pt, and family. Abdominal Status (last documented): Intact,Semi-soft abdomen with Active  bowel sounds. Last BM 04/02/22. Pertinent Medications: Pepcid, Imodium Q12H (held since 4/1), Solumedrol 40mg IV Q12H, protonix, Zosyn Q8H, K-DUR (daily, 20meq), Zofran PRN, Compazine, lasix 20mg Q8H, vancomycin  Continuous: Precedex  Lab Results   Component Value Date/Time    Sodium 148 (H) 04/03/2022 03:25 AM    Potassium 3.8 04/03/2022 03:25 AM    Chloride 111 (H) 04/03/2022 03:25 AM    CO2 29 04/03/2022 03:25 AM    Anion gap 8 04/03/2022 03:25 AM    Glucose 124 (H) 04/03/2022 03:25 AM    BUN 32 (H) 04/03/2022 03:25 AM    Creatinine 0.70 (L) 04/03/2022 03:25 AM    Calcium 8.4 04/03/2022 03:25 AM    Albumin 2.3 (L) 04/03/2022 03:25 AM    Magnesium 2.2 04/03/2022 03:25 AM    Phosphorus 1.9 (L) 04/01/2022 03:19 AM     Labs remarkable for hypernatremia. Nutrition Related Findings:   Port in place, also with 1 PIV.  TPN intiated 3/16. TPN increased in volume 3/19. TPN to be concentrated and volume decreased 3/22 per NP request due to new CHF. TPN changed to 18 hr cyclic infusion 1/33. TPN condensed to 16 hr infusion 3/29. TPN held 4/1 and 4/2. NGT place 4/2 for meds. Current Nutrition Therapies:  DIET NPO    Current Intake:   Average Meal Intake: NPO Average Supplement Intake: None ordered      Anthropometric Measures:  Height: 5' 6\" (167.6 cm)  Current Body Wt: 83.8 kg (184 lb 11.9 oz) (3/31), Weight source: Bed scale  BMI: 29.8,  (current BMI skewed by fluid)  Admission Body Weight: 148 lb 9.4 oz  Ideal Body Weight (lbs) (Calculated): 142 lbs (65 kg), 104.6 %  Usual Body Wt: 82.6 kg (182 lb) (12/14/21 office wt and per previous history), Percent weight change: -18.4          Edema: LLE: 1+; Non-pitting (4/3/2022  7:00 AM)  RLE: 1+; Non-pitting (4/3/2022  7:00 AM)     Estimated Daily Nutrient Needs:  Energy (kcal/day): 7369-7188 (Kcal/kg (25-30), Weight Used: Current (67.4 kg (3/15))  Protein (g/day):  (1.3-1.5 g/kg) Weight Used: (Admission (67.3 kg))  Fluid (ml/day):   (1 ml/kcal)    Nutrition Diagnosis:   · Inadequate oral intake related to altered GI function,early satiety (peritoneal carcinomatosis ) as evidenced by  (poor oral tolerance, wt loss, requires TPN for primary needs)    · Severe malnutrition related to catabolic illness as evidenced by  (malnutrition criteria as above)    Nutrition Interventions:   Food and/or Nutrient Delivery: Continue NPO,Start tube feeding     Coordination of Nutrition Care: Continue to monitor while inpatient    Goals:   Previous Goal Met: Goal(s) achieved  Active Goal: Tolerate trophic feeds by RD follow up. Nutrition Monitoring and Evaluation:      Food/Nutrient Intake Outcomes: Enteral nutrition intake/tolerance  Physical Signs/Symptoms Outcomes: Biochemical data,GI status,Weight,Fluid status or edema    Discharge Planning:     Too soon to determine    Electronically signed by Kimberlee Councilman MS, RDN, LD 4/3/2022 at 1:39 PM  Contact: [phone]

## 2022-04-03 NOTE — PROGRESS NOTES
Bedside and verbal shift change report received from [employer] (offgoing nurse). Report included the following information SBAR, Kardex, ED Summary, Intake/Output, MAR, Recent Results, Med Rec Status, Cardiac Rhythm A fib and Alarm Parameters .      Dual skin assessment completed at bedside: N/A (list pertinent skin assessment findings)    Dual verification of gtts completed (name of gtts verified): N/A

## 2022-04-03 NOTE — PROGRESS NOTES
Per Dr. Per Plascencia it is ok to give medications via NGT, but hold TF and Pt.s evening dose of Lumakras.

## 2022-04-03 NOTE — ROUTINE PROCESS
Bedside and verbal shift change report received from  Storm Hawthorne (offgoing nurse). Report included the following information SBAR, ED Summary, Procedure Summary, Intake/Output, MAR, Recent Results, Med Rec Status and Cardiac Rhythm NSR. Dual skin assessment completed at bedside:  In assessment   (list pertinent skin assessment findings)    Dual verification of gtts completed (name of gtts verified): NA

## 2022-04-04 ENCOUNTER — APPOINTMENT (OUTPATIENT)
Dept: GENERAL RADIOLOGY | Age: 67
DRG: 435 | End: 2022-04-04
Attending: INTERNAL MEDICINE
Payer: MEDICARE

## 2022-04-04 ENCOUNTER — APPOINTMENT (OUTPATIENT)
Dept: ULTRASOUND IMAGING | Age: 67
DRG: 435 | End: 2022-04-04
Attending: NURSE PRACTITIONER
Payer: MEDICARE

## 2022-04-04 LAB
ALBUMIN SERPL-MCNC: 2.7 G/DL (ref 3.2–4.6)
ALBUMIN/GLOB SERPL: 1 {RATIO} (ref 1.2–3.5)
ALP SERPL-CCNC: 251 U/L (ref 50–136)
ALT SERPL-CCNC: 18 U/L (ref 12–65)
ANION GAP SERPL CALC-SCNC: 5 MMOL/L (ref 7–16)
ARTERIAL PATENCY WRIST A: POSITIVE
AST SERPL-CCNC: 18 U/L (ref 15–37)
BASE EXCESS BLD CALC-SCNC: 3.5 MMOL/L
BASOPHILS # BLD: 0 K/UL (ref 0–0.2)
BASOPHILS NFR BLD: 0 % (ref 0–2)
BDY SITE: ABNORMAL
BILIRUB SERPL-MCNC: 0.7 MG/DL (ref 0.2–1.1)
BUN SERPL-MCNC: 36 MG/DL (ref 8–23)
CALCIUM SERPL-MCNC: 8.3 MG/DL (ref 8.3–10.4)
CHLORIDE SERPL-SCNC: 112 MMOL/L (ref 98–107)
CO2 SERPL-SCNC: 31 MMOL/L (ref 21–32)
CREAT SERPL-MCNC: 0.8 MG/DL (ref 0.8–1.5)
DIFFERENTIAL METHOD BLD: ABNORMAL
EOSINOPHIL # BLD: 0 K/UL (ref 0–0.8)
EOSINOPHIL NFR BLD: 0 % (ref 0.5–7.8)
ERYTHROCYTE [DISTWIDTH] IN BLOOD BY AUTOMATED COUNT: 16.7 % (ref 11.9–14.6)
GAS FLOW.O2 O2 DELIVERY SYS: ABNORMAL L/MIN
GLOBULIN SER CALC-MCNC: 2.6 G/DL (ref 2.3–3.5)
GLUCOSE SERPL-MCNC: 147 MG/DL (ref 65–100)
HCO3 BLD-SCNC: 27.3 MMOL/L (ref 22–26)
HCT VFR BLD AUTO: 27.3 % (ref 41.1–50.3)
HGB BLD-MCNC: 8.3 G/DL (ref 13.6–17.2)
IMM GRANULOCYTES # BLD AUTO: 0.4 K/UL (ref 0–0.5)
IMM GRANULOCYTES NFR BLD AUTO: 5 % (ref 0–5)
LYMPHOCYTES # BLD: 0.4 K/UL (ref 0.5–4.6)
LYMPHOCYTES NFR BLD: 5 % (ref 13–44)
MAGNESIUM SERPL-MCNC: 2.2 MG/DL (ref 1.8–2.4)
MCH RBC QN AUTO: 29.4 PG (ref 26.1–32.9)
MCHC RBC AUTO-ENTMCNC: 30.4 G/DL (ref 31.4–35)
MCV RBC AUTO: 96.8 FL (ref 79.6–97.8)
MONOCYTES # BLD: 0.4 K/UL (ref 0.1–1.3)
MONOCYTES NFR BLD: 5 % (ref 4–12)
NEUTS SEG # BLD: 6.8 K/UL (ref 1.7–8.2)
NEUTS SEG NFR BLD: 85 % (ref 43–78)
NRBC # BLD: 0.03 K/UL (ref 0–0.2)
O2/TOTAL GAS SETTING VFR VENT: 70 %
PCO2 BLD: 36.8 MMHG (ref 35–45)
PH BLD: 7.48 [PH] (ref 7.35–7.45)
PHOSPHATE SERPL-MCNC: 2.9 MG/DL (ref 2.3–3.7)
PLATELET # BLD AUTO: 120 K/UL (ref 150–450)
PLATELET COMMENTS,PCOM: ABNORMAL
PMV BLD AUTO: 11.3 FL (ref 9.4–12.3)
PO2 BLD: 91 MMHG (ref 75–100)
POTASSIUM SERPL-SCNC: 3.4 MMOL/L (ref 3.5–5.1)
PROT SERPL-MCNC: 5.3 G/DL (ref 6.3–8.2)
RBC # BLD AUTO: 2.82 M/UL (ref 4.23–5.6)
RBC MORPH BLD: ABNORMAL
SAO2 % BLD: 97.6 % (ref 95–98)
SERVICE CMNT-IMP: ABNORMAL
SODIUM SERPL-SCNC: 148 MMOL/L (ref 138–145)
SPECIMEN TYPE: ABNORMAL
VANCOMYCIN SERPL-MCNC: 27.4 UG/ML
WBC # BLD AUTO: 8 K/UL (ref 4.3–11.1)
WBC MORPH BLD: ABNORMAL

## 2022-04-04 PROCEDURE — 74011250637 HC RX REV CODE- 250/637: Performed by: NURSE PRACTITIONER

## 2022-04-04 PROCEDURE — 74011250636 HC RX REV CODE- 250/636: Performed by: NURSE PRACTITIONER

## 2022-04-04 PROCEDURE — 83735 ASSAY OF MAGNESIUM: CPT

## 2022-04-04 PROCEDURE — 74011250637 HC RX REV CODE- 250/637: Performed by: INTERNAL MEDICINE

## 2022-04-04 PROCEDURE — 94660 CPAP INITIATION&MGMT: CPT

## 2022-04-04 PROCEDURE — 97535 SELF CARE MNGMENT TRAINING: CPT

## 2022-04-04 PROCEDURE — 74011000250 HC RX REV CODE- 250: Performed by: INTERNAL MEDICINE

## 2022-04-04 PROCEDURE — 74011250636 HC RX REV CODE- 250/636: Performed by: INTERNAL MEDICINE

## 2022-04-04 PROCEDURE — 74011000258 HC RX REV CODE- 258: Performed by: INTERNAL MEDICINE

## 2022-04-04 PROCEDURE — APPSS45 APP SPLIT SHARED TIME 31-45 MINUTES: Performed by: NURSE PRACTITIONER

## 2022-04-04 PROCEDURE — 97530 THERAPEUTIC ACTIVITIES: CPT

## 2022-04-04 PROCEDURE — 80053 COMPREHEN METABOLIC PANEL: CPT

## 2022-04-04 PROCEDURE — 92610 EVALUATE SWALLOWING FUNCTION: CPT

## 2022-04-04 PROCEDURE — 65610000001 HC ROOM ICU GENERAL

## 2022-04-04 PROCEDURE — 36591 DRAW BLOOD OFF VENOUS DEVICE: CPT

## 2022-04-04 PROCEDURE — 3331090002 HH PPS REVENUE DEBIT

## 2022-04-04 PROCEDURE — 36600 WITHDRAWAL OF ARTERIAL BLOOD: CPT

## 2022-04-04 PROCEDURE — 97112 NEUROMUSCULAR REEDUCATION: CPT

## 2022-04-04 PROCEDURE — 3331090001 HH PPS REVENUE CREDIT

## 2022-04-04 PROCEDURE — 97164 PT RE-EVAL EST PLAN CARE: CPT

## 2022-04-04 PROCEDURE — 99232 SBSQ HOSP IP/OBS MODERATE 35: CPT | Performed by: INTERNAL MEDICINE

## 2022-04-04 PROCEDURE — 80202 ASSAY OF VANCOMYCIN: CPT

## 2022-04-04 PROCEDURE — 74011000258 HC RX REV CODE- 258: Performed by: NURSE PRACTITIONER

## 2022-04-04 PROCEDURE — 99233 SBSQ HOSP IP/OBS HIGH 50: CPT | Performed by: NURSE PRACTITIONER

## 2022-04-04 PROCEDURE — 84100 ASSAY OF PHOSPHORUS: CPT

## 2022-04-04 PROCEDURE — 97168 OT RE-EVAL EST PLAN CARE: CPT

## 2022-04-04 PROCEDURE — 74011000250 HC RX REV CODE- 250: Performed by: EMERGENCY MEDICINE

## 2022-04-04 PROCEDURE — C9113 INJ PANTOPRAZOLE SODIUM, VIA: HCPCS | Performed by: INTERNAL MEDICINE

## 2022-04-04 PROCEDURE — 71045 X-RAY EXAM CHEST 1 VIEW: CPT

## 2022-04-04 PROCEDURE — 82803 BLOOD GASES ANY COMBINATION: CPT

## 2022-04-04 PROCEDURE — 93970 EXTREMITY STUDY: CPT

## 2022-04-04 PROCEDURE — 85025 COMPLETE CBC W/AUTO DIFF WBC: CPT

## 2022-04-04 PROCEDURE — 99233 SBSQ HOSP IP/OBS HIGH 50: CPT | Performed by: INTERNAL MEDICINE

## 2022-04-04 RX ORDER — ENOXAPARIN SODIUM 100 MG/ML
40 INJECTION SUBCUTANEOUS EVERY 24 HOURS
Status: DISCONTINUED | OUTPATIENT
Start: 2022-04-04 | End: 2022-04-06 | Stop reason: HOSPADM

## 2022-04-04 RX ADMIN — HYDROMORPHONE HYDROCHLORIDE 1 MG: 1 INJECTION, SOLUTION INTRAMUSCULAR; INTRAVENOUS; SUBCUTANEOUS at 23:28

## 2022-04-04 RX ADMIN — AMIODARONE HYDROCHLORIDE 400 MG: 200 TABLET ORAL at 09:06

## 2022-04-04 RX ADMIN — METHYLPREDNISOLONE SODIUM SUCCINATE 40 MG: 40 INJECTION, POWDER, FOR SOLUTION INTRAMUSCULAR; INTRAVENOUS at 09:05

## 2022-04-04 RX ADMIN — SODIUM CHLORIDE, PRESERVATIVE FREE 20 MG: 5 INJECTION INTRAVENOUS at 09:06

## 2022-04-04 RX ADMIN — POTASSIUM BICARBONATE 40 MEQ: 782 TABLET, EFFERVESCENT ORAL at 17:03

## 2022-04-04 RX ADMIN — LOPERAMIDE HYDROCHLORIDE 2 MG: 2 CAPSULE ORAL at 21:01

## 2022-04-04 RX ADMIN — HYDROMORPHONE HYDROCHLORIDE 1 MG: 1 INJECTION, SOLUTION INTRAMUSCULAR; INTRAVENOUS; SUBCUTANEOUS at 17:01

## 2022-04-04 RX ADMIN — LOPERAMIDE HYDROCHLORIDE 2 MG: 2 CAPSULE ORAL at 09:06

## 2022-04-04 RX ADMIN — SODIUM CHLORIDE, PRESERVATIVE FREE 20 MG: 5 INJECTION INTRAVENOUS at 21:01

## 2022-04-04 RX ADMIN — AMIODARONE HYDROCHLORIDE 400 MG: 200 TABLET ORAL at 17:02

## 2022-04-04 RX ADMIN — PIPERACILLIN SODIUM,TAZOBACTAM SODIUM 4.5 G: 4; .5 INJECTION, POWDER, FOR SOLUTION INTRAVENOUS at 01:32

## 2022-04-04 RX ADMIN — MIDODRINE HYDROCHLORIDE 5 MG: 5 TABLET ORAL at 09:05

## 2022-04-04 RX ADMIN — ONDANSETRON 4 MG: 2 INJECTION INTRAMUSCULAR; INTRAVENOUS at 09:05

## 2022-04-04 RX ADMIN — PIPERACILLIN SODIUM,TAZOBACTAM SODIUM 4.5 G: 4; .5 INJECTION, POWDER, FOR SOLUTION INTRAVENOUS at 17:03

## 2022-04-04 RX ADMIN — PIPERACILLIN SODIUM,TAZOBACTAM SODIUM 4.5 G: 4; .5 INJECTION, POWDER, FOR SOLUTION INTRAVENOUS at 10:50

## 2022-04-04 RX ADMIN — DEXMEDETOMIDINE HYDROCHLORIDE 0.4 MCG/KG/HR: 100 INJECTION, SOLUTION INTRAVENOUS at 00:39

## 2022-04-04 RX ADMIN — SODIUM CHLORIDE, PRESERVATIVE FREE 10 ML: 5 INJECTION INTRAVENOUS at 15:47

## 2022-04-04 RX ADMIN — MIDODRINE HYDROCHLORIDE 5 MG: 5 TABLET ORAL at 21:01

## 2022-04-04 RX ADMIN — SODIUM CHLORIDE 40 MG: 9 INJECTION INTRAMUSCULAR; INTRAVENOUS; SUBCUTANEOUS at 09:05

## 2022-04-04 RX ADMIN — FUROSEMIDE 20 MG: 10 INJECTION, SOLUTION INTRAMUSCULAR; INTRAVENOUS at 21:01

## 2022-04-04 RX ADMIN — POTASSIUM BICARBONATE 40 MEQ: 782 TABLET, EFFERVESCENT ORAL at 09:06

## 2022-04-04 RX ADMIN — MIDODRINE HYDROCHLORIDE 5 MG: 5 TABLET ORAL at 15:46

## 2022-04-04 RX ADMIN — ENOXAPARIN SODIUM 40 MG: 40 INJECTION SUBCUTANEOUS at 21:01

## 2022-04-04 RX ADMIN — FUROSEMIDE 20 MG: 10 INJECTION, SOLUTION INTRAMUSCULAR; INTRAVENOUS at 15:46

## 2022-04-04 NOTE — PROGRESS NOTES
ACUTE PHYSICAL THERAPY GOALS:  (Developed with and agreed upon by patient and/or caregiver.)  UPDATED 4/4/22  LTG:  (1.)Mr. Gonzalez will move from supine to sit and sit to supine , scoot up and down and roll side to side in bed with STAND BY ASSIST within 7 treatment day(s). (2.)Mr. Gonzalez will transfer from bed to chair and chair to bed with STAND BY ASSIST using the least restrictive device within 7 treatment day(s). (3.)Mr. Gonzalez will ambulate with STAND BY ASSIST for 100 feet with the least restrictive device within 7 treatment day(s). (4.)Mr. Gonzalez will participate in therapeutic activity/exercises x 25 minutes for increased activity tolerance with SpO2 above 90% within 7 treatment days. ________________________________________________________________________________________________            PHYSICAL THERAPY ASSESSMENT: Re-evaluation and AM PT Treatment Day # 1      Boissevain Bridger is a 78 y.o. male   PRIMARY DIAGNOSIS: Hypotension  FTT (failure to thrive) in adult [R62.7]  Hypotension [I95.9]       Reason for Referral:  Generalized weakness  ICD-10: Treatment Diagnosis: Generalized Muscle Weakness (M62.81)  Other abnormalities of gait and mobility (R26.89)  History of falling (Z91.81)  INPATIENT: Payor: [employer] MEDICARE / Plan: [facility] [employer] MEDICARE HMO / Product Type: Managed Care Medicare /     ASSESSMENT:     REHAB RECOMMENDATIONS:   Recommendation to date pending progress:  Setting:   Short-term Rehab  Equipment:    To Be Determined     PRIOR LEVEL OF FUNCTION:  (Prior to Hospitalization) INITIAL/CURRENT LEVEL OF FUNCTION:  (Most Recently Demonstrated)   Bed Mobility:   Independent  Sit to Stand:   Independent  Transfers:   Modified Independent  Gait/Mobility:   Modified Independent Bed Mobility:   Maximal Assistance x 2  Sit to Stand:   Not tested  Transfers:   Not tested  Gait/Mobility:   Not tested     ASSESSMENT:   Mr. Eddie Mancera was supine in bed on Airvo in ICU.   Pt re-evaluated today given recent discharge due to decline in status. Pt continues to present with decreased strength and AROM in B LEs. He also has very poor activity tolerance today. Pt desaturated with rolling to 76% and required increased oxygen support. Pt required maxA x 2 for bed mobility and demonstrated fair sitting balance. Pt fatigued with sitting EOB and assisted back to bed with maxA x 2. Pt placed in semi chair position in bed and had copious dark secretions with coughing today. Pt's goals have been updated and he could benefit from skilled PT to address above deficits. SUBJECTIVE:   Mr. Darek Zhu states, \"Thank you\"    SOCIAL HISTORY/LIVING ENVIRONMENT: lives alone with dog in 1 level home with 6 LAURIE, has walk-in shower, no seat/bench, has raised toilet seat, has RW for gait. PLOF assist for dressing, able to bathe independently.   Home Environment: Private residence  One/Two Story Residence: One story  Living Alone: Yes  Support Systems: Child(betsy),Friend/Neighbor  OBJECTIVE:     PAIN: VITAL SIGNS: LINES/DRAINS:   Pre Treatment: Pain Screen  Pain Scale 1: Numeric (0 - 10)  Pain Intensity 1: 0  Post Treatment: 0   Continuous Pulse Oximetry, Vieira Catheter, IV and Nasogastric Tube  O2 Device: Heated,Hi flow nasal cannula     GROSS EVALUATION:  B LE Within Functional Limits Abnormal/ Functional Abnormal/ Non-Functional (see comments) Not Tested Comments:   AROM [] [x] [] []    PROM [] [] [] []    Strength [] [x] [] [] Generalized weakness   Balance [] [x] [] []    Posture [] [] [] []    Sensation [] [] [] []    Coordination [] [] [] []    Tone [] [] [] []    Edema [] [] [] []    Activity Tolerance [] [] [x] [] Desaturated to 70s% with rolling on Airvo    [] [] [] []      COGNITION/  PERCEPTION: Intact Impaired   (see comments) Comments:   Orientation [x] []    Vision [] []    Hearing [x] []    Command Following [x] []    Safety Awareness [x] []     [] []      MOBILITY: I Mod I S SBA CGA Min Mod Max Total  NT x2 Comments:   Bed Mobility    Rolling [] [] [] [] [] [x] [] [] [] [] [x]    Supine to Sit [] [] [] [] [] [] [] [x] [] [] [x]    Scooting [] [] [] [] [] [] [] [] [x] [] [x]    Sit to Supine [] [] [] [] [] [] [x] [] [] [] [x]    Transfers    Sit to Stand [] [] [] [] [] [] [] [] [] [x] []    Bed to Chair [] [] [] [] [] [] [] [] [] [x] []    Stand to Sit [] [] [] [] [] [] [] [] [] [x] []    I=Independent, Mod I=Modified Independent, S=Supervision, SBA=Standby Assistance, CGA=Contact Guard Assistance,   Min=Minimal Assistance, Mod=Moderate Assistance, Max=Maximal Assistance, Total=Total Assistance, NT=Not Tested  GAIT: I Mod I S SBA CGA Min Mod Max Total  NT x2 Comments:   Level of Assistance [] [] [] [] [] [] [] [] [] [x] []    Distance NT    DME N/A    Gait Quality NT    Weightbearing Status N/A     I=Independent, Mod I=Modified Independent, S=Supervision, SBA=Standby Assistance, CGA=Contact Guard Assistance,   Min=Minimal Assistance, Mod=Moderate Assistance, Max=Maximal Assistance, Total=Total Assistance, NT=Not Tested    [address] [place] [address] [address] [facility]       How much difficulty does the patient currently have. .. Unable A Lot A Little None   1. Turning over in bed (including adjusting bedclothes, sheets and blankets)? [] 1   [] 2   [x] 3   [] 4   2. Sitting down on and standing up from a chair with arms ( e.g., wheelchair, bedside commode, etc.)   [] 1   [x] 2   [] 3   [] 4   3. Moving from lying on back to sitting on the side of the bed? [] 1   [x] 2   [] 3   [] 4   How much help from another person does the patient currently need. .. Total A Lot A Little None   4. Moving to and from a bed to a chair (including a wheelchair)? [] 1   [x] 2   [] 3   [] 4   5. Need to walk in hospital room? [] 1   [x] 2   [] 3   [] 4   6. Climbing 3-5 steps with a railing?    [] 1   [x] 2   [] 3   [] 4   © 2007, Trustees of [address] [place] [address] [address] [address], under license to [employer]. All rights reserved     Score:  Initial: 21 Most Recent: 13 (Date: 4/4/22 )    Interpretation of Tool:  Represents activities that are increasingly more difficult (i.e. Bed mobility, Transfers, Gait). PLAN:   FREQUENCY/DURATION: PT Plan of Care: 3 times/week for duration of hospital stay or until stated goals are met, whichever comes first.    PROBLEM LIST:   (Skilled intervention is medically necessary to address:)  1. Decreased ADL/Functional Activities  2. Decreased Activity Tolerance  3. Decreased Balance  4. Decreased Gait Ability  5. Decreased Strength  6. Decreased Transfer Abilities   INTERVENTIONS PLANNED:   (Benefits and precautions of physical therapy have been discussed with the patient.)  1. Therapeutic Activity  2. Therapeutic Exercise/HEP  3. Neuromuscular Re-education  4. Gait Training  5. Education     TREATMENT:     Re-eval    TREATMENT:   ($$ Therapeutic Activity: 23-37 mins    )  Co-Treatment PT/OT necessary due to patient's decreased overall endurance/tolerance levels, as well as need for high level skilled assistance to complete functional transfers/mobility and functional tasks  Therapeutic Activity (23 Minutes): Therapeutic activity included Rolling, Supine to Sit, Sit to Supine, Scooting and Sitting balance  to improve functional Mobility, Strength and Activity tolerance.     TREATMENT GRID:  N/A    AFTER TREATMENT POSITION/PRECAUTIONS:  Bed, Needs within reach and RN notified    INTERDISCIPLINARY COLLABORATION:  RN/PCT, PT/PTA and OT/VILLAREAL    TOTAL TREATMENT DURATION:  PT Patient Time In/Time Out  Time In: 1300  Time Out: 95 Ana Dubois DPT

## 2022-04-04 NOTE — PROGRESS NOTES
VANCO DAILY FOLLOW UP RENAL INSUFFICIENCY PATIENT   [address] [facility] Pharmacokinetic Monitoring Service - Vancomycin    Consulting Provider: Charley Sy   Indication: HAP  Target Concentration: Random level ? 15 mg/L  Day of Therapy: 3  Additional Antimicrobials: Pip/Tazo    Pertinent Laboratory Values: Wt Readings from Last 1 Encounters:   04/04/22 75.8 kg (167 lb 1.7 oz)     Temp Readings from Last 1 Encounters:   04/04/22 97.9 °F (36.6 °C)     Recent Labs     04/04/22  0314 04/03/22  0325 04/02/22  0326   BUN 36* 32* 22   CREA 0.80 0.70* 0.60*   WBC 8.0 10.0 12.5*       Lab Results   Component Value Date/Time    Vancomycin, random 27.4 04/04/2022 03:14 AM       MRSA Nasal Swab: ordered by pharmacy, not collected. Assessment:  Date:  Dose/Freq Admin Times Level/Time:   4/3 1000 mg x 1 1059    4/4   Rd @ 7092 = 27.4   4/5                    Plan:  Concentration-guided dosing due to renal impairment  Patient with decreasing urine output over the past 48 hours, slight uptrend in SCR. Cautiously convert to intermittent dosing for now.   No dose needed at this time  Repeat vancomycin concentration ordered for 4/5 @ 0400    Pharmacy will continue to monitor patient and adjust therapy as indicated    Thank you for the consult,  Colletta Pollen, PharmD

## 2022-04-04 NOTE — PROGRESS NOTES
Problem: Falls - Risk of  Goal: *Absence of Falls  Description: Document Boo Calvillo Fall Risk and appropriate interventions in the flowsheet. Outcome: Progressing Towards Goal  Note: Fall Risk Interventions:  Mobility Interventions: Bed/chair exit alarm,Communicate number of staff needed for ambulation/transfer,Patient to call before getting OOB    Mentation Interventions: Adequate sleep, hydration, pain control,Bed/chair exit alarm,Door open when patient unattended,Evaluate medications/consider consulting pharmacy,Increase mobility,More frequent rounding,Reorient patient,Room close to nurse's station    Medication Interventions: Bed/chair exit alarm,Evaluate medications/consider consulting pharmacy,Teach patient to arise slowly    Elimination Interventions: Bed/chair exit alarm,Call light in reach,Patient to call for help with toileting needs,Toileting schedule/hourly rounds    History of Falls Interventions: Bed/chair exit alarm,Door open when patient unattended,Evaluate medications/consider consulting pharmacy         Problem: Pain  Goal: *Control of Pain  Outcome: Progressing Towards Goal     Problem: Nausea/Vomiting (Adult)  Goal: *Absence of nausea/vomiting  Outcome: Progressing Towards Goal     Problem: Nutrition Deficit  Goal: *Optimize nutritional status  Outcome: Progressing Towards Goal     Problem: Pressure Injury - Risk of  Goal: *Prevention of pressure injury  Description: Document Carlos Scale and appropriate interventions in the flowsheet. Outcome: Progressing Towards Goal  Note: Pressure Injury Interventions:  Sensory Interventions: Assess changes in LOC,Assess need for specialty bed,Avoid rigorous massage over bony prominences,Check visual cues for pain,Keep linens dry and wrinkle-free,Maintain/enhance activity level,Minimize linen layers,Monitor skin under medical devices,Pressure redistribution bed/mattress (bed type),Turn and reposition approx.  every two hours (pillows and wedges if needed)    Moisture Interventions: Absorbent underpads,Apply protective barrier, creams and emollients,Assess need for specialty bed,Check for incontinence Q2 hours and as needed,Internal/External urinary devices,Maintain skin hydration (lotion/cream),Minimize layers    Activity Interventions: Increase time out of bed,Pressure redistribution bed/mattress(bed type)    Mobility Interventions: Assess need for specialty bed,Chair cushion,Pressure redistribution bed/mattress (bed type),Turn and reposition approx.  every two hours(pillow and wedges)    Nutrition Interventions: Document food/fluid/supplement intake,Discuss nutritional consult with provider    Friction and Shear Interventions: Apply protective barrier, creams and emollients,Foam dressings/transparent film/skin sealants,Transfer aides:transfer board/Mac lift/ceiling lift,Transferring/repositioning devices

## 2022-04-04 NOTE — PROGRESS NOTES
[facility] Critical Care Note[de-identified] 4/4/2022  Samantha Gonzalez  Admission Date: 3/15/2022     Length of Stay: 20 days    Background: .66 y.o. 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 He has an abdominal pleurex catheter in place that is being drained usually every other day. He was admitted with hypotension/volume depletion and failure to thrive. Robert Mancia has been on TPN for nutritional support. He has been treated with Folfirinox previously but now has disease progression so is now being treated with Gemcitabine/Abraxane which was started around 3/17. He had a chest CT done on 3/20 that showed some infiltrates with small effusions. His CXR has since worsened and his oxygen needs have increased.  He has edema and is getting lasix but his blood pressure has limited use.  He was recently on Vanc and Maxipeme for ? Infection but has been off antibiotics for several days.  He is afebrile and WBC is 18.  Echo this admission with EF of 35 to 40%    Notable PMH:  has a past medical history of Back pain, Chronic pain, CINV (chemotherapy-induced nausea and vomiting) (4/20/2021), Erectile dysfunction, Gastritis, GERD (gastroesophageal reflux disease), Hiatal hernia, History of anemia, Hypertension, Left bundle branch block (LBBB) on electrocardiogram (02/05/2021), Malignant neoplasm of body of pancreas ([facility] [address] Malignant neoplasm of esophagus ([address] (12/07/2020), Morbid obesity ([address] and Nausea. 24 Hour events: On 70% HFNC. ROS: unable to obtain/negative except as listed elsewhere. Lines: (insertion date)   [place]: (4/1)  Pleurex (3/22    Drips: current dose (range)  None    Pertinent Exam:         Blood pressure (!) 109/59, pulse 72, temperature 97.9 °F (36.6 °C), resp. rate 25, height 5' 6\" (1.676 m), weight 167 lb 1.7 oz (75.8 kg), SpO2 99 %.      Intake/Output Summary (Last 24 hours) at 4/4/2022 1013  Last data filed at 4/4/2022 0326  Gross per 24 hour Intake 1208.02 ml   Output 2725 ml   Net -1516.98 ml     Constitutional:  nad on airvo and nrb  EENMT:  Sclera clear, pupils equal, oral mucosa moist  Respiratory: crackles   Cardiovascular:  RRR  Gastrointestinal:  soft with no tenderness; positive bowel sounds present  Musculoskeletal:  warm with no cyanosis, 1+ lower extremity edema  Skin:  no jaundice or ecchymosis  Neurologic: lethargic but wakes up  Psychiatric: calm    CXR:  improved      Recent Labs     04/04/22 0314 04/03/22 0325 04/02/22 0326   WBC 8.0 10.0 12.5*   HGB 8.3* 8.7* 9.0*   HCT 27.3* 28.6* 29.3*   * 121* 110*     Recent Labs     04/04/22 0314 04/03/22 0325 04/02/22 0326   * 148* 146*   K 3.4* 3.8 3.3*   * 111* 109*   CO2 31 29 29   * 124* 147*   BUN 36* 32* 22   CREA 0.80 0.70* 0.60*   MG 2.2 2.2 2.1   CA 8.3 8.4 8.4   PHOS 2.9  --   --    ALB 2.7* 2.3* 2.7*   AST 18 31 25   ALT 18 20 16   * 339* 285*     No results for input(s): LAC, TROPHS, BNPNT, CRP in the last 72 hours. No lab exists for component: ESR  No results for input(s): GLUCPOC in the last 72 hours. No lab exists for component: A1C  ECHO: Results from Hospital Encounter encounter on 03/15/22    ECHO ADULT FOLLOW-UP OR LIMITED    Interpretation Summary    Left Ventricle: Left ventricle is mildly dilated. Mildly increased wall thickness. Moderate global hypokinesis present. Moderately reduced left ventricular systolic function with a visually estimated EF of 30 - 35%. Abnormal diastolic function.   Mitral Valve: Mild transvalvular regurgitation.   Tricuspid Valve: Mildly elevated RVSP.   Left Atrium: Left atrium is mildly dilated.   Right Atrium: Right atrium is mildly dilated.   Pericardium: Left pleural effusion.   Contrast used: Definity.      Results     Procedure Component Value Units Date/Time    MSSA/MRSA SC BY PCR, NASAL SWAB [[id number]]     Order Status: Sent Specimen: Nasal swab     CULTURE, RESPIRATORY/SPUTUM/BRONCH Izzy Ennis STAIN [[id number]]  (Abnormal) Collected: 03/30/22 1742    Order Status: Completed Specimen: Sputum Updated: 04/03/22 0757     Special Requests: NO SPECIAL REQUESTS        GRAM STAIN       WBCS SEEN TOO NUMEROUS TO COUNT            NO EPITHELIAL CELLS SEEN         FEW GRAM NEGATIVE RODS         MODERATE YEAST         4+ MUCUS PRESENT        Culture result: HEAVY CANDIDA TROPICALIS               LIGHT NORMAL RESPIRATORY PANTERA          CULTURE, RESPIRATORY/SPUTUM/BRONCH W Dionisio Prado [[id number]] Collected: 03/30/22 0827    Order Status: Completed Specimen: Sputum Updated: 03/30/22 1417     Special Requests: NO SPECIAL REQUESTS        GRAM STAIN       GRAM STAIN EXAMINATION OF THIS SPECIMEN INDICATED OROPHARYNGEAL CONTAMINATION. PLEASE SUBMIT A NEW SPECIMEN OR NOTIFY THE MICRO DEPT WITHIN 48HRS IF FURTHER WORKUP IS DESIRED. Culture result:       Please reorder and recollect. RESULTS VERIFIED, PHONED TO AND READ BACK BY  BEN GERBER RN ON 03/30/22 @1412, ADS      MSSA/MRSA SC BY PCR, NASAL SWAB [[id number]] Collected: 03/25/22 1115    Order Status: Canceled Specimen: Nasal swab     C.  DIFFICILE AG & TOXIN A/B [[id number]] Collected: 03/25/22 0041    Order Status: Completed Specimen: Stool Updated: 03/25/22 1045     [address] ANTIGEN       C. DIFFICILE GDH ANTIGEN-NEGATIVE           C. difficile toxin       C. DIFFICILE TOXIN-NEGATIVE           PCR Reflex NOT APPLICABLE        INTERPRETATION       NEGATIVE FOR TOXIGENIC C. DIFFICILE           Clinical Consideration       NEGATIVE FOR TOXIGENIC C. DIFFICILE              Inpat Anti-Infectives (From admission, onward)     Start     Ordered Stop    04/02/22 2300  vancomycin (VANCOCIN) 1,000 mg in 0.9% sodium chloride 250 mL ([id number])  1,000 mg,   IntraVENous,   EVERY 12 HOURS         04/02/22 1114 --    04/01/22 0900  piperacillin-tazobactam (ZOSYN) 4.5 g in 0.9% sodium chloride (MBP/ADV) 100 mL MBP  4.5 g,   IntraVENous,   EVERY 8 HOURS 04/01/22 0850 04/08/22 0959              Ventilator Settings:  Ideal body weight: 63.8 kg (140 lb 10.5 oz)   Mode FIO2 Rate Tidal Volume Pressure PEEP      70 %               Peak airway pressure:         Minute ventilation: 10.8 l/min  ABG:  Recent Labs     04/04/22  0310 04/03/22  0327 04/02/22  0304   PHI 7.48* 7.42 7.46*   PCO2I 36.8 42.5 38.5   PO2I 91 78 47*   HCO3I 27.3* 27.7* 27.5*     Assessment and Plan:  (Medical Decision Making)       Impression: 66 y.o. 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 He has an abdominal pleurex catheter in place that is being drained usually every other day. He was admitted with hypotension/volume depletion and failure to thrive. He has been treated with Folfirinox previously but now has disease progression so is now being treated with Gemcitabine/Abraxane which was started around 3/17. He had a chest CT done on 3/20 that showed some infiltrates with small effusions. His CXR has since worsened and his oxygen needs have increased.  He has edema and is getting lasix but his blood pressure has limited use.  He was recently on Vanc and Maxipeme for ? Infection but has been off antibiotics for several days.  He is afebrile and WBC is 18.  Echo this admission with EF of 35 to 40%.     NEURO:    now calm.    Analgesia: increased Norco yesterday per palliative care on 3/31.   CV:   Volume Status:-+ 4174 cc since admit  LV dysfunction - as above. Cardiology has seen. Medical therapy limited but hypotension.   Hypotension: continue Midodrine  PULM:   Acute hypoxemic/hypercapneic respiratory failure:  weaning airvo. Bilateral pulmonary infiltrates: suspect combination of pneumonia and heart failure. Needs 3 more days of GN coverage which is already ordered.   Can d/c vanco.  RENAL:  MICHAEL: NA  Electrolytes: supplementing K+ and phos  GI:   Nutrition: off tpn -has ng tube- can start some tube feeds  Malignant ascites:  Drain pleurex every other day and prn. Drained 3/31  HEME:   Metastatic esophageal and pancreatic cancers - chemo started back now. + disease progression per oncology. Anemia: monitoring- now on empiric anticoagulation? Will change to prophylactic dose of lovenox unless there is clear sign of thromboembolic disease. Thrombocytopenia: monitor on chemo  Anticoagulation: doubt PE in setting of bilateral pulmonary infiltrates. ID:   Have restarted abx due to worsening hypoxia/increased infiltrates on CXR. pct 1.08    ENDO:   DM: NA  Skin: no decub, turns, preventive care  Prophy: lovenox.     Need to readdress code status. Unlikely to survive resuscitative efforts in this condition and it is not clear patient understands this. Appreciate attention by oncology and pall care to this issue. Full Code    The patient is critically ill with respiratory failure, circulatory failure and requires high complexity decision making for assessment and support including frequent ventilator adjustment , frequent evaluation and titration of therapies , application of advanced monitoring technologies and extensive interpretation of multiple databases    Cumulative time devoted to patient care services by me for day of service is 31 mins.     Yudelka Reynolds MD

## 2022-04-04 NOTE — PROGRESS NOTES
Chart reviewed and pt discussed in am IDR as remains ICU. Continues to go back and forth from BIPAP to Airvo/NRB. Currently on BIPAP 70% fio2. No gtts per MAR. TPN. CM following for d/c needs/POC -was home with [facility]/TPN with [facility]. Continue to follow for any change or further d/c needs/POC when stable. LOS 20 days.

## 2022-04-04 NOTE — PROGRESS NOTES
[facility]        Inpatient Hematology / Oncology Progress Note      Admission Date: 3/15/2022 10:28 AM  Reason for Admission/Hospital Course: FTT (failure to thrive) in adult [R62.7]  Hypotension [I95.9]      24 Hour Events:  Remains in ICU on HFNC  D2 Sotorasib today  Not much improvement in respiratory status  Somewhat confused  Continues on antibiotics  Niece at bedside    Transfusions: None  Replacements: None    ROS:   Unable to be obtained d/t patient status     10 point review of systems is otherwise negative with the exception of the elements mentioned above in the HPI.      No Known Allergies    OBJECTIVE:  Patient Vitals for the past 8 hrs:   BP Temp Pulse Resp SpO2 Weight   22 1100 (!) 137/105 -- 81 30 95 % --   22 1045 112/65 -- 86 (!) 32 95 % --   22 1030 (!) 113/58 -- 76 23 94 % --   22 1015 128/67 -- 83 -- 92 % --   22 1000 120/62 -- 95 (!) 35 -- --   22 0952 -- -- -- -- 99 % --   22 0945 (!) 106/57 -- 74 29 97 % --   22 0815 (!) 109/59 -- 72 25 94 % --   22 0800 104/62 -- 70 25 92 % --   22 0745 (!) 101/56 -- 64 19 98 % --   22 0730 (!) 98/53 -- 64 23 99 % --   22 0715 (!) 97/54 97.9 °F (36.6 °C) 67 22 96 % --   22 0700 (!) 104/59 -- 63 28 97 % --   22 0645 100/64 -- 73 -- -- --   22 0618 -- -- -- -- -- 167 lb 1.7 oz (75.8 kg)   22 0615 (!) 91/55 -- (!) 58 16 97 % --   22 0600 98/60 -- (!) 58 17 98 % --   22 0545 (!) 91/55 -- (!) 58 17 98 % --   22 0530 100/60 -- (!) 57 16 98 % --   22 0515 (!) 94/56 -- (!) 58 18 97 % --   22 0500 102/64 -- 67 25 98 % --   22 0445 100/60 -- (!) 57 16 98 % --   22 0430 90/61 -- 60 17 97 % --   22 0415 90/62 -- (!) 59 20 98 % --   22 0400 (!) 98/58 -- 61 18 98 % --   22 0345 (!) 99/55 -- (!) 59 18 97 % --     Temp (24hrs), Av °F (36.7 °C), Min:97.8 °F (36.6 °C), Max:98.2 °F (36.8 °C)    No intake/output data recorded. Physical Exam:  Constitutional: Thin, cachectic elderly male in no acute distress, lying comfortably in the hospital bed. HEENT: Normocephalic and atraumatic. Oropharynx is clear, mucous membranes are moist. Extraocular muscles are intact. Sclerae anicteric. Neck supple without JVD. No thyromegaly present. Skin Warm and dry. No bruising and no rash noted. No erythema. No pallor. Respiratory +slightly coarse. Normal air exchange without accessory muscle use. CVS Normal rate, regular rhythm and normal S1 and S2. No murmurs, gallops, or rubs. Abdomen Soft, nontender and distended, normoactive bowel sounds. +Abd pleurx   Neuro Grossly nonfocal with no obvious sensory or motor deficits. MSK 2+ BLE edema. Normal range of motion in general.  No tenderness. Psych Appropriate mood and affect.         Labs:      Recent Labs     04/04/22 0314 04/03/22 0325 04/02/22 0326   WBC 8.0 10.0 12.5*   RBC 2.82* 2.97* 3.08*   HGB 8.3* 8.7* 9.0*   HCT 27.3* 28.6* 29.3*   MCV 96.8 96.3 95.1   MCH 29.4 29.3 29.2   MCHC 30.4* 30.4* 30.7*   RDW 16.7* 16.3* 16.3*   * 121* 110*   GRANS 85* 84* 77   LYMPH 5* 4* 4*   MONOS 5 5 7   EOS 0* 0* 0*   BASOS 0 0 1   IG 5 7* 11*   DF AUTOMATED AUTOMATED AUTOMATED   ANEU 6.8 8.4* 9.6*   ABL 0.4* 0.4* 0.5   ABM 0.4 0.5 0.9   JONNY 0.0 0.0 0.0   ABB 0.0 0.0 0.1   AIG 0.4 0.7* 1.4*        Recent Labs     04/04/22 0314 04/03/22 0325 04/02/22 0326   * 148* 146*   K 3.4* 3.8 3.3*   * 111* 109*   CO2 31 29 29   AGAP 5* 8 8   * 124* 147*   BUN 36* 32* 22   CREA 0.80 0.70* 0.60*   GFRAA >60 >60 >60   GFRNA >60 >60 >60   CA 8.3 8.4 8.4   * 339* 285*   TP 5.3* 5.1* 5.4*   ALB 2.7* 2.3* 2.7*   GLOB 2.6 2.8 2.7   AGRAT 1.0* 0.8* 1.0*   MG 2.2 2.2 2.1   PHOS 2.9  --   --          Imaging:  XR CHEST SNGL V [[id number]] Collected: 04/02/22 0412   Order Status: Completed Updated: 04/02/22 0433   Narrative:     EXAM: Chest x-ray.     INDICATION: Dyspnea. COMPARISON: March 31, 2022. TECHNIQUE: Frontal view chest x-ray. FINDINGS: Diffuse bilateral lung infiltrates are mildly improved. No   pneumothorax or pleural effusion is seen. The heart size is stable. Again noted   is a left chest wall infusion port catheter. Impression:     Mildly improved bilateral lung infiltrates. XR CHEST Gina Colni [[id number]] Collected: 03/31/22 2133   Order Status: Completed Updated: 03/31/22 2136   Narrative:     EXAMINATION: One view chest     HISTORY: increase in shortness of breath     TECHNIQUE: Frontal chest.     COMPARISON: 3/30/2022     FINDINGS:     Stable left-sided MediPort. Persistent bilateral lung infiltrates. These have increased bilaterally   particularly on the right. There is no significant pneumothorax. There is no significant pleural effusion. The heart is unchanged. No other significant interval changes. Impression:       1. Findings as described above. Increased bilateral lung infiltrates. XR CHEST Gina Colin [[id number]] Collected: 03/30/22 0820   Order Status: Completed Updated: 03/30/22 0826   Narrative:     Exam: XR CHEST SNGL V on 3/30/2022 8:20 AM     Clinical History: The Male patient is 78years old  presenting for sob. Comparison:  Chest x-ray 3/24/2022     Findings:  Frontal view of the chest was obtained. Continued shallow inspiration with mild pulmonary edema and suggested left   basilar infiltrate and/or effusion. Left subclavian chest port in place.  The   cardiomediastinal silhouette is within normal limits.  There are no acute   osseous abnormalities. Impression:       1. Continued shallow inspiration with suspected left basilar infiltrate and/or   effusion as well as diffuse mild pulmonary edema.      CPT code(s) 79903          XR CHEST Gina Colin [[id number]] Collected: 03/24/22 1158   Order Status: Completed Updated: 03/24/22 1201   Narrative:     PORTABLE CHEST 1 VIEW HISTORY: Increasing oxygen needs. COMPARISON: 3/15/2022     FINDINGS: A chest port is present with catheter tip in the SVC. Lung volumes are diminished. Edema like interstitial densities are present in   the middle and lower lung zones. EKG leads are present. Impression:     Low lung volumes with bilateral infiltrates that may be caused by   edema or infection. Rosalio Oreilly CATH PLEURAL INDWELL [[id number]] Collected: 03/22/22 1614   Order Status: Completed Updated: 03/22/22 1652   Narrative:     Title: Dominant PleurX drain placement. Indication: Pancreatic cancer. Recurrent abdominal ascites.  Tunneled abdominal   PleurX drain catheter requested. : Paddy King PA-C     Supervising Physician: Lexi Fountain M.D. Consent:  Informed written and oral consent was obtained from the patient after   explanation of benefits and risks (including, but not limited to: Infection,   hemorrhage, visceral injury and pneumothorax).  The patient's questions were   answered to their satisfaction. The patient stated understanding and requested   that we proceed. Procedure:  With the patient supine, the abdomen was prepped and draped in the   standard fashion.  Lidocaine was administered for local field block.  Ultrasound   evaluation was performed. Using real-time ultrasound guidance, with appropriate   image recording, a Yueh needle was advanced into the ascites in the abdomen. Using fluoroscopy, the needle was exchanged over a wire for a peel-away sheath. The PleurX drain was brought through a subcutaneous tunnel and passed down the   peel-away sheath positioning the drain across the midline, lower and the pelvis. The skin incision was closed with absorbable suture.  The catheter was secured   with nonabsorbable suture. A total of 2250 cc of thin yellow fluid was removed.  Bandages were applied. Complications: Large-volume ascites.      Medications:  37 minutes based placed under moderate sedation was provided under   the direction and supervision of Gerda Negron M.D. using frontal and Versed. Continuous cardiopulmonary monitoring was provided by trained independent   observer present. Ancef was infused prior to the procedure. Contrast:  None. Radiation dose:   Fluoroscopy time: 18 seconds. Reference air kerma (mGy): 8   Kerma area product (cGy.cm2):  989   Fluoroscopic images: 1     Findings:  Large volume ascites. Plan: Bedrest for 1 hour. Recommend performing a drainage procedure daily or every-other-day for the next   two weeks in order to promote healing of the catheter site.        CT CHEST W CONT [[id number]] Collected: 03/20/22 1811   Order Status: Completed Updated: 03/20/22 1823   Narrative:     CT CHEST WITH CONTRAST DATED 3/20/2022. History: Echo with extra cardiac structure compressing the left atrium. Comparison: CT abdomen and pelvis with contrast 3/19/2022     Technique:   Multiple contiguous helical CT images reconstructed at 5 mm were   obtained from the neck base to the mid abdomen following the administration of   100 cc of Isovue 370 without acute complication. All CT scans performed at this   facility use one or all of the following: Automated exposure control, adjustment   of the mA and/or kVp according to patient's size, iterative reconstruction. Findings: The base of the neck is unremarkable in appearance. A left-sided venous port is   present. No lymphadenopathy is seen.  The thoracic aorta is normal in caliber. The heart is not clearly enlarged on the CT. Moderate to advanced   atherosclerotic calcification is seen of the coronary arteries. No significant   pericardial effusion is seen. The only abnormal space-occupying process adjacent   to the heart is a moderate size hiatal hernia. In addition to the stomach, there   is additional herniation of mesenteric fat as well as abdominal fluid.  These all   appear to anteriorly displace the heart. The esophagus proximal to the hernia   sac is fluid distended which could indicate reflux. Obstruction at the   gastroesophageal junction is felt to be less likely given the additional fluid   distention of the hernia sac. Evaluation with lung windows demonstrates a trace right, and small left   dependent pleural effusion. These do appear worsened from the prior examination. Nonspecific pulmonary infiltrates are otherwise seen. Lungs are expanded without   evidence for pneumothorax.  No acute osseous abnormality is seen. Mild anasarca   is seen of the chest wall. Limited evaluation of the upper abdomen demonstrate multiple findings which are   not significantly changed and better assessed on a dedicated contrasted CT scan   of the abdomen performed on 3/19/2022. Impression:     1.  The only abnormal space-occupying process adjacent to the heart is a   moderate size hiatal hernia. In addition to the stomach, there is additional   herniation of mesenteric fat as well as abdominal fluid. These all appear to   anteriorly displace the heart.       2. Worsening although trace to small bilateral dependent pleural effusions. Additional mild anasarca seen of the chest wall. These findings suggest fluid   overload. 3. Nonspecific pulmonary infiltrates. These could represent pulmonary edema   although the distribution is not classic. Additional allergies such as pneumonia   are not excluded if suggested by clinical findings. This report was made using voice transcription. Despite my best efforts to avoid   any, transcription errors may persist. If there is any question about the   accuracy of the report or need for clarification, then please call [phone], or text me through [email or web address] for clarification or correction. CT ABD PELV W CONT [[id number]] Collected: 03/19/22 1300   Order Status: Completed Updated: 03/20/22 1526   Addenda:        Addendum: Addendum: A request was made to assess for potential right   gluteal hematoma. There is appear to be edema in the right gluteal soft tissues   which is slightly more pronounced than on the left. No clearly demonstrated defined collection is seen to suggest significant hematoma. The most inferior   gluteal soft tissues have been excluded from the field of imaging. Signed: 03/20/22 1523 by Perri Cordero MD   Narrative:     CT ABDOMEN AND PELVIS WITH INTRAVENOUS CONTRAST DATED 3/19/2022. History: Fall hitting bottom and back. Comparison: CT abdomen and pelvis with contrast 12/27/2021     Technique:   Multiple contiguous helical CT images reconstructed at 5 mm   intervals were obtained from above the diaphragms through the ischial   tuberosities following oral and 100 cc Isovue-370 without acute complication. All CT scans performed at this facility use one or all of the following:   Automated exposure control, adjustment of the mA and/or kVp according to   patient's size, iterative reconstruction. Findings:   CT ABDOMEN:     Limited evaluation of the lung bases and base of the mediastinum demonstrates   nonspecific infiltrates in the bilateral lung bases, left greater than right. A   small dependent left pleural effusion is seen. A small to moderate size hiatal   hernia is present. Ascites within the abdomen is also seen within the hernia   sac. The Liver is clearly cirrhotic in its appearance. Multiple hepatic masses are   seen the largest of which resides in the inferior right lobe measuring 3 cm in   size. The appearance is highly concerning for malignancy either representing   multifocal hepatomas, or hepatic metastases. Hepatic metastases are favored   given the appearance. Asymmetric intraperitoneal air ductal dilation is seen in   the left lobe of the liver which may be obstructed by a central hepatic mass   seen on axial image 23 measuring 1.1 cm in size. .  The spleen demonstrates an   irregular area of decreased attenuation in the superior pole seen on axial image   17 measuring 3.2 cm x 1.7 cm. This is not as well characterized. This does not   appear to represent an acute defect. This could result from flow artifact.  No   contour deforming or enhancing mass lesions are seen of the adrenal glands. The   pancreas is grossly unchanged in its appearance with the patient having a known   primary pancreas tumor described on the prior study. The gallbladder has been   removed.  The kidneys enhance symmetrically and no evidence of hydronephrosis is   seen. A stable benign cyst is seen in the posterior upper to midpole cortex of   the right kidney measuring 2.6 cm in size. The visualized loops of small bowel and colon are normal in caliber.  The   appendix appears to have been removed. Mild to moderate diverticulosis is seen   of the left colon. No free air is seen. Moderate ascites is seen which   demonstrates low attenuation suggesting simple ascites. Diffuse mesenteric edema   is seen. Abnormal peritoneal nodularity and caking is suggested with additional   peritoneal thickening which at times appears nodular. This is highly concerning   for peritoneal metastatic process which appears worsened from the prior   examination.  No evolving adenopathy is seen.  The abdominal aorta demonstrate   moderate atherosclerotic calcification. No acute osseous abnormality is seen. Compression deformities are seen at T12, L1, and L4 although these have a   chronic appearance and are stable from the prior comparison study. CT PELVIS:   Small to moderate ascites extends into the pelvis. There is once again nodular   peritoneal thickening best appreciated on axial image 73 highly concerning for   evolving peritoneal metastatic disease. There appears to be serosal involvement   of the mid sigmoid colon seen on axial image 74. No abnormal dilation is seen to   suggest significant obstruction at this time. [place] evolving pelvic adenopathy is   seen.  The urinary bladder is unremarkable. No acute osseous abnormality is   seen. Impression:     1.  No clear acute injury from recent fall. Specifically, no acute osseous   abnormality is seen. 2. Grossly stable appearance of pancreatic tumor although this is suboptimally   assessed on this exam. However, there is clear evidence for evolving metastatic   disease with new hepatic masses, and multiple findings as described above which   are highly concerning for evolving peritoneal metastatic disease. Lastly,   irregular decreased attenuation is seen in the superior pole of the spleen which   does not appear clearly acute and does not demonstrate adjacent complex ascites. This could represent an additional metastasis although is not as well   characterized. This report was made using voice transcription. Despite my best efforts to avoid   any, transcription errors may persist. If there is any question about the   accuracy of the report or need for clarification, then please call [phone], or text me through [email or web address] for clarification or correction.     MRI BRAIN W WO CONT [[id number]] Collected: 03/19/22 1649   Order Status: Completed Updated: 03/19/22 1657   Narrative:     EXAMINATION: BRAIN MRI 3/19/2022 4:47 PM     ACCESSION NUMBER: [id number]     INDICATION: 58-year-old male with fall, altered mental status and confusion. History of pancreatic cancer on chemotherapy with recent progression of   intra-abdominal disease, no prior imaging of brain. COMPARISON: CT head 3/19/2022. TECHNIQUE: Multiplanar multisequence MRI of the brain without and with   intravenous administration of 14 mL contrast agent. FINDINGS:     Previously described subcentimeter focus along the posterior limb of the right   internal capsule follows CSF signal intensity on all sequences and is favored to   represent a prominent perivascular space.      There is a mild degree of scattered and confluent foci of T2/FLAIR   hyperintensity within the periventricular and deep white matter, nonspecific but   most commonly seen with chronic small vessel ischemic changes. Faint chronic   hemosiderin involving the left posterior putamen. No midline shift or mass lesion. There is no evidence of intracranial hemorrhage   or acute infarct. The ventricles are within normal limits in size and   configuration. There are no extra-axial fluid collections present.  No diffusion   weighted signal abnormality is identified. There is no abnormal enhancement. Impression:       No acute finding or evidence of intracranial metastatic disease. XR ELBOW LT MIN 3 V [[id number]] Collected: 03/19/22 1534   Order Status: Completed Updated: 03/19/22 1538   Narrative:     XR FOREARM LT AP/LAT, XR ELBOW LT MIN 3 V 3/19/2022 3:19 PM     HISTORY: Fall with left arm and left elbow pain. Impression:       Two views left forearm. No fracture or dislocation. No significant soft tissue   swelling. Old healed distal ulnar fracture deformity is present. Three views left elbow. No fracture or dislocation. No significant soft tissue   swelling. No fat pad elevation. XR FOREARM LT AP/LAT [[id number]] Collected: 03/19/22 1534   Order Status: Completed Updated: 03/19/22 1538   Narrative:     XR FOREARM LT AP/LAT, XR ELBOW LT MIN 3 V 3/19/2022 3:19 PM     HISTORY: Fall with left arm and left elbow pain. Impression:       Two views left forearm. No fracture or dislocation. No significant soft tissue   swelling. Old healed distal ulnar fracture deformity is present. Three views left elbow. No fracture or dislocation. No significant soft tissue   swelling. No fat pad elevation. CT HEAD WO CONT [[id number]] Collected: 03/19/22 1254   Order Status: Completed Updated: 03/19/22 1258   Narrative:     CT BRAIN WITHOUT CONTRAST   3/19/2022 12:26 PM     INDICATION: Fall, altered mental status and confusion. COMPARISON: None available at this hospital PACS     Technique:  Multiple contiguous axial images are obtained encompassing the brain   from the skull base to the vertex.  For this CT scanner at least one of the   following techniques is utilized to decrease patient radiation dose: Automatic   exposure control, KVP and mA modulation based on patient weight, and iterative   reconstruction. FINDINGS: The ventricles are midline and of appropriate size and configuration. Mild hypoattenuating foci seen in the periventricular deep white matter. Inferiorly at the junction of the thalamus and posterior limb of the internal   capsule on the right there is a rounded mildly hypodense focus that measures 9   mm. The midline structures and posterior fossa are intact.  There is no finding of   an acute cortical stroke, mass lesion, or acute bleed.  No extra-axial fluid   collection. The skull is intact, no discreet abnormality. The paranasal sinuses are not   remarkable. The visualized orbits and mastoid air-cells are patent. Impression:     9 mm rounded hypoattenuating focus on the right at the inferior   junction of the thalamus and posterior limb of the internal capsule is noted. Suspicious for lacunar infarct and of uncertain chronicity-cannot exclude that   this is new. Elsewhere only mild chronic changes in the periventricular white matter   suggested. For further imaging evaluation of this as clinically indicated-recommend brain   MRI with diffusion imaging. XR CHEST Ravinder Guajardo [[id number]] Collected: 03/15/22 1259   Order Status: Completed Updated: 03/15/22 1302   Narrative:     Chest X-ray     INDICATION: Hypotension. COMPARISON: Chest x-ray 2/10/2021. A portable AP view of the chest was obtained. FINDINGS: The lungs are clear. There are no infiltrates or effusions. Left chest   port is unchanged in position. No pneumothorax.  The heart size is normal.  The   bony thorax is intact.      Impression:     No acute findings in the chest. Lungs remain clear. No interval   change.         Medications:  Current Facility-Administered Medications   Medication Dose Route Frequency    potassium bicarb-citric acid (EFFER-K) tablet 40 mEq  40 mEq Oral BID    enoxaparin (LOVENOX) injection 40 mg  40 mg SubCUTAneous Q24H    NUTRITIONAL SUPPORT ELECTROLYTE PRN ORDERS   Does Not Apply PRN    famotidine (PF) (PEPCID) 20 mg in 0.9% sodium chloride 10 mL injection  20 mg IntraVENous Q12H    pantoprazole (PROTONIX) 40 mg in 0.9% sodium chloride 10 mL injection  40 mg IntraVENous DAILY    dexmedeTOMidine in 0.9 % NaCl (PRECEDEX) 400 mcg/100 mL (4 mcg/mL) infusion soln  0.1-1.5 mcg/kg/hr IntraVENous TITRATE    sotorasib (Lumakras) tab 960 mg (Patient Supplied)  960 mg Per NG tube DAILY    midodrine (PROAMATINE) tablet 5 mg  5 mg Oral TID    piperacillin-tazobactam (ZOSYN) 4.5 g in 0.9% sodium chloride (MBP/ADV) 100 mL MBP  4.5 g IntraVENous Q8H    HYDROmorphone (DILAUDID) injection 1 mg  1 mg IntraVENous Q4H PRN    HYDROmorphone (DILAUDID) injection 0.5 mg  0.5 mg IntraVENous Q4H PRN    LORazepam (ATIVAN) injection 1 mg  1 mg IntraVENous Q4H PRN    naloxone (NARCAN) injection 0.04 mg  0.04 mg IntraVENous PRN    furosemide (LASIX) injection 20 mg  20 mg IntraVENous Q8H    potassium chloride (K-DUR, KLOR-CON M20) SR tablet 20 mEq  20 mEq Oral DAILY    loperamide (IMODIUM) capsule 2 mg  2 mg Oral Q12H    HYDROcodone-acetaminophen (NORCO)  mg tablet 1 Tablet  1 Tablet Oral Q4H PRN    calcium carbonate (TUMS) chewable tablet 200 mg [elemental]  200 mg Oral TID PRN    alum-mag hydroxide-simeth (MYLANTA) oral suspension 30 mL  30 mL Oral Q4H PRN    prochlorperazine (COMPAZINE) with saline injection 5 mg  5 mg IntraVENous Q6H PRN    acetaminophen (TYLENOL) tablet 650 mg  650 mg Oral Q6H PRN    diphenhydrAMINE (BENADRYL) capsule 25 mg  25 mg Oral Q6H PRN    amiodarone (CORDARONE) tablet 400 mg  400 mg Oral BID    [Held by provider] fat emulsion 20% (LIPOSYN, INTRAlipid) infusion 250 mL  250 mL IntraVENous QPM    NUTRITIONAL SUPPORT ELECTROLYTE PRN ORDERS   Does Not Apply PRN    sodium chloride (NS) flush 5-10 mL  5-10 mL IntraVENous PRN    diphenoxylate-atropine (LOMOTIL) tablet 1 Tablet  1 Tablet Oral QID PRN    promethazine (PHENERGAN) tablet 25 mg  25 mg Oral Q6H PRN    tamsulosin (FLOMAX) capsule 0.4 mg  0.4 mg Oral DAILY    ondansetron (ZOFRAN) injection 4 mg  4 mg IntraVENous Q4H PRN         ASSESSMENT:    Problem List  Date Reviewed: 3/15/2022          Codes Class Noted    Acute respiratory failure with hypoxia [facility]) ICD-10-CM: J96.01  ICD-9-CM: 518.81  4/1/2022        Bilateral pulmonary infiltrates on chest x-ray ICD-10-CM: R91.8  ICD-9-CM: 793.19  4/1/2022        Chronic systolic congestive heart failure (HCC) (Chronic) ICD-10-CM: I50.22  ICD-9-CM: 428.22, 428.0  3/21/2022        Bradycardia ICD-10-CM: R00.1  ICD-9-CM: 427.89  3/19/2022        LBBB (left bundle branch block) ICD-10-CM: I44.7  ICD-9-CM: 426.3  3/19/2022        FTT (failure to thrive) in adult ICD-10-CM: R62.7  ICD-9-CM: 783.7  3/15/2022        * (Principal) Hypotension ICD-10-CM: I95.9  ICD-9-CM: 458.9  3/15/2022        Severe protein-calorie malnutrition ([facility] ICD-10-CM: E43  ICD-9-CM: 476  3/4/2022        Pancreatic cancer ([facility] ICD-10-CM: C25.9  ICD-9-CM: 157.9  3/2/2022        Ascites ICD-10-CM: R18.8  ICD-9-CM: 789.59  3/2/2022        Admission for antineoplastic chemotherapy ICD-10-CM: Z51.11  ICD-9-CM: V58.11  3/2/2022        Hypomagnesemia ICD-10-CM: E83.42  ICD-9-CM: 275.2  4/23/2021        Hypokalemia ICD-10-CM: E87.6  ICD-9-CM: 276.8  4/20/2021        CINV (chemotherapy-induced nausea and vomiting) ICD-10-CM: R11.2, T45.1X5A  ICD-9-CM: 787.01, E933.1  4/20/2021        Dehydration ICD-10-CM: E86.0  ICD-9-CM: 276.51  4/12/2021        Other neutropenia ([facility] ICD-10-CM: D70.8  ICD-9-CM: 288.09  3/9/2021 Malignant neoplasm of lower third of esophagus (HCC) ICD-10-CM: C15.5  ICD-9-CM: 150.5  1/5/2021        Malignant neoplasm of body of pancreas ([address] ICD-10-CM: C25.1  ICD-9-CM: 157.1  1/5/2021        Severe obesity (HCC) (Chronic) ICD-10-CM: E66.01  ICD-9-CM: 278.01  12/10/2020        Elevated glucose ICD-10-CM: R73.09  ICD-9-CM: 790.29  7/22/2019        Anemia ICD-10-CM: D64.9  ICD-9-CM: 285.9  7/22/2019        Chronic left-sided low back pain (Chronic) ICD-10-CM: M54.50, G89.29  ICD-9-CM: 724.2, 338.29  7/22/2019              78 y.o. M pancreatic cancer and esophageal cancer with malignant ascites of previously controlled FOLFIRINOX but currently disease progressed and most recently admited on 3/2/2022 to place Pleurx and arrange gemcitabine/Abraxane, had extensive discussion with inpatient team and pharmacy, patient was discharged on 3/7/2022 without chemo appointment and return to office on 3/15/2022, extremely sick and blood pressure not measurable in the office, and not been having much oral intake since discharge, urgently establish IV access and called EMS to take to ER to stabilize and admit to hospital, apparently needed much supportive care and volume resuscitation, start TPN until oral intake can improve and give gemcitabine/Abraxane once clinically stable, discussed with the inpatient team.    PLAN:    Metastatic pancreatic cancer / distal esophageal cancer  - Metastatic disease involving peritoneum, malignant ascites  - s/p 24 cycles of FOLFIRINOX with SD and recent POD with increasing CEA/ and malignant ascites  - Plan for gemcitabine/abraxane when clinically stable - hopefully tomorrow. 3/17 [place]/abraxane today, tolerated well  3/24 D8 due today, deferring tx d/t cytopenias. Dr. Mike Garcia discussed that if patient's condition does not improve, that he may want to consider Hospice services. Palliative care following.   3/25 Pt wants to con't to pursue treatment as he wants to change his relationship with his children. He does not feel the burden of tx outweighs the benefits and wants to continue tx for as long as he can. D15 due 3/31.  3/29 TM checked, CEA up but  improved. 3/30 Office working to obtain sotorasib. D15 gem/abraxane due tomorrow. 3/31 Notes from office indicate sotorasib approved. [place]/abraxane due today - plts <100k. Hold today and re-eval tomorrow. 4/1 Holding chemo for acute issues - office working on sotorasib  4/2 Sotorasib planned to be delivered today   4/3 Pt had NGT placed and discussed with pharmacy and Sotorasib has instructions to make into a liquid, however pt did not receive yesterday. Discussed extensively with ICU RN and pt to receive this ASAP today. 4/4 Started Sotorasib yesterday. Dr Sea Tran would like another round of [place]/abraxane when/if able. Malignant ascites  - Has abdominal Pleurx, drain three times weekly  3/17 Pleurx drained 3/16 - 1100cc out. 3/18 Pleurx accidentally dislodged last night - 1800cc removed before completely removed. IR notified and will re-evaluate Monday. 3/20 Worsening abdominal pain possibly secondary to re-accumulation, some drainage reported at Pleurx site. IR evaluating tomorrow. 3/21 IR to re-evaluate tomorrow, was not NPO today. 3/22 To IR today. 3/23 Abd pleurx replaced yesterday  3/25 Pleurx drained yesterday, 1.1L. Con't to drain prn.  3/26 drained today as he was feeling uncomfortable. Monitor blood pressures. 3/27 continue to drain PRN, but be cautious with blood pressures as he is hypotensive at times. 3/29 BP much improved - drained 1100 cc yesterday due to patient request. Previously draining every other day but asking to be drained daily. Will continue to drain PRN as long as BP acceptable. 3/30 Abdominal discomfort - requesting to be drained again today. BP stable.   3/31 Pleurx drained again today per pt request.  4/1 Holding on further draining of Pleurx given hypotension - drain every other day (or PRN). 4/3 Draining pleur-x today. Cancer related pain  - Continue home dilaudid  3/17 Utilizing IV morphine. Will decrease dosing and encourage use of home Norco.  3/18 Continue to wean IV meds - encourage PO.  3/20 Has not been receiving IV morphine due to hypotension. Continue with PRN oral medications. 3/22 Consult to [facility]"" regarding [address], symptom management. 3/23 PC following. Pt changed code status to DNR, wants to pursue further cancer-related treatment. 3/25 PC following  3/27 Dilaudid increased today. During rounds, he stated his pain was better controlled with increased dose. 3/29 Continues on IV dilaudid. 3/31 IV dilaudid use limited by BP. Increased Norco to 10mg yesterday. PC following. Hypotension / poor PO intake / protein-calorie malnutrition  - Consult RD for TPN  - BC pending, started on Cefe/Vanc and may be able to de-escalate soon as hypotension likely secondary to dehydration. LA improved after IFV. 3/17 On TPN. Continues on Cefe/Vanc although infectious work-up unremarkable. Will stop antibiotics. 3/18 Hypotension improved and remains afebrile off antibiotics. Continue with nutritional support via TPN.  3/19 Ongoing hypotension, although improved yesterday AM, worse through evening. Had 6L of IVF yesterday and continues on TPN. May consider midodrine although MAP consistently ~65.  3/21 Hypotension improved but borderline, continues TPN.   3/22 Discussed with RD volume status of TPN given new finding of HFrEF and CT chest with evidence of fluid overload. CM following for home TPN. Holding IV morphine. 3/23 BPs improved  3/24 Episode of hypotension last PM, required small fluid bolus, BP improved  3/25 Continues on TPN, however pt declined to be attached to TPN yesterday AM and then disconnected himself from it last night. 3/27 per RD note, patient unaware about prior TPN disconnections. This AM during rounds, visitor noted that \"something was leaking. \" He had pulled his port needle out again and his TPN was leaking onto the floor. RD plans to discuss further goals regarding TPN with pt when she visits him today. 0/07 Cyclic TPN to start today per RD. Encouraged PO intake and he agrees. 3/29 Reports he drank an Ensure but is scared to eat because of diarrhea. Will manage diarrhea. 3/31 Continues on TPN - PO intake remains minimal due to respiratory issues. 4/1 Hold TPN given volume overload  4/2 continue to hold TPN through the weekend   4/4 On TF    Nausea  - Continue PRN antiemetics    Diarrhea  - Antidiarrheals PRN  3/24 c/o worsening diarrhea. Check Cdiff.   3/25 Cdiff neg. Con't using antidiarrheals prn.  3/28 Reports diarrhea - only 1 documented stool. Continue PRN antidiarrheals. 3/29 Reports he is afraid to eat because of diarrhea. Will schedule imodium TID.   3/31 Change to BID. Bradycardia / HFrEF / LV dysfunction / LBBB / ectopy  - One documented episode of HR 43 - check EKG  - No hx of hypertension, monitor  3/17 EKG with bi-geminal PACs and known LBBB. Recheck EGK.  3/18 EKG with same as above. Palpated pulse on several occasions documented in the 40s. Tele applied overnight. Cardiology following. 3/19 Cardiology recommending K>4 (on TPN) and Mg replacements. Echo pending. 3/21 No bradycardia noted on telemetry. Does have ectopy which may have led to incorrect pulse rate palpation. Echo with EF 35-40%. CT chest completed due to echo findings rebeka noted hiatal hernia/mesenteric fat herniation/abdominal fluid displacing heart and small BL effusions, anasarca. Cardiology following.  3/22 Cardiology considering BB if BP improve for HF/LV dysfunction. Weight up 6# overnight and increased since admission. Clinically no evidence of overload but asking RD to adjust TPN volume. Reported run of NS-SVT - cardiology following. 3/25 Cards following. Hypotension limiting therapy for heart failure.   3/26 cards signed off and recommended cont Amiodarone   3/30 BP improved, CXR shows pulmonary edema and possible effusion. 20mg Lasix given this morning. Will add albumin and diurese as able pending blood pressure tolerance. 3/31 BP dropped yesterday before albumin but improved throughout night. Up to 7L NC. Will continue albumin and give lasix today. Start midodrine. 4/1 Received 40mg lasix yesterday/PM. Ongoing albumin - D2. Continues on midodrine but borderline BP making diuresis difficult. Developed worsening respiratory distress overnight. 4/2 Continues on Midodrine. BP fluctuating, a bit better. Repeat echo. 4/3 Repeat echo with EF 30-35%, was 35-40% on 3/20/22. 4/4 Continues midodrine - BPs improved    Pancytopenia secondary to chemotherapy  - Transfuse prn per [facility] SOPs  3/25 ANC down to 1000. Start G-CSF.  3/26 [address] improved to 2.6.   3/28 WBC up to 20 - stop G-CSF.   3/29 Plts up to 71k. 4/2 Plts stable at 110k    Fall  3/19 Witnessed slip and fall on wet floor yesterday. Today with L arm bruising - check X-ray. Will check CT head and CT AP (hematoma/lipoma palpated on R buttock) and also c/o worsening abdominal pain. 3/20 X-ray of L elbow/forearm negative. CT head with ?lacunar infarct but MRI showed no acute findings and ?lacunar infarct appears to be prominent perivascular space. CT AP with progressing peritoneal/liver disease and ?new splenic lesion. Last imaging obtained 12/2021 for comparison. Elevated LFTs  3/21 Monitor, possibly secondary to TPN or recent chemo. 3/22 Appear to be improving  3/23 AST improved, ALP increased  3/28 Improving  RESOLVED    Hypoxic respiratory failure  / ?Pneumonia / CHF exacerbation / fluid overload  3/24 On O2 @ 2L now. Check CXR.  3/25 CXR with low lung volumes with b/l infiltrates ?edema vs infx? Check PCT - elevated. Start Cef/Vanc.  3/26 continues on cef/vanc; on room air   3/28 D4 Cefepime/Vancomycin - afebrile. DC Vancomycin. 3/29 Continue Cefepime - likley DC soon. 3/31 DC Cefe.  No clinical evidence of pneumonia, afebrile. CXR findings likely pulmonary edema. Continue diuresis/albumin. 4/1 Moved to ICU this AM after increasing O2 needs/respriatory effort. On BiPAP. Minimal diuresis due to low BP. Repeat lasix this AM and continue diuresis/albumin as BP tolerates. Pulmonology following - restarted Cefe and added Vancomycin after CXR showed worsening infiltrates and adding steroids for possible pneumonitis given recent chemo. Patient changed code status back to full code. 4/2 Remains in ICU on BIPAP. On Vanc/Zosyn. Pulm added solu-medrol for ? Pneumonitis. Receiving Lasix every 8 hours. Defer further albumin if needed to critical care team, albumin level improved from previous. Start therapeutic dose Lovenox as previously unable to rule out PE.      4/3 Now on HFNC. Lasix continues TID. On Vanc/Zosyn/Solumedrol. 4/4 Continues on HFNC - diuresing as able with hypotension. Continues on Zosyn per pulmonology. Agitation/Delirium  4/2 On Precedex drip   4/3 Agitation better off of BiPAP, now on HF NC  4/4 Off Precedex, on HF NC     Continue home meds  [facility] SOPs  Lovenox (hold for plts <50k)    Goals and plan of care reviewed with the patient. All questions answered to the best of our ability. Disposition:  TBD pending clinical course. [facility] Carballo [address] Hematology & Oncology  [address] [address]  Office : [phone]  Fax : [phone]       Attending Addendum:  I have personally performed a face to face diagnostic evaluation on this patient. I have reviewed and agree with the care plan as documented above by  Jero Boucher N.P.  My findings are as follows: Patient appears stable, heart rate regular without murmurs, abdomen is non-tender, bowel sounds are positive.   60-year-old  male patient with history of metastatic pancreatic cancer as well as a second distal esophageal cancer, now status post gemcitabine/Abraxane along with Sotorasib. Has abdominal Pleurx drain. Pain management as needed. In ICU given respiratory failure with hypoxia, possible pneumonia and CHF exacerbation. Supportive care as above. Goals of care discussed.                Elda Foote MD  [facility] Hematology/Oncology  [address] [place] [address]  Office : [phone]  Fax : [phone]

## 2022-04-04 NOTE — PROGRESS NOTES
Comprehensive Nutrition Assessment    Type and Reason for Visit: Reassess  Tube Feeding Management (pulmonary)  TPN Management (oncology)    Nutrition Recommendations/Plan:    TPN remains held per discussion with Oncology, trophic TF held d/t increased coughing. Remains NPO at this time per SLP. Consider restarting TPN for primary needs 4/5 if fluid status allows. Malnutrition Assessment:  Malnutrition Status: Severe malnutrition  Context: Chronic illness  Findings of clinical characteristics of malnutrition:   Energy Intake:  7 - 75% or less est energy requirements for 1 month or longer  Weight Loss:  7.0 - Greater than 7.5% over 3 months (34# (18.4%) )     Body Fat Loss:  1 - Mild body fat loss, Buccal region,Orbital,Triceps   Muscle Mass Loss:  1 - Mild muscle mass loss, Calf (gastrocnemius),Hand (interosseous),Scapula (trapezius),Temples (temporalis)  Fluid Accumulation:  No significant fluid accumulation,     Strength:  Not performed     Nutrition Assessment:   Nutrition History: Per RD assessment patient was able to maintain PO intake and UBW throughout most of chemo. During admmission early March patient had \"stopped eating\" due to nausea. Upon assessment this admission patient reports no PO of foods for ~4 weeks. States that he has been able to tolerate some fluids. He reports continued nausea and vomiting as primary barrier. Nutrition Background: Patient with pancreatic and esophageal cancer, Amos's esophagus, obesity, HTN, GERD, gastritis, ascites and peritoneal carcinomatosis s/p Plurex drain. He presented to oncology office extremely sick, BP was unable to be measured. He was admitted directly to [facility]. Nutrition Interval:  Visit with pt and friend at bedside. He inquires about getting up to walk and eating. Discussed SLP eval NPO d/t respiratory status. Palliative care following.     KUB: 4/2: FINDINGS: The enteric tube tip is in the region of the proximal intra-abdominal  portion of the stomach, in this patient with a hiatal hernia on the prior CT  chest. The distal side-port of the enteric tube is probably within the herniated  portion of the stomach. No free air is identified. Discussed with Marleni Thomas NP. Plan to continue to hold TPN tonight d/t fluid status, TF held d/t increased coughing. Pleurex output past 24 hours 1650 ml. Abdominal Status (last documented): Intact,Semi-soft abdomen with Active  bowel sounds. Last BM 04/04/22. Pertinent Medications: pepcid, lasix (held), imodium, protonix, zosyn, effer K, sotorasib, buminate  Continuous: precedex stopped this am  Pertinent Labs:   Lab Results   Component Value Date/Time    Sodium 148 (H) 04/04/2022 03:14 AM    Potassium 3.4 (L) 04/04/2022 03:14 AM    Chloride 112 (H) 04/04/2022 03:14 AM    CO2 31 04/04/2022 03:14 AM    Anion gap 5 (L) 04/04/2022 03:14 AM    Glucose 147 (H) 04/04/2022 03:14 AM    BUN 36 (H) 04/04/2022 03:14 AM    Creatinine 0.80 04/04/2022 03:14 AM    Calcium 8.3 04/04/2022 03:14 AM    Albumin 2.7 (L) 04/04/2022 03:14 AM    Magnesium 2.2 04/04/2022 03:14 AM    Phosphorus 2.9 04/04/2022 03:14 AM       Nutrition Related Findings:   Port in place, also with 1 PIV. TPN intiated 3/16. TPN increased in volume 3/19. TPN to be concentrated and volume decreased 3/22 per NP request due to new CHF. TPN changed to 18 hr cyclic infusion 0/96. TPN condensed to 16 hr infusion 3/29. TPN held 4/1 and 4/2. NGT place 4/2, trophic enteral feed started 4/3. SLP eval 4/4 unable to be completed secondary increased coughing and decreasing sats. Current Nutrition Therapies:  DIET NPO  ADULT TUBE FEEDING Nasogastric; Peptide Based; Delivery Method: Continuous; Continuous Initial Rate (mL/hr): 10; Continuous Advance Tube Feeding: No; Water Flush Volume (mL): 30;  Water Flush Frequency: Q 4 hours  Current Tube Feeding (TF) Orders:   · Feeding Route: Nasogastric  · Formula: Peptide based  · Schedule:Continuous · Regimen: 10ml/hr  · Additives/Modulars:  (none)  · Water Flushes: 30 ml/4 hour  · Current TF & Flush Orders Provides: held at RD visit  · Goal TF & Flush Orders Provides: Trophic enteral infusions may provide up to 500 kcal/day, not intended to meet estimated needs    Current Parenteral Nutrition Orders:  · Type and Formula: Dex 14%, 8% AA    · Lipids: 250ml,Daily  · Duration: Cyclic (16 hr infusion)  · Rate/Volume: 1.56 L (65ml/hr)  · Current PN Order Provides: held since 4/1  · Goal PN Orders Provides: 1742 kcal/d (100% of needs), 125 grams of protein/d (100% of needs), 218 grams of CHO/d and 1810 ml of total volume/d    Current Intake:   Average Meal Intake: NPO Average Supplement Intake: NPO      Anthropometric Measures:  Height: 5' 6\" (167.6 cm)  Current Body Wt: 75.8 kg (167 lb 1.7 oz) (4/4), Weight source: Bed scale  BMI: 27,  (current BMI skewed by fluid)  Admission Body Weight: 148 lb 9.4 oz  Ideal Body Weight (lbs) (Calculated): 142 lbs (65 kg), 104.6 %  Usual Body Wt: 82.6 kg (182 lb) (12/14/21 office wt and per previous history), Percent weight change: -18.4        Edema: LLE: 1+; Pitting (4/4/2022  7:15 AM)  LUE: 1+ (4/4/2022  7:15 AM)  RLE: 1+; Pitting (4/4/2022  7:15 AM)    Estimated Daily Nutrient Needs:  Energy (kcal/day): 9778-1934 (Kcal/kg (25-30), Weight Used: Current (67.4 kg (3/15))  Protein (g/day):  (1.3-1.5 g/kg) Weight Used: (Admission (67.3 kg))  Fluid (ml/day):   (1 ml/kcal)    Nutrition Diagnosis:   · Inadequate oral intake related to altered GI function,early satiety (peritoneal carcinomatosis ) as evidenced by  (poor oral tolerance, wt loss, requires TPN for primary needs)    · Severe malnutrition related to catabolic illness as evidenced by  (malnutrition criteria as above)    · Inadequate protein-energy intake related to  (fluid status) as evidenced by  (TPN held secondary volume overload)    Nutrition Interventions:   Food and/or Nutrient Delivery: Continue NPO Coordination of Nutrition Care: Continue to monitor while inpatient    Goals:   Previous Goal Met: Progress towards goal(s) declining  Active Goal: Resume nutrition regimen within 48 hours    Nutrition Monitoring and Evaluation:      Food/Nutrient Intake Outcomes: Diet advancement/tolerance  Physical Signs/Symptoms Outcomes: Biochemical data,GI status,Fluid status or edema,Weight    Discharge Planning:     Too soon to determine    John Engel RD, LDN   Contact: [phone]

## 2022-04-04 NOTE — PROGRESS NOTES
SPEECH PATHOLOGY NOTE:    Speech therapy consult received and appreciated. Chart review completed and case discussed with RN. At time of attempt, patient with constant coughing. Sats in mid [de-identified]. Per RN, patient tends to desat after coughing and requires some time to recover. Will defer dysphagia evaluation until respiratory status stabilizes in attempt to best assess swallow function.      SUNNI Rolle, CCC-SLP  Speech Language Pathologist  Acute Rehabilitation Services  Contact: [place]

## 2022-04-04 NOTE — PROGRESS NOTES
Palliative Care Progress Note    Patient: Gigi Reese MRN: [id number]  SSN: [id number]    YOB: 1955  Age: 78 y.o. Sex: male       Assessment/Plan:     Chief Complaint/Interval History: Transferred to ICU for BIPAP on 4/1/22; currently on Airvo. Principal Diagnosis:    · Dyspnea  R06.00    Additional Diagnoses:   · Ascites  R18.8  · Cough  R05  · Failure to Thrive  R62.7  · Pain, abdomen  R10.9  · Encounter for Palliative Care  Z51.5    Palliative Performance Scale (PPS)       Medical Decision Making:   Reviewed and summarized notes from last palliative care visit to present. Discussed case with appropriate providers: ICU IDT rounds, LOBITO Sifuentes, Dr Andrew Huitron lab and X-ray data from last palliative care visit to present. Pt resting in bed; niece Bethel at bedside. Mr Yeni Cavazos endorsed discomfort at the site of the PleurX, which was drained today. He denied N/V, and indicated that he was breathing comfortably on the Airvo. He has episodes of coughing. Dr Frederick Gutierrez discussed with Mr Yeni Cavazos that an attempt to resuscitate was unlikely to be efffective, and that she didn't want him to be taken by surprise by a rapid decline in the future. Mr Yeni Cavazos listened but did not ask to change his code status. Palliative Care will continue the discussion of code status. Per LOBITO Sifuentes, Mr Yeni Cavazos has been telling staff that he has some 'unfinished family business' to take care of, apparently related to the welfare of his daughter and her children. Mika will talk further with the pt about his concerns, then share those concerns with Abdelrahman Sifuentes, to see if we can help him address these concerns. Mr Yeni Cavazos also has 2 sons. He has stated that son Miguel A Li is his first surrogate decision maker, followed by his friend Caryle Buys (please see ACP notes). Will continue to follow.      Will discuss findings with members of the interdisciplinary team.         More than 50% of this 35 minute visit was spent counseling and coordination of care as outlined above. Subjective:     Review of Systems:  A comprehensive review of systems was negative except as noted above. Objective:     Visit Vitals  BP (!) 137/105   Pulse 81   Temp 97.9 °F (36.6 °C)   Resp 30   Ht 5' 6\" (1.676 m)   Wt 167 lb 1.7 oz (75.8 kg)   SpO2 95%   BMI 26.97 kg/m²       Physical Exam:    General:  Cooperative. Debilitated. Eyes:  Conjunctivae/corneas clear    Nose: Nares normal. Septum midline. Neck: Supple, symmetrical, trachea midline   Lungs:   Coarse bilaterally, unlabored   Heart:  Regular rate and rhythm   Abdomen:   Soft, mildly tender, non-distended. Pleurx catheter   Extremities: Normal, atraumatic, no cyanosis.  BLE edema   Skin: Skin color, texture, turgor normal.    Neurologic: Nonfocal   Psych: Lethargic but oriented     Signed By: Gennaro Vernon NP     April 4, 2022

## 2022-04-04 NOTE — PROGRESS NOTES
LTG: Patient will tolerate least restrictive diet without overt signs or symptoms of airway compromise. STG: Patient will tolerate clear liquidswithout overt signs or symptoms of airway compromise. STG: Patient will participate in trials of additional textures to assess appropriateness for diet advancement. SPEECH LANGUAGE PATHOLOGY: DYSPHAGIA  Initial Assessment    NAME/AGE/GENDER: Kuldip Black is a 78 y.o. male  DATE: 4/4/2022  PRIMARY DIAGNOSIS: FTT (failure to thrive) in adult [R62.7]  Hypotension [I95.9]      ICD-10: Treatment Diagnosis: R13.12 Dysphagia, Oropharyngeal Phase    RECOMMENDATIONS   DIET:   NPO  Clear liquids for pleasure as tolerated by patient. MEDICATIONS: With liquid     ASPIRATION PRECAUTIONS  Slow rate of intake  Small bites/sips  Upright at 90 degrees during meal     COMPENSATORY STRATEGIES/MODIFICATIONS  Hold intake if coughing, SOB, or vomiting. EDUCATION:  Recommendations discussed with Nursing  Family  Patient     CONTINUATION OF SKILLED SERVICES/MEDICAL NECESSITY:  Patient is expected to demonstrate progress in  swallow strength, swallow timeliness, swallow function, diet tolerance and swallow safety in order to  improve swallow safety, work toward diet advancement and decrease aspiration risk. Patient continues to require skilled intervention due to dysphagia. RECOMMENDATIONS for CONTINUED SPEECH THERAPY: YES: Anticipate need for ongoing speech therapy during this hospitalization. ASSESSMENT   Patient presents with good tolerance with thin liquid trials. No overt s/sx of airway compromise. Only 2 tsps of puree presented before patient began dry heaving and retching. Unable to assess any more textures due to intolerance with po intake. Recommend sips of liquids for pleasure only. Educated patient/family on concerns for aspiration, need to hold intake if coughing or SOB occurs, and vomiting. He verbalizes understanding of recommendations.  Will follow along for possible diet advancement as tolerated. REHABILITATION POTENTIAL FOR STATED GOALS: Fair    PLAN    FREQUENCY/DURATION: Continue to follow patient 2 times a week for duration of hospital stay to address above goals. Recommendations for next treatment session: Next treatment session will address po trials      SUBJECTIVE   Patient upright in bed with visitor present. Patient reports \"coughing up green stuff\" earlier today. Was consuming thin liquids, ice cream, and mashed potatoes earlier this admission. History of Present Injury/Illness: Mr. Yeni Cavazos  has a past medical history of Back pain, Chronic pain, CINV (chemotherapy-induced nausea and vomiting) (4/20/2021), Erectile dysfunction, Gastritis, GERD (gastroesophageal reflux disease), Hiatal hernia, History of anemia, Hypertension, Left bundle branch block (LBBB) on electrocardiogram (02/05/2021), Malignant neoplasm of body of pancreas ([facility] [address] Malignant neoplasm of esophagus ([facility] [address] (12/07/2020), Morbid obesity ([facility] [address] and Nausea. Roshan Quinonez He also  has a past surgical history that includes hx cholecystectomy; hx appendectomy; hx colonoscopy (12/01/2020); hx vascular access; ir paracentesis abd w image (2/24/2022); ir insert cath pleural indwell (3/3/2022); and ir insert cath pleural indwell (3/22/2022). Problem List:  (Impairments causing functional limitations): Malignant neoplasm of esophagus  Increased risk for oropharyngeal dysphagia    Previous Dysphagia: YES Esophageal cancer leading to dysphagia  Diet Prior to Evaluation: NPO    Orientation:   Person  Place  Situation    Pain: Pain Scale 1: Numeric (0 - 10)  Pain Intensity 1: 0    OBJECTIVE   Oral Motor:   Labial: No impairment  Dentition: Edentulous  Oral Hygiene: Adequate  Lingual: No impairment    Swallow evaluation:   Patient consumed trials of ice chips and thin liquids by tsp and straw. Unable to present cup sips due to NG tube and Airvo limiting access to mouth.  Appropriate acceptance with all trials. Timely swallow with no cough, throat clear, or change in vocal quality. Voice actually clear and louder volume after thin liquids were presented. 2 tsp of puree also given. Gagging, dry heaving, and retching after 2nd trials. No vomiting occurred, but + nausea reported. Some complaints of applesauce feeling \"stuck\" as well. He continued to request thin liquids despite nausea and dry heaving. Educated patient/family on need to sit upright during po intake, importance of slow rate of intake, holding po if he becomes more SOB or has coughing. INTERDISCIPLINARY COLLABORATION: Registered Nurse  PRECAUTIONS/ALLERGIES: Patient has no known allergies. Tool Used: Dysphagia Outcome and Severity Scale (OLVIN)    Score Comments   Normal Diet  [] 7 With no strategies or extra time needed   Functional Swallow  [] 6 May have mild oral or pharyngeal delay   Mild Dysphagia  [] 5 Which may require one diet consistency restricted    Mild-Moderate Dysphagia  [] 4 With 1-2 diet consistencies restricted   Moderate Dysphagia  [] 3 With 2 or more diet consistencies restricted   Moderate-Severe Dysphagia  [] 2 With partial PO strategies (trials with ST only)   Severe Dysphagia  [] 1 With inability to tolerate any PO safely      Score:  Initial: 2 Most Recent: x (Date 04/04/22 )   Interpretation of Tool: The Dysphagia Outcome and Severity Scale (OLVIN) is a simple, easy-to-use, 7-point scale developed to systematically rate the functional severity of dysphagia based on objective assessment and make recommendations for diet level, independence level, and type of nutrition. Current Medications:   No current facility-administered medications on file prior to encounter. Current Outpatient Medications on File Prior to Encounter   Medication Sig Dispense Refill    tamsulosin (FLOMAX) 0.4 mg capsule Take 1 Capsule by mouth daily.  30 Capsule 2    lactulose (CHRONULAC) 10 gram/15 mL solution Take 45 mL by mouth three (3) times daily. 480 mL 0    promethazine (PHENERGAN) 25 mg tablet Take 1 Tablet by mouth every six (6) hours as needed for Nausea. 30 Tablet 1    ondansetron hcl (Zofran) 8 mg tablet Take 1 Tablet by mouth every eight (8) hours as needed for Nausea. Indications: nausea and vomiting caused by cancer drugs 60 Tablet 3    diphenoxylate-atropine (LomotiL) 2.5-0.025 mg per tablet Take 1 Tablet by mouth four (4) times daily as needed for Diarrhea. Max Daily Amount: 4 Tablets. 90 Tablet 1    sildenafil citrate (Viagra) 100 mg tablet Take 1 Tablet by mouth as needed (erectile dysfunction). 30 Tablet 5    DISABLED PLACARD (DISABLED PLACARD) DMV Use as directed for poor ambulation 1 Each 0    omeprazole (PRILOSEC) 40 mg capsule Take 1 Capsule by mouth daily. 30 Capsule 5    famotidine (PEPCID) 20 mg tablet Take 1 Tablet by mouth two (2) times a day. 60 Tablet 5    prochlorperazine (Compazine) 10 mg tablet Take 1 Tablet by mouth every six (6) hours as needed for Nausea. Indications: nausea and vomiting caused by cancer drugs, nausea and vomiting 90 Tablet 3    potassium chloride (K-DUR, KLOR-CON) 20 mEq tablet Take 1 Tablet by mouth daily. 30 Tablet 2    naproxen sodium (ALEVE) 220 mg cap Take 1 Capsule by mouth daily as needed for Pain. For back pain    Indications: pain      DISABLED PLACARD (DISABLED PLACARD) DMV Handicap Placard, for poor ambuation.  1 Each 0       SAFETY:  After treatment position/precautions:  Upright in bed  RN notified  Family at bedside  Call light within reach    Total Treatment Duration:   Time In: 0127  Time Out: [address], [facility], [facility] HAYDEN DONOVAN, [facility]-SLP  Speech Language Pathologist  Acute Rehabilitation Services  Contact: [place]

## 2022-04-04 NOTE — PROGRESS NOTES
ACUTE OT GOALS:  (Developed with and agreed upon by patient and/or caregiver.)  1. Patient will complete lower body bathing and dressing with supervision and adaptive equipment as needed. 2. Patient will complete toileting with min A.   3. Patient will tolerate 30 minutes of OT treatment with 1-2 rest breaks to increase activity tolerance for ADLs. 4. Patient will complete functional transfers with min A and adaptive equipment as needed. 5. Patient will complete functional activity while seated edge of bed with supervision and adaptive equipment as needed. 6. Patient will completed functional activity while maintaining Sp02 > 90% to increase activity tolerance for ADLs. 7. Patient will tolerate 30 minutes BUE therapeutic activities to increase use of BUE during ADL performance.      Timeframe: 7 visits       OCCUPATIONAL THERAPY ASSESSMENT: Daily Note and Re-evaluation OT Treatment Day # 1    Nkechi Arevalo is a 78 y.o. male   PRIMARY DIAGNOSIS: Hypotension  FTT (failure to thrive) in adult [R62.7]  Hypotension [I95.9]       Reason for Referral:    ICD-10: Treatment Diagnosis: Generalized Muscle Weakness (M62.81)  INPATIENT: Payor: [facility] MEDICARE / Plan: [address] [place] [address] HMO / Product Type: [email or web address] Medicare /   ASSESSMENT:     REHAB RECOMMENDATIONS:   Recommendation to date pending progress:  Setting:   Short-term Rehab  Equipment:    To Be Determined     PIOR LEVEL OF FUNCTION:  (Prior to Hospitalization)  INITIAL/CURRENT LEVEL OF FUNCTION:  (Based on today's evaluation)   Bathing:   Modified Independent  Dressing:   Minimal Assistance  Feeding/Grooming:  [place] Draft Standby Assistance  Toileting:   Contact Guard Assistance  Functional Mobility:   Contact Guard Assistance Bathing:   Moderate Assistance  Dressing:   Moderate Assistance  Feeding/Grooming:   Standby Assistance  Toileting:   Maximal Assistance  Functional Mobility:   Maximal Assistance x 2     ASSESSMENT:  Mr. Darek Zhu has a history of metastatic cancer currently undergoing treatment under oncology. He lives alone with his dog, but receives assistance from children. Recent falls. Today, pt presents with deficits in overall strength, activity tolerance, ADL performance, and functional mobility. Currently in ICU after pt experienced decline in respiratory status, requiring BIPAP. Patient resting on Airvo at 45L/75% upon arrival; agreeable to therapy. Max A required for rolling bed mobility to facilitate bowel hygiene. Sp02 dropping to high 70s with mobility/activity. Extended rest break with patient slowly recovering to 90%. Patient transferred to sitting EOB with max A x 2; fair EOB sitting with close SBA. Pt tolerating EOB sitting well with Sp02 ranging between 89-92%. Returned to bed and chair position with needs met. Re-eval completed with goals updated to reflect pt's current status. Will continue OT efforts as indicated. SUBJECTIVE:   Mr. Km Barlow states, \"I think I need to get cleaned up. \"    SOCIAL HISTORY/LIVING ENVIRONMENT: Lives alone in in home with 6 steps to enter. Walk-in shower. Receives assistance for ADL from family.   Home Environment: Private residence  One/Two Story Residence: One story  Living Alone: Yes  Support Systems: Child(betsy),Friend/Neighbor    OBJECTIVE:     PAIN: VITAL SIGNS: LINES/DRAINS:   Pre Treatment: Pain Screen  Pain Scale 1: Numeric (0 - 10)  Pain Intensity 1: 0  Post Treatment: 0   IV  O2 Device: Heated,Hi flow nasal cannula     GROSS EVALUATION:   Within Functional Limits Abnormal/ Functional Abnormal/ Non-Functional (see comments) Not Tested Comments:   AROM [] [x] [] []    PROM [] [x] [] []    Strength [] [x] [] [] Generally weak   Balance [] [x] [] [] Fair (+) EOB sitting; standing not tested   Posture [] [x] [] []    Sensation [] [] [] [x]    Coordination [] [x] [] []    Tone [] [x] [] []    Edema [] [] [] [x]    Activity Tolerance [] [x] [] [] Decreased on Airvo; rest breaks provided as needed.     [] [] [] []      COGNITION/  PERCEPTION: Intact Impaired   (see comments) Comments:   Orientation [x] []    Vision [] []    Hearing [] []    Judgment/ Insight [] []    Attention [] []    Memory [] []    Command Following [x] []    Emotional Regulation [] []     [] []      ACTIVITIES OF DAILY LIVING: I Mod I S SBA CGA Min Mod Max Total NT Comments   BASIC ADLs:              Bathing/ Showering [] [] [] [] [] [] [] [] [] [x]    Toileting [] [] [] [] [] [] [] [x] [x] [] Bowel hygiene   Dressing [] [] [] [] [] [] [] [x] [] [] gown   Feeding [] [] [] [] [] [] [] [] [] [x]    Grooming [] [] [] [x] [] [] [] [] [] []    Personal Device Care [] [] [] [] [] [] [] [] [] [x]    Functional Mobility [] [] [] [] [] [] [] [x] [] [] X 2 for bed mobility   I=Independent, Mod I=Modified Independent, S=Supervision, SBA=Standby Assistance, CGA=Contact Guard Assistance,   Min=Minimal Assistance, Mod=Moderate Assistance, Max=Maximal Assistance, Total=Total Assistance, NT=Not Tested    MOBILITY: I Mod I S SBA CGA Min Mod Max Total  NT x2 Comments:   Supine to sit [] [] [] [] [] [] [] [x] [] [] [x]    Sit to supine [] [] [] [] [] [] [] [x] [] [] [x]    Sit to stand [] [] [] [] [] [] [] [] [] [x] []    Bed to chair [] [] [] [] [] [] [] [] [] [x] []    I=Independent, Mod I=Modified Independent, S=Supervision, SBA=Standby Assistance, CGA=Contact Guard Assistance,   Min=Minimal Assistance, Mod=Moderate Assistance, Max=Maximal Assistance, Total=Total Assistance, NT=Not Tested    [address] [facility] [address] [address] AM-PAC 6 Clicks   Daily Activity Inpatient Short Form        How much help from another person does the patient currently need. .. Total A Lot A Little None   1. Putting on and taking off regular lower body clothing? [] 1   [x] 2   [] 3   [] 4   2. Bathing (including washing, rinsing, drying)? [] 1   [x] 2   [] 3   [] 4   3. Toileting, which includes using toilet, bedpan or urinal?   [] 1   [x] 2   [] 3   [] 4   4.   Putting on and taking off regular upper body clothing? [] 1   [] 2   [x] 3   [] 4   5. Taking care of personal grooming such as brushing teeth? [] 1   [] 2   [x] 3   [] 4   6. Eating meals? [] 1   [] 2   [x] 3   [] 4   © 2007, Trustees of [address] [place] [address] [address] [id number], under license to [employer]. All rights reserved     Score:  Initial: 18 Most Recent: 15 (Date: 4/4/2022 )   Interpretation of Tool:  Represents activities that are increasingly more difficult (i.e. Bed mobility, Transfers, Gait). PLAN:   FREQUENCY/DURATION: OT Plan of Care: 3 times/week for duration of hospital stay or until stated goals are met, whichever comes first.    PROBLEM LIST:   (Skilled intervention is medically necessary to address:)  1. Decreased ADL/Functional Activities  2. Decreased Activity Tolerance  3. Decreased AROM/PROM  4. Decreased Balance  5. Decreased Coordination  6. Decreased Strength  7. Decreased Transfer Abilities  8. Increased Pain   INTERVENTIONS PLANNED:   (Benefits and precautions of occupational therapy have been discussed with the patient.)  1. Self Care Training  2. Therapeutic Activity  3. Therapeutic Exercise/HEP  4. Neuromuscular Re-education  5. Education     TREATMENT:     EVALUATION: re-evaluation    TREATMENT:   ($$ Self Care/Home Management: 8-22 mins$$ Neuromuscular Re-Education: 8-22 mins   )  Self Care (13 Minutes): Self care including Toileting, Upper Body Dressing and Grooming to increase independence and decrease level of assistance required. Neuromuscular Re-education (10 Minutes): Neuromuscular Re-education included Balance Training, Postural training and Sitting balance training to improve Balance, Coordination, Postural Control and activity tolerance.     TREATMENT GRID:  N/A    AFTER TREATMENT POSITION/PRECAUTIONS:  Bed, Needs within reach, RN notified and Visitors at bedside    INTERDISCIPLINARY COLLABORATION:  RN/PCT and OT/VILLAREAL    TOTAL TREATMENT DURATION:  OT Patient Time In/Time Out  Time In: 1300  Time Out: [address] [facility] Fatemeh Saha

## 2022-04-04 NOTE — INTERDISCIPLINARY ROUNDS
Interdisciplinary team rounds were held 4/4/2022 with the following team members:Care Management, Nursing, Nurse Practitioner, Palliative Care, Pastoral Care, Pharmacy, Physical Therapy, Physician, Respiratory Therapy and Clinical Coordinator and the patient. Plan of care discussed. See clinical pathway and/or care plan for interventions and desired outcomes.

## 2022-04-04 NOTE — PROGRESS NOTES
Bedside and verbal shift change report received from  [facility] ALFONSO GALLEGOS (offgoing nurse). Report included the following information SBAR, Intake/Output, MAR, Recent Results, Med Rec Status and Cardiac Rhythm NSR.      Dual skin assessment completed at bedside: NA (list pertinent skin assessment findings)    Dual verification of gtts completed (name of gtts verified): NA

## 2022-04-04 NOTE — PROGRESS NOTES
Bedside and verbal shift change report received from  Amanda Huff [facility]BA [employer] (offgoing nurse). Report included the following information SBAR, Kardex, ED Summary, Intake/Output, MAR, Recent Results, Cardiac Rhythm NSR and Quality Measures.      Dual skin assessment completed at bedside: R abd hernia (list pertinent skin assessment findings)    Dual verification of gtts completed (name of gtts verified): N/A

## 2022-04-04 NOTE — PROGRESS NOTES
Occupational Therapy Note:    Participated in interdisciplinary rounds in ICU/CCU and chart reviewed. Patient is experiencing decrease in function from baseline. Patient would benefit from skilled acute therapy to increase independence with self care/ADLs, strength, endurance, and functional mobility. Recommend OT/PT consult when medically stable and MD agrees.     Thank you for your consideration,  Kriss Pascal OTR/JEN

## 2022-04-05 ENCOUNTER — APPOINTMENT (OUTPATIENT)
Dept: GENERAL RADIOLOGY | Age: 67
DRG: 435 | End: 2022-04-05
Attending: INTERNAL MEDICINE
Payer: MEDICARE

## 2022-04-05 ENCOUNTER — HOSPICE ADMISSION (OUTPATIENT)
Dept: HOSPICE | Facility: HOSPICE | Age: 67
End: 2022-04-05

## 2022-04-05 LAB
ALBUMIN SERPL-MCNC: 2.6 G/DL (ref 3.2–4.6)
ALBUMIN/GLOB SERPL: 1 {RATIO} (ref 1.2–3.5)
ALP SERPL-CCNC: 266 U/L (ref 50–136)
ALT SERPL-CCNC: 17 U/L (ref 12–65)
ANION GAP SERPL CALC-SCNC: 5 MMOL/L (ref 7–16)
AST SERPL-CCNC: 27 U/L (ref 15–37)
ATRIAL RATE: 95 BPM
BASOPHILS # BLD: 0 K/UL (ref 0–0.2)
BASOPHILS NFR BLD: 0 % (ref 0–2)
BILIRUB SERPL-MCNC: 0.9 MG/DL (ref 0.2–1.1)
BUN SERPL-MCNC: 38 MG/DL (ref 8–23)
CALCIUM SERPL-MCNC: 8.1 MG/DL (ref 8.3–10.4)
CALCULATED P AXIS, ECG09: 32 DEGREES
CALCULATED R AXIS, ECG10: -31 DEGREES
CALCULATED T AXIS, ECG11: 135 DEGREES
CHLORIDE SERPL-SCNC: 113 MMOL/L (ref 98–107)
CO2 SERPL-SCNC: 31 MMOL/L (ref 21–32)
CREAT SERPL-MCNC: 0.9 MG/DL (ref 0.8–1.5)
DIAGNOSIS, 93000: NORMAL
DIFFERENTIAL METHOD BLD: ABNORMAL
EOSINOPHIL # BLD: 0 K/UL (ref 0–0.8)
EOSINOPHIL NFR BLD: 0 % (ref 0.5–7.8)
ERYTHROCYTE [DISTWIDTH] IN BLOOD BY AUTOMATED COUNT: 17 % (ref 11.9–14.6)
GLOBULIN SER CALC-MCNC: 2.7 G/DL (ref 2.3–3.5)
GLUCOSE SERPL-MCNC: 116 MG/DL (ref 65–100)
HCT VFR BLD AUTO: 31.6 % (ref 41.1–50.3)
HGB BLD-MCNC: 9.4 G/DL (ref 13.6–17.2)
IMM GRANULOCYTES # BLD AUTO: 0.5 K/UL (ref 0–0.5)
IMM GRANULOCYTES NFR BLD AUTO: 3 % (ref 0–5)
LYMPHOCYTES # BLD: 0.8 K/UL (ref 0.5–4.6)
LYMPHOCYTES NFR BLD: 5 % (ref 13–44)
MAGNESIUM SERPL-MCNC: 2.2 MG/DL (ref 1.8–2.4)
MCH RBC QN AUTO: 28.8 PG (ref 26.1–32.9)
MCHC RBC AUTO-ENTMCNC: 29.7 G/DL (ref 31.4–35)
MCV RBC AUTO: 96.9 FL (ref 79.6–97.8)
MONOCYTES # BLD: 1.4 K/UL (ref 0.1–1.3)
MONOCYTES NFR BLD: 9 % (ref 4–12)
NEUTS SEG # BLD: 13.2 K/UL (ref 1.7–8.2)
NEUTS SEG NFR BLD: 83 % (ref 43–78)
NRBC # BLD: 0.1 K/UL (ref 0–0.2)
P-R INTERVAL, ECG05: 150 MS
PLATELET # BLD AUTO: 137 K/UL (ref 150–450)
PMV BLD AUTO: 11.5 FL (ref 9.4–12.3)
POTASSIUM SERPL-SCNC: 3.8 MMOL/L (ref 3.5–5.1)
PROT SERPL-MCNC: 5.3 G/DL (ref 6.3–8.2)
Q-T INTERVAL, ECG07: 416 MS
QRS DURATION, ECG06: 146 MS
QTC CALCULATION (BEZET), ECG08: 522 MS
RBC # BLD AUTO: 3.26 M/UL (ref 4.23–5.6)
SODIUM SERPL-SCNC: 149 MMOL/L (ref 138–145)
VENTRICULAR RATE, ECG03: 95 BPM
WBC # BLD AUTO: 15.8 K/UL (ref 4.3–11.1)

## 2022-04-05 PROCEDURE — 83735 ASSAY OF MAGNESIUM: CPT

## 2022-04-05 PROCEDURE — 85025 COMPLETE CBC W/AUTO DIFF WBC: CPT

## 2022-04-05 PROCEDURE — 3331090001 HH PPS REVENUE CREDIT

## 2022-04-05 PROCEDURE — 74011250637 HC RX REV CODE- 250/637: Performed by: NURSE PRACTITIONER

## 2022-04-05 PROCEDURE — 94660 CPAP INITIATION&MGMT: CPT

## 2022-04-05 PROCEDURE — APPSS45 APP SPLIT SHARED TIME 31-45 MINUTES: Performed by: NURSE PRACTITIONER

## 2022-04-05 PROCEDURE — C9113 INJ PANTOPRAZOLE SODIUM, VIA: HCPCS | Performed by: INTERNAL MEDICINE

## 2022-04-05 PROCEDURE — 74011000250 HC RX REV CODE- 250: Performed by: INTERNAL MEDICINE

## 2022-04-05 PROCEDURE — 74011250636 HC RX REV CODE- 250/636: Performed by: NURSE PRACTITIONER

## 2022-04-05 PROCEDURE — 74011250636 HC RX REV CODE- 250/636: Performed by: EMERGENCY MEDICINE

## 2022-04-05 PROCEDURE — 74011000258 HC RX REV CODE- 258: Performed by: NURSE PRACTITIONER

## 2022-04-05 PROCEDURE — 71045 X-RAY EXAM CHEST 1 VIEW: CPT

## 2022-04-05 PROCEDURE — 99232 SBSQ HOSP IP/OBS MODERATE 35: CPT | Performed by: INTERNAL MEDICINE

## 2022-04-05 PROCEDURE — 74011000258 HC RX REV CODE- 258: Performed by: INTERNAL MEDICINE

## 2022-04-05 PROCEDURE — 65610000001 HC ROOM ICU GENERAL

## 2022-04-05 PROCEDURE — 74011250637 HC RX REV CODE- 250/637: Performed by: INTERNAL MEDICINE

## 2022-04-05 PROCEDURE — 80053 COMPREHEN METABOLIC PANEL: CPT

## 2022-04-05 PROCEDURE — 3331090002 HH PPS REVENUE DEBIT

## 2022-04-05 PROCEDURE — 74011250636 HC RX REV CODE- 250/636: Performed by: INTERNAL MEDICINE

## 2022-04-05 PROCEDURE — 99233 SBSQ HOSP IP/OBS HIGH 50: CPT | Performed by: INTERNAL MEDICINE

## 2022-04-05 PROCEDURE — 93005 ELECTROCARDIOGRAM TRACING: CPT | Performed by: INTERNAL MEDICINE

## 2022-04-05 PROCEDURE — 94760 N-INVAS EAR/PLS OXIMETRY 1: CPT

## 2022-04-05 RX ORDER — LORAZEPAM 2 MG/ML
1 INJECTION INTRAMUSCULAR ONCE
Status: COMPLETED | OUTPATIENT
Start: 2022-04-06 | End: 2022-04-05

## 2022-04-05 RX ORDER — AMIODARONE HYDROCHLORIDE 200 MG/1
400 TABLET ORAL 2 TIMES DAILY
Status: CANCELLED | OUTPATIENT
Start: 2022-04-05

## 2022-04-05 RX ORDER — FUROSEMIDE 10 MG/ML
20 INJECTION INTRAMUSCULAR; INTRAVENOUS EVERY 12 HOURS
Status: DISCONTINUED | OUTPATIENT
Start: 2022-04-05 | End: 2022-04-06 | Stop reason: HOSPADM

## 2022-04-05 RX ADMIN — ONDANSETRON 4 MG: 2 INJECTION INTRAMUSCULAR; INTRAVENOUS at 17:20

## 2022-04-05 RX ADMIN — SODIUM CHLORIDE, PRESERVATIVE FREE 20 MG: 5 INJECTION INTRAVENOUS at 08:21

## 2022-04-05 RX ADMIN — HYDROMORPHONE HYDROCHLORIDE 1 MG: 1 INJECTION, SOLUTION INTRAMUSCULAR; INTRAVENOUS; SUBCUTANEOUS at 21:08

## 2022-04-05 RX ADMIN — PIPERACILLIN SODIUM,TAZOBACTAM SODIUM 4.5 G: 4; .5 INJECTION, POWDER, FOR SOLUTION INTRAVENOUS at 01:20

## 2022-04-05 RX ADMIN — DEXMEDETOMIDINE HYDROCHLORIDE 0.4 MCG/KG/HR: 100 INJECTION, SOLUTION INTRAVENOUS at 01:17

## 2022-04-05 RX ADMIN — LOPERAMIDE HYDROCHLORIDE 2 MG: 2 CAPSULE ORAL at 08:21

## 2022-04-05 RX ADMIN — SODIUM CHLORIDE, PRESERVATIVE FREE 20 MG: 5 INJECTION INTRAVENOUS at 21:07

## 2022-04-05 RX ADMIN — HYDROMORPHONE HYDROCHLORIDE 1 MG: 1 INJECTION, SOLUTION INTRAMUSCULAR; INTRAVENOUS; SUBCUTANEOUS at 12:25

## 2022-04-05 RX ADMIN — AMIODARONE HYDROCHLORIDE 400 MG: 200 TABLET ORAL at 17:13

## 2022-04-05 RX ADMIN — FUROSEMIDE 20 MG: 10 INJECTION, SOLUTION INTRAMUSCULAR; INTRAVENOUS at 05:19

## 2022-04-05 RX ADMIN — MIDODRINE HYDROCHLORIDE 5 MG: 5 TABLET ORAL at 21:08

## 2022-04-05 RX ADMIN — SODIUM CHLORIDE 40 MG: 9 INJECTION INTRAMUSCULAR; INTRAVENOUS; SUBCUTANEOUS at 08:21

## 2022-04-05 RX ADMIN — FUROSEMIDE 20 MG: 20 INJECTION, SOLUTION INTRAMUSCULAR; INTRAVENOUS at 21:07

## 2022-04-05 RX ADMIN — AMIODARONE HYDROCHLORIDE 400 MG: 200 TABLET ORAL at 08:21

## 2022-04-05 RX ADMIN — ENOXAPARIN SODIUM 40 MG: 40 INJECTION SUBCUTANEOUS at 21:07

## 2022-04-05 RX ADMIN — LOPERAMIDE HYDROCHLORIDE 2 MG: 2 CAPSULE ORAL at 21:08

## 2022-04-05 RX ADMIN — MIDODRINE HYDROCHLORIDE 5 MG: 5 TABLET ORAL at 08:21

## 2022-04-05 RX ADMIN — LORAZEPAM 1 MG: 2 INJECTION INTRAMUSCULAR at 23:11

## 2022-04-05 RX ADMIN — MIDODRINE HYDROCHLORIDE 5 MG: 5 TABLET ORAL at 17:13

## 2022-04-05 RX ADMIN — CEFTRIAXONE 2 G: 2 INJECTION, POWDER, FOR SOLUTION INTRAMUSCULAR; INTRAVENOUS at 11:08

## 2022-04-05 NOTE — INTERDISCIPLINARY ROUNDS
Interdisciplinary team rounds were held 4/5/2022 with the following team members:Care Management, Nursing, Nurse Practitioner, Palliative Care, Pastoral Care, Pharmacy, Physical Therapy, Physician, Respiratory Therapy and Clinical Coordinator and the patient. Plan of care discussed. See clinical pathway and/or care plan for interventions and desired outcomes.

## 2022-04-05 NOTE — PROGRESS NOTES
Comprehensive Nutrition Assessment    Type and Reason for Visit: Reassess  Tube Feeding Management (pulmonary)  TPN Management (oncology)    Nutrition Recommendations/Plan:    TPN to remain held at this time per discussion with Oncology Bertha Rao NP.  [address] Trophic TF to resume per if in line with goals of care.  Pt is now planning for home with hospice per f/u discussion with Bertha Rao NP. Hospice consult pending. Malnutrition Assessment:  Malnutrition Status: Severe malnutrition  Context: Chronic illness  Findings of clinical characteristics of malnutrition:   Energy Intake:  7 - 75% or less est energy requirements for 1 month or longer  Weight Loss:  7.0 - Greater than 7.5% over 3 months (34# (18.4%) )     Body Fat Loss:  1 - Mild body fat loss, Buccal region,Orbital,Triceps   Muscle Mass Loss:  1 - Mild muscle mass loss, Calf (gastrocnemius),Hand (interosseous),Scapula (trapezius),Temples (temporalis)  Fluid Accumulation:  No significant fluid accumulation,     Strength:  Not performed     Nutrition Assessment:   Nutrition History: Per RD assessment patient was able to maintain PO intake and UBW throughout most of chemo. During admmission early March patient had \"stopped eating\" due to nausea. Upon assessment this admission patient reports no PO of foods for ~4 weeks. States that he has been able to tolerate some fluids. He reports continued nausea and vomiting as primary barrier. Nutrition Background: Patient with pancreatic and esophageal cancer, Amos's esophagus, obesity, HTN, GERD, gastritis, ascites and peritoneal carcinomatosis s/p Plurex drain. He presented to oncology office extremely sick, BP was unable to be measured. He was admitted directly to [facility].   Nutrition Interval:  KUB: 4/2: FINDINGS: The enteric tube tip is in the region of the proximal intra-abdominal  portion of the stomach, in this patient with a hiatal hernia on the prior CT  chest. The distal side-port of the enteric tube is probably within the herniated  portion of the stomach. No free air is identified. Discussed with Rosemary Alvarez, Oncology NP and Abdelrahman Sifuentes RN. Pleurex to be drained today. Trophic TF to resume s/p drain. Abdominal Status (last documented): Diarrhea,Distended abdomen with Active  bowel sounds. Last BM 04/05/22. Pertinent Medications: rocephin, pepcid, lasix 20 mg/12 hrs, imodium, protonix, zosyn (d/c 4/5), sotorasib  Continuous: + precedex overnight  Pertinent Labs:   Lab Results   Component Value Date/Time    Sodium 149 (H) 04/05/2022 02:49 AM    Potassium 3.8 04/05/2022 02:49 AM    Chloride 113 (H) 04/05/2022 02:49 AM    CO2 31 04/05/2022 02:49 AM    Anion gap 5 (L) 04/05/2022 02:49 AM    Glucose 116 (H) 04/05/2022 02:49 AM    BUN 38 (H) 04/05/2022 02:49 AM    Creatinine 0.90 04/05/2022 02:49 AM    Calcium 8.1 (L) 04/05/2022 02:49 AM    Albumin 2.6 (L) 04/05/2022 02:49 AM    Magnesium 2.2 04/05/2022 02:49 AM    Phosphorus 2.9 04/04/2022 03:14 AM       Nutrition Related Findings:   Port in place, also with 1 PIV. TPN intiated 3/16. TPN increased in volume 3/19. TPN to be concentrated and volume decreased 3/22 per NP request due to new CHF. TPN changed to 18 hr cyclic infusion 6/24. TPN condensed to 16 hr infusion 3/29. TPN held 4/1 and 4/2. NGT place 4/2, trophic enteral feed started 4/3. SLP eval 4/4 clear liquids for pleasure only. Trophic TF held 4/4 d/t increased coughing 4/4. Trophic TF resuming 4/5. Current Nutrition Therapies:  DIET NPO  ADULT TUBE FEEDING Nasogastric; Peptide Based; Delivery Method: Continuous; Continuous Initial Rate (mL/hr): 10; Continuous Advance Tube Feeding: No; Water Flush Volume (mL): 30;  Water Flush Frequency: Q 4 hours  Current Tube Feeding (TF) Orders:   · Feeding Route: Nasogastric  · Formula: Peptide based  · Schedule:Continuous    · Regimen: 10ml/hr  · Additives/Modulars:  (none)  · Water Flushes: 30 ml/4 hour  · Current TF & Flush Orders Provides: to resume s/p pleurex drain  · Goal TF & Flush Orders Provides: Trophic enteral infusions may provide up to 500 kcal/day, not intended to meet estimated needs    Current Parenteral Nutrition Orders:  · Type and Formula: Dex 14%, 8% AA    · Lipids: 250ml,Daily  · Duration: Cyclic (16 hr infusion)  · Rate/Volume: 1.56 L (65ml/hr)  · Current PN Order Provides: held since 4/1  · Goal PN Orders Provides: 1742 kcal/d (100% of needs), 125 grams of protein/d (100% of needs), 218 grams of CHO/d and 1810 ml of total volume/d    Current Intake:   Average Meal Intake: NPO Average Supplement Intake: NPO      Anthropometric Measures:  Height: 5' 6\" (167.6 cm)  Current Body Wt: 75.7 kg (166 lb 14.2 oz) (4/5), Weight source: Bed scale  BMI: 26.9,  (current BMI skewed by fluid)  Admission Body Weight: 148 lb 9.4 oz  Ideal Body Weight (lbs) (Calculated): 142 lbs (65 kg), 104.6 %  Usual Body Wt: 82.6 kg (182 lb) (12/14/21 office wt and per previous history), Percent weight change: -18.4        Edema: LLE: 1+ (4/5/2022  7:30 AM)  LUE: 1+ (4/5/2022  7:30 AM)  RLE: 1+ (4/5/2022  7:30 AM)  RUE: 1+ (4/5/2022  7:30 AM)    Estimated Daily Nutrient Needs:  Energy (kcal/day): 0780-6895 (Kcal/kg (25-30), Weight Used: Current (67.4 kg (3/15))  Protein (g/day):  (1.3-1.5 g/kg) Weight Used: (Admission (67.3 kg))  Fluid (ml/day):   (1 ml/kcal)    Nutrition Diagnosis:   · Inadequate oral intake related to altered GI function,early satiety (peritoneal carcinomatosis ) as evidenced by  (poor oral tolerance, wt loss, carcinomatosis)    · Severe malnutrition related to catabolic illness as evidenced by  (malnutrition criteria as above)    · Inadequate protein-energy intake related to  (fluid status) as evidenced by  (TPN held secondary volume overload)    Nutrition Interventions:   Food and/or Nutrient Delivery: Continue NPO,Modify tube feeding     Coordination of Nutrition Care: Continue to monitor while inpatient    Goals:   Previous Goal Met: Progressing toward goal(s)  Active Goal: Resume nutrition regimen within 48 hours    Nutrition Monitoring and Evaluation:      Food/Nutrient Intake Outcomes: Diet advancement/tolerance,Enteral nutrition intake/tolerance  Physical Signs/Symptoms Outcomes: Biochemical data,GI status,Fluid status or edema,Weight    Discharge Planning:     Too soon to determine    John Pacheco RD, LDN   Contact: [phone]

## 2022-04-05 NOTE — PROGRESS NOTES
PT is transitioning to Hospice tomorrow morning. [facility] reviewed chart. PT was in bed with Friend at bedside. PT greeted [facility]. PT expressed he wanted to\" go home. \" PT stated he would leave at 8:30. Friend clarified that PT would leave tomorrow. [facility] spoke gently to PT. PT asked for prayer. PT is Restorationist. [facility] prayed with PT. [facility] offered PT and Friend additional support if needed. . Lilly Woodard M.Div.

## 2022-04-05 NOTE — PROGRESS NOTES
formerly [facility] Critical Care Note[de-identified] 4/5/2022  Laura Gonzalez  Admission Date: 3/15/2022     Length of Stay: 21 days    Background: .66 y.o. 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 He has an abdominal pleurex catheter in place that is being drained usually every other day. He was admitted with hypotension/volume depletion and failure to thrive. Moreno Ram has been on TPN for nutritional support. He has been treated with Folfirinox previously but now has disease progression so is now being treated with Gemcitabine/Abraxane which was started around 3/17. He had a chest CT done on 3/20 that showed some infiltrates with small effusions. His CXR has since worsened and his oxygen needs have increased.  He has edema and is getting lasix but his blood pressure has limited use.  He was recently on Vanc and Maxipeme for ? Infection but has been off antibiotics for several days.  He is afebrile and WBC is 18.  Echo this admission with EF of 35 to 40%    Notable PMH:  has a past medical history of Back pain, Chronic pain, CINV (chemotherapy-induced nausea and vomiting) (4/20/2021), Erectile dysfunction, Gastritis, GERD (gastroesophageal reflux disease), Hiatal hernia, History of anemia, Hypertension, Left bundle branch block (LBBB) on electrocardiogram (02/05/2021), Malignant neoplasm of body of pancreas ([address] Malignant neoplasm of esophagus ([address] (12/07/2020), Morbid obesity ([address] and Nausea. 24 Hour events:  Oxygen has been weaned. WBC up to 15.8K today. Mildly hypernatremic. Peritoneal catheter drainage will be performed today. He is confused. Remains on 80% airvo. Having diarrhea & had nausea with trophic TF yesterday. Fluid balance 914ml net negative yesterday. BP better. PT/OT following now. ROS: unable to obtain/negative except as listed elsewhere.      Lines: (insertion date)   [place]: (4/1)  abd Pleurex (3/22)  NGT    Drips: current dose (range)  None    Pertinent Exam:         Blood pressure 126/73, pulse 96, temperature 98.9 °F (37.2 °C), resp. rate 22, height 5' 6\" (1.676 m), weight 166 lb 14.2 oz (75.7 kg), SpO2 96 %. Intake/Output Summary (Last 24 hours) at 4/5/2022 0837  Last data filed at 4/5/2022 0830  Gross per 24 hour   Intake 395.95 ml   Output 1250 ml   Net -854.05 ml     Constitutional:  nad on airvo and nrb  EENMT:  Sclera clear, pupils equal, oral mucosa moist  Respiratory: crackles   Cardiovascular:  RRR  Gastrointestinal:  soft with no tenderness; positive bowel sounds present  Musculoskeletal:  warm with no cyanosis, 1+ lower extremity edema  Skin:  no jaundice or ecchymosis  Neurologic: lethargic but wakes up  Psychiatric: calm    CXR:       Recent Labs     04/05/22 0249 04/04/22  0314 04/03/22  0325   WBC 15.8* 8.0 10.0   HGB 9.4* 8.3* 8.7*   HCT 31.6* 27.3* 28.6*   * 120* 121*     Recent Labs     04/05/22  0249 04/04/22  0314 04/03/22  0325   * 148* 148*   K 3.8 3.4* 3.8   * 112* 111*   CO2 31 31 29   * 147* 124*   BUN 38* 36* 32*   CREA 0.90 0.80 0.70*   MG 2.2 2.2 2.2   CA 8.1* 8.3 8.4   PHOS  --  2.9  --    ALB 2.6* 2.7* 2.3*   AST 27 18 31   ALT 17 18 20   * 251* 339*     No results for input(s): LAC, TROPHS, BNPNT, CRP in the last 72 hours. No lab exists for component: ESR  No results for input(s): GLUCPOC in the last 72 hours. No lab exists for component: A1C  ECHO: Results from Hospital Encounter encounter on 03/15/22    ECHO ADULT FOLLOW-UP OR LIMITED    Interpretation Summary    Left Ventricle: Left ventricle is mildly dilated. Mildly increased wall thickness. Moderate global hypokinesis present. Moderately reduced left ventricular systolic function with a visually estimated EF of 30 - 35%. Abnormal diastolic function.   Mitral Valve: Mild transvalvular regurgitation.   Tricuspid Valve: Mildly elevated RVSP.   Left Atrium: Left atrium is mildly dilated.     Right Atrium: Right atrium is mildly dilated.   Pericardium: Left pleural effusion.   Contrast used: Definity. Results     Procedure Component Value Units Date/Time    MSSA/MRSA SC BY PCR, NASAL SWAB [[id number]]     Order Status: Sent Specimen: Nasal swab     CULTURE, RESPIRATORY/SPUTUM/BRONCH Catarina Sears STAIN [[id number]]  (Abnormal) Collected: 03/30/22 1742    Order Status: Completed Specimen: Sputum Updated: 04/03/22 0757     Special Requests: NO SPECIAL REQUESTS        GRAM STAIN       WBCS SEEN TOO NUMEROUS TO COUNT            NO EPITHELIAL CELLS SEEN         FEW GRAM NEGATIVE RODS         MODERATE YEAST         4+ MUCUS PRESENT        Culture result: HEAVY CANDIDA TROPICALIS               LIGHT NORMAL RESPIRATORY PANTERA          CULTURE, RESPIRATORY/SPUTUM/BRONCH [address] [[id number]] Collected: 03/30/22 0827    Order Status: Completed Specimen: Sputum Updated: 03/30/22 1417     Special Requests: NO SPECIAL REQUESTS        GRAM STAIN       GRAM STAIN EXAMINATION OF THIS SPECIMEN INDICATED OROPHARYNGEAL CONTAMINATION. PLEASE SUBMIT A NEW SPECIMEN OR NOTIFY THE MICRO DEPT WITHIN 48HRS IF FURTHER WORKUP IS DESIRED. Culture result:       Please reorder and recollect. RESULTS VERIFIED, PHONED TO AND READ BACK BY  BEN GERBER RN ON 03/30/22 @1412, ADS      MSSA/MRSA SC BY PCR, NASAL SWAB [[id number]] Collected: 03/25/22 1115    Order Status: Canceled Specimen: Nasal swab     C.  DIFFICILE AG & TOXIN A/B [[id number]] Collected: 03/25/22 0041    Order Status: Completed Specimen: Stool Updated: 03/25/22 1045     [address] [place] ANTIGEN       C. DIFFICILE GDH ANTIGEN-NEGATIVE           C. difficile toxin       C. DIFFICILE TOXIN-NEGATIVE           PCR Reflex NOT APPLICABLE        INTERPRETATION       NEGATIVE FOR TOXIGENIC C. DIFFICILE           Clinical Consideration       NEGATIVE FOR TOXIGENIC C. DIFFICILE              Inpat Anti-Infectives (From admission, onward)     Start     Ordered Stop    04/02/22 2300 vancomycin (VANCOCIN) 1,000 mg in 0.9% sodium chloride 250 mL ([id number])  1,000 mg,   IntraVENous,   EVERY 12 HOURS         04/02/22 1114 --    04/01/22 0900  piperacillin-tazobactam (ZOSYN) 4.5 g in 0.9% sodium chloride (MBP/ADV) 100 mL MBP  4.5 g,   IntraVENous,   EVERY 8 HOURS         04/01/22 0850 04/08/22 0959              Ventilator Settings:  Ideal body weight: 63.8 kg (140 lb 10.5 oz)   Mode FIO2 Rate Tidal Volume Pressure PEEP      85 %               Peak airway pressure:         Minute ventilation: 13 l/min  ABG:  Recent Labs     04/04/22  0310 04/03/22  0327   PHI 7.48* 7.42   PCO2I 36.8 42.5   PO2I 91 78   HCO3I 27.3* 27.7*     Assessment and Plan:  (Medical Decision Making)       Impression: 66 y.o. 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 He has an abdominal pleurex catheter in place that is being drained usually every other day. He was admitted with hypotension/volume depletion and failure to thrive. He has been treated with Folfirinox previously but now has disease progression so is now being treated with Gemcitabine/Abraxane which was started around 3/17. He had a chest CT done on 3/20 that showed some infiltrates with small effusions. His CXR has since worsened and his oxygen needs have increased.  He has edema and is getting lasix but his blood pressure has limited use.  He was recently on Vanc and Maxipeme for ? Infection but has been off antibiotics for several days.  He is afebrile and WBC is 18.  Echo this admission with EF of 35 to 40%.     NEURO:    now calm.    Analgesia: increased Norco yesterday per palliative care on 3/31.   Delirium:  Non-pharmacologic measures. CV:   Volume Status:-+ 4174 cc since admit  LV dysfunction - as above. Cardiology has seen. Hypotension: continue Midodrine  PULM:   Acute hypoxemic/hypercapneic respiratory failure:  weaning airvo.     Bilateral pulmonary infiltrates: suspect combination of pneumonia and heart failure. Needs 2 more days of GN coverage which is already ordered. Can d/c vanco.  Will de-escalate to ceftriaxone given hypernatremia and negative cultures. RENAL:  MICHAEL: NA  Electrolytes: supplementing K+ and phos  GI:   Nutrition: will resume trophic TF today. Malignant ascites:  Drain pleurex every other day and prn. Draining today  HEME:   Metastatic esophageal and pancreatic cancers - chemo started back now. + disease progression per oncology. Anemia: monitoring- now on empiric anticoagulation? Will change to prophylactic dose of lovenox unless there is clear sign of thromboembolic disease. Thrombocytopenia: monitor on chemo  Anticoagulation: doubt PE in setting of bilateral pulmonary infiltrates. ID:   Have restarted abx due to worsening hypoxia/increased infiltrates on CXR. ENDO:   DM: NA  Skin: no decub, turns, preventive care  Prophy: lovenox.     All providers- please address code status. Unlikely to survive resuscitative efforts in this condition and it is not clear patient understands this. Appreciate attention by oncology and pall care to this issue. Full Code    The patient is critically ill with respiratory failure, circulatory failure and requires high complexity decision making for assessment and support including frequent ventilator adjustment , frequent evaluation and titration of therapies , application of advanced monitoring technologies and extensive interpretation of multiple databases    Cumulative time devoted to patient care services by me for day of service is 33 mins.     Roe Staples MD

## 2022-04-05 NOTE — HOSPICE
[facility]    RN liaisons x2 (Moraima Daniel and Alee) spent 3 hours in ICU working toward getting Mr. Roderick Santiago home today per his wishes. Unfortunately this was not able to come together today. We explained to patient and his friend Norma Rosales at bedside that we are doing our best to make this a safe and comfortable transition to home. Patient explicitly stated multiple times throughout our multiple conversations that he wants to go home to die. He does NOT want to come back to the hospital and he does not want to die in the hospital.      DME is being delivered this evening to patient's home (hospital bed, overbed table, 10L oxygen concentrators x2) and [facility] will pick him up tomorrow morning at 8:30 am to go home. Patient signed hospice admission consents and DNR himself this afternoon. His daughter at bedside tried to stop him from signing a DNR and wanted him to be sent back to the hospital and put on life support if needed. Liaison and patient himself explained to daughter that this would be contradictory to his entire goal of getting out of the hospital and being able to die at home. Patient looked at his daughter and Manda Backer said \"Please let me go peacefully. \"  Patient then signed DNR form, daughter insisting \"He will be fine, he won't even need that. \"  This conversation witnessed by patient's friend Norma Rosales and patient's ICU nurse Patel Solo. Again, plan for discharge home with [facility] Wednesday at 8:30 am.  JACINTO Rangel aware.     Thank you for this referral,  Moraima Daniel, RN, BSN  [facility] liaison  [phone]

## 2022-04-05 NOTE — PROGRESS NOTES
Unfortunately, equipment can not arrive prior to needed time for d/c RN to be at home per [facility]. Ojrge Neri and Son to speak with pt. Explained reason and that we want to make sure he has what he needs to be comfortable. Antony Must, at bedside. Aware equipment will be delivered this evening though for am d/c and Jensen Macias aware per Jorge He. Assure pt we would try and make happen early in am. CM to follow.

## 2022-04-05 NOTE — PROGRESS NOTES
[facility]        Inpatient Hematology / Oncology Progress Note      Admission Date: 3/15/2022 10:28 AM  Reason for Admission/Hospital Course: FTT (failure to thrive) in adult [R62.7]  Hypotension [I95.9]      24 Hour Events:  Remains in ICU on Airvo  D3 Sotorasib today  No improvement in respiratory status  Friend at bedside   Requesting to be dsicharged    Transfusions: None  Replacements: None       ROS:  Constitutional: +fatigue. Negative for fever, chills, weakness, malaise. CV: Negative for chest pain, palpitations, edema. Respiratory: +dyspnea. Negative for cough, wheezing. GI: Negative for nausea, abdominal pain, diarrhea. 10 point review of systems is otherwise negative with the exception of the elements mentioned above in the HPI.          No Known Allergies    OBJECTIVE:  Patient Vitals for the past 8 hrs:   BP Temp Pulse Resp SpO2   22 1130 107/62 -- 95 (!) 33 98 %   22 1100 109/76 -- 96 27 97 %   22 1030 (!) 118/59 -- 99 (!) 33 97 %   22 1000 118/73 -- 92 25 97 %   22 0930 108/74 -- 93 21 96 %   22 0900 120/69 -- 94 22 94 %   22 0830 103/73 -- 92 20 98 %   22 0803 -- -- 96 22 96 %   22 0800 122/68 -- 95 24 96 %   22 0730 126/73 98.7 °F (37.1 °C) 99 24 100 %   22 0700 117/72 -- 95 22 100 %   22 0648 -- -- 93 25 100 %   22 0635 -- -- 94 19 100 %   22 0634 121/76 -- 96 25 100 %   22 0632 -- -- 94 25 100 %   22 0619 -- -- 95 27 99 %   22 0604 122/80 -- 94 21 100 %   22 0549 -- -- 92 23 100 %   22 0534 119/77 -- 89 24 100 %   22 0519 -- -- 86 21 100 %   22 0504 117/78 -- 86 25 100 %   22 0456 -- -- 85 24 100 %   22 0449 -- -- 84 23 100 %   22 0445 -- -- 83 21 99 %   22 0433 96/64 -- 84 25 100 %   22 0420 -- -- 79 18 98 %     Temp (24hrs), Av.7 °F (37.1 °C), Min:97.9 °F (36.6 °C), Max:99 °F (37.2 °C)     07 - 04/05 1900  In: 60   Out: -     Physical Exam:  Constitutional: Thin, cachectic elderly male in no acute distress, lying comfortably in the hospital bed. HEENT: Normocephalic and atraumatic. Oropharynx is clear, mucous membranes are moist. Extraocular muscles are intact. Sclerae anicteric. Neck supple without JVD. No thyromegaly present. Skin Warm and dry. No bruising and no rash noted. No erythema. No pallor. Respiratory +slightly coarse. Normal air exchange without accessory muscle use. CVS Normal rate, regular rhythm and normal S1 and S2. No murmurs, gallops, or rubs. Abdomen Soft, nontender and distended, normoactive bowel sounds. +Abd pleurx   Neuro Grossly nonfocal with no obvious sensory or motor deficits. MSK 2+ BLE edema. Normal range of motion in general.  No tenderness. Psych Appropriate mood and affect. Labs:      Recent Labs     04/05/22 0249 04/04/22 0314 04/03/22 0325   WBC 15.8* 8.0 10.0   RBC 3.26* 2.82* 2.97*   HGB 9.4* 8.3* 8.7*   HCT 31.6* 27.3* 28.6*   MCV 96.9 96.8 96.3   MCH 28.8 29.4 29.3   MCHC 29.7* 30.4* 30.4*   RDW 17.0* 16.7* 16.3*   * 120* 121*   GRANS 83* 85* 84*   LYMPH 5* 5* 4*   MONOS 9 5 5   EOS 0* 0* 0*   BASOS 0 0 0   IG 3 5 7*   DF AUTOMATED AUTOMATED AUTOMATED   ANEU 13.2* 6.8 8.4*   ABL 0.8 0.4* 0.4*   ABM 1.4* 0.4 0.5   JONNY 0.0 0.0 0.0   ABB 0.0 0.0 0.0   AIG 0.5 0.4 0.7*        Recent Labs     04/05/22 0249 04/04/22 0314 04/03/22 0325   * 148* 148*   K 3.8 3.4* 3.8   * 112* 111*   CO2 31 31 29   AGAP 5* 5* 8   * 147* 124*   BUN 38* 36* 32*   CREA 0.90 0.80 0.70*   GFRAA >60 >60 >60   GFRNA >60 >60 >60   CA 8.1* 8.3 8.4   * 251* 339*   TP 5.3* 5.3* 5.1*   ALB 2.6* 2.7* 2.3*   GLOB 2.7 2.6 2.8   AGRAT 1.0* 1.0* 0.8*   MG 2.2 2.2 2.2   PHOS  --  2.9  --          Imaging:  XR CHEST SNGL V [[id number]] Collected: 04/02/22 0412   Order Status: Completed Updated: 04/02/22 0433   Narrative:     EXAM: Chest x-ray. INDICATION: Dyspnea. COMPARISON: March 31, 2022. TECHNIQUE: Frontal view chest x-ray. FINDINGS: Diffuse bilateral lung infiltrates are mildly improved. No   pneumothorax or pleural effusion is seen. The heart size is stable. Again noted   is a left chest wall infusion port catheter. Impression:     Mildly improved bilateral lung infiltrates. XR CHEST Artelia Glassing [[id number]] Collected: 03/31/22 2133   Order Status: Completed Updated: 03/31/22 2136   Narrative:     EXAMINATION: One view chest     HISTORY: increase in shortness of breath     TECHNIQUE: Frontal chest.     COMPARISON: 3/30/2022     FINDINGS:     Stable left-sided MediPort. Persistent bilateral lung infiltrates. These have increased bilaterally   particularly on the right. There is no significant pneumothorax. There is no significant pleural effusion. The heart is unchanged. No other significant interval changes. Impression:       1. Findings as described above. Increased bilateral lung infiltrates. XR CHEST Artelia Glassing [[id number]] Collected: 03/30/22 0820   Order Status: Completed Updated: 03/30/22 0826   Narrative:     Exam: XR CHEST SNGL V on 3/30/2022 8:20 AM     Clinical History: The Male patient is 78years old  presenting for sob. Comparison:  Chest x-ray 3/24/2022     Findings:  Frontal view of the chest was obtained. Continued shallow inspiration with mild pulmonary edema and suggested left   basilar infiltrate and/or effusion. Left subclavian chest port in place.  The   cardiomediastinal silhouette is within normal limits.  There are no acute   osseous abnormalities. Impression:       1. Continued shallow inspiration with suspected left basilar infiltrate and/or   effusion as well as diffuse mild pulmonary edema.      CPT code(s) 00088          XR CHEST SNGL V [[id number]] Collected: 03/24/22 1158   Order Status: Completed Updated: 03/24/22 1201   Narrative:     PORTABLE CHEST 1 VIEW     HISTORY: Increasing oxygen needs. COMPARISON: 3/15/2022     FINDINGS: A chest port is present with catheter tip in the SVC. Lung volumes are diminished. Edema like interstitial densities are present in   the middle and lower lung zones. EKG leads are present. Impression:     Low lung volumes with bilateral infiltrates that may be caused by   edema or infection. Rosalio Oreilly CATH PLEURAL INDWELL [[id number]] Collected: 03/22/22 1614   Order Status: Completed Updated: 03/22/22 1652   Narrative:     Title: Dominant PleurX drain placement. Indication: Pancreatic cancer. Recurrent abdominal ascites.  Tunneled abdominal   PleurX drain catheter requested. : Senia Brennan PA-C     Supervising Physician: Lexi Fountain M.D. Consent:  Informed written and oral consent was obtained from the patient after   explanation of benefits and risks (including, but not limited to: Infection,   hemorrhage, visceral injury and pneumothorax).  The patient's questions were   answered to their satisfaction. The patient stated understanding and requested   that we proceed. Procedure:  With the patient supine, the abdomen was prepped and draped in the   standard fashion.  Lidocaine was administered for local field block.  Ultrasound   evaluation was performed. Using real-time ultrasound guidance, with appropriate   image recording, a Yueh needle was advanced into the ascites in the abdomen. Using fluoroscopy, the needle was exchanged over a wire for a peel-away sheath. The PleurX drain was brought through a subcutaneous tunnel and passed down the   peel-away sheath positioning the drain across the midline, lower and the pelvis. The skin incision was closed with absorbable suture.  The catheter was secured   with nonabsorbable suture. A total of 2250 cc of thin yellow fluid was removed.  Bandages were applied. Complications: Large-volume ascites.      Medications:  37 minutes based placed under moderate sedation was provided under   the direction and supervision of Carmen Schroeder M.D. using frontal and Versed. Continuous cardiopulmonary monitoring was provided by trained independent   observer present. Ancef was infused prior to the procedure. Contrast:  None. Radiation dose:   Fluoroscopy time: 18 seconds. Reference air kerma (mGy): 8   Kerma area product (cGy.cm2):  086   Fluoroscopic images: 1     Findings:  Large volume ascites. Plan: Bedrest for 1 hour. Recommend performing a drainage procedure daily or every-other-day for the next   two weeks in order to promote healing of the catheter site.        CT CHEST W CONT [[id number]] Collected: 03/20/22 1811   Order Status: Completed Updated: 03/20/22 1823   Narrative:     CT CHEST WITH CONTRAST DATED 3/20/2022. History: Echo with extra cardiac structure compressing the left atrium. Comparison: CT abdomen and pelvis with contrast 3/19/2022     Technique:   Multiple contiguous helical CT images reconstructed at 5 mm were   obtained from the neck base to the mid abdomen following the administration of   100 cc of Isovue 370 without acute complication. All CT scans performed at this   facility use one or all of the following: Automated exposure control, adjustment   of the mA and/or kVp according to patient's size, iterative reconstruction. Findings: The base of the neck is unremarkable in appearance. A left-sided venous port is   present. No lymphadenopathy is seen.  The thoracic aorta is normal in caliber. The heart is not clearly enlarged on the CT. Moderate to advanced   atherosclerotic calcification is seen of the coronary arteries. No significant   pericardial effusion is seen. The only abnormal space-occupying process adjacent   to the heart is a moderate size hiatal hernia. In addition to the stomach, there   is additional herniation of mesenteric fat as well as abdominal fluid.  These all   appear to anteriorly displace the heart. The esophagus proximal to the hernia   sac is fluid distended which could indicate reflux. Obstruction at the   gastroesophageal junction is felt to be less likely given the additional fluid   distention of the hernia sac. Evaluation with lung windows demonstrates a trace right, and small left   dependent pleural effusion. These do appear worsened from the prior examination. Nonspecific pulmonary infiltrates are otherwise seen. Lungs are expanded without   evidence for pneumothorax.  No acute osseous abnormality is seen. Mild anasarca   is seen of the chest wall. Limited evaluation of the upper abdomen demonstrate multiple findings which are   not significantly changed and better assessed on a dedicated contrasted CT scan   of the abdomen performed on 3/19/2022. Impression:     1.  The only abnormal space-occupying process adjacent to the heart is a   moderate size hiatal hernia. In addition to the stomach, there is additional   herniation of mesenteric fat as well as abdominal fluid. These all appear to   anteriorly displace the heart.       2. Worsening although trace to small bilateral dependent pleural effusions. Additional mild anasarca seen of the chest wall. These findings suggest fluid   overload. 3. Nonspecific pulmonary infiltrates. These could represent pulmonary edema   although the distribution is not classic. Additional allergies such as pneumonia   are not excluded if suggested by clinical findings. This report was made using voice transcription. Despite my best efforts to avoid   any, transcription errors may persist. If there is any question about the   accuracy of the report or need for clarification, then please call [phone], or text me through [email or web address] for clarification or correction. CT ABD PELV W CONT [[id number]] Collected: 03/19/22 1300   Order Status: Completed Updated: 03/20/22 1526   Addenda:        Addendum: Addendum: A request was made to assess for potential right   gluteal hematoma. There is appear to be edema in the right gluteal soft tissues   which is slightly more pronounced than on the left. No clearly demonstrated defined collection is seen to suggest significant hematoma. The most inferior   gluteal soft tissues have been excluded from the field of imaging. Signed: 03/20/22 1523 by Maria G Karimi MD   Narrative:     CT ABDOMEN AND PELVIS WITH INTRAVENOUS CONTRAST DATED 3/19/2022. History: Fall hitting bottom and back. Comparison: CT abdomen and pelvis with contrast 12/27/2021     Technique:   Multiple contiguous helical CT images reconstructed at 5 mm   intervals were obtained from above the diaphragms through the ischial   tuberosities following oral and 100 cc Isovue-370 without acute complication. All CT scans performed at this facility use one or all of the following:   Automated exposure control, adjustment of the mA and/or kVp according to   patient's size, iterative reconstruction. Findings:   CT ABDOMEN:     Limited evaluation of the lung bases and base of the mediastinum demonstrates   nonspecific infiltrates in the bilateral lung bases, left greater than right. A   small dependent left pleural effusion is seen. A small to moderate size hiatal   hernia is present. Ascites within the abdomen is also seen within the hernia   sac. The Liver is clearly cirrhotic in its appearance. Multiple hepatic masses are   seen the largest of which resides in the inferior right lobe measuring 3 cm in   size. The appearance is highly concerning for malignancy either representing   multifocal hepatomas, or hepatic metastases. Hepatic metastases are favored   given the appearance. Asymmetric intraperitoneal air ductal dilation is seen in   the left lobe of the liver which may be obstructed by a central hepatic mass   seen on axial image 23 measuring 1.1 cm in size. .  The spleen demonstrates an   irregular area of decreased attenuation in the superior pole seen on axial image   17 measuring 3.2 cm x 1.7 cm. This is not as well characterized. This does not   appear to represent an acute defect. This could result from flow artifact.  No   contour deforming or enhancing mass lesions are seen of the adrenal glands. The   pancreas is grossly unchanged in its appearance with the patient having a known   primary pancreas tumor described on the prior study. The gallbladder has been   removed.  The kidneys enhance symmetrically and no evidence of hydronephrosis is   seen. A stable benign cyst is seen in the posterior upper to midpole cortex of   the right kidney measuring 2.6 cm in size. The visualized loops of small bowel and colon are normal in caliber.  The   appendix appears to have been removed. Mild to moderate diverticulosis is seen   of the left colon. No free air is seen. Moderate ascites is seen which   demonstrates low attenuation suggesting simple ascites. Diffuse mesenteric edema   is seen. Abnormal peritoneal nodularity and caking is suggested with additional   peritoneal thickening which at times appears nodular. This is highly concerning   for peritoneal metastatic process which appears worsened from the prior   examination.  No evolving adenopathy is seen.  The abdominal aorta demonstrate   moderate atherosclerotic calcification. No acute osseous abnormality is seen. Compression deformities are seen at T12, L1, and L4 although these have a   chronic appearance and are stable from the prior comparison study. CT PELVIS:   Small to moderate ascites extends into the pelvis. There is once again nodular   peritoneal thickening best appreciated on axial image 73 highly concerning for   evolving peritoneal metastatic disease. There appears to be serosal involvement   of the mid sigmoid colon seen on axial image 74. No abnormal dilation is seen to   suggest significant obstruction at this time. Palmira Petra evolving pelvic adenopathy is   seen.  The urinary bladder is unremarkable. No acute osseous abnormality is   seen. Impression:     1.  No clear acute injury from recent fall. Specifically, no acute osseous   abnormality is seen. 2. Grossly stable appearance of pancreatic tumor although this is suboptimally   assessed on this exam. However, there is clear evidence for evolving metastatic   disease with new hepatic masses, and multiple findings as described above which   are highly concerning for evolving peritoneal metastatic disease. Lastly,   irregular decreased attenuation is seen in the superior pole of the spleen which   does not appear clearly acute and does not demonstrate adjacent complex ascites. This could represent an additional metastasis although is not as well   characterized. This report was made using voice transcription. Despite my best efforts to avoid   any, transcription errors may persist. If there is any question about the   accuracy of the report or need for clarification, then please call [phone], or text me through [email or web address] for clarification or correction.     MRI BRAIN W WO CONT [[id number]] Collected: 03/19/22 1649   Order Status: Completed Updated: 03/19/22 1657   Narrative:     EXAMINATION: BRAIN MRI 3/19/2022 4:47 PM     ACCESSION NUMBER: [id number]     INDICATION: 59-year-old male with fall, altered mental status and confusion. History of pancreatic cancer on chemotherapy with recent progression of   intra-abdominal disease, no prior imaging of brain. COMPARISON: CT head 3/19/2022. TECHNIQUE: Multiplanar multisequence MRI of the brain without and with   intravenous administration of 14 mL contrast agent. FINDINGS:     Previously described subcentimeter focus along the posterior limb of the right   internal capsule follows CSF signal intensity on all sequences and is favored to   represent a prominent perivascular space.      There is a mild degree of scattered and confluent foci of T2/FLAIR   hyperintensity within the periventricular and deep white matter, nonspecific but   most commonly seen with chronic small vessel ischemic changes. Faint chronic   hemosiderin involving the left posterior putamen. No midline shift or mass lesion. There is no evidence of intracranial hemorrhage   or acute infarct. The ventricles are within normal limits in size and   configuration. There are no extra-axial fluid collections present.  No diffusion   weighted signal abnormality is identified. There is no abnormal enhancement. Impression:       No acute finding or evidence of intracranial metastatic disease. XR ELBOW LT MIN 3 V [[id number]] Collected: 03/19/22 1534   Order Status: Completed Updated: 03/19/22 1538   Narrative:     XR FOREARM LT AP/LAT, XR ELBOW LT MIN 3 V 3/19/2022 3:19 PM     HISTORY: Fall with left arm and left elbow pain. Impression:       Two views left forearm. No fracture or dislocation. No significant soft tissue   swelling. Old healed distal ulnar fracture deformity is present. Three views left elbow. No fracture or dislocation. No significant soft tissue   swelling. No fat pad elevation. XR FOREARM LT AP/LAT [[id number]] Collected: 03/19/22 1534   Order Status: Completed Updated: 03/19/22 1538   Narrative:     XR FOREARM LT AP/LAT, XR ELBOW LT MIN 3 V 3/19/2022 3:19 PM     HISTORY: Fall with left arm and left elbow pain. Impression:       Two views left forearm. No fracture or dislocation. No significant soft tissue   swelling. Old healed distal ulnar fracture deformity is present. Three views left elbow. No fracture or dislocation. No significant soft tissue   swelling. No fat pad elevation. CT HEAD WO CONT [[id number]] Collected: 03/19/22 1254   Order Status: Completed Updated: 03/19/22 1258   Narrative:     CT BRAIN WITHOUT CONTRAST   3/19/2022 12:26 PM     INDICATION: Fall, altered mental status and confusion.      COMPARISON: None available at this [facility] PACS     Technique:  Multiple contiguous axial images are obtained encompassing the brain   from the skull base to the vertex.  For this CT scanner at least one of the   following techniques is utilized to decrease patient radiation dose: Automatic   exposure control, KVP and mA modulation based on patient weight, and iterative   reconstruction. FINDINGS: The ventricles are midline and of appropriate size and configuration. Mild hypoattenuating foci seen in the periventricular deep white matter. Inferiorly at the junction of the thalamus and posterior limb of the internal   capsule on the right there is a rounded mildly hypodense focus that measures 9   mm. The midline structures and posterior fossa are intact.  There is no finding of   an acute cortical stroke, mass lesion, or acute bleed.  No extra-axial fluid   collection. The skull is intact, no discreet abnormality. The paranasal sinuses are not   remarkable. The visualized orbits and mastoid air-cells are patent. Impression:     9 mm rounded hypoattenuating focus on the right at the inferior   junction of the thalamus and posterior limb of the internal capsule is noted. Suspicious for lacunar infarct and of uncertain chronicity-cannot exclude that   this is new. Elsewhere only mild chronic changes in the periventricular white matter   suggested. For further imaging evaluation of this as clinically indicated-recommend brain   MRI with diffusion imaging. XR CHEST Anice Master [[id number]] Collected: 03/15/22 1259   Order Status: Completed Updated: 03/15/22 1302   Narrative:     Chest X-ray     INDICATION: Hypotension. COMPARISON: Chest x-ray 2/10/2021. A portable AP view of the chest was obtained. FINDINGS: The lungs are clear. There are no infiltrates or effusions. Left chest   port is unchanged in position. No pneumothorax.  The heart size is normal.  The   bony thorax is intact.      Impression:     No acute findings in the chest. Lungs remain clear. No interval   change.         Medications:  Current Facility-Administered Medications   Medication Dose Route Frequency    cefTRIAXone (ROCEPHIN) 2 g in 0.9% sodium chloride (MBP/ADV) 50 mL MBP  2 g IntraVENous Q24H    furosemide (LASIX) injection 20 mg  20 mg IntraVENous Q12H    enoxaparin (LOVENOX) injection 40 mg  40 mg SubCUTAneous Q24H    famotidine (PF) (PEPCID) 20 mg in 0.9% sodium chloride 10 mL injection  20 mg IntraVENous Q12H    pantoprazole (PROTONIX) 40 mg in 0.9% sodium chloride 10 mL injection  40 mg IntraVENous DAILY    dexmedeTOMidine in 0.9 % NaCl (PRECEDEX) 400 mcg/100 mL (4 mcg/mL) infusion soln  0.1-1.5 mcg/kg/hr IntraVENous TITRATE    sotorasib (Lumakras) tab 960 mg  (Patient Supplied)  960 mg Per NG tube DAILY    midodrine (PROAMATINE) tablet 5 mg  5 mg Oral TID    HYDROmorphone (DILAUDID) injection 1 mg  1 mg IntraVENous Q4H PRN    HYDROmorphone (DILAUDID) injection 0.5 mg  0.5 mg IntraVENous Q4H PRN    naloxone (NARCAN) injection 0.04 mg  0.04 mg IntraVENous PRN    potassium chloride (K-DUR, KLOR-CON M20) SR tablet 20 mEq  20 mEq Oral DAILY    loperamide (IMODIUM) capsule 2 mg  2 mg Oral Q12H    HYDROcodone-acetaminophen (NORCO)  mg tablet 1 Tablet  1 Tablet Oral Q4H PRN    calcium carbonate (TUMS) chewable tablet 200 mg [elemental]  200 mg Oral TID PRN    alum-mag hydroxide-simeth (MYLANTA) oral suspension 30 mL  30 mL Oral Q4H PRN    prochlorperazine (COMPAZINE) with saline injection 5 mg  5 mg IntraVENous Q6H PRN    acetaminophen (TYLENOL) tablet 650 mg  650 mg Oral Q6H PRN    diphenhydrAMINE (BENADRYL) capsule 25 mg  25 mg Oral Q6H PRN    amiodarone (CORDARONE) tablet 400 mg  400 mg Oral BID    [Held by provider] fat emulsion 20% (LIPOSYN, INTRAlipid) infusion 250 mL  250 mL IntraVENous QPM    NUTRITIONAL SUPPORT ELECTROLYTE PRN ORDERS   Does Not Apply PRN    sodium chloride (NS) flush 5-10 mL  5-10 mL IntraVENous PRN  diphenoxylate-atropine (LOMOTIL) tablet 1 Tablet  1 Tablet Oral QID PRN    promethazine (PHENERGAN) tablet 25 mg  25 mg Oral Q6H PRN    tamsulosin (FLOMAX) capsule 0.4 mg  0.4 mg Oral DAILY    ondansetron (ZOFRAN) injection 4 mg  4 mg IntraVENous Q4H PRN         ASSESSMENT:    Problem List  Date Reviewed: 3/15/2022          Codes Class Noted    Acute respiratory failure with hypoxia [facility]) ICD-10-CM: J96.01  ICD-9-CM: 518.81  4/1/2022        Bilateral pulmonary infiltrates on chest x-ray ICD-10-CM: R91.8  ICD-9-CM: 793.19  4/1/2022        Chronic systolic congestive heart failure (HCC) (Chronic) ICD-10-CM: I50.22  ICD-9-CM: 428.22, 428.0  3/21/2022        Bradycardia ICD-10-CM: R00.1  ICD-9-CM: 427.89  3/19/2022        LBBB (left bundle branch block) ICD-10-CM: I44.7  ICD-9-CM: 426.3  3/19/2022        FTT (failure to thrive) in adult ICD-10-CM: R62.7  ICD-9-CM: 783.7  3/15/2022        * (Principal) Hypotension ICD-10-CM: I95.9  ICD-9-CM: 458.9  3/15/2022        Severe protein[facility]calorie malnutrition ([facility] ICD-10-CM: E43  ICD-9-CM: 195  3/4/2022        Pancreatic cancer ([facility] ICD-10-CM: C25.9  ICD-9-CM: 157.9  3/2/2022        Ascites ICD-10-CM: R18.8  ICD-9-CM: 789.59  3/2/2022        Admission for antineoplastic chemotherapy ICD-10-CM: Z51.11  ICD-9-CM: V58.11  3/2/2022        Hypomagnesemia ICD-10-CM: E83.42  ICD-9-CM: 275.2  4/23/2021        Hypokalemia ICD-10-CM: E87.6  ICD-9-CM: 276.8  4/20/2021        CINV (chemotherapy-induced nausea and vomiting) ICD-10-CM: R11.2, T45.1X5A  ICD-9-CM: 787.01, E933.1  4/20/2021        Dehydration ICD-10-CM: E86.0  ICD-9-CM: 276.51  4/12/2021        Other neutropenia ([facility] ICD-10-CM: D70.8  ICD-9-CM: 288.09  3/9/2021        Malignant neoplasm of lower third of esophagus ([facility] ICD-10-CM: C15.5  ICD-9-CM: 150.5  1/5/2021        Malignant neoplasm of body of pancreas ([facility] ICD-10-CM: C25.1  ICD-9-CM: 157.1  1/5/2021        Severe obesity ([facility] (Chronic) ICD-10-CM: E66.01  ICD-9-CM: 278.01  12/10/2020        Elevated glucose ICD-10-CM: R73.09  ICD-9-CM: 790.29  7/22/2019        Anemia ICD-10-CM: D64.9  ICD-9-CM: 285.9  7/22/2019        Chronic left-sided low back pain (Chronic) ICD-10-CM: M54.50, G89.29  ICD-9-CM: 724.2, 338.29  7/22/2019              78 y.o. M pancreatic cancer and esophageal cancer with malignant ascites of previously controlled FOLFIRINOX but currently disease progressed and most recently admited on 3/2/2022 to place Pleurx and arrange gemcitabine/Abraxane, had extensive discussion with inpatient team and pharmacy, patient was discharged on 3/7/2022 without chemo appointment and return to office on 3/15/2022, extremely sick and blood pressure not measurable in the office, and not been having much oral intake since discharge, urgently establish IV access and called EMS to take to ER to stabilize and admit to hospital, apparently needed much supportive care and volume resuscitation, start TPN until oral intake can improve and give gemcitabine/Abraxane once clinically stable, discussed with the inpatient team.    PLAN:    Metastatic pancreatic cancer / distal esophageal cancer  - Metastatic disease involving peritoneum, malignant ascites  - s/p 24 cycles of FOLFIRINOX with SD and recent POD with increasing CEA/ and malignant ascites  - Plan for gemcitabine/abraxane when clinically stable - hopefully tomorrow. 3/17 [place]/abraxane today, tolerated well  3/24 D8 due today, deferring tx d/t cytopenias. Dr. Sumi Fajardo discussed that if patient's condition does not improve, that he may want to consider Hospice services. Palliative care following. 3/25 Pt wants to con't to pursue treatment as he wants to change his relationship with his children. He does not feel the burden of tx outweighs the benefits and wants to continue tx for as long as he can. D15 due 3/31.  3/29 TM checked, CEA up but  improved. 3/30 Office working to obtain sotorasib.  D15 gem/abraxane due tomorrow. 3/31 Notes from office indicate sotorasib approved. [place]/abraxane due today - plts <100k. Hold today and re-eval tomorrow. 4/1 Holding chemo for acute issues - office working on sotorasib  4/2 Sotorasib planned to be delivered today   4/3 Pt had NGT placed and discussed with pharmacy and Sotorasib has instructions to make into a liquid, however pt did not receive yesterday. Discussed extensively with ICU RN and pt to receive this ASAP today. 4/4 Started Sotorasib yesterday. Dr Daya Wallace would like another round of [place]/abraxane when/if able. 4/5 Continues sotorasib. Malignant ascites  - Has abdominal Pleurx, drain three times weekly  3/17 Pleurx drained 3/16 - 1100cc out. 3/18 Pleurx accidentally dislodged last night - 1800cc removed before completely removed. IR notified and will re-evaluate Monday. 3/20 Worsening abdominal pain possibly secondary to re-accumulation, some drainage reported at Pleurx site. IR evaluating tomorrow. 3/21 IR to re-evaluate tomorrow, was not NPO today. 3/22 To IR today. 3/23 Abd pleurx replaced yesterday  3/25 Pleurx drained yesterday, 1.1L. Con't to drain prn.  3/26 drained today as he was feeling uncomfortable. Monitor blood pressures. 3/27 continue to drain PRN, but be cautious with blood pressures as he is hypotensive at times. 3/29 BP much improved - drained 1100 cc yesterday due to patient request. Previously draining every other day but asking to be drained daily. Will continue to drain PRN as long as BP acceptable. 3/30 Abdominal discomfort - requesting to be drained again today. BP stable. 3/31 Pleurx drained again today per pt request.  4/1 Holding on further draining of Pleurx given hypotension - drain every other day (or PRN). 4/3 Draining pleur-x today. Cancer related pain  - Continue home dilaudid  3/17 Utilizing IV morphine.  Will decrease dosing and encourage use of home Norco.  3/18 Continue to wean IV meds - encourage PO.  3/20 Has not been receiving IV morphine due to hypotension. Continue with PRN oral medications. 3/22 Consult to [facility] regarding [address], symptom management. 3/23 PC following. Pt changed code status to DNR, wants to pursue further cancer-related treatment. 3/25 PC following  3/27 Dilaudid increased today. During rounds, he stated his pain was better controlled with increased dose. 3/29 Continues on IV dilaudid. 3/31 IV dilaudid use limited by BP. Increased Norco to 10mg yesterday. PC following. Hypotension / poor PO intake / protein-calorie malnutrition  - Consult RD for TPN  - BC pending, started on Cefe/Vanc and may be able to de-escalate soon as hypotension likely secondary to dehydration. LA improved after IFV. 3/17 On TPN. Continues on Cefe/Vanc although infectious work-up unremarkable. Will stop antibiotics. 3/18 Hypotension improved and remains afebrile off antibiotics. Continue with nutritional support via TPN.  3/19 Ongoing hypotension, although improved yesterday AM, worse through evening. Had 6L of IVF yesterday and continues on TPN. May consider midodrine although MAP consistently ~65.  3/21 Hypotension improved but borderline, continues TPN.   3/22 Discussed with RD volume status of TPN given new finding of HFrEF and CT chest with evidence of fluid overload. CM following for home TPN. Holding IV morphine. 3/23 BPs improved  3/24 Episode of hypotension last PM, required small fluid bolus, BP improved  3/25 Continues on TPN, however pt declined to be attached to TPN yesterday AM and then disconnected himself from it last night. 3/27 per RD note, patient unaware about prior TPN disconnections. This AM during rounds, visitor noted that \"something was leaking. \" He had pulled his port needle out again and his TPN was leaking onto the floor. RD plans to discuss further goals regarding TPN with pt when she visits him today. 2/73 Cyclic TPN to start today per RD.  Encouraged PO intake and he agrees. 3/29 Reports he drank an Ensure but is scared to eat because of diarrhea. Will manage diarrhea. 3/31 Continues on TPN - PO intake remains minimal due to respiratory issues. 4/1 Hold TPN given volume overload  4/2 continue to hold TPN through the weekend   4/4 On TF - intermittent cough/nausea and holding for now. Nausea  - Continue PRN antiemetics    Diarrhea  - Antidiarrheals PRN  3/24 c/o worsening diarrhea. Check Cdiff.   3/25 Cdiff neg. Con't using antidiarrheals prn.  3/28 Reports diarrhea - only 1 documented stool. Continue PRN antidiarrheals. 3/29 Reports he is afraid to eat because of diarrhea. Will schedule imodium TID.   3/31 Change to BID. Bradycardia / HFrEF / LV dysfunction / LBBB / ectopy  - One documented episode of HR 43 - check EKG  - No hx of hypertension, monitor  3/17 EKG with bi-geminal PACs and known LBBB. Recheck EGK.  3/18 EKG with same as above. Palpated pulse on several occasions documented in the 40s. Tele applied overnight. Cardiology following. 3/19 Cardiology recommending K>4 (on TPN) and Mg replacements. Echo pending. 3/21 No bradycardia noted on telemetry. Does have ectopy which may have led to incorrect pulse rate palpation. Echo with EF 35-40%. CT chest completed due to echo findings rebeka noted hiatal hernia/mesenteric fat herniation/abdominal fluid displacing heart and small BL effusions, anasarca. Cardiology following.  3/22 Cardiology considering BB if BP improve for HF/LV dysfunction. Weight up 6# overnight and increased since admission. Clinically no evidence of overload but asking RD to adjust TPN volume. Reported run of NS-SVT - cardiology following. 3/25 Cards following. Hypotension limiting therapy for heart failure. 3/26 cards signed off and recommended cont Amiodarone   3/30 BP improved, CXR shows pulmonary edema and possible effusion. 20mg Lasix given this morning. Will add albumin and diurese as able pending blood pressure tolerance.   3/31 BP dropped yesterday before albumin but improved throughout night. Up to 7L NC. Will continue albumin and give lasix today. Start midodrine. 4/1 Received 40mg lasix yesterday/PM. Ongoing albumin - D2. Continues on midodrine but borderline BP making diuresis difficult. Developed worsening respiratory distress overnight. 4/2 Continues on Midodrine. BP fluctuating, a bit better. Repeat echo. 4/3 Repeat echo with EF 30-35%, was 35-40% on 3/20/22. 4/4 Continues midodrine - BPs improved    Pancytopenia secondary to chemotherapy  - Transfuse prn per [facility] SOPs  3/25 ANC down to 1000. Start G-CSF.  3/26 [address] improved to 2.6.   3/28 WBC up to 20 - stop G-CSF.   3/29 Plts up to 71k. 4/2 Plts stable at 110k    Fall  3/19 Witnessed slip and fall on wet floor yesterday. Today with L arm bruising - check X-ray. Will check CT head and CT AP (hematoma/lipoma palpated on R buttock) and also c/o worsening abdominal pain. 3/20 X-ray of L elbow/forearm negative. CT head with ?lacunar infarct but MRI showed no acute findings and ?lacunar infarct appears to be prominent perivascular space. CT AP with progressing peritoneal/liver disease and ?new splenic lesion. Last imaging obtained 12/2021 for comparison. Elevated LFTs  3/21 Monitor, possibly secondary to TPN or recent chemo. 3/22 Appear to be improving  3/23 AST improved, ALP increased  3/28 Improving  RESOLVED    Hypoxic respiratory failure  / ?Pneumonia / CHF exacerbation / fluid overload  3/24 On O2 @ 2L now. Check CXR.  3/25 CXR with low lung volumes with b/l infiltrates ?edema vs infx? Check PCT - elevated. Start Cef/Vanc.  3/26 continues on cef/vanc; on room air   3/28 D4 Cefepime/Vancomycin - afebrile. DC Vancomycin. 3/29 Continue Cefepime - likley DC soon. 3/31 DC Cefe. No clinical evidence of pneumonia, afebrile. CXR findings likely pulmonary edema. Continue diuresis/albumin. 4/1 Moved to ICU this AM after increasing O2 needs/respriatory effort.  On BiPAP. Minimal diuresis due to low BP. Repeat lasix this AM and continue diuresis/albumin as BP tolerates. Pulmonology following - restarted Cefe and added Vancomycin after CXR showed worsening infiltrates and adding steroids for possible pneumonitis given recent chemo. Patient changed code status back to full code. 4/2 Remains in ICU on BIPAP. On Vanc/Zosyn. Pulm added solu-medrol for ? Pneumonitis. Receiving Lasix every 8 hours. Defer further albumin if needed to critical care team, albumin level improved from previous. Start therapeutic dose Lovenox as previously unable to rule out PE.      4/3 Now on HFNC. Lasix continues TID. On Vanc/Zosyn/Solumedrol. 4/4 Continues on HFNC - diuresing as able with hypotension. Continues on Zosyn per pulmonology. 4/5 On Airvo, continues on lasix as able. Now on CTX per pulmonology. Agitation/Delirium  4/2 On Precedex drip   4/3 Agitation better off of BiPAP, now on HF NC  4/4 Off Precedex, on HF NC  4/5 More alert/interactive today, calm. Goals of care - Extensive discussion with patient and friend Eddie Hutchins (who Mr Bailee Pearson frequently defers decisions to). Explained DNR vs full code. After lengthy discussion he seems to be agreeable to DNR at this time but will think about this further. He indicates that he is not getting better and feels like he will never make it out of the hospital. He says he would like to die at home. He is requesting discharge. We discussed that with his current medical needs this isn't feasible. We then revisited hospice (which was briefly discussed last week) and he is agreeable to learning more about hospice services - consult placed. Continue home meds  [facility] SOPs  Lovenox (hold for plts <50k)    Goals and plan of care reviewed with the patient. All questions answered to the best of our ability. Disposition:  TBD pending clinical course.               Jacob Curran, [address] Hematology & Oncology  [address] [facility]  Office : [phone]  Fax : [phone]         Attending Addendum:  I have personally performed a face to face diagnostic evaluation on this patient. I have reviewed and agree with the care plan as documented above by  Iqra Villalpando N.P.  My findings are as follows: Patient appears stable, heart rate regular without murmurs, abdomen is non-tender, bowel sounds are positive. 79-year-old  male patient with history of metastatic pancreatic cancer as well as a second distal esophageal cancer, now status post gemcitabine/Abraxane along with Sotorasib. Has abdominal Pleurx drain. Pain management as needed. In ICU given respiratory failure with hypoxia, possible pneumonia and CHF exacerbation. Significant debility. Goals of care discussed in detail today w patient and friend at bedside including code status.  They will think over this, and also discuss w remaining family members.                 Gal Womack MD  [facility] Hematology/Oncology  [address] [address]  Office : [phone]  Fax : [phone]

## 2022-04-05 NOTE — PROGRESS NOTES
Spoke with Mr. Teetee Otto who is currently alert and oriented. He has spoke with MD regarding prognosis. He wants to go home \"today\" with home hospice. Roberth Jacskon, at bedside. States he has spoke with son, Trenton Dupree, his POA, and agrees with POC. He would like [facility] if they can make happen today. Spoke with Jenni with intake at [facility]. She will reach out and discuss with liaison. CM following.

## 2022-04-05 NOTE — PROGRESS NOTES
Spoke with RT, Bernice Cruz, he will work to see if pt can tolerate optimizer with 10L for home hospice. Pt begging CM to go home. \"please\". Explained that we want to make sure he can tolerate oxygen for transport and home. He understands, but still asking, \"please\".

## 2022-04-05 NOTE — PROGRESS NOTES
Palliative Care Progress Note    Patient: Thuan Hazelor MRN: [id number]  SSN: [id number]    YOB: 1955  Age: 78 y.o. Sex: male       Assessment/Plan:     Chief Complaint/Interval History: remains on airvo. Notes dyspnea     Principal Diagnosis:    · Dyspnea  R06.00    Additional Diagnoses:   · Ascites  R18.8  · Cough  R05  · Failure to Thrive  R62.7  · Pain, abdomen  R10.9  · Encounter for Palliative Care  Z51.5    Palliative Performance Scale (PPS)       Medical Decision Making:   Reviewed and summarized notes from last palliative care visit to present. Discussed case with appropriate providers: ICU IDT rounds, RN Faviola Barajas  Reviewed lab and X-ray data from last palliative care visit to present. Pt resting in bed. Didn't want to speak much as he states he gets short of breath with conversation. Pt friend at bedside. Reviewed ongoing care and explained potential for continued decline. We discussed code status and reviewed pt not likely to survive a code. Pt has started new chemo and wishes to continue treatment and remain FULL code at this time    Will continue to follow. Addendum:  Oncology has spoken with pt. Pt now wishing for DNR, hospice services. Will sign off but remain available if needed. Will discuss findings with members of the interdisciplinary team.         More than 50% of this 35 minute visit was spent counseling and coordination of care as outlined above. Subjective:     Review of Systems:  A comprehensive review of systems was negative except as noted above. Objective:     Visit Vitals  /62   Pulse 95   Temp 98.7 °F (37.1 °C)   Resp (!) 33   Ht 5' 6\" (1.676 m)   Wt 166 lb 14.2 oz (75.7 kg)   SpO2 98%   BMI 26.94 kg/m²       Physical Exam:    General:  Cooperative. Debilitated. Eyes:  Conjunctivae/corneas clear    Nose: Nares normal. Septum midline.    Neck: Supple, symmetrical, trachea midline   Lungs:   Coarse bilaterally, unlabored   Heart: Regular rate and rhythm   Abdomen:   Soft, mildly tender, non-distended. Pleurx catheter   Extremities: Normal, atraumatic, no cyanosis.  BLE edema   Skin: Skin color, texture, turgor normal.    Neurologic: Nonfocal   Psych: Lethargic but oriented     Signed By: Lakia Lew MD     April 5, 2022

## 2022-04-05 NOTE — PROGRESS NOTES
Bedside and verbal shift change report received from  Nena Ramirez (offgoing nurse). Report included the following information SBAR, Kardex, ED Summary, Intake/Output, MAR, Recent Results, Cardiac Rhythm sinus tachy and Quality Measures.      Dual skin assessment completed at bedside    Dual verification of gtts completed (name of gtts verified): N/A

## 2022-04-05 NOTE — PROGRESS NOTES
[facility] here at bedside. Awaiting MD acceptance. Staff continues to work with pt for comfort level with O2 needs. Packet for transport completed and on will call with [facility] for 4pm pending accepting MD. CM following.

## 2022-04-06 ENCOUNTER — HOME CARE VISIT (OUTPATIENT)
Dept: HOME HEALTH SERVICES | Facility: HOME HEALTH | Age: 67
End: 2022-04-06
Payer: MEDICARE

## 2022-04-06 ENCOUNTER — HOME CARE VISIT (OUTPATIENT)
Dept: HOSPICE | Facility: HOSPICE | Age: 67
End: 2022-04-06

## 2022-04-06 VITALS
DIASTOLIC BLOOD PRESSURE: 78 MMHG | HEIGHT: 66 IN | RESPIRATION RATE: 34 BRPM | OXYGEN SATURATION: 96 % | BODY MASS INDEX: 25.65 KG/M2 | SYSTOLIC BLOOD PRESSURE: 107 MMHG | TEMPERATURE: 99.3 F | WEIGHT: 159.61 LBS | HEART RATE: 110 BPM

## 2022-04-06 LAB
ALBUMIN SERPL-MCNC: 2.8 G/DL (ref 3.2–4.6)
ALBUMIN/GLOB SERPL: 0.9 {RATIO} (ref 1.2–3.5)
ALP SERPL-CCNC: 247 U/L (ref 50–136)
ALT SERPL-CCNC: 21 U/L (ref 12–65)
ANION GAP SERPL CALC-SCNC: 11 MMOL/L (ref 7–16)
AST SERPL-CCNC: 26 U/L (ref 15–37)
BASOPHILS # BLD: 0.1 K/UL (ref 0–0.2)
BASOPHILS NFR BLD: 0 % (ref 0–2)
BILIRUB SERPL-MCNC: 0.8 MG/DL (ref 0.2–1.1)
BUN SERPL-MCNC: 42 MG/DL (ref 8–23)
CALCIUM SERPL-MCNC: 8.6 MG/DL (ref 8.3–10.4)
CHLORIDE SERPL-SCNC: 115 MMOL/L (ref 98–107)
CO2 SERPL-SCNC: 25 MMOL/L (ref 21–32)
CREAT SERPL-MCNC: 1.1 MG/DL (ref 0.8–1.5)
DIFFERENTIAL METHOD BLD: ABNORMAL
EOSINOPHIL # BLD: 0 K/UL (ref 0–0.8)
EOSINOPHIL NFR BLD: 0 % (ref 0.5–7.8)
ERYTHROCYTE [DISTWIDTH] IN BLOOD BY AUTOMATED COUNT: 17.7 % (ref 11.9–14.6)
GLOBULIN SER CALC-MCNC: 3.2 G/DL (ref 2.3–3.5)
GLUCOSE SERPL-MCNC: 122 MG/DL (ref 65–100)
HCT VFR BLD AUTO: 37.9 % (ref 41.1–50.3)
HGB BLD-MCNC: 11.1 G/DL (ref 13.6–17.2)
IMM GRANULOCYTES # BLD AUTO: 0.6 K/UL (ref 0–0.5)
IMM GRANULOCYTES NFR BLD AUTO: 3 % (ref 0–5)
LYMPHOCYTES # BLD: 0.8 K/UL (ref 0.5–4.6)
LYMPHOCYTES NFR BLD: 4 % (ref 13–44)
MAGNESIUM SERPL-MCNC: 2.4 MG/DL (ref 1.8–2.4)
MCH RBC QN AUTO: 29 PG (ref 26.1–32.9)
MCHC RBC AUTO-ENTMCNC: 29.3 G/DL (ref 31.4–35)
MCV RBC AUTO: 99 FL (ref 79.6–97.8)
MONOCYTES # BLD: 1.6 K/UL (ref 0.1–1.3)
MONOCYTES NFR BLD: 8 % (ref 4–12)
NEUTS SEG # BLD: 16.8 K/UL (ref 1.7–8.2)
NEUTS SEG NFR BLD: 85 % (ref 43–78)
NRBC # BLD: 0.32 K/UL (ref 0–0.2)
PHOSPHATE SERPL-MCNC: 3 MG/DL (ref 2.3–3.7)
PLATELET # BLD AUTO: 218 K/UL (ref 150–450)
PMV BLD AUTO: 11.5 FL (ref 9.4–12.3)
POTASSIUM SERPL-SCNC: 3.5 MMOL/L (ref 3.5–5.1)
PROT SERPL-MCNC: 6 G/DL (ref 6.3–8.2)
RBC # BLD AUTO: 3.83 M/UL (ref 4.23–5.6)
SODIUM SERPL-SCNC: 151 MMOL/L (ref 138–145)
WBC # BLD AUTO: 19.9 K/UL (ref 4.3–11.1)

## 2022-04-06 PROCEDURE — 3331090002 HH PPS REVENUE DEBIT

## 2022-04-06 PROCEDURE — 83735 ASSAY OF MAGNESIUM: CPT

## 2022-04-06 PROCEDURE — 74011250636 HC RX REV CODE- 250/636: Performed by: NURSE PRACTITIONER

## 2022-04-06 PROCEDURE — 84100 ASSAY OF PHOSPHORUS: CPT

## 2022-04-06 PROCEDURE — 36415 COLL VENOUS BLD VENIPUNCTURE: CPT

## 2022-04-06 PROCEDURE — 74011250636 HC RX REV CODE- 250/636: Performed by: EMERGENCY MEDICINE

## 2022-04-06 PROCEDURE — 80053 COMPREHEN METABOLIC PANEL: CPT

## 2022-04-06 PROCEDURE — 77010033711 HC HIGH FLOW OXYGEN

## 2022-04-06 PROCEDURE — 85025 COMPLETE CBC W/AUTO DIFF WBC: CPT

## 2022-04-06 PROCEDURE — 94760 N-INVAS EAR/PLS OXIMETRY 1: CPT

## 2022-04-06 PROCEDURE — 3331090001 HH PPS REVENUE CREDIT

## 2022-04-06 RX ORDER — MORPHINE SULFATE 20 MG/ML
10 SOLUTION ORAL
Qty: 30 ML | Refills: 0 | Status: SHIPPED | OUTPATIENT
Start: 2022-04-06 | End: 2022-04-11

## 2022-04-06 RX ORDER — MORPHINE SULFATE 20 MG/ML
10 SOLUTION ORAL EVERY 6 HOURS
Qty: 10 ML | Refills: 0 | Status: SHIPPED | OUTPATIENT
Start: 2022-04-06 | End: 2022-04-11

## 2022-04-06 RX ORDER — ONDANSETRON 8 MG/1
8 TABLET, ORALLY DISINTEGRATING ORAL
Qty: 30 TABLET | Refills: 0 | Status: SHIPPED | OUTPATIENT
Start: 2022-04-06

## 2022-04-06 RX ORDER — MORPHINE SULFATE 2 MG/ML
2 INJECTION, SOLUTION INTRAMUSCULAR; INTRAVENOUS
Status: DISCONTINUED | OUTPATIENT
Start: 2022-04-06 | End: 2022-04-06 | Stop reason: HOSPADM

## 2022-04-06 RX ADMIN — HYDROMORPHONE HYDROCHLORIDE 0.5 MG: 1 INJECTION, SOLUTION INTRAMUSCULAR; INTRAVENOUS; SUBCUTANEOUS at 03:53

## 2022-04-06 RX ADMIN — MORPHINE SULFATE 2 MG: 2 INJECTION, SOLUTION INTRAMUSCULAR; INTRAVENOUS at 01:10

## 2022-04-06 NOTE — PROGRESS NOTES
Pt discharged with EMS with [facility]. Pt discharged on 15L NRB; SAT 95-96. Family and friends aware of risks of transport but state these are pt wishes. Friend currently at bedside with plans to follow ambulance; son at home awaiting pt. Friend sent home with pt belongings.

## 2022-04-06 NOTE — PROGRESS NOTES
Problem: Falls - Risk of  Goal: *Absence of Falls  Description: Document Debbie Issa Fall Risk and appropriate interventions in the flowsheet.   Outcome: Resolved/Met     Problem: Pain  Goal: *Control of Pain  Outcome: Resolved/Met  Goal: *PALLIATIVE CARE:  Alleviation of Pain  Outcome: Resolved/Met     Problem: Patient Education: Go to Patient Education Activity  Goal: Patient/Family Education  Outcome: Resolved/Met     Problem: Nausea/Vomiting (Adult)  Goal: *Absence of nausea/vomiting  Outcome: Resolved/Met     Problem: Patient Education: Go to Patient Education Activity  Goal: Patient/Family Education  Outcome: Resolved/Met

## 2022-04-06 NOTE — DISCHARGE SUMMARY
[facility]: Inpatient Hematology / Oncology Discharge Summary Note    Patient ID:  Jr Weaver  [id number]  20 y.o.  1955    Admit Date: 3/15/2022    Discharge Date: 4/6/2022    Admission Diagnoses: FTT (failure to thrive) in adult [R62.7]  Hypotension [I95.9]    Discharge Diagnoses:  Principal Diagnosis: Hypotension  Principal Problem:    Hypotension (3/15/2022)    Active Problems:    Malignant neoplasm of body of pancreas ([address] (1/5/2021)      Severe protein-calorie malnutrition ([address] (3/4/2022)      FTT (failure to thrive) in adult (3/15/2022)      Bradycardia (3/19/2022)      LBBB (left bundle branch block) (3/19/2022)      Chronic systolic congestive heart failure ([address] (3/21/2022)      Acute respiratory failure with hypoxia ([address] (4/1/2022)      Bilateral pulmonary infiltrates on chest x-ray (4/1/2022)        Hospital Course: In summary, 78year old male patient of Dr Adriel Siddiqi with metastatic pancreatic cancer and esophageal cancer, s/p FOLFIRINOX with good control of disease until recent disease progression (worsening peritoneal mets as well as malignant ascites). He was recently admitted for abdominal Pleurx placement and tried to arrange inpatient gem/abraxane but instead arranged as outpatient and patient discharged. However, upon follow-up with Dr Adriel Siddiqi on day of admission (1 week after previous DC) he was noted to be very ill, extremely hypotensive. He had been draining anywhere from 4-10L of ascitic fluid daily and had very poor PO intake. He was admitted for inpatient chemotherapy and TPN. While admitted, he was started on TPN and received D1C1 gemcitabine/abraxane 3/17/22. He tolerated this well but developed cytopenias that precluded D8 and D15 therapy and caused this to be deferred. 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 While admitted he was evaluated by cardiology for ectopy and bradycardia. It was noted that he had an EF for 35-40% which was unable to be optimally managed due to ongoing hypotension. He continued on TPN. Unfortunately, several days before gem/abraxane due he developed progressive respiratory distress and had to be moved to ICU for BiPAP. Albumin was given and attempted diuresis as tolerated by BP. Continued broad-spectrum antibiotics as CXR showed edema vs pneumonia. He continued to require significant needs with no improvement in respiratory status. Given underlying uncurable/stage IV malignancy, CHF, need for ongoing nutrition and clearly not tolerating TPN volume and not a feeding tube candidate given ascites, discussion had with patient and he expressed his wishes to go home and die peacefully at home. He agreed to conversation with hospice and ultimately elected to go home with [facility]. He is now ready to be discharged home with hospice services. Patient discharged prior to attending physician/team rounding on patient - patient not seen today.      See full details of lengthy admission below:    HPI: 78 y.o. M pancreatic cancer and esophageal cancer with malignant ascites of previously controlled FOLFIRINOX but currently disease progressed and most recently admited on 3/2/2022 to place Pleurx and arrange gemcitabine/Abraxane, had extensive discussion with inpatient team and pharmacy, patient was discharged on 3/7/2022 without chemo appointment and return to office on 3/15/2022, extremely sick and blood pressure not measurable in the office, and not been having much oral intake since discharge, urgently establish IV access and called EMS to take to ER to stabilize and admit to hospital, apparently needed much supportive care and volume resuscitation, start TPN until oral intake can improve and give gemcitabine/Abraxane once clinically stable, discussed with the inpatient team.     PLAN:     Metastatic pancreatic cancer / distal esophageal cancer  - Metastatic disease involving peritoneum, malignant ascites  - s/p 24 cycles of FOLFIRINOX with SD and recent POD with increasing CEA/ and malignant ascites  - Plan for gemcitabine/abraxane when clinically stable - hopefully tomorrow. 3/17 [place]/abraxane today, tolerated well  3/24 D8 due today, deferring tx d/t cytopenias. Dr. Kendrick Hemphill discussed that if patient's condition does not improve, that he may want to consider Hospice services. Palliative care following. 3/25 Pt wants to con't to pursue treatment as he wants to change his relationship with his children. He does not feel the burden of tx outweighs the benefits and wants to continue tx for as long as he can. D15 due 3/31.  3/29 TM checked, CEA up but  improved. 3/30 Office working to obtain sotorasib. D15 gem/abraxane due tomorrow. 3/31 Notes from office indicate sotorasib approved. [place]/abraxane due today - plts <100k. Hold today and re-eval tomorrow. 4/1 Holding chemo for acute issues - office working on sotorasib  4/2 Sotorasib planned to be delivered today   4/3 Pt had NGT placed and discussed with pharmacy and Sotorasib has instructions to make into a liquid, however pt did not receive yesterday. Discussed extensively with ICU RN and pt to receive this ASAP today. 4/4 Started Sotorasib yesterday. Dr Raymundo Florence would like another round of [place]/abraxane when/if able. 4/5 Continues sotorasib.     Malignant ascites  - Has abdominal Pleurx, drain three times weekly  3/17 Pleurx drained 3/16 - 1100cc out. 3/18 Pleurx accidentally dislodged last night - 1800cc removed before completely removed. IR notified and will re-evaluate Monday. 3/20 Worsening abdominal pain possibly secondary to re-accumulation, some drainage reported at Pleurx site. IR evaluating tomorrow. 3/21 IR to re-evaluate tomorrow, was not NPO today. 3/22 To IR today. 3/23 Abd pleurx replaced yesterday  3/25 Pleurx drained yesterday, 1.1L.   Con't to drain prn.  3/26 drained today as he was feeling uncomfortable. Monitor blood pressures. 3/27 continue to drain PRN, but be cautious with blood pressures as he is hypotensive at times. 3/29 BP much improved - drained 1100 cc yesterday due to patient request. Previously draining every other day but asking to be drained daily. Will continue to drain PRN as long as BP acceptable. 3/30 Abdominal discomfort - requesting to be drained again today. BP stable. 3/31 Pleurx drained again today per pt request.  4/1 Holding on further draining of Pleurx given hypotension - drain every other day (or PRN). 4/3 Draining pleur-x today.       Cancer related pain  - Continue home dilaudid  3/17 Utilizing IV morphine. Will decrease dosing and encourage use of home Norco.  3/18 Continue to wean IV meds - encourage PO.  3/20 Has not been receiving IV morphine due to hypotension. Continue with PRN oral medications. 3/22 Consult to [facility] regarding [address], symptom management. 3/23 PC following. Pt changed code status to DNR, wants to pursue further cancer-related treatment. 3/25 PC following  3/27 Dilaudid increased today. During rounds, he stated his pain was better controlled with increased dose. 3/29 Continues on IV dilaudid. 3/31 IV dilaudid use limited by BP. Increased Norco to 10mg yesterday. PC following.     Hypotension / poor PO intake / protein-calorie malnutrition  - Consult RD for TPN  - BC pending, started on Cefe/Vanc and may be able to de-escalate soon as hypotension likely secondary to dehydration. LA improved after IFV. 3/17 On TPN. Continues on Cefe/Vanc although infectious work-up unremarkable. Will stop antibiotics. 3/18 Hypotension improved and remains afebrile off antibiotics. Continue with nutritional support via TPN.  3/19 Ongoing hypotension, although improved yesterday AM, worse through evening. Had 6L of IVF yesterday and continues on TPN. May consider midodrine although MAP consistently ~65.  3/21 Hypotension improved but borderline, continues TPN. 3/22 Discussed with RD volume status of TPN given new finding of HFrEF and CT chest with evidence of fluid overload. CM following for home TPN. Holding IV morphine. 3/23 BPs improved  3/24 Episode of hypotension last PM, required small fluid bolus, BP improved  3/25 Continues on TPN, however pt declined to be attached to TPN yesterday AM and then disconnected himself from it last night. 3/27 per RD note, patient unaware about prior TPN disconnections. This AM during rounds, visitor noted that \"something was leaking. \" He had pulled his port needle out again and his TPN was leaking onto the floor. RD plans to discuss further goals regarding TPN with pt when she visits him today. 0/46 Cyclic TPN to start today per RD. Encouraged PO intake and he agrees. 3/29 Reports he drank an Ensure but is scared to eat because of diarrhea. Will manage diarrhea. 3/31 Continues on TPN - PO intake remains minimal due to respiratory issues. 4/1 Hold TPN given volume overload  4/2 continue to hold TPN through the weekend   4/4 On TF - intermittent cough/nausea and holding for now.     Nausea  - Continue PRN antiemetics     Diarrhea  - Antidiarrheals PRN  3/24 c/o worsening diarrhea. Check Cdiff.   3/25 Cdiff neg. Con't using antidiarrheals prn.  3/28 Reports diarrhea - only 1 documented stool. Continue PRN antidiarrheals. 3/29 Reports he is afraid to eat because of diarrhea. Will schedule imodium TID.   3/31 Change to BID.     Bradycardia / HFrEF / LV dysfunction / LBBB / ectopy  - One documented episode of HR 43 - check EKG  - No hx of hypertension, monitor  3/17 EKG with bi-geminal PACs and known LBBB. Recheck EGK.  3/18 EKG with same as above. Palpated pulse on several occasions documented in the 40s. Tele applied overnight. Cardiology following. 3/19 Cardiology recommending K>4 (on TPN) and Mg replacements. Echo pending. 3/21 No bradycardia noted on telemetry.  Does have ectopy which may have led to incorrect pulse rate palpation. Echo with EF 35-40%. CT chest completed due to echo findings rebeka noted hiatal hernia/mesenteric fat herniation/abdominal fluid displacing heart and small BL effusions, anasarca. Cardiology following.  3/22 Cardiology considering BB if BP improve for HF/LV dysfunction. Weight up 6# overnight and increased since admission. Clinically no evidence of overload but asking RD to adjust TPN volume. Reported run of NS-SVT - cardiology following. 3/25 Cards following. Hypotension limiting therapy for heart failure. 3/26 cards signed off and recommended cont Amiodarone   3/30 BP improved, CXR shows pulmonary edema and possible effusion. 20mg Lasix given this morning. Will add albumin and diurese as able pending blood pressure tolerance. 3/31 BP dropped yesterday before albumin but improved throughout night. Up to 7L NC. Will continue albumin and give lasix today. Start midodrine. 4/1 Received 40mg lasix yesterday/PM. Ongoing albumin - D2. Continues on midodrine but borderline BP making diuresis difficult. Developed worsening respiratory distress overnight. 4/2 Continues on Midodrine. BP fluctuating, a bit better. Repeat echo. 4/3 Repeat echo with EF 30-35%, was 35-40% on 3/20/22. 4/4 Continues midodrine - BPs improved     Pancytopenia secondary to chemotherapy  - Transfuse prn per [facility] SOPs  3/25 ANC down to 1000. Start G-CSF.  3/26 [address] improved to 2.6.   3/28 WBC up to 20 - stop G-CSF.   3/29 Plts up to 71k. 4/2 Plts stable at 110k     Fall  3/19 Witnessed slip and fall on wet floor yesterday. Today with L arm bruising - check X-ray. Will check CT head and CT AP (hematoma/lipoma palpated on R buttock) and also c/o worsening abdominal pain. 3/20 X-ray of L elbow/forearm negative. CT head with ?lacunar infarct but MRI showed no acute findings and ?lacunar infarct appears to be prominent perivascular space. CT AP with progressing peritoneal/liver disease and ?new splenic lesion.  Last imaging obtained 12/2021 for comparison.     Elevated LFTs  3/21 Monitor, possibly secondary to TPN or recent chemo. 3/22 Appear to be improving  3/23 AST improved, ALP increased  3/28 Improving  RESOLVED     Hypoxic respiratory failure  / ?Pneumonia / CHF exacerbation / fluid overload  3/24 On O2 @ 2L now. Check CXR.  3/25 CXR with low lung volumes with b/l infiltrates ?edema vs infx? Check PCT - elevated. Start Cef/Vanc.  3/26 continues on cef/vanc; on room air   3/28 D4 Cefepime/Vancomycin - afebrile. DC Vancomycin. 3/29 Continue Cefepime - likley DC soon. 3/31 DC Cefe. No clinical evidence of pneumonia, afebrile. CXR findings likely pulmonary edema. Continue diuresis/albumin. 4/1 Moved to ICU this AM after increasing O2 needs/respriatory effort. On BiPAP. Minimal diuresis due to low BP. Repeat lasix this AM and continue diuresis/albumin as BP tolerates. Pulmonology following - restarted Cefe and added Vancomycin after CXR showed worsening infiltrates and adding steroids for possible pneumonitis given recent chemo. Patient changed code status back to full code. 4/2 Remains in ICU on BIPAP. On Vanc/Zosyn. Pulm added solu-medrol for ? Pneumonitis. Receiving Lasix every 8 hours. Defer further albumin if needed to critical care team, albumin level improved from previous. Start therapeutic dose Lovenox as previously unable to rule out PE.      4/3 Now on HFNC. Lasix continues TID. On Vanc/Zosyn/Solumedrol. 4/4 Continues on HFNC - diuresing as able with hypotension. Continues on Zosyn per pulmonology. 4/5 On Airvo, continues on lasix as able. Now on CTX per pulmonology.     Agitation/Delirium  4/2 On Precedex drip   4/3 Agitation better off of BiPAP, now on HF NC  4/4 Off Precedex, on HF NC  4/5 More alert/interactive today, calm.     Goals of care 4/5/22 - Extensive discussion with patient and friend Ronda Cm (who Mr Ligia Moore frequently defers decisions to). Explained DNR vs full code.  After lengthy discussion he seems to be agreeable to DNR at this time but will think about this further. He indicates that he is not getting better and feels like he will never make it out of the hospital. He says he would like to die at home. He is requesting discharge. We discussed that with his current medical needs this isn't feasible. We then revisited hospice (which was briefly discussed last week) and he is agreeable to learning more about hospice services - consult placed. Consults:  IP CONSULT TO CARDIOLOGY  IP CONSULT TO INTERVENTIONAL RADIOLOGY  IP CONSULT TO PALLIATIVE CARE - PROVIDER    Pertinent Diagnostic Studies:   Labs:    Recent Labs     04/06/22 0351 04/05/22 0249 04/04/22 0314   WBC 19.9* 15.8* 8.0   HGB 11.1* 9.4* 8.3*    137* 120*   ANEU 16.8* 13.2* 6.8      Recent Labs     04/06/22 0351 04/05/22 0249 04/04/22 0314   * 149* 148*   K 3.5 3.8 3.4*   * 113* 112*   CO2 25 31 31   * 116* 147*   BUN 42* 38* 36*   CREA 1.10 0.90 0.80   CA 8.6 8.1* 8.3   * 266* 251*   TP 6.0* 5.3* 5.3*   ALB 2.8* 2.6* 2.7*   MG 2.4 2.2 2.2   PHOS 3.0  --  2.9         Current Discharge Medication List      START taking these medications    Details   !! morphine (ROXANOL) 100 mg/5 mL (20 mg/mL) concentrated solution Take 0.5 mL by mouth every six (6) hours for 5 days. Max Daily Amount: 40 mg. Take 0.5 mL (10mg) by mouth or sublingual every 6 hours for 7 days. Indications: hospice / end of life  Qty: 10 mL, Refills: 0  Start date: 4/6/2022, End date: 4/11/2022    Associated Diagnoses: End of life care      ondansetron (ZOFRAN ODT) 8 mg disintegrating tablet Take 1 Tablet by mouth every eight (8) hours as needed for Nausea or Vomiting.  Indications: hospice / end of life  Qty: 30 Tablet, Refills: 0  Start date: 4/6/2022    Associated Diagnoses: End of life care      !! morphine (ROXANOL) 100 mg/5 mL (20 mg/mL) concentrated solution Take 0.5 mL by mouth every two (2) hours as needed for Pain or Shortness of Breath for up to 5 days. Max Daily Amount: 120 mg. Indications: hospice / end of life  Qty: 30 mL, Refills: 0  Start date: 4/6/2022, End date: 4/11/2022    Associated Diagnoses: End of life care       !! - Potential duplicate medications found. Please discuss with provider. CONTINUE these medications which have NOT CHANGED    Details   promethazine (PHENERGAN) 25 mg tablet Take 1 Tablet by mouth every six (6) hours as needed for Nausea. Qty: 30 Tablet, Refills: 1    Associated Diagnoses: Nausea      ondansetron hcl (Zofran) 8 mg tablet Take 1 Tablet by mouth every eight (8) hours as needed for Nausea. Indications: nausea and vomiting caused by cancer drugs  Qty: 60 Tablet, Refills: 3    Associated Diagnoses: CINV (chemotherapy-induced nausea and vomiting)      diphenoxylate-atropine (LomotiL) 2.5-0.025 mg per tablet Take 1 Tablet by mouth four (4) times daily as needed for Diarrhea. Max Daily Amount: 4 Tablets. Qty: 90 Tablet, Refills: 1    Associated Diagnoses: Malignant neoplasm of lower third of esophagus ([address] Diarrhea, unspecified type      !! DISABLED PLACARD (DISABLED PLACARD) DMV Use as directed for poor ambulation  Qty: 1 Each, Refills: 0    Associated Diagnoses: Poor tolerance for ambulation; Malignant neoplasm of lower third of esophagus ([address] Malignant neoplasm of body of pancreas (HCC)      prochlorperazine (Compazine) 10 mg tablet Take 1 Tablet by mouth every six (6) hours as needed for Nausea. Indications: nausea and vomiting caused by cancer drugs, nausea and vomiting  Qty: 90 Tablet, Refills: 3    Associated Diagnoses: CINV (chemotherapy-induced nausea and vomiting)      !! DISABLED PLACARD (DISABLED PLACARD) DMV Handicap Placard, for poor ambuation. Qty: 1 Each, Refills: 0    Associated Diagnoses: Poor tolerance for ambulation       !! - Potential duplicate medications found. Please discuss with provider.       STOP taking these medications       sotorasib 120 mg tab Comments: Reason for Stopping:         HYDROmorphone (DILAUDID) 4 mg tablet Comments:   Reason for Stopping:         tamsulosin (FLOMAX) 0.4 mg capsule Comments:   Reason for Stopping:         lactulose (CHRONULAC) 10 gram/15 mL solution Comments:   Reason for Stopping:         sildenafil citrate (Viagra) 100 mg tablet Comments:   Reason for Stopping:         omeprazole (PRILOSEC) 40 mg capsule Comments:   Reason for Stopping:         famotidine (PEPCID) 20 mg tablet Comments:   Reason for Stopping:         potassium chloride (K-DUR, KLOR-CON) 20 mEq tablet Comments:   Reason for Stopping:         naproxen sodium (ALEVE) 220 mg cap Comments:   Reason for Stopping:                 OBJECTIVE:  Patient Vitals for the past 8 hrs:   BP Temp Pulse Resp SpO2 Weight   04/06/22 0723 -- -- -- -- (!) 86 % --   04/06/22 0703 97/66 -- (!) 111 (!) 31 (!) 86 % --   04/06/22 0642 -- -- (!) 111 29 (!) 86 % --   04/06/22 0634 93/72 -- (!) 109 28 (!) 86 % --   04/06/22 0631 -- -- (!) 109 28 (!) 86 % --   04/06/22 0626 -- -- (!) 111 27 (!) 85 % --   04/06/22 0623 -- -- (!) 110 29 (!) 85 % --   04/06/22 0622 -- -- (!) 110 28 (!) 85 % --   04/06/22 0615 -- -- (!) 109 28 (!) 85 % --   04/06/22 0614 -- -- (!) 110 28 (!) 85 % --   04/06/22 0604 91/63 -- (!) 109 28 (!) 85 % --   04/06/22 0555 -- -- (!) 109 26 (!) 85 % --   04/06/22 0554 -- -- (!) 109 (!) 39 (!) 86 % --   04/06/22 0544 -- -- (!) 108 28 (!) 86 % --   04/06/22 0539 -- -- (!) 109 26 (!) 86 % --   04/06/22 0534 94/66 -- (!) 109 26 (!) 86 % --   04/06/22 0519 -- -- (!) 108 (!) 32 (!) 89 % --   04/06/22 0504 94/67 -- (!) 107 24 (!) 89 % --   04/06/22 0449 -- -- (!) 107 27 (!) 88 % --   04/06/22 0437 -- -- (!) 108 26 (!) 89 % --   04/06/22 0434 91/63 -- (!) 107 24 (!) 88 % --   04/06/22 0431 -- -- (!) 107 25 (!) 88 % --   04/06/22 0426 -- -- (!) 108 26 (!) 88 % --   04/06/22 0423 -- -- (!) 108 29 (!) 88 % --   04/06/22 0420 -- -- (!) 106 26 (!) 88 % --   04/06/22 0416 -- -- (!) 106 25 (!) 87 % --   22 0412 -- -- (!) 106 24 (!) 87 % --   22 0402 -- -- (!) 107 26 (!) 87 % --   22 0401 93/62 -- (!) 107 (!) 31 (!) 88 % --   22 0346 -- -- (!) 110 (!) 32 (!) 86 % --   22 0331 114/80 -- (!) 109 (!) 33 (!) 88 % --   22 0316 -- -- (!) 110 (!) 35 (!) 88 % --   22 0301 120/79 99.2 °F (37.3 °C) (!) 110 30 (!) 89 % --   22 0246 -- -- (!) 110 29 90 % --   22 0232 119/ -- (!) 110 -- 90 % --   22 -- -- (!) 109 28 90 % --   22 -- -- (!) 109 27 91 % --   22 -- -- (!) 111 28 90 % --   22 -- -- (!) 110 (!) 32 (!) 88 % --   22 0205 -- -- (!) 110 24 90 % --   22 -- -- (!) 109 27 90 % --   22 119/ -- (!) 110 29 90 % --   22 -- -- (!) 109 27 92 % --   22 014 -- -- (!) 108 28 92 % --   22 0129 118/78 -- (!) 109 28 90 % --   22 0114 -- -- (!) 109 28 91 % --   22 0059 114/77 -- (!) 109 27 90 % --   22 0044 -- -- (!) 112 -- (!) 89 % --   22 0029 112/69 -- (!) 111 30 (!) 88 % --   22 0014 -- -- (!) 108 -- 90 % --   22 0011 -- -- -- -- -- 159 lb 9.8 oz (72.4 kg)   22 2359 99/63 -- (!) 105 23 (!) 89 % --     Temp (24hrs), Av.2 °F (37.3 °C), Min:98.5 °F (36.9 °C), Max:99.6 °F (37.6 °C)    No intake/output data recorded. Physical Exam:    Not completed. Patient not seen.     ASSESSMENT:    Principal Problem:    Hypotension (3/15/2022)    Active Problems:    Malignant neoplasm of body of pancreas ([address] (2021)      Severe protein-calorie malnutrition ([address] (3/4/2022)      FTT (failure to thrive) in adult (3/15/2022)      Bradycardia (3/19/2022)      LBBB (left bundle branch block) (3/19/2022)      Chronic systolic congestive heart failure ([address] (3/21/2022)      Acute respiratory failure with hypoxia ([address] (2022)      Bilateral pulmonary infiltrates on chest x-ray (2022)        DISPOSITION:    [facility]. Over 30 minutes was spent in discharge planning and coordination of care.          [facility] [address] Hematology & Oncology  [address] [address]  Office : [phone]  Fax : [phone]       Attending Addendum:  I have reviewed and agree with the care plan as documented by Leyda Sotelo MD  [facility] Hematology/Oncology  [address] [place] [address]  Office : [phone]  Fax : [phone]

## 2022-04-06 NOTE — PROGRESS NOTES
Pt d/c home this am with [facility]. Claude Learn via transport and packet completed with DNR by [facility]. Care Management Interventions  PCP Verified by CM:  Yes (Dr Heydi Lane)  Transition of Care Consult (CM Consult): [id number]: Yes  [facility]: Yes  MyChart Signup: No  Discharge Durable Medical Equipment: No  Physical Therapy Consult: No  Occupational Therapy Consult: No  Speech Therapy Consult: No  Support Systems: Child(betsy),Friend/Neighbor  Confirm Follow Up Transport: Family  Discharge Location  Patient Expects to be Discharged to[de-identified] Home with hospice [facility])

## 2022-04-06 NOTE — PROGRESS NOTES
Occupational Therapy Note:    OT is being discontinued at this time d/t patient going home on hospice.     Marilyn Leon, OTR/L, CLT

## 2022-11-10 NOTE — PROGRESS NOTES
Arrived to the [facility]. Oxaliplatin infusing. Irinotecan/leucovorin infusing. Patient tolerating well. Any issues or concerns during appointment: no.  Patient aware of next infusion appointment on 12/2/2021 (date) at 4:30 pm (time). Patient aware of next lab and [facility] office visit on 12/14/2021 (date) at 8 am (time). Report given to Sandrine Valladares, [facility].
Report from Andra Bennett RN. Continuous chemo pump connected and instructions reviewed. Patient discharged ambulatory in stable condition.
Detail Level: Simple
Detail Level: Zone
Detail Level: Detailed

## 2022-11-22 NOTE — PROCEDURES
Department of Interventional Radiology  [phone]        Interventional Radiology Brief Procedure Note    Patient: Theresa Calix MRN: [id number]  SSN: [id number]    YOB: 1955  Age: 78 y.o. Sex: male      Date of Procedure: 3/3/2022    Pre-Procedure Diagnosis: Recurrent ascites    Post-Procedure Diagnosis: SAME    Procedure(s): Tunneled peritoneal drain placement    Brief Description of Procedure: US and fluoro guided tunneled peritoneal drain (PleurX) placement, RLQ. Fluid sent for lab. Performed By: Marina Harmon MD     Assistants: None    Anesthesia:Moderate Sedation    Estimated Blood Loss: Less than 10ml    Specimens:  Sent to lab    Implants: As above    Findings: Large volume ascites.      Complications: None    Signed By: Marina Harmon MD     March 3, 2022 Goal Outcome Evaluation:  Plan of Care Reviewed With: patient        Progress: no change  Outcome Evaluation: Patient recieved a new IV this morning. Participated with PT this afternoon. No acute events occurred during shift. Will continue to monitor.

## 2022-12-02 NOTE — CONSULTS
Palliative Care    Patient: Ashley Her MRN: [id number]  SSN: [id number]    YOB: 1955  Age: 78 y.o. Sex: male       Date of Request: 3/21/22  Date of Consult:  3/22/2022  Reason for Consult:  goals of care  Requesting Physician: TRACI Villalpando     Assessment/Plan:     Principal Diagnosis:   Pain, abdomen  R10.9    Additional Diagnoses:   · Advance Care Planning Counseling Z71.89  · Failure to Thrive  R62.7  · Malnutrition, Chronic  E46  · Nausea/Vomiting  R11.2  · Encounter for Palliative Care  Z51.5  · DNR    Palliative Performance Scale (PPS):       Medical Decision Making:   Reviewed and summarized notes from admission to present. Discussed case with appropriate providers:   Reviewed laboratory and x-ray data from admission to present. Pt resting in bed, no visitors present. Mr Ivette Loyd denied abdominal pain, but is aware that IV and PO meds are available if needed. He said that he had needed medicine for nausea earlier in the day, which had been effective. He denied SOB, and other discomforts. In discussing his wishes for his care, Mr Ivette Loyd stated that he would not want be intubated or be given CPR. He agreed that he wanted to change his code status to DNR, and this was done. The pt said that he wants his first surrogate medical decision maker to be his son Richard Montalvo, and the alternate surrogate decision maker to be his \"50-year friend\" Ladonna Villareal. He has another son, but does not wish to include him on the list of surrogate medical decision makers. Mr Ivette Loyd was not interested in completing a HCPOA document. In terms of assistance at home, Mr Ivette Loyd said that his DIL, Angelica Lizarraga, wife of the son not included as a surrogate medical decision maker, has been an enormous help, and he expects that she will be able to continue assisting him. The pt lives alone.     Mr Ivette Loyd confirmed that he wishes to continue receiving treatments for his cancer, explaining that he still enjoys each day. This comment provided an opportunity to promote the value of hospice, if and when the time comes that the pt finds that each day has become a burden. The pt nodded, but did not wish to discuss it further. Will discuss findings with members of the interdisciplinary team.      Thank you for this referral.          .    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 He was previously hospitalized from 3/2 - 3/7/22 for placement of Pleurx. TPN was started on 3/16/22. Advance Directive: No       Code Status:  Full Code            Health Care Power of : No - Patient does not have a [address]     Past Medical History:   Diagnosis Date    Back pain     followed by pain management    Chronic pain     left-sided, lower back pain    CINV (chemotherapy-induced nausea and vomiting) 4/20/2021    Erectile dysfunction     Gastritis     GERD (gastroesophageal reflux disease)     daily medication    Hiatal hernia     \"medium\"    History of anemia     Hypertension     situational; has Amlodipine to take if systolic >676    Left bundle branch block (LBBB) on electrocardiogram 02/05/2021    Malignant neoplasm of body of pancreas ([address]     Malignant neoplasm of esophagus ([address] 12/07/2020    Morbid obesity (HCC)     Nausea     takes antiemetic med prn      Past Surgical History:   Procedure Laterality Date    HX APPENDECTOMY      HX CHOLECYSTECTOMY      HX COLONOSCOPY  12/01/2020    with EGD    HX VASCULAR ACCESS      IR INSERT CATH PLEURAL INDWELL  3/3/2022    IR PARACENTESIS ABD W IMAGE  2022     Family History   Problem Relation Age of Onset    Cancer Mother     No Known Problems Father     Cancer Sister       Social History     Tobacco Use    Smoking status: Former Smoker     Packs/day: 1.50     Years: 15.00     Pack years: 22.50     Quit date: 1974     Years since quittin.3    Smokeless tobacco: Never Used   Substance Use Topics    Alcohol use: Yes     Comment: socially     Prior to Admission medications    Medication Sig Start Date End Date Taking? Authorizing Provider   HYDROmorphone (DILAUDID) 4 mg tablet Take 1 Tablet by mouth every four (4) hours as needed for Pain for up to 14 days. Max Daily Amount: 24 mg. 3/7/22 3/21/22  Quincy Levine NP   tamsulosin (FLOMAX) 0.4 mg capsule Take 1 Capsule by mouth daily. 3/6/22   Kory Raphael NP   lactulose (CHRONULAC) 10 gram/15 mL solution Take 45 mL by mouth three (3) times daily. 22   Ana Maria Madrigal MD   promethazine (PHENERGAN) 25 mg tablet Take 1 Tablet by mouth every six (6) hours as needed for Nausea. 22   Ana Maria Madrigal MD   ondansetron hcl (Zofran) 8 mg tablet Take 1 Tablet by mouth every eight (8) hours as needed for Nausea. Indications: nausea and vomiting caused by cancer drugs 22   Ana Maria Madrigal MD   diphenoxylate-atropine (LomotiL) 2.5-0.025 mg per tablet Take 1 Tablet by mouth four (4) times daily as needed for Diarrhea. Max Daily Amount: 4 Tablets. 21   Aristeo Contreras NP   sildenafil citrate (Viagra) 100 mg tablet Take 1 Tablet by mouth as needed (erectile dysfunction). 21   Diana Zepeda MD   DISABLED MATEO (DISABLED [place]) DMV Use as directed for poor ambulation 21   Diana Zepeda MD   omeprazole (PRILOSEC) 40 mg capsule Take 1 Capsule by mouth daily. 21   Diana Zepeda MD   famotidine (PEPCID) 20 mg tablet Take 1 Tablet by mouth two (2) times a day.  21   Diana Zepeda MD   prochlorperazine (Compazine) 10 mg tablet Take 1 Tablet by mouth every six (6) hours as needed for Nausea. Indications: nausea and vomiting caused by cancer drugs, nausea and vomiting 7/27/21   Dwight Guardado NP   potassium chloride (K-DUR, KLOR-CON) 20 mEq tablet Take 1 Tablet by mouth daily. 6/1/21   Richard Garsia MD   naproxen sodium (ALEVE) 220 mg cap Take 1 Capsule by mouth daily as needed for Pain. For back pain    Indications: pain    Provider, Historical   DISABLED PLACARD (DISABLED PLACARD) DMV Handicap Placard, for poor ambuation. 7/22/19   Uzma Zuluaga MD       No Known Allergies     Review of Systems:  A comprehensive review of systems was negative except as noted above. Objective:     Visit Vitals  /73 (BP 1 Location: Left upper arm, BP Patient Position: Lying)   Pulse 97   Temp 97.2 °F (36.2 °C)   Resp 18   Ht 5' 6\" (1.676 m)   Wt 175 lb 14.4 oz (79.8 kg)   SpO2 97%   BMI 28.39 kg/m²        Physical Exam:    General:  Cooperative. No acute distress. Eyes:  Conjunctivae/corneas clear    Nose: Nares normal. Septum midline. Neck: Supple, symmetrical, trachea midline, no JVD   Lungs:   Clear to auscultation bilaterally, unlabored   Heart:  Regular rate and rhythm, no murmur    Abdomen:   Soft, non-tender, non-distended   Extremities: Normal, atraumatic, no cyanosis or edema   Skin: Skin color, texture, turgor normal. No rash or lesions.    Neurologic: Nonfocal   Psych: Alert and oriented      Assessment:     Hospital Problems  Date Reviewed: 3/15/2022          Codes Class Noted POA    Chronic systolic congestive heart failure ([facility] ICD-10-CM: I50.22  ICD-9-CM: 428.22, 428.0  3/21/2022 Unknown        Bradycardia ICD-10-CM: R00.1  ICD-9-CM: 427.89  3/19/2022 Unknown        LBBB (left bundle branch block) ICD-10-CM: I44.7  ICD-9-CM: 426.3  3/19/2022 Unknown        FTT (failure to thrive) in adult ICD-10-CM: R62.7  ICD-9-CM: 783.7  3/15/2022 Unknown        * (Principal) Hypotension ICD-10-CM: I95.9  ICD-9-CM: 458.9  3/15/2022 Unknown        Severe protein-calorie malnutrition ([facility] ICD-10-CM: E43  ICD-9-CM: 955  3/4/2022 Yes        Malignant neoplasm of body of pancreas ([facility] ICD-10-CM: C25.1  ICD-9-CM: 157.1  1/5/2021 Yes              Signed By: Edgardo Moreno NP     March 22, 2022 oriented to person, place, time and situation

## 2024-11-27 NOTE — PROGRESS NOTES
Comprehensive Nutrition Assessment    Type and Reason for Visit: Reassess  TPN Management (oncology)    Nutrition Recommendations/Plan:   Parenteral Nutrition:  Total parenteral nutrition  to begin at 1800  Continue: Dex 14%, 8% AA 1.56 L (65ml/hr)   Continue 16 hr cyclic infusion. Infuse at 60 ml/hr from 6863-3994, then increase to 103 ml/hr and infuse from 0573-7007, then decrease to 60 ml/hr from 8125-7099, then discontinue  Continue 250 ml 20% lipids daily  Lytes/L:    Sodium 150 meq (50 meq NaCl, 100 meq NaAcetate), Potassium 35 meq (35 meq KPO4), 5 meq Mg, 4.5 meq Calcium  Other additives: MTE, MVI MWF due to national shortages  Formula remains ~1:2 Cl:Acetate  Nutritional Supplement Therapy:   Active electrolyte replacement per nutrition support protocols  Replacement indicated:  None  Labs:   CMP daily  Mg daily per provider  Phos MWF    POC Glucoses/SSI Not indicated     Malnutrition Assessment:  Malnutrition Status: Severe malnutrition  Context: Chronic illness  Findings of clinical characteristics of malnutrition:   Energy Intake:  7 - 75% or less est energy requirements for 1 month or longer  Weight Loss:  7.0 - Greater than 7.5% over 3 months (34# (18.4%) )     Body Fat Loss:  1 - Mild body fat loss, Buccal region,Orbital,Triceps   Muscle Mass Loss:  1 - Mild muscle mass loss, Calf (gastrocnemius),Hand (interosseous),Scapula (trapezius),Temples (temporalis)  Fluid Accumulation:  No significant fluid accumulation,     Strength:  Not performed     Nutrition Assessment:   Nutrition History: Per RD assessment patient was able to maintain PO intake and UBW throughout most of chemo. During admmission early March patient had \"stopped eating\" due to nausea. Upon assessment this admission patient reports no PO of foods for ~4 weeks. States that he has been able to tolerate some fluids. He reports continued nausea and vomiting as primary barrier.        Nutrition Background: Patient with pancreatic and Pt verified by name and .  Pt calling states her DHEA and Addyi are not covered by insurance.  Nurse discussed with pt sending rx to [facility] pharmacy.  Pt is agreeable.  Message sent to Mitesh Guerrero to place order.  Pt has no questions at this time.   esophageal cancer, Amos's esophagus, obesity, HTN, GERD, gastritis, ascites and peritoneal carcinomatosis s/p Plurex drain. He presented to oncology office extremely sick, BP was unable to be measured. He was admitted directly to [facility]. Nutrition Interval:  PO intake continues to limited due to early satiety and persistent nausea and likely secondary to peritoneal carcinomatosis, recurrent ascites with Plurex drain. Patient has demonstrated inability to meet needs orally with severe weight loss and malnutrition as above. Intake trends since admission reveal daily PO tolerance of declining now less than 1 meal per day which is not sufficient to sustain weight, functional status, or life. Abdominal pleurex replaced 3/22: 2250 ml removed. Remains TPN dependent d/t peritoneal carcinomatosis, severe malnutrition. Patient seen reclined in bed, vomiting and coughing into emesis bag. He states he does not feel good today. He states he was able to tolerate a donut and whole milk yesterday. He states he ate ~50% of meatloaf and mashed potatoes last night but vomited all of it shortly after. He states that the RN took 3L off via Plurex drain this am. TPN infusing at 103 ml/hr. Discussed with RN Ioana Guzman, and she reports ~1.5 L removed this am. Weighed patient post plurex drain with weight below. Discussed with RN current infusion rate and recommended decreasing to 60 ml/hr now and allow to run for ~30 minutes prior to stopping. Abdominal Status (last documented): Diarrhea,Tender,Nausea abdomen with Active  bowel sounds. Last BM 03/28/22.   Pertinent Medications: maxipime, pepcid, protonix, Zofran being utilized multiple times daily, Compazine daily since 3/27, Phenergan PRN, Mylanta, Tums, Lomotil PRN, Imodium PRN, Lasix 20 mg this am  Electrolyte replacement: 3/23: 20 mmol NaPhos  Pertinent Labs:   Lab Results   Component Value Date/Time    Sodium 137 (L) 03/30/2022 03:06 AM    Potassium 3.8 03/30/2022 03:06 AM Chloride 106 03/30/2022 03:06 AM    CO2 25 03/30/2022 03:06 AM    Anion gap 6 (L) 03/30/2022 03:06 AM    Glucose 106 (H) 03/30/2022 03:06 AM    BUN 28 (H) 03/30/2022 03:06 AM    Creatinine 0.50 (L) 03/30/2022 03:06 AM    Calcium 8.0 (L) 03/30/2022 03:06 AM    Albumin 1.1 (L) 03/30/2022 03:06 AM    Magnesium 2.4 03/30/2022 03:06 AM    Phosphorus 2.5 03/30/2022 03:06 AM   Na low to low normal, K stable, CO2 stable     Nutrition Related Findings:   Port in place, also with 1 PIV. TPN intiated 3/16. TPN increased in volume 3/19. TPN to be concentrated and volume decreased 3/22 per NP request due to new CHF. TPN changed to 18 hr cyclic infusion 1/81. TPN condensed to 16 hr infusion 3/29.       Current Nutrition Therapies:  ADULT DIET Regular  Current Parenteral Nutrition Orders:  · Type and Formula: Dex 14%, 8% AA    · Lipids: 250ml,Daily  · Duration: Cyclic (16 hr infusion)  · Rate/Volume: 1.56 L (65ml/hr)  · Current PN Order Provides: infusing per current order  · Goal PN Orders Provides: 1742 kcal/d (100% of needs), 125 grams of protein/d (100% of needs), 218 grams of CHO/d and 1810 ml of total volume/d    Current Intake:   Average Meal Intake: 1-25% Average Supplement Intake: None ordered      Anthropometric Measures:  Height: 5' 6\" (167.6 cm)  Current Body Wt: 80.5 kg (177 lb 7.5 oz) (3/30 after Pleurx drainage), Weight source: Bed scale  BMI: 28.7,  (current BMI skewed by fluid)  Admission Body Weight: 148 lb 9.4 oz  Ideal Body Weight (lbs) (Calculated): 142 lbs (65 kg), 104.6 %  Usual Body Wt: 82.6 kg (182 lb) (12/14/21 office wt and per previous history), Percent weight change: -18.4          Edema: LLE: 2+; Pitting (3/29/2022  7:20 PM)  RLE: 2+; Pitting (3/29/2022  7:20 PM)     Estimated Daily Nutrient Needs:  Energy (kcal/day): 1246-2434 (Kcal/kg (25-30), Weight Used: Current (67.4 kg (3/15))  Protein (g/day):  (1.3-1.5 g/kg) Weight Used: (Admission (67.3 kg))  Fluid (ml/day):   (1 ml/kcal)    Nutrition Diagnosis:   · Inadequate oral intake related to altered GI function,early satiety (peritoneal carcinomatosis ) as evidenced by  (poor oral tolerance, wt loss, requires TPN for primary needs)    · Severe malnutrition related to catabolic illness as evidenced by  (malnutrition criteria as above)    Nutrition Interventions:   Food and/or Nutrient Delivery: Continue current diet,Modify parenteral nutrition     Coordination of Nutrition Care: Continue to monitor while inpatient    Goals:   Previous Goal Met: Goal(s) achieved  Active Goal: Continue to tolerate cyclic TPN    Nutrition Monitoring and Evaluation:      Food/Nutrient Intake Outcomes: Food and nutrient intake,Parenteral nutrition intake/tolerance  Physical Signs/Symptoms Outcomes: Biochemical data,GI status,Fluid status or edema,Meal time behavior,Weight    Discharge Planning:    Continue oral nutrition supplement,Parenteral nutrition    Dina Jordan RD, , LD on 3/30/2022 at 11:17 AM  Contact: [phone]
